# Patient Record
Sex: FEMALE | Race: WHITE | Employment: UNEMPLOYED | URBAN - METROPOLITAN AREA
[De-identification: names, ages, dates, MRNs, and addresses within clinical notes are randomized per-mention and may not be internally consistent; named-entity substitution may affect disease eponyms.]

---

## 2017-02-16 LAB — EF %, EXTERNAL: NORMAL

## 2017-02-22 LAB
CREATININE, EXTERNAL: 0.83
HBA1C MFR BLD HPLC: 7.5 %

## 2017-04-03 RX ORDER — FLUTICASONE PROPIONATE 50 MCG
SPRAY, SUSPENSION (ML) NASAL
Qty: 48 G | Refills: 4 | Status: SHIPPED | OUTPATIENT
Start: 2017-04-03 | End: 2017-09-14

## 2017-04-28 ENCOUNTER — OFFICE VISIT (OUTPATIENT)
Dept: INTERNAL MEDICINE CLINIC | Age: 47
End: 2017-04-28

## 2017-04-28 VITALS
TEMPERATURE: 98.5 F | OXYGEN SATURATION: 100 % | DIASTOLIC BLOOD PRESSURE: 70 MMHG | HEART RATE: 65 BPM | RESPIRATION RATE: 16 BRPM | SYSTOLIC BLOOD PRESSURE: 110 MMHG | WEIGHT: 140.8 LBS | HEIGHT: 55 IN | BODY MASS INDEX: 32.59 KG/M2

## 2017-04-28 DIAGNOSIS — Z13.39 SCREENING FOR ALCOHOLISM: ICD-10-CM

## 2017-04-28 DIAGNOSIS — I10 ESSENTIAL HYPERTENSION: Chronic | ICD-10-CM

## 2017-04-28 DIAGNOSIS — Z12.31 SCREENING MAMMOGRAM, ENCOUNTER FOR: ICD-10-CM

## 2017-04-28 DIAGNOSIS — E03.9 ACQUIRED HYPOTHYROIDISM: ICD-10-CM

## 2017-04-28 DIAGNOSIS — B35.3 TINEA PEDIS OF BOTH FEET: ICD-10-CM

## 2017-04-28 DIAGNOSIS — Z00.00 ROUTINE GENERAL MEDICAL EXAMINATION AT A HEALTH CARE FACILITY: Primary | ICD-10-CM

## 2017-04-28 RX ORDER — PRENATAL VIT 91/IRON/FOLIC/DHA 28-975-200
COMBINATION PACKAGE (EA) ORAL 2 TIMES DAILY
Qty: 30 G | Refills: 0 | Status: SHIPPED | OUTPATIENT
Start: 2017-04-28 | End: 2017-09-14

## 2017-04-28 NOTE — PROGRESS NOTES
Chief Complaint   Patient presents with    Complete Physical     Goals that were addressed/or need to be completed after this visit:  Health Maintenance Due   Topic Date Due    DTaP/Tdap/Td series (1 - Tdap) 08/03/1990    EYE EXAM RETINAL OR DILATED Q1  04/22/2016    MEDICARE YEARLY EXAM  08/04/2016    FOOT EXAM Q1  08/04/2016    LIPID PANEL Q1  10/29/2016    MICROALBUMIN Q1  05/04/2017     Health Maintenance:  Glaucoma Screening: up to date - record requested from Dr. Leonel Rosales. Immunizations:   Tetanus: confirmed w/ pharmacy [facility] on 1/15/2016 - updated in . Influenza: up to date. Annual Wellness Visit: - performed today. Diabetic Foot Exam:  Order placed. Labs:    Lipid:  Order placed. Microalbumin:  Order placed.

## 2017-04-28 NOTE — MR AVS SNAPSHOT
Visit Information Date & Time Provider Department Dept. Phone Encounter #  
 4/28/2017  4:30 PM Prasad Boyle MD [address] [address] Internal Medicine [phone] [id number] Upcoming Health Maintenance Date Due  
 EYE EXAM RETINAL OR DILATED Q1 4/22/2016 MEDICARE YEARLY EXAM 8/4/2016 FOOT EXAM Q1 8/4/2016 LIPID PANEL Q1 10/29/2016 MICROALBUMIN Q1 5/4/2017 HEMOGLOBIN A1C Q6M 8/22/2017 DTaP/Tdap/Td series (2 - Td) 1/15/2026 Allergies as of 4/28/2017  Review Complete On: 4/28/2017 By: Lydia Fajardo LPN Severity Noted Reaction Type Reactions Acetaminophen  04/29/2013    Other (comments) Patient has implanted glucometer. Glucometer does not work properly when patient takes acetaminophen. Codeine  03/01/2010    Nausea and Vomiting Nitrofurantoin  10/30/2010    Rash Pcn [Penicillins]  03/01/2010    Hives, Rash Current Immunizations  Reviewed on 6/10/2016 Name Date Influenza Vaccine 10/1/2015 Influenza Vaccine Whole 10/2/2012 Pneumococcal Vaccine (Unspecified Type) 1/1/2007 Tdap 1/15/2016 Not reviewed this visit You Were Diagnosed With   
  
 Codes Comments Uncontrolled type 1 diabetes mellitus without complication (HCC)    -  Primary ICD-10-CM: E10.9 ICD-9-CM: 250.03 Essential hypertension     ICD-10-CM: I10 
ICD-9-CM: 401.9 Acquired hypothyroidism     ICD-10-CM: E03.9 ICD-9-CM: 244.9 Tinea pedis of both feet     ICD-10-CM: B35.3 ICD-9-CM: 110.4 Screening mammogram, encounter for     ICD-10-CM: Z12.31 
ICD-9-CM: V76.12 Routine general medical examination at a health care facility     ICD-10-CM: Z00.00 ICD-9-CM: V70.0 Screening for alcoholism     ICD-10-CM: Z13.89 ICD-9-CM: V79.1 Vitals BP Pulse Temp Resp Height(growth percentile) Weight(growth percentile) 110/70 65 98.5 °F (36.9 °C) (Oral) 16 4' 6.5\" (1.384 m) 140 lb 12.8 oz (63.9 kg) LMP SpO2 BMI OB Status Smoking Status 03/27/1986 100% 33.33 kg/m2 Hysterectomy Never Smoker Vitals History BMI and BSA Data Body Mass Index Body Surface Area  
 33.33 kg/m 2 1.57 m 2 Preferred Pharmacy Pharmacy Name Phone [address] [facility], [address] [facility] [address] Rohan Valadez Your Updated Medication List  
  
   
This list is accurate as of: 4/28/17  5:15 PM.  Always use your most recent med list.  
  
  
  
  
  insulin pump Misc Commonly known as:  PATIENT SUPPLIED  
by SubCUTAneous route as needed. ADVIL 200 mg tablet Generic drug:  ibuprofen Take 400 mg by mouth every six (6) hours as needed for Pain. albuterol 2.5 mg /3 mL (0.083 %) nebulizer solution Commonly known as:  PROVENTIL VENTOLIN  
3 mL by Nebulization route every four (4) hours as needed for Wheezing and Shortness of Breath. aspirin delayed-release 81 mg tablet Take 81 mg by mouth daily. biotin 500 mcg Cap Take 1 Cap by mouth daily. Cetirizine 10 mg Cap Commonly known as:  ZyrTEC Take 1 Tab by mouth daily. fluticasone 50 mcg/actuation nasal spray Commonly known as:  Creasie Ingram Use 2 sprays in each  nostril daily HumaLOG 100 unit/mL injection Generic drug:  insulin lispro  
by SubCUTAneous route. Sliding scale per Endo  
  
 levothyroxine 112 mcg tablet Commonly known as:  SYNTHROID Take 1 tablet by mouth  daily before breakfast  
  
 minocycline 50 mg capsule Commonly known as:  Landon Ruck Take 1 capsule by mouth  daily  
  
 terbinafine HCl 1 % topical cream  
Commonly known as:  LAMISIL Apply  to affected area two (2) times a day. VITAMIN D2 PO Take 50,000 Int'l Units by mouth every seven (7) days. Prescriptions Sent to Pharmacy Refills  
 terbinafine HCl (LAMISIL) 1 % topical cream 0 Sig: Apply  to affected area two (2) times a day.   
 Class: Normal  
 Pharmacy: [address], [address]  #: [phone] Route: Topical  
  
We Performed the Following CBC WITH AUTOMATED DIFF [63607 CPT(R)] HEPATIC FUNCTION PANEL [25891 CPT(R)]  DIABETES FOOT EXAM [HM7 Custom] LIPID PANEL [96228 CPT(R)] MICROALBUMIN, UR, RAND W/ MICROALBUMIN/CREA RATIO [id number] CPT(R)] UA/M W/RFLX CULTURE, ROUTINE [[id number] Custom] To-Do List   
 04/28/2017 Imaging:  [facility] MAMMO BI SCREENING INCL CAD Patient Instructions Medicare Wellness Visit, Female The best way to live healthy is to have a healthy lifestyle by eating a well-balanced diet, exercising regularly, limiting alcohol and stopping smoking. Regular physical exams and screening tests are another way to keep healthy. Preventive exams provided by your health care provider can find health problems before they become diseases or illnesses. Preventive services including immunizations, screening tests, monitoring and exams can help you take care of your own health. All people over age 72 should have a pneumovax  and and a prevnar shot to prevent pneumonia. These are once in a lifetime unless you and your provider decide differently. All people over 65 should have a yearly flu shot and a tetanus vaccine every 10 years. A bone mass density to screen for osteoporosis or thinning of the bones should be done every 2 years after 65. Screening for diabetes mellitus with a blood sugar test should be done every year. Glaucoma is a disease of the eye due to increased ocular pressure that can lead to blindness and it should be done every year by an eye professional. 
 
Cardiovascular screening tests that check for elevated lipids (fatty part of blood) which can lead to heart disease and strokes should be done every 5 years.  
 
Colorectal screening that evaluates for blood or polyps in your colon should be done yearly as a stool test or every five years as a flexible sigmoidoscope or every 10 years as a colonoscopy up to age 76. Breast cancer screening with a mammogram is recommended biennially  for women age 54-69. Screening for cervical cancer with a pap smear and pelvic exam is recommended for women after age 72 years every 2 years up to age 79 or when the provider and patient decide to stop. If there is a history of cervical abnormalities or other increased risk for cancer then the test is recommended yearly. Hepatitis C screening is also recommended for anyone born between [birth year] through [address]. A shingles vaccine is also recommended once in a lifetime after age 61. Your Medicare Wellness Exam is recommended annually. Here is a list of your current Health Maintenance items with a due date: 
Health Maintenance Due Topic Date Due Won Rivas Eye Exam  04/22/2016 Won Rivas Annual Well Visit  08/04/2016 Won Rivas Diabetic Foot Care  08/04/2016  Cholesterol Test   10/29/2016  Albumin Urine Test  05/04/2017 Introducing \[facility]\"" & HEALTH SERVICES! Dear Casandra Castellanos: Thank you for requesting a [employer] account. Our records indicate that you already have an active [employer] account. You can access your account anytime at [email or web address] Did you know that you can access your hospital and ER discharge instructions at any time in [employer]? You can also review all of your test results from your hospital stay or ER visit. Additional Information If you have questions, please visit the Frequently Asked Questions section of the [employer] website at [email or web address] Remember, [employer] is NOT to be used for urgent needs. For medical emergencies, dial 911. Now available from your iPhone and Android! Please provide this summary of care documentation to your next provider. Your primary care clinician is listed as Isabella [phone]  If you have any questions after today's visit, please call [phone].

## 2017-04-28 NOTE — PATIENT INSTRUCTIONS

## 2017-04-28 NOTE — PROGRESS NOTES
This is an Initial Medicare Annual Wellness Exam (AWV) (Performed 12 months after IPPE or effective date of Medicare Part B enrollment, Once in a lifetime)  Also for followup of DM, thyroid, and recent URI. Has suffered a cough for the last 10 days or so. Is better now. Minimal sputum. No fever or chills. No PND or Orthopnea. No change in bowels or bladder. I have reviewed the patient's medical history in detail and updated the computerized patient record. History     Past Medical History:   Diagnosis Date    Allergic rhinitis, cause unspecified     Asthma     Diabetes ([facility] [address]     Down's syndrome     H/O impacted cerumen     Dr. Verena Haley Hypothyroid     Thyroid disease       Past Surgical History:   Procedure Laterality Date    HX HEENT Bilateral 2017    prior in one eye    HX HEENT Left 3/5/15    cataract left and right    HX HYSTERECTOMY       Current Outpatient Prescriptions   Medication Sig Dispense Refill    terbinafine HCl (LAMISIL) 1 % topical cream Apply  to affected area two (2) times a day. 30 g 0    fluticasone (FLONASE) 50 mcg/actuation nasal spray Use 2 sprays in each  nostril daily 48 g 4    levothyroxine (SYNTHROID) 112 mcg tablet Take 1 tablet by mouth  daily before breakfast 90 Tab 3    Cetirizine (ZYRTEC) 10 mg cap Take 1 Tab by mouth daily. 90 Cap 1    insulin lispro (HUMALOG) 100 unit/mL injection by SubCUTAneous route. Sliding scale per Endo      ibuprofen (ADVIL) 200 mg tablet Take 400 mg by mouth every six (6) hours as needed for Pain.   insulin pump (PATIENT SUPPLIED) misc by SubCUTAneous route as needed.  biotin 500 mcg cap Take 1 Cap by mouth daily.  albuterol (PROVENTIL VENTOLIN) 2.5 mg /3 mL (0.083 %) nebulizer solution 3 mL by Nebulization route every four (4) hours as needed for Wheezing and Shortness of Breath. 60 Each 2    aspirin delayed-release 81 mg tablet Take 81 mg by mouth daily.       ERGOCALCIFEROL (VITAMIN D PO) Take 50,000 Int'l Units by mouth every seven (7) days.  minocycline (MINOCIN, DYNACIN) 50 mg capsule Take 1 capsule by mouth  daily 90 Cap 3     Allergies   Allergen Reactions    Acetaminophen Other (comments)     Patient has implanted glucometer. Glucometer does not work properly when patient takes acetaminophen.  Codeine Nausea and Vomiting    Nitrofurantoin Rash    Pcn [Penicillins] Hives and Rash     Family History   Problem Relation Age of Onset    Thyroid Disease Mother      hypo    Hypertension Father      Social History   Substance Use Topics    Smoking status: Never Smoker    Smokeless tobacco: Never Used    Alcohol use No     Patient Active Problem List   Diagnosis Code    Down syndrome Q90.9    Acquired hypothyroidism E03.9    Anemia, unspecified D64.9    Hypoglycemia, unspecified E16.2    DM (diabetes mellitus), type 1, uncontrolled ([facility] E10.65    Syncope and collapse R55    Asthma J45.909    HTN (hypertension) I10    Acne L70.9    Advance directive on file Z78.9         Depression Risk Factor Screening:     PHQ 2 / 9, over the last two weeks 6/10/2016   Little interest or pleasure in doing things Not at all   Feeling down, depressed or hopeless Not at all   Total Score PHQ 2 0     Alcohol Risk Factor Screening: On any occasion during the past 3 months, have you had more than 3 drinks containing alcohol? No    Do you average more than 7 drinks per week? No    Functional Ability and Level of Safety:     Hearing Loss   normal-to-mild    Activities of Daily Living   Self-care. Requires assistance with: no ADLs    Fall Risk     Fall Risk Assessment, last 12 mths 8/4/2015   Able to walk? Yes   Fall in past 12 months? No     Abuse Screen   Patient is not abused    Review of Systems   A comprehensive review of systems was negative except for: seeing the endocrine MD for followup as well as good sugars when checked.      Physical Examination     No exam data present    Evaluation of Cognitive Function:  Mood/affect:  happy  Appearance: age appropriate  Family member/caregiver input: Mom and sister in the room and concur with her history. Visit Vitals    /70    Pulse 65    Temp 98.5 °F (36.9 °C) (Oral)    Resp 16    Ht 4' 6.5\" (1.384 m)    Wt 140 lb 12.8 oz (63.9 kg)    LMP 03/27/1986    SpO2 100%    BMI 33.33 kg/m2     General appearance: alert, cooperative, no distress, appears stated age  Head: Normocephalic, without obvious abnormality, atraumatic  Eyes: negative  Ears: normal TM's and external ear canals AU  Nose: Nares normal. Septum midline. Mucosa normal. No drainage or sinus tenderness. Throat: Lips, mucosa, and tongue normal. Teeth and gums normal  Neck: supple, symmetrical, trachea midline, no adenopathy, thyroid: not enlarged, symmetric, no tenderness/mass/nodules, no carotid bruit and no JVD  Back: symmetric, no curvature. ROM normal. No CVA tenderness. Lungs: clear to auscultation bilaterally  Heart: regular rate and rhythm, S1, S2 normal, no murmur, click, rub or gallop  Abdomen: soft, non-tender. Bowel sounds normal. No masses,  no organomegaly  Extremities: extremities normal, atraumatic, no cyanosis or edema  Pulses: 2+ and symmetric  Lymph nodes: Cervical, supraclavicular, and axillary nodes normal.  Neurologic: Grossly normal       Diabetic foot exam performed by Larisa Valladares MD     Measurement  Response Nurse Comment Physician Comment   Monofilament  R - 6/6  L - 6/6     Pulse DP R - present  L - present     Pulse TP R - present  L - present     Structural deformity R - Yes - absent nails  L - Yes - absent nails     Skin Integrity / Deformity R - Yes - red scaly and irritated skin on the bottom of foot  L - Yes - red and dry scaly skin on the bottom of the feet.          Reviewed by:               Patient Care Team:  Larisa Valladares MD as PCP - [address] [place] [address], RN as [address] [place] [address] (Internal Medicine)  Corinne Sage, MD as Physician (Endocrinology)  Ashkan Black MD as Physician (Ophthalmology)  Bryant Avila MD as Physician (Ophthalmology)  Kareem Bailey MD as Physician (Otolaryngology)  Harrison Lord III, MD as Physician (Dermatology)    Advice/Referrals/Counseling   Education and counseling provided:  Are appropriate based on today's review and evaluation  End-of-Life planning (with patient's consent)  Screening Mammography      Assessment/Plan   Kev Monique was seen today for complete physical.    Diagnoses and all orders for this visit:    Uncontrolled type 1 diabetes mellitus without complication ([address] - on a pump and seeing endocrine MD for followup. Controlled per her mom. -     UA/M W/RFLX CULTURE, ROUTINE  -     CBC WITH AUTOMATED DIFF  -     MICROALBUMIN, UR, RAND W/ MICROALBUMIN/CREA RATIO  -     LIPID PANEL  -     HM DIABETES FOOT EXAM  -     HEPATIC FUNCTION PANEL    Essential hypertension - BP well controlled    Acquired hypothyroidism - euthyroid. Tinea pedis of both feet - will start DAILY topical treatment and call if not better. -     terbinafine HCl (LAMISIL) 1 % topical cream; Apply  to affected area two (2) times a day. Screening mammogram, encounter for  -     [facility] MAMMO BI SCREENING INCL CAD; Future    Routine general medical examination at a health care facility    Screening for alcoholism  Down's syndrome - stable     Follow-up Disposition: 6 months and as needed. Advised her to call back or return to office if symptoms worsen/change/persist.  Discussed expected course/resolution/complications of diagnosis in detail with patient. Medication risks/benefits/costs/interactions/alternatives discussed with patient. She was given an after visit summary which includes diagnoses, current medications, & vitals. She expressed understanding with the diagnosis and plan. Jarek Kaminski

## 2017-08-21 RX ORDER — MINOCYCLINE HYDROCHLORIDE 50 MG/1
CAPSULE ORAL
Qty: 90 CAP | Refills: 3 | Status: SHIPPED | OUTPATIENT
Start: 2017-08-21 | End: 2021-01-01

## 2017-09-13 DIAGNOSIS — Z11.1 SCREENING-PULMONARY TB: Primary | ICD-10-CM

## 2017-09-14 ENCOUNTER — TELEPHONE (OUTPATIENT)
Dept: INTERNAL MEDICINE CLINIC | Age: 47
End: 2017-09-14

## 2017-09-14 ENCOUNTER — HOSPITAL ENCOUNTER (INPATIENT)
Age: 47
LOS: 2 days | Discharge: HOME | DRG: 638 | End: 2017-09-16
Attending: EMERGENCY MEDICINE | Admitting: INTERNAL MEDICINE
Payer: MEDICARE

## 2017-09-14 ENCOUNTER — APPOINTMENT (OUTPATIENT)
Dept: GENERAL RADIOLOGY | Age: 47
DRG: 638 | End: 2017-09-14
Attending: INTERNAL MEDICINE
Payer: MEDICARE

## 2017-09-14 DIAGNOSIS — E10.10 DIABETIC KETOACIDOSIS WITHOUT COMA ASSOCIATED WITH TYPE 1 DIABETES MELLITUS (HCC): Primary | ICD-10-CM

## 2017-09-14 PROBLEM — E11.10 DKA (DIABETIC KETOACIDOSES): Status: ACTIVE | Noted: 2017-09-14

## 2017-09-14 LAB
ADMINISTERED INITIALS, ADMINIT: NORMAL
ALBUMIN SERPL-MCNC: 3.2 G/DL (ref 3.5–5)
ALBUMIN/GLOB SERPL: 0.7 {RATIO} (ref 1.1–2.2)
ALP SERPL-CCNC: 98 U/L (ref 45–117)
ALT SERPL-CCNC: 24 U/L (ref 12–78)
ANION GAP SERPL CALC-SCNC: 15 MMOL/L (ref 5–15)
ANION GAP SERPL CALC-SCNC: 8 MMOL/L (ref 5–15)
ANION GAP SERPL CALC-SCNC: 9 MMOL/L (ref 5–15)
APPEARANCE UR: CLEAR
AST SERPL-CCNC: 22 U/L (ref 15–37)
ATRIAL RATE: 78 BPM
BACTERIA URNS QL MICRO: NEGATIVE /HPF
BASOPHILS # BLD: 0 K/UL (ref 0–0.1)
BASOPHILS # BLD: 0 K/UL (ref 0–0.1)
BASOPHILS NFR BLD: 0 % (ref 0–1)
BASOPHILS NFR BLD: 0 % (ref 0–1)
BILIRUB SERPL-MCNC: 0.6 MG/DL (ref 0.2–1)
BILIRUB UR QL: NEGATIVE
BUN SERPL-MCNC: 29 MG/DL (ref 6–20)
BUN SERPL-MCNC: 30 MG/DL (ref 6–20)
BUN SERPL-MCNC: 38 MG/DL (ref 6–20)
BUN/CREAT SERPL: 30 (ref 12–20)
BUN/CREAT SERPL: 33 (ref 12–20)
BUN/CREAT SERPL: 33 (ref 12–20)
CALCIUM SERPL-MCNC: 8.1 MG/DL (ref 8.5–10.1)
CALCIUM SERPL-MCNC: 8.1 MG/DL (ref 8.5–10.1)
CALCIUM SERPL-MCNC: 9.4 MG/DL (ref 8.5–10.1)
CALCULATED P AXIS, ECG09: 43 DEGREES
CALCULATED R AXIS, ECG10: 47 DEGREES
CALCULATED T AXIS, ECG11: 31 DEGREES
CHLORIDE SERPL-SCNC: 100 MMOL/L (ref 97–108)
CHLORIDE SERPL-SCNC: 104 MMOL/L (ref 97–108)
CHLORIDE SERPL-SCNC: 96 MMOL/L (ref 97–108)
CO2 SERPL-SCNC: 17 MMOL/L (ref 21–32)
CO2 SERPL-SCNC: 24 MMOL/L (ref 21–32)
CO2 SERPL-SCNC: 25 MMOL/L (ref 21–32)
COLOR UR: ABNORMAL
CREAT SERPL-MCNC: 0.88 MG/DL (ref 0.55–1.02)
CREAT SERPL-MCNC: 0.9 MG/DL (ref 0.55–1.02)
CREAT SERPL-MCNC: 1.25 MG/DL (ref 0.55–1.02)
D50 ADMINISTERED, D50ADM: 0 ML
D50 ORDER, D50ORD: 0 ML
DIAGNOSIS, 93000: NORMAL
DIFFERENTIAL METHOD BLD: ABNORMAL
EOSINOPHIL # BLD: 0 K/UL (ref 0–0.4)
EOSINOPHIL # BLD: 0.1 K/UL (ref 0–0.4)
EOSINOPHIL NFR BLD: 0 % (ref 0–7)
EOSINOPHIL NFR BLD: 1 % (ref 0–7)
EPITH CASTS URNS QL MICRO: ABNORMAL /LPF
ERYTHROCYTE [DISTWIDTH] IN BLOOD BY AUTOMATED COUNT: 12.5 % (ref 11.5–14.5)
ERYTHROCYTE [DISTWIDTH] IN BLOOD BY AUTOMATED COUNT: 12.8 % (ref 11.5–14.5)
EST. AVERAGE GLUCOSE BLD GHB EST-MCNC: 183 MG/DL
GLOBULIN SER CALC-MCNC: 4.9 G/DL (ref 2–4)
GLSCOM COMMENTS: NORMAL
GLUCOSE BLD STRIP.AUTO-MCNC: 128 MG/DL (ref 65–100)
GLUCOSE BLD STRIP.AUTO-MCNC: 128 MG/DL (ref 65–100)
GLUCOSE BLD STRIP.AUTO-MCNC: 142 MG/DL (ref 65–100)
GLUCOSE BLD STRIP.AUTO-MCNC: 149 MG/DL (ref 65–100)
GLUCOSE BLD STRIP.AUTO-MCNC: 180 MG/DL (ref 65–100)
GLUCOSE BLD STRIP.AUTO-MCNC: 185 MG/DL (ref 65–100)
GLUCOSE BLD STRIP.AUTO-MCNC: 196 MG/DL (ref 65–100)
GLUCOSE BLD STRIP.AUTO-MCNC: 205 MG/DL (ref 65–100)
GLUCOSE BLD STRIP.AUTO-MCNC: 220 MG/DL (ref 65–100)
GLUCOSE BLD STRIP.AUTO-MCNC: 248 MG/DL (ref 65–100)
GLUCOSE BLD STRIP.AUTO-MCNC: 312 MG/DL (ref 65–100)
GLUCOSE BLD STRIP.AUTO-MCNC: 501 MG/DL (ref 65–100)
GLUCOSE SERPL-MCNC: 159 MG/DL (ref 65–100)
GLUCOSE SERPL-MCNC: 186 MG/DL (ref 65–100)
GLUCOSE SERPL-MCNC: 531 MG/DL (ref 65–100)
GLUCOSE UR STRIP.AUTO-MCNC: >1000 MG/DL
GLUCOSE, GLC: 128 MG/DL
GLUCOSE, GLC: 128 MG/DL
GLUCOSE, GLC: 142 MG/DL
GLUCOSE, GLC: 147 MG/DL
GLUCOSE, GLC: 180 MG/DL
GLUCOSE, GLC: 185 MG/DL
GLUCOSE, GLC: 196 MG/DL
GLUCOSE, GLC: 205 MG/DL
GLUCOSE, GLC: 248 MG/DL
GLUCOSE, GLC: 312 MG/DL
HBA1C MFR BLD: 8 % (ref 4.2–6.3)
HCG SERPL QL: NEGATIVE
HCT VFR BLD AUTO: 30.7 % (ref 35–47)
HCT VFR BLD AUTO: 40 % (ref 35–47)
HGB BLD-MCNC: 10.6 G/DL (ref 11.5–16)
HGB BLD-MCNC: 13.5 G/DL (ref 11.5–16)
HGB UR QL STRIP: ABNORMAL
HIGH TARGET, HITG: 250 MG/DL
HYALINE CASTS URNS QL MICRO: ABNORMAL /LPF (ref 0–5)
INSULIN ADMINSTERED, INSADM: 0 UNITS/HOUR
INSULIN ADMINSTERED, INSADM: 0.1 UNITS/HOUR
INSULIN ADMINSTERED, INSADM: 0.8 UNITS/HOUR
INSULIN ADMINSTERED, INSADM: 1.3 UNITS/HOUR
INSULIN ADMINSTERED, INSADM: 1.4 UNITS/HOUR
INSULIN ADMINSTERED, INSADM: 2.9 UNITS/HOUR
INSULIN ADMINSTERED, INSADM: 3.8 UNITS/HOUR
INSULIN ADMINSTERED, INSADM: 5 UNITS/HOUR
INSULIN ORDER, INSORD: 0 UNITS/HOUR
INSULIN ORDER, INSORD: 0.1 UNITS/HOUR
INSULIN ORDER, INSORD: 0.8 UNITS/HOUR
INSULIN ORDER, INSORD: 1.3 UNITS/HOUR
INSULIN ORDER, INSORD: 1.4 UNITS/HOUR
INSULIN ORDER, INSORD: 2.9 UNITS/HOUR
INSULIN ORDER, INSORD: 3.8 UNITS/HOUR
INSULIN ORDER, INSORD: 5 UNITS/HOUR
KETONES UR QL STRIP.AUTO: 80 MG/DL
LACTATE SERPL-SCNC: 1.3 MMOL/L (ref 0.4–2)
LEUKOCYTE ESTERASE UR QL STRIP.AUTO: ABNORMAL
LOW TARGET, LOT: 150 MG/DL
LYMPHOCYTES # BLD: 0.6 K/UL (ref 0.8–3.5)
LYMPHOCYTES # BLD: 0.8 K/UL (ref 0.8–3.5)
LYMPHOCYTES NFR BLD: 13 % (ref 12–49)
LYMPHOCYTES NFR BLD: 7 % (ref 12–49)
MAGNESIUM SERPL-MCNC: 1.6 MG/DL (ref 1.6–2.4)
MAGNESIUM SERPL-MCNC: 1.7 MG/DL (ref 1.6–2.4)
MCH RBC QN AUTO: 31.9 PG (ref 26–34)
MCH RBC QN AUTO: 32.4 PG (ref 26–34)
MCHC RBC AUTO-ENTMCNC: 33.8 G/DL (ref 30–36.5)
MCHC RBC AUTO-ENTMCNC: 34.5 G/DL (ref 30–36.5)
MCV RBC AUTO: 92.5 FL (ref 80–99)
MCV RBC AUTO: 95.9 FL (ref 80–99)
MINUTES UNTIL NEXT BG, NBG: 60 MIN
MONOCYTES # BLD: 0.1 K/UL (ref 0–1)
MONOCYTES # BLD: 0.3 K/UL (ref 0–1)
MONOCYTES NFR BLD: 1 % (ref 5–13)
MONOCYTES NFR BLD: 5 % (ref 5–13)
MULTIPLIER, MUL: 0
MULTIPLIER, MUL: 0.01
MULTIPLIER, MUL: 0.02
NEUTS BAND NFR BLD MANUAL: 1 % (ref 0–6)
NEUTS SEG # BLD: 5.5 K/UL (ref 1.8–8)
NEUTS SEG # BLD: 7.8 K/UL (ref 1.8–8)
NEUTS SEG NFR BLD: 82 % (ref 32–75)
NEUTS SEG NFR BLD: 90 % (ref 32–75)
NITRITE UR QL STRIP.AUTO: NEGATIVE
ORDER INITIALS, ORDINIT: NORMAL
P-R INTERVAL, ECG05: 158 MS
PH UR STRIP: 5 [PH] (ref 5–8)
PHOSPHATE SERPL-MCNC: 3.3 MG/DL (ref 2.6–4.7)
PLATELET # BLD AUTO: 215 K/UL (ref 150–400)
PLATELET # BLD AUTO: 218 K/UL (ref 150–400)
POTASSIUM SERPL-SCNC: 3.9 MMOL/L (ref 3.5–5.1)
POTASSIUM SERPL-SCNC: 3.9 MMOL/L (ref 3.5–5.1)
POTASSIUM SERPL-SCNC: 4.3 MMOL/L (ref 3.5–5.1)
PROT SERPL-MCNC: 8.1 G/DL (ref 6.4–8.2)
PROT UR STRIP-MCNC: 100 MG/DL
Q-T INTERVAL, ECG07: 390 MS
QRS DURATION, ECG06: 76 MS
QTC CALCULATION (BEZET), ECG08: 444 MS
RBC # BLD AUTO: 3.32 M/UL (ref 3.8–5.2)
RBC # BLD AUTO: 4.17 M/UL (ref 3.8–5.2)
RBC #/AREA URNS HPF: ABNORMAL /HPF (ref 0–5)
RBC MORPH BLD: ABNORMAL
SERVICE CMNT-IMP: ABNORMAL
SODIUM SERPL-SCNC: 128 MMOL/L (ref 136–145)
SODIUM SERPL-SCNC: 133 MMOL/L (ref 136–145)
SODIUM SERPL-SCNC: 137 MMOL/L (ref 136–145)
SP GR UR REFRACTOMETRY: 1.02 (ref 1–1.03)
UROBILINOGEN UR QL STRIP.AUTO: 0.2 EU/DL (ref 0.2–1)
VENTRICULAR RATE, ECG03: 78 BPM
WBC # BLD AUTO: 6.7 K/UL (ref 3.6–11)
WBC # BLD AUTO: 8.6 K/UL (ref 3.6–11)
WBC URNS QL MICRO: ABNORMAL /HPF (ref 0–4)

## 2017-09-14 PROCEDURE — 74011250636 HC RX REV CODE- 250/636: Performed by: EMERGENCY MEDICINE

## 2017-09-14 PROCEDURE — 80053 COMPREHEN METABOLIC PANEL: CPT | Performed by: EMERGENCY MEDICINE

## 2017-09-14 PROCEDURE — 83036 HEMOGLOBIN GLYCOSYLATED A1C: CPT

## 2017-09-14 PROCEDURE — 84100 ASSAY OF PHOSPHORUS: CPT

## 2017-09-14 PROCEDURE — 36415 COLL VENOUS BLD VENIPUNCTURE: CPT | Performed by: EMERGENCY MEDICINE

## 2017-09-14 PROCEDURE — 93005 ELECTROCARDIOGRAM TRACING: CPT

## 2017-09-14 PROCEDURE — 85025 COMPLETE CBC W/AUTO DIFF WBC: CPT | Performed by: EMERGENCY MEDICINE

## 2017-09-14 PROCEDURE — 82962 GLUCOSE BLOOD TEST: CPT

## 2017-09-14 PROCEDURE — 65660000001 HC RM ICU INTERMED STEPDOWN

## 2017-09-14 PROCEDURE — 96361 HYDRATE IV INFUSION ADD-ON: CPT

## 2017-09-14 PROCEDURE — 83735 ASSAY OF MAGNESIUM: CPT

## 2017-09-14 PROCEDURE — 87040 BLOOD CULTURE FOR BACTERIA: CPT

## 2017-09-14 PROCEDURE — 96360 HYDRATION IV INFUSION INIT: CPT

## 2017-09-14 PROCEDURE — 84703 CHORIONIC GONADOTROPIN ASSAY: CPT | Performed by: EMERGENCY MEDICINE

## 2017-09-14 PROCEDURE — 81001 URINALYSIS AUTO W/SCOPE: CPT | Performed by: EMERGENCY MEDICINE

## 2017-09-14 PROCEDURE — 83605 ASSAY OF LACTIC ACID: CPT

## 2017-09-14 PROCEDURE — 71020 XR CHEST PA LAT: CPT

## 2017-09-14 PROCEDURE — 99285 EMERGENCY DEPT VISIT HI MDM: CPT

## 2017-09-14 PROCEDURE — 74011636637 HC RX REV CODE- 636/637: Performed by: EMERGENCY MEDICINE

## 2017-09-14 PROCEDURE — 74011000258 HC RX REV CODE- 258: Performed by: EMERGENCY MEDICINE

## 2017-09-14 PROCEDURE — 87086 URINE CULTURE/COLONY COUNT: CPT | Performed by: EMERGENCY MEDICINE

## 2017-09-14 PROCEDURE — 80048 BASIC METABOLIC PNL TOTAL CA: CPT

## 2017-09-14 PROCEDURE — 74011250636 HC RX REV CODE- 250/636: Performed by: INTERNAL MEDICINE

## 2017-09-14 RX ORDER — LEVOTHYROXINE SODIUM 50 UG/1
50 TABLET ORAL
COMMUNITY
End: 2017-09-20 | Stop reason: DRUGHIGH

## 2017-09-14 RX ORDER — DEXTROSE 50 % IN WATER (D50W) INTRAVENOUS SYRINGE
12.5-25 AS NEEDED
Status: DISCONTINUED | OUTPATIENT
Start: 2017-09-14 | End: 2017-09-16 | Stop reason: HOSPADM

## 2017-09-14 RX ORDER — ASPIRIN 81 MG/1
81 TABLET ORAL DAILY
Status: DISCONTINUED | OUTPATIENT
Start: 2017-09-15 | End: 2017-09-16 | Stop reason: HOSPADM

## 2017-09-14 RX ORDER — ONDANSETRON 2 MG/ML
4 INJECTION INTRAMUSCULAR; INTRAVENOUS
Status: DISCONTINUED | OUTPATIENT
Start: 2017-09-14 | End: 2017-09-16 | Stop reason: HOSPADM

## 2017-09-14 RX ORDER — FLUTICASONE PROPIONATE 50 MCG
1 SPRAY, SUSPENSION (ML) NASAL 2 TIMES DAILY
Status: DISCONTINUED | OUTPATIENT
Start: 2017-09-14 | End: 2017-09-16 | Stop reason: HOSPADM

## 2017-09-14 RX ORDER — LEVOTHYROXINE SODIUM 50 UG/1
50 TABLET ORAL
Status: DISCONTINUED | OUTPATIENT
Start: 2017-09-14 | End: 2017-09-16 | Stop reason: HOSPADM

## 2017-09-14 RX ORDER — DEXTROSE 50 % IN WATER (D50W) INTRAVENOUS SYRINGE
12.5-25 AS NEEDED
Status: DISCONTINUED | OUTPATIENT
Start: 2017-09-14 | End: 2017-09-14 | Stop reason: SDUPTHER

## 2017-09-14 RX ORDER — INSULIN LISPRO 100 [IU]/ML
INJECTION, SOLUTION INTRAVENOUS; SUBCUTANEOUS
COMMUNITY
End: 2020-01-19

## 2017-09-14 RX ORDER — SODIUM CHLORIDE 0.9 % (FLUSH) 0.9 %
5-10 SYRINGE (ML) INJECTION EVERY 8 HOURS
Status: DISCONTINUED | OUTPATIENT
Start: 2017-09-14 | End: 2017-09-16 | Stop reason: HOSPADM

## 2017-09-14 RX ORDER — INSULIN LISPRO 100 [IU]/ML
INJECTION, SOLUTION INTRAVENOUS; SUBCUTANEOUS
Status: DISCONTINUED | OUTPATIENT
Start: 2017-09-14 | End: 2017-09-14

## 2017-09-14 RX ORDER — MINOCYCLINE HYDROCHLORIDE 50 MG/1
50 CAPSULE ORAL DAILY
Status: DISCONTINUED | OUTPATIENT
Start: 2017-09-15 | End: 2017-09-16 | Stop reason: HOSPADM

## 2017-09-14 RX ORDER — SODIUM CHLORIDE 9 MG/ML
100 INJECTION, SOLUTION INTRAVENOUS CONTINUOUS
Status: DISCONTINUED | OUTPATIENT
Start: 2017-09-14 | End: 2017-09-16 | Stop reason: HOSPADM

## 2017-09-14 RX ORDER — MAGNESIUM SULFATE 100 %
4 CRYSTALS MISCELLANEOUS AS NEEDED
Status: DISCONTINUED | OUTPATIENT
Start: 2017-09-14 | End: 2017-09-16 | Stop reason: HOSPADM

## 2017-09-14 RX ORDER — ERGOCALCIFEROL 1.25 MG/1
50000 CAPSULE ORAL
Status: DISCONTINUED | OUTPATIENT
Start: 2017-09-17 | End: 2017-09-16 | Stop reason: HOSPADM

## 2017-09-14 RX ORDER — MAGNESIUM SULFATE 100 %
4 CRYSTALS MISCELLANEOUS AS NEEDED
Status: DISCONTINUED | OUTPATIENT
Start: 2017-09-14 | End: 2017-09-14 | Stop reason: SDUPTHER

## 2017-09-14 RX ORDER — FLUTICASONE PROPIONATE 50 MCG
1 SPRAY, SUSPENSION (ML) NASAL 2 TIMES DAILY
COMMUNITY
End: 2021-01-01

## 2017-09-14 RX ORDER — SODIUM CHLORIDE 0.9 % (FLUSH) 0.9 %
5-10 SYRINGE (ML) INJECTION AS NEEDED
Status: DISCONTINUED | OUTPATIENT
Start: 2017-09-14 | End: 2017-09-16 | Stop reason: HOSPADM

## 2017-09-14 RX ADMIN — SODIUM CHLORIDE 1000 ML: 900 INJECTION, SOLUTION INTRAVENOUS at 10:28

## 2017-09-14 RX ADMIN — SODIUM CHLORIDE 100 ML/HR: 900 INJECTION, SOLUTION INTRAVENOUS at 23:01

## 2017-09-14 RX ADMIN — SODIUM CHLORIDE 1.3 UNITS/HR: 900 INJECTION, SOLUTION INTRAVENOUS at 17:48

## 2017-09-14 RX ADMIN — SODIUM CHLORIDE 5 UNITS/HR: 900 INJECTION, SOLUTION INTRAVENOUS at 12:25

## 2017-09-14 RX ADMIN — SODIUM CHLORIDE 3.8 UNITS/HR: 900 INJECTION, SOLUTION INTRAVENOUS at 13:41

## 2017-09-14 RX ADMIN — Medication 10 ML: at 22:00

## 2017-09-14 NOTE — PROGRESS NOTES
Admission Medication Reconciliation:    Information obtained from: patient, patient's family    Significant PMH/Disease States:   Past Medical History:   Diagnosis Date    Allergic rhinitis, cause unspecified     Asthma     Diabetes ([address]     Down's syndrome     H/O impacted cerumen     Dr. Abeba Mills Hypothyroid     Thyroid disease        Chief Complaint for this Admission:  hyperglycemia    Allergies:  Acetaminophen; Codeine; Nitrofurantoin; and Pcn [penicillins]    Prior to Admission Medications:   Prior to Admission Medications   Prescriptions Last Dose Informant Patient Reported? Taking?    insulin pump (PATIENT SUPPLIED) misc 2017  Yes Yes   Sig: by SubCUTAneous route as needed. ERGOCALCIFEROL (VITAMIN D PO) 9/10/2017 Parent Yes Yes   Sig: Take 50,000 Int'l Units by mouth. Mother says pt takes 6 doses per month   aspirin delayed-release 81 mg tablet 2017  Yes Yes   Sig: Take 81 mg by mouth daily. biotin 500 mcg cap 2017  Yes Yes   Sig: Take 1 Cap by mouth daily. fluticasone (FLONASE) 50 mcg/actuation nasal spray 2017  Yes Yes   [place] by Both Nostrils route two (2) times a day. insulin lispro (HUMALOG) 100 unit/mL injection 2017  Yes Yes   Sig: by SubCUTAneous route. For use in insulin pump   levothyroxine (SYNTHROID) 50 mcg tablet 2017 at Unknown time Parent Yes Yes   Sig: Take 50 mcg by mouth five (5) days a week. minocycline (MINOCIN, DYNACIN) 50 mg capsule 2017 Parent No Yes   Sig: Take 1 capsule by mouth  daily      Facility-Administered Medications: None         Comments/Recommendations: Discussed home medications with patient and her family members. Removed lamisil cream and albuterol. Added flonase and humalog to be used in insulin pump. The only medication the patient took this morning was levothyroxine, but she threw up about 10 minutes after taking it. Takes vitamin D on Sundays. Confirmed allergies.  The patient and her family members did not have any questions at this time.     Yamile Rich, PharmD  ED EXT [phone]

## 2017-09-14 NOTE — ED TRIAGE NOTES
Triage Note: Patient is coming in with elevated blood sugar today and vomiting. Patient was in the process of having the Insulin pump changed out so the pump is not running at this time.  Patient received 10 units of Regular Inulin IM prior to arrival.

## 2017-09-14 NOTE — ED NOTES
Spoke with Dr. Sophie Altamirano and Alexsander Arzola from the DTC, plan is to start pt's personal insulin pump now and transitioned off insulin drip. Insulin drip is to be stopped TWO HOURS after insulin pump is started. MD should be consulted when insulin dripped is stopped to discharge patient if BGL remain WNL.

## 2017-09-14 NOTE — ED NOTES
Robin Allison with DTC at bedside speaking to pt's family member, Dr. Smith Catching paged to discuss recommendations.

## 2017-09-14 NOTE — DIABETES MGMT
DTC Insulin Drip/Consult   Progress Note     Recommendations/ Comments:  Patient on the Insulin drip. Drip rate 0.8 units/hr. Plan is to restart insulin pump and discontinue drip 2 hours after. Patient known to DTC from outpatient education. Reviewed pump settings and history and learned that blood sugars were > 400 mg/dL starting about mid-afternoon yesterday. Patient's mother (caregiver) was unaware of this and plans to watch patient more carefully. Patient had juice and glucose tablets yesterday, so appropriate use of low blood sugar treatments was reviewed. Chart reviewed on [address]. Patient is 49 y.o. female with a history of Type 1 Diabetes on insulin pump at home. Insulin Pump Settings    Pt on [place] (Type of Pump) and uses quick set (Type of infusion set). Basal Rates     Insulin Carb Ratios  12am to 8am = 0.5 units/hr  12am to 10am = 1 unit per 13 g   8am to 3pm = 0.6 units/hr  10am to 5pm = 1 unit per 15 g   3pm to 9:30om = 0.625 units/hr      5pm to 12am = 1 unit per 11 g   9:30pm to 12am = 0.5 units/hr     Insulin Sensitivity Factors  1 to 100 = 1 unit per 100 mg/dl 12am-7am  1 to 75 = 1 unit per 75 mg/dl  7am-9pm  Target = 100-150 mg/dL      A1c:   Lab Results   Component Value Date/Time    Hemoglobin A1c 8.0 09/14/2017 02:45 PM    Hemoglobin A1c, External 7.5 02/22/2017         Recent Glucose Results:   Lab Results   Component Value Date/Time     (H) 09/14/2017 02:45 PM     (H) 09/14/2017 09:53 AM    GLUCPOC 142 (H) 09/14/2017 04:22 PM    GLUCPOC 205 (H) 09/14/2017 02:52 PM    GLUCPOC 248 (H) 09/14/2017 01:38 PM        Lab Results   Component Value Date/Time    Creatinine 0.90 09/14/2017 02:45 PM       Active Orders   Diet    DIET DIABETIC WITH OPTIONS Consistent Carb 1800kcal; Regular; 2 GM NA (House Low NA)        PO intake: No data found. Currently on insulin gtt. Will continue to follow as needed. Thank you.   Lea Colmenares, MS, RN, CDE

## 2017-09-14 NOTE — ED NOTES
Spoke with Dr. Mallory Ramos that pt's BG is 205. Orders received to continue drip for one more hour and administer basal insulin. Pt carb count is 1 per caregiver after eating salad and unsweetened iced tea.

## 2017-09-14 NOTE — ED PROVIDER NOTES
HPI Comments: Pt. Presents to the ER with n/v and high blood sugars. Pt. Says that when she awoke this morning, she started throwing up. Pt. Threw up once but had multiple episodes of dry heaves. Pt. Has an insulin pump. It was taken off this morning to change sites. Pt. Reports \"normal\" blood sugars yesterday. It sounds like most of her blood sugars were in the mid to upper 100s yesterday, although she did dip from 101 and then up to 350 in the afternoon. Pt. Says that her nausea has resolved. No fevers/chills. No pain. No other complaints. Patient is a 49 y.o. female presenting with hyperglycemia. High Blood Sugar    Associated symptoms include nausea and vomiting. Pertinent negatives include no fever, no diarrhea, no dysuria, no arthralgias, no chest pain and no back pain. Past Medical History:   Diagnosis Date    Allergic rhinitis, cause unspecified     Asthma     Diabetes ([facility] [address]     Down's syndrome    [facility]/O carla James Hypothyroid     Thyroid disease        Past Surgical History:   Procedure Laterality Date    HX CYST REMOVAL      right shoulder    HX HEENT Bilateral 2017    prior in one eye    HX HEENT Left 3/5/15    cataract left and right    HX HYSTERECTOMY           Family History:   Problem Relation Age of Onset    Thyroid Disease Mother      hypo    Hypertension Father        Social History     Social History    Marital status: SINGLE     Spouse name: N/A    Number of children: N/A    Years of education: N/A     Occupational History    Not on file. Social History Main Topics    Smoking status: Never Smoker    Smokeless tobacco: Never Used    Alcohol use No    Drug use: No    Sexual activity: Not Currently     Other Topics Concern    Not on file     Social History Narrative         ALLERGIES: Acetaminophen; Codeine; Nitrofurantoin; and Pcn [penicillins]    Review of Systems   Constitutional: Negative for chills and fever.    HENT: Negative for rhinorrhea and sore throat. Respiratory: Negative for cough and shortness of breath. Cardiovascular: Negative for chest pain. Gastrointestinal: Positive for nausea and vomiting. Negative for abdominal pain and diarrhea. Genitourinary: Negative for dysuria and urgency. Musculoskeletal: Negative for arthralgias and back pain. Skin: Negative for rash. Neurological: Negative for dizziness, weakness and light-headedness. Vitals:    09/14/17 0942   BP: 108/58   Pulse: 80   Resp: 20   Temp: 97.7 °F (36.5 °C)   SpO2: 99%   Weight: 64 kg (141 lb)   Height: 4' 6\" (1.372 m)            Physical Exam     Vital signs reviewed. Nursing notes reviewed. Const:  No acute distress, well developed, well nourished  Head:  Atraumatic, normocephalic  Eyes:  PERRL, conjunctiva normal, no scleral icterus  Neck:  Supple, trachea midline  Cardiovascular:  RRR, no murmurs, no gallops, no rubs  Resp:  No resp distress, no increased work of breathing, no wheezes, no rhonchi, no rales,  Abd:  Soft, non-tender, non-distended, no rebound, no guarding, no CVA tenderness  :  Deferred  MSK:  No pedal edema, normal ROM  Neuro:  Alert and oriented x3, no cranial nerve defect  Skin:  Warm, dry, intact  Psych: normal mood and affect, behavior is normal, judgement and thought content is normal        MDM  Number of Diagnoses or Management Options  Diabetic ketoacidosis without coma associated with type 1 diabetes mellitus ([facility] [address]):      Amount and/or Complexity of Data Reviewed  Clinical lab tests: ordered and reviewed  Tests in the radiology section of CPT®: ordered and reviewed  Review and summarize past medical records: yes    Patient Progress  Patient progress: stable    ED Course     Pt. Presents to the ER with vomiting and hyperglycemia. Pt. Found to be in mild DKA. I have started her on insulin. Pt.  To be evaluated for admission by the hospitalist.      Procedures

## 2017-09-15 LAB
ADMINISTERED INITIALS, ADMINIT: NORMAL
ANION GAP SERPL CALC-SCNC: 10 MMOL/L (ref 5–15)
ANION GAP SERPL CALC-SCNC: 12 MMOL/L (ref 5–15)
ANION GAP SERPL CALC-SCNC: 13 MMOL/L (ref 5–15)
BACTERIA SPEC CULT: NORMAL
BUN SERPL-MCNC: 22 MG/DL (ref 6–20)
BUN SERPL-MCNC: 24 MG/DL (ref 6–20)
BUN SERPL-MCNC: 27 MG/DL (ref 6–20)
BUN/CREAT SERPL: 24 (ref 12–20)
BUN/CREAT SERPL: 27 (ref 12–20)
BUN/CREAT SERPL: 31 (ref 12–20)
CALCIUM SERPL-MCNC: 8.3 MG/DL (ref 8.5–10.1)
CALCIUM SERPL-MCNC: 8.4 MG/DL (ref 8.5–10.1)
CALCIUM SERPL-MCNC: 8.5 MG/DL (ref 8.5–10.1)
CC UR VC: NORMAL
CHLORIDE SERPL-SCNC: 101 MMOL/L (ref 97–108)
CHLORIDE SERPL-SCNC: 102 MMOL/L (ref 97–108)
CHLORIDE SERPL-SCNC: 102 MMOL/L (ref 97–108)
CO2 SERPL-SCNC: 19 MMOL/L (ref 21–32)
CO2 SERPL-SCNC: 21 MMOL/L (ref 21–32)
CO2 SERPL-SCNC: 23 MMOL/L (ref 21–32)
CREAT SERPL-MCNC: 0.88 MG/DL (ref 0.55–1.02)
CREAT SERPL-MCNC: 0.89 MG/DL (ref 0.55–1.02)
CREAT SERPL-MCNC: 0.9 MG/DL (ref 0.55–1.02)
D50 ADMINISTERED, D50ADM: 0 ML
D50 ADMINISTERED, D50ADM: 11 ML
D50 ORDER, D50ORD: 0 ML
D50 ORDER, D50ORD: 11 ML
GLSCOM COMMENTS: NORMAL
GLUCOSE BLD STRIP.AUTO-MCNC: 126 MG/DL (ref 65–100)
GLUCOSE BLD STRIP.AUTO-MCNC: 136 MG/DL (ref 65–100)
GLUCOSE BLD STRIP.AUTO-MCNC: 142 MG/DL (ref 65–100)
GLUCOSE BLD STRIP.AUTO-MCNC: 178 MG/DL (ref 65–100)
GLUCOSE BLD STRIP.AUTO-MCNC: 210 MG/DL (ref 65–100)
GLUCOSE BLD STRIP.AUTO-MCNC: 212 MG/DL (ref 65–100)
GLUCOSE BLD STRIP.AUTO-MCNC: 234 MG/DL (ref 65–100)
GLUCOSE BLD STRIP.AUTO-MCNC: 240 MG/DL (ref 65–100)
GLUCOSE BLD STRIP.AUTO-MCNC: 241 MG/DL (ref 65–100)
GLUCOSE BLD STRIP.AUTO-MCNC: 276 MG/DL (ref 65–100)
GLUCOSE BLD STRIP.AUTO-MCNC: 293 MG/DL (ref 65–100)
GLUCOSE BLD STRIP.AUTO-MCNC: 332 MG/DL (ref 65–100)
GLUCOSE BLD STRIP.AUTO-MCNC: 347 MG/DL (ref 65–100)
GLUCOSE BLD STRIP.AUTO-MCNC: 428 MG/DL (ref 65–100)
GLUCOSE BLD STRIP.AUTO-MCNC: 434 MG/DL (ref 65–100)
GLUCOSE BLD STRIP.AUTO-MCNC: 73 MG/DL (ref 65–100)
GLUCOSE BLD STRIP.AUTO-MCNC: 85 MG/DL (ref 65–100)
GLUCOSE BLD STRIP.AUTO-MCNC: 99 MG/DL (ref 65–100)
GLUCOSE SERPL-MCNC: 193 MG/DL (ref 65–100)
GLUCOSE SERPL-MCNC: 365 MG/DL (ref 65–100)
GLUCOSE SERPL-MCNC: 405 MG/DL (ref 65–100)
GLUCOSE, GLC: 142 MG/DL
GLUCOSE, GLC: 178 MG/DL
GLUCOSE, GLC: 210 MG/DL
GLUCOSE, GLC: 212 MG/DL
GLUCOSE, GLC: 234 MG/DL
GLUCOSE, GLC: 240 MG/DL
GLUCOSE, GLC: 241 MG/DL
GLUCOSE, GLC: 276 MG/DL
GLUCOSE, GLC: 293 MG/DL
GLUCOSE, GLC: 332 MG/DL
GLUCOSE, GLC: 347 MG/DL
GLUCOSE, GLC: 428 MG/DL
GLUCOSE, GLC: 434 MG/DL
GLUCOSE, GLC: 73 MG/DL
GLUCOSE, GLC: 99 MG/DL
HIGH TARGET, HITG: 250 MG/DL
INSULIN ADMINSTERED, INSADM: 0 UNITS/HOUR
INSULIN ADMINSTERED, INSADM: 0.1 UNITS/HOUR
INSULIN ADMINSTERED, INSADM: 1.1 UNITS/HOUR
INSULIN ADMINSTERED, INSADM: 1.5 UNITS/HOUR
INSULIN ADMINSTERED, INSADM: 1.8 UNITS/HOUR
INSULIN ADMINSTERED, INSADM: 11 UNITS/HOUR
INSULIN ADMINSTERED, INSADM: 14 UNITS/HOUR
INSULIN ADMINSTERED, INSADM: 14.4 UNITS/HOUR
INSULIN ADMINSTERED, INSADM: 15 UNITS/HOUR
INSULIN ADMINSTERED, INSADM: 2.2 UNITS/HOUR
INSULIN ADMINSTERED, INSADM: 3.3 UNITS/HOUR
INSULIN ADMINSTERED, INSADM: 3.9 UNITS/HOUR
INSULIN ADMINSTERED, INSADM: 5.4 UNITS/HOUR
INSULIN ORDER, INSORD: 0 UNITS/HOUR
INSULIN ORDER, INSORD: 0.1 UNITS/HOUR
INSULIN ORDER, INSORD: 1.1 UNITS/HOUR
INSULIN ORDER, INSORD: 1.5 UNITS/HOUR
INSULIN ORDER, INSORD: 1.8 UNITS/HOUR
INSULIN ORDER, INSORD: 11 UNITS/HOUR
INSULIN ORDER, INSORD: 14 UNITS/HOUR
INSULIN ORDER, INSORD: 14.4 UNITS/HOUR
INSULIN ORDER, INSORD: 15 UNITS/HOUR
INSULIN ORDER, INSORD: 2.2 UNITS/HOUR
INSULIN ORDER, INSORD: 3.3 UNITS/HOUR
INSULIN ORDER, INSORD: 3.9 UNITS/HOUR
INSULIN ORDER, INSORD: 5.4 UNITS/HOUR
LOW TARGET, LOT: 150 MG/DL
MAGNESIUM SERPL-MCNC: 1.7 MG/DL (ref 1.6–2.4)
MAGNESIUM SERPL-MCNC: 1.7 MG/DL (ref 1.6–2.4)
MAGNESIUM SERPL-MCNC: 1.8 MG/DL (ref 1.6–2.4)
MINUTES UNTIL NEXT BG, NBG: 120 MIN
MINUTES UNTIL NEXT BG, NBG: 120 MIN
MINUTES UNTIL NEXT BG, NBG: 15 MIN
MINUTES UNTIL NEXT BG, NBG: 60 MIN
MULTIPLIER, MUL: 0
MULTIPLIER, MUL: 0.01
MULTIPLIER, MUL: 0.02
MULTIPLIER, MUL: 0.03
MULTIPLIER, MUL: 0.04
MULTIPLIER, MUL: 0.04
MULTIPLIER, MUL: 0.05
MULTIPLIER, MUL: 0.05
MULTIPLIER, MUL: 0.06
ORDER INITIALS, ORDINIT: NORMAL
POTASSIUM SERPL-SCNC: 4.5 MMOL/L (ref 3.5–5.1)
POTASSIUM SERPL-SCNC: 4.5 MMOL/L (ref 3.5–5.1)
POTASSIUM SERPL-SCNC: 4.8 MMOL/L (ref 3.5–5.1)
SERVICE CMNT-IMP: ABNORMAL
SERVICE CMNT-IMP: NORMAL
SODIUM SERPL-SCNC: 134 MMOL/L (ref 136–145)
SODIUM SERPL-SCNC: 134 MMOL/L (ref 136–145)
SODIUM SERPL-SCNC: 135 MMOL/L (ref 136–145)

## 2017-09-15 PROCEDURE — 74011636637 HC RX REV CODE- 636/637: Performed by: EMERGENCY MEDICINE

## 2017-09-15 PROCEDURE — 83735 ASSAY OF MAGNESIUM: CPT

## 2017-09-15 PROCEDURE — 74011250637 HC RX REV CODE- 250/637: Performed by: INTERNAL MEDICINE

## 2017-09-15 PROCEDURE — 82962 GLUCOSE BLOOD TEST: CPT

## 2017-09-15 PROCEDURE — 36415 COLL VENOUS BLD VENIPUNCTURE: CPT

## 2017-09-15 PROCEDURE — 74011636637 HC RX REV CODE- 636/637: Performed by: HOSPITALIST

## 2017-09-15 PROCEDURE — 80048 BASIC METABOLIC PNL TOTAL CA: CPT

## 2017-09-15 PROCEDURE — 74011250636 HC RX REV CODE- 250/636: Performed by: INTERNAL MEDICINE

## 2017-09-15 PROCEDURE — 74011000258 HC RX REV CODE- 258: Performed by: EMERGENCY MEDICINE

## 2017-09-15 PROCEDURE — 65660000001 HC RM ICU INTERMED STEPDOWN

## 2017-09-15 RX ORDER — INSULIN LISPRO 100 [IU]/ML
INJECTION, SOLUTION INTRAVENOUS; SUBCUTANEOUS
Status: DISCONTINUED | OUTPATIENT
Start: 2017-09-15 | End: 2017-09-15 | Stop reason: ALTCHOICE

## 2017-09-15 RX ADMIN — SODIUM CHLORIDE 0.1 UNITS/HR: 900 INJECTION, SOLUTION INTRAVENOUS at 00:48

## 2017-09-15 RX ADMIN — SODIUM CHLORIDE 1.1 UNITS/HR: 900 INJECTION, SOLUTION INTRAVENOUS at 16:38

## 2017-09-15 RX ADMIN — Medication 10 ML: at 21:11

## 2017-09-15 RX ADMIN — SODIUM CHLORIDE 100 ML/HR: 900 INJECTION, SOLUTION INTRAVENOUS at 09:25

## 2017-09-15 RX ADMIN — FLUTICASONE PROPIONATE 1 SPRAY: 50 SPRAY, METERED NASAL at 10:45

## 2017-09-15 RX ADMIN — FLUTICASONE PROPIONATE 1 SPRAY: 50 SPRAY, METERED NASAL at 19:39

## 2017-09-15 RX ADMIN — Medication 10 ML: at 05:36

## 2017-09-15 RX ADMIN — MINOCYCLINE HYDROCHLORIDE 50 MG: 50 CAPSULE ORAL at 09:25

## 2017-09-15 RX ADMIN — LEVOTHYROXINE SODIUM 50 MCG: 50 TABLET ORAL at 06:47

## 2017-09-15 RX ADMIN — INSULIN LISPRO 4 UNITS: 100 INJECTION, SOLUTION INTRAVENOUS; SUBCUTANEOUS at 13:02

## 2017-09-15 RX ADMIN — ASPIRIN 81 MG: 81 TABLET, COATED ORAL at 09:25

## 2017-09-15 NOTE — CDMP QUERY
Documentation of Acute Renal Nickie Deiters is noted in the progress notes. Currently the patient does not meet RIFLE criteria (BSV approved) to support this diagnosis. If you are using another criteria to support this diagnosis, please document this in your progress note. Otherwise, please document in the progress notes the clinical indicators that support this diagnosis or state that the diagnosis has been ruled out. RIFLE  (BSV Approved)  RISK:  Increased SCr x 1.5 or GFR decrease > 25% (within 7 days)    INJURY:  Increased SCr x 2.0 or GFR decreased > 50%    FAILURE:  Increased SCr x 3.0 or GFR decrease > 75% or SCr >4.0 mg/dL or acute increase >0.5 mg/dL    LOSS:  Persistent acute renal failure = complete loss of kidney function > 4 weeks    END STAGE:  End stage of kidney disease > 3 months    AKIN:  STAGE  1:  Increase in SCr >/= 0.3 mg/dL or >/= 150% to 200% (1.5 to 2-fold) from baseline (within 48 hours)    STAGE  2:  Increase in SCr to more than 200% to 300% (>2-3 fold) from baseline    STAGE  3:  Increase in SCr to more than 300% (>3-fold) from baseline or SCr >/= 4.0 mg/dL with an acute increase of at least 0.5 mg/dL or initiation of renal replacement therapy    KDIGO:  STAGE  1:  Increase in SCr by >/= 0.3 mg/dL within 48 hours or increase in SCr 1.5 to 1.9 times baseline which is known or presumed to have occurred within the prior 7 days    STAGE  2:  Increase in SCr to 2.0 to 2.9 times baseline    STAGE  3:  Increase in SCr to 3.0 times baseline or increase in SCr to >/= baseline or increase in SCr to >/= 4.0 mg/dL or initiation of renal replacement therapy    Please clarify and document your clinical opinion in the progress notes and discharge summary including the definitive and/or presumptive diagnosis, (suspected or probable), related to the above clinical findings. Please include clinical findings supporting your diagnosis.     Thank you   Devante العراقي  [facility]  [phone]

## 2017-09-15 NOTE — H&P
History & Physical  Teri Altamirano DO    Date of admission: 9/14/2017    Patient name: Kahtie Jimenez  MRN: [id number]  YOB: 1970  Age: 49 y.o. Primary care provider:  Nyla Frost MD     Source of Information: patient, medical records and parents                               Chief complaint: high sugar    History of present illness  Kathie Jimenez is a 49 y.o. female who presents with high blood glucose. She has a history of down's syndrome and DM2. She is on an insulin pump and states she was nauseated all night and when she awoke she took her morning medications and threw up. She checked her blood glucose and it was elevated in the 500's. She was set to replace her insulin pump today and noted it was functioning properly. Her mother does confirm this as well. She came to the ER for further evaluation. In the ER she was found to be in mild DKA. She was started on an insulin drip and labs were drawn every 4 hours. Prior to her being moved up to a room her gap closed and she was seen by DM education. It was felt she likely had a GI infection and now is back to her baseline. She was restarted on her Pump and she was weaned off the drip. However, her BG continued to be elevated. She will continue to be admitted for control of her BG. Past Medical History:   Diagnosis Date    Allergic rhinitis, cause unspecified     Asthma     Diabetes ([facility] [address]     Down's syndrome     H/O impacted cerumen     Dr. Lorenza Lundborg Hypothyroid     Thyroid disease       Past Surgical History:   Procedure Laterality Date    HX CYST REMOVAL      right shoulder    HX HEENT Bilateral 2017    prior in one eye    HX HEENT Left 3/5/15    cataract left and right    HX HYSTERECTOMY       Prior to Admission medications    Medication Sig Start Date End Date Taking?  Authorizing Provider   levothyroxine (SYNTHROID) 50 mcg tablet Take 50 mcg by mouth five (5) days a week. Yes Historical Provider   insulin lispro (HUMALOG) 100 unit/mL injection by SubCUTAneous route. For use in insulin pump   Yes Historical Provider   fluticasone (FLONASE) 50 mcg/actuation nasal spray 1 [place] by Both Nostrils route two (2) times a day. Yes Historical Provider   minocycline (MINOCIN, DYNACIN) 50 mg capsule Take 1 capsule by mouth  daily 8/21/17  Yes Karen Pretty III, MD    insulin pump (PATIENT SUPPLIED) misc by SubCUTAneous route as needed. Yes Historical Provider   biotin 500 mcg cap Take 1 Cap by mouth daily. Yes Historical Provider   aspirin delayed-release 81 mg tablet Take 81 mg by mouth daily. Yes Historical Provider   ERGOCALCIFEROL (VITAMIN D PO) Take 50,000 Int'l Units by mouth. Mother says pt takes 6 doses per month   Yes Historical Provider     Allergies   Allergen Reactions    Acetaminophen Other (comments)     Patient has implanted glucometer. Glucometer does not work properly when patient takes acetaminophen.  Codeine Nausea and Vomiting    Nitrofurantoin Rash    Pcn [Penicillins] Hives and Rash      Family History   Problem Relation Age of Onset    Thyroid Disease Mother      hypo    Hypertension Father          Social history  Patient resides    Independently    x  With family care      Assisted living      SNF    Ambulates  x  Independently      With cane       Assisted walker         Alcohol history   x  None     Social     Chronic   Smoking history  x  None     Former smoker     Current smoker     Denies illicit drug use. History   Smoking Status    Never Smoker   Smokeless Tobacco    Never Used       Code status  x  Full code     DNR/DNI        Code status discussed with the patient/caregivers.     Review of systems  Constitutional: negative  Eyes: negative  Ears, Nose, Mouth, Throat, and Face: negative  Respiratory: negative  Cardiovascular: negative  Gastrointestinal: negative  Genitourinary:negative  Musculoskeletal:negative  Neurological: negative  Allergic/Immunologic: negative   The remainder of the review of systems was reviewed and is noncontributory.     Physical Examination   Visit Vitals    /76 (BP 1 Location: Right arm, BP Patient Position: At rest)    Pulse 73    Temp 98.4 °F (36.9 °C)    Resp 20    Ht 4' 6\" (1.372 m)    Wt 141 lb (64 kg)    LMP 1986    SpO2 100%    BMI 34 kg/m2          O2 Device: Room air    Gen: awake, NAD, cooperative, pleasant, Down's syndrome  Head: NCAT  EENT: PERRL, EOMI, MMM, oropharynx benign  Neck: supple, no masses  Lungs: CTAB, no w/r/r  CVS: regular rhythm, normal rate, S1S2, AMADO, no peripheral edema, +pulses  Abd: +BS, soft, NT, ND  MSK: moves all extremities  Neuro: AOx3, CN II-XII grossly intact, NFD, responds appropriately to questions and commands  Skin: warm, dry; no rashes, lesions, ulcers noted    Data Review    EK Hour Results:  Recent Results (from the past 24 hour(s))   GLUCOSE, POC    Collection Time: 17  9:44 AM   Result Value Ref Range    Glucose (POC) 501 (H) 65 - 100 mg/dL    Performed by SHRUTHI ZAVALA    EKG, 12 LEAD, INITIAL    Collection Time: 17  9:49 AM   Result Value Ref Range    Ventricular Rate 78 BPM    Atrial Rate 78 BPM    P-R Interval 158 ms    QRS Duration 76 ms    Q-T Interval 390 ms    QTC Calculation (Bezet) 444 ms    Calculated P Axis 43 degrees    Calculated R Axis 47 degrees    Calculated T Axis 31 degrees    Diagnosis       Normal sinus rhythm  When compared with ECG of 2013 12:01,  No significant change was found  Confirmed by lEe Trujillo MD, Chester Morris ([id number]) on 2017 4:29:44 PM     CBC WITH AUTOMATED DIFF    Collection Time: 17  9:53 AM   Result Value Ref Range    WBC 8.6 3.6 - 11.0 K/uL    RBC 4.17 3.80 - 5.20 M/uL    HGB 13.5 11.5 - 16.0 g/dL    HCT 40.0 35.0 - 47.0 %    MCV 95.9 80.0 - 99.0 FL    MCH 32.4 26.0 - 34.0 PG    MCHC 33.8 30.0 - 36.5 g/dL    RDW 12.8 11.5 - 14.5 %    PLATELET 931 274 - 508 K/uL    NEUTROPHILS 90 (H) 32 - 75 %    BAND NEUTROPHILS 1 0 - 6 %    LYMPHOCYTES 7 (L) 12 - 49 %    MONOCYTES 1 (L) 5 - 13 %    EOSINOPHILS 1 0 - 7 %    BASOPHILS 0 0 - 1 %    ABS. NEUTROPHILS 7.8 1.8 - 8.0 K/UL    ABS. LYMPHOCYTES 0.6 (L) 0.8 - 3.5 K/UL    ABS. MONOCYTES 0.1 0.0 - 1.0 K/UL    ABS. EOSINOPHILS 0.1 0.0 - 0.4 K/UL    ABS. BASOPHILS 0.0 0.0 - 0.1 K/UL    DF MANUAL      RBC COMMENTS NORMOCYTIC, NORMOCHROMIC     METABOLIC PANEL, COMPREHENSIVE    Collection Time: 09/14/17  9:53 AM   Result Value Ref Range    Sodium 128 (L) 136 - 145 mmol/L    Potassium 4.3 3.5 - 5.1 mmol/L    Chloride 96 (L) 97 - 108 mmol/L    CO2 17 (L) 21 - 32 mmol/L    Anion gap 15 5 - 15 mmol/L    Glucose 531 (H) 65 - 100 mg/dL    BUN 38 (H) 6 - 20 MG/DL    Creatinine 1.25 (H) 0.55 - 1.02 MG/DL    BUN/Creatinine ratio 30 (H) 12 - 20      GFR est AA 55 (L) >60 ml/min/1.73m2    GFR est non-AA 46 (L) >60 ml/min/1.73m2    Calcium 9.4 8.5 - 10.1 MG/DL    Bilirubin, total 0.6 0.2 - 1.0 MG/DL    ALT (SGPT) 24 12 - 78 U/L    AST (SGOT) 22 15 - 37 U/L    Alk.  phosphatase 98 45 - 117 U/L    Protein, total 8.1 6.4 - 8.2 g/dL    Albumin 3.2 (L) 3.5 - 5.0 g/dL    Globulin 4.9 (H) 2.0 - 4.0 g/dL    A-G Ratio 0.7 (L) 1.1 - 2.2     HCG QL SERUM    Collection Time: 09/14/17  9:53 AM   Result Value Ref Range    HCG, Ql. NEGATIVE  NEG     URINALYSIS W/ RFLX MICROSCOPIC    Collection Time: 09/14/17 10:27 AM   Result Value Ref Range    Color YELLOW/STRAW      Appearance CLEAR CLEAR      Specific gravity 1.023 1.003 - 1.030      pH (UA) 5.0 5.0 - 8.0      Protein 100 (A) NEG mg/dL    Glucose >1000 (A) NEG mg/dL    Ketone 80 (A) NEG mg/dL    Bilirubin NEGATIVE  NEG      Blood SMALL (A) NEG      Urobilinogen 0.2 0.2 - 1.0 EU/dL    Nitrites NEGATIVE  NEG      Leukocyte Esterase TRACE (A) NEG      WBC 10-20 0 - 4 /hpf    RBC 0-5 0 - 5 /hpf    Epithelial cells FEW FEW /lpf    Bacteria NEGATIVE  NEG /hpf    Hyaline cast 0-2 0 - 5 /lpf   GLUCOSE, POC    Collection Time: 09/14/17 12:16 PM   Result Value Ref Range    Glucose (POC) 312 (H) 65 - 100 mg/dL    Performed by [place]    Collection Time: 09/14/17 12:23 PM   Result Value Ref Range    Glucose 312 mg/dL    Insulin order 5.0 units/hour    Insulin adminstered 5.0 units/hour    Multiplier 0.020     Low target 150 mg/dL    High target 250 mg/dL    D50 order 0.0 ml    D50 administered 0.00 ml    Minutes until next BG 60 min    Order initials vg     Administered initials vg     GLSCOM Comments     GLUCOSE, POC    Collection Time: 09/14/17  1:38 PM   Result Value Ref Range    Glucose (POC) 248 (H) 65 - 100 mg/dL    Performed by La Jose Ariel    Collection Time: 09/14/17  1:40 PM   Result Value Ref Range    Glucose 248 mg/dL    Insulin order 3.8 units/hour    Insulin adminstered 3.8 units/hour    Multiplier 0.020     Low target 150 mg/dL    High target 250 mg/dL    D50 order 0.0 ml    D50 administered 0.00 ml    Minutes until next BG 60 min    Order initials vg     Administered initials vg     GLSCOM Comments     METABOLIC PANEL, BASIC    Collection Time: 09/14/17  2:45 PM   Result Value Ref Range    Sodium 137 136 - 145 mmol/L    Potassium 3.9 3.5 - 5.1 mmol/L    Chloride 104 97 - 108 mmol/L    CO2 25 21 - 32 mmol/L    Anion gap 8 5 - 15 mmol/L    Glucose 186 (H) 65 - 100 mg/dL    BUN 30 (H) 6 - 20 MG/DL    Creatinine 0.90 0.55 - 1.02 MG/DL    BUN/Creatinine ratio 33 (H) 12 - 20      GFR est AA >60 >60 ml/min/1.73m2    GFR est non-AA >60 >60 ml/min/1.73m2    Calcium 8.1 (L) 8.5 - 10.1 MG/DL   MAGNESIUM    Collection Time: 09/14/17  2:45 PM   Result Value Ref Range    Magnesium 1.7 1.6 - 2.4 mg/dL   PHOSPHORUS    Collection Time: 09/14/17  2:45 PM   Result Value Ref Range    Phosphorus 3.3 2.6 - 4.7 MG/DL   CBC WITH AUTOMATED DIFF    Collection Time: 09/14/17  2:45 PM   Result Value Ref Range    WBC 6.7 3.6 - 11.0 K/uL    RBC 3.32 (L) 3.80 - 5.20 M/uL    HGB 10.6 (L) 11.5 - 16.0 g/dL    HCT 30.7 (L) 35.0 - 47.0 %    MCV 92.5 80.0 - 99.0 FL    MCH 31.9 26.0 - 34.0 PG    MCHC 34.5 30.0 - 36.5 g/dL    RDW 12.5 11.5 - 14.5 %    PLATELET 851 337 - 937 K/uL    NEUTROPHILS 82 (H) 32 - 75 %    LYMPHOCYTES 13 12 - 49 %    MONOCYTES 5 5 - 13 %    EOSINOPHILS 0 0 - 7 %    BASOPHILS 0 0 - 1 %    ABS. NEUTROPHILS 5.5 1.8 - 8.0 K/UL    ABS. LYMPHOCYTES 0.8 0.8 - 3.5 K/UL    ABS. MONOCYTES 0.3 0.0 - 1.0 K/UL    ABS. EOSINOPHILS 0.0 0.0 - 0.4 K/UL    ABS.  BASOPHILS 0.0 0.0 - 0.1 K/UL   HEMOGLOBIN A1C WITH EAG    Collection Time: 09/14/17  2:45 PM   Result Value Ref Range    Hemoglobin A1c 8.0 (H) 4.2 - 6.3 %    Est. average glucose 183 mg/dL   LACTIC ACID    Collection Time: 09/14/17  2:45 PM   Result Value Ref Range    Lactic acid 1.3 0.4 - 2.0 MMOL/L   GLUCOSE, POC    Collection Time: 09/14/17  2:52 PM   Result Value Ref Range    Glucose (POC) 205 (H) 65 - 100 mg/dL    Performed by [facility] [address]    Collection Time: 09/14/17  2:58 PM   Result Value Ref Range    Glucose 205 mg/dL    Insulin order 2.9 units/hour    Insulin adminstered 2.9 units/hour    Multiplier 0.020     Low target 150 mg/dL    High target 250 mg/dL    D50 order 0.0 ml    D50 administered 0.00 ml    Minutes until next BG 60 min    Order initials vg     Administered initials vg     GLSCOM Comments     GLUCOSE, POC    Collection Time: 09/14/17  4:22 PM   Result Value Ref Range    Glucose (POC) 142 (H) 65 - 100 mg/dL    Performed by [facility] [employer] [facility]    Collection Time: 09/14/17  4:37 PM   Result Value Ref Range    Glucose 142 mg/dL    Insulin order 0.8 units/hour    Insulin adminstered 0.8 units/hour    Multiplier 0.010     Low target 150 mg/dL    High target 250 mg/dL    D50 order 0.0 ml    D50 administered 0.00 ml    Minutes until next BG 60 min    Order initials OM     Administered initials OM     GLSCOM Comments     GLUCOSE, POC Collection Time: 09/14/17  5:43 PM   Result Value Ref Range    Glucose (POC) 185 (H) 65 - 100 mg/dL    Performed by Liu Mims    Collection Time: 09/14/17  5:47 PM   Result Value Ref Range    Glucose 185 mg/dL    Insulin order 1.3 units/hour    Insulin adminstered 1.3 units/hour    Multiplier 0.010     Low target 150 mg/dL    High target 250 mg/dL    D50 order 0.0 ml    D50 administered 0.00 ml    Minutes until next BG 60 min    Order initials OM     Administered initials OM     GLSCOM Comments     GLUCOSE, POC    Collection Time: 09/14/17  6:34 PM   Result Value Ref Range    Glucose (POC) 196 (H) 65 - 100 mg/dL    Performed by Liu Mims    Collection Time: 09/14/17  6:38 PM   Result Value Ref Range    Glucose 196 mg/dL    Insulin order 1.4 units/hour    Insulin adminstered 1.4 units/hour    Multiplier 0.010     Low target 150 mg/dL    High target 250 mg/dL    D50 order 0.0 ml    D50 administered 0.00 ml    Minutes until next BG 60 min    Order initials OM     Administered initials OM     GLSCOM Comments     GLUCOSE, POC    Collection Time: 09/14/17  7:44 PM   Result Value Ref Range    Glucose (POC) 220 (H) 65 - 100 mg/dL    Performed by Radha Page      Recent Labs      09/14/17   1445  09/14/17   0953   WBC  6.7  8.6   HGB  10.6*  13.5   HCT  30.7*  40.0   PLT  218  215     Recent Labs      09/14/17   1445  09/14/17   0953   NA  137  128*   K  3.9  4.3   CL  104  96*   CO2  25  17*   GLU  186*  531*   BUN  30*  38*   CREA  0.90  1.25*   CA  8.1*  9.4   MG  1.7   --    PHOS  3.3   --    ALB   --   3.2*   SGOT   --   22   ALT   --   24       Imaging  none    Assessment and Plan   1. DKA: mild, now off drip. Resume insulin pump. Continue with BG checks. Follow labs q 4hours. 2. Hyponatremia: IVF. Likely secondary to #1. [place]. 3. ROBBY: likely secondary to #1. Continue with IVF, monitor    Diet: diabetic  Activity: as tolerated.   DVT prophylaxis: SCD    Consultations: DM education  Anticipated disposition: to home tomorrow       Signed by: Marilyn Nunes DO    September 14, 2017 at 8:27 PM

## 2017-09-15 NOTE — PROGRESS NOTES
I was called earlier tonight by nurse Flex Raygoza about about patient who reportedly was placed on Glucomander / insulin IV infusion and on her own insulin pump( following evaluation and consensus by both hospitalist and diabetic treatment team). Nurse noted that despite already being on these treatments that BG were increasing. Nurse asked if she should stop the glucomander. I informed nurse to follow hospital policy and continue on glucomander with increasing BG levels. Pharmacist later called about concerns for being on both insulin pump and glucomander. I informed her that I would recommend following hospital policy with Glucomander protocols already in place. As there were concerns for patient self-administering her own insulin bolus with insulin pump, then I would recommend stopping the patient's insulin pump per hospital Glucomander protocols for patient's own safety.

## 2017-09-15 NOTE — PROGRESS NOTES
Verbal order received to restart pt insulin pump. Pump applied at 1650. IV insulin at 1.1 units/hr. Will stop IV insulin at 1850. IV insulin stopped at 1843. BG trending down on Dexcom CGM at 1900. Pt given small snack of 11 g of carb (grahm cracker and PB) since IV insulin was running at 3.3 units/hr for the last hour. At 1927 Pt was feeling like BG was dropping. BG 85. Dexcom showed BG stabilized at 114. Pt denies any other complaints and is tired.

## 2017-09-15 NOTE — PROGRESS NOTES
Hospitalist Progress Note  Anders Catalan MD  Office: [phone]        Date of Service:  9/15/2017  NAME:  Artis Gr  :  1970  MRN:  [id number]      Admission Summary:   49 y.o. female who presents with high blood glucose. She has a history of down's syndrome and DM2, on an insulin pump and states she was nauseated all night and when she awoke she took her morning medications and threw up. She checked her blood glucose and it was elevated in the 500's. Started on Insulin drip in ED and admitted to [facility]    Interval history / Subjective:   Pt seen early this am. RN present at bedside as well as pt's mom. Pt's glucs initially decr to 200s range then spiked up to 400s range. Pt restarted back on insulin drip. Her home insulin pump remains on hold. By afternoon, pt became hypoglycemic to 73 and is now being transitioned to her insulin pump with d.c of insulin drip in couple hrs     Assessment & Plan:     DKA  -HbA1c 8.0. On insulin pump at home  -reqd insulin gtt, will be weaned off today with reinitiation of her insulin pump  -IVF  -DTC on board    PSEUDOHYPONA  -sec to elev glucs, improved. Almost normalized    ROBBY,resolved  -akin stage i  -prerenal. resovled with ivf    POSS UTI  -UA with mild pyuria  -pend UCx  -hold off on abx since no sx, afebrile, no leukocytosis as d/w mom    MRDD  -stable    Antic d/c home tmrw    Code status: full  DVT prophylaxis:SCDs    Care Plan discussed with: Patient/Family, Nurse,  and Other pharm  Disposition: Home w/Family     Hospital Problems  Date Reviewed: 2017          Codes Class Noted POA    DKA (diabetic ketoacidoses) ([facility] ICD-10-CM: E13.10  ICD-9-CM: 250.10  2017 Unknown                Review of Systems:   A comprehensive review of systems was negative except for that written in the HPI. Vital Signs:    Last 24hrs VS reviewed since prior progress note.  Most recent are:  Visit Vitals    /75 (BP 1 Location: Right arm, BP Patient Position: At rest)    Pulse 87    Temp 98.2 °F (36.8 °C)    Resp 18    Ht 4' 6\" (1.372 m)    Wt 64 kg (141 lb)    SpO2 98%    BMI 34 kg/m2         Intake/Output Summary (Last 24 hours) at 09/15/17 1637  Last data filed at 09/15/17 1321   Gross per 24 hour   Intake              690 ml   Output             1125 ml   Net             -435 ml        Physical Examination:             Constitutional:  No acute distress, cooperative, pleasant    ENT:  Oral mucous moist, oropharynx benign. Neck supple,    Resp:  CTA bilaterally. No wheezing/rhonchi/rales. No accessory muscle use   CV:  Regular rhythm, normal rate, no murmurs, gallops, rubs    GI:  Soft, non distended, non tender. normoactive bowel sounds, no hepatosplenomegaly     Musculoskeletal:  No edema, warm, 2+ pulses throughout    Neurologic:  Moves all extremities. AAOx3, CN II-XII reviewed           Data Review:    Review and/or order of clinical lab test      Labs:     Recent Labs      09/14/17   1445  09/14/17   0953   WBC  6.7  8.6   HGB  10.6*  13.5   HCT  30.7*  40.0   PLT  218  215     Recent Labs      09/15/17   1207  09/15/17   0957  09/15/17   0057   09/14/17   1445   NA  134*  134*  135*   < >  137   K  4.5  4.8  4.5   < >  3.9   CL  102  101  102   < >  104   CO2  19*  23  21   < >  25   BUN  24*  22*  27*   < >  30*   CREA  0.89  0.90  0.88   < >  0.90   GLU  405*  365*  193*   < >  186*   CA  8.4*  8.5  8.3*   < >  8.1*   MG  1.7  1.8  1.7   < >  1.7   PHOS   --    --    --    --   3.3    < > = values in this interval not displayed. Recent Labs      09/14/17   0953   SGOT  22   ALT  24   AP  98   TBILI  0.6   TP  8.1   ALB  3.2*   GLOB  4.9*     No results for input(s): INR, PTP, APTT in the last 72 hours. No lab exists for component: INREXT   No results for input(s): FE, TIBC, PSAT, FERR in the last 72 hours.    No results found for: FOL, RBCF   No results for input(s): PH, PCO2, PO2 in the last 72 hours. No results for input(s): CPK, CKNDX, TROIQ in the last 72 hours. No lab exists for component: CPKMB  Lab Results   Component Value Date/Time    Cholesterol, total 135 04/30/2013 05:20 AM    HDL Cholesterol 59 04/30/2013 05:20 AM    LDL, calculated 66.6 04/30/2013 05:20 AM    Triglyceride 47 04/30/2013 05:20 AM    CHOL/HDL Ratio 2.3 04/30/2013 05:20 AM     Lab Results   Component Value Date/Time    Glucose (POC) 73 09/15/2017 04:12 PM    Glucose (POC) 142 09/15/2017 03:07 PM    Glucose (POC) 293 09/15/2017 01:59 PM    Glucose (POC) 347 09/15/2017 12:54 PM    Glucose (POC) 434 09/15/2017 11:45 AM     Lab Results   Component Value Date/Time    Color YELLOW/STRAW 09/14/2017 10:27 AM    Appearance CLEAR 09/14/2017 10:27 AM    Specific gravity 1.023 09/14/2017 10:27 AM    Specific gravity 1.015 04/29/2013 01:50 PM    pH (UA) 5.0 09/14/2017 10:27 AM    Protein 100 09/14/2017 10:27 AM    Glucose >1000 09/14/2017 10:27 AM    Ketone 80 09/14/2017 10:27 AM    Bilirubin NEGATIVE  09/14/2017 10:27 AM    Urobilinogen 0.2 09/14/2017 10:27 AM    Nitrites NEGATIVE  09/14/2017 10:27 AM    Leukocyte Esterase TRACE 09/14/2017 10:27 AM    Glucose/Ketones  02/08/2009 07:00 AM     GLUCOSE AND KETONES SIGNIFICANTLY ELEVATED.  RESULTS PHONED TO AND READ BACK BY    Epithelial cells FEW 09/14/2017 10:27 AM    Bacteria NEGATIVE  09/14/2017 10:27 AM    WBC 10-20 09/14/2017 10:27 AM    RBC 0-5 09/14/2017 10:27 AM         Medications Reviewed:     Current Facility-Administered Medications   Medication Dose Route Frequency    insulin lispro (HUMALOG) injection   SubCUTAneous TIDAC    insulin regular (NOVOLIN R, HUMULIN R) 100 Units in 0.9% sodium chloride 100 mL infusion  0-50 Units/hr IntraVENous TITRATE    dextrose (D50W) injection syrg 12.5-25 g  12.5-25 g IntraVENous PRN    glucagon (GLUCAGEN) injection 1 mg  1 mg IntraMUSCular PRN    aspirin delayed-release tablet 81 mg  81 mg Oral DAILY  [START ON 9/17/2017] ergocalciferol (ERGOCALCIFEROL) capsule 50,000 Units  50,000 Units Oral every Sunday    fluticasone (FLONASE) 50 mcg/actuation nasal spray 1 Spray  1 Spray Both Nostrils BID    levothyroxine (SYNTHROID) tablet 50 mcg  50 mcg Oral Once per day on Mon Tue Wed Thu Fri    minocycline (MINOCIN, DYNACIN) capsule 50 mg  50 mg Oral DAILY    sodium chloride (NS) flush 5-10 mL  5-10 mL IntraVENous Q8H    sodium chloride (NS) flush 5-10 mL  5-10 mL IntraVENous PRN    glucose chewable tablet 16 g  4 Tab Oral PRN    ondansetron (ZOFRAN) injection 4 mg  4 mg IntraVENous Q4H PRN    0.9% sodium chloride infusion  100 mL/hr IntraVENous CONTINUOUS     ______________________________________________________________________  EXPECTED LENGTH OF STAY: 3d 0h                 Anders Catalan MD

## 2017-09-15 NOTE — ROUTINE PROCESS
11:40 am   Pt's IV connected to insulin and NS found infiltrated. Removed IV. New IV placed by [facility], RN (see flowsheet). 11:45 am   Spoke with Dr. David Whittington regarding pt's BG trends and plan for transition to home insulin pump. MD stated pt should remain on hospital insulin drip until BG's are more stable and stated pt should remain on NS at 100 mL until BG is below 250. RN notified MD that pt did not have carb coverage ordered. MD to put in order for coverage. 3:29 PM  Spoke with Dr. David Whittington to report pt's current anion gap of 13 and BGs to inquire RE transitioning to home insulin pump. MD would like RN to f/u with her RE pt's next BG due in 30 mins. If next BG is below 250, pt may begin transition to home insulin pump. 4 PM  Bedside and Verbal shift change report given to [address], RN (oncoming nurse) by Pj Briseno RN (offgoing nurse). Report included the following information SBAR, Kardex, Procedure Summary, Intake/Output, MAR, Accordion, Recent Results and Cardiac Rhythm NSR.

## 2017-09-15 NOTE — DIABETES MGMT
DTC Insulin Drip/Consult   Progress Note     Recommendations/ Comments:  Patient on the Insulin and has had wide blood sugar fluctuations today( mg/dL). She is now on her insulin pump and will be transitioned off the drip 2 hours post start of insulin pump. Order for insulin pump on chart. Chart reviewed on Ness Gr. Patient is 49 y.o. female with a history of Type 1 Diabetes on insulin pump at home. Insulin Pump Settings    Pt on [employer] Rebel 523(Type of Pump) and uses quick set (Type of infusion set). Basal Rates     Insulin Carb Ratios  12am to 8am = 0.5 units/hr  12am to 10am = 1 unit per 13 g   8am to 3pm = 0.6 units/hr  10am to 5pm = 1 unit per 15 g   3pm to 9:30om = 0.625 units/hr      5pm to 12am = 1 unit per 11 g   9:30pm to 12am = 0.5 units/hr     Insulin Sensitivity Factors  1 to 100 = 1 unit per 100 mg/dl 12am-7am  1 to 75 = 1 unit per 75 mg/dl  7am-9pm  Target = 100-150 mg/dL      A1c:   Lab Results   Component Value Date/Time    Hemoglobin A1c 8.0 09/14/2017 02:45 PM    Hemoglobin A1c, External 7.5 02/22/2017         Recent Glucose Results:   Lab Results   Component Value Date/Time     (H) 09/15/2017 12:07 PM     (H) 09/15/2017 09:57 AM     (H) 09/15/2017 12:57 AM    GLUCPOC 99 09/15/2017 04:36 PM    GLUCPOC 73 09/15/2017 04:12 PM    GLUCPOC 142 (H) 09/15/2017 03:07 PM        Lab Results   Component Value Date/Time    Creatinine 0.89 09/15/2017 12:07 PM       Active Orders   Diet    DIET DIABETIC WITH OPTIONS Consistent Carb 1800kcal; Regular; 2 GM NA (House Low NA)        PO intake:   Patient Vitals for the past 72 hrs:   % Diet Eaten   09/15/17 1225 100 %   09/15/17 0941 100 %       Currently on insulin gtt running @ 1.1 units/jhr    Will continue to follow as needed. Thank you.   Gene Ibarra, MS, RN, CDE

## 2017-09-15 NOTE — PROGRESS NOTES
The following herbal, alternative, and/or nutritional/dietary supplement product(s) has been discontinued  per [facility] approved policy:    biotin    Please reorder upon discharge if appropriate.

## 2017-09-15 NOTE — PROGRESS NOTES
TRANSFER - IN REPORT:    Verbal report received from [facility] (name) on Gareth Laner RADHA Gr  being received from ED (unit) for routine progression of care      Report consisted of patients Situation, Background, Assessment and   Recommendations(SBAR). Information from the following report(s) SBAR, Kardex, Intake/Output, MAR, Recent Results and Cardiac Rhythm NSR was reviewed with the receiving nurse. Opportunity for questions and clarification was provided. Assessment completed upon patients arrival to unit and care assumed.      Primary Nurse Ck Verdin and Ciarra Albrecht, RN performed a dual skin assessment on this patient No impairment noted  Raul score is as charted

## 2017-09-15 NOTE — PROGRESS NOTES
Problem: DKA: Day 1  Goal: Medications  Outcome: Progressing Towards Goal     Educated family and patient on insulin drip and the need for Q1 blood sugars. Normal Saline started at 100mL/hour. Will continue to monitor. Bedside and Verbal shift change report given to Lisbet (oncoming nurse) by Elizabeth Wells (offgoing nurse). Report included the following information SBAR, Kardex, Intake/Output, MAR, Recent Results, Cardiac Rhythm NSR and Alarm Parameters .

## 2017-09-15 NOTE — PROGRESS NOTES
Problem: DKA: Day 2  Goal: *Acidosis resolved  Outcome: Progressing Towards Goal  Pt's anion gap has closed per last two lab results. Goal: *Tolerating diet  Outcome: Progressing Towards Goal  No c/o of N/V. Pt consuming 100% of meals today. Goal: *Demonstrates progressive activity  Outcome: Progressing Towards Goal  Pt tolerated ambulating in room and is using bedside commode. Goal: *Blood glucose 80 to 180 mg/dl  Outcome: Not Progressing Towards Goal  Variance: Patient slowly responding  Comments: Pt's last BG trended downward.

## 2017-09-15 NOTE — PROGRESS NOTES
Chart reviewed for discharge planning needs. No needs or barriers noted. Pt was on insulin pump prior to admission, DTC following. Pt lives at home and has a supportive family. KOFFI Martinez      Care Management Interventions  PCP Verified by CM:  Yes (Dr. Rachna Hull)  Last Visit to PCP: 04/28/17  Physical Therapy Consult: No  Occupational Therapy Consult: No  Speech Therapy Consult: No  Current Support Network: Relative's Home  Discharge Location  Discharge Placement: Home

## 2017-09-15 NOTE — ED NOTES
Spoke with Dr. Felisa Dockery concerning pt's increase in BGL, she wants pt to remain on glucostabilizer over night and continue her insulin pump. Verbal order to discontinue SQ insulin order received.

## 2017-09-15 NOTE — ROUTINE PROCESS
TRANSFER - OUT REPORT:    Verbal report given to Jian Pineda RN (name) on Soraya Gr  being transferred to [facility] (unit) for routine progression of care       Report consisted of patients Situation, Background, Assessment and   Recommendations(SBAR). Information from the following report(s) SBAR, ED Summary, [facility] and Recent Results was reviewed with the receiving nurse. Lines:   Peripheral IV 09/14/17 Left;Right Antecubital (Active)   Site Assessment Clean, dry, & intact 9/14/2017  9:52 AM   Phlebitis Assessment 0 9/14/2017  9:52 AM   Infiltration Assessment 0 9/14/2017  9:52 AM   Dressing Status Clean, dry, & intact 9/14/2017  9:52 AM        Opportunity for questions and clarification was provided. Patient transported with:   Registered Nurse     UPDATE: Pt assessment unchanged, pt's BGL now 128, VSS, no other complaints at this time, waiting on transport.      Visit Vitals    /76 (BP 1 Location: Right arm, BP Patient Position: At rest)    Pulse 73    Temp 98.4 °F (36.9 °C)    Resp 20    Ht 4' 6\" (1.372 m)    Wt 64 kg (141 lb)    SpO2 100%    BMI 34 kg/m2

## 2017-09-16 VITALS
RESPIRATION RATE: 17 BRPM | HEART RATE: 76 BPM | OXYGEN SATURATION: 97 % | SYSTOLIC BLOOD PRESSURE: 138 MMHG | HEIGHT: 55 IN | WEIGHT: 141.31 LBS | BODY MASS INDEX: 32.7 KG/M2 | DIASTOLIC BLOOD PRESSURE: 61 MMHG | TEMPERATURE: 98.2 F

## 2017-09-16 LAB
ANION GAP SERPL CALC-SCNC: 11 MMOL/L (ref 5–15)
BUN SERPL-MCNC: 29 MG/DL (ref 6–20)
BUN/CREAT SERPL: 37 (ref 12–20)
CALCIUM SERPL-MCNC: 8.5 MG/DL (ref 8.5–10.1)
CHLORIDE SERPL-SCNC: 109 MMOL/L (ref 97–108)
CO2 SERPL-SCNC: 20 MMOL/L (ref 21–32)
CREAT SERPL-MCNC: 0.78 MG/DL (ref 0.55–1.02)
ERYTHROCYTE [DISTWIDTH] IN BLOOD BY AUTOMATED COUNT: 13.1 % (ref 11.5–14.5)
GLUCOSE BLD STRIP.AUTO-MCNC: 114 MG/DL (ref 65–100)
GLUCOSE BLD STRIP.AUTO-MCNC: 197 MG/DL (ref 65–100)
GLUCOSE BLD STRIP.AUTO-MCNC: 332 MG/DL (ref 65–100)
GLUCOSE SERPL-MCNC: 122 MG/DL (ref 65–100)
HCT VFR BLD AUTO: 31.2 % (ref 35–47)
HGB BLD-MCNC: 10.7 G/DL (ref 11.5–16)
MAGNESIUM SERPL-MCNC: 1.8 MG/DL (ref 1.6–2.4)
MCH RBC QN AUTO: 31.9 PG (ref 26–34)
MCHC RBC AUTO-ENTMCNC: 34.3 G/DL (ref 30–36.5)
MCV RBC AUTO: 93.1 FL (ref 80–99)
PLATELET # BLD AUTO: 228 K/UL (ref 150–400)
POTASSIUM SERPL-SCNC: 4.1 MMOL/L (ref 3.5–5.1)
RBC # BLD AUTO: 3.35 M/UL (ref 3.8–5.2)
SERVICE CMNT-IMP: ABNORMAL
SODIUM SERPL-SCNC: 140 MMOL/L (ref 136–145)
WBC # BLD AUTO: 4.1 K/UL (ref 3.6–11)

## 2017-09-16 PROCEDURE — 82962 GLUCOSE BLOOD TEST: CPT

## 2017-09-16 PROCEDURE — 80048 BASIC METABOLIC PNL TOTAL CA: CPT

## 2017-09-16 PROCEDURE — 83735 ASSAY OF MAGNESIUM: CPT

## 2017-09-16 PROCEDURE — 36415 COLL VENOUS BLD VENIPUNCTURE: CPT

## 2017-09-16 PROCEDURE — 74011250637 HC RX REV CODE- 250/637: Performed by: INTERNAL MEDICINE

## 2017-09-16 PROCEDURE — 85027 COMPLETE CBC AUTOMATED: CPT | Performed by: HOSPITALIST

## 2017-09-16 RX ADMIN — ASPIRIN 81 MG: 81 TABLET, COATED ORAL at 09:56

## 2017-09-16 RX ADMIN — FLUTICASONE PROPIONATE 1 SPRAY: 50 SPRAY, METERED NASAL at 09:02

## 2017-09-16 RX ADMIN — Medication 10 ML: at 06:00

## 2017-09-16 RX ADMIN — MINOCYCLINE HYDROCHLORIDE 50 MG: 50 CAPSULE ORAL at 09:56

## 2017-09-16 NOTE — PROGRESS NOTES
I have reviewed discharge instructions with the patient, parent and caregiver. The patient, parent and caregiver verbalized understanding. PT discharged with family. Unable to sign electronic signature pad for discharge instructions because it didn't work.

## 2017-09-16 NOTE — DISCHARGE SUMMARY
Discharge Summary       PATIENT ID: Jenn Spencer  MRN: [id number]   YOB: 1970    DATE OF ADMISSION: 9/14/2017  9:33 AM    DATE OF DISCHARGE:9/16/2017   PRIMARY CARE PROVIDER: Thais Bazan MD     DISCHARGING PROVIDER: Dai Marshall MD    To contact this individual call [phone] and ask the  to page. If unavailable ask to be transferred the Adult Hospitalist Department. CONSULTATIONS: IP CONSULT TO HOSPITALIST    PROCEDURES/SURGERIES: * No surgery found *    ADMITTING [address] [place] [address] COURSE:   49 y. o. female who presents with high blood glucose. She has a history of down's syndrome and DM2, on an insulin pump and states she was nauseated all night and when she awoke she took her morning medications and threw up. She checked her blood glucose and it was elevated in the 500's. Started on Insulin drip in ED and admitted to [facility]. Pt was able to successfully wean off insulin drip yest and transitioned to her insulin pump with improvement of her numbers. She has also had complete resolution of the nausea and abd pain. Pt is stable for d/c home. Mom present at bedside. DISCHARGE DIAGNOSES / PLAN:      DKA, resolved  -HbA1c 8.0. On insulin pump at home  -reqd insulin gtt, weaned off evening of 9/15 with reinitiation of her insulin pump. glucs up to 400s in mornng and then dropped low. glucs better controlled today  -IVF given  -DTC on board     PSEUDOHYPONA  -sec to elev glucs, improved.  Almost normalized     ROBBY,resolved  -akin stage i  -prerenal. resovled with ivf     UA with mild pyuria  -UCx no signif growth  -no uti therefore no abx neeed       MRDD  -stable          PENDING TEST RESULTS:   At the time of discharge the following test results are still pending: none    FOLLOW UP APPOINTMENTS:    Follow-up Information     Follow up With Details Comments Contact Info    Thais Bazan MD  f/u next wednesday as prev rome Re[facility]             ADDITIONAL CARE RECOMMENDATIONS:     DIET: Diabetic Diet    ACTIVITY: Activity as tolerated    WOUND CARE: none    EQUIPMENT needed: none      DISCHARGE MEDICATIONS:  Current Discharge Medication List      CONTINUE these medications which have NOT CHANGED    Details   levothyroxine (SYNTHROID) 50 mcg tablet Take 50 mcg by mouth five (5) days a week. insulin lispro (HUMALOG) 100 unit/mL injection by SubCUTAneous route. For use in insulin pump      fluticasone (FLONASE) 50 mcg/actuation nasal spray 1 [place] by Both Nostrils route two (2) times a day. minocycline (MINOCIN, DYNACIN) 50 mg capsule Take 1 capsule by mouth  daily  Qty: 90 Cap, Refills: 3       insulin pump (PATIENT SUPPLIED) misc by SubCUTAneous route as needed. biotin 500 mcg cap Take 1 Cap by mouth daily. aspirin delayed-release 81 mg tablet Take 81 mg by mouth daily. ERGOCALCIFEROL (VITAMIN D PO) Take 50,000 Int'l Units by mouth. Mother says pt takes 6 doses per month               NOTIFY YOUR PHYSICIAN FOR ANY OF THE FOLLOWING:   Fever over 101 degrees for 24 hours. Chest pain, shortness of breath, fever, chills, nausea, vomiting, diarrhea, change in mentation, falling, weakness, bleeding. Severe pain or pain not relieved by medications. Or, any other signs or symptoms that you may have questions about.     DISPOSITION:   x Home With:   OT  PT  HH  RN       Long term SNF/Inpatient Rehab    Independent/assisted living    Hospice    Other:       PATIENT CONDITION AT DISCHARGE:     Functional status    Poor     Deconditioned    x Independent      Cognition   x  Lucid     Forgetful     Dementia      Catheters/lines (plus indication)    [facility]     PICC     PEG    x None      Code status   x  Full code     DNR      PHYSICAL EXAMINATION AT DISCHARGE:   Refer to Progress Note      CHRONIC MEDICAL DIAGNOSES:  Problem List as of 9/16/2017  Date Reviewed: 4/28/2017          Codes Class Noted - Resolved    DKA (diabetic ketoacidoses) ([facility] ICD-10-CM: E13.10  ICD-9-CM: 250.10  9/14/2017 - Present        Advance directive on file ICD-10-CM: Z78.9  ICD-9-CM: V49.89  12/28/2015 - Present        Acne ICD-10-CM: L70.9  ICD-9-CM: 706.1  8/20/2014 - Present        Syncope and collapse ICD-10-CM: R55  ICD-9-CM: 780.2  4/29/2013 - Present        Asthma (Chronic) ICD-10-CM: J45.909  ICD-9-CM: 493.90  4/29/2013 - Present        HTN (hypertension) (Chronic) ICD-10-CM: I10  ICD-9-CM: 401.9  4/29/2013 - Present        DM (diabetes mellitus), type 1, uncontrolled ([facility] ICD-10-CM: E10.65  ICD-9-CM: 250.03  11/14/2012 - Present        Hypoglycemia, unspecified ICD-10-CM: E16.2  ICD-9-CM: 251.2  6/27/2012 - Present        Down syndrome ICD-10-CM: Q90.9  ICD-9-CM: 758.0  7/23/2010 - Present        Acquired hypothyroidism ICD-10-CM: E03.9  ICD-9-CM: 244.9  7/23/2010 - Present        Anemia, unspecified ICD-10-CM: D64.9  ICD-9-CM: 285.9  7/23/2010 - Present        RESOLVED: Hyperglycemia ICD-10-CM: R73.9  ICD-9-CM: 790.29  7/28/2014 - 7/29/2014        RESOLVED: Type II or unspecified type diabetes mellitus without mention of complication, uncontrolled ICD-10-CM: E11.65  ICD-9-CM: 250.02  7/23/2010 - 11/14/2012              Greater than 35 minutes were spent with the patient on counseling and coordination of care    Signed:   Marbella Fabian MD  9/16/2017  11:21 AM

## 2017-09-16 NOTE — PROGRESS NOTES
Problem: DKA: Day 3  Goal: Nutrition/Diet  Outcome: Progressing Towards Goal  Patient is able to count carbohydrates per meal and snack and is able to bolus herself appropriately using her home insulin pump. 0740- Bedside and Verbal shift change report given to Mary Strauss (oncoming nurse) by Lubna Fischer (offgoing nurse). Report included the following information SBAR, Kardex, Intake/Output, MAR, Recent Results and Cardiac Rhythm NSR.

## 2017-09-16 NOTE — DISCHARGE INSTRUCTIONS
Discharge Instructions       PATIENT ID: Alex Tapia  MRN: [id number]   YOB: 1970    DATE OF ADMISSION: 9/14/2017  9:33 AM    DATE OF DISCHARGE: 9/16/2017    PRIMARY CARE PROVIDER: Luiza Carl MD     ATTENDING PHYSICIAN: Domingo Washington MD  DISCHARGING PROVIDER: Domingo Washington MD    To contact this individual call [phone] and ask the  to page. If unavailable ask to be transferred the Adult Hospitalist Department. DISCHARGE DIAGNOSES   DKA, resolved  H/O DM  PSEUDOHYPONA,sec to elev glucs, resolved  ROBBY,resolved      CONSULTATIONS: IP CONSULT TO HOSPITALIST    PROCEDURES/SURGERIES: * No surgery found *    PENDING TEST RESULTS:   At the time of discharge the following test results are still pending:none    FOLLOW UP APPOINTMENTS:   Follow-up Information     Follow up With Details Comments 5 Princess Deng III, MD  f/u next wednesday as prev schd [facility]  [facility]  [phone]              ADDITIONAL CARE RECOMMENDATIONS:     DIET: Diabetic Diet    ACTIVITY: Activity as tolerated    WOUND CARE: none    EQUIPMENT needed: none      DISCHARGE MEDICATIONS:   See Medication Reconciliation Form    · It is important that you take the medication exactly as they are prescribed. · Keep your medication in the bottles provided by the pharmacist and keep a list of the medication names, dosages, and times to be taken in your wallet. · Do not take other medications without consulting your doctor. NOTIFY YOUR PHYSICIAN FOR ANY OF THE FOLLOWING:   Fever over 101 degrees for 24 hours. Chest pain, shortness of breath, fever, chills, nausea, vomiting, diarrhea, change in mentation, falling, weakness, bleeding. Severe pain or pain not relieved by medications. Or, any other signs or symptoms that you may have questions about.       DISPOSITION:   x Home With:   OT  PT  [facility]  RN       SNF/Inpatient Rehab/LTAC Independent/assisted living    Hospice    Other:     CDMP Checked:   Yes x     PROBLEM LIST Updated:  Yes x       Signed:   Reshma Rutherford MD  9/16/2017  11:20 AM

## 2017-09-17 ENCOUNTER — PATIENT OUTREACH (OUTPATIENT)
Dept: INTERNAL MEDICINE CLINIC | Age: 47
End: 2017-09-17

## 2017-09-17 NOTE — PROGRESS NOTES
[facility] Follow Up for [facility] Admission from 9/14/17 - 9/16/17 for DKA. RRAT score: 9 LOW    Advance Medical Directive on file in EMR? yes has an advanced directive - a copy has been provided    Barriers to care? Mentally challenged, transportation    Support System consists of: parents, family    [address], if so what agency? No    Medical History:     Past Medical History:   Diagnosis Date    Allergic rhinitis, cause unspecified     Asthma     Diabetes ([address]     Down's syndrome     H/O impacted cerumen     Dr. Henry Guess Hypothyroid     Thyroid disease      This represents Transitions of Care b/c NN spoke with patient and/or caregiver within 1 business days of discharge. Pt's TCM follow up appt is scheduled with Dr. Musa Cox on 9/20/17, which is within 5 days of discharge. Kary Diop LPN with Dr Musa Cox called pt's mother on 9/18/17 and verified [employer] Delong with 2 identifiers. Says she is doing well. Office received paperwork to be completed for pt to move to the [address] [place] [address]. Pt's appt was r/s from Dr Renetta Pulido to Dr Musa Cox. Fall Risk Assessment:    Fall Risk Assessment, last 12 mths 8/4/2015   Able to walk? Yes   Fall in past 12 months? No     Patient presenting symptoms (referenced by Marbella Fabian MD):   Pt presents with high blood glucose. She has a history of down's syndrome and DM2, on an insulin pump and states she was nauseated all night and when she awoke she took her morning medications and threw up. She checked her blood glucose and it was elevated in the 500's. Started on Insulin drip in ED and admitted to [facility]. Pt was able to successfully wean off insulin drip yest and transitioned to her insulin pump with improvement of her numbers. She has also had complete resolution of the nausea and abd pain. Pt is stable for d/c home.  Mom present at bedside.     Course of current Hospitalization (referenced by Marbella Fabian MD note dated 9/16/17):   DKA, resolved  -HbA1c 8.0. On insulin pump at home  -reqd insulin gtt, weaned off evening of 9/15 with reinitiation of her insulin pump. glucs up to 400s in mornng and then dropped low. glucs better controlled today  -IVF given  -DTC on board      PSEUDOHYPONA  -sec to elev glucs, improved.  Almost normalized      ROBBY,resolved  -akin stage i  -prerenal. resovled with ivf      UA with mild pyuria  -UCx no signif growth  -no uti therefore no abx neeed       MRDD  -stable     Follow-up Information     Follow up With Details Comments Contact Info     Nyla Frost MD   f/u next wednesday as prev schd [address] [facility]  [address] [place] [address]  [phone]     Lab/Diagnostics at time of Discharge:   Lab Results  Component Value Date/Time   WBC 4.1 09/16/2017 03:20 AM   HGB 10.7 09/16/2017 03:20 AM   Hemoglobin (POC) 12.2 11/05/2012 03:10 PM   HCT 31.2 09/16/2017 03:20 AM   Hematocrit (POC) 36 11/05/2012 03:10 PM   PLATELET 793 91/53/0167 03:20 AM   MCV 93.1 09/16/2017 03:20 AM     Lab Results  Component Value Date/Time   Hemoglobin A1c 8.0 09/14/2017 02:45 PM   Hemoglobin A1c 7.3 04/30/2013 05:20 AM   Hemoglobin A1c 7.6 02/09/2009 05:00 AM   Hemoglobin A1c, External 7.5 02/22/2017   Hemoglobin A1c, External 7.7 08/17/2016 11:35 AM   Hemoglobin A1c, External 7.5 05/04/2016   Glucose 122 09/16/2017 03:20 AM   Glucose (POC) 332 09/16/2017 11:38 AM   LDL, calculated 66.6 04/30/2013 05:20 AM   Creatinine (POC) 1.0 11/05/2012 03:10 PM   Creatinine 0.78 09/16/2017 03:20 AM      Lab Results   Component Value Date/Time    Sodium 140 09/16/2017 03:20 AM    Potassium 4.1 09/16/2017 03:20 AM    Chloride 109 09/16/2017 03:20 AM    CO2 20 09/16/2017 03:20 AM    Anion gap 11 09/16/2017 03:20 AM    Glucose 122 09/16/2017 03:20 AM    BUN 29 09/16/2017 03:20 AM    Creatinine 0.78 09/16/2017 03:20 AM    BUN/Creatinine ratio 37 09/16/2017 03:20 AM    GFR est AA >60 09/16/2017 03:20 AM    GFR est non-AA >60 09/16/2017 03:20 AM    Calcium 8.5 09/16/2017 03:20 AM      Lab Results   Component Value Date/Time    Hemoglobin A1c 8.0 09/14/2017 02:45 PM    Hemoglobin A1c, External 7.5 02/22/2017        Medication Reconciliation completed: No           CONTINUE these medications which have NOT CHANGED     Details   levothyroxine (SYNTHROID) 50 mcg tablet Take 50 mcg by mouth five (5) days a week.       insulin lispro (HUMALOG) 100 unit/mL injection by SubCUTAneous route. For use in insulin pump       fluticasone (FLONASE) 50 mcg/actuation nasal spray 1 [place] by Both Nostrils route two (2) times a day.       minocycline (MINOCIN, DYNACIN) 50 mg capsule Take 1 capsule by mouth  daily  Qty: 90 Cap, Refills: 3        insulin pump (PATIENT SUPPLIED) misc by SubCUTAneous route as needed.       biotin 500 mcg cap Take 1 Cap by mouth daily.       aspirin delayed-release 81 mg tablet Take 81 mg by mouth daily.       ERGOCALCIFEROL (VITAMIN D PO) Take 50,000 Int'l Units by mouth.  Mother says pt takes 6 doses per month             Prescription Medication total: 5 (pharmacy consult for polypharm needed?) no      Past Hospitalizations and/or ED visits last 12 months? 0

## 2017-09-19 LAB
BACTERIA SPEC CULT: NORMAL
SERVICE CMNT-IMP: NORMAL

## 2017-09-20 ENCOUNTER — OFFICE VISIT (OUTPATIENT)
Dept: INTERNAL MEDICINE CLINIC | Age: 47
End: 2017-09-20

## 2017-09-20 VITALS
TEMPERATURE: 98.4 F | HEIGHT: 55 IN | HEART RATE: 64 BPM | BODY MASS INDEX: 32.49 KG/M2 | DIASTOLIC BLOOD PRESSURE: 70 MMHG | RESPIRATION RATE: 16 BRPM | SYSTOLIC BLOOD PRESSURE: 138 MMHG | OXYGEN SATURATION: 98 % | WEIGHT: 140.4 LBS

## 2017-09-20 DIAGNOSIS — E10.10 DIABETIC KETOACIDOSIS WITHOUT COMA ASSOCIATED WITH TYPE 1 DIABETES MELLITUS (HCC): Primary | ICD-10-CM

## 2017-09-20 DIAGNOSIS — Q90.9 DOWN SYNDROME: ICD-10-CM

## 2017-09-20 RX ORDER — LEVOTHYROXINE SODIUM 112 UG/1
1 TABLET ORAL DAILY
COMMUNITY
Start: 2017-06-26 | End: 2017-12-18 | Stop reason: SDUPTHER

## 2017-09-20 RX ORDER — GLUCOSAMINE SULFATE 1500 MG
2000 POWDER IN PACKET (EA) ORAL
COMMUNITY
End: 2020-01-15 | Stop reason: DRUGHIGH

## 2017-09-20 NOTE — PROGRESS NOTES
HPI:  Diane Gr is a 49y.o. year old female who is here for a routine visit in transitional care visit after recent admission to [facility] for hyperglycemia. Her blood sugar was low last evening in the 60 range. She has otherwise been doing well. She is planning to transition with her parents to independent living at the [facility] next week. She has been feeling well. No headaches or dizziness. No nosebleeds. No fevers or chills. No chest pains or shortness of breath. No cough or wheeze. No change in bowel or bladder habits. Patient Outreach   9/17/2017   Diane Gr   MRN: [id number]   CSN:   [id number]   Call Information      Provider Odell Shaw   9/17/2017 7:39 PM Mann Chung RN [facility]   Reason for Call   Transitions Of Care 9/14/17 - 9/16/17 for DKA   Reason for Call History   Progress notes   Mann Chung RN at 09/17/17 1939   Status: Signed          NNTOCIP     [place] [address] Follow Up for [facility] Admission from 9/14/17 - 9/16/17 for DKA.       RRAT score: 9 LOW     Advance Medical Directive on file in EMR? yes has an advanced directive - a copy has been provided     Barriers to care? Mentally challenged, transportation  [address] consists of: parents, family  [address], if so what agency? No     Medical History:          Past Medical History:   Diagnosis Date    Allergic rhinitis, cause unspecified      Asthma      Diabetes ([facility] [address] [facility]      Down's syndrome      H/O impacted cerumen       Dr. Linda Loyola Hypothyroid      Thyroid disease        This represents Transitions of Care b/c NN spoke with patient and/or caregiver within 1 business days of discharge. Pt's TCM follow up appt is scheduled with Dr. Jennifer Anderson on 9/20/17, which is within 5 days of discharge.      Leyla Lr LPN with Dr Jennifer Anderson called pt's mother on 9/18/17 and verified Neri Ortega with 2 identifiers. Says she is doing well.   Office received paperwork to be completed for pt to move to the [facility]. Pt's appt was r/s from Dr Marybel Jaquez to Dr Cheron Boeck.       Fall Risk Assessment:    Fall Risk Assessment, last 12 mths 8/4/2015   Able to walk? Yes   Fall in past 12 months? No      Patient presenting symptoms (referenced by Aditi Santoyo MD):   Pt presents with high blood glucose. She has a history of down's syndrome and DM2, on an insulin pump and states she was nauseated all night and when she awoke she took her morning medications and threw up. She checked her blood glucose and it was elevated in the 500's. Started on Insulin drip in ED and admitted to [facility]. Pt was able to successfully wean off insulin drip yest and transitioned to her insulin pump with improvement of her numbers. She has also had complete resolution of the nausea and abd pain. Pt is stable for d/c home. Mom present at bedside.     Course of current Hospitalization (referenced by Aditi Santoyo MD note dated 9/16/17):   DKA, resolved  -HbA1c 8.0. On insulin pump at home  -reqd insulin gtt, weaned off evening of 9/15 with reinitiation of her insulin pump. glucs up to 400s in mornng and then dropped low. glucs better controlled today  -IVF given  -DTC on board      PSEUDOHYPONA  -sec to elev glucs, improved.  Almost normalized      ROBBY,resolved  -akin stage i  -prerenal. resovled with ivf      UA with mild pyuria  -UCx no signif growth  -no uti therefore no abx neeed      MRDD  -stable             Follow-up Information      Follow up With Details Comments Contact Info      Rohan Pizano MD    f/u next wednesday as prev schd [address]  [address]  [phone]      Lab/Diagnostics at time of Discharge:   Lab Results  Component Value Date/Time   WBC 4.1 09/16/2017 03:20 AM   HGB 10.7 09/16/2017 03:20 AM   Hemoglobin (POC) 12.2 11/05/2012 03:10 PM   HCT 31.2 09/16/2017 03:20 AM   Hematocrit (POC) 36 11/05/2012 03:10 PM   PLATELET 210 83/70/5658 03:20 AM MCV 93.1 09/16/2017 03:20 AM      Lab Results  Component Value Date/Time   Hemoglobin A1c 8.0 09/14/2017 02:45 PM   Hemoglobin A1c 7.3 04/30/2013 05:20 AM   Hemoglobin A1c 7.6 02/09/2009 05:00 AM   Hemoglobin A1c, External 7.5 02/22/2017   Hemoglobin A1c, External 7.7 08/17/2016 11:35 AM   Hemoglobin A1c, External 7.5 05/04/2016   Glucose 122 09/16/2017 03:20 AM   Glucose (POC) 332 09/16/2017 11:38 AM   LDL, calculated 66.6 04/30/2013 05:20 AM   Creatinine (POC) 1.0 11/05/2012 03:10 PM   Creatinine 0.78 09/16/2017 03:20 AM            Lab Results   Component Value Date/Time     Sodium 140 09/16/2017 03:20 AM     Potassium 4.1 09/16/2017 03:20 AM     Chloride 109 09/16/2017 03:20 AM     CO2 20 09/16/2017 03:20 AM     Anion gap 11 09/16/2017 03:20 AM     Glucose 122 09/16/2017 03:20 AM     BUN 29 09/16/2017 03:20 AM     Creatinine 0.78 09/16/2017 03:20 AM     BUN/Creatinine ratio 37 09/16/2017 03:20 AM     GFR est AA >60 09/16/2017 03:20 AM     GFR est non-AA >60 09/16/2017 03:20 AM     Calcium 8.5 09/16/2017 03:20 AM            Lab Results   Component Value Date/Time     Hemoglobin A1c 8.0 09/14/2017 02:45 PM     Hemoglobin A1c, External 7.5 02/22/2017             Medication Reconciliation completed: No             CONTINUE these medications which have NOT CHANGED      Details   levothyroxine (SYNTHROID) 50 mcg tablet Take 50 mcg by mouth five (5) days a week.        insulin lispro (HUMALOG) 100 unit/mL injection by SubCUTAneous route.  For use in insulin pump        fluticasone (FLONASE) 50 mcg/actuation nasal spray 1 [place] by Both Nostrils route two (2) times a day.        minocycline (MINOCIN, DYNACIN) 50 mg capsule Take 1 capsule by mouth  daily  Qty: 90 Cap, Refills: 3         insulin pump (PATIENT SUPPLIED) misc by SubCUTAneous route as needed.        biotin 500 mcg cap Take 1 Cap by mouth daily.        aspirin delayed-release 81 mg tablet Take 81 mg by mouth daily.        ERGOCALCIFEROL (VITAMIN D PO) Take 50,000 Int'l Units by mouth. Mother says pt takes 6 doses per month                Prescription Medication total: 5 (pharmacy consult for polypharm needed?) no       Past Hospitalizations and/or ED visits last 12 months? 0         Care Management General Assessment   Assessment completed with: parents   Consent for care management: No   Living arrangement: parent   Reports poor health: No   Support system: parent   Inadequate caregiver assistance: No   Family conflict: No   Type of residence: private residence   Home care services: No   Equipment used at home: none   Communication device: No   Financial problems: No   Transportation issues: Yes   Transportation means: accessible car, family   Other issues: impaired decision making   Diet: diabetic diet   Inadequate nutrition: No       Medications   Medication Review Info   Medications have not yet been reviewed for this encounter.    Encounter Messages   Expand All  Collapse All   No messages in this encounter   Follow-up and Disposition   Routing History   Follow-up and Disposition History       Patient Instructions   None   Chart Review Routing History   Recipient Method Report Sent By Ibrahima Kaur MD   Fax: [phone]   Phone: [phone]    Fax Provider Comm Report Victor Hugo Juarez LPN [[id number]] 3/01/7723  1:40 PM 05/08/2011   Chris Ortega MD   Fax: [phone]   Phone: [phone]    Fax Provider Comm Report Rohan Pizano MD [facility] 11/14/2011  5:51 PM 11/12/2011        Provider Comm Report Rohan Pizano MD [facility] 11/14/2011  5:51 PM 11/03/2011        Provider Comm Report Rohan Pizano MD [facility] 11/14/2011  5:51 PM 11/03/2011        Provider Comm Report Rohan Pizano MD [facility] 11/14/2011  5:51 PM 11/03/2011        Provider Comm Report Rohan Pizano MD [facility] 11/14/2011  5:51 PM 11/03/2011        Provider Comm Report Rohan Pizano MD [facility] 11/14/2011  5:51 PM 11/03/2011   Danya Marino MD   Fax: [phone]   Phone: [phone]    Fax Provider Comm Report Hu Bryant MD [facility] 12/2/2011  3:28 PM 11/12/2011        Provider Comm Report Hu Bryant MD [facility] 12/2/2011  3:28 PM 11/03/2011        Provider Comm Report Hu Bryant MD [facility] 12/2/2011  3:28 PM 11/03/2011        Provider Comm Report Hu Bryant MD [facility] 12/2/2011  3:28 PM 11/03/2011        Provider Comm Report Hu Bryant MD [facility] 12/2/2011  3:28 PM 11/03/2011        Provider Comm Report Hu Bryant MD [facility] 12/2/2011  3:28 PM 11/03/2011   Sindy Gould MD   Fax: [phone]   Phone: [phone]    Fax Provider Comm Report Hu Bryant MD [facility] 12/2/2011  3:28 PM 11/03/2011        Provider Comm Report Hu Bryant MD [facility] 12/2/2011  3:28 PM 02/08/2009   Sindy Gould MD   Fax: [phone]   Phone: [phone]    Fax Provider Comm Report Hu Bryant MD [facility] 12/2/2011  6:52 PM 12/02/2011   Hu Bryant MD   Phone: [phone]    In [employer] Routed [address] 6/27/2012  6:51 AM 06/27/2012   Hu Bryant MD   Phone: [phone]    In Basket IP Auto Routed Zenaida Vigil MD [[id number]] 4/29/2013  9:28 PM 04/29/2013   Hu Bryant MD   Phone: [phone]    In [address] [place] [address] Auto Routed Notes Daksha Christian MD [[id number]] 7/28/2014  7:43 PM 07/28/2014   Hu Bryant MD   Phone: [phone]    In [address] [place] [address] Auto Routed Notes Gris Mancini [place] [[id number]] 7/29/2014  9:26 AM 07/29/2014   Hu Bryant MD   Phone: [phone]    In Basket IP Auto Routed Notes Josy Vasquez MD [[id number]] 9/16/2017 11:27 AM 09/16/2017    Routing History   There are no sent or routed communications associated with this encounter.       Encounter Information      Provider Department Encounter #   9/17/2017 [address] [place] [id number]    Encounter Status   Closed by Alice Parikh RN on 9/18/17 at 3:27 PM Additional Documentation   Vitals:     LMP 03/27/1986    SmartForms:     C HP SF GENERAL CM      CHRONIC CARE MANAGEMENT TIME SPENT WITH PATIENT        Encounter Info:     Billing Info,     History,     Allergies,     Detailed Report        Provider Demographics   Phone Address   [phone] [address] [facility] [address] [facility]  [address]   Allergies as of 9/17/2017   Review Complete On: 9/14/2017 By: Beatriz Wu RN      Noted Reaction Type Reactions   Acetaminophen 04/29/2013    Other (comments)   Patient has implanted Denise Guild does not work properly when patient takes acetaminophen. Codeine 03/01/2010    Nausea and Vomiting   Nitrofurantoin 10/30/2010    Rash   Pcn [Penicillins] 03/01/2010    Hives, Rash               Past Medical History:   Diagnosis Date    Allergic rhinitis, cause unspecified     Asthma     Diabetes ([facility] [address]     Down's syndrome     H/O impacted cerumen     Dr. Guillermo Caro Hypothyroid     Thyroid disease        Past Surgical History:   Procedure Laterality Date    HX CYST REMOVAL      right shoulder    HX HEENT Bilateral 2017    prior in one eye    HX HEENT Left 3/5/15    cataract left and right    HX HYSTERECTOMY         Prior to Admission medications    Medication Sig Start Date End Date Taking? Authorizing Provider   cholecalciferol (VITAMIN D3) 1,000 unit cap Take  by mouth daily. Yes Historical Provider   insulin lispro (HUMALOG) 100 unit/mL injection by SubCUTAneous route. For use in insulin pump   Yes Historical Provider   fluticasone (FLONASE) 50 mcg/actuation nasal spray 1 [place] by Both Nostrils route two (2) times a day. Yes Historical Provider   minocycline (MINOCIN, DYNACIN) 50 mg capsule Take 1 capsule by mouth  daily 8/21/17  Yes Katlyn Pritchard III, MD    insulin pump (PATIENT SUPPLIED) misc by SubCUTAneous route as needed. Yes Historical Provider   biotin 500 mcg cap Take 1 Cap by mouth daily.    Yes Historical Provider aspirin delayed-release 81 mg tablet Take 81 mg by mouth daily. Yes Historical Provider   levothyroxine (SYNTHROID) 112 mcg tablet Take 1 Tab by mouth daily. 6/26/17   Historical Provider       Social History     Social History    Marital status: SINGLE     Spouse name: N/A    Number of children: N/A    Years of education: N/A     Occupational History    Not on file. Social History Main Topics    Smoking status: Never Smoker    Smokeless tobacco: Never Used    Alcohol use No    Drug use: No    Sexual activity: Not Currently     Other Topics Concern    Not on file     Social History Narrative          ROS  Per HPI    Visit Vitals    /70 (BP 1 Location: Right arm, BP Patient Position: Sitting)    Pulse 64    Temp 98.4 °F (36.9 °C) (Oral)    Resp 16    Ht 4' 6\" (1.372 m)    Wt 140 lb 6.4 oz (63.7 kg)    LMP 03/27/1986    SpO2 98%    BMI 33.85 kg/m2         Physical Exam   Physical Examination: General appearance - alert, well appearing, and in no distress  Mouth - mucous membranes moist, pharynx normal without lesions  Neck - supple, no significant adenopathy  Lymphatics - no palpable lymphadenopathy, no hepatosplenomegaly  Chest - clear to auscultation, no wheezes, rales or rhonchi, symmetric air entry  Heart - normal rate, regular rhythm, normal S1, S2, no murmurs, rubs, clicks or gallops  Abdomen - soft, nontender, nondistended, no masses or organomegaly  Extremities - peripheral pulses normal, no pedal edema, no clubbing or cyanosis      Assessment/Plan:  1.  diabetes under better control. She will see her endocrinologist for follow-up in the next week at my suggestion. 2.  Nausea and vomiting resolved after control of her blood sugar    3. Down syndrome is stable. Agree with her to move to independent living. She did have a chest x-ray done in the hospital and this will serve for her TB screening for this move.   Follow-up Disposition: as needed   Advised her to call back or return to office if symptoms worsen/change/persist.  Discussed expected course/resolution/complications of diagnosis in detail with patient. Medication risks/benefits/costs/interactions/alternatives discussed with patient. She was given an after visit summary which includes diagnoses, current medications, & vitals. She expressed understanding with the diagnosis and plan. Follow-up Disposition: Not on File       Advised her to call back or return to office if symptoms worsen/change/persist.  Discussed expected course/resolution/complications of diagnosis in detail with patient. Medication risks/benefits/costs/interactions/alternatives discussed with patient. She was given an after visit summary which includes diagnoses, current medications, & vitals. She expressed understanding with the diagnosis and plan.

## 2017-09-20 NOTE — MR AVS SNAPSHOT
Visit Information Date & Time Provider Department Dept. Phone Encounter #  
 9/20/2017  9:30 AM Segundo Weber MD [facility] [phone] [id number] Your Appointments 9/29/2017  2:00 PM  
ROUTINE CARE with Segundo Weber MD  
[facility]) Appt Note: forms for [place]; due/elig for [facility] - 4/29/18 or later; pt in ER; forms for hermitage [address] formerly [facility] [address]  
[address] [address] [place] Upcoming Health Maintenance Date Due  
 LIPID PANEL Q1 10/29/2016 MICROALBUMIN Q1 5/4/2017 INFLUENZA AGE 9 TO ADULT 8/1/2017 EYE EXAM RETINAL OR DILATED Q1 10/12/2017 HEMOGLOBIN A1C Q6M 3/14/2018 FOOT EXAM Q1 4/28/2018 MEDICARE YEARLY EXAM 4/29/2018 DTaP/Tdap/Td series (2 - Td) 1/15/2026 Allergies as of 9/20/2017  Review Complete On: 9/20/2017 By: Segundo Weber MD  
  
 Severity Noted Reaction Type Reactions Acetaminophen  04/29/2013    Other (comments) Patient has implanted glucometer. Glucometer does not work properly when patient takes acetaminophen. Codeine  03/01/2010    Nausea and Vomiting Nitrofurantoin  10/30/2010    Rash Pcn [Penicillins]  03/01/2010    Hives, Rash Current Immunizations  Reviewed on 6/10/2016 Name Date Influenza Vaccine 10/1/2015 Influenza Vaccine Whole 10/2/2012 Pneumococcal Vaccine (Unspecified Type) 1/1/2007 Tdap 1/15/2016 Not reviewed this visit Vitals BP Pulse Temp Resp Height(growth percentile) Weight(growth percentile) 138/70 (BP 1 Location: Right arm, BP Patient Position: Sitting) 64 98.4 °F (36.9 °C) (Oral) 16 4' 6\" (1.372 m) 140 lb 6.4 oz (63.7 kg) LMP SpO2 BMI OB Status Smoking Status 03/27/1986 98% 33.85 kg/m2 Hysterectomy Never Smoker BMI and BSA Data  Body Mass Index Body Surface Area  
 33.85 kg/m 2 1.56 m 2  
  
  
 Preferred Pharmacy Pharmacy Name Phone [id number] [place] [address] [facility] [phone] Your Updated Medication List  
  
   
This list is accurate as of: 9/20/17 10:22 AM.  Always use your most recent med list.  
  
  
  
  
  insulin pump Misc Commonly known as:  PATIENT SUPPLIED  
by SubCUTAneous route as needed. aspirin delayed-release 81 mg tablet Take 81 mg by mouth daily. biotin 500 mcg Cap Take 1 Cap by mouth daily. FLONASE 50 mcg/actuation nasal spray Generic drug:  fluticasone 1 [place] by Both Nostrils route two (2) times a day. HumaLOG 100 unit/mL injection Generic drug:  insulin lispro  
by SubCUTAneous route. For use in insulin pump  
  
 levothyroxine 112 mcg tablet Commonly known as:  SYNTHROID Take 1 Tab by mouth daily. minocycline 50 mg capsule Commonly known as:  Zain Hazard Take 1 capsule by mouth  daily VITAMIN D3 1,000 unit Cap Generic drug:  cholecalciferol Take  by mouth daily. Introducing [facility]! Dear Pedro Luis : Thank you for requesting a [email or web address] account. Our records indicate that you already have an active [email or web address] account. You can access your account anytime at [email or web address] Did you know that you can access your hospital and ER discharge instructions at any time in [email or web address]? You can also review all of your test results from your hospital stay or ER visit. Additional Information If you have questions, please visit the Frequently Asked Questions section of the [email or web address] website at [email or web address] Remember, [email or web address] is NOT to be used for urgent needs. For medical emergencies, dial 911. Now available from your iPhone and Android! Please provide this summary of care documentation to your next provider. Your primary care clinician is listed as [place] [phone] If you have any questions after today's visit, please call [phone].

## 2017-10-02 ENCOUNTER — PATIENT OUTREACH (OUTPATIENT)
Dept: INTERNAL MEDICINE CLINIC | Age: 47
End: 2017-10-02

## 2017-12-18 RX ORDER — LEVOTHYROXINE SODIUM 112 UG/1
TABLET ORAL
Qty: 90 TAB | Refills: 0 | Status: SHIPPED | OUTPATIENT
Start: 2017-12-18 | End: 2018-02-28

## 2018-02-28 ENCOUNTER — HOSPITAL ENCOUNTER (OUTPATIENT)
Age: 48
Setting detail: OBSERVATION
Discharge: NURSING, REHAB OR LONG-TERM CARE | End: 2018-03-02
Attending: EMERGENCY MEDICINE | Admitting: FAMILY MEDICINE
Payer: MEDICARE

## 2018-02-28 DIAGNOSIS — E87.1 HYPONATREMIA: Primary | ICD-10-CM

## 2018-02-28 LAB
ALBUMIN SERPL-MCNC: 2.2 G/DL (ref 3.5–5)
ALBUMIN/GLOB SERPL: 0.5 {RATIO} (ref 1.1–2.2)
ALP SERPL-CCNC: 72 U/L (ref 45–117)
ALT SERPL-CCNC: 13 U/L (ref 12–78)
ANION GAP SERPL CALC-SCNC: 8 MMOL/L (ref 5–15)
APPEARANCE UR: CLEAR
AST SERPL-CCNC: 19 U/L (ref 15–37)
BACTERIA URNS QL MICRO: NEGATIVE /HPF
BASOPHILS # BLD: 0.1 K/UL (ref 0–0.1)
BASOPHILS NFR BLD: 1 % (ref 0–1)
BILIRUB SERPL-MCNC: 0.2 MG/DL (ref 0.2–1)
BILIRUB UR QL: NEGATIVE
BUN SERPL-MCNC: 22 MG/DL (ref 6–20)
BUN/CREAT SERPL: 19 (ref 12–20)
CALCIUM SERPL-MCNC: 8.3 MG/DL (ref 8.5–10.1)
CHLORIDE SERPL-SCNC: 92 MMOL/L (ref 97–108)
CO2 SERPL-SCNC: 26 MMOL/L (ref 21–32)
COLOR UR: ABNORMAL
CREAT SERPL-MCNC: 1.14 MG/DL (ref 0.55–1.02)
DIFFERENTIAL METHOD BLD: ABNORMAL
EOSINOPHIL # BLD: 0.2 K/UL (ref 0–0.4)
EOSINOPHIL NFR BLD: 4 % (ref 0–7)
EPITH CASTS URNS QL MICRO: ABNORMAL /LPF
ERYTHROCYTE [DISTWIDTH] IN BLOOD BY AUTOMATED COUNT: 14.9 % (ref 11.5–14.5)
EST. AVERAGE GLUCOSE BLD GHB EST-MCNC: 214 MG/DL
GLOBULIN SER CALC-MCNC: 4.7 G/DL (ref 2–4)
GLUCOSE BLD STRIP.AUTO-MCNC: 256 MG/DL (ref 65–100)
GLUCOSE BLD STRIP.AUTO-MCNC: 262 MG/DL (ref 65–100)
GLUCOSE SERPL-MCNC: 184 MG/DL (ref 65–100)
GLUCOSE UR STRIP.AUTO-MCNC: NEGATIVE MG/DL
HBA1C MFR BLD: 9.1 % (ref 4.2–6.3)
HCT VFR BLD AUTO: 29.2 % (ref 35–47)
HGB BLD-MCNC: 10 G/DL (ref 11.5–16)
HGB UR QL STRIP: ABNORMAL
HYALINE CASTS URNS QL MICRO: ABNORMAL /LPF (ref 0–5)
IMM GRANULOCYTES # BLD: 0.1 K/UL (ref 0–0.04)
IMM GRANULOCYTES NFR BLD AUTO: 1 % (ref 0–0.5)
KETONES UR QL STRIP.AUTO: NEGATIVE MG/DL
LEUKOCYTE ESTERASE UR QL STRIP.AUTO: NEGATIVE
LYMPHOCYTES # BLD: 0.5 K/UL (ref 0.8–3.5)
LYMPHOCYTES NFR BLD: 8 % (ref 12–49)
MCH RBC QN AUTO: 30.9 PG (ref 26–34)
MCHC RBC AUTO-ENTMCNC: 34.2 G/DL (ref 30–36.5)
MCV RBC AUTO: 90.1 FL (ref 80–99)
MONOCYTES # BLD: 0.4 K/UL (ref 0–1)
MONOCYTES NFR BLD: 7 % (ref 5–13)
NEUTS SEG # BLD: 4.8 K/UL (ref 1.8–8)
NEUTS SEG NFR BLD: 79 % (ref 32–75)
NITRITE UR QL STRIP.AUTO: NEGATIVE
NRBC # BLD: 0 K/UL (ref 0–0.01)
NRBC BLD-RTO: 0 PER 100 WBC
OSMOLALITY SERPL: 289 MOSM/KG H2O
PH UR STRIP: 6.5 [PH] (ref 5–8)
PLATELET # BLD AUTO: 220 K/UL (ref 150–400)
PMV BLD AUTO: 9.8 FL (ref 8.9–12.9)
POTASSIUM SERPL-SCNC: 4.4 MMOL/L (ref 3.5–5.1)
PROT SERPL-MCNC: 6.9 G/DL (ref 6.4–8.2)
PROT UR STRIP-MCNC: 100 MG/DL
RBC # BLD AUTO: 3.24 M/UL (ref 3.8–5.2)
RBC #/AREA URNS HPF: ABNORMAL /HPF (ref 0–5)
RBC MORPH BLD: ABNORMAL
SERVICE CMNT-IMP: ABNORMAL
SERVICE CMNT-IMP: ABNORMAL
SODIUM SERPL-SCNC: 126 MMOL/L (ref 136–145)
SP GR UR REFRACTOMETRY: 1.01 (ref 1–1.03)
UA: UC IF INDICATED,UAUC: ABNORMAL
UROBILINOGEN UR QL STRIP.AUTO: 0.2 EU/DL (ref 0.2–1)
WBC # BLD AUTO: 6.1 K/UL (ref 3.6–11)
WBC URNS QL MICRO: ABNORMAL /HPF (ref 0–4)

## 2018-02-28 PROCEDURE — 99218 HC RM OBSERVATION: CPT

## 2018-02-28 PROCEDURE — 80053 COMPREHEN METABOLIC PANEL: CPT | Performed by: NURSE PRACTITIONER

## 2018-02-28 PROCEDURE — 74011250636 HC RX REV CODE- 250/636: Performed by: EMERGENCY MEDICINE

## 2018-02-28 PROCEDURE — 83935 ASSAY OF URINE OSMOLALITY: CPT | Performed by: FAMILY MEDICINE

## 2018-02-28 PROCEDURE — 36415 COLL VENOUS BLD VENIPUNCTURE: CPT | Performed by: FAMILY MEDICINE

## 2018-02-28 PROCEDURE — 84300 ASSAY OF URINE SODIUM: CPT | Performed by: FAMILY MEDICINE

## 2018-02-28 PROCEDURE — 82570 ASSAY OF URINE CREATININE: CPT | Performed by: FAMILY MEDICINE

## 2018-02-28 PROCEDURE — 96360 HYDRATION IV INFUSION INIT: CPT

## 2018-02-28 PROCEDURE — 74011636637 HC RX REV CODE- 636/637: Performed by: FAMILY MEDICINE

## 2018-02-28 PROCEDURE — 74011250636 HC RX REV CODE- 250/636: Performed by: FAMILY MEDICINE

## 2018-02-28 PROCEDURE — 99285 EMERGENCY DEPT VISIT HI MDM: CPT

## 2018-02-28 PROCEDURE — 82962 GLUCOSE BLOOD TEST: CPT

## 2018-02-28 PROCEDURE — 85025 COMPLETE CBC W/AUTO DIFF WBC: CPT | Performed by: NURSE PRACTITIONER

## 2018-02-28 PROCEDURE — 83930 ASSAY OF BLOOD OSMOLALITY: CPT | Performed by: FAMILY MEDICINE

## 2018-02-28 PROCEDURE — 84443 ASSAY THYROID STIM HORMONE: CPT | Performed by: FAMILY MEDICINE

## 2018-02-28 PROCEDURE — 96361 HYDRATE IV INFUSION ADD-ON: CPT

## 2018-02-28 PROCEDURE — 83036 HEMOGLOBIN GLYCOSYLATED A1C: CPT | Performed by: FAMILY MEDICINE

## 2018-02-28 PROCEDURE — 82533 TOTAL CORTISOL: CPT | Performed by: FAMILY MEDICINE

## 2018-02-28 PROCEDURE — 81001 URINALYSIS AUTO W/SCOPE: CPT | Performed by: EMERGENCY MEDICINE

## 2018-02-28 RX ORDER — SODIUM CHLORIDE 9 MG/ML
75 INJECTION, SOLUTION INTRAVENOUS CONTINUOUS
Status: DISCONTINUED | OUTPATIENT
Start: 2018-02-28 | End: 2018-02-28

## 2018-02-28 RX ORDER — INSULIN GLARGINE 100 [IU]/ML
14 INJECTION, SOLUTION SUBCUTANEOUS DAILY
COMMUNITY
End: 2020-01-15

## 2018-02-28 RX ORDER — SULFAMETHOXAZOLE AND TRIMETHOPRIM 800; 160 MG/1; MG/1
1 TABLET ORAL 2 TIMES DAILY
COMMUNITY
Start: 2018-02-25 | End: 2018-03-02

## 2018-02-28 RX ORDER — LEVOTHYROXINE SODIUM 50 UG/1
50 TABLET ORAL
Status: DISCONTINUED | OUTPATIENT
Start: 2018-03-01 | End: 2018-02-28 | Stop reason: DRUGHIGH

## 2018-02-28 RX ORDER — INSULIN LISPRO 100 [IU]/ML
3 INJECTION, SOLUTION INTRAVENOUS; SUBCUTANEOUS
Status: ON HOLD | COMMUNITY
End: 2018-12-16 | Stop reason: SDUPTHER

## 2018-02-28 RX ORDER — GLUCOSAMINE/CHONDR SU A SOD 750-600 MG
5000 TABLET ORAL DAILY
COMMUNITY
End: 2020-01-15 | Stop reason: DRUGHIGH

## 2018-02-28 RX ORDER — MAGNESIUM SULFATE 100 %
12 CRYSTALS MISCELLANEOUS AS NEEDED
COMMUNITY

## 2018-02-28 RX ORDER — ASPIRIN 81 MG/1
81 TABLET ORAL DAILY
Status: DISCONTINUED | OUTPATIENT
Start: 2018-03-01 | End: 2018-03-02 | Stop reason: HOSPADM

## 2018-02-28 RX ORDER — DEXTROSE 50 % IN WATER (D50W) INTRAVENOUS SYRINGE
12.5-25 AS NEEDED
Status: DISCONTINUED | OUTPATIENT
Start: 2018-02-28 | End: 2018-03-02 | Stop reason: HOSPADM

## 2018-02-28 RX ORDER — MAGNESIUM SULFATE 100 %
4 CRYSTALS MISCELLANEOUS AS NEEDED
Status: DISCONTINUED | OUTPATIENT
Start: 2018-02-28 | End: 2018-03-02 | Stop reason: HOSPADM

## 2018-02-28 RX ORDER — LEVOTHYROXINE SODIUM 50 UG/1
50 TABLET ORAL
COMMUNITY
End: 2018-03-02

## 2018-02-28 RX ORDER — SODIUM CHLORIDE 0.9 % (FLUSH) 0.9 %
5-10 SYRINGE (ML) INJECTION EVERY 8 HOURS
Status: DISCONTINUED | OUTPATIENT
Start: 2018-02-28 | End: 2018-03-02 | Stop reason: HOSPADM

## 2018-02-28 RX ORDER — SODIUM CHLORIDE 0.9 % (FLUSH) 0.9 %
5-10 SYRINGE (ML) INJECTION AS NEEDED
Status: DISCONTINUED | OUTPATIENT
Start: 2018-02-28 | End: 2018-03-02 | Stop reason: HOSPADM

## 2018-02-28 RX ORDER — INSULIN LISPRO 100 [IU]/ML
INJECTION, SOLUTION INTRAVENOUS; SUBCUTANEOUS EVERY 6 HOURS
Status: DISCONTINUED | OUTPATIENT
Start: 2018-03-01 | End: 2018-03-02 | Stop reason: HOSPADM

## 2018-02-28 RX ORDER — LEVOTHYROXINE SODIUM 50 UG/1
50 TABLET ORAL
Status: DISCONTINUED | OUTPATIENT
Start: 2018-03-01 | End: 2018-03-01

## 2018-02-28 RX ORDER — SODIUM CHLORIDE 9 MG/ML
50 INJECTION, SOLUTION INTRAVENOUS CONTINUOUS
Status: DISCONTINUED | OUTPATIENT
Start: 2018-02-28 | End: 2018-03-02 | Stop reason: HOSPADM

## 2018-02-28 RX ADMIN — SODIUM CHLORIDE 50 ML/HR: 900 INJECTION, SOLUTION INTRAVENOUS at 23:01

## 2018-02-28 RX ADMIN — SODIUM CHLORIDE 1000 ML: 900 INJECTION, SOLUTION INTRAVENOUS at 19:30

## 2018-02-28 RX ADMIN — Medication 10 ML: at 23:06

## 2018-02-28 RX ADMIN — INSULIN LISPRO 3 UNITS: 100 INJECTION, SOLUTION INTRAVENOUS; SUBCUTANEOUS at 23:11

## 2018-02-28 NOTE — IP AVS SNAPSHOT
27[address] [facility] [address] 
[phone] Patient: Lucila Gr MRN: [id number] [place]U:[birth year] A check amelia indicates which time of day the medication should be taken. My Medications CHANGE how you take these medications Instructions Each Dose to Equal  
 Morning Noon Evening Bedtime  
 levothyroxine 100 mcg tablet Commonly known as:  SYNTHROID What changed:  See the new instructions. Your last dose was: Your next dose is: Take 1 Tab by mouth every morning. 100 mcg CONTINUE taking these medications Instructions Each Dose to Equal  
 Morning Noon Evening Bedtime  
 aspirin delayed-release 81 mg tablet Your last dose was: Your next dose is: Take 81 mg by mouth daily. 81 mg  
    
   
   
   
  
 BACTRIM -800 mg per tablet Generic drug:  trimethoprim-sulfamethoxazole Your last dose was: Your next dose is: Take 1 Tab by mouth two (2) times a day. 1 Tab Biotin 2,500 mcg Cap Your last dose was: Your next dose is: Take 5,000 mcg by mouth daily. 5000 mcg FLONASE 50 mcg/actuation nasal spray Generic drug:  fluticasone Your last dose was: Your next dose is:    
   
   
 1 Spray by Both Nostrils route two (2) times a day. 1 Spray  
    
   
   
   
  
 glucagon 1 mg injection Commonly known as:  Sue Holder Your last dose was: Your next dose is:    
   
   
 1 mg by IntraMUSCular route once as needed (hypoglycemia if patient is unresponsive). 1 mg  
    
   
   
   
  
 glucose 4 gram chewable tablet Your last dose was: Your next dose is: Take 12 g by mouth as needed (BS < 60). 12 g * HumaLOG U-100 Insulin 100 unit/mL injection Generic drug:  insulin lispro Your last dose was: Your next dose is:    
   
   
 by SubCUTAneous route. Sliding scale: If blood sugar > 80 give with scheduled 3 units 151-200= 4 units  201-250= 5 units 251-300= 6 units If > 300 call MD  
     
   
   
   
  
 * HumaLOG U-100 Insulin 100 unit/mL injection Generic drug:  insulin lispro Your last dose was: Your next dose is:    
   
   
 3 Units by SubCUTAneous route three (3) times daily (with meals). Hold insulin if BS < 80. Hold if patient does not eat her meal. If BS> 150 give with SS.  
 3 Units LANTUS U-100 INSULIN 100 unit/mL injection Generic drug:  insulin glargine Your last dose was: Your next dose is:    
   
   
 14 Units by SubCUTAneous route daily. 14 Units  
    
   
   
   
  
 minocycline 50 mg capsule Commonly known as:  Jhon Tamayos Your last dose was: Your next dose is: Take 1 capsule by mouth  daily VITAMIN D3 1,000 unit Cap Generic drug:  cholecalciferol Your last dose was: Your next dose is: Take 2,000 Units by mouth every Sunday. 2000 Units * Notice: This list has 2 medication(s) that are the same as other medications prescribed for you. Read the directions carefully, and ask your doctor or other care provider to review them with you. Where to Get Your Medications Information on where to get these meds will be given to you by the nurse or doctor. ! Ask your nurse or doctor about these medications  
  levothyroxine 100 mcg tablet

## 2018-02-28 NOTE — IP AVS SNAPSHOT
[address] 
[phone] Patient: Nimisha Gr MRN: [id number] [facility]:4/2/5697 About your hospitalization You were admitted on:  February 28, 2018 You last received care in the:  [facility] [address] [place] [address] SPECIAL  You were discharged on:  March 2, 2018 Why you were hospitalized Your primary diagnosis was:  Hyponatremia Follow-up Information Follow up With Details Comments Contact Info Charlotte Mcdowell MD   [address] [facility] 
[phone] Discharge Orders None A check amelia indicates which time of day the medication should be taken. My Medications CHANGE how you take these medications Instructions Each Dose to Equal  
 Morning Noon Evening Bedtime  
 levothyroxine 100 mcg tablet Commonly known as:  SYNTHROID What changed:  See the new instructions. Your last dose was: Your next dose is: Take 1 Tab by mouth every morning. 100 mcg CONTINUE taking these medications Instructions Each Dose to Equal  
 Morning Noon Evening Bedtime  
 aspirin delayed-release 81 mg tablet Your last dose was: Your next dose is: Take 81 mg by mouth daily. 81 mg  
    
   
   
   
  
 BACTRIM -800 mg per tablet Generic drug:  trimethoprim-sulfamethoxazole Your last dose was: Your next dose is: Take 1 Tab by mouth two (2) times a day. 1 Tab Biotin 2,500 mcg Cap Your last dose was: Your next dose is: Take 5,000 mcg by mouth daily. 5000 mcg FLONASE 50 mcg/actuation nasal spray Generic drug:  fluticasone Your last dose was: Your next dose is:    
   
   
 1 Spray by Both Nostrils route two (2) times a day. 1 Spray glucagon 1 mg injection Commonly known as:  Rosmery Rebollar Your last dose was: Your next dose is:    
   
   
 1 mg by IntraMUSCular route once as needed (hypoglycemia if patient is unresponsive). 1 mg  
    
   
   
   
  
 glucose 4 gram chewable tablet Your last dose was: Your next dose is: Take 12 g by mouth as needed (BS < 60). 12 g * HumaLOG U-100 Insulin 100 unit/mL injection Generic drug:  insulin lispro Your last dose was: Your next dose is:    
   
   
 by SubCUTAneous route. Sliding scale: If blood sugar > 80 give with scheduled 3 units 151-200= 4 units  201-250= 5 units 251-300= 6 units If > 300 call MD  
     
   
   
   
  
 * HumaLOG U-100 Insulin 100 unit/mL injection Generic drug:  insulin lispro Your last dose was: Your next dose is:    
   
   
 3 Units by SubCUTAneous route three (3) times daily (with meals). Hold insulin if BS < 80. Hold if patient does not eat her meal. If BS> 150 give with SS.  
 3 Units LANTUS U-100 INSULIN 100 unit/mL injection Generic drug:  insulin glargine Your last dose was: Your next dose is:    
   
   
 14 Units by SubCUTAneous route daily. 14 Units  
    
   
   
   
  
 minocycline 50 mg capsule Commonly known as:  Prasad Prescott Your last dose was: Your next dose is: Take 1 capsule by mouth  daily VITAMIN D3 1,000 unit Cap Generic drug:  cholecalciferol Your last dose was: Your next dose is: Take 2,000 Units by mouth every Sunday. 2000 Units * Notice: This list has 2 medication(s) that are the same as other medications prescribed for you. Read the directions carefully, and ask your doctor or other care provider to review them with you. Where to Get Your Medications Information on where to get these meds will be given to you by the nurse or doctor. ! Ask your nurse or doctor about these medications  
  levothyroxine 100 mcg tablet Discharge Instructions Discharge SNF/Rehab Instructions/LTAC  
 
 
PATIENT ID: Nela Gr MRN: [id number] YOB: 1970 DATE OF ADMISSION: 2/28/2018  6:42 PM   
DATE OF DISCHARGE: 3/2/2018 PRIMARY CARE PROVIDER: Felice Costa MD  
 
 
ATTENDING PHYSICIAN: Alessandro Mike MD;* 
DISCHARGING PROVIDER: Alessandro Mike MD    
To contact this individual call [phone] and ask the  to page. If unavailable ask to be transferred the Adult Hospitalist Department. CONSULTATIONS: IP CONSULT TO HOSPITALIST 
 
PROCEDURES/SURGERIES: * No surgery found * [address] COURSE:  
 
A 49years old female with past medical history significant for Down's syndrome, hypothyroidism, diabetes, and asthma who presents from home with chief complaint of evaluation for abnormal lab results. Hyponatremia possible related to hypothyroidism  
-improved with normal saline, and sodium level 136 
-TSH elevated to 79.90,  
-she takes synthroid 50 mcg 5 x a week, dose increased after discussion with her Endocrinologist 
Hypothyroidism  
-increased Synthroid to 100 mcg po q daily after Dr Denilson Lynn recommendation DM-II 
-A1c 9.1 
-add lantus 14 units, lispro 3 units q hs, sliding scale and monitor finger stick glucose 
-sliding scale, monitor finger stick glucose  
Anemia of chronic disease  
-no evidence of active bleeding, H/H stable Down syndrome  
-continue supportive care 
  
Code status: Full Code DVT prophylaxis: SCD 
 
DISCHARGE DIAGNOSES / PLAN:   
 
Hyponatremia possible related to hypothyroidism  
-Resolved Hypothyroidism  
-increased Synthroid to 100 mcg po q daily 
-outpatient follow up with Endocrinologist  
DM-II 
 -continue lantus 14 units, lispro 3 units q hs, sliding scale and monitor finger stick glucose Anemia of chronic disease  
-no evidence of active bleeding, H/H stable Down syndrome  
-continue supportive care PENDING TEST RESULTS:  
At the time of discharge the following test results are still pending: none FOLLOW UP APPOINTMENTS:   
Sanjiv Umanzor MD, in one week David Recinos MD in 4 weeks ADDITIONAL CARE RECOMMENDATIONS: none DIET: Diabetic Diet TUBE FEEDING INSTRUCTIONS: none OXYGEN / BiPAP SETTINGS: none ACTIVITY: Activity as tolerated WOUND CARE: none EQUIPMENT needed: none DISCHARGE MEDICATIONS: 
 See Medication Reconciliation Form NOTIFY YOUR PHYSICIAN FOR ANY OF THE FOLLOWING:  
Fever over 101 degrees for 24 hours. Chest pain, shortness of breath, fever, chills, nausea, vomiting, diarrhea, change in mentation, falling, weakness, bleeding. Severe pain or pain not relieved by medications. Or, any other signs or symptoms that you may have questions about. DISPOSITION: 
  Home With: 
 OT  PT  [facility]  RN  
  
 SNF/Inpatient Rehab/LTAC Independent/assisted living  
x [facility] Other:  
 
 
PATIENT CONDITION AT DISCHARGE:  
 
Functional status Poor Deconditioned   
x Independent Cognition  
x  Lucid Forgetful Dementia Catheters/lines (plus indication) [facility] PICC   
 PEG   
x None Code status  
 x Full code DNR   
 
PHYSICAL EXAMINATION AT DISCHARGE: 
 Refer to Progress Note CHRONIC MEDICAL DIAGNOSES: 
Problem List as of 3/1/2018  Date Reviewed: 2/28/2018 Codes Class Noted - Resolved * (Principal)Hyponatremia ICD-10-CM: E87.1 ICD-9-CM: 276.1  2/28/2018 - Present [facility]acidoses[facility] ICD-10-CM: E13.10 ICD-9-CM: 250.10  9/14/2017 - Present Advance directive on file ICD-10-CM: Z78.9 ICD-9-CM: V49.89  12/28/2015 - Present Acne ICD-10-CM: L70.9 ICD-9-CM: 706.1  8/20/2014 - Present Syncope and collapse ICD-10-CM: R55 
ICD-9-CM: 780.2  4/29/2013 - Present Asthma (Chronic) ICD-10-CM: J45.909 ICD-9-CM: 493.90  4/29/2013 - Present HTN (hypertension) (Chronic) ICD-10-CM: I10 
ICD-9-CM: 401.9  4/29/2013 - Present DM (diabetes mellitus), type 1, uncontrolled ([facility] ICD-10-CM: E10.65 ICD-9-CM: 250.03  11/14/2012 - Present Hypoglycemia, unspecified ICD-10-CM: E16.2 ICD-9-CM: 251.2  6/27/2012 - Present Down syndrome ICD-10-CM: Q90.9 ICD-9-CM: 758.0  7/23/2010 - Present Acquired hypothyroidism ICD-10-CM: E03.9 ICD-9-CM: 244.9  7/23/2010 - Present Anemia, unspecified ICD-10-CM: D64.9 ICD-9-CM: 285.9  7/23/2010 - Present RESOLVED: Hyperglycemia ICD-10-CM: R73.9 ICD-9-CM: 790.29  7/28/2014 - 7/29/2014 RESOLVED: Type II or unspecified type diabetes mellitus without mention of complication, uncontrolled ICD-10-CM: E11.65 ICD-9-CM: 250.02  7/23/2010 - 11/14/2012 CDMP Checked:  
Yes x PROBLEM LIST Updated: 
Yes x Signed:  
Joseph Mcfarland MD 
3/1/2018 
8:38 PM 
 
  
 
  
  
  
[place] Announcement We are excited to announce that we are making your provider's discharge notes available to you in [place]. You will see these notes when they are completed and signed by the physician that discharged you from your recent hospital stay. If you have any questions or concerns about any information you see in [place], please call the Health Information Department where you were seen or reach out to your Primary Care Provider for more information about your plan of care. Introducing [facility]! Dear Rj Napier: Thank you for requesting a [employer] account. Our records indicate that you already have an active [employer] account. You can access your account anytime at [email or web address] [email or web address] Did you know that you can access your hospital and ER discharge instructions at any time in [employer]? You can also review all of your test results from your hospital stay or ER visit. Additional Information If you have questions, please visit the Frequently Asked Questions section of the [employer] website at [email or web address] Remember, MyChart is NOT to be used for urgent needs. For medical emergencies, dial 911. Now available from your iPhone and Android! Providers Seen During Your Hospitalization Provider Specialty Primary office phone Matthew Day MD Emergency Medicine [phone] Criselda Damico MD Internal Medicine [phone] Nakia Burton MD Hospitalist [phone] Your Primary Care Physician (PCP) Primary Care Physician Office Phone Office Fax Maria Del Rosario Lowry [phone] You are allergic to the following Allergen Reactions Acetaminophen Other (comments) Patient has implanted glucometer. Glucometer does not work properly when patient takes acetaminophen. Codeine Nausea and Vomiting Nitrofurantoin Rash Pcn (Penicillins) Hives Rash Recent Documentation Weight BMI OB Status Smoking Status 59.6 kg 31.66 kg/m2 Hysterectomy Never Smoker Emergency Contacts Name Discharge Info Relation Home Work Mobile Medina Gr DISCHARGE CAREGIVER [3] Other Relative [6] [phone] Lonza Clay  Sister [23] [phone] Patient Belongings The following personal items are in your possession at time of discharge: 
  Dental Appliances: None         Home Medications: None   Jewelry: None  Clothing: At bedside    Other Valuables: None Please provide this summary of care documentation to your next provider. Signatures-by signing, you are acknowledging that this After Visit Summary has been reviewed with you and you have received a copy. Patient Signature:  ____________________________________________________________ Date:  ____________________________________________________________  
  
Estrada November Provider Signature:  ____________________________________________________________ Date:  ____________________________________________________________

## 2018-02-28 NOTE — ED NOTES
5:00 PM  I have evaluated the patient as the Provider in Triage. I have reviewed Her vital signs and the triage nurse assessment. I have talked with the patient and any available family and advised that I am the provider in triage and have ordered the appropriate study to initiate their work up based on the clinical presentation during my assessment. I have advised that the patient will be accommodated in the Main ED as soon as possible. I have also requested to contact the triage nurse or myself immediately if the patient experiences any changes in their condition during this brief waiting period. Patient seen by PCP and sent here for low sodium.  She denies symptoms   Marcos Stiles NP

## 2018-02-28 NOTE — ED TRIAGE NOTES
TRIAGE NOTE:  Patient arrives with c/o abnormal low sodium. Patient referred by PCP. Patient family reports patient has been treated for UTI. Patient reports vomiting on Monday but states \"it was something I ate\".

## 2018-03-01 LAB
ANION GAP SERPL CALC-SCNC: 9 MMOL/L (ref 5–15)
BASOPHILS # BLD: 0.1 K/UL (ref 0–0.1)
BASOPHILS NFR BLD: 1 % (ref 0–1)
BUN SERPL-MCNC: 16 MG/DL (ref 6–20)
BUN/CREAT SERPL: 16 (ref 12–20)
CALCIUM SERPL-MCNC: 8.6 MG/DL (ref 8.5–10.1)
CHLORIDE SERPL-SCNC: 104 MMOL/L (ref 97–108)
CO2 SERPL-SCNC: 23 MMOL/L (ref 21–32)
CORTIS SERPL-MCNC: 12.7 UG/DL
CREAT SERPL-MCNC: 0.99 MG/DL (ref 0.55–1.02)
CREAT UR-MCNC: 26.2 MG/DL
DIFFERENTIAL METHOD BLD: ABNORMAL
EOSINOPHIL # BLD: 0.2 K/UL (ref 0–0.4)
EOSINOPHIL NFR BLD: 5 % (ref 0–7)
ERYTHROCYTE [DISTWIDTH] IN BLOOD BY AUTOMATED COUNT: 15.2 % (ref 11.5–14.5)
GLUCOSE BLD STRIP.AUTO-MCNC: 150 MG/DL (ref 65–100)
GLUCOSE BLD STRIP.AUTO-MCNC: 173 MG/DL (ref 65–100)
GLUCOSE BLD STRIP.AUTO-MCNC: 383 MG/DL (ref 65–100)
GLUCOSE SERPL-MCNC: 180 MG/DL (ref 65–100)
HCT VFR BLD AUTO: 31.7 % (ref 35–47)
HGB BLD-MCNC: 10.6 G/DL (ref 11.5–16)
IMM GRANULOCYTES # BLD: 0 K/UL (ref 0–0.04)
IMM GRANULOCYTES NFR BLD AUTO: 1 % (ref 0–0.5)
LYMPHOCYTES # BLD: 0.5 K/UL (ref 0.8–3.5)
LYMPHOCYTES NFR BLD: 11 % (ref 12–49)
MCH RBC QN AUTO: 30.6 PG (ref 26–34)
MCHC RBC AUTO-ENTMCNC: 33.4 G/DL (ref 30–36.5)
MCV RBC AUTO: 91.6 FL (ref 80–99)
MONOCYTES # BLD: 0.5 K/UL (ref 0–1)
MONOCYTES NFR BLD: 12 % (ref 5–13)
NEUTS SEG # BLD: 2.9 K/UL (ref 1.8–8)
NEUTS SEG NFR BLD: 71 % (ref 32–75)
NRBC # BLD: 0 K/UL (ref 0–0.01)
NRBC BLD-RTO: 0 PER 100 WBC
OSMOLALITY UR: 225 MOSM/KG H2O
PLATELET # BLD AUTO: 202 K/UL (ref 150–400)
PMV BLD AUTO: 10 FL (ref 8.9–12.9)
POTASSIUM SERPL-SCNC: 4.4 MMOL/L (ref 3.5–5.1)
RBC # BLD AUTO: 3.46 M/UL (ref 3.8–5.2)
SERVICE CMNT-IMP: ABNORMAL
SODIUM SERPL-SCNC: 136 MMOL/L (ref 136–145)
SODIUM UR-SCNC: 42 MMOL/L
TSH SERPL DL<=0.05 MIU/L-ACNC: 79.9 UIU/ML (ref 0.36–3.74)
WBC # BLD AUTO: 4.1 K/UL (ref 3.6–11)

## 2018-03-01 PROCEDURE — 74011636637 HC RX REV CODE- 636/637: Performed by: HOSPITALIST

## 2018-03-01 PROCEDURE — 99218 HC RM OBSERVATION: CPT

## 2018-03-01 PROCEDURE — 74011250637 HC RX REV CODE- 250/637: Performed by: FAMILY MEDICINE

## 2018-03-01 PROCEDURE — 96361 HYDRATE IV INFUSION ADD-ON: CPT

## 2018-03-01 PROCEDURE — 85025 COMPLETE CBC W/AUTO DIFF WBC: CPT | Performed by: FAMILY MEDICINE

## 2018-03-01 PROCEDURE — 36415 COLL VENOUS BLD VENIPUNCTURE: CPT | Performed by: FAMILY MEDICINE

## 2018-03-01 PROCEDURE — 80048 BASIC METABOLIC PNL TOTAL CA: CPT | Performed by: FAMILY MEDICINE

## 2018-03-01 PROCEDURE — 82962 GLUCOSE BLOOD TEST: CPT

## 2018-03-01 PROCEDURE — 74011250636 HC RX REV CODE- 250/636: Performed by: FAMILY MEDICINE

## 2018-03-01 PROCEDURE — 74011636637 HC RX REV CODE- 636/637: Performed by: FAMILY MEDICINE

## 2018-03-01 RX ORDER — INSULIN LISPRO 100 [IU]/ML
12 INJECTION, SOLUTION INTRAVENOUS; SUBCUTANEOUS ONCE
Status: DISCONTINUED | OUTPATIENT
Start: 2018-03-01 | End: 2018-03-01

## 2018-03-01 RX ORDER — INSULIN GLARGINE 100 [IU]/ML
18 INJECTION, SOLUTION SUBCUTANEOUS
Status: DISCONTINUED | OUTPATIENT
Start: 2018-03-01 | End: 2018-03-02 | Stop reason: HOSPADM

## 2018-03-01 RX ORDER — INSULIN LISPRO 100 [IU]/ML
3 INJECTION, SOLUTION INTRAVENOUS; SUBCUTANEOUS
Status: DISCONTINUED | OUTPATIENT
Start: 2018-03-01 | End: 2018-03-02 | Stop reason: HOSPADM

## 2018-03-01 RX ORDER — INSULIN LISPRO 100 [IU]/ML
9 INJECTION, SOLUTION INTRAVENOUS; SUBCUTANEOUS ONCE
Status: COMPLETED | OUTPATIENT
Start: 2018-03-01 | End: 2018-03-01

## 2018-03-01 RX ORDER — INSULIN GLARGINE 100 [IU]/ML
14 INJECTION, SOLUTION SUBCUTANEOUS
Status: DISCONTINUED | OUTPATIENT
Start: 2018-03-01 | End: 2018-03-01

## 2018-03-01 RX ORDER — LEVOTHYROXINE SODIUM 100 UG/1
100 TABLET ORAL
Status: DISCONTINUED | OUTPATIENT
Start: 2018-03-02 | End: 2018-03-02 | Stop reason: HOSPADM

## 2018-03-01 RX ORDER — LEVOTHYROXINE SODIUM 50 UG/1
50 TABLET ORAL
Status: DISCONTINUED | OUTPATIENT
Start: 2018-03-02 | End: 2018-03-01

## 2018-03-01 RX ADMIN — INSULIN LISPRO 9 UNITS: 100 INJECTION, SOLUTION INTRAVENOUS; SUBCUTANEOUS at 18:00

## 2018-03-01 RX ADMIN — ASPIRIN 81 MG: 81 TABLET, COATED ORAL at 09:32

## 2018-03-01 RX ADMIN — INSULIN LISPRO 3 UNITS: 100 INJECTION, SOLUTION INTRAVENOUS; SUBCUTANEOUS at 11:56

## 2018-03-01 RX ADMIN — INSULIN LISPRO 9 UNITS: 100 INJECTION, SOLUTION INTRAVENOUS; SUBCUTANEOUS at 17:00

## 2018-03-01 RX ADMIN — Medication 10 ML: at 05:45

## 2018-03-01 RX ADMIN — INSULIN LISPRO 3 UNITS: 100 INJECTION, SOLUTION INTRAVENOUS; SUBCUTANEOUS at 16:30

## 2018-03-01 RX ADMIN — INSULIN LISPRO 2 UNITS: 100 INJECTION, SOLUTION INTRAVENOUS; SUBCUTANEOUS at 11:55

## 2018-03-01 RX ADMIN — LEVOTHYROXINE SODIUM 50 MCG: 50 TABLET ORAL at 05:48

## 2018-03-01 RX ADMIN — INSULIN LISPRO 2 UNITS: 100 INJECTION, SOLUTION INTRAVENOUS; SUBCUTANEOUS at 05:45

## 2018-03-01 RX ADMIN — SODIUM CHLORIDE 50 ML/HR: 900 INJECTION, SOLUTION INTRAVENOUS at 20:58

## 2018-03-01 RX ADMIN — Medication 10 ML: at 14:00

## 2018-03-01 RX ADMIN — INSULIN GLARGINE 18 UNITS: 100 INJECTION, SOLUTION SUBCUTANEOUS at 22:30

## 2018-03-01 RX ADMIN — INSULIN LISPRO 3 UNITS: 100 INJECTION, SOLUTION INTRAVENOUS; SUBCUTANEOUS at 09:32

## 2018-03-01 NOTE — ED PROVIDER NOTES
HPI Comments: 49 y.o. female with past medical history significant for Down's syndrome, hypothyroidism, diabetes, and asthma who presents from home with chief complaint of evaluation for abnormal lab results. Patient states her PCP sent her to the ED for a low sodium level. Patient notes she was found to have a UTI recently and is currently on Bactrim. Patient does mention an episode of vomiting 2 days ago, but she suspects this was from something she ate. Patient denies nausea, vomiting, and weakness. Oral intake has been reduced over the past few days. No urinary symptoms, cough or congestion and no SOB noted. There are no other acute medical concerns at this time. Old Chart Review: Per note, patient was evaluated here from 09/14/18 through the 16th for DKA, pseudohypona, ROBBY, and UA. Social hx: Tobacco Use: No, Alcohol Use: No, Drug Use: No    PCP: Shane Ruth MD    Note written by Onel Ortega, as dictated by Marylen Anda, MD 7:14 PM      The history is provided by the patient and medical records. Past Medical History:   Diagnosis Date    Allergic rhinitis, cause unspecified     Asthma     Diabetes ([facility]     Down's syndrome    [facility] H/O impacted cerumen     Dr. Sotelo Keep Hypothyroid     Thyroid disease        Past Surgical History:   Procedure Laterality Date    HX CYST REMOVAL      right shoulder    HX HEENT Bilateral 2017    prior in one eye    HX HEENT Left 3/5/15    cataract left and right    HX HYSTERECTOMY           Family History:   Problem Relation Age of Onset    Thyroid Disease Mother      hypo    Hypertension Father        Social History     Social History    Marital status: SINGLE     Spouse name: N/A    Number of children: N/A    Years of education: N/A     Occupational History    Not on file.      Social History Main Topics    Smoking status: Never Smoker    Smokeless tobacco: Never Used    Alcohol use No    Drug use: No    Sexual activity: Not Currently     Other Topics Concern    Not on file     Social History Narrative         ALLERGIES: Acetaminophen; Codeine; Nitrofurantoin; and Pcn [penicillins]    Review of Systems   Constitutional: Negative for activity change, appetite change and fatigue. HENT: Negative for ear pain, facial swelling, sore throat and trouble swallowing. Eyes: Negative for pain, discharge and visual disturbance. Respiratory: Negative for chest tightness, shortness of breath and wheezing. Cardiovascular: Negative for chest pain and palpitations. Gastrointestinal: Negative for abdominal pain, blood in stool, nausea and vomiting. Genitourinary: Negative for difficulty urinating, flank pain and hematuria. Musculoskeletal: Negative for arthralgias, joint swelling, myalgias and neck pain. Skin: Negative for color change and rash. Neurological: Negative for dizziness, weakness, numbness and headaches. Hematological: Negative for adenopathy. Does not bruise/bleed easily. Psychiatric/Behavioral: Negative for behavioral problems, confusion and sleep disturbance. All other systems reviewed and are negative. Vitals:    02/28/18 1658   BP: 138/80   Pulse: 82   Resp: 18   Temp: 98.5 °F (36.9 °C)   SpO2: 99%   Weight: 65 kg (143 lb 3.2 oz)            Physical Exam   Constitutional: She is oriented to person, place, and time. She appears well-developed and well-nourished. No distress. Downs syndrome  NAD   HENT:   Head: Normocephalic and atraumatic. Nose: Nose normal.   Mouth/Throat: Oropharynx is clear and moist.   Eyes: Conjunctivae and EOM are normal. Pupils are equal, round, and reactive to light. No scleral icterus. Neck: Normal range of motion. Neck supple. No JVD present. No tracheal deviation present. No thyromegaly present. No carotid bruits noted. Cardiovascular: Normal rate, regular rhythm, normal heart sounds and intact distal pulses. Exam reveals no gallop and no friction rub.     No murmur heard.  Pulmonary/Chest: Effort normal and breath sounds normal. No respiratory distress. She has no wheezes. She has no rales. She exhibits no tenderness. Abdominal: Soft. Bowel sounds are normal. She exhibits no distension and no mass. There is no tenderness. There is no rebound and no guarding. Musculoskeletal: Normal range of motion. She exhibits no edema or tenderness. Lymphadenopathy:     She has no cervical adenopathy. Neurological: She is alert and oriented to person, place, and time. She has normal reflexes. No cranial nerve deficit. Coordination normal.   Skin: Skin is warm and dry. No rash noted. No erythema. Psychiatric: She has a normal mood and affect. Her behavior is normal. Judgment and thought content normal.   Nursing note and vitals reviewed. Note written by Onel Maldonado, as dictated by Crystal Poole MD 7:14 PM    MDM  Number of Diagnoses or Management Options  Hyponatremia: new and requires workup     Amount and/or Complexity of Data Reviewed  Clinical lab tests: ordered and reviewed  Decide to obtain previous medical records or to obtain history from someone other than the patient: yes  Obtain history from someone other than the patient: yes  Review and summarize past medical records: yes  Discuss the patient with other providers: yes    Risk of Complications, Morbidity, and/or Mortality  Presenting problems: moderate  Diagnostic procedures: moderate  Management options: moderate    Patient Progress  Patient progress: stable        ED Course       Procedures    CONSULT NOTE:  8:11 PM Crystal Poole MD spoke with Dr. Benita Reich, Consult for Hospitalist.  Discussed available diagnostic tests and clinical findings. Provider is in agreement with care plans as outlined. Provider will evaluate the patient for admission to the hospital.    Discussed all findings with the family and it is felt that admission for further evaluation and treatment is reasonable. Family agrees. No difficulty in the ED.  Admitted without incident

## 2018-03-01 NOTE — PROGRESS NOTES
Hospitalist Progress Note  Milad Khanna MD  Answering service: [phone] from in house phone  Cell: [phone]      Date of Service:  3/1/2018  NAME:  Mateo Gr  :  1970  MRN:  [id number]      Admission Summary:   49 y.o. female with past medical history significant for Down's syndrome, hypothyroidism, diabetes, and asthma who presents from home with chief complaint of evaluation for abnormal lab results. Interval history / Subjective:   No acute complaint, limited historian     Assessment & Plan:     Hyponatremia possible related to hypothyroidism   -TSH elevated to 79.90,   -she takes synthroid 50 mcg 5 x a week, will adjust to 50 mcg q day  -improved with IVF    Hypothyroidism   -increase Synthroid 100 mcg po q daily  -I called and discussed with Dr Wade Oropeza her PCP and he said her endocrinologist, Dr Mago Brooks is managing her synthroid. Discussed with Dr Mago Brooks and recommended to increase her synthroid to 100 mcg po q daily jeff 50 mcg 5 x a week    DM-II  -A1c 9.1  -add lantus 14 units, lispro 3 units q hs, sliding scale and monitor finger stick glucose  -sliding scale, monitor finger stick glucose    Anemia of chronic disease   -no evidence of active bleeding, H/H stable    Down syndrome   -continue supportive care    Code status: Full Code  DVT prophylaxis: SCD    Care Plan discussed with: Patient/Family and Nurse  Disposition: TBD   Discussed with her parents and other family number,  answered questions  Contact number [phone] [facility] Problems  Date Reviewed: 2018          Codes Class Noted POA    * (Principal)Hyponatremia ICD-10-CM: E87.1  ICD-9-CM: 276.1  2018 Unknown              Vital Signs:    Last 24hrs VS reviewed since prior progress note.  Most recent are:  Visit Vitals    /58    Pulse 76    Temp 98.6 °F (37 °C)    Resp 16    Wt 65 kg (143 lb 3.2 oz)    SpO2 95%    BMI 34.53 kg/m2         Intake/Output Summary (Last 24 hours) at 03/01/18 8901  Last data filed at 03/01/18 0600   Gross per 24 hour   Intake           309.17 ml   Output                0 ml   Net           309.17 ml        Physical Examination:             Constitutional:  No acute distress, cooperative, pleasant    ENT:  Oral mucous moist, oropharynx benign. Neck supple,    Resp:  CTA bilaterally. No wheezing/rhonchi/rales. No accessory muscle use   CV:  Regular rhythm, normal rate, no murmurs, gallops, rubs    GI:  Soft, non distended, non tender. normoactive bowel sounds, no hepatosplenomegaly     Musculoskeletal:  No edema    Neurologic:  Moves all extremities. AAOx3, CN II-XII reviewed     Skin:  Good turgor, no rashes or ulcers       Data Review:    Review and/or order of clinical lab test      Labs:     Recent Labs      03/01/18   0456  02/28/18   1722   WBC  4.1  6.1   HGB  10.6*  10.0*   HCT  31.7*  29.2*   PLT  202  220     Recent Labs      03/01/18   0456  02/28/18   1722   NA  136  126*   K  4.4  4.4   CL  104  92*   CO2  23  26   BUN  16  22*   CREA  0.99  1.14*   GLU  180*  184*   CA  8.6  8.3*     Recent Labs      02/28/18   1722   SGOT  19   ALT  13   AP  72   TBILI  0.2   TP  6.9   ALB  2.2*   GLOB  4.7*     No results for input(s): INR, PTP, APTT in the last 72 hours. No lab exists for component: INREXT   No results for input(s): FE, TIBC, PSAT, FERR in the last 72 hours. No results found for: FOL, RBCF   No results for input(s): PH, PCO2, PO2 in the last 72 hours. No results for input(s): CPK, CKNDX, TROIQ in the last 72 hours.     No lab exists for component: CPKMB  Lab Results   Component Value Date/Time    Cholesterol, total 135 04/30/2013 05:20 AM    HDL Cholesterol 59 04/30/2013 05:20 AM    LDL, calculated 66.6 04/30/2013 05:20 AM    Triglyceride 47 04/30/2013 05:20 AM    CHOL/HDL Ratio 2.3 04/30/2013 05:20 AM     Lab Results   Component Value Date/Time    Glucose (POC) 256 (H) 02/28/2018 10:28 PM    Glucose (POC) 262 (H) 02/28/2018 06:42 PM    Glucose (POC) 332 (H) 09/16/2017 11:38 AM    Glucose (POC) 197 (H) 09/16/2017 08:35 AM    Glucose (POC) 114 (H) 09/16/2017 06:28 AM     Lab Results   Component Value Date/Time    Color YELLOW/STRAW 02/28/2018 07:30 PM    Appearance CLEAR 02/28/2018 07:30 PM    Specific gravity 1.010 02/28/2018 07:30 PM    Specific gravity 1.015 04/29/2013 01:50 PM    pH (UA) 6.5 02/28/2018 07:30 PM    Protein 100 (A) 02/28/2018 07:30 PM    Glucose NEGATIVE  02/28/2018 07:30 PM    Ketone NEGATIVE  02/28/2018 07:30 PM    Bilirubin NEGATIVE  02/28/2018 07:30 PM    Urobilinogen 0.2 02/28/2018 07:30 PM    Nitrites NEGATIVE  02/28/2018 07:30 PM    Leukocyte Esterase NEGATIVE  02/28/2018 07:30 PM    Glucose/Ketones  02/08/2009 07:00 AM     GLUCOSE AND KETONES SIGNIFICANTLY ELEVATED. RESULTS PHONED TO AND READ BACK BY    Epithelial cells FEW 02/28/2018 07:30 PM    Bacteria NEGATIVE  02/28/2018 07:30 PM    WBC 0-4 02/28/2018 07:30 PM    RBC 5-10 02/28/2018 07:30 PM         Medications Reviewed:     Current Facility-Administered Medications   Medication Dose Route Frequency    . PHARMACY TO SUBSTITUTE PER PROTOCOL    Per Protocol    aspirin delayed-release tablet 81 mg  81 mg Oral DAILY    sodium chloride (NS) flush 5-10 mL  5-10 mL IntraVENous Q8H    sodium chloride (NS) flush 5-10 mL  5-10 mL IntraVENous PRN    0.9% sodium chloride infusion  50 mL/hr IntraVENous CONTINUOUS    glucose chewable tablet 16 g  4 Tab Oral PRN    dextrose (D50W) injection syrg 12.5-25 g  12.5-25 g IntraVENous PRN    glucagon (GLUCAGEN) injection 1 mg  1 mg IntraMUSCular PRN    insulin lispro (HUMALOG) injection   SubCUTAneous Q6H    levothyroxine (SYNTHROID) tablet 50 mcg  50 mcg Oral Once per day on Mon Tue Wed Thu Fri     Current Outpatient Prescriptions   Medication Sig    trimethoprim-sulfamethoxazole (BACTRIM DS) 160-800 mg per tablet Take 1 Tab by mouth two (2) times a day.     Biotin 2,500 mcg cap Take 5,000 mcg by mouth daily.  levothyroxine (SYNTHROID) 50 mcg tablet Take 50 mcg by mouth five (5) days a week. Takes on M,T,W,T,F    glucose 4 gram chewable tablet Take 12 g by mouth as needed (BS < 60).  glucagon (GLUCAGEN) 1 mg injection 1 mg by IntraMUSCular route once as needed (hypoglycemia if patient is unresponsive).  insulin glargine (LANTUS U-100 INSULIN) 100 unit/mL injection 14 Units by SubCUTAneous route daily.  insulin lispro (HUMALOG U-100 INSULIN) 100 unit/mL injection 3 Units by SubCUTAneous route three (3) times daily (with meals). Hold insulin if BS < 80. Hold if patient does not eat her meal. If BS> 150 give with SS.    fluticasone (FLONASE) 50 mcg/actuation nasal spray 1 [place] by Both Nostrils route two (2) times a day.  minocycline (MINOCIN, DYNACIN) 50 mg capsule Take 1 capsule by mouth  daily    aspirin delayed-release 81 mg tablet Take 81 mg by mouth daily.  cholecalciferol (VITAMIN D3) 1,000 unit cap Take 2,000 Units by mouth every Sunday.  insulin lispro (HUMALOG) 100 unit/mL injection by SubCUTAneous route. Sliding scale:  If blood sugar > 80 give with scheduled 3 units  151-200= 4 units   201-250= 5 units  251-300= 6 units  If > 300 call MD     ______________________________________________________________________  EXPECTED LENGTH OF STAY: - - -  ACTUAL LENGTH OF STAY:          0                 Rochelle Lazcano MD

## 2018-03-01 NOTE — ED NOTES
Bedside and Verbal shift change report given to Tanvi Jones (oncoming nurse) by Leia Crespo (offgoing nurse). Report included the following information ED Summary and Recent Results.

## 2018-03-01 NOTE — PROGRESS NOTES
TRANSFER - IN REPORT:    Verbal report received from Sadie Aquino RN(name) on Ness Gr  being received from ED(unit) for routine progression of care      Report consisted of patients Situation, Background, Assessment and   Recommendations(SBAR). Information from the following report(s) SBAR, Kardex, ED Summary, Procedure Summary, Intake/Output, MAR, Accordion, Recent Results and Med Rec Status was reviewed with the receiving nurse. Opportunity for questions and clarification was provided. Assessment completed upon patients arrival to unit and care assumed.

## 2018-03-01 NOTE — PROGRESS NOTES
Admission Medication Reconciliation:    Information obtained from:  Medication list from the [facility]    Comments/Recommendations: All medications/allergies have been reviewed and updated; due to the patient's condition, last medication administration times reviewed with patient's father. All medication information was received from medication list from the [address] [place] [address]. Changes made to Prior to Admission (PTA) Medication List:   ?   Medications Added:   - None   ? Medications Changed:   - None   ? Medications Removed:   - None       Significant PMH/Disease States:   Past Medical History:   Diagnosis Date    Allergic rhinitis, cause unspecified     Asthma     Diabetes ([address]     Down's syndrome     H/O impacted cerumen     Dr. Terry Hollingsworth Hypothyroid     Thyroid disease        Chief Complaint for this Admission:    Chief Complaint   Patient presents with    Abnormal Lab Results       Allergies:  Acetaminophen; Codeine; Nitrofurantoin; and Pcn [penicillins]    Prior to Admission Medications:   Prior to Admission Medications   Prescriptions Last Dose Informant Patient Reported? Taking? Biotin 2,500 mcg cap 2018 at AM  Yes Yes   Sig: Take 5,000 mcg by mouth daily. aspirin delayed-release 81 mg tablet 2018 at AM  Yes Yes   Sig: Take 81 mg by mouth daily. cholecalciferol (VITAMIN D3) 1,000 unit cap 2018  Yes No   Sig: Take 2,000 Units by mouth every . fluticasone (FLONASE) 50 mcg/actuation nasal spray 2018 at AM  Yes Yes   [place] by Both Nostrils route two (2) times a day. glucagon (GLUCAGEN) 1 mg injection   Yes Yes   Si mg by IntraMUSCular route once as needed (hypoglycemia if patient is unresponsive). glucose 4 gram chewable tablet   Yes Yes   Sig: Take 12 g by mouth as needed (BS < 60). insulin glargine (LANTUS U-100 INSULIN) 100 unit/mL injection 2018 at AM  Yes Yes   Si Units by SubCUTAneous route daily.    insulin lispro (HUMALOG U-100 INSULIN) 100 unit/mL injection   Yes Yes   Sig: 3 Units by SubCUTAneous route three (3) times daily (with meals). Hold insulin if BS < 80. Hold if patient does not eat her meal. If BS> 150 give with SS.   insulin lispro (HUMALOG) 100 unit/mL injection   Yes No   Sig: by SubCUTAneous route. Sliding scale: If blood sugar > 80 give with scheduled 3 units  151-200= 4 units   201-250= 5 units  251-300= 6 units  If > 300 call MD   levothyroxine (SYNTHROID) 50 mcg tablet 2/28/2018 at AM  Yes Yes   Sig: Take 50 mcg by mouth five (5) days a week. Takes on M,T,W,T,F   minocycline (MINOCIN, DYNACIN) 50 mg capsule 2/28/2018 at AM Parent No Yes   Sig: Take 1 capsule by mouth  daily   trimethoprim-sulfamethoxazole (BACTRIM DS) 160-800 mg per tablet 2/28/2018 at AM  Yes Yes   Sig: Take 1 Tab by mouth two (2) times a day. Facility-Administered Medications: None     Thank you for allowing pharmacy to participate in the coordination of this patient's care. If you have any other questions, please contact the medication reconciliation pharmacist at [phone]. Vega Du, Pharm. D.

## 2018-03-01 NOTE — ED NOTES
This RN assumed care of patient at this time. Patient sleeping in room, monitored via telemetry. At this time patient is awaiting inpatient room.

## 2018-03-02 VITALS
OXYGEN SATURATION: 95 % | SYSTOLIC BLOOD PRESSURE: 113 MMHG | WEIGHT: 131.3 LBS | TEMPERATURE: 98.6 F | HEART RATE: 82 BPM | DIASTOLIC BLOOD PRESSURE: 67 MMHG | BODY MASS INDEX: 31.66 KG/M2 | RESPIRATION RATE: 16 BRPM

## 2018-03-02 LAB
ALBUMIN SERPL-MCNC: 1.9 G/DL (ref 3.5–5)
ALBUMIN/GLOB SERPL: 0.4 {RATIO} (ref 1.1–2.2)
ALP SERPL-CCNC: 68 U/L (ref 45–117)
ALT SERPL-CCNC: 15 U/L (ref 12–78)
ANION GAP SERPL CALC-SCNC: 7 MMOL/L (ref 5–15)
AST SERPL-CCNC: 16 U/L (ref 15–37)
BILIRUB SERPL-MCNC: 0.1 MG/DL (ref 0.2–1)
BUN SERPL-MCNC: 19 MG/DL (ref 6–20)
BUN/CREAT SERPL: 22 (ref 12–20)
CALCIUM SERPL-MCNC: 8.3 MG/DL (ref 8.5–10.1)
CHLORIDE SERPL-SCNC: 105 MMOL/L (ref 97–108)
CO2 SERPL-SCNC: 25 MMOL/L (ref 21–32)
CREAT SERPL-MCNC: 0.85 MG/DL (ref 0.55–1.02)
ERYTHROCYTE [DISTWIDTH] IN BLOOD BY AUTOMATED COUNT: 15.8 % (ref 11.5–14.5)
GLOBULIN SER CALC-MCNC: 4.4 G/DL (ref 2–4)
GLUCOSE BLD STRIP.AUTO-MCNC: 181 MG/DL (ref 65–100)
GLUCOSE SERPL-MCNC: 196 MG/DL (ref 65–100)
HCT VFR BLD AUTO: 30.4 % (ref 35–47)
HGB BLD-MCNC: 10 G/DL (ref 11.5–16)
MCH RBC QN AUTO: 30.6 PG (ref 26–34)
MCHC RBC AUTO-ENTMCNC: 32.9 G/DL (ref 30–36.5)
MCV RBC AUTO: 93 FL (ref 80–99)
NRBC # BLD: 0 K/UL (ref 0–0.01)
NRBC BLD-RTO: 0 PER 100 WBC
PLATELET # BLD AUTO: 203 K/UL (ref 150–400)
PMV BLD AUTO: 9.9 FL (ref 8.9–12.9)
POTASSIUM SERPL-SCNC: 4.5 MMOL/L (ref 3.5–5.1)
PROT SERPL-MCNC: 6.3 G/DL (ref 6.4–8.2)
RBC # BLD AUTO: 3.27 M/UL (ref 3.8–5.2)
SERVICE CMNT-IMP: ABNORMAL
SODIUM SERPL-SCNC: 137 MMOL/L (ref 136–145)
WBC # BLD AUTO: 3.6 K/UL (ref 3.6–11)

## 2018-03-02 PROCEDURE — 80053 COMPREHEN METABOLIC PANEL: CPT | Performed by: HOSPITALIST

## 2018-03-02 PROCEDURE — 99218 HC RM OBSERVATION: CPT

## 2018-03-02 PROCEDURE — 74011250637 HC RX REV CODE- 250/637: Performed by: HOSPITALIST

## 2018-03-02 PROCEDURE — 85027 COMPLETE CBC AUTOMATED: CPT | Performed by: HOSPITALIST

## 2018-03-02 PROCEDURE — 74011636637 HC RX REV CODE- 636/637: Performed by: HOSPITALIST

## 2018-03-02 PROCEDURE — 82962 GLUCOSE BLOOD TEST: CPT

## 2018-03-02 PROCEDURE — 36415 COLL VENOUS BLD VENIPUNCTURE: CPT | Performed by: HOSPITALIST

## 2018-03-02 PROCEDURE — 74011250637 HC RX REV CODE- 250/637: Performed by: FAMILY MEDICINE

## 2018-03-02 RX ORDER — LEVOTHYROXINE SODIUM 100 UG/1
100 TABLET ORAL
Qty: 30 TAB | Refills: 0 | Status: SHIPPED | OUTPATIENT
Start: 2018-03-02 | End: 2020-01-15 | Stop reason: DRUGHIGH

## 2018-03-02 RX ADMIN — ASPIRIN 81 MG: 81 TABLET, COATED ORAL at 09:35

## 2018-03-02 RX ADMIN — INSULIN LISPRO 3 UNITS: 100 INJECTION, SOLUTION INTRAVENOUS; SUBCUTANEOUS at 07:50

## 2018-03-02 RX ADMIN — LEVOTHYROXINE SODIUM 100 MCG: 100 TABLET ORAL at 09:38

## 2018-03-02 NOTE — PROGRESS NOTES
Hospital to [address] [place] [address] Maile                                                                        49 y.o.   female    [address]   Room: 350/01    [facility] [address]  CR  Unit Phone# :  [phone] [facility]  Olivier Anaya [address] [facility] [address]  Dept: [phone]  Loc: [phone]                    SITUATION     Admitted:  2/28/2018         Attending Provider:  Mcneil Harada, MD;*       Consultations:  IP CONSULT TO HOSPITALIST    PCP:  Lilia Steinberg MD   [phone]    Treatment Team: Attending Provider: Mcneil Harada, MD; Attending Provider: Alicia Bee MD; Consulting Provider: Alicia Bee MD; Utilization Review: Robert Brown RN    Admitting Dx:  Hyponatremia       Principal Problem: Hyponatremia    * No surgery found * of      BY: * Surgery not found *             ON: * No surgery found *                  Code Status: Full Code                Advance Directives:   Advance Care Planning 3/2/2018   Patient's Healthcare Decision Maker is: Legal Next of Jimi 68   Primary Decision Maker Name Jolly Mao   Primary Decision Maker Phone Number [phone]   Primary Decision Maker Relationship to Patient Sibling   Secondary Decision Maker Name Sam Siddiqui   Secondary Decision Maker Phone Number [phone]   Confirm Advance Directive Yes, on file    (Send w/patient)   Verified       Isolation:  There are currently no Active Isolations       MDRO: No current active infections    Pain Medications given:  none        Special Equipment needed: no  Type of equipment:         BACKGROUND     Allergies: Allergies   Allergen Reactions    Acetaminophen Other (comments)     Patient has implanted glucometer. Glucometer does not work properly when patient takes acetaminophen.     Codeine Nausea and Vomiting    Nitrofurantoin Rash    Pcn [Penicillins] Hives and Rash       Past Medical History:   Diagnosis Date    Allergic rhinitis, cause unspecified     Asthma     [address] ([facility] [address] [facility]     Down's syndrome     H/O impacted cerumen     Dr. Lora Cornell Hypothyroid     Thyroid disease        Past Surgical History:   Procedure Laterality Date    HX CYST REMOVAL      right shoulder    HX HEENT Bilateral 2017    prior in one eye    HX HEENT Left 3/5/15    cataract left and right    HX HYSTERECTOMY         Prescriptions Prior to Admission   Medication Sig    trimethoprim-sulfamethoxazole (BACTRIM DS) 160-800 mg per tablet Take 1 Tab by mouth two (2) times a day.  Biotin 2,500 mcg cap Take 5,000 mcg by mouth daily.  levothyroxine (SYNTHROID) 50 mcg tablet Take 50 mcg by mouth five (5) days a week. Takes on M,T,W,T,F    glucose 4 gram chewable tablet Take 12 g by mouth as needed (BS < 60).  glucagon (GLUCAGEN) 1 mg injection 1 mg by IntraMUSCular route once as needed (hypoglycemia if patient is unresponsive).  insulin glargine (LANTUS U-100 INSULIN) 100 unit/mL injection 14 Units by SubCUTAneous route daily.  insulin lispro (HUMALOG U-100 INSULIN) 100 unit/mL injection 3 Units by SubCUTAneous route three (3) times daily (with meals). Hold insulin if BS < 80. Hold if patient does not eat her meal. If BS> 150 give with SS.    fluticasone (FLONASE) 50 mcg/actuation nasal spray 1 [place] by Both Nostrils route two (2) times a day.  minocycline (MINOCIN, DYNACIN) 50 mg capsule Take 1 capsule by mouth  daily    aspirin delayed-release 81 mg tablet Take 81 mg by mouth daily.  cholecalciferol (VITAMIN D3) 1,000 unit cap Take 2,000 Units by mouth every Sunday.  insulin lispro (HUMALOG) 100 unit/mL injection by SubCUTAneous route. Sliding scale:  If blood sugar > 80 give with scheduled 3 units  151-200= 4 units   201-250= 5 units  251-300= 6 units  If > 300 call MD       Hard scripts included in transfer packet yes    Vaccinations:    Immunization History   Administered Date(s) Administered    Influenza Vaccine 10/01/2015  Influenza Vaccine Whole 10/02/2012    Pneumococcal Vaccine (Unspecified Type) 01/01/2007    Tdap 01/15/2016       Readmission Risks: medium   Known Risks: The [employer] Index tool is an evidenced based tool that has more automatic generated information. The tool looks at many different items such as the age of the patient, how many times they were admitted in the last calendar year, current length of stay in the hospital and their diagnosis. All of these items are pulled automatically from information documented in the chart from various places and will generate a score that predicts whether a patient is at low (less than 13), medium (13-20) or high (21 or greater) risk of being readmitted.         ASSESSMENT                Temp: 97.7 °F (36.5 °C) (03/02/18 0443) Pulse (Heart Rate): 81 (03/02/18 0443)     Resp Rate: 16 (03/02/18 0443)           BP: 130/77 (03/02/18 0443)     O2 Sat (%): 92 % (03/02/18 0443)     Weight: 59.6 kg (131 lb 4.8 oz)    Height: (not recorded)       If above not within 1 hour of discharge:    BP:113/67  P: 82  R: 16 T: 98.6 O2 Sat: 95%  O2: RA    Active Orders   Diet    DIET DIABETIC CONSISTENT CARB Regular         Orientation: oriented to time, place, person and situation     Active Behaviors: None                                   Active Lines/Drains:  (Peg Tube / Kaiser / CL or S/L?): no    Urinary Status: Bathroom privileges     Last BM: Last Bowel Movement Date: 02/28/18                   Mobility: No limitations   Weight Bearing Status: WBAT (Weight Bearing as Tolerated)                Lab Results   Component Value Date/Time    Glucose 196 (H) 03/02/2018 05:19 AM    Hemoglobin A1c 9.1 (H) 02/28/2018 05:22 PM    HGB 10.0 (L) 03/02/2018 05:19 AM    HGB 10.6 (L) 03/01/2018 04:56 AM    Hemoglobin A1c, External 7.5 02/22/2017        RECOMMENDATION     See After Visit Summary (AVS) for:  · Discharge instructions  · After [address]   · Special equipment needed (entered pre-discharge by Care Management)  · Medication Reconciliation    · Follow up Appointment(s)         Report given/sent by:  Meena Eldridge RN                    Verbal report given to:  Nasima Avery  FAXED to:  [phone]         Estimated discharge time:  3/2/2018 at 1030

## 2018-03-02 NOTE — PROGRESS NOTES
03/02/2018 - 09:30     CM received a phone call from [facility] (Ms. Gita Mejias) regarding confirmation and an update of information on the patient. CM confirmed with bedside nurse that patient will be discharging today. Nurse is working with the patient and patient's sister who is bedside, to review discharge information. Plan is to discharge back to [facility] with transport provided by the patient's sister. CM returned a call to Ms. Gita Mejias and left a  requesting a call. CM will update with where to call in nursing report. CRM: Mc Garcia, MPH; Z: [phone]      10:00    CM met with patient and sister at bedside. Patient is eager to return to [facility]. Sister confirmed that Montserrat Watson is the Nurse Manager: [phone]; Main Phone: [phone]. CM called the main number and confirmed with nursing staff that patient will be discharged shortly.     Nurse will need to call in report to Troy Arenas: [phone]    CRM: Mc Garcia, MPH; Z: [phone]

## 2018-03-02 NOTE — PROGRESS NOTES
Bedside and Verbal shift change report given to [address] (oncoming nurse) by Melany Sainz (offgoing nurse). Report included the following information SBAR, Kardex, ED Summary, Procedure Summary, Intake/Output, MAR and Recent Results.

## 2018-03-02 NOTE — DISCHARGE SUMMARY
Discharge Summary       PATIENT ID: Ilan Sanders  MRN: [id number]   YOB: 1970    DATE OF ADMISSION: 2/28/2018  6:42 PM    DATE OF DISCHARGE: 3/2/2018   PRIMARY CARE PROVIDER: Anali Ochoa MD     ATTENDING PHYSICIAN: Lisette Adams MD  DISCHARGING PROVIDER: Charissa Bumpers, MD    To contact this individual call [phone] and ask the  to page. If unavailable ask to be transferred the Adult Hospitalist Department. CONSULTATIONS: IP CONSULT TO HOSPITALIST    PROCEDURES/SURGERIES: * No surgery found *    ADMITTING DIAGNOSES & HOSPITAL COURSE:     A 49years old female with past medical history significant for Down's syndrome, hypothyroidism, diabetes, and asthma who presents from home with chief complaint of evaluation for abnormal lab results.   Hyponatremia possible related to hypothyroidism   -improved with normal saline, and sodium level 136  -TSH elevated to 79.90,   -she takes synthroid 50 mcg 5 x a week, dose increased after discussion with her Endocrinologist  Hypothyroidism   -increased Synthroid to 100 mcg po q daily after Dr Wade Lee recommendation  DM-II  -A1c 9.1  -add lantus 14 units, lispro 3 units q hs, sliding scale and monitor finger stick glucose  -sliding scale, monitor finger stick glucose   Anemia of chronic disease   -no evidence of active bleeding, H/H stable  Down syndrome   -continue supportive care     Code status: Full Code  DVT prophylaxis: SCD    DISCHARGE DIAGNOSES / PLAN:      Hyponatremia possible related to hypothyroidism   -Resolved  Hypothyroidism   -increased Synthroid to 100 mcg po q daily  -outpatient follow up with Endocrinologist   DM-II  -continue lantus 14 units, lispro 3 units q hs, sliding scale and monitor finger stick glucose  Anemia of chronic disease   -no evidence of active bleeding, H/H stable  Down syndrome   -continue supportive care    PENDING TEST RESULTS:   At the time of discharge the following test results are still pending: none    FOLLOW UP APPOINTMENTS:    Karyle Pacific, MD, in one week  Vicki Martinez MD in 4 weeks       ADDITIONAL CARE RECOMMENDATIONS:    DIET: Diabetic Diet    ACTIVITY: Activity as tolerated    WOUND CARE: NONE    EQUIPMENT needed: NONE      DISCHARGE MEDICATIONS:  Current Discharge Medication List      CONTINUE these medications which have CHANGED    Details   levothyroxine (SYNTHROID) 100 mcg tablet Take 1 Tab by mouth every morning. Qty: 30 Tab, Refills: 0         CONTINUE these medications which have NOT CHANGED    Details   trimethoprim-sulfamethoxazole (BACTRIM DS) 160-800 mg per tablet Take 1 Tab by mouth two (2) times a day. Biotin 2,500 mcg cap Take 5,000 mcg by mouth daily. glucose 4 gram chewable tablet Take 12 g by mouth as needed (BS < 60). glucagon (GLUCAGEN) 1 mg injection 1 mg by IntraMUSCular route once as needed (hypoglycemia if patient is unresponsive). insulin glargine (LANTUS U-100 INSULIN) 100 unit/mL injection 14 Units by SubCUTAneous route daily. !! insulin lispro (HUMALOG U-100 INSULIN) 100 unit/mL injection 3 Units by SubCUTAneous route three (3) times daily (with meals). Hold insulin if BS < 80. Hold if patient does not eat her meal. If BS> 150 give with SS.      fluticasone (FLONASE) 50 mcg/actuation nasal spray 1 [place] by Both Nostrils route two (2) times a day. minocycline (MINOCIN, DYNACIN) 50 mg capsule Take 1 capsule by mouth  daily  Qty: 90 Cap, Refills: 3      aspirin delayed-release 81 mg tablet Take 81 mg by mouth daily. cholecalciferol (VITAMIN D3) 1,000 unit cap Take 2,000 Units by mouth every Sunday. !! insulin lispro (HUMALOG) 100 unit/mL injection by SubCUTAneous route. Sliding scale: If blood sugar > 80 give with scheduled 3 units  151-200= 4 units   201-250= 5 units  251-300= 6 units  If > 300 call MD       !! - Potential duplicate medications found. Please discuss with provider.             NOTIFY [address] ANY OF THE FOLLOWING:   Fever over 101 degrees for 24 hours. Chest pain, shortness of breath, fever, chills, nausea, vomiting, diarrhea, change in mentation, falling, weakness, bleeding. Severe pain or pain not relieved by medications. Or, any other signs or symptoms that you may have questions about.     DISPOSITION:    Home With:   OT  PT  HH  RN       Long term SNF/Inpatient Rehab    Independent/assisted living    Hospice   X Other: [facility] AT DISCHARGE:     Functional status    Poor     Deconditioned    X Independent      Cognition   X  Lucid     Forgetful     Dementia      Catheters/lines (plus indication)    [facility]     PICC     PEG    X None      Code status   X  Full code     DNR      PHYSICAL EXAMINATION AT DISCHARGE:   Refer to Progress Note      CHRONIC MEDICAL DIAGNOSES:  Problem List as of 3/2/2018  Date Reviewed: 2/28/2018          Codes Class Noted - Resolved    * (Principal)Hyponatremia ICD-10-CM: E87.1  ICD-9-CM: 276.1  2/28/2018 - Present        DKA (diabetic ketoacidoses) ([facility] ICD-10-CM: E13.10  ICD-9-CM: 250.10  9/14/2017 - Present        Advance directive on file ICD-10-CM: Z78.9  ICD-9-CM: V49.89  12/28/2015 - Present        Acne ICD-10-CM: L70.9  ICD-9-CM: 706.1  8/20/2014 - Present        Syncope and collapse ICD-10-CM: R55  ICD-9-CM: 780.2  4/29/2013 - Present        Asthma (Chronic) ICD-10-CM: J45.909  ICD-9-CM: 493.90  4/29/2013 - Present        HTN (hypertension) (Chronic) ICD-10-CM: I10  ICD-9-CM: 401.9  4/29/2013 - Present        DM (diabetes mellitus), type 1, uncontrolled ([facility] ICD-10-CM: E10.65  ICD-9-CM: 250.03  11/14/2012 - Present        Hypoglycemia, unspecified ICD-10-CM: E16.2  ICD-9-CM: 251.2  6/27/2012 - Present        Down syndrome ICD-10-CM: Q90.9  ICD-9-CM: 758.0  7/23/2010 - Present        Acquired hypothyroidism ICD-10-CM: E03.9  ICD-9-CM: 244.9  7/23/2010 - Present        Anemia, unspecified ICD-10-CM: D64.9  ICD-9-CM: 285.9  7/23/2010 - Present        RESOLVED: Hyperglycemia ICD-10-CM: R73.9  ICD-9-CM: 790.29  7/28/2014 - 7/29/2014        RESOLVED: Type II or unspecified type diabetes mellitus without mention of complication, uncontrolled ICD-10-CM: E11.65  ICD-9-CM: 250.02  7/23/2010 - 11/14/2012              Greater than 35 minutes were spent with the patient on counseling and coordination of care    Signed:   Rajesh Giron MD  3/2/2018  8:19 AM

## 2018-03-02 NOTE — DIABETES MGMT
DTC Progress Note    Recommendations/ Comments: Chart reviewed for elevated blood sugars. Noted Lantus increased to 18 units (from 14) If appropriate, please consider:   - increasing pre-meal insulin to 5 units tid ac. Current hospital DM medication: Lantus 18 units, Lispro 3 units tid ac plus correction scale. Chart reviewed on [address]. Patient is a 49 y.o. female with a history of Type 1 Diabetes on intensive insulin injection program (lantus 14 units, Lispro 3 units tid ac and correction) at home. A1c:   Lab Results   Component Value Date/Time    Hemoglobin A1c 9.1 (H) 02/28/2018 05:22 PM    Hemoglobin A1c 8.0 (H) 09/14/2017 02:45 PM    Hemoglobin A1c, External 7.5 02/22/2017    Hemoglobin A1c, External 7.7 08/17/2016 11:35 AM       Recent Glucose Results:   Lab Results   Component Value Date/Time     (H) 03/02/2018 05:19 AM    GLUCPOC 181 (H) 03/02/2018 06:55 AM    GLUCPOC 173 (H) 03/01/2018 09:41 PM    GLUCPOC 383 (H) 03/01/2018 04:29 PM        Lab Results   Component Value Date/Time    Creatinine 0.85 03/02/2018 05:19 AM     Estimated Creatinine Clearance: 61.8 mL/min (based on Cr of 0.85). Active Orders   Diet    DIET DIABETIC CONSISTENT CARB Regular        PO intake: No data found. Will continue to follow as needed.     Thank you  Eden Tapia, MS, RN, CDE

## 2018-04-01 ENCOUNTER — HOSPITAL ENCOUNTER (EMERGENCY)
Age: 48
Discharge: HOME | End: 2018-04-01
Attending: EMERGENCY MEDICINE | Admitting: EMERGENCY MEDICINE
Payer: MEDICARE

## 2018-04-01 VITALS
BODY MASS INDEX: 38.52 KG/M2 | HEIGHT: 60 IN | TEMPERATURE: 97.8 F | RESPIRATION RATE: 16 BRPM | WEIGHT: 196.21 LBS | DIASTOLIC BLOOD PRESSURE: 59 MMHG | HEART RATE: 67 BPM | OXYGEN SATURATION: 95 % | SYSTOLIC BLOOD PRESSURE: 132 MMHG

## 2018-04-01 DIAGNOSIS — R73.9 HYPERGLYCEMIA: Primary | ICD-10-CM

## 2018-04-01 LAB
ALBUMIN SERPL-MCNC: 2.1 G/DL (ref 3.5–5)
ALBUMIN/GLOB SERPL: 0.4 {RATIO} (ref 1.1–2.2)
ALP SERPL-CCNC: 87 U/L (ref 45–117)
ALT SERPL-CCNC: 12 U/L (ref 12–78)
ANION GAP SERPL CALC-SCNC: 8 MMOL/L (ref 5–15)
APPEARANCE UR: CLEAR
AST SERPL-CCNC: 13 U/L (ref 15–37)
BACTERIA URNS QL MICRO: NEGATIVE /HPF
BASOPHILS # BLD: 0.1 K/UL (ref 0–0.1)
BASOPHILS NFR BLD: 1 % (ref 0–1)
BILIRUB SERPL-MCNC: 0.2 MG/DL (ref 0.2–1)
BILIRUB UR QL: NEGATIVE
BUN SERPL-MCNC: 34 MG/DL (ref 6–20)
BUN/CREAT SERPL: 32 (ref 12–20)
CALCIUM SERPL-MCNC: 8.8 MG/DL (ref 8.5–10.1)
CHLORIDE SERPL-SCNC: 103 MMOL/L (ref 97–108)
CO2 SERPL-SCNC: 23 MMOL/L (ref 21–32)
COLOR UR: ABNORMAL
CREAT SERPL-MCNC: 1.07 MG/DL (ref 0.55–1.02)
DIFFERENTIAL METHOD BLD: ABNORMAL
EOSINOPHIL # BLD: 0.1 K/UL (ref 0–0.4)
EOSINOPHIL NFR BLD: 3 % (ref 0–7)
EPITH CASTS URNS QL MICRO: ABNORMAL /LPF
ERYTHROCYTE [DISTWIDTH] IN BLOOD BY AUTOMATED COUNT: 14.8 % (ref 11.5–14.5)
GLOBULIN SER CALC-MCNC: 5 G/DL (ref 2–4)
GLUCOSE BLD STRIP.AUTO-MCNC: 268 MG/DL (ref 65–100)
GLUCOSE BLD STRIP.AUTO-MCNC: 399 MG/DL (ref 65–100)
GLUCOSE SERPL-MCNC: 450 MG/DL (ref 65–100)
GLUCOSE UR STRIP.AUTO-MCNC: >1000 MG/DL
HCT VFR BLD AUTO: 33.9 % (ref 35–47)
HGB BLD-MCNC: 10.9 G/DL (ref 11.5–16)
HGB UR QL STRIP: ABNORMAL
HYALINE CASTS URNS QL MICRO: ABNORMAL /LPF (ref 0–5)
IMM GRANULOCYTES # BLD: 0 K/UL (ref 0–0.04)
IMM GRANULOCYTES NFR BLD AUTO: 0 % (ref 0–0.5)
KETONES UR QL STRIP.AUTO: NEGATIVE MG/DL
LEUKOCYTE ESTERASE UR QL STRIP.AUTO: NEGATIVE
LYMPHOCYTES # BLD: 1.1 K/UL (ref 0.8–3.5)
LYMPHOCYTES NFR BLD: 22 % (ref 12–49)
MCH RBC QN AUTO: 30.9 PG (ref 26–34)
MCHC RBC AUTO-ENTMCNC: 32.2 G/DL (ref 30–36.5)
MCV RBC AUTO: 96 FL (ref 80–99)
MONOCYTES # BLD: 0.4 K/UL (ref 0–1)
MONOCYTES NFR BLD: 8 % (ref 5–13)
NEUTS SEG # BLD: 3.2 K/UL (ref 1.8–8)
NEUTS SEG NFR BLD: 66 % (ref 32–75)
NITRITE UR QL STRIP.AUTO: NEGATIVE
NRBC # BLD: 0 K/UL (ref 0–0.01)
NRBC BLD-RTO: 0 PER 100 WBC
PH UR STRIP: 6 [PH] (ref 5–8)
PLATELET # BLD AUTO: 251 K/UL (ref 150–400)
PMV BLD AUTO: 9.8 FL (ref 8.9–12.9)
POTASSIUM SERPL-SCNC: 4.2 MMOL/L (ref 3.5–5.1)
PROT SERPL-MCNC: 7.1 G/DL (ref 6.4–8.2)
PROT UR STRIP-MCNC: 100 MG/DL
RBC # BLD AUTO: 3.53 M/UL (ref 3.8–5.2)
RBC #/AREA URNS HPF: ABNORMAL /HPF (ref 0–5)
SERVICE CMNT-IMP: ABNORMAL
SERVICE CMNT-IMP: ABNORMAL
SODIUM SERPL-SCNC: 134 MMOL/L (ref 136–145)
SP GR UR REFRACTOMETRY: 1.02 (ref 1–1.03)
UR CULT HOLD, URHOLD: NORMAL
UROBILINOGEN UR QL STRIP.AUTO: 0.2 EU/DL (ref 0.2–1)
WBC # BLD AUTO: 4.9 K/UL (ref 3.6–11)
WBC URNS QL MICRO: ABNORMAL /HPF (ref 0–4)

## 2018-04-01 PROCEDURE — 99284 EMERGENCY DEPT VISIT MOD MDM: CPT

## 2018-04-01 PROCEDURE — 82962 GLUCOSE BLOOD TEST: CPT

## 2018-04-01 PROCEDURE — 85025 COMPLETE CBC W/AUTO DIFF WBC: CPT | Performed by: EMERGENCY MEDICINE

## 2018-04-01 PROCEDURE — 81001 URINALYSIS AUTO W/SCOPE: CPT | Performed by: EMERGENCY MEDICINE

## 2018-04-01 PROCEDURE — 74011250636 HC RX REV CODE- 250/636: Performed by: EMERGENCY MEDICINE

## 2018-04-01 PROCEDURE — 36415 COLL VENOUS BLD VENIPUNCTURE: CPT | Performed by: EMERGENCY MEDICINE

## 2018-04-01 PROCEDURE — 80053 COMPREHEN METABOLIC PANEL: CPT | Performed by: EMERGENCY MEDICINE

## 2018-04-01 PROCEDURE — 96360 HYDRATION IV INFUSION INIT: CPT

## 2018-04-01 RX ORDER — DIAPER,BRIEF,ADULT, DISPOSABLE
1 EACH MISCELLANEOUS DAILY
COMMUNITY
End: 2020-01-15

## 2018-04-01 RX ADMIN — SODIUM CHLORIDE 2000 ML: 900 INJECTION, SOLUTION INTRAVENOUS at 03:52

## 2018-04-01 NOTE — ED NOTES
1495: Bedside and Verbal shift change report given to [address] GILMAN RN (oncoming nurse) by [address] RN (offgoing nurse). Report included the following information SBAR, Kardex, ED Summary, [facility] and Recent Results. 1395: Patient verbalizes understanding of discharge instructions that were provided by MD. Opportunity for questions provided. Patient in no apparent distress. VSS, no complaints of pain. Patient ambulatory accompanied by parents upon discharge.    Visit Vitals    /59 (BP 1 Location: Left arm, BP Patient Position: At rest)    Pulse 67    Temp 97.8 °F (36.6 °C)    Resp 16    Ht 5' (1.524 m)    Wt 89 kg (196 lb 3.4 oz)    SpO2 95%    BMI 38.32 kg/m2

## 2018-04-01 NOTE — ED PROVIDER NOTES
Patient is a 49 y.o. female presenting with hyperglycemia. High Blood Sugar    Pertinent negatives include no fever, no nausea, no vomiting, no dysuria and no headaches. 49year old female with IDDM since age 2, Down syndrome, asthma, hypothyroid, presents from [facility] accompanied by her mother for elevated blood sugars for 1 week. 400's up to 500. Her doctor has been trying to get it down but it has not. This morning at 3am they checked it and it was over 500. Patient has no complaints. Mother reports she does often get UTI's without symptoms. NO fevers. NO chest pain or sob. No n/v/d. Eating/drinking ok. Denies increased thirst/urination. No abdominal pain. Received 5 units of Humulog at 2100 and 8 units at 2:45. She is on Lantus 14units as well. She was admitted earlier this month for hyponatremia. Past Medical History:   Diagnosis Date    Allergic rhinitis, cause unspecified     Asthma     [facility] ([facility]     Down's syndrome    [facility] H/O impacted cerumen     Dr. Myah Sidhu Hypothyroid     Thyroid disease        Past Surgical History:   Procedure Laterality Date    HX CYST REMOVAL      right shoulder    HX HEENT Bilateral 2017    prior in one eye    HX HEENT Left 3/5/15    cataract left and right    HX HYSTERECTOMY           Family History:   Problem Relation Age of Onset    Thyroid Disease Mother      hypo    Hypertension Father        Social History     Social History    Marital status: SINGLE     Spouse name: N/A    Number of children: N/A    Years of education: N/A     Occupational History    Not on file. Social History Main Topics    Smoking status: Never Smoker    Smokeless tobacco: Never Used    Alcohol use No    Drug use: No    Sexual activity: Not Currently     Other Topics Concern    Not on file     Social History Narrative         ALLERGIES: Acetaminophen; Codeine; Nitrofurantoin; and Pcn [penicillins]    Review of Systems   Constitutional: Negative for fever. HENT: Negative for congestion. Eyes: Negative for visual disturbance. Respiratory: Negative for cough and shortness of breath. Gastrointestinal: Negative for abdominal pain, nausea and vomiting. Endocrine: Negative for polyuria. Genitourinary: Negative for dysuria. Musculoskeletal: Negative for gait problem. Skin: Negative for rash. Neurological: Negative for headaches. Psychiatric/Behavioral: Negative for dysphoric mood. Vitals:    04/01/18 0324   BP: (!) 112/98   Pulse: 78   Resp: 18   Temp: 98.1 °F (36.7 °C)   SpO2: 99%   Weight: 89 kg (196 lb 3.4 oz)   Height: 5' (1.524 m)            Physical Exam   Constitutional: She is oriented to person, place, and time. She appears well-developed and well-nourished. No distress. HENT:   Head: Normocephalic and atraumatic. Mouth/Throat: Oropharynx is clear and moist. No oropharyngeal exudate. Eyes: Conjunctivae and EOM are normal. Pupils are equal, round, and reactive to light. Right eye exhibits no discharge. Left eye exhibits no discharge. No scleral icterus. Neck: Normal range of motion. Neck supple. No JVD present. Cardiovascular: Normal rate, regular rhythm, normal heart sounds and intact distal pulses. Exam reveals no gallop and no friction rub. No murmur heard. Pulmonary/Chest: Effort normal and breath sounds normal. No stridor. No respiratory distress. She has no wheezes. She has no rales. She exhibits no tenderness. Abdominal: Soft. Bowel sounds are normal. She exhibits no distension and no mass. There is no tenderness. There is no rebound and no guarding. Musculoskeletal: Normal range of motion. She exhibits no edema or tenderness. Neurological: She is alert and oriented to person, place, and time. She has normal reflexes. No cranial nerve deficit. She exhibits normal muscle tone. Coordination normal.   Skin: Skin is warm and dry. No rash noted. No erythema. Psychiatric: She has a normal mood and affect.  Her behavior is normal. Judgment and thought content normal.        [facility]      ED Course       Procedures    Blood sugar 399. Will check labs/ua,  give IV fluids and recheck the glucose after fluids. Labs ok- glucose 450 but normal bicarb, no anion gap and no ketones in urine. Repeat post fluids pending. Harsh Lee MD  5:40 AM  Spoke with Dr. Mckenzie Skinner- would like to go up to 16 units of Lantus at night and continue sliding scale. She will monitor from there. Discussed with family and they are agreeable with taking her home. Will follow up with her.

## 2018-04-01 NOTE — ED TRIAGE NOTES
Pt arrives via EMS from [facility] for elevated blood sugars. This has been an ongoing problem all week. Given 5 units of Humalog at 2100, 8 units at 0245. Pt denies any complaints.

## 2018-04-01 NOTE — DISCHARGE INSTRUCTIONS
Learning About High Blood Sugar  What is high blood sugar? Your body turns the food you eat into glucose (sugar), which it uses for energy. But if your body isn't able to use the sugar right away, it can build up in your blood and lead to high blood sugar. When the amount of sugar in your blood stays too high for too much of the time, you may have diabetes. Diabetes is a disease that can cause serious health problems. The good news is that lifestyle changes may help you get your blood sugar back to normal and avoid or delay diabetes. What causes high blood sugar? Sugar (glucose) can build up in your blood if you:  · Are overweight. · Have a family history of diabetes. · Take certain medicines, such as steroids. What are the symptoms? Having high blood sugar may not cause any symptoms at all. Or it may make you feel very thirsty or very hungry. You may also urinate more often than usual, have blurry vision, or lose weight without trying. How is high blood sugar treated? You can take steps to lower your blood sugar level if you understand what makes it get higher. Your doctor may want you to learn how to test your blood sugar level at home. Then you can see how illness, stress, or different kinds of food or medicine raise or lower your blood sugar level. Other tests may be needed to see if you have diabetes. How can you prevent high blood sugar? · Watch your weight. If you're overweight, losing just a small amount of weight may help. Reducing fat around your waist is most important. · Limit the amount of calories, sweets, and unhealthy fat you eat. Ask your doctor if a dietitian can help you. A registered dietitian can help you create meal plans that fit your lifestyle. · Get at least 30 minutes of exercise on most days of the week. Exercise helps control your blood sugar. It also helps you maintain a healthy weight. Walking is a good choice.  You also may want to do other activities, such as running, swimming, cycling, or playing tennis or team sports. · If your doctor prescribed medicines, take them exactly as prescribed. Call your doctor if you think you are having a problem with your medicine. You will get more details on the specific medicines your doctor prescribes. Follow-up care is a key part of your treatment and safety. Be sure to make and go to all appointments, and call your doctor if you are having problems. It's also a good idea to know your test results and keep a list of the medicines you take. Where can you learn more? Go to http://rebecca-cornel.info/. Enter O108 in the search box to learn more about \"Learning About High Blood Sugar. \"  Current as of: March 13, 2017  Content Version: 11.4  © 8983-9959 [employer]wise, [employer]. Care instructions adapted under license by [employer] (which disclaims liability or warranty for this information). If you have questions about a medical condition or this instruction, always ask your healthcare professional. [address] any warranty or liability for your use of this information.

## 2018-12-14 ENCOUNTER — HOSPITAL ENCOUNTER (INPATIENT)
Age: 48
LOS: 1 days | Discharge: HOME | DRG: 637 | End: 2018-12-16
Attending: EMERGENCY MEDICINE | Admitting: INTERNAL MEDICINE
Payer: MEDICARE

## 2018-12-14 ENCOUNTER — APPOINTMENT (OUTPATIENT)
Dept: GENERAL RADIOLOGY | Age: 48
DRG: 637 | End: 2018-12-14
Attending: EMERGENCY MEDICINE
Payer: MEDICARE

## 2018-12-14 DIAGNOSIS — E87.1 HYPONATREMIA: ICD-10-CM

## 2018-12-14 DIAGNOSIS — E08.10 DIABETIC KETOACIDOSIS WITHOUT COMA ASSOCIATED WITH DIABETES MELLITUS DUE TO UNDERLYING CONDITION (HCC): Primary | ICD-10-CM

## 2018-12-14 DIAGNOSIS — R65.10 SIRS (SYSTEMIC INFLAMMATORY RESPONSE SYNDROME) (HCC): ICD-10-CM

## 2018-12-14 DIAGNOSIS — Q90.9 DOWN SYNDROME: ICD-10-CM

## 2018-12-14 LAB
ALBUMIN SERPL-MCNC: 1.6 G/DL (ref 3.5–5)
ALBUMIN/GLOB SERPL: 0.4 {RATIO} (ref 1.1–2.2)
ALP SERPL-CCNC: 87 U/L (ref 45–117)
ALT SERPL-CCNC: 17 U/L (ref 12–78)
ANION GAP SERPL CALC-SCNC: 9 MMOL/L (ref 5–15)
AST SERPL-CCNC: 22 U/L (ref 15–37)
BASOPHILS # BLD: 0 K/UL (ref 0–0.1)
BASOPHILS NFR BLD: 0 % (ref 0–1)
BILIRUB SERPL-MCNC: 0.2 MG/DL (ref 0.2–1)
BUN SERPL-MCNC: 42 MG/DL (ref 6–20)
BUN/CREAT SERPL: 21 (ref 12–20)
CALCIUM SERPL-MCNC: 7.4 MG/DL (ref 8.5–10.1)
CHLORIDE SERPL-SCNC: 98 MMOL/L (ref 97–108)
CO2 SERPL-SCNC: 20 MMOL/L (ref 21–32)
CREAT SERPL-MCNC: 2.01 MG/DL (ref 0.55–1.02)
DIFFERENTIAL METHOD BLD: ABNORMAL
EOSINOPHIL # BLD: 0 K/UL (ref 0–0.4)
EOSINOPHIL NFR BLD: 0 % (ref 0–7)
ERYTHROCYTE [DISTWIDTH] IN BLOOD BY AUTOMATED COUNT: 13.4 % (ref 11.5–14.5)
GLOBULIN SER CALC-MCNC: 4.4 G/DL (ref 2–4)
GLUCOSE BLD STRIP.AUTO-MCNC: 557 MG/DL (ref 65–100)
GLUCOSE SERPL-MCNC: 544 MG/DL (ref 65–100)
HCT VFR BLD AUTO: 29.5 % (ref 35–47)
HGB BLD-MCNC: 9.4 G/DL (ref 11.5–16)
IMM GRANULOCYTES # BLD: 0.1 K/UL (ref 0–0.04)
IMM GRANULOCYTES NFR BLD AUTO: 1 % (ref 0–0.5)
LACTATE BLD-SCNC: 1.31 MMOL/L (ref 0.4–2)
LYMPHOCYTES # BLD: 1.1 K/UL (ref 0.8–3.5)
LYMPHOCYTES NFR BLD: 9 % (ref 12–49)
MCH RBC QN AUTO: 30.8 PG (ref 26–34)
MCHC RBC AUTO-ENTMCNC: 31.9 G/DL (ref 30–36.5)
MCV RBC AUTO: 96.7 FL (ref 80–99)
MONOCYTES # BLD: 1.1 K/UL (ref 0–1)
MONOCYTES NFR BLD: 9 % (ref 5–13)
NEUTS SEG # BLD: 9.6 K/UL (ref 1.8–8)
NEUTS SEG NFR BLD: 81 % (ref 32–75)
NRBC # BLD: 0 K/UL (ref 0–0.01)
NRBC BLD-RTO: 0 PER 100 WBC
PLATELET # BLD AUTO: 210 K/UL (ref 150–400)
PMV BLD AUTO: 10.7 FL (ref 8.9–12.9)
POTASSIUM SERPL-SCNC: 4.3 MMOL/L (ref 3.5–5.1)
PROT SERPL-MCNC: 6 G/DL (ref 6.4–8.2)
RBC # BLD AUTO: 3.05 M/UL (ref 3.8–5.2)
SERVICE CMNT-IMP: ABNORMAL
SODIUM SERPL-SCNC: 127 MMOL/L (ref 136–145)
WBC # BLD AUTO: 11.8 K/UL (ref 3.6–11)

## 2018-12-14 PROCEDURE — 74011250636 HC RX REV CODE- 250/636: Performed by: EMERGENCY MEDICINE

## 2018-12-14 PROCEDURE — 87040 BLOOD CULTURE FOR BACTERIA: CPT

## 2018-12-14 PROCEDURE — 82962 GLUCOSE BLOOD TEST: CPT

## 2018-12-14 PROCEDURE — 99285 EMERGENCY DEPT VISIT HI MDM: CPT

## 2018-12-14 PROCEDURE — 96360 HYDRATION IV INFUSION INIT: CPT

## 2018-12-14 PROCEDURE — 84100 ASSAY OF PHOSPHORUS: CPT

## 2018-12-14 PROCEDURE — 82010 KETONE BODYS QUAN: CPT

## 2018-12-14 PROCEDURE — 80053 COMPREHEN METABOLIC PANEL: CPT

## 2018-12-14 PROCEDURE — 85025 COMPLETE CBC W/AUTO DIFF WBC: CPT

## 2018-12-14 PROCEDURE — 87804 INFLUENZA ASSAY W/OPTIC: CPT

## 2018-12-14 PROCEDURE — 71045 X-RAY EXAM CHEST 1 VIEW: CPT

## 2018-12-14 PROCEDURE — 83605 ASSAY OF LACTIC ACID: CPT

## 2018-12-14 PROCEDURE — 83036 HEMOGLOBIN GLYCOSYLATED A1C: CPT

## 2018-12-14 PROCEDURE — 36415 COLL VENOUS BLD VENIPUNCTURE: CPT

## 2018-12-14 PROCEDURE — 93005 ELECTROCARDIOGRAM TRACING: CPT

## 2018-12-14 RX ADMIN — SODIUM CHLORIDE 2000 ML: 900 INJECTION, SOLUTION INTRAVENOUS at 23:32

## 2018-12-15 ENCOUNTER — APPOINTMENT (OUTPATIENT)
Dept: CT IMAGING | Age: 48
DRG: 637 | End: 2018-12-15
Attending: INTERNAL MEDICINE
Payer: MEDICARE

## 2018-12-15 LAB
ADMINISTERED INITIALS, ADMINIT: NORMAL
AMYLASE SERPL-CCNC: 17 U/L (ref 25–115)
ANION GAP SERPL CALC-SCNC: 11 MMOL/L (ref 5–15)
ANION GAP SERPL CALC-SCNC: 9 MMOL/L (ref 5–15)
APPEARANCE UR: CLEAR
ARTERIAL PATENCY WRIST A: NO
ATRIAL RATE: 82 BPM
B-OH-BUTYR SERPL-SCNC: 0.28 MMOL/L
BACTERIA URNS QL MICRO: NEGATIVE /HPF
BASE DEFICIT BLDV-SCNC: 8 MMOL/L
BDY SITE: ABNORMAL
BILIRUB UR QL: NEGATIVE
BUN SERPL-MCNC: 38 MG/DL (ref 6–20)
BUN SERPL-MCNC: 38 MG/DL (ref 6–20)
BUN/CREAT SERPL: 23 (ref 12–20)
BUN/CREAT SERPL: 24 (ref 12–20)
CALCIUM SERPL-MCNC: 6.9 MG/DL (ref 8.5–10.1)
CALCIUM SERPL-MCNC: 6.9 MG/DL (ref 8.5–10.1)
CALCULATED P AXIS, ECG09: 44 DEGREES
CALCULATED R AXIS, ECG10: 62 DEGREES
CALCULATED T AXIS, ECG11: 45 DEGREES
CHLORIDE SERPL-SCNC: 103 MMOL/L (ref 97–108)
CHLORIDE SERPL-SCNC: 104 MMOL/L (ref 97–108)
CK MB CFR SERPL CALC: 1.6 % (ref 0–2.5)
CK MB CFR SERPL CALC: 1.8 % (ref 0–2.5)
CK MB SERPL-MCNC: 3 NG/ML (ref 5–25)
CK MB SERPL-MCNC: 3.9 NG/ML (ref 5–25)
CK SERPL-CCNC: 189 U/L (ref 26–192)
CK SERPL-CCNC: 215 U/L (ref 26–192)
CO2 SERPL-SCNC: 17 MMOL/L (ref 21–32)
CO2 SERPL-SCNC: 18 MMOL/L (ref 21–32)
COLOR UR: ABNORMAL
CREAT SERPL-MCNC: 1.58 MG/DL (ref 0.55–1.02)
CREAT SERPL-MCNC: 1.68 MG/DL (ref 0.55–1.02)
D50 ADMINISTERED, D50ADM: 0 ML
D50 ORDER, D50ORD: 0 ML
DIAGNOSIS, 93000: NORMAL
EPITH CASTS URNS QL MICRO: ABNORMAL /LPF
EST. AVERAGE GLUCOSE BLD GHB EST-MCNC: 203 MG/DL
FERRITIN SERPL-MCNC: 234 NG/ML (ref 8–252)
FLUAV AG NPH QL IA: NEGATIVE
FLUBV AG NOSE QL IA: NEGATIVE
FOLATE SERPL-MCNC: 28.7 NG/ML (ref 5–21)
GAS FLOW.O2 O2 DELIVERY SYS: ABNORMAL L/MIN
GLSCOM COMMENTS: NORMAL
GLUCOSE BLD STRIP.AUTO-MCNC: 127 MG/DL (ref 65–100)
GLUCOSE BLD STRIP.AUTO-MCNC: 142 MG/DL (ref 65–100)
GLUCOSE BLD STRIP.AUTO-MCNC: 173 MG/DL (ref 65–100)
GLUCOSE BLD STRIP.AUTO-MCNC: 185 MG/DL (ref 65–100)
GLUCOSE BLD STRIP.AUTO-MCNC: 211 MG/DL (ref 65–100)
GLUCOSE BLD STRIP.AUTO-MCNC: 236 MG/DL (ref 65–100)
GLUCOSE BLD STRIP.AUTO-MCNC: 244 MG/DL (ref 65–100)
GLUCOSE BLD STRIP.AUTO-MCNC: 248 MG/DL (ref 65–100)
GLUCOSE BLD STRIP.AUTO-MCNC: 249 MG/DL (ref 65–100)
GLUCOSE BLD STRIP.AUTO-MCNC: 254 MG/DL (ref 65–100)
GLUCOSE BLD STRIP.AUTO-MCNC: 325 MG/DL (ref 65–100)
GLUCOSE BLD STRIP.AUTO-MCNC: 425 MG/DL (ref 65–100)
GLUCOSE BLD STRIP.AUTO-MCNC: 445 MG/DL (ref 65–100)
GLUCOSE BLD STRIP.AUTO-MCNC: 478 MG/DL (ref 65–100)
GLUCOSE SERPL-MCNC: 302 MG/DL (ref 65–100)
GLUCOSE SERPL-MCNC: 403 MG/DL (ref 65–100)
GLUCOSE UR STRIP.AUTO-MCNC: 500 MG/DL
GLUCOSE, GLC: 127 MG/DL
GLUCOSE, GLC: 173 MG/DL
GLUCOSE, GLC: 185 MG/DL
GLUCOSE, GLC: 211 MG/DL
GLUCOSE, GLC: 236 MG/DL
GLUCOSE, GLC: 244 MG/DL
GLUCOSE, GLC: 249 MG/DL
GLUCOSE, GLC: 325 MG/DL
GLUCOSE, GLC: 425 MG/DL
GLUCOSE, GLC: 478 MG/DL
HBA1C MFR BLD: 8.7 % (ref 4.2–6.3)
HCO3 BLDV-SCNC: 18.2 MMOL/L (ref 23–28)
HGB UR QL STRIP: ABNORMAL
HIGH TARGET, HITG: 250 MG/DL
HYALINE CASTS URNS QL MICRO: ABNORMAL /LPF (ref 0–5)
INSULIN ADMINSTERED, INSADM: 1.3 UNITS/HOUR
INSULIN ADMINSTERED, INSADM: 11 UNITS/HOUR
INSULIN ADMINSTERED, INSADM: 2.3 UNITS/HOUR
INSULIN ADMINSTERED, INSADM: 3 UNITS/HOUR
INSULIN ADMINSTERED, INSADM: 3.5 UNITS/HOUR
INSULIN ADMINSTERED, INSADM: 3.7 UNITS/HOUR
INSULIN ADMINSTERED, INSADM: 3.8 UNITS/HOUR
INSULIN ADMINSTERED, INSADM: 5.7 UNITS/HOUR
INSULIN ADMINSTERED, INSADM: 8 UNITS/HOUR
INSULIN ADMINSTERED, INSADM: 8.4 UNITS/HOUR
INSULIN ORDER, INSORD: 1.3 UNITS/HOUR
INSULIN ORDER, INSORD: 11 UNITS/HOUR
INSULIN ORDER, INSORD: 2.3 UNITS/HOUR
INSULIN ORDER, INSORD: 3 UNITS/HOUR
INSULIN ORDER, INSORD: 3.5 UNITS/HOUR
INSULIN ORDER, INSORD: 3.7 UNITS/HOUR
INSULIN ORDER, INSORD: 3.8 UNITS/HOUR
INSULIN ORDER, INSORD: 5.7 UNITS/HOUR
INSULIN ORDER, INSORD: 8 UNITS/HOUR
INSULIN ORDER, INSORD: 8.4 UNITS/HOUR
IRON SATN MFR SERPL: 26 % (ref 20–50)
IRON SERPL-MCNC: 28 UG/DL (ref 35–150)
IRON SERPL-MCNC: 29 UG/DL (ref 35–150)
KETONES UR QL STRIP.AUTO: NEGATIVE MG/DL
LACTATE SERPL-SCNC: 1.5 MMOL/L (ref 0.4–2)
LEUKOCYTE ESTERASE UR QL STRIP.AUTO: NEGATIVE
LIPASE SERPL-CCNC: 87 U/L (ref 73–393)
LOW TARGET, LOT: 150 MG/DL
MAGNESIUM SERPL-MCNC: 1.7 MG/DL (ref 1.6–2.4)
MAGNESIUM SERPL-MCNC: 1.7 MG/DL (ref 1.6–2.4)
MINUTES UNTIL NEXT BG, NBG: 60 MIN
MULTIPLIER, MUL: 0.02
MULTIPLIER, MUL: 0.03
NITRITE UR QL STRIP.AUTO: NEGATIVE
ORDER INITIALS, ORDINIT: NORMAL
P-R INTERVAL, ECG05: 154 MS
PCO2 BLDV: 37.5 MMHG (ref 41–51)
PH BLDV: 7.29 [PH] (ref 7.32–7.42)
PH UR STRIP: 5.5 [PH] (ref 5–8)
PHOSPHATE SERPL-MCNC: 4.1 MG/DL (ref 2.6–4.7)
PO2 BLDV: 42 MMHG (ref 25–40)
POTASSIUM SERPL-SCNC: 3.8 MMOL/L (ref 3.5–5.1)
POTASSIUM SERPL-SCNC: 3.9 MMOL/L (ref 3.5–5.1)
PROT UR STRIP-MCNC: 100 MG/DL
Q-T INTERVAL, ECG07: 388 MS
QRS DURATION, ECG06: 72 MS
QTC CALCULATION (BEZET), ECG08: 453 MS
RBC #/AREA URNS HPF: ABNORMAL /HPF (ref 0–5)
SAO2 % BLDV: 73 % (ref 65–88)
SERVICE CMNT-IMP: ABNORMAL
SODIUM SERPL-SCNC: 131 MMOL/L (ref 136–145)
SODIUM SERPL-SCNC: 131 MMOL/L (ref 136–145)
SP GR UR REFRACTOMETRY: 1.01 (ref 1–1.03)
SPECIMEN TYPE: ABNORMAL
TIBC SERPL-MCNC: 106 UG/DL (ref 250–450)
TROPONIN I SERPL-MCNC: 0.15 NG/ML
TROPONIN I SERPL-MCNC: 0.19 NG/ML
TROPONIN I SERPL-MCNC: 0.2 NG/ML
TSH SERPL DL<=0.05 MIU/L-ACNC: 0.13 UIU/ML (ref 0.36–3.74)
UA: UC IF INDICATED,UAUC: ABNORMAL
UROBILINOGEN UR QL STRIP.AUTO: 0.2 EU/DL (ref 0.2–1)
VENTRICULAR RATE, ECG03: 82 BPM
VIT B12 SERPL-MCNC: 1123 PG/ML (ref 193–986)
WBC URNS QL MICRO: ABNORMAL /HPF (ref 0–4)

## 2018-12-15 PROCEDURE — 82803 BLOOD GASES ANY COMBINATION: CPT

## 2018-12-15 PROCEDURE — 84443 ASSAY THYROID STIM HORMONE: CPT

## 2018-12-15 PROCEDURE — 71250 CT THORAX DX C-: CPT

## 2018-12-15 PROCEDURE — 94640 AIRWAY INHALATION TREATMENT: CPT

## 2018-12-15 PROCEDURE — 74011250637 HC RX REV CODE- 250/637: Performed by: INTERNAL MEDICINE

## 2018-12-15 PROCEDURE — 36415 COLL VENOUS BLD VENIPUNCTURE: CPT

## 2018-12-15 PROCEDURE — 94760 N-INVAS EAR/PLS OXIMETRY 1: CPT

## 2018-12-15 PROCEDURE — 83735 ASSAY OF MAGNESIUM: CPT

## 2018-12-15 PROCEDURE — 83690 ASSAY OF LIPASE: CPT

## 2018-12-15 PROCEDURE — 82150 ASSAY OF AMYLASE: CPT

## 2018-12-15 PROCEDURE — 82553 CREATINE MB FRACTION: CPT

## 2018-12-15 PROCEDURE — 82746 ASSAY OF FOLIC ACID SERUM: CPT

## 2018-12-15 PROCEDURE — 74011250636 HC RX REV CODE- 250/636: Performed by: INTERNAL MEDICINE

## 2018-12-15 PROCEDURE — 74011000250 HC RX REV CODE- 250: Performed by: INTERNAL MEDICINE

## 2018-12-15 PROCEDURE — 83540 ASSAY OF IRON: CPT

## 2018-12-15 PROCEDURE — 74011636637 HC RX REV CODE- 636/637: Performed by: HOSPITALIST

## 2018-12-15 PROCEDURE — 82607 VITAMIN B-12: CPT

## 2018-12-15 PROCEDURE — 74011636637 HC RX REV CODE- 636/637: Performed by: EMERGENCY MEDICINE

## 2018-12-15 PROCEDURE — 82962 GLUCOSE BLOOD TEST: CPT

## 2018-12-15 PROCEDURE — 83605 ASSAY OF LACTIC ACID: CPT

## 2018-12-15 PROCEDURE — 74011000258 HC RX REV CODE- 258: Performed by: EMERGENCY MEDICINE

## 2018-12-15 PROCEDURE — 80048 BASIC METABOLIC PNL TOTAL CA: CPT

## 2018-12-15 PROCEDURE — 81001 URINALYSIS AUTO W/SCOPE: CPT

## 2018-12-15 PROCEDURE — 77030029684 HC NEB SM VOL KT MONA -A

## 2018-12-15 PROCEDURE — 87086 URINE CULTURE/COLONY COUNT: CPT

## 2018-12-15 PROCEDURE — 65660000000 HC RM CCU STEPDOWN

## 2018-12-15 PROCEDURE — 84484 ASSAY OF TROPONIN QUANT: CPT

## 2018-12-15 PROCEDURE — 87077 CULTURE AEROBIC IDENTIFY: CPT

## 2018-12-15 PROCEDURE — 82728 ASSAY OF FERRITIN: CPT

## 2018-12-15 PROCEDURE — 74011250637 HC RX REV CODE- 250/637: Performed by: HOSPITALIST

## 2018-12-15 PROCEDURE — 93306 TTE W/DOPPLER COMPLETE: CPT

## 2018-12-15 PROCEDURE — 87186 SC STD MICRODIL/AGAR DIL: CPT

## 2018-12-15 RX ORDER — HEPARIN SODIUM 5000 [USP'U]/ML
5000 INJECTION, SOLUTION INTRAVENOUS; SUBCUTANEOUS EVERY 8 HOURS
Status: DISCONTINUED | OUTPATIENT
Start: 2018-12-15 | End: 2018-12-16 | Stop reason: HOSPADM

## 2018-12-15 RX ORDER — ASPIRIN 81 MG/1
81 TABLET ORAL DAILY
Status: DISCONTINUED | OUTPATIENT
Start: 2018-12-15 | End: 2018-12-16 | Stop reason: HOSPADM

## 2018-12-15 RX ORDER — SODIUM CHLORIDE 9 MG/ML
125 INJECTION, SOLUTION INTRAVENOUS CONTINUOUS
Status: DISCONTINUED | OUTPATIENT
Start: 2018-12-15 | End: 2018-12-15

## 2018-12-15 RX ORDER — FLUTICASONE PROPIONATE 50 MCG
1 SPRAY, SUSPENSION (ML) NASAL 2 TIMES DAILY
Status: DISCONTINUED | OUTPATIENT
Start: 2018-12-15 | End: 2018-12-16 | Stop reason: HOSPADM

## 2018-12-15 RX ORDER — SODIUM CHLORIDE 0.9 % (FLUSH) 0.9 %
5-10 SYRINGE (ML) INJECTION EVERY 8 HOURS
Status: DISCONTINUED | OUTPATIENT
Start: 2018-12-15 | End: 2018-12-16 | Stop reason: HOSPADM

## 2018-12-15 RX ORDER — SODIUM CHLORIDE 0.9 % (FLUSH) 0.9 %
5-10 SYRINGE (ML) INJECTION AS NEEDED
Status: DISCONTINUED | OUTPATIENT
Start: 2018-12-15 | End: 2018-12-16 | Stop reason: HOSPADM

## 2018-12-15 RX ORDER — IPRATROPIUM BROMIDE AND ALBUTEROL SULFATE 2.5; .5 MG/3ML; MG/3ML
3 SOLUTION RESPIRATORY (INHALATION)
Status: DISCONTINUED | OUTPATIENT
Start: 2018-12-15 | End: 2018-12-16 | Stop reason: HOSPADM

## 2018-12-15 RX ORDER — LEVOFLOXACIN 5 MG/ML
750 INJECTION, SOLUTION INTRAVENOUS
Status: DISCONTINUED | OUTPATIENT
Start: 2018-12-17 | End: 2018-12-16

## 2018-12-15 RX ORDER — INSULIN LISPRO 100 [IU]/ML
INJECTION, SOLUTION INTRAVENOUS; SUBCUTANEOUS
Status: DISCONTINUED | OUTPATIENT
Start: 2018-12-15 | End: 2018-12-16 | Stop reason: HOSPADM

## 2018-12-15 RX ORDER — GUAIFENESIN 100 MG/5ML
100 SOLUTION ORAL
Status: DISCONTINUED | OUTPATIENT
Start: 2018-12-15 | End: 2018-12-16 | Stop reason: HOSPADM

## 2018-12-15 RX ORDER — MAGNESIUM SULFATE 100 %
4 CRYSTALS MISCELLANEOUS AS NEEDED
Status: DISCONTINUED | OUTPATIENT
Start: 2018-12-15 | End: 2018-12-16 | Stop reason: HOSPADM

## 2018-12-15 RX ORDER — BISACODYL 5 MG
5 TABLET, DELAYED RELEASE (ENTERIC COATED) ORAL DAILY PRN
Status: DISCONTINUED | OUTPATIENT
Start: 2018-12-15 | End: 2018-12-16 | Stop reason: HOSPADM

## 2018-12-15 RX ORDER — MELATONIN
2000
Status: DISCONTINUED | OUTPATIENT
Start: 2018-12-16 | End: 2018-12-16 | Stop reason: HOSPADM

## 2018-12-15 RX ORDER — INSULIN GLARGINE 100 [IU]/ML
14 INJECTION, SOLUTION SUBCUTANEOUS DAILY
Status: DISCONTINUED | OUTPATIENT
Start: 2018-12-15 | End: 2018-12-16 | Stop reason: HOSPADM

## 2018-12-15 RX ORDER — INSULIN LISPRO 100 [IU]/ML
INJECTION, SOLUTION INTRAVENOUS; SUBCUTANEOUS
Status: DISCONTINUED | OUTPATIENT
Start: 2018-12-15 | End: 2018-12-15

## 2018-12-15 RX ORDER — DEXTROSE 50 % IN WATER (D50W) INTRAVENOUS SYRINGE
25-50 AS NEEDED
Status: DISCONTINUED | OUTPATIENT
Start: 2018-12-15 | End: 2018-12-16 | Stop reason: HOSPADM

## 2018-12-15 RX ORDER — ONDANSETRON 2 MG/ML
4 INJECTION INTRAMUSCULAR; INTRAVENOUS
Status: DISCONTINUED | OUTPATIENT
Start: 2018-12-15 | End: 2018-12-16 | Stop reason: HOSPADM

## 2018-12-15 RX ORDER — MAGNESIUM SULFATE 100 %
4 CRYSTALS MISCELLANEOUS AS NEEDED
Status: DISCONTINUED | OUTPATIENT
Start: 2018-12-15 | End: 2018-12-15

## 2018-12-15 RX ORDER — DEXTROSE 50 % IN WATER (D50W) INTRAVENOUS SYRINGE
25-50 AS NEEDED
Status: DISCONTINUED | OUTPATIENT
Start: 2018-12-15 | End: 2018-12-15

## 2018-12-15 RX ORDER — LEVOTHYROXINE SODIUM 100 UG/1
100 TABLET ORAL
Status: DISCONTINUED | OUTPATIENT
Start: 2018-12-15 | End: 2018-12-16 | Stop reason: HOSPADM

## 2018-12-15 RX ORDER — LEVOFLOXACIN 5 MG/ML
750 INJECTION, SOLUTION INTRAVENOUS EVERY 24 HOURS
Status: DISCONTINUED | OUTPATIENT
Start: 2018-12-15 | End: 2018-12-15

## 2018-12-15 RX ORDER — INSULIN LISPRO 100 [IU]/ML
3 INJECTION, SOLUTION INTRAVENOUS; SUBCUTANEOUS
Status: DISCONTINUED | OUTPATIENT
Start: 2018-12-15 | End: 2018-12-16

## 2018-12-15 RX ADMIN — LEVOFLOXACIN 750 MG: 5 INJECTION, SOLUTION INTRAVENOUS at 02:16

## 2018-12-15 RX ADMIN — Medication 10 ML: at 13:15

## 2018-12-15 RX ADMIN — Medication 1 CAPSULE: at 08:27

## 2018-12-15 RX ADMIN — INSULIN LISPRO 2 UNITS: 100 INJECTION, SOLUTION INTRAVENOUS; SUBCUTANEOUS at 17:52

## 2018-12-15 RX ADMIN — FLUTICASONE PROPIONATE 1 SPRAY: 50 SPRAY, METERED NASAL at 18:00

## 2018-12-15 RX ADMIN — HEPARIN SODIUM 5000 UNITS: 5000 INJECTION INTRAVENOUS; SUBCUTANEOUS at 05:06

## 2018-12-15 RX ADMIN — SODIUM CHLORIDE 125 ML/HR: 900 INJECTION, SOLUTION INTRAVENOUS at 05:54

## 2018-12-15 RX ADMIN — INSULIN LISPRO 3 UNITS: 100 INJECTION, SOLUTION INTRAVENOUS; SUBCUTANEOUS at 12:14

## 2018-12-15 RX ADMIN — INSULIN LISPRO 1 UNITS: 100 INJECTION, SOLUTION INTRAVENOUS; SUBCUTANEOUS at 09:14

## 2018-12-15 RX ADMIN — FLUTICASONE PROPIONATE 1 SPRAY: 50 SPRAY, METERED NASAL at 08:27

## 2018-12-15 RX ADMIN — HEPARIN SODIUM 5000 UNITS: 5000 INJECTION INTRAVENOUS; SUBCUTANEOUS at 22:04

## 2018-12-15 RX ADMIN — GUAIFENESIN 100 MG: 200 SOLUTION ORAL at 18:11

## 2018-12-15 RX ADMIN — SODIUM CHLORIDE 8.4 UNITS/HR: 900 INJECTION, SOLUTION INTRAVENOUS at 02:13

## 2018-12-15 RX ADMIN — IPRATROPIUM BROMIDE AND ALBUTEROL SULFATE 3 ML: .5; 3 SOLUTION RESPIRATORY (INHALATION) at 09:22

## 2018-12-15 RX ADMIN — LEVOTHYROXINE SODIUM 100 MCG: 100 TABLET ORAL at 07:00

## 2018-12-15 RX ADMIN — ASPIRIN 81 MG: 81 TABLET, COATED ORAL at 08:25

## 2018-12-15 RX ADMIN — Medication 10 ML: at 21:42

## 2018-12-15 RX ADMIN — HEPARIN SODIUM 5000 UNITS: 5000 INJECTION INTRAVENOUS; SUBCUTANEOUS at 12:14

## 2018-12-15 RX ADMIN — Medication 10 ML: at 05:07

## 2018-12-15 RX ADMIN — INSULIN LISPRO 2 UNITS: 100 INJECTION, SOLUTION INTRAVENOUS; SUBCUTANEOUS at 12:14

## 2018-12-15 RX ADMIN — INSULIN LISPRO 3 UNITS: 100 INJECTION, SOLUTION INTRAVENOUS; SUBCUTANEOUS at 17:53

## 2018-12-15 RX ADMIN — INSULIN GLARGINE 14 UNITS: 100 INJECTION, SOLUTION SUBCUTANEOUS at 11:24

## 2018-12-15 NOTE — PROGRESS NOTES
Day #1 of Levaquin  Indication:  Possible CAP (?)  Current regimen:  750 mg IV Q 24 hr x 5 days  Abx regimen: Monotherapy  Recent Labs     12/15/18  0509 12/15/18  0354 18  2306   WBC  --   --  11.8*   CREA 1.58* 1.68* 2.01*   BUN 38* 38* 42*     Est CrCl: ~30-40 ml/min; UO: unmeasured  Temp (24hrs), Av.1 °F (36.7 °C), Min:97.8 °F (36.6 °C), Max:98.4 °F (36.9 °C)    Cultures:    Blood: NGTD    Plan: Change to 750 mg IV Q 48 hr per [facility] P&T Committee Protocol with respect to renal function. Pharmacy will continue to monitor patient daily and will make dosage adjustments based upon changing renal function.

## 2018-12-15 NOTE — PROGRESS NOTES
Hospitalist Progress Note  Jillian Viveros MD  Answering service: [phone] OR [phone] from in house phone      Date of Service:  12/15/2018  NAME:  Giselle Gr  :  1970  MRN:  [id number]      Admission Summary: This is a 14-year-old woman with a past medical history significant for Down syndrome, hypothyroidism, asthma, and hypertension, who was in her usual state of health until the day of her presentation at the emergency room when it was reported that the patient's blood sugar was elevated at the assisted living facility where the patient resides. Interval history / Subjective:     Pt seen in follow up of Pneumonia    Pt seen She reports cough and wants cough medications       Assessment & Plan:     Assessment    Pneumonia  Elevated troponin's unknown sig  Diabetes type 1 with Hyperglycemia  Downs syndrome   Overweight - Body mass index is 28.59 kg/m². Plan  Continue Tele monitoring  Continue abx, Followup Cultures  Transition off Insulin drip - resume home doses of insulin  Cough medications  Pulmonary Toilet  Check Echo  Cardiology consulted    Code status: Full  DVT prophylaxis: SCD    Care Plan discussed with: Patient/Family, Nurse and Other Sister  Patient has given Verbal permission to discuss medical care with   persons present in the room and and also with contact as listed on face sheet. Disposition: SNF/LTC     Hospital Problems  Date Reviewed: 12/15/2018          Codes Class Noted POA    * (Principal) DKA (diabetic ketoacidoses) ([facility] ICD-10-CM: E13.10  ICD-9-CM: 250.10  2017 Yes                Review of Systems:   Pertinent items are noted in HPI. Vital Signs:    Last 24hrs VS reviewed since prior progress note.  Most recent are:  Visit Vitals  /47 (BP 1 Location: Left arm, BP Patient Position: At rest)   Pulse 84   Temp 97.5 °F (36.4 °C)   Resp 22   Ht 5' (1.524 m)   Wt 66.4 kg (146 lb 6.2 oz)   SpO2 98%   BMI 28.59 kg/m²         Intake/Output Summary (Last 24 hours) at 12/15/2018 1628  Last data filed at 12/15/2018 1300  Gross per 24 hour   Intake 2630 ml   Output --   Net 2630 ml        Physical Examination:             Constitutional:  No acute distress, cooperative, pleasant    ENT:  Oral mucous moist, oropharynx benign. Neck supple,    Resp:  + rhonchi LLL. No accessory muscle use   CV:  Regular rhythm, normal rate, no murmurs, gallops, rubs    GI:  Soft, non distended, non tender. normoactive bowel sounds, no hepatosplenomegaly     Musculoskeletal:  No edema, warm, 2+ pulses throughout    Neurologic:  Moves all extremities. AAOx3, CN II-XII reviewed     Skin:  Good turgor, no rashes or ulcers       Data Review:    Review and/or order of clinical lab test  Review and/or order of tests in the radiology section of CPT  Review and/or order of tests in the medicine section of CPT      Labs:     Recent Labs     12/14/18  2306   WBC 11.8*   HGB 9.4*   HCT 29.5*        Recent Labs     12/15/18  0509 12/15/18  0354 12/14/18  2306   * 131* 127*   K 3.8 3.9 4.3    103 98   CO2 18* 17* 20*   BUN 38* 38* 42*   CREA 1.58* 1.68* 2.01*   * 403* 544*   CA 6.9* 6.9* 7.4*   MG 1.7 1.7  --    PHOS  --   --  4.1     Recent Labs     12/15/18  0509 12/14/18  2306   SGOT  --  22   ALT  --  17   AP  --  87   TBILI  --  0.2   TP  --  6.0*   ALB  --  1.6*   GLOB  --  4.4*   AML 17*  --    LPSE 87  --      No results for input(s): INR, PTP, APTT in the last 72 hours. No lab exists for component: INREXT   Recent Labs     12/15/18  0509   TIBC 106*   PSAT 26   FERR 234      Lab Results   Component Value Date/Time    Folate 28.7 (H) 12/15/2018 05:09 AM      No results for input(s): PH, PCO2, PO2 in the last 72 hours.   Recent Labs     12/15/18  1145 12/15/18  0509   * 189   CKNDX 1.8 1.6   TROIQ 0.19* 0.15*     Lab Results   Component Value Date/Time    Cholesterol, total 135 04/30/2013 05:20 AM    HDL Cholesterol 59 04/30/2013 05:20 AM    LDL, calculated 66.6 04/30/2013 05:20 AM    Triglyceride 47 04/30/2013 05:20 AM    CHOL/HDL Ratio 2.3 04/30/2013 05:20 AM     Lab Results   Component Value Date/Time    Glucose (POC) 254 (H) 12/15/2018 04:02 PM    Glucose (POC) 244 (H) 12/15/2018 12:36 PM    Glucose (POC) 236 (H) 12/15/2018 11:23 AM    Glucose (POC) 211 (H) 12/15/2018 10:11 AM    Glucose (POC) 173 (H) 12/15/2018 09:07 AM     Lab Results   Component Value Date/Time    Color YELLOW/STRAW 12/15/2018 01:25 AM    Appearance CLEAR 12/15/2018 01:25 AM    Specific gravity 1.013 12/15/2018 01:25 AM    Specific gravity 1.015 04/29/2013 01:50 PM    pH (UA) 5.5 12/15/2018 01:25 AM    Protein 100 (A) 12/15/2018 01:25 AM    Glucose 500 (A) 12/15/2018 01:25 AM    Ketone NEGATIVE  12/15/2018 01:25 AM    Bilirubin NEGATIVE  12/15/2018 01:25 AM    Urobilinogen 0.2 12/15/2018 01:25 AM    Nitrites NEGATIVE  12/15/2018 01:25 AM    Leukocyte Esterase NEGATIVE  12/15/2018 01:25 AM    Glucose/Ketones  02/08/2009 07:00 AM     GLUCOSE AND KETONES SIGNIFICANTLY ELEVATED.  RESULTS PHONED TO AND READ BACK BY    Epithelial cells FEW 12/15/2018 01:25 AM    Bacteria NEGATIVE  12/15/2018 01:25 AM    WBC 0-4 12/15/2018 01:25 AM    RBC 0-5 12/15/2018 01:25 AM         Medications Reviewed:     Current Facility-Administered Medications   Medication Dose Route Frequency    albuterol-ipratropium (DUO-NEB) 2.5 MG-0.5 MG/3 ML  3 mL Nebulization Q4H PRN    aspirin delayed-release tablet 81 mg  81 mg Oral DAILY    fluticasone (FLONASE) 50 mcg/actuation nasal spray 1 Spray  1 Spray Both Nostrils BID    levothyroxine (SYNTHROID) tablet 100 mcg  100 mcg Oral 7am    sodium chloride (NS) flush 5-10 mL  5-10 mL IntraVENous Q8H    sodium chloride (NS) flush 5-10 mL  5-10 mL IntraVENous PRN    ondansetron (ZOFRAN) injection 4 mg  4 mg IntraVENous Q4H PRN    bisacodyl (DULCOLAX) tablet 5 mg  5 mg Oral DAILY PRN    heparin (porcine) injection 5,000 Units  5,000 Units SubCUTAneous Q8H    lactobac ac& pc-s.therm-b.anim (JEN Q/RISAQUAD)  1 Cap Oral DAILY    [START ON 12/17/2018] levoFLOXacin (LEVAQUIN) 750 mg in D5W IVPB  750 mg IntraVENous Q48H    insulin glargine (LANTUS) injection 14 Units  14 Units SubCUTAneous DAILY    [START ON 12/16/2018] cholecalciferol (VITAMIN D3) tablet 2,000 Units  2,000 Units Oral every Sunday    insulin lispro (HUMALOG) injection 3 Units  3 Units SubCUTAneous TID WITH MEALS    glucose chewable tablet 16 g  4 Tab Oral PRN    dextrose (D50W) injection syrg 12.5-25 g  25-50 mL IntraVENous PRN    glucagon (GLUCAGEN) injection 1 mg  1 mg IntraMUSCular PRN    insulin lispro (HUMALOG) injection   SubCUTAneous AC&HS     ______________________________________________________________________  EXPECTED LENGTH OF STAY: - - -  ACTUAL LENGTH OF STAY:          0                 Belén Blandon MD

## 2018-12-15 NOTE — PROGRESS NOTES
TRANSFER - IN REPORT:    Verbal report received from Zack(name) on Ness Gr  being received from [facility]) for routine progression of care      Report consisted of patients Situation, Background, Assessment and   Recommendations(SBAR). Information from the following report(s) SBAR, ED Summary, Procedure Summary, MAR, Recent Results and Cardiac Rhythm Normal Sinus was reviewed with the receiving nurse. Opportunity for questions and clarification was provided. Assessment completed upon patients arrival to unit and care assumed.

## 2018-12-15 NOTE — PROGRESS NOTES
Problem: Diabetes Self-Management  Goal: *Monitoring blood glucose, interpreting and using results  Identify recommended blood glucose targets  and personal targets. Outcome: Progressing Towards Goal  Pts blood glucose within target range for the past four hours. Pts anion gap closed since admission. Dr. Nida Guallpa notified, orders received for lantus 14 units and to d/c drip and D5 two hours after lantus. Pt educated on use of insulin drip for control of blood glucose. Pt verbalized understanding. 1124- lantus administered    1320- insulin drip and D5 stopped    TRANSFER - OUT REPORT:    Verbal report given to Paty Barba RN(name) on Ness Gr  being transferred to [facility](unit) for routine progression of care       Report consisted of patients Situation, Background, Assessment and   Recommendations(SBAR). Information from the following report(s) SBAR, Kardex, ED Summary, Procedure Summary, Intake/Output, MAR, Accordion, Recent Results, Med Rec Status, Cardiac Rhythm NSR and Alarm Parameters  was reviewed with the receiving nurse.     Lines:   Peripheral IV 12/14/18 Right Wrist (Active)   Site Assessment Clean, dry, & intact 12/15/2018  4:00 PM   Phlebitis Assessment 0 12/15/2018  4:00 PM   Infiltration Assessment 0 12/15/2018  4:00 PM   Dressing Status Clean, dry, & intact 12/15/2018  4:00 PM   Dressing Type Tape;Transparent 12/15/2018  4:00 PM   Hub Color/Line Status Pink;Flushed;End cap changed 12/15/2018  4:00 PM   Action Taken Open ports on tubing capped 12/15/2018  8:29 AM   Alcohol Cap Used Yes 12/15/2018  4:00 PM       Peripheral IV 12/14/18 Left Antecubital (Active)   Site Assessment Clean, dry, & intact 12/15/2018  4:00 PM   Phlebitis Assessment 0 12/15/2018  4:00 PM   Infiltration Assessment 0 12/15/2018  4:00 PM   Dressing Status Clean, dry, & intact 12/15/2018  4:00 PM   Dressing Type Tape;Transparent 12/15/2018  4:00 PM   Hub Color/Line Status Pink;Flushed;End cap changed 12/15/2018  4:00 PM Action Taken Open ports on tubing capped 12/15/2018  8:29 AM   Alcohol Cap Used Yes 12/15/2018  4:00 PM        Opportunity for questions and clarification was provided.       Patient transported with:   [employer]

## 2018-12-15 NOTE — PROGRESS NOTES
9569 Pt has a CT of the chest ordered this AM. CT unable to take pt now. Will notify day shift.     0620 Dr Lilli Null notified of pt's troponin of 0.15. No orders received for now. Cards consulted. Will continue to monitor. TRANSFER - IN REPORT:    Verbal report received from Ac(name) on Ness Gr  being received from ED(unit) for routine progression of care      Report consisted of patients Situation, Background, Assessment and   Recommendations(SBAR). Information from the following report(s) SBAR, Kardex, ED Summary and Recent Results was reviewed with the receiving nurse. Opportunity for questions and clarification was provided. Assessment completed upon patients arrival to unit and care assumed.

## 2018-12-15 NOTE — PROGRESS NOTES
The following herbal, alternative, and/or nutritional/dietary supplement product(s) has been discontinued  per [employer] approved policy:      Biotin 4,137 mcg cap     Please reorder upon discharge if appropriate.

## 2018-12-15 NOTE — ED NOTES
[facility] called and notified RN that pt received 30 units of regular insulin prior to EMS transport. Pt also taking doxycycline for Bronchitis. Dr. Janie Beckman notified.

## 2018-12-15 NOTE — PROGRESS NOTES
The following herbal, alternative, and/or nutritional/dietary supplement product(s) has been discontinued  per [employer]/[facility] approved policy:      cranberry conc-ascorbic acid 4,200-20 mg cap - 1 tablet PO daily    Please reorder upon discharge if appropriate.

## 2018-12-15 NOTE — ED TRIAGE NOTES
Pt arrived to ed by ems. Pt was told by care giver that her b.s. Was over 500. Nad. Respirations unlabored. Denies pain. Pt's mother at bedside.

## 2018-12-15 NOTE — ED PROVIDER NOTES
51 y.o. female with past medical history significant for DM I, Down Syndrome, and hypothyroidism who presents to the ED via EMS (from [facility]), accompanied by mother, with chief complaint of high blood sugar. Per EMS, about 36 today urinalysis results showed that had elevated blood sugar over 500. She received 30 units of regular insulin, but assisted living nurses reported that blood sugar remained high, prompting the call to EMS. Pt had a CXR and blood work done at [facility] yesterday; she has had a cough for about 2 weeks and was put on Doxycycline for bronchitis. Mother notes that pt had a fever of 101.3 this morning. Pt reports diarrhea and abd pain, but specifically denies any HA at this time. There are no other acute medical concerns at this time. Negative Tobacco use; Negative EtOH use; Negative Illicit Drug Abuse       PCP: Kelsey Mckee MD    Note written by Onel Wesley, as dictated by Darrin Payne MD 10:39 PM        The history is provided by the patient and medical records. No  was used.         Past Medical History:   Diagnosis Date    Allergic rhinitis, cause unspecified     Asthma     Diabetes ([facility]     Down's syndrome     H/O impacted cerumen     Dr. Daisy Irizarry Hypothyroid     Thyroid disease        Past Surgical History:   Procedure Laterality Date    HX CYST REMOVAL      right shoulder    HX HEENT Bilateral 2017    prior in one eye    HX HEENT Left 3/5/15    cataract left and right    HX HYSTERECTOMY           Family History:   Problem Relation Age of Onset    Thyroid Disease Mother         hypo    Hypertension Father        Social History     Socioeconomic History    Marital status: SINGLE     Spouse name: Not on file    Number of children: Not on file    Years of education: Not on file    Highest education level: Not on file   Social Needs    Financial resource strain: Not on file    Food insecurity - worry: Not on file   [facility] Food insecurity - inability: Not on file    Transportation needs - medical: Not on file   [employer] needs - non-medical: Not on file   Occupational History    Not on file   Tobacco Use    Smoking status: Never Smoker    Smokeless tobacco: Never Used   Substance and Sexual Activity    Alcohol use: No    Drug use: No    Sexual activity: Not Currently   Other Topics Concern    Not on file   Social History Narrative    Not on file         ALLERGIES: Acetaminophen; Codeine; Nitrofurantoin; and Pcn [penicillins]    Review of Systems   Constitutional: Positive for fever. Respiratory: Positive for cough. Gastrointestinal: Positive for abdominal pain and diarrhea. Negative for nausea and vomiting. Neurological: Negative for headaches. All other systems reviewed and are negative. Vitals:    12/14/18 2256 12/14/18 2312   BP:  116/53   Pulse: 86 87   Resp: 20 18   Temp: 98.4 °F (36.9 °C) 98.4 °F (36.9 °C)   SpO2: 95% 97%            Physical Exam   Nursing note and vitals reviewed. CONSTITUTIONAL: Well-appearing; well-nourished; in no apparent distress  HEAD: Normocephalic; atraumatic  EYES: PERRL; EOM intact; conjunctiva and sclera are clear bilaterally. ENT: No rhinorrhea; normal pharynx with no tonsillar hypertrophy; mucous membranes pink/moist, no erythema, no exudate. NECK: Supple; non-tender; no cervical lymphadenopathy  CARD: Normal S1, S2; no murmurs, rubs, or gallops. Regular rate and rhythm. RESP: Normal respiratory effort; breath sounds clear and equal bilaterally; no wheezes, rhonchi, or rales. ABD: Normal bowel sounds; non-distended; non-tender; no palpable organomegaly, no masses, no bruits. Back Exam: Normal inspection; no vertebral point tenderness, no CVA tenderness. Normal range of motion. EXT: Normal ROM in all four extremities; non-tender to palpation; no swelling or deformity; distal pulses are normal, no edema. SKIN: Warm; dry; no rash.   NEURO:Alert and oriented x 3, coherent, KUSUM-XII grossly intact, sensory and motor are non-focal.        MDM  Number of Diagnoses or Management Options  Diabetic ketoacidosis without coma associated with diabetes mellitus due to underlying condition ([facility]):   Down syndrome:   Hyponatremia:   SIRS (systemic inflammatory response syndrome) ([facility]):   Diagnosis management comments: Assessment: this is a 50-year-old female with history of diabetes, who presents with elevated blood sugar, fever,, and general malaise, rule out DKA, and , electrolyte abnormality, dehydration/sepsis      Plan: EKG/ chest x-ray/ lab/ IV fluid/ insulin/ serial exam/ Monitor and Reevaluate. Amount and/or Complexity of Data Reviewed  Clinical lab tests: ordered and reviewed  Tests in the radiology section of CPT®: ordered and reviewed  Tests in the medicine section of CPT®: reviewed and ordered  Discussion of test results with the performing providers: yes  Decide to obtain previous medical records or to obtain history from someone other than the patient: yes  Obtain history from someone other than the patient: yes  Review and summarize past medical records: yes  Discuss the patient with other providers: yes  Independent visualization of images, tracings, or specimens: yes    Risk of Complications, Morbidity, and/or Mortality  Presenting problems: moderate  Diagnostic procedures: moderate  Management options: moderate    Critical Care  Total time providing critical care: (Total critical care time spent exclusive of procedures: 45 minutes)    Patient Progress  Patient progress: stable         Procedures     ED EKG interpretation:  Rhythm: normal sinus rhythm; and regular . Rate (approx.): 82; Axis: normal; P wave: normal; QRS interval: normal ; ST/T wave: non-specific changes; in  Lead: Diffusely; Other findings: abnormal ekg. This EKG was interpreted by Maddy Hernandez MD,ED Provider. XRAY INTERPRETATION (ED MD)  Chest Xray  No acute process seen. Normal heart size.  No bony abnormalities. No infiltrate. Franky Serrano MD 12:36 AM    PROGRESS NOTE:  Pt has been reexamined by Franky Serrano MD all available results have been reviewed with pt and any available family. Pt understands sx, dx, and tx in ED. Care plan has been outlined and questions have been answered. Pt and any available family understands and agrees to need for admission to hospital for further tx not available in ED. Pt is ready for admission. Written by Casimiro Joshua MD,  12:43 AM    CONSULT NOTE:  Franky Serrano MD spoke with Dr. Willian Pereira of the adult hospitalist team. Discussed patient's presentation, history, physical assessment, and available diagnostic results.  He will evaluate, write orders and admit the patient to the hospital. 12:43 AM    .

## 2018-12-15 NOTE — ED NOTES
TRANSFER - IN REPORT:    Verbal report received from [facility] sidra (name) on Paul Gr  being received from [facility](unit) for routine progression of care      Report consisted of patients Situation, Background, Assessment and   Recommendations(SBAR). Information from the following report(s) ED Summary and MAR was reviewed with the receiving nurse. Opportunity for questions and clarification was provided. Assessment completed upon patients arrival to unit and care assumed.

## 2018-12-15 NOTE — H&P
[address]  HISTORY AND PHYSICAL      Name:CELSA NORRIS  MR#: [id number]  : 1970  ACCOUNT #: [de-identified]   ADMIT DATE: 2018    PRIMARY CARE PHYSICIAN:  Kerri Rock MD    SOURCE OF INFORMATION:  Patient and patient's mother, who was present at the bedside. CHIEF COMPLAINT:  Elevated blood sugar. HISTORY OF PRESENT ILLNESS:  This is a 50-year-old woman with a past medical history significant for Down syndrome, hypothyroidism, asthma, and hypertension, who was in her usual state of health until the day of her presentation at the emergency room when it was reported that the patient's blood sugar was elevated at the assisted living facility where the patient resides. It was reported that the patient's blood sugar was as high as 500. Patient received 30 units of regular insulin and a repeat blood sugar was still very high. Because of that, EMS was called and then the patient was brought to the emergency room for further evaluation. Also, for the past couple of days, patient has been having a cough and nasal congestion. The cough is nonproductive. It was reported that the patient had a chest x-ray and blood work done at the assisted living facility yesterday. The patient was subsequently started on doxycycline for bronchitis. The patient also had a fever at the assisted living facility as well. Her temperature was as high as 101.3. Patient denies rigors and chills. When the patient arrived at the emergency room, her lab work showed diabetic ketoacidosis. Chest x-ray was suggestive of pneumonia. Patient was started on treatment for DKA as per DKA protocol. She was subsequently referred to the hospitalist service for evaluation for admission. She was first admitted to this facility from 2018 to 2018. Patient was admitted and treated for diabetic ketoacidosis.     PAST MEDICAL HISTORY:  Down syndrome, hypothyroidism, hypertension, asthma. ALLERGIES:  PATIENT IS ALLERGIC TO CODEINE, PENICILLIN, ACETAMINOPHEN, NITROFURANTOIN. MEDICATIONS:  Her medications are aspirin 81 mg daily, Flonase 50 mcg 1 spray into each nostril twice daily, Lantus insulin 14 units subcutaneously daily, sliding scale with insulin coverage, Synthroid 100 mcg daily, doxycycline 50 mg daily. FAMILY HISTORY:  This was reviewed. Her mother had thyroid disease. Father had hypertension. PAST SURGICAL HISTORY:  This is significant for bilateral cataract extraction, hysterectomy, removal of a cyst from the right shoulder. SOCIAL HISTORY:  No history of alcohol or tobacco abuse. REVIEW OF SYSTEMS:  HEENT:  No headache, no dizziness, no blurring of vision, no photophobia. RESPIRATORY:  This is positive for cough and shortness of breath. No hemoptysis. CARDIOVASCULAR:  No chest pain, no orthopnea, no palpitations. GASTROINTESTINAL:  This is positive for abdominal pain, diarrhea. No nausea or vomiting. No constipation. GENITOURINARY:  No dysuria, no urgency, and no frequency. All other systems are reviewed and they are negative. PHYSICAL EXAMINATION:  GENERAL APPEARANCE:  Patient appeared ill, in moderate distress. VITAL SIGNS:  On arrival at the emergency room, temperature 98.4, pulse 86, respiratory rate 20, blood pressure 116/53, oxygen saturation 95% on room air. HEAD:  Normocephalic, atraumatic. EYES:  Normal eye movement. No redness, no drainage, no discharge. EARS:  Normal external ears with no obvious drainage. NOSE:  No deformity, no drainage. MOUTH AND THROAT:  No visible oral lesions. Dry oral mucosa. NECK:  Supple, no JVD, no thyromegaly. CHEST:  Few expiratory wheezes, no crackles. HEART:  Normal S1 and S2, regular. No clinically appreciable murmur. ABDOMEN:  Soft, nontender. Normal bowel sounds. CENTRAL NERVOUS SYSTEM:  Alert, oriented x3. No gross focal neurological deficits. EXTREMITIES:  No edema.   Pulses 2+ bilaterally. MUSCULOSKELETAL:  No obvious joint deformity or swelling. SKIN:  No active skin lesions seen in the exposed part of the body. PSYCHIATRIC:  Normal mood and affect. LYMPHATIC:  No cervical lymphadenopathy. DIAGNOSTIC DATA:  EKG shows normal sinus rhythm, no significant ST and T-wave abnormalities. Chest x-ray shows left perihilar opacity that may represent atelectasis or infection, prominence of the left hilum may represent lymphadenopathy. LABORATORY DATA:  Lactic acid level 1.31. Hematology:  WBC 11.8, hemoglobin 9.4, hematocrit 29.5, platelets 573. Chemistry:  Sodium 127, potassium 4.3, chloride 98, CO2 of 20, glucose 544, BUN 42, creatinine 2.01, calcium 7.4, total bilirubin 0.2, ALT 17, AST 22, alkaline phosphatase 87, total protein 6.0, albumin level 1.6, globulin 4.4. Influenza A antigen negative. Influenza B antigen negative. Venous blood gas done in the emergency room, pH 7.295, pCO2 of 37.5, pO2 of 42, bicarbonate 18.2. Urinalysis is significant for negative nitrite, negative leukocyte esterase, negative bacteria, small blood. Phosphorus 4.1. ASSESSMENT:  1.  Diabetic ketoacidosis. 2.  Hyponatremia. 3.  Suspected bacterial pneumonia. 4.  Anemia. 5.  Acute renal failure. 6.  Down syndrome. 7.  Hypothyroidism. PLAN:  1. Diabetic ketoacidosis. We will admit the patient for further evaluation and treatment. We will continue with treatment for diabetic ketoacidosis as per protocol. We will monitor the patient closely. 2.  Hyponatremia. This is most likely due to the diabetic ketoacidosis. We will carry out hydration with saline and repeat the sodium level. 3.  Suspected bacterial pneumonia. This could be because of the patient's diabetic ketoacidosis. We will start the patient on Levaquin. We will obtain a noncontrast CT scan of the chest for further evaluation. 4.  Anemia. This is most likely due to chronic disease.   We will carry out anemia workup including checking a stool guaiac to rule out occult gastrointestinal bleed. 5.  Acute renal failure. This is most likely due to volume depletion. We will carry out fluid therapy. We will monitor the patient's renal function. If there is no definite improvement with fluid therapy, patient may require further evaluation such as a renal ultrasound and Nephrology consult. 6.  Down syndrome. We will continue with supportive therapy. 7.  Hypothyroidism. We will continue with Synthroid. We will check a TSH level. 8.  Other issues:  CODE STATUS:  THE PATIENT IS A FULL CODE. We will place the patient on heparin for deep venous thrombosis prophylaxis.       Jose Yusuf MD       RE/LILLIANA  D: 12/15/2018 02:13     T: 12/15/2018 09:15  JOB #: [id number]  CC: Shahrzad Quintero MD

## 2018-12-16 VITALS
RESPIRATION RATE: 18 BRPM | SYSTOLIC BLOOD PRESSURE: 118 MMHG | BODY MASS INDEX: 28.74 KG/M2 | HEIGHT: 60 IN | DIASTOLIC BLOOD PRESSURE: 53 MMHG | HEART RATE: 84 BPM | TEMPERATURE: 98.5 F | WEIGHT: 146.39 LBS | OXYGEN SATURATION: 100 %

## 2018-12-16 LAB
ALBUMIN SERPL-MCNC: 1.7 G/DL (ref 3.5–5)
ALBUMIN/GLOB SERPL: 0.4 {RATIO} (ref 1.1–2.2)
ALP SERPL-CCNC: 94 U/L (ref 45–117)
ALT SERPL-CCNC: 20 U/L (ref 12–78)
ANION GAP SERPL CALC-SCNC: 8 MMOL/L (ref 5–15)
AST SERPL-CCNC: 32 U/L (ref 15–37)
BASOPHILS # BLD: 0.1 K/UL (ref 0–0.1)
BASOPHILS NFR BLD: 1 % (ref 0–1)
BILIRUB SERPL-MCNC: 0.2 MG/DL (ref 0.2–1)
BUN SERPL-MCNC: 28 MG/DL (ref 6–20)
BUN/CREAT SERPL: 28 (ref 12–20)
CALCIUM SERPL-MCNC: 8.2 MG/DL (ref 8.5–10.1)
CHLORIDE SERPL-SCNC: 104 MMOL/L (ref 97–108)
CHOLEST SERPL-MCNC: 107 MG/DL
CO2 SERPL-SCNC: 20 MMOL/L (ref 21–32)
CREAT SERPL-MCNC: 1.01 MG/DL (ref 0.55–1.02)
DIFFERENTIAL METHOD BLD: ABNORMAL
EOSINOPHIL # BLD: 0 K/UL (ref 0–0.4)
EOSINOPHIL NFR BLD: 0 % (ref 0–7)
ERYTHROCYTE [DISTWIDTH] IN BLOOD BY AUTOMATED COUNT: 12.8 % (ref 11.5–14.5)
GLOBULIN SER CALC-MCNC: 4.6 G/DL (ref 2–4)
GLUCOSE BLD STRIP.AUTO-MCNC: 139 MG/DL (ref 65–100)
GLUCOSE BLD STRIP.AUTO-MCNC: 226 MG/DL (ref 65–100)
GLUCOSE SERPL-MCNC: 202 MG/DL (ref 65–100)
HCT VFR BLD AUTO: 30.5 % (ref 35–47)
HDLC SERPL-MCNC: 42 MG/DL
HDLC SERPL: 2.5 {RATIO} (ref 0–5)
HGB BLD-MCNC: 10.2 G/DL (ref 11.5–16)
IMM GRANULOCYTES # BLD: 0 K/UL
IMM GRANULOCYTES NFR BLD AUTO: 0 %
LDLC SERPL CALC-MCNC: 36.4 MG/DL (ref 0–100)
LIPID PROFILE,FLP: NORMAL
LYMPHOCYTES # BLD: 1.3 K/UL (ref 0.8–3.5)
LYMPHOCYTES NFR BLD: 19 % (ref 12–49)
MCH RBC QN AUTO: 31.5 PG (ref 26–34)
MCHC RBC AUTO-ENTMCNC: 33.4 G/DL (ref 30–36.5)
MCV RBC AUTO: 94.1 FL (ref 80–99)
MONOCYTES # BLD: 0.5 K/UL (ref 0–1)
MONOCYTES NFR BLD: 7 % (ref 5–13)
NEUTS BAND NFR BLD MANUAL: 2 % (ref 0–6)
NEUTS SEG # BLD: 5.2 K/UL (ref 1.8–8)
NEUTS SEG NFR BLD: 71 % (ref 32–75)
NRBC # BLD: 0 K/UL (ref 0–0.01)
NRBC BLD-RTO: 0 PER 100 WBC
PLATELET # BLD AUTO: 246 K/UL (ref 150–400)
PMV BLD AUTO: 10.1 FL (ref 8.9–12.9)
POTASSIUM SERPL-SCNC: 4.1 MMOL/L (ref 3.5–5.1)
PROT SERPL-MCNC: 6.3 G/DL (ref 6.4–8.2)
RBC # BLD AUTO: 3.24 M/UL (ref 3.8–5.2)
RBC MORPH BLD: ABNORMAL
SERVICE CMNT-IMP: ABNORMAL
SERVICE CMNT-IMP: ABNORMAL
SODIUM SERPL-SCNC: 132 MMOL/L (ref 136–145)
TRIGL SERPL-MCNC: 143 MG/DL (ref ?–150)
TROPONIN I SERPL-MCNC: 0.2 NG/ML
VLDLC SERPL CALC-MCNC: 28.6 MG/DL
WBC # BLD AUTO: 7.1 K/UL (ref 3.6–11)

## 2018-12-16 PROCEDURE — 74011250636 HC RX REV CODE- 250/636: Performed by: INTERNAL MEDICINE

## 2018-12-16 PROCEDURE — 82962 GLUCOSE BLOOD TEST: CPT

## 2018-12-16 PROCEDURE — 74011250637 HC RX REV CODE- 250/637: Performed by: HOSPITALIST

## 2018-12-16 PROCEDURE — 74011250637 HC RX REV CODE- 250/637: Performed by: INTERNAL MEDICINE

## 2018-12-16 PROCEDURE — 80061 LIPID PANEL: CPT

## 2018-12-16 PROCEDURE — 74011636637 HC RX REV CODE- 636/637: Performed by: HOSPITALIST

## 2018-12-16 PROCEDURE — 80053 COMPREHEN METABOLIC PANEL: CPT

## 2018-12-16 PROCEDURE — 85025 COMPLETE CBC W/AUTO DIFF WBC: CPT

## 2018-12-16 PROCEDURE — 36415 COLL VENOUS BLD VENIPUNCTURE: CPT

## 2018-12-16 PROCEDURE — 84484 ASSAY OF TROPONIN QUANT: CPT

## 2018-12-16 RX ORDER — GUAIFENESIN 100 MG/5ML
100 SOLUTION ORAL
Qty: 1 BOTTLE | Refills: 1 | Status: SHIPPED | OUTPATIENT
Start: 2018-12-16 | End: 2021-01-01

## 2018-12-16 RX ORDER — INSULIN LISPRO 100 [IU]/ML
5 INJECTION, SOLUTION INTRAVENOUS; SUBCUTANEOUS
Qty: 1 VIAL | Refills: 0 | Status: SHIPPED
Start: 2018-12-16 | End: 2020-01-15 | Stop reason: DRUGHIGH

## 2018-12-16 RX ORDER — LEVOFLOXACIN 750 MG/1
750 TABLET ORAL DAILY
Qty: 5 TAB | Refills: 0 | Status: SHIPPED | OUTPATIENT
Start: 2018-12-16 | End: 2020-01-15

## 2018-12-16 RX ORDER — INSULIN LISPRO 100 [IU]/ML
5 INJECTION, SOLUTION INTRAVENOUS; SUBCUTANEOUS
Status: DISCONTINUED | OUTPATIENT
Start: 2018-12-16 | End: 2018-12-16 | Stop reason: HOSPADM

## 2018-12-16 RX ORDER — ALBUTEROL SULFATE 90 UG/1
1 AEROSOL, METERED RESPIRATORY (INHALATION)
Qty: 1 INHALER | Refills: 0 | Status: SHIPPED | OUTPATIENT
Start: 2018-12-16

## 2018-12-16 RX ORDER — LEVOFLOXACIN 5 MG/ML
750 INJECTION, SOLUTION INTRAVENOUS EVERY 24 HOURS
Status: DISCONTINUED | OUTPATIENT
Start: 2018-12-16 | End: 2018-12-16 | Stop reason: HOSPADM

## 2018-12-16 RX ADMIN — INSULIN LISPRO 2 UNITS: 100 INJECTION, SOLUTION INTRAVENOUS; SUBCUTANEOUS at 06:59

## 2018-12-16 RX ADMIN — INSULIN LISPRO 5 UNITS: 100 INJECTION, SOLUTION INTRAVENOUS; SUBCUTANEOUS at 12:19

## 2018-12-16 RX ADMIN — LEVOFLOXACIN 750 MG: 5 INJECTION, SOLUTION INTRAVENOUS at 08:17

## 2018-12-16 RX ADMIN — HEPARIN SODIUM 5000 UNITS: 5000 INJECTION INTRAVENOUS; SUBCUTANEOUS at 12:20

## 2018-12-16 RX ADMIN — GUAIFENESIN 100 MG: 200 SOLUTION ORAL at 07:11

## 2018-12-16 RX ADMIN — Medication 10 ML: at 08:27

## 2018-12-16 RX ADMIN — FLUTICASONE PROPIONATE 1 SPRAY: 50 SPRAY, METERED NASAL at 09:11

## 2018-12-16 RX ADMIN — INSULIN GLARGINE 14 UNITS: 100 INJECTION, SOLUTION SUBCUTANEOUS at 08:23

## 2018-12-16 RX ADMIN — INSULIN LISPRO 3 UNITS: 100 INJECTION, SOLUTION INTRAVENOUS; SUBCUTANEOUS at 08:23

## 2018-12-16 RX ADMIN — ASPIRIN 81 MG: 81 TABLET, COATED ORAL at 08:16

## 2018-12-16 RX ADMIN — Medication 1 CAPSULE: at 08:16

## 2018-12-16 RX ADMIN — LEVOTHYROXINE SODIUM 100 MCG: 100 TABLET ORAL at 06:59

## 2018-12-16 RX ADMIN — HEPARIN SODIUM 5000 UNITS: 5000 INJECTION INTRAVENOUS; SUBCUTANEOUS at 06:59

## 2018-12-16 RX ADMIN — VITAMIN D, TAB 1000IU (100/BT) 2000 UNITS: 25 TAB at 07:38

## 2018-12-16 NOTE — DISCHARGE SUMMARY
Discharge Summary       PATIENT ID: Eunice Oliver  MRN: [id number]   YOB: 1970    DATE OF ADMISSION: 12/14/2018 10:37 PM    DATE OF DISCHARGE: 01/01/19  PRIMARY CARE PROVIDER: Gigi Davidson MD       DISCHARGING PROVIDER: Berta Guardado MD    To contact this individual call [phone] and ask the  to page. If unavailable ask to be transferred the Adult Hospitalist Department. CONSULTATIONS: IP CONSULT TO CARDIOLOGY      PROCEDURES/SURGERIES: * No surgery found *    IMAGING  Ct Chest Wo Cont    Result Date: 12/15/2018  IMPRESSION: Airspace disease in the superior and medial basilar segments of the left lower lobe. Xr Chest Port    Result Date: 12/14/2018  IMPRESSION: Left perihilar opacity that may represent atelectasis or infection. Prominence of the left hilum may represent lymphadenopathy. HISTORY OF PRESENT ILLNESS per admitting : This is a 68-year-old woman with a past medical history significant for Down syndrome, hypothyroidism, asthma, and hypertension, who was in her usual state of health until the day of her presentation at the emergency room when it was reported that the patient's blood sugar was elevated at the assisted living facility where the patient resides. It was reported that the patient's blood sugar was as high as 500. Patient received 30 units of regular insulin and a repeat blood sugar was still very high. Because of that, EMS was called and then the patient was brought to the emergency room for further evaluation. Also, for the past couple of days, patient has been having a cough and nasal congestion. The cough is nonproductive. It was reported that the patient had a chest x-ray and blood work done at the assisted living facility yesterday. The patient was subsequently started on doxycycline for bronchitis.   The patient also had a fever at the assisted living facility as well.  Her temperature was as high as 101.3. Patient denies rigors and chills. When the patient arrived at the emergency room, her lab work showed diabetic ketoacidosis. Chest x-ray was suggestive of pneumonia. Patient was started on treatment for DKA as per DKA protocol. She was subsequently referred to the hospitalist service for evaluation for admission. She was first admitted to this facility from 02/28/2018 to 03/02/2018. Patient was admitted and treated for diabetic ketoacidosis. HOSPITAL COURSE:     Pneumonia  Likely Community Acquired,  Cultures so far negative  D/C on po Abx  Follow up CXR in 1 month for resolution of pneumonia    Elevated troponin's unknown sig  Seen by cardiology  Non cardiac Troponin leak   Echo Normal   No Additional Workup recommended by cardiology  Patient denies any chest pains    Diabetes type 1 with Hyperglycemia  Transitioned off Insulin drip - resume home doses of insulin  Was not in Acidosis  Back on usual home doses of insulin  OP followup with her endocrinologist  D/W pts family     Jessica Krabbe syndrome  At baseline  [facility]    Overweight - Body mass index is 28.59 kg/m². Continue Other medications for chronic medical problems. DISCHARGE DIAGNOSES / PLAN:      Patient Active Problem List   Diagnosis Code    Down syndrome Q90.9    Acquired hypothyroidism E03.9    Anemia, unspecified D64.9    DM (diabetes mellitus), type 1, uncontrolled ([facility] E10.65    Asthma J45.909    HTN (hypertension) I10    Acne L70.9    Advance directive on file Z78.9           FOLLOW UP APPOINTMENTS:    Follow-up Information     Follow up With Specialties Details Why Contact Nhan Celis MD Internal Medicine   [address] [place] [address]  [facility].  [phone]             ADDITIONAL CARE RECOMMENDATIONS:   · It is important that you take the medication exactly as they are prescribed.    · Keep your medication in the bottles provided by the pharmacist and keep a list of the medication names, dosages, and times to be taken in your wallet. · Do not take other medications without consulting your doctor. ·  Donot take sedating mediations if you are sleepy or confused. · Fall Precautions  · Keep Well Hydrated  · Report to your medical provider if you feel you have  developed allergies to medications  · Follow up with your PCP or Consultant for medication adjustments and refills  · Monitor for signs of fevers,chills,bleeding,chest pain and seek medical attention if you do so. · Recheck CXR in 1 months time for resolution of pneumonia  · Monitor Glucose checks  · Followup with PCP and Endocrinologist for further management        DIET: Diabetic Diet    ACTIVITY: Activity as tolerated      DISCHARGE MEDICATIONS:  Discharge Medication List as of 12/16/2018 12:33 PM      START taking these medications    Details   guaiFENesin (ROBITUSSIN) 100 mg/5 mL liquid Take 5 mL by mouth every four (4) hours as needed for Cough., NormalPlease use diabetic tussinDisp-1 Bottle, R-1      L. acidoph & paracasei- S therm- Bifido (JEN-Q/RISAQUAD) 8 billion cell cap cap Take 1 Cap by mouth daily. , Print, Disp-30 Cap, R-0      albuterol (PROVENTIL HFA, VENTOLIN HFA, PROAIR HFA) 90 mcg/actuation inhaler Take 1 Puff by inhalation every four (4) hours as needed for Wheezing., Print, Disp-1 Inhaler, R-0      levoFLOXacin (LEVAQUIN) 750 mg tablet Take 1 Tab by mouth daily. , Print, Disp-5 Tab, R-0         CONTINUE these medications which have CHANGED    Details   !! insulin lispro (HUMALOG U-100 INSULIN) 100 unit/mL injection 5 Units by SubCUTAneous route three (3) times daily (with meals). Hold insulin if BS < 80. Hold if patient does not eat her meal. If BS> 150 give with SS., No Print, Disp-1 Vial, R-0       !! - Potential duplicate medications found. Please discuss with provider.       CONTINUE these medications which have NOT CHANGED    Details   cranberry conc-ascorbic acid 4,200-20 mg cap Take 1 Cap by mouth daily. , Historical Med      levothyroxine (SYNTHROID) 100 mcg tablet Take 1 Tab by mouth every morning., Print, Disp-30 Tab, R-0      Biotin 2,500 mcg cap Take 5,000 mcg by mouth daily. , Historical Med      glucose 4 gram chewable tablet Take 12 g by mouth as needed (BS < 60). , Historical Med      glucagon (GLUCAGEN) 1 mg injection 1 mg by IntraMUSCular route once as needed (hypoglycemia if patient is unresponsive). , Historical Med      insulin glargine (LANTUS U-100 INSULIN) 100 unit/mL injection 14 Units by SubCUTAneous route daily. , Historical Med      cholecalciferol (VITAMIN D3) 1,000 unit cap Take 2,000 Units by mouth every Sunday., Historical Med      !! insulin lispro (HUMALOG) 100 unit/mL injection by SubCUTAneous route. Sliding scale: If blood sugar > 80 give with scheduled 3 units  151-200= 4 units   201-250= 5 units  251-300= 6 units  If > 300 call MD, Historical Med      fluticasone (FLONASE) 50 mcg/actuation nasal spray 1 [place] by Both Nostrils route two (2) times a day., Historical Med      minocycline (MINOCIN, DYNACIN) 50 mg capsule Take 1 capsule by mouth  daily, Normal, Disp-90 Cap, R-3      aspirin delayed-release 81 mg tablet Take 81 mg by mouth daily. , Historical Med       !! - Potential duplicate medications found. Please discuss with provider.           All Micro Results     Procedure Component Value Units Date/Time    CULTURE, BLOOD, PAIRED [[id number]] Collected:  12/14/18 2306    Order Status:  Completed Specimen:  Blood Updated:  12/19/18 0503     Special Requests: NO SPECIAL REQUESTS        Culture result: NO GROWTH 5 DAYS       CULTURE, URINE [[id number]]  (Abnormal)  (Susceptibility) Collected:  12/15/18 0125    Order Status:  Completed Specimen:  Cath Urine Updated:  12/17/18 1137     Special Requests: NO SPECIAL REQUESTS        Fletcher Count --        14006  COLONIES/mL       Culture result:       ENTEROCOCCUS FAECALIS GROUP D (18,000 COL/mL) ALPHA STREPTOCOCCUS (4,000 COL/mL)          INFLUENZA A & B AG (RAPID TEST) [[id number]] Collected:  12/14/18 2334    Order Status:  Completed Specimen:  Nasopharyngeal from Nasal washing Updated:  12/15/18 0004     Influenza A Antigen NEGATIVE         Influenza B Antigen NEGATIVE              No results found for this or any previous visit (from the past 24 hour(s)). NOTIFY YOUR PHYSICIAN FOR ANY OF THE FOLLOWING:   Fever over 101 degrees for 24 hours. Chest pain, shortness of breath, fever, chills, nausea, vomiting, diarrhea, change in mentation, falling, weakness, bleeding. Severe pain or pain not relieved by medications. Or, any other signs or symptoms that you may have questions about. DISPOSITION:    Home With:   OT  PT  HH  RN       Long term SNF/Inpatient Rehab   X assisted living    Hospice    Other:       PATIENT CONDITION AT DISCHARGE:     Functional status    Poor     Deconditioned    X Independent      Cognition    X Lucid     Forgetful     Dementia      Catheters/lines (plus indication)    [facility]     PICC     PEG    X None      Code status    X Full code     DNR      PHYSICAL EXAMINATION AT DISCHARGE:  Gen:  No distress, alert  HEENT:  Normal cephalic atraumatic, extra-occular movements are intact. Neck:  Supple, No JVD  Lungs:  Clear bilaterally, no wheeze, no rales, normal effort  Heart:  Regular Rate and Rhythm, normal S1 and S2, no edema  Abdomen:  Soft, non tender, normal bowel sounds, no guarding.   Extremities:  Well perfused, no cyanosis or edema  Neurological:  Awake and alert, CN's are intact, normal strength throughout extremities  Skin:  No rashes or moles  Mental Status:  Normal thought process, does not appear anxious      CHRONIC MEDICAL DIAGNOSES:  Problem List as of 12/16/2018 Date Reviewed: 12/15/2018          Codes Class Noted - Resolved    Hyponatremia ICD-10-CM: E87.1  ICD-9-CM: 276.1  2/28/2018 - Present        * (Principal) [facility]acidoses) [facility] ICD-10-CM: E13.10  ICD-9-CM: 250.10  9/14/2017 - Present        Advance directive on file ICD-10-CM: Z78.9  ICD-9-CM: V49.89  12/28/2015 - Present        Acne ICD-10-CM: L70.9  ICD-9-CM: 706.1  8/20/2014 - Present        Syncope and collapse ICD-10-CM: R55  ICD-9-CM: 780.2  4/29/2013 - Present        Asthma (Chronic) ICD-10-CM: J45.909  ICD-9-CM: 493.90  4/29/2013 - Present        HTN (hypertension) (Chronic) ICD-10-CM: I10  ICD-9-CM: 401.9  4/29/2013 - Present        DM (diabetes mellitus), type 1, uncontrolled [facility] ICD-10-CM: E10.65  ICD-9-CM: 250.03  11/14/2012 - Present        Hypoglycemia, unspecified ICD-10-CM: E16.2  ICD-9-CM: 251.2  6/27/2012 - Present        Down syndrome ICD-10-CM: Q90.9  ICD-9-CM: 758.0  7/23/2010 - Present        Acquired hypothyroidism ICD-10-CM: E03.9  ICD-9-CM: 244.9  7/23/2010 - Present        Anemia, unspecified ICD-10-CM: D64.9  ICD-9-CM: 285.9  7/23/2010 - Present        RESOLVED: Hyperglycemia ICD-10-CM: R73.9  ICD-9-CM: 790.29  7/28/2014 - 7/29/2014        RESOLVED: Type II or unspecified type diabetes mellitus without mention of complication, uncontrolled ICD-10-CM: E11.65  ICD-9-CM: 250.02  7/23/2010 - 11/14/2012                Discussed with patient and family. Explained the importance of following up, Compliance with medications and recommendations on discharge,Side effect profile of medications were explained. Safety precautions at home and while taking pain medications also explained. All questions answered to the satisfaction of the patient/family. Discussed with consultant(s) who are agreeable to the discharge. Verbal and written instructions on discharge given. Explained about Discharge medications and side effect profile.         Thank you Brayan Ching MD for taking care of your patient, Please call with any questions.       Greater than 36 minutes were spent with the patient on counseling and coordination of care    Signed:   Samra Torres MD  1/1/2019  12:20 PM

## 2018-12-16 NOTE — PROGRESS NOTES
Patient being discharged home; discharge instruction and script reviewed, med care notes provided.   Patient's f

## 2018-12-16 NOTE — PROGRESS NOTES
13:37 discharge instructions, and medications script and care notes given to patient and family, (two brothers and mom) Dr. Hugh Ackerman at bedside with patient and family.   Patient instructed to have a follow chest x ray of chest.  Edson Wilder RN

## 2018-12-16 NOTE — PROGRESS NOTES
Problem: Falls - Risk of  Goal: *Absence of Falls  Document Vickey Fall Risk and appropriate interventions in the flowsheet. Outcome: Progressing Towards Goal  Fall Risk Interventions:  Mobility Interventions: Communicate number of staff needed for ambulation/transfer, Patient to call before getting OOB         Medication Interventions: Patient to call before getting OOB    Elimination Interventions: Call light in reach, Patient to call for help with toileting needs, Toilet paper/wipes in reach             Problem: Pressure Injury - Risk of  Goal: *Prevention of pressure injury  Document Raul Scale and appropriate interventions in the flowsheet.   Outcome: Progressing Towards Goal  Pressure Injury Interventions:  Sensory Interventions: Assess changes in LOC, Check visual cues for pain, Keep linens dry and wrinkle-free    Moisture Interventions: Apply protective barrier, creams and emollients, Check for incontinence Q2 hours and as needed, Minimize layers, Moisture barrier    Activity Interventions: Increase time out of bed, Pressure redistribution bed/mattress(bed type)    Mobility Interventions: Pressure redistribution bed/mattress (bed type), PT/OT evaluation    Nutrition Interventions: Document food/fluid/supplement intake

## 2018-12-16 NOTE — CONSULTS
Cardiology Consult    Patient: Derrek Her MRN: [id number]  SSN: [id number]    YOB: 1970  Age: 51 y.o. Sex: female       Subjective:      Date of  Admission: 12/14/2018     Admission type: Emergency    Isha Gr is a 51 y.o. female admitted for DKA (diabetic ketoacidoses) ([facility] [address] Pt with Down's syndrome, asthma, HTN admit with PNA and DKA. She is feeling better. No chest pain. Breathing improved. No prior cardiac history.     Primary Care Provider: Ji Harvey MD  Past Medical History:   Diagnosis Date    Allergic rhinitis, cause unspecified     Asthma     Diabetes ([facility]     Down's syndrome     H/O impacted cerumen     Dr. Michelle Garcia Hypothyroid     Thyroid disease       Past Surgical History:   Procedure Laterality Date    HX CYST REMOVAL      right shoulder    HX HEENT Bilateral 2017    prior in one eye    HX HEENT Left 3/5/15    cataract left and right    HX HYSTERECTOMY       Family History   Problem Relation Age of Onset    Thyroid Disease Mother         hypo    Hypertension Father       Social History     Tobacco Use    Smoking status: Never Smoker    Smokeless tobacco: Never Used   Substance Use Topics    Alcohol use: No      Current Facility-Administered Medications   Medication Dose Route Frequency    levoFLOXacin (LEVAQUIN) 750 mg in D5W IVPB  750 mg IntraVENous Q24H    insulin lispro (HUMALOG) injection 5 Units  5 Units SubCUTAneous TID WITH MEALS    albuterol-ipratropium (DUO-NEB) 2.5 MG-0.5 MG/3 ML  3 mL Nebulization Q4H PRN    aspirin delayed-release tablet 81 mg  81 mg Oral DAILY    fluticasone (FLONASE) 50 mcg/actuation nasal spray 1 Spray  1 Spray Both Nostrils BID    levothyroxine (SYNTHROID) tablet 100 mcg  100 mcg Oral 7am    sodium chloride (NS) flush 5-10 mL  5-10 mL IntraVENous Q8H    sodium chloride (NS) flush 5-10 mL  5-10 mL IntraVENous PRN    ondansetron (ZOFRAN) injection 4 mg  4 mg IntraVENous Q4H PRN    bisacodyl (DULCOLAX) tablet 5 mg  5 mg Oral DAILY PRN    heparin (porcine) injection 5,000 Units  5,000 Units SubCUTAneous Q8H    lactobac ac& pc-s.therm-b.anim (JEN Q/RISAQUAD)  1 Cap Oral DAILY    insulin glargine (LANTUS) injection 14 Units  14 Units SubCUTAneous DAILY    cholecalciferol (VITAMIN D3) tablet 2,000 Units  2,000 Units Oral every Sunday    glucose chewable tablet 16 g  4 Tab Oral PRN    dextrose (D50W) injection syrg 12.5-25 g  25-50 mL IntraVENous PRN    glucagon (GLUCAGEN) injection 1 mg  1 mg IntraMUSCular PRN    insulin lispro (HUMALOG) injection   SubCUTAneous AC&HS    guaiFENesin (ROBITUSSIN) 100 mg/5 mL oral liquid 100 mg  100 mg Oral Q4H PRN        Allergies   Allergen Reactions    Acetaminophen Other (comments)     Patient has implanted glucometer. Glucometer does not work properly when patient takes acetaminophen.  Codeine Nausea and Vomiting    Nitrofurantoin Rash    Pcn [Penicillins] Hives and Rash        Review of Systems:  A comprehensive review of systems was negative except for that written in the History of Present Illness. Subjective:     Visit Vitals  /56 (BP Patient Position: At rest)   Pulse 94   Temp 97.7 °F (36.5 °C)   Resp 20   Ht 5' (1.524 m)   Wt 146 lb 6.2 oz (66.4 kg)   SpO2 97%   BMI 28.59 kg/m²        Physical Exam:  Visit Vitals  /56 (BP Patient Position: At rest)   Pulse 94   Temp 97.7 °F (36.5 °C)   Resp 20   Ht 5' (1.524 m)   Wt 146 lb 6.2 oz (66.4 kg)   LMP 03/27/1986   SpO2 97%   BMI 28.59 kg/m²     General Appearance:  Well developed, well nourished,alert and oriented x 3, and individual in no acute distress. Ears/Nose/Mouth/Throat:   Hearing grossly normal.         Neck: Supple. No JVD   Chest:   Scattered L sided rhonchi, no wheeze   Cardiovascular:  Regular rate and rhythm, S1, S2 normal, no murmur. Abdomen:   Soft, non-tender, bowel sounds are active. Extremities: trace edema bilaterally. Skin: Warm and dry. Cardiographics:  Telemetry: normal sinus rhythm  ECG: normal sinus rhythm, nonspecific ST and T waves changes  Echocardiogram: Normal and reviewed by myself    Data Reviewed: All lab results for the last 24 hours reviewed. Assessment:         Hospital Problems  Date Reviewed: 12/15/2018          Codes Class Noted POA    * (Principal) D[facility]di[facility]acidoses) [facility] ICD-10-CM: E13.10  ICD-9-CM: 250.10  9/14/2017 Yes               Plan:     Troponin elevation. Trivially elevated and flat, echo was normal and reviewed with pt; from demand/PNA. No evidence of acute ischemia. Supportive care and tx of PNA as is being done. No additional cardiac evaluation indicated at this time and will sign off.       Signed By: Herrera Mora MD     December 16, 2018

## 2018-12-16 NOTE — DISCHARGE INSTRUCTIONS
· It is important that you take the medication exactly as they are prescribed. · Keep your medication in the bottles provided by the pharmacist and keep a list of the medication names, dosages, and times to be taken in your wallet. · Do not take other medications without consulting your doctor. ·  Donot take sedating mediations if you are sleepy or confused. · Fall Precautions  · Keep Well Hydrated  · Report to your medical provider if you feel you have  developed allergies to medications  · Follow up with your PCP or Consultant for medication adjustments and refills  · Monitor for signs of fevers,chills,bleeding,chest pain and seek medical attention if you do so.    · Recheck CXR in 1 months time for resolution of pneumonia  · Monitor Glucose checks  · Followup with PCP and Endocrinologist for further management

## 2018-12-16 NOTE — PROGRESS NOTES
Day #2 of Levaquin - Renal Dose Adjustment  Indication:  CAP  Current regimen:  750 mg IV Q 48 hr x 5 days  Abx regimen: Monotherapy  Recent Labs     18  0408 12/15/18  0509 12/15/18  0354 18  2306   WBC 7.1  --   --  11.8*   CREA 1.01 1.58* 1.68* 2.01*   BUN 28* 38* 38* 42*     Est CrCl: ~50-60 ml/min; UO: unmeasured  Temp (24hrs), Av.9 °F (36.6 °C), Min:97.4 °F (36.3 °C), Max:98.6 °F (37 °C)    Cultures:    Blood: NGTD  12/15 Urine: pending    Plan: Given the improvement in the patient's Scr, the dose of Levaquin has been adjusted back to 750 mg IV Q 24 hr x 4 more doses per [facility] P[facility]T [facility] Protocol with respect to renal function. Pharmacy will continue to monitor patient daily and will make dosage adjustments based upon changing renal function.

## 2018-12-17 LAB
BACTERIA SPEC CULT: ABNORMAL
BACTERIA SPEC CULT: ABNORMAL
CC UR VC: ABNORMAL
SERVICE CMNT-IMP: ABNORMAL

## 2018-12-17 NOTE — CDMP QUERY
Dear Dr. Shira Garza,    Please clarify if this patient is (was) being treated/managed for:     => Systemic inflammatory response syndrome (SIRS) of non-infectious origin (POA) with acute organ dysfunction (ROBBY) in the setting of bacterial pneumonia and DKA requiring   => Other explanation of clinical findings  => Clinically Undetermined (no explanation for clinical findings)    The medical record reflects the following clinical findings, treatment, and risk factors. Risk Factors:  bacterial pneumonia  Clinical Indicators:  noted ROBBY- BUN 38, creat 1.58, GFR 35, SIRS per H&P, wbc 11.8, bands 2, UA + staph  Treatment: NS 2000ml bolus, IVF, labs, IV levaquin    Please clarify and document your clinical opinion in the progress notes and discharge summary including the definitive and/or presumptive diagnosis, (suspected or probable), related to the above clinical findings. Please include clinical findings supporting your diagnosis.       Thank You  Aracelis Sesay,MSN,BSN,RN,[facility]  [phone]

## 2018-12-19 LAB
BACTERIA SPEC CULT: NORMAL
SERVICE CMNT-IMP: NORMAL

## 2018-12-24 ENCOUNTER — DOCUMENTATION ONLY (OUTPATIENT)
Dept: INTERNAL MEDICINE CLINIC | Age: 48
End: 2018-12-24

## 2019-01-01 PROBLEM — E11.10 DKA (DIABETIC KETOACIDOSES): Status: RESOLVED | Noted: 2017-09-14 | Resolved: 2019-01-01

## 2019-01-01 PROBLEM — E87.1 HYPONATREMIA: Status: RESOLVED | Noted: 2018-02-28 | Resolved: 2019-01-01

## 2020-01-15 ENCOUNTER — APPOINTMENT (OUTPATIENT)
Dept: GENERAL RADIOLOGY | Age: 50
DRG: 194 | End: 2020-01-15
Attending: EMERGENCY MEDICINE
Payer: MEDICARE

## 2020-01-15 ENCOUNTER — APPOINTMENT (OUTPATIENT)
Dept: ULTRASOUND IMAGING | Age: 50
DRG: 194 | End: 2020-01-15
Attending: FAMILY MEDICINE
Payer: MEDICARE

## 2020-01-15 ENCOUNTER — HOSPITAL ENCOUNTER (INPATIENT)
Age: 50
LOS: 3 days | Discharge: NURSING, REHAB OR LONG-TERM CARE | DRG: 194 | End: 2020-01-19
Attending: EMERGENCY MEDICINE | Admitting: INTERNAL MEDICINE
Payer: MEDICARE

## 2020-01-15 ENCOUNTER — APPOINTMENT (OUTPATIENT)
Dept: VASCULAR SURGERY | Age: 50
DRG: 194 | End: 2020-01-15
Attending: FAMILY MEDICINE
Payer: MEDICARE

## 2020-01-15 DIAGNOSIS — E86.1 HYPOVOLEMIA: ICD-10-CM

## 2020-01-15 DIAGNOSIS — N17.9 ACUTE RENAL FAILURE, UNSPECIFIED ACUTE RENAL FAILURE TYPE (HCC): Primary | ICD-10-CM

## 2020-01-15 DIAGNOSIS — J45.901 MILD ASTHMA WITH ACUTE EXACERBATION, UNSPECIFIED WHETHER PERSISTENT: ICD-10-CM

## 2020-01-15 DIAGNOSIS — R73.9 HYPERGLYCEMIA: ICD-10-CM

## 2020-01-15 LAB
ALBUMIN SERPL-MCNC: 1.8 G/DL (ref 3.5–5)
ALBUMIN/GLOB SERPL: 0.4 {RATIO} (ref 1.1–2.2)
ALP SERPL-CCNC: 93 U/L (ref 45–117)
ALT SERPL-CCNC: 20 U/L (ref 12–78)
ANION GAP SERPL CALC-SCNC: 8 MMOL/L (ref 5–15)
ARTERIAL PATENCY WRIST A: YES
AST SERPL-CCNC: 23 U/L (ref 15–37)
B-OH-BUTYR SERPL-SCNC: 0.08 MMOL/L
BASE DEFICIT BLD-SCNC: 6 MMOL/L
BASOPHILS # BLD: 0 K/UL (ref 0–0.1)
BASOPHILS NFR BLD: 0 % (ref 0–1)
BDY SITE: ABNORMAL
BILIRUB SERPL-MCNC: <0.1 MG/DL (ref 0.2–1)
BUN SERPL-MCNC: 61 MG/DL (ref 6–20)
BUN/CREAT SERPL: 25 (ref 12–20)
CALCIUM SERPL-MCNC: 8 MG/DL (ref 8.5–10.1)
CHLORIDE SERPL-SCNC: 102 MMOL/L (ref 97–108)
CK SERPL-CCNC: 152 U/L (ref 26–192)
CO2 SERPL-SCNC: 22 MMOL/L (ref 21–32)
COMMENT, HOLDF: NORMAL
CREAT SERPL-MCNC: 2.46 MG/DL (ref 0.55–1.02)
DIFFERENTIAL METHOD BLD: ABNORMAL
EOSINOPHIL # BLD: 0 K/UL (ref 0–0.4)
EOSINOPHIL NFR BLD: 0 % (ref 0–7)
ERYTHROCYTE [DISTWIDTH] IN BLOOD BY AUTOMATED COUNT: 13.2 % (ref 11.5–14.5)
GAS FLOW.O2 O2 DELIVERY SYS: ABNORMAL L/MIN
GAS FLOW.O2 SETTING OXYMISER: 2 L/M
GLOBULIN SER CALC-MCNC: 4.5 G/DL (ref 2–4)
GLUCOSE BLD STRIP.AUTO-MCNC: 270 MG/DL (ref 65–100)
GLUCOSE BLD STRIP.AUTO-MCNC: 310 MG/DL (ref 65–100)
GLUCOSE BLD STRIP.AUTO-MCNC: 379 MG/DL (ref 65–100)
GLUCOSE SERPL-MCNC: 473 MG/DL (ref 65–100)
HCO3 BLD-SCNC: 20.7 MMOL/L (ref 22–26)
HCT VFR BLD AUTO: 31.8 % (ref 35–47)
HGB BLD-MCNC: 10.1 G/DL (ref 11.5–16)
IMM GRANULOCYTES # BLD AUTO: 0 K/UL (ref 0–0.04)
IMM GRANULOCYTES NFR BLD AUTO: 1 % (ref 0–0.5)
LYMPHOCYTES # BLD: 0.3 K/UL (ref 0.8–3.5)
LYMPHOCYTES NFR BLD: 7 % (ref 12–49)
MAGNESIUM SERPL-MCNC: 1.6 MG/DL (ref 1.6–2.4)
MAGNESIUM SERPL-MCNC: 1.9 MG/DL (ref 1.6–2.4)
MCH RBC QN AUTO: 31.9 PG (ref 26–34)
MCHC RBC AUTO-ENTMCNC: 31.8 G/DL (ref 30–36.5)
MCV RBC AUTO: 100.3 FL (ref 80–99)
MONOCYTES # BLD: 0.3 K/UL (ref 0–1)
MONOCYTES NFR BLD: 7 % (ref 5–13)
NEUTS SEG # BLD: 3.2 K/UL (ref 1.8–8)
NEUTS SEG NFR BLD: 85 % (ref 32–75)
NRBC # BLD: 0 K/UL (ref 0–0.01)
NRBC BLD-RTO: 0 PER 100 WBC
PCO2 BLD: 43.6 MMHG (ref 35–45)
PH BLD: 7.28 [PH] (ref 7.35–7.45)
PHOSPHATE SERPL-MCNC: 3.4 MG/DL (ref 2.6–4.7)
PLATELET # BLD AUTO: 131 K/UL (ref 150–400)
PMV BLD AUTO: 10.3 FL (ref 8.9–12.9)
PO2 BLD: 66 MMHG (ref 80–100)
POTASSIUM SERPL-SCNC: 4 MMOL/L (ref 3.5–5.1)
PROT SERPL-MCNC: 6.3 G/DL (ref 6.4–8.2)
RBC # BLD AUTO: 3.17 M/UL (ref 3.8–5.2)
RBC MORPH BLD: ABNORMAL
SAMPLES BEING HELD,HOLD: NORMAL
SAO2 % BLD: 90 % (ref 92–97)
SERVICE CMNT-IMP: ABNORMAL
SODIUM SERPL-SCNC: 132 MMOL/L (ref 136–145)
SPECIMEN TYPE: ABNORMAL
TOTAL RESP. RATE, ITRR: 23
WBC # BLD AUTO: 3.8 K/UL (ref 3.6–11)

## 2020-01-15 PROCEDURE — 82962 GLUCOSE BLOOD TEST: CPT

## 2020-01-15 PROCEDURE — 80053 COMPREHEN METABOLIC PANEL: CPT

## 2020-01-15 PROCEDURE — 94640 AIRWAY INHALATION TREATMENT: CPT

## 2020-01-15 PROCEDURE — 74011250636 HC RX REV CODE- 250/636: Performed by: FAMILY MEDICINE

## 2020-01-15 PROCEDURE — 96372 THER/PROPH/DIAG INJ SC/IM: CPT

## 2020-01-15 PROCEDURE — 36600 WITHDRAWAL OF ARTERIAL BLOOD: CPT

## 2020-01-15 PROCEDURE — 74011250636 HC RX REV CODE- 250/636: Performed by: EMERGENCY MEDICINE

## 2020-01-15 PROCEDURE — 83735 ASSAY OF MAGNESIUM: CPT

## 2020-01-15 PROCEDURE — 74011250637 HC RX REV CODE- 250/637: Performed by: FAMILY MEDICINE

## 2020-01-15 PROCEDURE — 82550 ASSAY OF CK (CPK): CPT

## 2020-01-15 PROCEDURE — 99218 HC RM OBSERVATION: CPT

## 2020-01-15 PROCEDURE — 74011000250 HC RX REV CODE- 250: Performed by: FAMILY MEDICINE

## 2020-01-15 PROCEDURE — 81001 URINALYSIS AUTO W/SCOPE: CPT

## 2020-01-15 PROCEDURE — 99285 EMERGENCY DEPT VISIT HI MDM: CPT

## 2020-01-15 PROCEDURE — 94664 DEMO&/EVAL PT USE INHALER: CPT

## 2020-01-15 PROCEDURE — 76770 US EXAM ABDO BACK WALL COMP: CPT

## 2020-01-15 PROCEDURE — 84100 ASSAY OF PHOSPHORUS: CPT

## 2020-01-15 PROCEDURE — 71045 X-RAY EXAM CHEST 1 VIEW: CPT

## 2020-01-15 PROCEDURE — 82803 BLOOD GASES ANY COMBINATION: CPT

## 2020-01-15 PROCEDURE — 36415 COLL VENOUS BLD VENIPUNCTURE: CPT

## 2020-01-15 PROCEDURE — 74011000250 HC RX REV CODE- 250: Performed by: EMERGENCY MEDICINE

## 2020-01-15 PROCEDURE — 96365 THER/PROPH/DIAG IV INF INIT: CPT

## 2020-01-15 PROCEDURE — 0100U RESPIRATORY PANEL,PCR,NASOPHARYNGEAL: CPT

## 2020-01-15 PROCEDURE — 93970 EXTREMITY STUDY: CPT

## 2020-01-15 PROCEDURE — 74011636637 HC RX REV CODE- 636/637: Performed by: FAMILY MEDICINE

## 2020-01-15 PROCEDURE — 93005 ELECTROCARDIOGRAM TRACING: CPT

## 2020-01-15 PROCEDURE — 82010 KETONE BODYS QUAN: CPT

## 2020-01-15 PROCEDURE — 85025 COMPLETE CBC W/AUTO DIFF WBC: CPT

## 2020-01-15 RX ORDER — MAGNESIUM SULFATE 100 %
4 CRYSTALS MISCELLANEOUS AS NEEDED
Status: DISCONTINUED | OUTPATIENT
Start: 2020-01-15 | End: 2020-01-19 | Stop reason: HOSPADM

## 2020-01-15 RX ORDER — INSULIN LISPRO 100 [IU]/ML
5 INJECTION, SOLUTION INTRAVENOUS; SUBCUTANEOUS
Status: DISCONTINUED | OUTPATIENT
Start: 2020-01-15 | End: 2020-01-16

## 2020-01-15 RX ORDER — DEXTROMETHORPHAN POLISTIREX 30 MG/5ML
30 SUSPENSION ORAL
COMMUNITY
End: 2021-01-01

## 2020-01-15 RX ORDER — ATORVASTATIN CALCIUM 20 MG/1
20 TABLET, FILM COATED ORAL
COMMUNITY

## 2020-01-15 RX ORDER — CRANBERRY FRUIT 500 MG
500 TABLET,CHEWABLE ORAL DAILY
COMMUNITY
End: 2021-01-01

## 2020-01-15 RX ORDER — HEPARIN SODIUM 5000 [USP'U]/ML
5000 INJECTION, SOLUTION INTRAVENOUS; SUBCUTANEOUS EVERY 8 HOURS
Status: DISCONTINUED | OUTPATIENT
Start: 2020-01-15 | End: 2020-01-19 | Stop reason: HOSPADM

## 2020-01-15 RX ORDER — CHOLECALCIFEROL (VITAMIN D3) 125 MCG
4000 CAPSULE ORAL DAILY
COMMUNITY

## 2020-01-15 RX ORDER — GUAIFENESIN 100 MG/5ML
100 SOLUTION ORAL
Status: DISCONTINUED | OUTPATIENT
Start: 2020-01-15 | End: 2020-01-17 | Stop reason: SDUPTHER

## 2020-01-15 RX ORDER — CHOLECALCIFEROL (VITAMIN D3) 125 MCG
5000 CAPSULE ORAL DAILY
COMMUNITY

## 2020-01-15 RX ORDER — ALBUTEROL SULFATE 0.83 MG/ML
2.5 SOLUTION RESPIRATORY (INHALATION)
Status: COMPLETED | OUTPATIENT
Start: 2020-01-15 | End: 2020-01-15

## 2020-01-15 RX ORDER — LEVOTHYROXINE SODIUM 100 UG/1
100 TABLET ORAL
COMMUNITY
End: 2021-01-01

## 2020-01-15 RX ORDER — ACETAMINOPHEN 325 MG/1
650 TABLET ORAL
Status: DISCONTINUED | OUTPATIENT
Start: 2020-01-15 | End: 2020-01-19 | Stop reason: HOSPADM

## 2020-01-15 RX ORDER — SODIUM CHLORIDE 0.9 % (FLUSH) 0.9 %
5-40 SYRINGE (ML) INJECTION EVERY 8 HOURS
Status: DISCONTINUED | OUTPATIENT
Start: 2020-01-15 | End: 2020-01-19 | Stop reason: HOSPADM

## 2020-01-15 RX ORDER — ALBUTEROL SULFATE 0.83 MG/ML
2.5 SOLUTION RESPIRATORY (INHALATION)
Status: ON HOLD | COMMUNITY
End: 2020-01-19 | Stop reason: SDUPTHER

## 2020-01-15 RX ORDER — ONDANSETRON 4 MG/1
4 TABLET, FILM COATED ORAL
COMMUNITY
End: 2021-01-01

## 2020-01-15 RX ORDER — INSULIN LISPRO 100 [IU]/ML
4 INJECTION, SOLUTION INTRAVENOUS; SUBCUTANEOUS
Status: ON HOLD | COMMUNITY
End: 2022-01-01 | Stop reason: SDUPTHER

## 2020-01-15 RX ORDER — TORSEMIDE 20 MG/1
40 TABLET ORAL DAILY
Status: ON HOLD | COMMUNITY
End: 2020-01-19 | Stop reason: SDUPTHER

## 2020-01-15 RX ORDER — LEVOTHYROXINE SODIUM 100 UG/1
100 TABLET ORAL
Status: DISCONTINUED | OUTPATIENT
Start: 2020-01-16 | End: 2020-01-19 | Stop reason: HOSPADM

## 2020-01-15 RX ORDER — INSULIN DEGLUDEC 100 U/ML
15 INJECTION, SOLUTION SUBCUTANEOUS
Status: ON HOLD | COMMUNITY
End: 2020-01-19 | Stop reason: SDUPTHER

## 2020-01-15 RX ORDER — BENZONATATE 100 MG/1
100 CAPSULE ORAL
COMMUNITY
End: 2021-01-01

## 2020-01-15 RX ORDER — ESOMEPRAZOLE MAGNESIUM 40 MG/1
40 CAPSULE, DELAYED RELEASE ORAL DAILY
COMMUNITY
End: 2021-01-01

## 2020-01-15 RX ORDER — INSULIN GLARGINE 100 [IU]/ML
15 INJECTION, SOLUTION SUBCUTANEOUS EVERY EVENING
Status: DISCONTINUED | OUTPATIENT
Start: 2020-01-15 | End: 2020-01-16

## 2020-01-15 RX ORDER — IPRATROPIUM BROMIDE AND ALBUTEROL SULFATE 2.5; .5 MG/3ML; MG/3ML
3 SOLUTION RESPIRATORY (INHALATION)
Status: DISCONTINUED | OUTPATIENT
Start: 2020-01-15 | End: 2020-01-16

## 2020-01-15 RX ORDER — ASPIRIN 81 MG/1
81 TABLET ORAL DAILY
Status: DISCONTINUED | OUTPATIENT
Start: 2020-01-16 | End: 2020-01-19 | Stop reason: HOSPADM

## 2020-01-15 RX ORDER — SODIUM CHLORIDE 9 MG/ML
75 INJECTION, SOLUTION INTRAVENOUS CONTINUOUS
Status: DISPENSED | OUTPATIENT
Start: 2020-01-15 | End: 2020-01-16

## 2020-01-15 RX ORDER — IBUPROFEN 400 MG/1
400 TABLET ORAL
COMMUNITY
End: 2022-01-01

## 2020-01-15 RX ORDER — INSULIN LISPRO 100 [IU]/ML
INJECTION, SOLUTION INTRAVENOUS; SUBCUTANEOUS
Status: DISCONTINUED | OUTPATIENT
Start: 2020-01-15 | End: 2020-01-18

## 2020-01-15 RX ORDER — PREDNISONE 20 MG/1
40 TABLET ORAL
Status: DISCONTINUED | OUTPATIENT
Start: 2020-01-15 | End: 2020-01-19 | Stop reason: HOSPADM

## 2020-01-15 RX ORDER — DEXTROSE MONOHYDRATE 100 MG/ML
0-250 INJECTION, SOLUTION INTRAVENOUS AS NEEDED
Status: DISCONTINUED | OUTPATIENT
Start: 2020-01-15 | End: 2020-01-19 | Stop reason: HOSPADM

## 2020-01-15 RX ORDER — SODIUM CHLORIDE 0.9 % (FLUSH) 0.9 %
5-40 SYRINGE (ML) INJECTION AS NEEDED
Status: DISCONTINUED | OUTPATIENT
Start: 2020-01-15 | End: 2020-01-19 | Stop reason: HOSPADM

## 2020-01-15 RX ADMIN — SODIUM CHLORIDE 75 ML/HR: 900 INJECTION, SOLUTION INTRAVENOUS at 16:19

## 2020-01-15 RX ADMIN — SODIUM CHLORIDE 1000 ML: 900 INJECTION, SOLUTION INTRAVENOUS at 11:45

## 2020-01-15 RX ADMIN — PREDNISONE 40 MG: 20 TABLET ORAL at 19:08

## 2020-01-15 RX ADMIN — INSULIN LISPRO 3 UNITS: 100 INJECTION, SOLUTION INTRAVENOUS; SUBCUTANEOUS at 21:23

## 2020-01-15 RX ADMIN — GUAIFENESIN 100 MG: 100 SOLUTION ORAL at 23:10

## 2020-01-15 RX ADMIN — IPRATROPIUM BROMIDE AND ALBUTEROL SULFATE 3 ML: .5; 3 SOLUTION RESPIRATORY (INHALATION) at 19:23

## 2020-01-15 RX ADMIN — INSULIN LISPRO 5 UNITS: 100 INJECTION, SOLUTION INTRAVENOUS; SUBCUTANEOUS at 18:15

## 2020-01-15 RX ADMIN — ACETAMINOPHEN 650 MG: 325 TABLET ORAL at 19:25

## 2020-01-15 RX ADMIN — AZITHROMYCIN MONOHYDRATE 500 MG: 500 INJECTION, POWDER, LYOPHILIZED, FOR SOLUTION INTRAVENOUS at 16:21

## 2020-01-15 RX ADMIN — IPRATROPIUM BROMIDE AND ALBUTEROL SULFATE 3 ML: .5; 3 SOLUTION RESPIRATORY (INHALATION) at 23:11

## 2020-01-15 RX ADMIN — INSULIN GLARGINE 15 UNITS: 100 INJECTION, SOLUTION SUBCUTANEOUS at 18:14

## 2020-01-15 RX ADMIN — HEPARIN SODIUM 5000 UNITS: 5000 INJECTION INTRAVENOUS; SUBCUTANEOUS at 18:06

## 2020-01-15 RX ADMIN — IPRATROPIUM BROMIDE AND ALBUTEROL SULFATE 3 ML: .5; 3 SOLUTION RESPIRATORY (INHALATION) at 15:44

## 2020-01-15 RX ADMIN — GUAIFENESIN 100 MG: 100 SOLUTION ORAL at 18:05

## 2020-01-15 RX ADMIN — ALBUTEROL SULFATE 2.5 MG: 2.5 SOLUTION RESPIRATORY (INHALATION) at 13:13

## 2020-01-15 RX ADMIN — INSULIN LISPRO 7 UNITS: 100 INJECTION, SOLUTION INTRAVENOUS; SUBCUTANEOUS at 18:14

## 2020-01-15 NOTE — H&P
[address]  HISTORY AND PHYSICAL    Name:  Favian Salinas  MR#:  [id number]  :  1970  ACCOUNT #:  [de-identified]  ADMIT DATE:  01/15/2020    CHIEF COMPLAINT:  Shortness of breath. HISTORY OF PRESENT ILLNESS:  The patient is a 58-year-old female with past medical history of Down syndrome, asthma, diabetes, hypothyroidism, who presents to the hospital with the above-mentioned symptom. The patient lives at [facility] with her mother. Currently, her sister is present at the bedside. History was primarily obtained from the patient as well as her sister. The patient reports that this morning she was not feeling well, she was having some shortness of breath associated with cough, also her blood glucose was low. She checked her blood glucose, and it was \"in the 70s. \"  The patient apparently this morning was short of breath, and usually when she is short of breath, she feels confused and cries, gets diaphoretic, and she is usually treated with orange and cranberry juice. The sister reports the patient was a little lethargic this morning, but she is not very sure as to how the events unfolded. She reports that the patient has had some swelling in her legs, follows up with Dr. Rl Quiros who is her nephrologist.  Recently, her Lasix was stopped, and she was started on a new diuretic of which the sister does not remember the name. She also reports that the patient has been having some cough. She saw a pulmonologist 8 days ago, and they were not very sure as to what is going on and \"no acute treatment was done and she is not taking any inhalers. \"  The patient came to the ER, was found to be hypoxic and was found to have a blood glucose of 400-500 and was requested to be admitted under the hospitalist service. The patient denies any other complaints or problems. Denies any headache, blurry vision, sore throat, trouble swallowing, trouble with speech.   Denies any chest pain, abdominal pain, constipation, diarrhea, urinary symptoms, focal or generalized neurological weakness, recent travel, sick contact, falls, injuries, hematemesis, melena, hemoptysis, hematuria or any other concerns or problems. PAST MEDICAL HISTORY:  See above. HOME MEDICATIONS:  Currently, the patient is on  1. Albuterol p.r.n.  2.  Humalog 5 units with each meal.  3.  Levothyroxine 100 mcg daily. 4.  Lantus 14 units daily. 5.  Aspirin 81 mg daily. 6.  Probiotic. 7.  Glucagon as needed. 8.  Cholecalciferol. SOCIAL HISTORY:  No history of tobacco abuse, alcohol use or IV drug abuse. ALLERGIES:  TO CODEINE, NITROFURANTOIN AND PENICILLIN. FAMILY HISTORY:  Mother has a history of thyroid disease, and father has a history of hypothyroidism. PHYSICAL EXAMINATION:  VITAL SIGNS:  Temperature 99.5, pulse 73, respiratory rate 17, blood pressure 132/46, pulse oximetry 92% on room air, on arrival was around 89%. GENERAL:  Alert x2, awake, mildly distressed, pleasant female. HEENT:  Pupils equal and reactive to light. Dry mucous membranes. Tympanic membranes clear. NECK:  Supple. CHEST:  Decreased basilar breath sounds. Scattered wheezes. CORONARY:  S1 and S2 are heard. ABDOMEN:  Soft, nontender, nondistended. Bowel sounds are physiological.  EXTREMITIES:  No clubbing, no cyanosis, no edema. NEURO/PSYCH:  Pleasant mood and affect. No focal neurological deficits were noted. SKIN:  Warm. LABORATORY DATA:  White count 3.8, hemoglobin 10.1, hematocrit 31.8, platelets 879. Sodium 132, potassium 4.0, chloride 102, bicarbonate 22, anion gap 8, glucose 473, BUN 61, creatinine 2.46, calcium 8.0, magnesium 1.9, bilirubin total less than 0.1, ALT 20, AST 23, alkaline phosphatase 93. Beta-hydroxybutyrate is 0.08.    X-ray of the chest shows no acute process. US retroperitoneum is pending. EKG shows normal sinus rhythm with nonspecific ST changes. ASSESSMENT AND PLAN:  1.   Hyperglycemia:  The patient will be observed on a telemetry bed. We will provide gentle IV hydration as I believe this is secondary to dehydration. Put the patient on sliding scale NovoLog insulin. Continue home doses of Humalog and home doses of Lantus. We will provide Accu-Cheks, and further intervention per hospital course. Continue to closely monitor. Reassess as needed. We will also check a hemoglobin A1c.  2.  Acute bronchitis:  The patient appears to be mildly wheezy with signs and symptoms for acute bronchitis. Start the patient on steroids, IV antibiotics, scheduled DuoNebs, pulse oximetry monitoring. We will get a respiratory panel, oxygen support, and further intervention per hospital course. Continue to closely monitor and reassess as needed. May consider getting pulmonary consult. Monitor for now. 3.  Acute kidney injury:  Likely prerenal.  The patient was recently started on new diuretic. Provide gentle IV hydration. Renal ultrasound. Avoid nephrotoxic medications. Renally dose all other medications. Nephrology consult and further intervention per hospital course. Continue to closely monitor. Reassess as needed. 4.  Hypothyroidism:  Continue home medication and continue to monitor. 5.  Acute hypoxic respiratory failure:  See above. Likely secondary to bronchitis. Oxygen support, pulse oximetry monitoring, telemetry monitoring, treat the underlying cause, and further intervention per hospital course. If persists, may consider getting a pulmonary consult. Monitor for now. 6.  Gastrointestinal/deep venous thrombosis prophylaxis:  The patient will be on heparin.       Braulio Goodell, MD      [id number]  D:  01/15/2020 15:12  T:  01/15/2020 17:01  JOB #:  [id number]

## 2020-01-15 NOTE — ED TRIAGE NOTES
Pt comes in via EMS for high blood sugar. Pt lives at [facility] with parents, parents state pt has been sick for ~2 months. Facility checked BGL this am and read \"HI\". Pt also reports HA, and Abd pain. Pt has coarse breath sounds. Hx of down syndrome.

## 2020-01-15 NOTE — ED NOTES
1548: Verbal shift change report given to Sol Florian RN (oncoming nurse) by Heriberto Thibodeaux RN (offgoing nurse). Report included the following information SBAR, Kardex, ED Summary, MAR, Recent Results and Cardiac Rhythm NSR.

## 2020-01-15 NOTE — PROGRESS NOTES
Admission Medication Reconciliation:    Information obtained from:  [facility] Medication list, Parents  RxQuery data available¹:  NO    Comments/Recommendations: Patient with Down's Syndrome. Resides at [facility]. Parents were present with patient, however stated they were unsure of her medications and that [facility] would know all of her medications. Obtained current medication list from [facility] which was utilized to update PTA med list. Parents were able to verify patient's allergies. 1)  Of note, nurse at [facility] reports patient has not had any of her medications today except for Humalog- she received 20 units at 8:30 AM and another 15 units at 9:15 AM.    2)  Medication changes (since last review): Added  - Atorvastatin  - Cranberry chewable  - Delsym ER  - Ibuprofen  - Esomeprazole  - Benzonatate  - Torsemide  - Tresiba (insulin degludec)  - Ondansetron  - Albuterol neb soln    Adjusted  - Vitamin D3 (from 2000 units every Sunday to 4000 units daily)  - Humalog (from 5 units TIDAC to 3-4 units TIDAC)  - Humalog SS (updated for BG ranges >301)  - Levothyroxine (from 100 mcg daily to 100 mcg daily on Mon, Tues, Wed, Thurs, Fri, Sat- none on Sun)    Removed  - Cranberry-ascorbic acid  - Lantus (insulin glargine)  - Levofloxacin    3)  Reviewed patient allergies. According to parents, patient was not allowed to take acetaminophen due to implanted glucometer not reading correctly when patient taking acetaminophen. Per parents patient however no longer has implanted glucometer. ¹RxQuery pharmacy benefit data reflects medications filled and processed through the patient's insurance, however   this data does NOT capture whether the medication was picked up or is currently being taken by the patient.     Allergies:  Acetaminophen; Codeine; Nitrofurantoin; and Pcn [penicillins]    Significant PMH/Disease States:   Past Medical History:   Diagnosis Date    Allergic rhinitis, cause unspecified     Asthma     Diabetes ([facility] [address]     Down's syndrome     H/O impacted cerumen     Dr. Karyle Maxcy    Hypothyroid     Thyroid disease      Chief Complaint for this Admission:    Chief Complaint   Patient presents with    High Blood Sugar       Prior to Admission Medications:   Prior to Admission Medications   Prescriptions Last Dose Informant Taking? L. acidoph & paracasei- S therm- Bifido (JEN-Q/RISAQUAD) 8 billion cell cap cap 2020 at Unknown time  Yes   Sig: Take 1 Cap by mouth daily. albuterol (PROVENTIL HFA, VENTOLIN HFA, PROAIR HFA) 90 mcg/actuation inhaler   Yes   Sig: Take 1 Puff by inhalation every four (4) hours as needed for Wheezing. albuterol (PROVENTIL VENTOLIN) 2.5 mg /3 mL (0.083 %) nebu   Yes   Si.5 mg by Nebulization route every four (4) hours as needed for Wheezing. aspirin delayed-release 81 mg tablet 2020 at Unknown time  Yes   Sig: Take 81 mg by mouth daily. atorvastatin (LIPITOR) 10 mg tablet 2020 at Unknown time  Yes   Sig: Take 10 mg by mouth daily. benzonatate (TESSALON PERLES) 100 mg capsule   Yes   Sig: Take 100 mg by mouth three (3) times daily as needed for Cough. biotin 5,000 mcg TbDi 2020 at Unknown time  Yes   Sig: Take 5,000 mcg by mouth daily. cholecalciferol, vitamin D3, (VITAMIN D3) 2,000 unit tab 2020 at Unknown time  Yes   Sig: Take 4,000 Units by mouth daily. cranberry fruit 500 mg chew 2020 at Unknown time  Yes   Sig: Take 500 mg by mouth daily. dextromethorphan (DELSYM) 30 mg/5 mL liquid   Yes   Sig: Take 30 mg by mouth every twelve (12) hours as needed for Cough. esomeprazole (NEXIUM) 40 mg capsule 2020 at Unknown time  Yes   Sig: Take 40 mg by mouth daily. fluticasone (FLONASE) 50 mcg/actuation nasal spray 2020 at Unknown time  Yes   Si [place] by Both Nostrils route two (2) times a day.    glucagon (GLUCAGEN) 1 mg injection   Yes   Si mg by IntraMUSCular route once as needed (hypoglycemia if patient is unresponsive). glucose 4 gram chewable tablet   Yes   Sig: Take 12 g by mouth as needed (BS < 60). guaiFENesin (ROBITUSSIN) 100 mg/5 mL liquid   Yes   Sig: Take 5 mL by mouth every four (4) hours as needed for Cough. ibuprofen (MOTRIN) 400 mg tablet   Yes   Sig: Take 400 mg by mouth every six (6) hours as needed for Pain (Fever). insulin degludec (TRESIBA U-100 INSULIN) 100 unit/mL soln 1/14/2020 at Unknown time  Yes   Sig: 15 Units by SubCUTAneous route nightly. insulin lispro (HUMALOG U-100 INSULIN) 100 unit/mL injection   Yes   Sig: 3-4 Units by SubCUTAneous route Before breakfast, lunch, and dinner. Hold insulin if BS <80. Hold if patient does not eat her meal. If BS >150 give with SS.   insulin lispro (HUMALOG) 100 unit/mL injection   Yes   Sig: by SubCUTAneous route. Sliding scale: If blood sugar > 80 give with scheduled 3 units  151-200= 4 units   201-250= 5 units  251-300= 6 units  301-350 = 8 units  351-400 = 12 units   If >400 give 16 units and contact MD   levothyroxine (SYNTHROID) 100 mcg tablet 1/14/2020 at Unknown time  Yes   Sig: Take 100 mcg by mouth. Take daily on Mon, Tues, Wed, Thur, Fri, Sat (none on Sun)   minocycline (MINOCIN, [place]) 50 mg capsule 1/14/2020 at Unknown time Parent Yes   Sig: Take 1 capsule by mouth  daily   ondansetron hcl (ZOFRAN) 4 mg tablet   Yes   Sig: Take 4 mg by mouth every four (4) hours as needed for Nausea or Vomiting.   torsemide (DEMADEX) 20 mg tablet 1/14/2020 at Unknown time  Yes   Sig: Take 40 mg by mouth daily. Facility-Administered Medications: None       Please contact the main inpatient pharmacy with any questions or concerns at [phone] and we will direct you to the clinical pharmacist covering this patient's care while in-house.    JUANCARLOS Scott

## 2020-01-15 NOTE — ED PROVIDER NOTES
HPI .  Patient has Down syndrome, asthma, diabetes, and hypothyroidism. She lives at the [address] [place] [address] in an apartment with her mother. They report that this is independent living but they are near a nursing station and patient's meds are administered by adult home personnel. Patient is ambulatory without assistance in general but uses a cane once in a while. She has had an intermittent cough for 2 months or so. Patient saw pulmonologist 8 days ago. At that time she was not wheezing or out of breath particularly. Last night patient's mother felt like she was hypoglycemic. Glucose was not checked. When patient cries, seems confused, and becomes diaphoretic her mother routinely treats her for hypoglycemia. She drank  some orange or cranberry juice. Patient stays in bed a lot including this morning. [place] personnel checked her glucose and it was greater than 600. Soon thereafter patient complained of headache, abdominal pain, and weakness. Patient was given 20 units of Humalog at 8:30 AM and an additional 15 units of Humalog at 9:25 AM.  Patient reports that she is wheezing. Cough is also flaring up again today.     Past Medical History:   Diagnosis Date    Allergic rhinitis, cause unspecified     Asthma     Diabetes ([facility] [address]     Down's syndrome     H/O impacted cerumen     Dr. Cedillo Flucherie Hypothyroid     Thyroid disease        Past Surgical History:   Procedure Laterality Date    HX CYST REMOVAL      right shoulder    HX HEENT Bilateral 2017    prior in one eye    HX HEENT Left 3/5/15    cataract left and right    HX HYSTERECTOMY           Family History:   Problem Relation Age of Onset    Thyroid Disease Mother         hypo    Hypertension Father        Social History     Socioeconomic History    Marital status: SINGLE     Spouse name: Not on file    Number of children: Not on file    Years of education: Not on file    Highest education level: Not on file   Occupational History    Not on file   Social Needs    Financial resource strain: Not on file    Food insecurity:     Worry: Not on file     Inability: Not on file    Transportation needs:     Medical: Not on file     Non-medical: Not on file   Tobacco Use    Smoking status: Never Smoker    Smokeless tobacco: Never Used   Substance and Sexual Activity    Alcohol use: No    Drug use: No    Sexual activity: Not Currently   Lifestyle    Physical activity:     Days per week: Not on file     Minutes per session: Not on file    Stress: Not on file   Relationships    Social connections:     Talks on phone: Not on file     Gets together: Not on file     Attends Congregational service: Not on file     Active member of club or organization: Not on file     Attends meetings of clubs or organizations: Not on file     Relationship status: Not on file    Intimate partner violence:     Fear of current or ex partner: Not on file     Emotionally abused: Not on file     Physically abused: Not on file     Forced sexual activity: Not on file   Other Topics Concern    Not on file   Social History Narrative    Not on file         ALLERGIES: Acetaminophen; Codeine; Nitrofurantoin; and Pcn [penicillins]    Review of Systems   Constitutional: Positive for diaphoresis. HENT: Positive for congestion. Eyes: Negative for redness. Respiratory: Positive for cough, shortness of breath and wheezing. Cardiovascular: Negative for chest pain. Gastrointestinal: Positive for abdominal pain. Negative for abdominal distention. Endocrine: Positive for polyuria. Genitourinary: Positive for frequency. Neurological: Positive for dizziness. Negative for syncope. Psychiatric/Behavioral: Negative for agitation and behavioral problems. The patient is not nervous/anxious.         Vitals:    01/15/20 1133   BP: 132/46   Pulse: 83   Resp: 17   Temp: 99.5 °F (37.5 °C)   SpO2: 91%   Weight: 71.3 kg (157 lb 3 oz)            Physical Exam  Vitals signs and nursing note reviewed. Constitutional:       Appearance: She is well-developed. Comments: Small stature; Down's facies   HENT:      Head: Normocephalic and atraumatic. Eyes:      Pupils: Pupils are equal, round, and reactive to light. Neck:      Musculoskeletal: Normal range of motion and neck supple. Cardiovascular:      Rate and Rhythm: Normal rate and regular rhythm. Heart sounds: Normal heart sounds. No murmur. No friction rub. No gallop. Pulmonary:      Effort: Pulmonary effort is normal. No respiratory distress. Breath sounds: No wheezing or rales. Comments: Diffuse wheezes  Abdominal:      Palpations: Abdomen is soft. Tenderness: There is no tenderness. There is no rebound. Musculoskeletal: Normal range of motion. General: No tenderness. Skin:     Findings: No erythema. Neurological:      Mental Status: She is alert. Cranial Nerves: No cranial nerve deficit. Comments: Motor; symmetric   Psychiatric:         Behavior: Behavior normal.          MDM       Procedures        ED EKG interpretation:  Rhythm: normal sinus rhythm; and regular . Rate (approx.): 90; Axis: normal; P wave: normal; QRS interval: normal ; ST/T wave: normal; in  Lead: ; Other findings: . This EKG was interpreted by Chanell Maharaj MD,ED Provider. 11:45 AM           Hospitalist Vadito Text for Admission  12:52 PM    ED Room Number: ER22/22  Patient Name and age:  Robert Gr 47 y.o.  female  Working Diagnosis:   1. Acute renal failure, unspecified acute renal failure type ([address]    2. Hypovolemia    3. Hyperglycemia    4.  Mild asthma with acute exacerbation, unspecified whether persistent      Readmission: no  Isolation Requirements:  no  Recommended Level of Care:  telemetry  Code Status:  FULL  Other:    Department:[facility] Adult ED - [phone]

## 2020-01-15 NOTE — ED NOTES
Patient's niece would like to speak with Dr Gabriella Celis (attending) regarding patient's disposition. She also requested pt's blood sugar re-checked before insulin administration. Tylenol was also requested. MD paged.

## 2020-01-15 NOTE — ED NOTES
1727: Verbal shift change report given to Jazmine Perez RN (oncoming nurse) by Nyla Perry RN (offgoing nurse). Report included the following information SBAR, Kardex, ED Summary, [facility] and Recent Results.

## 2020-01-16 LAB
ANION GAP SERPL CALC-SCNC: 10 MMOL/L (ref 5–15)
ANION GAP SERPL CALC-SCNC: 8 MMOL/L (ref 5–15)
APPEARANCE UR: CLEAR
ATRIAL RATE: 88 BPM
B PERT DNA SPEC QL NAA+PROBE: NOT DETECTED
BACTERIA URNS QL MICRO: NEGATIVE /HPF
BASOPHILS # BLD: 0 K/UL (ref 0–0.1)
BASOPHILS NFR BLD: 0 % (ref 0–1)
BILIRUB UR QL: NEGATIVE
BORDETELLA PARAPERTUSSIS PCR, BORPAR: NOT DETECTED
BUN SERPL-MCNC: 48 MG/DL (ref 6–20)
BUN SERPL-MCNC: 50 MG/DL (ref 6–20)
BUN/CREAT SERPL: 22 (ref 12–20)
BUN/CREAT SERPL: 25 (ref 12–20)
C PNEUM DNA SPEC QL NAA+PROBE: NOT DETECTED
CALCIUM SERPL-MCNC: 7.8 MG/DL (ref 8.5–10.1)
CALCIUM SERPL-MCNC: 8.2 MG/DL (ref 8.5–10.1)
CALCULATED P AXIS, ECG09: 50 DEGREES
CALCULATED R AXIS, ECG10: 81 DEGREES
CALCULATED T AXIS, ECG11: 32 DEGREES
CHLORIDE SERPL-SCNC: 103 MMOL/L (ref 97–108)
CHLORIDE SERPL-SCNC: 106 MMOL/L (ref 97–108)
CO2 SERPL-SCNC: 15 MMOL/L (ref 21–32)
CO2 SERPL-SCNC: 18 MMOL/L (ref 21–32)
COLOR UR: ABNORMAL
CREAT SERPL-MCNC: 1.95 MG/DL (ref 0.55–1.02)
CREAT SERPL-MCNC: 2.24 MG/DL (ref 0.55–1.02)
DIAGNOSIS, 93000: NORMAL
DIFFERENTIAL METHOD BLD: ABNORMAL
EOSINOPHIL # BLD: 0 K/UL (ref 0–0.4)
EOSINOPHIL NFR BLD: 0 % (ref 0–7)
EPITH CASTS URNS QL MICRO: ABNORMAL /LPF
ERYTHROCYTE [DISTWIDTH] IN BLOOD BY AUTOMATED COUNT: 13.2 % (ref 11.5–14.5)
FLUAV H1 2009 PAND RNA SPEC QL NAA+PROBE: NOT DETECTED
FLUAV H1 RNA SPEC QL NAA+PROBE: NOT DETECTED
FLUAV H3 RNA SPEC QL NAA+PROBE: NOT DETECTED
FLUAV SUBTYP SPEC NAA+PROBE: NOT DETECTED
FLUBV RNA SPEC QL NAA+PROBE: NOT DETECTED
GLUCOSE BLD STRIP.AUTO-MCNC: 246 MG/DL (ref 65–100)
GLUCOSE BLD STRIP.AUTO-MCNC: 364 MG/DL (ref 65–100)
GLUCOSE BLD STRIP.AUTO-MCNC: 500 MG/DL (ref 65–100)
GLUCOSE BLD STRIP.AUTO-MCNC: 502 MG/DL (ref 65–100)
GLUCOSE BLD STRIP.AUTO-MCNC: 540 MG/DL (ref 65–100)
GLUCOSE SERPL-MCNC: 380 MG/DL (ref 65–100)
GLUCOSE SERPL-MCNC: 431 MG/DL (ref 65–100)
GLUCOSE UR STRIP.AUTO-MCNC: 250 MG/DL
HADV DNA SPEC QL NAA+PROBE: NOT DETECTED
HCOV 229E RNA SPEC QL NAA+PROBE: NOT DETECTED
HCOV HKU1 RNA SPEC QL NAA+PROBE: NOT DETECTED
HCOV NL63 RNA SPEC QL NAA+PROBE: NOT DETECTED
HCOV OC43 RNA SPEC QL NAA+PROBE: NOT DETECTED
HCT VFR BLD AUTO: 29.9 % (ref 35–47)
HGB BLD-MCNC: 9.9 G/DL (ref 11.5–16)
HGB UR QL STRIP: ABNORMAL
HMPV RNA SPEC QL NAA+PROBE: DETECTED
HPIV1 RNA SPEC QL NAA+PROBE: NOT DETECTED
HPIV2 RNA SPEC QL NAA+PROBE: NOT DETECTED
HPIV3 RNA SPEC QL NAA+PROBE: NOT DETECTED
HPIV4 RNA SPEC QL NAA+PROBE: NOT DETECTED
IMM GRANULOCYTES # BLD AUTO: 0.1 K/UL (ref 0–0.04)
IMM GRANULOCYTES NFR BLD AUTO: 1 % (ref 0–0.5)
KETONES UR QL STRIP.AUTO: NEGATIVE MG/DL
LEUKOCYTE ESTERASE UR QL STRIP.AUTO: NEGATIVE
LYMPHOCYTES # BLD: 0.4 K/UL (ref 0.8–3.5)
LYMPHOCYTES NFR BLD: 8 % (ref 12–49)
M PNEUMO DNA SPEC QL NAA+PROBE: NOT DETECTED
MCH RBC QN AUTO: 31.9 PG (ref 26–34)
MCHC RBC AUTO-ENTMCNC: 33.1 G/DL (ref 30–36.5)
MCV RBC AUTO: 96.5 FL (ref 80–99)
MONOCYTES # BLD: 0.1 K/UL (ref 0–1)
MONOCYTES NFR BLD: 2 % (ref 5–13)
NEUTS SEG # BLD: 4.7 K/UL (ref 1.8–8)
NEUTS SEG NFR BLD: 89 % (ref 32–75)
NITRITE UR QL STRIP.AUTO: NEGATIVE
NRBC # BLD: 0 K/UL (ref 0–0.01)
NRBC BLD-RTO: 0 PER 100 WBC
P-R INTERVAL, ECG05: 146 MS
PH UR STRIP: 6.5 [PH] (ref 5–8)
PLATELET # BLD AUTO: 132 K/UL (ref 150–400)
PMV BLD AUTO: 11.1 FL (ref 8.9–12.9)
POTASSIUM SERPL-SCNC: 4 MMOL/L (ref 3.5–5.1)
POTASSIUM SERPL-SCNC: 4.8 MMOL/L (ref 3.5–5.1)
PROT UR STRIP-MCNC: 300 MG/DL
Q-T INTERVAL, ECG07: 362 MS
QRS DURATION, ECG06: 62 MS
QTC CALCULATION (BEZET), ECG08: 438 MS
RBC # BLD AUTO: 3.1 M/UL (ref 3.8–5.2)
RBC #/AREA URNS HPF: ABNORMAL /HPF (ref 0–5)
RBC MORPH BLD: ABNORMAL
RBC MORPH BLD: ABNORMAL
RSV RNA SPEC QL NAA+PROBE: NOT DETECTED
RV+EV RNA SPEC QL NAA+PROBE: NOT DETECTED
SERVICE CMNT-IMP: ABNORMAL
SODIUM SERPL-SCNC: 129 MMOL/L (ref 136–145)
SODIUM SERPL-SCNC: 131 MMOL/L (ref 136–145)
SP GR UR REFRACTOMETRY: 1.01 (ref 1–1.03)
UR CULT HOLD, URHOLD: NORMAL
UROBILINOGEN UR QL STRIP.AUTO: 0.2 EU/DL (ref 0.2–1)
VENTRICULAR RATE, ECG03: 88 BPM
WBC # BLD AUTO: 5.3 K/UL (ref 3.6–11)
WBC URNS QL MICRO: ABNORMAL /HPF (ref 0–4)

## 2020-01-16 PROCEDURE — 74011636637 HC RX REV CODE- 636/637: Performed by: INTERNAL MEDICINE

## 2020-01-16 PROCEDURE — 36415 COLL VENOUS BLD VENIPUNCTURE: CPT

## 2020-01-16 PROCEDURE — 82962 GLUCOSE BLOOD TEST: CPT

## 2020-01-16 PROCEDURE — 36600 WITHDRAWAL OF ARTERIAL BLOOD: CPT

## 2020-01-16 PROCEDURE — 80048 BASIC METABOLIC PNL TOTAL CA: CPT

## 2020-01-16 PROCEDURE — 94664 DEMO&/EVAL PT USE INHALER: CPT

## 2020-01-16 PROCEDURE — 74011000258 HC RX REV CODE- 258: Performed by: INTERNAL MEDICINE

## 2020-01-16 PROCEDURE — 74011636637 HC RX REV CODE- 636/637: Performed by: FAMILY MEDICINE

## 2020-01-16 PROCEDURE — 85025 COMPLETE CBC W/AUTO DIFF WBC: CPT

## 2020-01-16 PROCEDURE — 74011000250 HC RX REV CODE- 250: Performed by: INTERNAL MEDICINE

## 2020-01-16 PROCEDURE — 65270000029 HC RM PRIVATE

## 2020-01-16 PROCEDURE — 74011000250 HC RX REV CODE- 250: Performed by: FAMILY MEDICINE

## 2020-01-16 PROCEDURE — 94640 AIRWAY INHALATION TREATMENT: CPT

## 2020-01-16 PROCEDURE — 99218 HC RM OBSERVATION: CPT

## 2020-01-16 PROCEDURE — 74011250637 HC RX REV CODE- 250/637: Performed by: FAMILY MEDICINE

## 2020-01-16 PROCEDURE — 74011250636 HC RX REV CODE- 250/636: Performed by: INTERNAL MEDICINE

## 2020-01-16 PROCEDURE — 74011250636 HC RX REV CODE- 250/636: Performed by: FAMILY MEDICINE

## 2020-01-16 PROCEDURE — 74011250637 HC RX REV CODE- 250/637: Performed by: INTERNAL MEDICINE

## 2020-01-16 RX ORDER — ALBUTEROL SULFATE 0.83 MG/ML
2.5 SOLUTION RESPIRATORY (INHALATION)
Status: DISCONTINUED | OUTPATIENT
Start: 2020-01-16 | End: 2020-01-19 | Stop reason: HOSPADM

## 2020-01-16 RX ORDER — PANTOPRAZOLE SODIUM 40 MG/1
40 TABLET, DELAYED RELEASE ORAL
Status: DISCONTINUED | OUTPATIENT
Start: 2020-01-16 | End: 2020-01-19 | Stop reason: HOSPADM

## 2020-01-16 RX ORDER — FLUTICASONE PROPIONATE 50 MCG
1 SPRAY, SUSPENSION (ML) NASAL 2 TIMES DAILY
Status: DISCONTINUED | OUTPATIENT
Start: 2020-01-16 | End: 2020-01-19 | Stop reason: HOSPADM

## 2020-01-16 RX ORDER — DEXTROMETHORPHAN POLISTIREX 30 MG/5ML
30 SUSPENSION ORAL
Status: DISCONTINUED | OUTPATIENT
Start: 2020-01-16 | End: 2020-01-19 | Stop reason: HOSPADM

## 2020-01-16 RX ORDER — LEVOTHYROXINE SODIUM 100 UG/1
100 TABLET ORAL
Status: DISCONTINUED | OUTPATIENT
Start: 2020-01-16 | End: 2020-01-17 | Stop reason: SDUPTHER

## 2020-01-16 RX ORDER — INSULIN DEGLUDEC 100 U/ML
30 INJECTION, SOLUTION SUBCUTANEOUS ONCE
Status: DISCONTINUED | OUTPATIENT
Start: 2020-01-17 | End: 2020-01-17

## 2020-01-16 RX ORDER — SODIUM CHLORIDE 450 MG/100ML
125 INJECTION, SOLUTION INTRAVENOUS ONCE
Status: DISCONTINUED | OUTPATIENT
Start: 2020-01-16 | End: 2020-01-16

## 2020-01-16 RX ORDER — INSULIN GLARGINE 100 [IU]/ML
30 INJECTION, SOLUTION SUBCUTANEOUS EVERY 24 HOURS
Status: DISCONTINUED | OUTPATIENT
Start: 2020-01-16 | End: 2020-01-16

## 2020-01-16 RX ORDER — SODIUM CHLORIDE 9 MG/ML
125 INJECTION, SOLUTION INTRAVENOUS CONTINUOUS
Status: DISCONTINUED | OUTPATIENT
Start: 2020-01-16 | End: 2020-01-16

## 2020-01-16 RX ORDER — INSULIN LISPRO 100 [IU]/ML
8 INJECTION, SOLUTION INTRAVENOUS; SUBCUTANEOUS
Status: DISCONTINUED | OUTPATIENT
Start: 2020-01-17 | End: 2020-01-19 | Stop reason: HOSPADM

## 2020-01-16 RX ORDER — SODIUM CHLORIDE 450 MG/100ML
125 INJECTION, SOLUTION INTRAVENOUS CONTINUOUS
Status: DISCONTINUED | OUTPATIENT
Start: 2020-01-16 | End: 2020-01-16

## 2020-01-16 RX ORDER — BENZONATATE 100 MG/1
100 CAPSULE ORAL
Status: DISCONTINUED | OUTPATIENT
Start: 2020-01-16 | End: 2020-01-19 | Stop reason: HOSPADM

## 2020-01-16 RX ORDER — ATORVASTATIN CALCIUM 10 MG/1
10 TABLET, FILM COATED ORAL DAILY
Status: DISCONTINUED | OUTPATIENT
Start: 2020-01-16 | End: 2020-01-19 | Stop reason: HOSPADM

## 2020-01-16 RX ORDER — IPRATROPIUM BROMIDE AND ALBUTEROL SULFATE 2.5; .5 MG/3ML; MG/3ML
3 SOLUTION RESPIRATORY (INHALATION)
Status: DISCONTINUED | OUTPATIENT
Start: 2020-01-17 | End: 2020-01-19 | Stop reason: HOSPADM

## 2020-01-16 RX ADMIN — INSULIN LISPRO 5 UNITS: 100 INJECTION, SOLUTION INTRAVENOUS; SUBCUTANEOUS at 12:50

## 2020-01-16 RX ADMIN — IPRATROPIUM BROMIDE AND ALBUTEROL SULFATE 3 ML: .5; 3 SOLUTION RESPIRATORY (INHALATION) at 11:53

## 2020-01-16 RX ADMIN — SODIUM CHLORIDE: 450 INJECTION, SOLUTION INTRAVENOUS at 23:09

## 2020-01-16 RX ADMIN — INSULIN LISPRO 11 UNITS: 100 INJECTION, SOLUTION INTRAVENOUS; SUBCUTANEOUS at 12:48

## 2020-01-16 RX ADMIN — IPRATROPIUM BROMIDE AND ALBUTEROL SULFATE 3 ML: .5; 3 SOLUTION RESPIRATORY (INHALATION) at 03:06

## 2020-01-16 RX ADMIN — HEPARIN SODIUM 5000 UNITS: 5000 INJECTION INTRAVENOUS; SUBCUTANEOUS at 09:44

## 2020-01-16 RX ADMIN — SODIUM CHLORIDE 10 ML: 9 INJECTION INTRAMUSCULAR; INTRAVENOUS; SUBCUTANEOUS at 09:43

## 2020-01-16 RX ADMIN — SODIUM CHLORIDE 125 ML/HR: 900 INJECTION, SOLUTION INTRAVENOUS at 14:33

## 2020-01-16 RX ADMIN — INSULIN GLARGINE 30 UNITS: 100 INJECTION, SOLUTION SUBCUTANEOUS at 14:33

## 2020-01-16 RX ADMIN — LEVOTHYROXINE SODIUM 100 MCG: 100 TABLET ORAL at 08:22

## 2020-01-16 RX ADMIN — IPRATROPIUM BROMIDE AND ALBUTEROL SULFATE 3 ML: .5; 3 SOLUTION RESPIRATORY (INHALATION) at 15:48

## 2020-01-16 RX ADMIN — SODIUM CHLORIDE 10 ML: 9 INJECTION INTRAMUSCULAR; INTRAVENOUS; SUBCUTANEOUS at 12:50

## 2020-01-16 RX ADMIN — SODIUM CHLORIDE 10 ML: 9 INJECTION INTRAMUSCULAR; INTRAVENOUS; SUBCUTANEOUS at 14:34

## 2020-01-16 RX ADMIN — CEFTRIAXONE 1 G: 1 INJECTION, POWDER, FOR SOLUTION INTRAMUSCULAR; INTRAVENOUS at 09:43

## 2020-01-16 RX ADMIN — SODIUM CHLORIDE 10 ML: 9 INJECTION INTRAMUSCULAR; INTRAVENOUS; SUBCUTANEOUS at 22:00

## 2020-01-16 RX ADMIN — ASPIRIN 81 MG: 81 TABLET, COATED ORAL at 09:42

## 2020-01-16 RX ADMIN — INSULIN LISPRO 5 UNITS: 100 INJECTION, SOLUTION INTRAVENOUS; SUBCUTANEOUS at 08:10

## 2020-01-16 RX ADMIN — FLUTICASONE PROPIONATE 1 SPRAY: 50 SPRAY, METERED NASAL at 12:48

## 2020-01-16 RX ADMIN — LEVOTHYROXINE SODIUM 100 MCG: 100 TABLET ORAL at 09:43

## 2020-01-16 RX ADMIN — INSULIN LISPRO 11 UNITS: 100 INJECTION, SOLUTION INTRAVENOUS; SUBCUTANEOUS at 17:39

## 2020-01-16 RX ADMIN — PANTOPRAZOLE SODIUM 40 MG: 40 TABLET, DELAYED RELEASE ORAL at 09:43

## 2020-01-16 RX ADMIN — INSULIN LISPRO 5 UNITS: 100 INJECTION, SOLUTION INTRAVENOUS; SUBCUTANEOUS at 17:40

## 2020-01-16 RX ADMIN — INSULIN LISPRO 7 UNITS: 100 INJECTION, SOLUTION INTRAVENOUS; SUBCUTANEOUS at 08:08

## 2020-01-16 RX ADMIN — PREDNISONE 40 MG: 20 TABLET ORAL at 08:08

## 2020-01-16 RX ADMIN — ATORVASTATIN CALCIUM 10 MG: 10 TABLET, FILM COATED ORAL at 09:43

## 2020-01-16 RX ADMIN — HEPARIN SODIUM 5000 UNITS: 5000 INJECTION INTRAVENOUS; SUBCUTANEOUS at 17:40

## 2020-01-16 RX ADMIN — IPRATROPIUM BROMIDE AND ALBUTEROL SULFATE 3 ML: .5; 3 SOLUTION RESPIRATORY (INHALATION) at 07:21

## 2020-01-16 RX ADMIN — FLUTICASONE PROPIONATE 1 SPRAY: 50 SPRAY, METERED NASAL at 23:07

## 2020-01-16 RX ADMIN — AZITHROMYCIN MONOHYDRATE 500 MG: 500 INJECTION, POWDER, LYOPHILIZED, FOR SOLUTION INTRAVENOUS at 14:33

## 2020-01-16 RX ADMIN — INSULIN LISPRO 2 UNITS: 100 INJECTION, SOLUTION INTRAVENOUS; SUBCUTANEOUS at 23:08

## 2020-01-16 NOTE — PROGRESS NOTES
1350 - Received a critical result for CO2 of 15 at 1340. Notified the RN Margot and have paged the Dr. To let them know. 1400 - Dr. Faviola Simmons called back and said she believes that results may not be accurate and to redraw them again.  Placed a STAT BMP order

## 2020-01-16 NOTE — PROGRESS NOTES
[address] [facility] Adult  Hospitalist Group                                                                                          Hospitalist Progress Note  Izzy Rodríguez MD  Answering service: [phone] or [phone] from in house phone        Date of Service:  2020  NAME:  Yana Benton  :  1970  MRN:  [id number]      Admission Summary:   The patient is a 67-year-old female with past medical history of Down syndrome, asthma, diabetes, hypothyroidism, who presents to the hospital with the above-mentioned symptom. The patient lives at [facility] with her mother. Currently, her sister is present at the bedside. History was primarily obtained from the patient as well as her sister. The patient reports that this morning she was not feeling well, she was having some shortness of breath associated with cough, also her blood glucose was low. She checked her blood glucose, and it was \"in the 70s. \"  The patient apparently this morning was short of breath, and usually when she is short of breath, she feels confused and cries, gets diaphoretic, and she is usually treated with orange and cranberry juice. The sister reports the patient was a little lethargic this morning, but she is not very sure as to how the events unfolded. She reports that the patient has had some swelling in her legs, follows up with Dr. Chiara Kilgore who is her nephrologist.  Recently, her Lasix was stopped, and she was started on a new diuretic of which the sister does not remember the name. She also reports that the patient has been having some cough. She saw a pulmonologist 8 days ago, and they were not very sure as to what is going on and \"no acute treatment was done and she is not taking any inhalers. \"  The patient came to the ER, was found to be hypoxic and was found to have a blood glucose of 400-500 and was requested to be admitted under the hospitalist service.     Interval history / Subjective:   Patient states she is feeling much better, since being admitted to the hospital.  Her blood sugars have remained very elevated. Per the patient, and family only her home insulin works for her     Assessment & Plan:     Community-acquired pneumonia, left lower lobe airspace disease on CT, also + viral PNA with human metaneumovirus   -Continue ceftriaxone and azithromycin while inpatient, may transition to oral on discharge  - Check procalcitonin    Acute asthma exacerbation -worsened by CAP, and positive human metapneumovirus  -Scheduled nebs  -Prednisone 40 mg daily, x5 days  -Chest PT  - O2 if needed    ROBBY - Cr up to 2.4, from baseline of ~1.0  -Nephrology consulted  -Resume IV fluids today  -Repeat basic metabolic panel tomorrow    Acute metabolic acidosis, non-gap, likely secondary to dehydration  - Add IV fluids with half-normal saline plus bicarb  - BMP tomorrow am   - nephrology following    Uncontrolled type 2 diabetes - A1c 8.7%  - pt prefers to use home U-100, will give 30U glargine this evening, and patient to resume home U-100 tomorrow evening if still admitted. - POCT glucose checks   - Increase mealtime to 8U + SSI  - Hypoglycemia protocol  - BMP checked and not in DKA     Code status: FULL  DVT prophylaxis: heparin     Care Plan discussed with: Patient/Family  Disposition: TBD     Hospital Problems  Date Reviewed: 12/15/2018          Codes Class Noted POA    ROBBY (acute kidney injury) [facility]) ICD-10-CM: N17.9  ICD-9-CM: 584.9  1/15/2020 Unknown                Review of Systems:   A comprehensive review of systems was negative except for that written in the HPI. Vital Signs:    Last 24hrs VS reviewed since prior progress note.  Most recent are:  Visit Vitals  /79 (BP 1 Location: Left arm, BP Patient Position: At rest)   Pulse 78   Temp 98 °F (36.7 °C)   Resp 18   Wt 72.7 kg (160 lb 4.8 oz)   SpO2 97%   BMI 31.31 kg/m²       No intake or output data in the 24 hours ending 01/16/20 8741     Physical Examination:             Constitutional:  No acute distress, cooperative, pleasant, down facies   ENT:  Oral mucous moist, oropharynx benign. Resp:  + exp wheezing in all lung fields, course bilaterally    CV:  Regular rhythm, normal rate, no murmurs, gallops, rubs    GI:  Soft, non distended, non tender. normoactive bowel sounds, no hepatosplenomegaly     Musculoskeletal:  trace edema, warm, 2+ pulses throughout    Neurologic:  Moves all extremities. Answering questions appropriately      Skin:  Good turgor, no rashes or ulcers       Data Review:    Review and/or order of clinical lab test  Review and/or order of tests in the radiology section of CPT  Review and/or order of tests in the medicine section of [facility]      Labs:     Recent Labs     01/16/20  0715 01/15/20  1159   WBC 5.3 3.8   HGB 9.9* 10.1*   HCT 29.9* 31.8*   * 131*     Recent Labs     01/16/20  1619 01/16/20  0715 01/15/20  1602 01/15/20  1159   * 129*  --  132*   K 4.0 4.8  --  4.0    106  --  102   CO2 18* 15*  --  22   BUN 50* 48*  --  61*   CREA 2.24* 1.95*  --  2.46*   * 380*  --  473*   CA 8.2* 7.8*  --  8.0*   MG  --   --  1.6 1.9   PHOS  --   --  3.4  --      Recent Labs     01/15/20  1159   SGOT 23   ALT 20   AP 93   TBILI <0.1*   TP 6.3*   ALB 1.8*   GLOB 4.5*     No results for input(s): INR, PTP, APTT, INREXT in the last 72 hours. No results for input(s): FE, TIBC, PSAT, FERR in the last 72 hours. Lab Results   Component Value Date/Time    Folate 28.7 (H) 12/15/2018 05:09 AM      No results for input(s): PH, PCO2, PO2 in the last 72 hours.   Recent Labs     01/15/20  1602        Lab Results   Component Value Date/Time    Cholesterol, total 107 12/16/2018 04:08 AM    HDL Cholesterol 42 12/16/2018 04:08 AM    LDL, calculated 36.4 12/16/2018 04:08 AM    Triglyceride 143 12/16/2018 04:08 AM    CHOL/HDL Ratio 2.5 12/16/2018 04:08 AM     Lab Results   Component Value Date/Time    Glucose (POC) 502 (H) 01/16/2020 04:08 PM    Glucose (POC) 500 (H) 01/16/2020 11:42 AM    Glucose (POC) 540 (H) 01/16/2020 11:39 AM    Glucose (POC) 364 (H) 01/16/2020 06:32 AM    Glucose (POC) 270 (H) 01/15/2020 09:11 PM     Lab Results   Component Value Date/Time    Color YELLOW/STRAW 01/15/2020 11:59 AM    Appearance CLEAR 01/15/2020 11:59 AM    Specific gravity 1.010 01/15/2020 11:59 AM    Specific gravity 1.015 04/29/2013 01:50 PM    pH (UA) 6.5 01/15/2020 11:59 AM    Protein 300 (A) 01/15/2020 11:59 AM    Glucose 250 (A) 01/15/2020 11:59 AM    Ketone NEGATIVE  01/15/2020 11:59 AM    Bilirubin NEGATIVE  01/15/2020 11:59 AM    Urobilinogen 0.2 01/15/2020 11:59 AM    Nitrites NEGATIVE  01/15/2020 11:59 AM    Leukocyte Esterase NEGATIVE  01/15/2020 11:59 AM    Glucose/Ketones  02/08/2009 07:00 AM     GLUCOSE AND KETONES SIGNIFICANTLY ELEVATED.  RESULTS PHONED TO AND READ BACK BY    Epithelial cells FEW 01/15/2020 11:59 AM    Bacteria NEGATIVE  01/15/2020 11:59 AM    WBC 0-4 01/15/2020 11:59 AM    RBC 0-5 01/15/2020 11:59 AM         Medications Reviewed:     Current Facility-Administered Medications   Medication Dose Route Frequency    cefTRIAXone (ROCEPHIN) 1 g in 0.9% sodium chloride (MBP/ADV) 50 mL  1 g IntraVENous Q24H    albuterol (PROVENTIL VENTOLIN) nebulizer solution 2.5 mg  2.5 mg Nebulization Q4H PRN    atorvastatin (LIPITOR) tablet 10 mg  10 mg Oral DAILY    benzonatate (TESSALON) capsule 100 mg  100 mg Oral TID PRN    dextromethorphan (DELSYM) 30 mg/5 mL syrup 30 mg  30 mg Oral Q12H PRN    pantoprazole (PROTONIX) tablet 40 mg  40 mg Oral ACB    fluticasone propionate (FLONASE) 50 mcg/actuation nasal spray 1 Spray  1 Spray Both Nostrils BID    levothyroxine (SYNTHROID) tablet 100 mcg  100 mcg Oral ACB    insulin glargine (LANTUS) injection 30 Units  30 Units SubCUTAneous Q24H    0.9% sodium chloride infusion  125 mL/hr IntraVENous CONTINUOUS    aspirin delayed-release tablet 81 mg  81 mg Oral DAILY    guaiFENesin (ROBITUSSIN) 100 mg/5 mL oral liquid 100 mg  100 mg Oral Q4H PRN    insulin lispro (HUMALOG) injection 5 Units  5 Units SubCUTAneous TID WITH MEALS    levothyroxine (SYNTHROID) tablet 100 mcg  100 mcg Oral Once per day on Mon Tue Wed Thu Fri Sat    sodium chloride (NS) flush 5-40 mL  5-40 mL IntraVENous Q8H    sodium chloride (NS) flush 5-40 mL  5-40 mL IntraVENous PRN    heparin (porcine) injection 5,000 Units  5,000 Units SubCUTAneous Q8H    albuterol-ipratropium (DUO-NEB) 2.5 MG-0.5 MG/3 ML  3 mL Nebulization Q4H RT    predniSONE (DELTASONE) tablet 40 mg  40 mg Oral DAILY WITH BREAKFAST    azithromycin (ZITHROMAX) 500 mg in 0.9% sodium chloride (MBP/ADV) 250 mL  500 mg IntraVENous Q24H    glucose chewable tablet 16 g  4 Tab Oral PRN    glucagon (GLUCAGEN) injection 1 mg  1 mg IntraMUSCular PRN    dextrose 10% infusion 0-250 mL  0-250 mL IntraVENous PRN    insulin lispro (HUMALOG) injection   SubCUTAneous AC&HS    acetaminophen (TYLENOL) tablet 650 mg  650 mg Oral Q4H PRN     ______________________________________________________________________  EXPECTED LENGTH OF STAY: - - -  ACTUAL LENGTH OF STAY:          0                 Yana Real MD

## 2020-01-16 NOTE — PROGRESS NOTES
Problem: Falls - Risk of  Goal: *Absence of Falls  Description  Document Isamarchitrasunday Jett Fall Risk and appropriate interventions in the flowsheet.   Outcome: Progressing Towards Goal  Note: Fall Risk Interventions:            Medication Interventions: Patient to call before getting OOB, Evaluate medications/consider consulting pharmacy                   Problem: Patient Education: Go to Patient Education Activity  Goal: Patient/Family Education  Outcome: Progressing Towards Goal

## 2020-01-16 NOTE — PROGRESS NOTES
Blood sugar 364. Called MD and she advised to use current sliding scale insulin coverage and use parameter for blood glucose of 300-349.

## 2020-01-16 NOTE — CONSULTS
Assessment:  ROBBY on CKD-3: Bump in serum Cr to 2.4mg/dl likely from recent increase in diuretic. Improving with IVF. Patient has high grade proteinuria (nephrotic range) of 5gm-> thus high risk for CKD progression. Suspect underlying DM nephropathy. Baseline serum Cr 1 to 1.4mg/dl. ZO8:[id number]    Metabolic acidosis: likely 2 to IV NS/hyperchloremia    HTN    Acute bronchitis/PNA    HYpoantremia: pseudo from hyperglycemia    Down Syndrome    Plan/Recommendations: Will await repeat lab work  Consider changing IVF to 1/2NS +75meq Sodium Bicarbonate if she remains acidotic  Hold Torsemide  Control bld sugars  IV Abx  Strict I/Os,avoid nephrotoxins  AM lab      Discussed with patient and RN    Thanks for the consultation. Renal service will follow patient with you. Please contact me with any questions or concerns. Initial Consult note         Patient name: Lesli Humphrey  MR no: [id number]  Date of admission: 1/15/2020  Date of consultation: 1/16/2020  Requested by: Dr. Holland Eubanks  Reason for consult: ROBBY on CKD    Patient seen and examined. History obtained from patient and chart review. Relevant labs, data and notes reviewed. HPI: Sohan Gr is a 47 y.o. female with PMH significant for CKD stage 3, Down's Syndrome, DM2, Hypothyroidism who presented last night to ED with SOB/Hypoglycemia. Notable lethargy. IN ED Bld sugars in the 400s. Serum Cr elevated at 2.4mg/dl. Nephrology consulted to evaluate ROBBY on CKD. Patient is followed by my partner Dr. Jeni Payton for CKD management. Reportedly was started on a new diuretic recently? Last visit on 12/12/20-> no record of new medicine but Lasix was increased to 40mg daily. Med list now shows she is on Torsemide-> unclear who started that. No n/v/d. No NSAID use.     PMH:  Past Medical History:   Diagnosis Date    Allergic rhinitis, cause unspecified     Asthma     Diabetes ([facility] [address]     Down's syndrome     H/O impacted cerumen     Dr. Yamini Parisi Hypothyroid     Thyroid disease      PSH:  Past Surgical History:   Procedure Laterality Date    HX CYST REMOVAL      right shoulder    HX HEENT Bilateral 2017    prior in one eye    HX HEENT Left 3/5/15    cataract left and right    HX HYSTERECTOMY         Social history:   Social History     Tobacco Use    Smoking status: Never Smoker    Smokeless tobacco: Never Used   Substance Use Topics    Alcohol use: No    Drug use: No       Family history:  No history of CKD or ESRD in the family. Allergies   Allergen Reactions    Acetaminophen Other (comments)     Patient has implanted glucometer. Glucometer does not work properly when patient takes acetaminophen.  No longer with implanted glucometer per parents    Codeine Nausea and Vomiting    Nitrofurantoin Rash    Pcn [Penicillins] Hives and Rash       Current Facility-Administered Medications   Medication Dose Route Frequency Last Dose    cefTRIAXone (ROCEPHIN) 1 g in 0.9% sodium chloride (MBP/ADV) 50 mL  1 g IntraVENous Q24H 1 g at 01/16/20 0943    albuterol (PROVENTIL VENTOLIN) nebulizer solution 2.5 mg  2.5 mg Nebulization Q4H PRN      atorvastatin (LIPITOR) tablet 10 mg  10 mg Oral DAILY 10 mg at 01/16/20 0943    benzonatate (TESSALON) capsule 100 mg  100 mg Oral TID PRN      dextromethorphan (DELSYM) 30 mg/5 mL syrup 30 mg  30 mg Oral Q12H PRN      pantoprazole (PROTONIX) tablet 40 mg  40 mg Oral ACB 40 mg at 01/16/20 0943    fluticasone propionate (FLONASE) 50 mcg/actuation nasal spray 1 Spray  1 Spray Both Nostrils BID 1 Spray at 01/16/20 1248    levothyroxine (SYNTHROID) tablet 100 mcg  100 mcg Oral  mcg at 01/16/20 0943    insulin glargine (LANTUS) injection 30 Units  30 Units SubCUTAneous Q24H      0.9% sodium chloride infusion  125 mL/hr IntraVENous CONTINUOUS      aspirin delayed-release tablet 81 mg  81 mg Oral DAILY 81 mg at 01/16/20 0942    guaiFENesin (ROBITUSSIN) 100 mg/5 mL oral liquid 100 mg  100 mg Oral Q4H  mg at 01/15/20 2310    insulin lispro (HUMALOG) injection 5 Units  5 Units SubCUTAneous TID WITH MEALS 5 Units at 01/16/20 1250    levothyroxine (SYNTHROID) tablet 100 mcg  100 mcg Oral Once per day on Mon Tue Wed Thu Fri Sat 100 mcg at 01/16/20 9989    sodium chloride (NS) flush 5-40 mL  5-40 mL IntraVENous Q8H 10 mL at 01/16/20 0943    sodium chloride (NS) flush 5-40 mL  5-40 mL IntraVENous PRN 10 mL at 01/16/20 1250    heparin (porcine) injection 5,000 Units  5,000 Units SubCUTAneous Q8H 5,000 Units at 01/16/20 0944    albuterol-ipratropium (DUO-NEB) 2.5 MG-0.5 MG/3 ML  3 mL Nebulization Q4H RT 3 mL at 01/16/20 1153    predniSONE (DELTASONE) tablet 40 mg  40 mg Oral DAILY WITH BREAKFAST 40 mg at 01/16/20 0808    azithromycin (ZITHROMAX) 500 mg in 0.9% sodium chloride (MBP/ADV) 250 mL  500 mg IntraVENous Q24H 500 mg at 01/15/20 1621    glucose chewable tablet 16 g  4 Tab Oral PRN      glucagon (GLUCAGEN) injection 1 mg  1 mg IntraMUSCular PRN      dextrose 10% infusion 0-250 mL  0-250 mL IntraVENous PRN      insulin lispro (HUMALOG) injection   SubCUTAneous AC&HS 11 Units at 01/16/20 1248    acetaminophen (TYLENOL) tablet 650 mg  650 mg Oral Q4H  mg at 01/15/20 1925       ROS (besides HPI):    General: No fever. No weight changes  ENT: No hearing loss or visual changes  Cardiovascular: No Chest pain/SOB  Pulmonary: No SOB  GI: No abdominal pain. No Nausea/Vomiting/Diarrhea. No blood in stool  : No blood in urine. No foamy or cloudy urine  Musculoskeletal: No joint swelling or redness.  No morning stiffness  Endocrine: no cold or heat intolerance  Psych: denies anxiety or depression  Neuro: No light headedness or dizziness    Objective   Visit Vitals  /58 (BP 1 Location: Left arm, BP Patient Position: At rest)   Pulse 83   Temp 98.2 °F (36.8 °C)   Resp 18   Wt 72.7 kg (160 lb 4.8 oz)   LMP 03/27/1986   SpO2 94%   BMI 31.31 kg/m²       Physical Exam:    Gen: NAD. HEENT: AT/NC, Downs facies    Neck: no JVD, no cervical lymphadenopathy, no carotid bruit    Lungs/Chest wall: Breath sounds normal. Symmetrical chest wall expansion. No accessory muscle use. Clear to auscultation    Cardiovascular: Normal S1/S2, normal rate, regular rhythm. Abdomen: soft, NT, ND, BS+, no HSM    Ext: no clubbing or cyanosis. No edema    Skin: warm and dry. No rashes    : no CVA tenderness    CNS: alert awake. Answers appropriately.      Labs/Data:    Lab Results   Component Value Date/Time    Sodium 129 (L) 01/16/2020 07:15 AM    Potassium 4.8 01/16/2020 07:15 AM    Chloride 106 01/16/2020 07:15 AM    CO2 15 (LL) 01/16/2020 07:15 AM    Anion gap 8 01/16/2020 07:15 AM    Glucose 380 (H) 01/16/2020 07:15 AM    BUN 48 (H) 01/16/2020 07:15 AM    Creatinine 1.95 (H) 01/16/2020 07:15 AM    BUN/Creatinine ratio 25 (H) 01/16/2020 07:15 AM    GFR est AA 33 (L) 01/16/2020 07:15 AM    GFR est non-AA 27 (L) 01/16/2020 07:15 AM    Calcium 7.8 (L) 01/16/2020 07:15 AM       Lab Results   Component Value Date/Time    WBC 5.3 01/16/2020 07:15 AM    Hemoglobin (POC) 12.2 11/05/2012 03:10 PM    HGB 9.9 (L) 01/16/2020 07:15 AM    Hematocrit (POC) 36 11/05/2012 03:10 PM    HCT 29.9 (L) 01/16/2020 07:15 AM    PLATELET 325 (L) 20/[birth year]1947 07:15 AM    MCV 96.5 01/16/2020 07:15 AM       Urine analysis:   Results for orders placed or performed in visit on 01/26/15   AMB POC URINALYSIS DIP STICK AUTO W/O MICRO     Status: Abnormal   Result Value Ref Range Status    Color (UA POC) Yellow  Final    Clarity (UA POC) Clear  Final    Glucose (UA POC) Negative Negative Final    Bilirubin (UA POC) Negative Negative Final    Ketones (UA POC) Negative Negative Final    Specific gravity (UA POC) 1.015 1.001 - 1.035 Final    Blood (UA POC) Negative Negative Final    pH (UA POC) 6.0 4.6 - 8.0 Final    Protein (UA POC) 2+ Negative mg/dL Final    Urobilinogen (UA POC) 0.2 mg/dL 0.2 - 1 Final    Nitrites (UA POC) Negative Negative Final    Leukocyte esterase (UA POC) Negative Negative Final       UA reviewed    Renal US: 1/15/20   IMPRESSION:    Normal examination.     No components found for: SPEP, UPEP  No results found for: PUQ, PROTU2, PROTU1, BJP1, CPE1, IMEL1, MET2  No results found for: MCACR, MCA1, MCA2, MCA3, MCAU, MCAU2, MCALPOCT    No intake or output data in the 24 hours ending 01/16/20 1423    Wt Readings from Last 3 Encounters:   01/16/20 72.7 kg (160 lb 4.8 oz)   12/16/18 66.4 kg (146 lb 6.2 oz)   04/01/18 89 kg (196 lb 3.4 oz)       Signed by:  Ron Rose MD  Nephrology and Hypertension  [facility]

## 2020-01-16 NOTE — PROGRESS NOTES
Occupational Therapy Screening:  Services are not indicated at this time. [facility] based on results obtained during the nursing admission assessment. The patients chart was reviewed and the patient is not appropriate for a skilled therapy evaluation at this time. Please consult occupational therapy if any therapy needs arise. Thank you.     Annel Hurley OT

## 2020-01-17 LAB
ALBUMIN SERPL-MCNC: 1.8 G/DL (ref 3.5–5)
ALBUMIN/GLOB SERPL: 0.4 {RATIO} (ref 1.1–2.2)
ALP SERPL-CCNC: 84 U/L (ref 45–117)
ALT SERPL-CCNC: 27 U/L (ref 12–78)
ANION GAP SERPL CALC-SCNC: 8 MMOL/L (ref 5–15)
AST SERPL-CCNC: 41 U/L (ref 15–37)
BILIRUB SERPL-MCNC: 0.1 MG/DL (ref 0.2–1)
BUN SERPL-MCNC: 54 MG/DL (ref 6–20)
BUN/CREAT SERPL: 28 (ref 12–20)
CALCIUM SERPL-MCNC: 8.1 MG/DL (ref 8.5–10.1)
CHLORIDE SERPL-SCNC: 106 MMOL/L (ref 97–108)
CO2 SERPL-SCNC: 22 MMOL/L (ref 21–32)
CREAT SERPL-MCNC: 1.94 MG/DL (ref 0.55–1.02)
ERYTHROCYTE [DISTWIDTH] IN BLOOD BY AUTOMATED COUNT: 13.3 % (ref 11.5–14.5)
GLOBULIN SER CALC-MCNC: 4.3 G/DL (ref 2–4)
GLUCOSE BLD STRIP.AUTO-MCNC: 231 MG/DL (ref 65–100)
GLUCOSE BLD STRIP.AUTO-MCNC: 241 MG/DL (ref 65–100)
GLUCOSE BLD STRIP.AUTO-MCNC: 344 MG/DL (ref 65–100)
GLUCOSE BLD STRIP.AUTO-MCNC: 78 MG/DL (ref 65–100)
GLUCOSE BLD STRIP.AUTO-MCNC: 90 MG/DL (ref 65–100)
GLUCOSE SERPL-MCNC: 86 MG/DL (ref 65–100)
HCT VFR BLD AUTO: 29.9 % (ref 35–47)
HGB BLD-MCNC: 9.5 G/DL (ref 11.5–16)
MAGNESIUM SERPL-MCNC: 1.8 MG/DL (ref 1.6–2.4)
MCH RBC QN AUTO: 30.5 PG (ref 26–34)
MCHC RBC AUTO-ENTMCNC: 31.8 G/DL (ref 30–36.5)
MCV RBC AUTO: 96.1 FL (ref 80–99)
NRBC # BLD: 0 K/UL (ref 0–0.01)
NRBC BLD-RTO: 0 PER 100 WBC
PHOSPHATE SERPL-MCNC: 3.2 MG/DL (ref 2.6–4.7)
PLATELET # BLD AUTO: 157 K/UL (ref 150–400)
PMV BLD AUTO: 10.9 FL (ref 8.9–12.9)
POTASSIUM SERPL-SCNC: 3.8 MMOL/L (ref 3.5–5.1)
PROT SERPL-MCNC: 6.1 G/DL (ref 6.4–8.2)
RBC # BLD AUTO: 3.11 M/UL (ref 3.8–5.2)
SERVICE CMNT-IMP: ABNORMAL
SERVICE CMNT-IMP: NORMAL
SERVICE CMNT-IMP: NORMAL
SODIUM SERPL-SCNC: 136 MMOL/L (ref 136–145)
WBC # BLD AUTO: 4.6 K/UL (ref 3.6–11)

## 2020-01-17 PROCEDURE — 74011636637 HC RX REV CODE- 636/637: Performed by: FAMILY MEDICINE

## 2020-01-17 PROCEDURE — 36415 COLL VENOUS BLD VENIPUNCTURE: CPT

## 2020-01-17 PROCEDURE — 94664 DEMO&/EVAL PT USE INHALER: CPT

## 2020-01-17 PROCEDURE — 65270000029 HC RM PRIVATE

## 2020-01-17 PROCEDURE — 82962 GLUCOSE BLOOD TEST: CPT

## 2020-01-17 PROCEDURE — 74011250637 HC RX REV CODE- 250/637: Performed by: INTERNAL MEDICINE

## 2020-01-17 PROCEDURE — 74011636637 HC RX REV CODE- 636/637: Performed by: INTERNAL MEDICINE

## 2020-01-17 PROCEDURE — 74011000258 HC RX REV CODE- 258: Performed by: INTERNAL MEDICINE

## 2020-01-17 PROCEDURE — 83735 ASSAY OF MAGNESIUM: CPT

## 2020-01-17 PROCEDURE — 74011250636 HC RX REV CODE- 250/636: Performed by: INTERNAL MEDICINE

## 2020-01-17 PROCEDURE — 84100 ASSAY OF PHOSPHORUS: CPT

## 2020-01-17 PROCEDURE — 94760 N-INVAS EAR/PLS OXIMETRY 1: CPT

## 2020-01-17 PROCEDURE — 74011250636 HC RX REV CODE- 250/636: Performed by: FAMILY MEDICINE

## 2020-01-17 PROCEDURE — 94640 AIRWAY INHALATION TREATMENT: CPT

## 2020-01-17 PROCEDURE — 80053 COMPREHEN METABOLIC PANEL: CPT

## 2020-01-17 PROCEDURE — 74011250637 HC RX REV CODE- 250/637: Performed by: FAMILY MEDICINE

## 2020-01-17 PROCEDURE — 74011000250 HC RX REV CODE- 250: Performed by: INTERNAL MEDICINE

## 2020-01-17 PROCEDURE — 85027 COMPLETE CBC AUTOMATED: CPT

## 2020-01-17 RX ORDER — SODIUM CHLORIDE 9 MG/ML
75 INJECTION, SOLUTION INTRAVENOUS CONTINUOUS
Status: DISCONTINUED | OUTPATIENT
Start: 2020-01-17 | End: 2020-01-19

## 2020-01-17 RX ORDER — INSULIN DEGLUDEC 100 U/ML
15 INJECTION, SOLUTION SUBCUTANEOUS ONCE
Status: COMPLETED | OUTPATIENT
Start: 2020-01-17 | End: 2020-01-17

## 2020-01-17 RX ORDER — GUAIFENESIN 100 MG/5ML
100 SOLUTION ORAL
Status: DISCONTINUED | OUTPATIENT
Start: 2020-01-17 | End: 2020-01-19 | Stop reason: HOSPADM

## 2020-01-17 RX ADMIN — IPRATROPIUM BROMIDE AND ALBUTEROL SULFATE 3 ML: .5; 3 SOLUTION RESPIRATORY (INHALATION) at 11:43

## 2020-01-17 RX ADMIN — HEPARIN SODIUM 5000 UNITS: 5000 INJECTION INTRAVENOUS; SUBCUTANEOUS at 16:30

## 2020-01-17 RX ADMIN — SODIUM CHLORIDE 10 ML: 9 INJECTION INTRAMUSCULAR; INTRAVENOUS; SUBCUTANEOUS at 13:38

## 2020-01-17 RX ADMIN — FLUTICASONE PROPIONATE 1 SPRAY: 50 SPRAY, METERED NASAL at 09:46

## 2020-01-17 RX ADMIN — BENZONATATE 100 MG: 100 CAPSULE ORAL at 18:18

## 2020-01-17 RX ADMIN — SODIUM CHLORIDE 75 ML/HR: 900 INJECTION, SOLUTION INTRAVENOUS at 12:25

## 2020-01-17 RX ADMIN — FLUTICASONE PROPIONATE 1 SPRAY: 50 SPRAY, METERED NASAL at 23:01

## 2020-01-17 RX ADMIN — LEVOTHYROXINE SODIUM 100 MCG: 100 TABLET ORAL at 07:15

## 2020-01-17 RX ADMIN — INSULIN LISPRO 2 UNITS: 100 INJECTION, SOLUTION INTRAVENOUS; SUBCUTANEOUS at 23:00

## 2020-01-17 RX ADMIN — INSULIN LISPRO 8 UNITS: 100 INJECTION, SOLUTION INTRAVENOUS; SUBCUTANEOUS at 16:29

## 2020-01-17 RX ADMIN — IPRATROPIUM BROMIDE AND ALBUTEROL SULFATE 3 ML: .5; 3 SOLUTION RESPIRATORY (INHALATION) at 07:25

## 2020-01-17 RX ADMIN — INSULIN LISPRO 8 UNITS: 100 INJECTION, SOLUTION INTRAVENOUS; SUBCUTANEOUS at 12:13

## 2020-01-17 RX ADMIN — IPRATROPIUM BROMIDE AND ALBUTEROL SULFATE 3 ML: .5; 3 SOLUTION RESPIRATORY (INHALATION) at 21:33

## 2020-01-17 RX ADMIN — ATORVASTATIN CALCIUM 10 MG: 10 TABLET, FILM COATED ORAL at 09:45

## 2020-01-17 RX ADMIN — GUAIFENESIN 100 MG: 100 SOLUTION ORAL at 01:01

## 2020-01-17 RX ADMIN — GUAIFENESIN 100 MG: 200 SOLUTION ORAL at 23:01

## 2020-01-17 RX ADMIN — PANTOPRAZOLE SODIUM 40 MG: 40 TABLET, DELAYED RELEASE ORAL at 07:15

## 2020-01-17 RX ADMIN — ALBUTEROL SULFATE 2.5 MG: 2.5 SOLUTION RESPIRATORY (INHALATION) at 01:10

## 2020-01-17 RX ADMIN — SODIUM CHLORIDE: 450 INJECTION, SOLUTION INTRAVENOUS at 07:17

## 2020-01-17 RX ADMIN — AZITHROMYCIN MONOHYDRATE 500 MG: 500 INJECTION, POWDER, LYOPHILIZED, FOR SOLUTION INTRAVENOUS at 13:38

## 2020-01-17 RX ADMIN — SODIUM CHLORIDE 10 ML: 9 INJECTION INTRAMUSCULAR; INTRAVENOUS; SUBCUTANEOUS at 06:00

## 2020-01-17 RX ADMIN — CEFTRIAXONE 1 G: 1 INJECTION, POWDER, FOR SOLUTION INTRAMUSCULAR; INTRAVENOUS at 09:46

## 2020-01-17 RX ADMIN — BENZONATATE 100 MG: 100 CAPSULE ORAL at 07:15

## 2020-01-17 RX ADMIN — HEPARIN SODIUM 5000 UNITS: 5000 INJECTION INTRAVENOUS; SUBCUTANEOUS at 09:45

## 2020-01-17 RX ADMIN — ASPIRIN 81 MG: 81 TABLET, COATED ORAL at 09:45

## 2020-01-17 RX ADMIN — IPRATROPIUM BROMIDE AND ALBUTEROL SULFATE 3 ML: .5; 3 SOLUTION RESPIRATORY (INHALATION) at 15:12

## 2020-01-17 RX ADMIN — INSULIN LISPRO 5 UNITS: 100 INJECTION, SOLUTION INTRAVENOUS; SUBCUTANEOUS at 12:12

## 2020-01-17 RX ADMIN — INSULIN DEGLUDEC 15 UNITS: 100 INJECTION, SOLUTION SUBCUTANEOUS at 16:29

## 2020-01-17 RX ADMIN — PREDNISONE 40 MG: 20 TABLET ORAL at 07:15

## 2020-01-17 NOTE — PROGRESS NOTES
JEAN:    Discharge tomorrow back to [place]. CM notified Charge Nurse, [address] [facility] on [address]. One of patient's daughters will provide transportation. Please call Patient's sister, Oskar Zayas when discharge time is [phone]. RN call report to Nursing supervisor at [phone]    CM fax AVS to [phone]    Care Management Interventions  PCP Verified by CM: Yes  Mode of Transport at Discharge: Other (see comment)(Patient's sister will take her back to the [facility] at discharge.)  Transition of Care Consult (CM Consult): Assisted Living  MyChart Signup: No  Discharge Durable Medical Equipment: No  Physical Therapy Consult: No  Occupational Therapy Consult: No  Speech Therapy Consult: No  Current Support Network: Assisted Living  Confirm Follow Up Transport: Family  Discharge Location  Discharge Placement: Assisted Living     Reason for Admission:   SOB                   RRAT Score:     7                Plan for utilizing home health: No orders                         Current Advanced Directive/Advance Care Plan:  ON file                         Transition of Care Plan:         Patient lives at the [facility] with her mother in assisted living. She will go back there at discharge. One of her daughters will drive her there.     Elliot Wallace RN/CRM

## 2020-01-17 NOTE — PROGRESS NOTES
[address] [facility] Adult  Hospitalist Group                                                                                          Hospitalist Progress Note  Joe Dejesus MD  Answering service: [phone] or [phone] from in house phone        Date of Service:  2020  NAME:  Dai Sky  :  1970  MRN:  [id number]      Admission Summary:   The patient is a 25-year-old female with past medical history of Down syndrome, asthma, diabetes, hypothyroidism, who presents to the hospital with the above-mentioned symptom. The patient lives at [facility] with her mother. Currently, her sister is present at the bedside. History was primarily obtained from the patient as well as her sister. The patient reports that this morning she was not feeling well, she was having some shortness of breath associated with cough, also her blood glucose was low. She checked her blood glucose, and it was \"in the 70s. \"  The patient apparently this morning was short of breath, and usually when she is short of breath, she feels confused and cries, gets diaphoretic, and she is usually treated with orange and cranberry juice. The sister reports the patient was a little lethargic this morning, but she is not very sure as to how the events unfolded. She reports that the patient has had some swelling in her legs, follows up with Dr. Stephanie Burt who is her nephrologist.  Recently, her Lasix was stopped, and she was started on a new diuretic of which the sister does not remember the name. She also reports that the patient has been having some cough. She saw a pulmonologist 8 days ago, and they were not very sure as to what is going on and \"no acute treatment was done and she is not taking any inhalers. \"  The patient came to the ER, was found to be hypoxic and was found to have a blood glucose of 400-500 and was requested to be admitted under the hospitalist service.     Interval history / Subjective:   Patient states she is feeling much better, since being admitted to the hospital.  Her blood sugars have remained very elevated. Per the patient, and family only her home insulin works for her     Assessment & Plan:     Community-acquired pneumonia, left lower lobe airspace disease on CT, also + viral PNA with human metaneumovirus (hypoxic failure ruled out)  -Continue ceftriaxone and azithromycin while inpatient, may transition to oral on discharge  -O2 as needed    Acute asthma exacerbation - imrpoving. worsened by CAP, and positive human metapneumovirus  -Scheduled nebs  -Prednisone 40 mg daily, x5 days  -Chest PT  -O2 if needed    ROBBY - Cr up to 2.4, from baseline of ~1.0, improving   -Nephrology following  -Switch IVF from 0.45 bicarb to 0.9NaCl   -Repeat basic metabolic panel tomorrow    Acute metabolic acidosis, non-gap, likely secondary to dehydration, improved. - Switch fluids to 0.9NaCL  - BMP tomorrow am   - nephrology following    Uncontrolled type 2 diabetes - A1c 8.7%  - pt prefers to use home U-100, will give 15U glargine this evening,  - POCT glucose checks   - Increase mealtime to 8U + SSI, may need to increase further as patient seems to have post meal highs and not overnight. - Hypoglycemia protocol    Code status: FULL  DVT prophylaxis: heparin     Care Plan discussed with: Patient/Family  Disposition: TBD, plan to DC tomorrow 01/18     Hospital Problems  Date Reviewed: 12/15/2018          Codes Class Noted POA    ROBBY (acute kidney injury) [facility]) ICD-10-CM: N17.9  ICD-9-CM: 584.9  1/15/2020 Unknown                Review of Systems:   A comprehensive review of systems was negative except for that written in the HPI. Vital Signs:    Last 24hrs VS reviewed since prior progress note.  Most recent are:  Visit Vitals  /78 (BP 1 Location: Left arm, BP Patient Position: At rest)   Pulse 93   Temp 97.3 °F (36.3 °C)   Resp 18   Wt 75.6 kg (166 lb 9.6 oz)   SpO2 93%   BMI 32.54 kg/m²         Intake/Output Summary (Last 24 hours) at 1/17/2020 1637  Last data filed at 1/17/2020 0946  Gross per 24 hour   Intake 350 ml   Output --   Net 350 ml        Physical Examination:             Constitutional:  No acute distress, cooperative, pleasant, down facies   ENT:  Oral mucous moist, oropharynx benign. Resp:  + exp wheezing in all lung fields, course bilaterally, no increased work of breathing. CV:  Regular rhythm, normal rate, no murmurs, gallops, rubs    GI:  Soft, non distended, non tender. normoactive bowel sounds, no hepatosplenomegaly     Musculoskeletal:  trace edema, warm, 2+ pulses throughout    Neurologic:  Moves all extremities. Answering questions appropriately      Skin:  Good turgor, no rashes or ulcers       Data Review:    Review and/or order of clinical lab test  Review and/or order of tests in the radiology section of CPT  Review and/or order of tests in the medicine section of CPT      Labs:     Recent Labs     01/17/20  0300 01/16/20  0715   WBC 4.6 5.3   HGB 9.5* 9.9*   HCT 29.9* 29.9*    132*     Recent Labs     01/17/20 0300 01/16/20  1619 01/16/20  0715 01/15/20  1602 01/15/20  1159    131* 129*  --  132*   K 3.8 4.0 4.8  --  4.0    103 106  --  102   CO2 22 18* 15*  --  22   BUN 54* 50* 48*  --  61*   CREA 1.94* 2.24* 1.95*  --  2.46*   GLU 86 431* 380*  --  473*   CA 8.1* 8.2* 7.8*  --  8.0*   MG 1.8  --   --  1.6 1.9   PHOS 3.2  --   --  3.4  --      Recent Labs     01/17/20  0300 01/15/20  1159   SGOT 41* 23   ALT 27 20   AP 84 93   TBILI 0.1* <0.1*   TP 6.1* 6.3*   ALB 1.8* 1.8*   GLOB 4.3* 4.5*     No results for input(s): INR, PTP, APTT, INREXT, INREXT in the last 72 hours. No results for input(s): FE, TIBC, PSAT, FERR in the last 72 hours. Lab Results   Component Value Date/Time    Folate 28.7 (H) 12/15/2018 05:09 AM      No results for input(s): PH, PCO2, PO2 in the last 72 hours.   Recent Labs     01/15/20  1602        Lab Results   Component Value Date/Time    Cholesterol, total 107 12/16/2018 04:08 AM    HDL Cholesterol 42 12/16/2018 04:08 AM    LDL, calculated 36.4 12/16/2018 04:08 AM    Triglyceride 143 12/16/2018 04:08 AM    CHOL/HDL Ratio 2.5 12/16/2018 04:08 AM     Lab Results   Component Value Date/Time    Glucose (POC) 344 (H) 01/17/2020 03:54 PM    Glucose (POC) 241 (H) 01/17/2020 11:31 AM    Glucose (POC) 90 01/17/2020 06:40 AM    Glucose (POC) 78 01/17/2020 06:12 AM    Glucose (POC) 246 (H) 01/16/2020 10:05 PM     Lab Results   Component Value Date/Time    Color YELLOW/STRAW 01/15/2020 11:59 AM    Appearance CLEAR 01/15/2020 11:59 AM    Specific gravity 1.010 01/15/2020 11:59 AM    Specific gravity 1.015 04/29/2013 01:50 PM    pH (UA) 6.5 01/15/2020 11:59 AM    Protein 300 (A) 01/15/2020 11:59 AM    Glucose 250 (A) 01/15/2020 11:59 AM    Ketone NEGATIVE  01/15/2020 11:59 AM    Bilirubin NEGATIVE  01/15/2020 11:59 AM    Urobilinogen 0.2 01/15/2020 11:59 AM    Nitrites NEGATIVE  01/15/2020 11:59 AM    Leukocyte Esterase NEGATIVE  01/15/2020 11:59 AM    Glucose/Ketones  02/08/2009 07:00 AM     GLUCOSE AND KETONES SIGNIFICANTLY ELEVATED.  RESULTS PHONED TO AND READ BACK BY    Epithelial cells FEW 01/15/2020 11:59 AM    Bacteria NEGATIVE  01/15/2020 11:59 AM    WBC 0-4 01/15/2020 11:59 AM    RBC 0-5 01/15/2020 11:59 AM         Medications Reviewed:     Current Facility-Administered Medications   Medication Dose Route Frequency    0.9% sodium chloride infusion  75 mL/hr IntraVENous CONTINUOUS    cefTRIAXone (ROCEPHIN) 1 g in 0.9% sodium chloride (MBP/ADV) 50 mL  1 g IntraVENous Q24H    albuterol (PROVENTIL VENTOLIN) nebulizer solution 2.5 mg  2.5 mg Nebulization Q4H PRN    atorvastatin (LIPITOR) tablet 10 mg  10 mg Oral DAILY    benzonatate (TESSALON) capsule 100 mg  100 mg Oral TID PRN    dextromethorphan (DELSYM) 30 mg/5 mL syrup 30 mg  30 mg Oral Q12H PRN    pantoprazole (PROTONIX) tablet 40 mg  40 mg Oral ACB    fluticasone propionate (FLONASE) 50 mcg/actuation nasal spray 1 Spray  1 Spray Both Nostrils BID    insulin lispro (HUMALOG) injection 8 Units  8 Units SubCUTAneous TID WITH MEALS    albuterol-ipratropium (DUO-NEB) 2.5 MG-0.5 MG/3 ML  3 mL Nebulization QID RT    aspirin delayed-release tablet 81 mg  81 mg Oral DAILY    guaiFENesin (ROBITUSSIN) 100 mg/5 mL oral liquid 100 mg  100 mg Oral Q4H PRN    levothyroxine (SYNTHROID) tablet 100 mcg  100 mcg Oral Once per day on Mon Tue Wed Thu Fri Sat    sodium chloride (NS) flush 5-40 mL  5-40 mL IntraVENous Q8H    sodium chloride (NS) flush 5-40 mL  5-40 mL IntraVENous PRN    heparin (porcine) injection 5,000 Units  5,000 Units SubCUTAneous Q8H    predniSONE (DELTASONE) tablet 40 mg  40 mg Oral DAILY WITH BREAKFAST    azithromycin (ZITHROMAX) 500 mg in 0.9% sodium chloride (MBP/ADV) 250 mL  500 mg IntraVENous Q24H    glucose chewable tablet 16 g  4 Tab Oral PRN    glucagon (GLUCAGEN) injection 1 mg  1 mg IntraMUSCular PRN    dextrose 10% infusion 0-250 mL  0-250 mL IntraVENous PRN    insulin lispro (HUMALOG) injection   SubCUTAneous AC&HS    acetaminophen (TYLENOL) tablet 650 mg  650 mg Oral Q4H PRN     ______________________________________________________________________  EXPECTED LENGTH OF STAY: 3d 7h  ACTUAL LENGTH OF STAY:          1                 Josephine Mar MD

## 2020-01-17 NOTE — PROGRESS NOTES
Patient name: John Kc  MRN: [id number]    Nephrology Progress note:    Assessment:  ROBBY on CKD-3: Bump in serum Cr to 2.4mg/dl likely from recent diuresis. Improving with IVF. Azotemia exacerbated by steroids. Patient has high grade proteinuria (nephrotic range) of 5gm-> thus high risk for CKD progression. Suspect underlying DM nephropathy. Baseline serum Cr 1 to 1.4mg/dl. Followed by Dr. Jackson Mccormick     DM2: Hyperglycemia improving.     Metabolic acidosis: likely 2 to IV NS/hyperchloremia-> better with IV bicarb     HTN     Acute bronchitis/PNA     HYponatremia: pseudo from hyperglycemia-> better     Down Syndrome    Plan/Recommendations:  Change back to NS at 75cc/hr-> stop by tomorrow  Hold diuretics-> can resume lower dose of Torsemide 20mg daily on discharge  IV Abx  Steroids/nebs  Am labs    Subjective:  Ct to have wheezing/cough. Bld sugars better    ROS:   No nausea, no vomiting  No chest pain    Exam:  Visit Vitals  /79   Pulse 78   Temp 97.7 °F (36.5 °C)   Resp 17   Wt 72.7 kg (160 lb 4.8 oz)   LMP 03/27/1986   SpO2 98%   BMI 31.31 kg/m²     Wt Readings from Last 3 Encounters:   01/16/20 72.7 kg (160 lb 4.8 oz)   12/16/18 66.4 kg (146 lb 6.2 oz)   04/01/18 89 kg (196 lb 3.4 oz)     No intake or output data in the 24 hours ending 01/17/20 1012    Gen: NAD  HEENT: Down's facies  Lungs/Chest wall: End exp wheezing/scattered rhonchi  Cardiovascular: Regular rate, normal rhythm. Abdomen/: Soft, NT, ND, BS+. Ext:  No peripheral edema  CNS: alert and awake.  Answers appropriately      Current Facility-Administered Medications   Medication Dose Route Frequency Last Dose    insulin degludec inpn 15 Units (Patient Supplied)  15 Units SubCUTAneous ONCE      cefTRIAXone (ROCEPHIN) 1 g in 0.9% sodium chloride (MBP/ADV) 50 mL  1 g IntraVENous Q24H 1 g at 01/17/20 0946    albuterol (PROVENTIL VENTOLIN) nebulizer solution 2.5 mg  2.5 mg Nebulization Q4H PRN 2.5 mg at 01/17/20 0110    atorvastatin (LIPITOR) tablet 10 mg  10 mg Oral DAILY 10 mg at 01/17/20 0945    benzonatate (TESSALON) capsule 100 mg  100 mg Oral TID  mg at 01/17/20 0715    dextromethorphan (DELSYM) 30 mg/5 mL syrup 30 mg  30 mg Oral Q12H PRN      pantoprazole (PROTONIX) tablet 40 mg  40 mg Oral ACB 40 mg at 01/17/20 0715    fluticasone propionate (FLONASE) 50 mcg/actuation nasal spray 1 Spray  1 Spray Both Nostrils BID 1 Spray at 01/17/20 0946    sodium bicarbonate (8.4%) 75 mEq in 0.45% sodium chloride 1,000 mL infusion   IntraVENous CONTINUOUS      insulin lispro (HUMALOG) injection 8 Units  8 Units SubCUTAneous TID WITH MEALS      albuterol-ipratropium (DUO-NEB) 2.5 MG-0.5 MG/3 ML  3 mL Nebulization QID RT 3 mL at 01/17/20 0725    aspirin delayed-release tablet 81 mg  81 mg Oral DAILY 81 mg at 01/17/20 0945    guaiFENesin (ROBITUSSIN) 100 mg/5 mL oral liquid 100 mg  100 mg Oral Q4H  mg at 01/17/20 0101    levothyroxine (SYNTHROID) tablet 100 mcg  100 mcg Oral Once per day on Mon Tue Wed Thu Fri Sat 100 mcg at 01/17/20 0715    sodium chloride (NS) flush 5-40 mL  5-40 mL IntraVENous Q8H 10 mL at 01/17/20 0600    sodium chloride (NS) flush 5-40 mL  5-40 mL IntraVENous PRN 10 mL at 01/16/20 1250    heparin (porcine) injection 5,000 Units  5,000 Units SubCUTAneous Q8H 5,000 Units at 01/17/20 0945    predniSONE (DELTASONE) tablet 40 mg  40 mg Oral DAILY WITH BREAKFAST 40 mg at 01/17/20 0715    azithromycin (ZITHROMAX) 500 mg in 0.9% sodium chloride (MBP/ADV) 250 mL  500 mg IntraVENous Q24H 500 mg at 01/16/20 1433    glucose chewable tablet 16 g  4 Tab Oral PRN      glucagon (GLUCAGEN) injection 1 mg  1 mg IntraMUSCular PRN      dextrose 10% infusion 0-250 mL  0-250 mL IntraVENous PRN      insulin lispro (HUMALOG) injection   SubCUTAneous AC&HS Stopped at 01/17/20 0730    acetaminophen (TYLENOL) tablet 650 mg  650 mg Oral Q4H  mg at 01/15/20 1925       Labs/Data:    Lab Results   Component Value Date/Time    WBC 4.6 01/17/2020 03:00 AM    Hemoglobin (POC) 12.2 11/05/2012 03:10 PM    HGB 9.5 (L) 01/17/2020 03:00 AM    Hematocrit (POC) 36 11/05/2012 03:10 PM    HCT 29.9 (L) 01/17/2020 03:00 AM    PLATELET 448 41/87/7261 03:00 AM    MCV 96.1 01/17/2020 03:00 AM       Lab Results   Component Value Date/Time    Sodium 136 01/17/2020 03:00 AM    Potassium 3.8 01/17/2020 03:00 AM    Chloride 106 01/17/2020 03:00 AM    CO2 22 01/17/2020 03:00 AM    Anion gap 8 01/17/2020 03:00 AM    Glucose 86 01/17/2020 03:00 AM    BUN 54 (H) 01/17/2020 03:00 AM    Creatinine 1.94 (H) 01/17/2020 03:00 AM    BUN/Creatinine ratio 28 (H) 01/17/2020 03:00 AM    GFR est AA 33 (L) 01/17/2020 03:00 AM    GFR est non-AA 27 (L) 01/17/2020 03:00 AM    Calcium 8.1 (L) 01/17/2020 03:00 AM       Patient seen and examined. Chart reviewed. Labs, data and other pertinent notes reviewed in last 24 hrs.     Discussed with patient and Dr. Yuliet Allen by:  Remington Carnes MD  [address]

## 2020-01-17 NOTE — PROGRESS NOTES
Bedside shift change report given to TOOTIE Xiong (oncoming nurse) by Dao Brumfield RN (offgoing nurse). Report included the following information SBAR, Kardex, Intake/Output and MAR.

## 2020-01-17 NOTE — CDMP QUERY
#1 
 
Pt admitted with viral pneumonia/Pt noted to have documentation of respiratory failure in H/P without supporting criteria If possible, please document in the progress notes and d/c summary if you are evaluating and / or treating any of the following: ? Acute Hypoxic Respiratory Failure Ruled Out ? Acute Hypoxia only ? Other, please specify ? Clinically unable to determine Please see criteria below The medical record reflects the following: 
  Risk Factors: Pneumonia Clinical Indicators:  
resp 17-32 ED Respiratory: Positive for cough, shortness of breath and wheezing Pulmonary:  
   Effort: Pulmonary effort is normal. No respiratory distress. Breath sounds: No wheezing or rales. Comments: Diffuse wheezes H/P Acute hypoxic respiratory failure:  See above. Likely secondary to bronchitis. Oxygen support, pulse oximetry monitoring, telemetry monitoring, treat the underlying cause, and further intervention per hospital course. If persists, may consider getting a pulmonary consult. Monitor for now P/F 295 
 
1/15/2020 15:43 
pH (POC): 7.284 (L) 
pCO2 (POC): 43.6 pO2 (POC): 66 (L) HCO3 (POC): 20.7 (L) 
sO2 (POC): 90 (L) Base deficit (POC): 6 Specimen type (POC): ARTERIAL Flow rate (POC): 2 Site: RIGHT RADIAL Device[de-identified] NASAL CANNULA Treatment:pulmonary consult, supplemental oxygen @ 2lts Thank you, 
       Rl Lopez [facility], [address] Criteria that is needed to support diagnosis: 1. Difficulty breathing: SOB, dyspnea, tachypnea (RR >/= 28), respiratory distress, cyanosis, use of accessory muscles, air hunger, inability to speak in full sentences AND 2. Blood gas abnormality 
 pO2 < 60 or SpO2 </= 88%  while on room air 
 pCO2 > 50 & usually pH < 7.35 
  pO2 < 70 or SpO2 < 92% while on supplemental O2 
  pO2 < 80 or SpO2 < 95% with FiO2 > 32% Requiring more than 40% FiO2, regardless of pO2/SpO2

## 2020-01-18 LAB
ALBUMIN SERPL-MCNC: 1.8 G/DL (ref 3.5–5)
ALBUMIN/GLOB SERPL: 0.4 {RATIO} (ref 1.1–2.2)
ALP SERPL-CCNC: 82 U/L (ref 45–117)
ALT SERPL-CCNC: 33 U/L (ref 12–78)
ANION GAP SERPL CALC-SCNC: 6 MMOL/L (ref 5–15)
AST SERPL-CCNC: 50 U/L (ref 15–37)
BASOPHILS # BLD: 0 K/UL (ref 0–0.1)
BASOPHILS NFR BLD: 0 % (ref 0–1)
BILIRUB SERPL-MCNC: <0.1 MG/DL (ref 0.2–1)
BUN SERPL-MCNC: 54 MG/DL (ref 6–20)
BUN/CREAT SERPL: 32 (ref 12–20)
CALCIUM SERPL-MCNC: 8.1 MG/DL (ref 8.5–10.1)
CHLORIDE SERPL-SCNC: 111 MMOL/L (ref 97–108)
CO2 SERPL-SCNC: 23 MMOL/L (ref 21–32)
CREAT SERPL-MCNC: 1.68 MG/DL (ref 0.55–1.02)
DIFFERENTIAL METHOD BLD: ABNORMAL
EOSINOPHIL # BLD: 0 K/UL (ref 0–0.4)
EOSINOPHIL NFR BLD: 0 % (ref 0–7)
ERYTHROCYTE [DISTWIDTH] IN BLOOD BY AUTOMATED COUNT: 13.6 % (ref 11.5–14.5)
GLOBULIN SER CALC-MCNC: 4.2 G/DL (ref 2–4)
GLUCOSE BLD STRIP.AUTO-MCNC: 105 MG/DL (ref 65–100)
GLUCOSE BLD STRIP.AUTO-MCNC: 115 MG/DL (ref 65–100)
GLUCOSE BLD STRIP.AUTO-MCNC: 23 MG/DL (ref 65–100)
GLUCOSE BLD STRIP.AUTO-MCNC: 243 MG/DL (ref 65–100)
GLUCOSE BLD STRIP.AUTO-MCNC: 25 MG/DL (ref 65–100)
GLUCOSE BLD STRIP.AUTO-MCNC: 357 MG/DL (ref 65–100)
GLUCOSE SERPL-MCNC: 27 MG/DL (ref 65–100)
HCT VFR BLD AUTO: 29.6 % (ref 35–47)
HGB BLD-MCNC: 9.8 G/DL (ref 11.5–16)
IMM GRANULOCYTES # BLD AUTO: 0 K/UL
IMM GRANULOCYTES NFR BLD AUTO: 0 %
LYMPHOCYTES # BLD: 1.5 K/UL (ref 0.8–3.5)
LYMPHOCYTES NFR BLD: 27 % (ref 12–49)
MAGNESIUM SERPL-MCNC: 1.9 MG/DL (ref 1.6–2.4)
MCH RBC QN AUTO: 31.8 PG (ref 26–34)
MCHC RBC AUTO-ENTMCNC: 33.1 G/DL (ref 30–36.5)
MCV RBC AUTO: 96.1 FL (ref 80–99)
MONOCYTES # BLD: 0.5 K/UL (ref 0–1)
MONOCYTES NFR BLD: 9 % (ref 5–13)
NEUTS BAND NFR BLD MANUAL: 2 % (ref 0–6)
NEUTS SEG # BLD: 3.6 K/UL (ref 1.8–8)
NEUTS SEG NFR BLD: 62 % (ref 32–75)
NRBC # BLD: 0 K/UL (ref 0–0.01)
NRBC BLD-RTO: 0 PER 100 WBC
PHOSPHATE SERPL-MCNC: 3.2 MG/DL (ref 2.6–4.7)
PLATELET # BLD AUTO: 185 K/UL (ref 150–400)
PMV BLD AUTO: 10.4 FL (ref 8.9–12.9)
POTASSIUM SERPL-SCNC: 3.6 MMOL/L (ref 3.5–5.1)
PROT SERPL-MCNC: 6 G/DL (ref 6.4–8.2)
RBC # BLD AUTO: 3.08 M/UL (ref 3.8–5.2)
RBC MORPH BLD: ABNORMAL
RBC MORPH BLD: ABNORMAL
SERVICE CMNT-IMP: ABNORMAL
SODIUM SERPL-SCNC: 140 MMOL/L (ref 136–145)
WBC # BLD AUTO: 5.6 K/UL (ref 3.6–11)

## 2020-01-18 PROCEDURE — 84100 ASSAY OF PHOSPHORUS: CPT

## 2020-01-18 PROCEDURE — 94760 N-INVAS EAR/PLS OXIMETRY 1: CPT

## 2020-01-18 PROCEDURE — 65270000029 HC RM PRIVATE

## 2020-01-18 PROCEDURE — 74011636637 HC RX REV CODE- 636/637: Performed by: INTERNAL MEDICINE

## 2020-01-18 PROCEDURE — 74011250636 HC RX REV CODE- 250/636: Performed by: INTERNAL MEDICINE

## 2020-01-18 PROCEDURE — 74011636637 HC RX REV CODE- 636/637: Performed by: FAMILY MEDICINE

## 2020-01-18 PROCEDURE — 74011000258 HC RX REV CODE- 258: Performed by: INTERNAL MEDICINE

## 2020-01-18 PROCEDURE — 94664 DEMO&/EVAL PT USE INHALER: CPT

## 2020-01-18 PROCEDURE — 74011000250 HC RX REV CODE- 250: Performed by: INTERNAL MEDICINE

## 2020-01-18 PROCEDURE — 74011250637 HC RX REV CODE- 250/637: Performed by: INTERNAL MEDICINE

## 2020-01-18 PROCEDURE — 74011000258 HC RX REV CODE- 258: Performed by: FAMILY MEDICINE

## 2020-01-18 PROCEDURE — 82962 GLUCOSE BLOOD TEST: CPT

## 2020-01-18 PROCEDURE — 94640 AIRWAY INHALATION TREATMENT: CPT

## 2020-01-18 PROCEDURE — 74011250636 HC RX REV CODE- 250/636: Performed by: FAMILY MEDICINE

## 2020-01-18 PROCEDURE — 83735 ASSAY OF MAGNESIUM: CPT

## 2020-01-18 PROCEDURE — 80053 COMPREHEN METABOLIC PANEL: CPT

## 2020-01-18 PROCEDURE — 74011250637 HC RX REV CODE- 250/637: Performed by: FAMILY MEDICINE

## 2020-01-18 PROCEDURE — 36415 COLL VENOUS BLD VENIPUNCTURE: CPT

## 2020-01-18 PROCEDURE — 85025 COMPLETE CBC W/AUTO DIFF WBC: CPT

## 2020-01-18 RX ORDER — INSULIN LISPRO 100 [IU]/ML
INJECTION, SOLUTION INTRAVENOUS; SUBCUTANEOUS
Status: DISCONTINUED | OUTPATIENT
Start: 2020-01-18 | End: 2020-01-19 | Stop reason: HOSPADM

## 2020-01-18 RX ORDER — POTASSIUM CHLORIDE 750 MG/1
40 TABLET, FILM COATED, EXTENDED RELEASE ORAL
Status: COMPLETED | OUTPATIENT
Start: 2020-01-18 | End: 2020-01-18

## 2020-01-18 RX ADMIN — ATORVASTATIN CALCIUM 10 MG: 10 TABLET, FILM COATED ORAL at 08:54

## 2020-01-18 RX ADMIN — HEPARIN SODIUM 5000 UNITS: 5000 INJECTION INTRAVENOUS; SUBCUTANEOUS at 17:04

## 2020-01-18 RX ADMIN — AZITHROMYCIN MONOHYDRATE 500 MG: 500 INJECTION, POWDER, LYOPHILIZED, FOR SOLUTION INTRAVENOUS at 13:19

## 2020-01-18 RX ADMIN — FLUTICASONE PROPIONATE 1 SPRAY: 50 SPRAY, METERED NASAL at 08:55

## 2020-01-18 RX ADMIN — INSULIN LISPRO 5 UNITS: 100 INJECTION, SOLUTION INTRAVENOUS; SUBCUTANEOUS at 17:03

## 2020-01-18 RX ADMIN — IPRATROPIUM BROMIDE AND ALBUTEROL SULFATE 3 ML: .5; 3 SOLUTION RESPIRATORY (INHALATION) at 10:10

## 2020-01-18 RX ADMIN — POTASSIUM CHLORIDE 40 MEQ: 750 TABLET, FILM COATED, EXTENDED RELEASE ORAL at 13:19

## 2020-01-18 RX ADMIN — DEXTROMETHORPHAN 30 MG: 30 SUSPENSION, EXTENDED RELEASE ORAL at 21:42

## 2020-01-18 RX ADMIN — HEPARIN SODIUM 5000 UNITS: 5000 INJECTION INTRAVENOUS; SUBCUTANEOUS at 08:54

## 2020-01-18 RX ADMIN — DEXTROSE MONOHYDRATE 250 ML: 10 INJECTION, SOLUTION INTRAVENOUS at 07:27

## 2020-01-18 RX ADMIN — CEFTRIAXONE 1 G: 1 INJECTION, POWDER, FOR SOLUTION INTRAMUSCULAR; INTRAVENOUS at 08:53

## 2020-01-18 RX ADMIN — FLUTICASONE PROPIONATE 1 SPRAY: 50 SPRAY, METERED NASAL at 20:28

## 2020-01-18 RX ADMIN — LEVOTHYROXINE SODIUM 100 MCG: 100 TABLET ORAL at 08:59

## 2020-01-18 RX ADMIN — ASPIRIN 81 MG: 81 TABLET, COATED ORAL at 08:54

## 2020-01-18 RX ADMIN — PANTOPRAZOLE SODIUM 40 MG: 40 TABLET, DELAYED RELEASE ORAL at 08:54

## 2020-01-18 RX ADMIN — IPRATROPIUM BROMIDE AND ALBUTEROL SULFATE 3 ML: .5; 3 SOLUTION RESPIRATORY (INHALATION) at 21:23

## 2020-01-18 RX ADMIN — PREDNISONE 40 MG: 20 TABLET ORAL at 08:54

## 2020-01-18 NOTE — PROGRESS NOTES
visit for Advance Medical Directive (AMD) consult. Upon review of pt's chart pt currently has an AMD in her chart. Please contact [phone] [address] [facility] for further support.  follow up as needed.      Bobo Bright, [facility]   [phone]

## 2020-01-18 NOTE — ACP (ADVANCE CARE PLANNING)
visit for Advance Medical Directive (AMD) consult. Upon review of pt's chart pt currently has an AMD in her chart. Please contact [phone] [address] [facility] for further support.  follow up as needed.      Bobo Bright, [employer] [facility]   [phone]

## 2020-01-18 NOTE — PROGRESS NOTES
[facility] Sugar at 0717 that resulted at 25 and repeat at 0719 that resulted at 23. RN called into the room. Patient was lethargic but arousable. This Rn started D10. Dr. Barrie Morales Paged and made aware. As D10 was infusing, patient woke up and was able to drink 2 apple juices. Patient was also given crackers and peanut butter. [facility] PCT rechecked POC blood sugar at 0805 for 115. Day shift Rn made aware of the situation and will continue to monitor this am.     0815: received call from lab ([facility]) to report serum glucose of 27. It is of note that the patient's blood was drawn at 0530 1/18 but did not result in the lab until 0800.

## 2020-01-18 NOTE — PROGRESS NOTES
Patient name: Carola Santos  MRN: [id number]    Nephrology Progress note:    Assessment:  ROBBY on CKD-3: Presenting serum Cr 2.4mg/dl likely from recent diuresis. Improving with IVF-> down to 1.6mg/dl today. Azotemia exacerbated by steroids. Patient has high grade proteinuria (nephrotic range) of 5gm-> thus high risk for CKD progression. Suspect underlying DM nephropathy. Baseline serum Cr 1 to 1.4mg/dl. Followed by Dr. Tracy Chiu     DM2: Hyperglycemia improved. +Hypoglycemic this morning.     Metabolic acidosis: likely 2 to IV NS/hyperchloremia-> better with IV bicarb     HTN     Acute bronchitis/PNA     HYponatremia: pseudo from hyperglycemia-> better     Down Syndrome    Hypokalemia    Plan/Recommendations:  Ct IV NS at 75cc/hr  Oral KCl 40meq x1 dose  WOrk on hypoglycemia  Hold diuretics-> can resume lower dose of Torsemide 20mg daily on discharge  IV Abx  Steroids/nebs  Am labs    Subjective:  Improving cough. +Hypoglycemic this morning.      ROS:   No nausea, no vomiting  No chest pain    Exam:  Visit Vitals  /69 (BP 1 Location: Left arm, BP Patient Position: At rest)   Pulse 86   Temp 98 °F (36.7 °C)   Resp 17   Wt 75.6 kg (166 lb 9.6 oz)   LMP 03/27/1986   SpO2 94%   BMI 32.54 kg/m²     Wt Readings from Last 3 Encounters:   01/17/20 75.6 kg (166 lb 9.6 oz)   12/16/18 66.4 kg (146 lb 6.2 oz)   04/01/18 89 kg (196 lb 3.4 oz)       Intake/Output Summary (Last 24 hours) at 1/18/2020 1148  Last data filed at 1/17/2020 1338  Gross per 24 hour   Intake 350 ml   Output --   Net 350 ml       Gen: NAD  HEENT: Down's facies  Lungs/Chest wall: IMproving aeration. Few scattered rhonchi (better)  Cardiovascular: Regular rate, normal rhythm. Abdomen/: Soft, NT, ND, BS+. Ext:  No peripheral edema  CNS: alert and awake.  Answers appropriately      Current Facility-Administered Medications   Medication Dose Route Frequency Last Dose    insulin lispro (HUMALOG) injection   SubCUTAneous TIDAC      potassium chloride SR (KLOR-CON 10) tablet 40 mEq  40 mEq Oral NOW      0.9% sodium chloride infusion  75 mL/hr IntraVENous CONTINUOUS 75 mL/hr at 01/17/20 1225    guaiFENesin (ROBITUSSIN) 100 mg/5 mL oral liquid 100 mg  100 mg Oral Q4H  mg at 01/17/20 2301    cefTRIAXone (ROCEPHIN) 1 g in 0.9% sodium chloride (MBP/ADV) 50 mL  1 g IntraVENous Q24H 1 g at 01/18/20 0853    albuterol (PROVENTIL VENTOLIN) nebulizer solution 2.5 mg  2.5 mg Nebulization Q4H PRN 2.5 mg at 01/17/20 0110    atorvastatin (LIPITOR) tablet 10 mg  10 mg Oral DAILY 10 mg at 01/18/20 0854    benzonatate (TESSALON) capsule 100 mg  100 mg Oral TID  mg at 01/17/20 1818    dextromethorphan (DELSYM) 30 mg/5 mL syrup 30 mg  30 mg Oral Q12H PRN      pantoprazole (PROTONIX) tablet 40 mg  40 mg Oral ACB 40 mg at 01/18/20 0854    fluticasone propionate (FLONASE) 50 mcg/actuation nasal spray 1 Spray  1 Spray Both Nostrils BID 1 Spray at 01/18/20 0855    [Held by provider] insulin lispro (HUMALOG) injection 8 Units  8 Units SubCUTAneous TID WITH MEALS 8 Units at 01/17/20 1629    albuterol-ipratropium (DUO-NEB) 2.5 MG-0.5 MG/3 ML  3 mL Nebulization QID RT 3 mL at 01/18/20 1010    aspirin delayed-release tablet 81 mg  81 mg Oral DAILY 81 mg at 01/18/20 0854    levothyroxine (SYNTHROID) tablet 100 mcg  100 mcg Oral Once per day on Mon Tue Wed Thu Fri Sat 100 mcg at 01/18/20 0859    sodium chloride (NS) flush 5-40 mL  5-40 mL IntraVENous Q8H 10 mL at 01/17/20 1338    sodium chloride (NS) flush 5-40 mL  5-40 mL IntraVENous PRN 10 mL at 01/16/20 1250    heparin (porcine) injection 5,000 Units  5,000 Units SubCUTAneous Q8H 5,000 Units at 01/18/20 0854    predniSONE (DELTASONE) tablet 40 mg  40 mg Oral DAILY WITH BREAKFAST 40 mg at 01/18/20 0854    azithromycin (ZITHROMAX) 500 mg in 0.9% sodium chloride (MBP/ADV) 250 mL  500 mg IntraVENous Q24H 500 mg at 01/17/20 1338    glucose chewable tablet 16 g  4 Tab Oral PRN      glucagon (GLUCAGEN) injection 1 mg  1 mg IntraMUSCular PRN      dextrose 10% infusion 0-250 mL  0-250 mL IntraVENous  mL at 01/18/20 0727    acetaminophen (TYLENOL) tablet 650 mg  650 mg Oral Q4H  mg at 01/15/20 1925       Labs/Data:    Lab Results   Component Value Date/Time    WBC 5.6 01/18/2020 05:37 AM    Hemoglobin (POC) 12.2 11/05/2012 03:10 PM    HGB 9.8 (L) 01/18/2020 05:37 AM    Hematocrit (POC) 36 11/05/2012 03:10 PM    HCT 29.6 (L) 01/18/2020 05:37 AM    PLATELET 971 03/25/9516 05:37 AM    MCV 96.1 01/18/2020 05:37 AM       Lab Results   Component Value Date/Time    Sodium 140 01/18/2020 05:37 AM    Potassium 3.6 01/18/2020 05:37 AM    Chloride 111 (H) 01/18/2020 05:37 AM    CO2 23 01/18/2020 05:37 AM    Anion gap 6 01/18/2020 05:37 AM    Glucose 27 (LL) 01/18/2020 05:37 AM    BUN 54 (H) 01/18/2020 05:37 AM    Creatinine 1.68 (H) 01/18/2020 05:37 AM    BUN/Creatinine ratio 32 (H) 01/18/2020 05:37 AM    GFR est AA 39 (L) 01/18/2020 05:37 AM    GFR est non-AA 32 (L) 01/18/2020 05:37 AM    Calcium 8.1 (L) 01/18/2020 05:37 AM       Patient seen and examined. Chart reviewed. Labs, data and other pertinent notes reviewed in last 24 hrs.     Discussed with patient and Dr. Yudy Ruiz by:  Tani Moreno MD  [phone]

## 2020-01-18 NOTE — PROGRESS NOTES
[facility]                                                                                          Hospitalist Progress Note  Renard Garner MD  Answering service: [phone] or [phone] from in house phone        Date of Service:  2020  NAME:  Lesli Humphrey  :  1970  MRN:  [id number]      Admission Summary:   The patient is a 74-year-old female with past medical history of Down syndrome, asthma, diabetes, hypothyroidism, who presents to the hospital with the above-mentioned symptom. The patient lives at [facility] with her mother. Currently, her sister is present at the bedside. History was primarily obtained from the patient as well as her sister. The patient reports that this morning she was not feeling well, she was having some shortness of breath associated with cough, also her blood glucose was low. She checked her blood glucose, and it was \"in the 70s. \"  The patient apparently this morning was short of breath, and usually when she is short of breath, she feels confused and cries, gets diaphoretic, and she is usually treated with orange and cranberry juice. The sister reports the patient was a little lethargic this morning, but she is not very sure as to how the events unfolded. She reports that the patient has had some swelling in her legs, follows up with Dr. Nimisha Pelaez who is her nephrologist.  Recently, her Lasix was stopped, and she was started on a new diuretic of which the sister does not remember the name. She also reports that the patient has been having some cough. She saw a pulmonologist 8 days ago, and they were not very sure as to what is going on and \"no acute treatment was done and she is not taking any inhalers. \"  The patient came to the ER, was found to be hypoxic and was found to have a blood glucose of 400-500 and was requested to be admitted under the hospitalist service.     Interval history / Subjective:   Severe hypoglycmeia this AM with BS in the 20's despite being on home dose of insulin. Pt says this has happened to her in the past, and per her mom has been associated with seizures as well in the past. Per mom she needs to eat an evening snack  Breathing is slowly improving. Assessment & Plan:     Community-acquired pneumonia, left lower lobe airspace disease on CT, also + viral PNA with human metaneumovirus (hypoxic failure ruled out)  -Continue ceftriaxone and azithromycin while inpatient, may transition to oral on discharge total 5-7 day course. -O2 as needed    Acute asthma exacerbation - imrpoving. worsened by CAP, and positive human metapneumovirus  -Scheduled nebs  -Prednisone 40 mg daily, x5 days  -Chest PT  -O2 if needed    ROBBY - Cr up to 2.4, from baseline of ~1.0, improving   -Nephrology following  -0.9NaCL at 76 today, will DC tomorrow am   -Repeat basic metabolic panel tomorrow     Acute metabolic acidosis, non-gap, likely secondary to dehydration, improved. - BMP tomorrow am   - nephrology following    Uncontrolled type 2 diabetes - A1c 8.7% with Severe hypoglycemia event this am (20's)  - Holding home long acting insulin given low tonight   - POCT glucose checks   - Hold mealtime + SSI, may need to increase further as patient seems to have post meal highs and not overnight. - Hypoglycemia protocol    Code status: FULL  DVT prophylaxis: heparin     Care Plan discussed with: Patient/Family  Disposition: TBD, plan to DC tomorrow 01/19 if no further lows      Hospital Problems  Date Reviewed: 12/15/2018          Codes Class Noted POA    ROBBY (acute kidney injury) [facility]) ICD-10-CM: N17.9  ICD-9-CM: 584.9  1/15/2020 Unknown                Review of Systems:   A comprehensive review of systems was negative except for that written in the HPI. Vital Signs:    Last 24hrs VS reviewed since prior progress note. Most recent are:  Visit Vitals  /80 (BP 1 Location: Left arm, BP Patient Position:  At rest)   Pulse 91   Temp 98.1 °F (36.7 °C)   Resp 14   Wt 75.6 kg (166 lb 9.6 oz)   SpO2 97%   Breastfeeding No   BMI 32.54 kg/m²       No intake or output data in the 24 hours ending 01/18/20 1627     Physical Examination:             Constitutional:  No acute distress, cooperative, pleasant, down facies   ENT:  Oral mucous moist, oropharynx benign. Resp:  + mild exp wheezing in all lung fields, course bilaterally, no increased work of breathing. + cough. CV:  Regular rhythm, normal rate, no murmurs, gallops, rubs    GI:  Soft, non distended, non tender. normoactive bowel sounds, no hepatosplenomegaly     Musculoskeletal:  trace edema, warm, 2+ pulses throughout    Neurologic:  Moves all extremities. Answering questions appropriately      Skin:  Good turgor, no rashes or ulcers       Data Review:    Review and/or order of clinical lab test  Review and/or order of tests in the radiology section of CPT  Review and/or order of tests in the medicine section of CPT      Labs:     Recent Labs     01/18/20  0537 01/17/20  0300   WBC 5.6 4.6   HGB 9.8* 9.5*   HCT 29.6* 29.9*    157     Recent Labs     01/18/20  0537 01/17/20  0300 01/16/20  1619    136 131*   K 3.6 3.8 4.0   * 106 103   CO2 23 22 18*   BUN 54* 54* 50*   CREA 1.68* 1.94* 2.24*   GLU 27* 86 431*   CA 8.1* 8.1* 8.2*   MG 1.9 1.8  --    PHOS 3.2 3.2  --      Recent Labs     01/18/20  0537 01/17/20  0300   SGOT 50* 41*   ALT 33 27   AP 82 84   TBILI <0.1* 0.1*   TP 6.0* 6.1*   ALB 1.8* 1.8*   GLOB 4.2* 4.3*     No results for input(s): INR, PTP, APTT, INREXT, INREXT in the last 72 hours. No results for input(s): FE, TIBC, PSAT, FERR in the last 72 hours. Lab Results   Component Value Date/Time    Folate 28.7 (H) 12/15/2018 05:09 AM      No results for input(s): PH, PCO2, PO2 in the last 72 hours. No results for input(s): CPK, CKNDX, TROIQ in the last 72 hours.     No lab exists for component: CPKMB  Lab Results   Component Value Date/Time    Cholesterol, total 107 12/16/2018 04:08 AM    HDL Cholesterol 42 12/16/2018 04:08 AM    LDL, calculated 36.4 12/16/2018 04:08 AM    Triglyceride 143 12/16/2018 04:08 AM    CHOL/HDL Ratio 2.5 12/16/2018 04:08 AM     Lab Results   Component Value Date/Time    Glucose (POC) 105 (H) 01/18/2020 11:35 AM    Glucose (POC) 115 (H) 01/18/2020 08:04 AM    Glucose (POC) 23 (LL) 01/18/2020 07:19 AM    Glucose (POC) 25 (LL) 01/18/2020 07:17 AM    Glucose (POC) 231 (H) 01/17/2020 09:38 PM     Lab Results   Component Value Date/Time    Color YELLOW/STRAW 01/15/2020 11:59 AM    Appearance CLEAR 01/15/2020 11:59 AM    Specific gravity 1.010 01/15/2020 11:59 AM    Specific gravity 1.015 04/29/2013 01:50 PM    pH (UA) 6.5 01/15/2020 11:59 AM    Protein 300 (A) 01/15/2020 11:59 AM    Glucose 250 (A) 01/15/2020 11:59 AM    Ketone NEGATIVE  01/15/2020 11:59 AM    Bilirubin NEGATIVE  01/15/2020 11:59 AM    Urobilinogen 0.2 01/15/2020 11:59 AM    Nitrites NEGATIVE  01/15/2020 11:59 AM    Leukocyte Esterase NEGATIVE  01/15/2020 11:59 AM    Glucose/Ketones  02/08/2009 07:00 AM     GLUCOSE AND KETONES SIGNIFICANTLY ELEVATED.  RESULTS PHONED TO AND READ BACK BY    Epithelial cells FEW 01/15/2020 11:59 AM    Bacteria NEGATIVE  01/15/2020 11:59 AM    WBC 0-4 01/15/2020 11:59 AM    RBC 0-5 01/15/2020 11:59 AM         Medications Reviewed:     Current Facility-Administered Medications   Medication Dose Route Frequency    insulin lispro (HUMALOG) injection   SubCUTAneous TIDAC    0.9% sodium chloride infusion  75 mL/hr IntraVENous CONTINUOUS    guaiFENesin (ROBITUSSIN) 100 mg/5 mL oral liquid 100 mg  100 mg Oral Q4H PRN    cefTRIAXone (ROCEPHIN) 1 g in 0.9% sodium chloride (MBP/ADV) 50 mL  1 g IntraVENous Q24H    albuterol (PROVENTIL VENTOLIN) nebulizer solution 2.5 mg  2.5 mg Nebulization Q4H PRN    atorvastatin (LIPITOR) tablet 10 mg  10 mg Oral DAILY    benzonatate (TESSALON) capsule 100 mg  100 mg Oral TID PRN    dextromethorphan (DELSYM) 30 mg/5 mL syrup 30 mg  30 mg Oral Q12H PRN    pantoprazole (PROTONIX) tablet 40 mg  40 mg Oral ACB    fluticasone propionate (FLONASE) 50 mcg/actuation nasal spray 1 Spray  1 Spray Both Nostrils BID    [Held by provider] insulin lispro (HUMALOG) injection 8 Units  8 Units SubCUTAneous TID WITH MEALS    albuterol-ipratropium (DUO-NEB) 2.5 MG-0.5 MG/3 ML  3 mL Nebulization QID RT    aspirin delayed-release tablet 81 mg  81 mg Oral DAILY    levothyroxine (SYNTHROID) tablet 100 mcg  100 mcg Oral Once per day on Mon Tue Wed Thu Fri Sat    sodium chloride (NS) flush 5-40 mL  5-40 mL IntraVENous Q8H    sodium chloride (NS) flush 5-40 mL  5-40 mL IntraVENous PRN    heparin (porcine) injection 5,000 Units  5,000 Units SubCUTAneous Q8H    predniSONE (DELTASONE) tablet 40 mg  40 mg Oral DAILY WITH BREAKFAST    azithromycin (ZITHROMAX) 500 mg in 0.9% sodium chloride (MBP/ADV) 250 mL  500 mg IntraVENous Q24H    glucose chewable tablet 16 g  4 Tab Oral PRN    glucagon (GLUCAGEN) injection 1 mg  1 mg IntraMUSCular PRN    dextrose 10% infusion 0-250 mL  0-250 mL IntraVENous PRN    acetaminophen (TYLENOL) tablet 650 mg  650 mg Oral Q4H PRN     ______________________________________________________________________  EXPECTED LENGTH OF STAY: 3d 7h  ACTUAL LENGTH OF STAY:          2                 Nakia Wilson MD

## 2020-01-19 VITALS
OXYGEN SATURATION: 97 % | BODY MASS INDEX: 33.03 KG/M2 | RESPIRATION RATE: 18 BRPM | SYSTOLIC BLOOD PRESSURE: 156 MMHG | TEMPERATURE: 98 F | HEART RATE: 88 BPM | WEIGHT: 169.1 LBS | DIASTOLIC BLOOD PRESSURE: 78 MMHG

## 2020-01-19 PROBLEM — J45.901 ASTHMA EXACERBATION: Status: ACTIVE | Noted: 2020-01-19

## 2020-01-19 PROBLEM — E87.20 METABOLIC ACIDOSIS: Status: ACTIVE | Noted: 2020-01-19

## 2020-01-19 LAB
ANION GAP SERPL CALC-SCNC: 5 MMOL/L (ref 5–15)
BASOPHILS # BLD: 0 K/UL (ref 0–0.1)
BASOPHILS NFR BLD: 0 % (ref 0–1)
BUN SERPL-MCNC: 51 MG/DL (ref 6–20)
BUN/CREAT SERPL: 28 (ref 12–20)
CALCIUM SERPL-MCNC: 7.6 MG/DL (ref 8.5–10.1)
CHLORIDE SERPL-SCNC: 109 MMOL/L (ref 97–108)
CO2 SERPL-SCNC: 20 MMOL/L (ref 21–32)
CREAT SERPL-MCNC: 1.84 MG/DL (ref 0.55–1.02)
DIFFERENTIAL METHOD BLD: ABNORMAL
EOSINOPHIL # BLD: 0 K/UL (ref 0–0.4)
EOSINOPHIL NFR BLD: 0 % (ref 0–7)
ERYTHROCYTE [DISTWIDTH] IN BLOOD BY AUTOMATED COUNT: 13.7 % (ref 11.5–14.5)
GLUCOSE BLD STRIP.AUTO-MCNC: 297 MG/DL (ref 65–100)
GLUCOSE BLD STRIP.AUTO-MCNC: 355 MG/DL (ref 65–100)
GLUCOSE SERPL-MCNC: 368 MG/DL (ref 65–100)
HCT VFR BLD AUTO: 29.6 % (ref 35–47)
HGB BLD-MCNC: 9.5 G/DL (ref 11.5–16)
IMM GRANULOCYTES # BLD AUTO: 0 K/UL
IMM GRANULOCYTES NFR BLD AUTO: 0 %
LYMPHOCYTES # BLD: 0.2 K/UL (ref 0.8–3.5)
LYMPHOCYTES NFR BLD: 3 % (ref 12–49)
MAGNESIUM SERPL-MCNC: 2 MG/DL (ref 1.6–2.4)
MCH RBC QN AUTO: 31.6 PG (ref 26–34)
MCHC RBC AUTO-ENTMCNC: 32.1 G/DL (ref 30–36.5)
MCV RBC AUTO: 98.3 FL (ref 80–99)
METAMYELOCYTES NFR BLD MANUAL: 2 %
MONOCYTES # BLD: 0.6 K/UL (ref 0–1)
MONOCYTES NFR BLD: 8 % (ref 5–13)
NEUTS BAND NFR BLD MANUAL: 1 % (ref 0–6)
NEUTS SEG # BLD: 6.5 K/UL (ref 1.8–8)
NEUTS SEG NFR BLD: 86 % (ref 32–75)
NRBC # BLD: 0 K/UL (ref 0–0.01)
NRBC BLD-RTO: 0 PER 100 WBC
PHOSPHATE SERPL-MCNC: 3.6 MG/DL (ref 2.6–4.7)
PLATELET # BLD AUTO: 175 K/UL (ref 150–400)
PMV BLD AUTO: 11.1 FL (ref 8.9–12.9)
POTASSIUM SERPL-SCNC: 4.8 MMOL/L (ref 3.5–5.1)
RBC # BLD AUTO: 3.01 M/UL (ref 3.8–5.2)
RBC MORPH BLD: ABNORMAL
RBC MORPH BLD: ABNORMAL
SERVICE CMNT-IMP: ABNORMAL
SERVICE CMNT-IMP: ABNORMAL
SODIUM SERPL-SCNC: 134 MMOL/L (ref 136–145)
WBC # BLD AUTO: 7.5 K/UL (ref 3.6–11)

## 2020-01-19 PROCEDURE — 83735 ASSAY OF MAGNESIUM: CPT

## 2020-01-19 PROCEDURE — 74011250636 HC RX REV CODE- 250/636: Performed by: INTERNAL MEDICINE

## 2020-01-19 PROCEDURE — 74011250637 HC RX REV CODE- 250/637: Performed by: INTERNAL MEDICINE

## 2020-01-19 PROCEDURE — 82962 GLUCOSE BLOOD TEST: CPT

## 2020-01-19 PROCEDURE — 74011000250 HC RX REV CODE- 250: Performed by: INTERNAL MEDICINE

## 2020-01-19 PROCEDURE — 36415 COLL VENOUS BLD VENIPUNCTURE: CPT

## 2020-01-19 PROCEDURE — 74011000258 HC RX REV CODE- 258: Performed by: INTERNAL MEDICINE

## 2020-01-19 PROCEDURE — 80048 BASIC METABOLIC PNL TOTAL CA: CPT

## 2020-01-19 PROCEDURE — 74011250636 HC RX REV CODE- 250/636: Performed by: FAMILY MEDICINE

## 2020-01-19 PROCEDURE — 74011636637 HC RX REV CODE- 636/637: Performed by: INTERNAL MEDICINE

## 2020-01-19 PROCEDURE — 85025 COMPLETE CBC W/AUTO DIFF WBC: CPT

## 2020-01-19 PROCEDURE — 94640 AIRWAY INHALATION TREATMENT: CPT

## 2020-01-19 PROCEDURE — 74011250637 HC RX REV CODE- 250/637: Performed by: FAMILY MEDICINE

## 2020-01-19 PROCEDURE — 84100 ASSAY OF PHOSPHORUS: CPT

## 2020-01-19 PROCEDURE — 74011636637 HC RX REV CODE- 636/637: Performed by: FAMILY MEDICINE

## 2020-01-19 RX ORDER — ALBUTEROL SULFATE 0.83 MG/ML
2.5 SOLUTION RESPIRATORY (INHALATION)
Qty: 30 NEBULE | Refills: 2 | Status: SHIPPED | OUTPATIENT
Start: 2020-01-19 | End: 2020-02-18

## 2020-01-19 RX ORDER — TORSEMIDE 20 MG/1
20 TABLET ORAL DAILY
Qty: 30 TAB | Refills: 0 | Status: SHIPPED | OUTPATIENT
Start: 2020-01-19 | End: 2020-02-18

## 2020-01-19 RX ORDER — CEPHALEXIN 500 MG/1
500 CAPSULE ORAL 3 TIMES DAILY
Qty: 6 CAP | Refills: 0 | Status: SHIPPED | OUTPATIENT
Start: 2020-01-19 | End: 2020-01-21

## 2020-01-19 RX ORDER — INSULIN DEGLUDEC 100 U/ML
7 INJECTION, SOLUTION SUBCUTANEOUS
Qty: 1 VIAL | Refills: 0 | Status: SHIPPED | OUTPATIENT
Start: 2020-01-19 | End: 2020-02-18

## 2020-01-19 RX ADMIN — CEFTRIAXONE 1 G: 1 INJECTION, POWDER, FOR SOLUTION INTRAMUSCULAR; INTRAVENOUS at 09:33

## 2020-01-19 RX ADMIN — INSULIN LISPRO 6 UNITS: 100 INJECTION, SOLUTION INTRAVENOUS; SUBCUTANEOUS at 07:54

## 2020-01-19 RX ADMIN — ASPIRIN 81 MG: 81 TABLET, COATED ORAL at 09:33

## 2020-01-19 RX ADMIN — IPRATROPIUM BROMIDE AND ALBUTEROL SULFATE 3 ML: .5; 3 SOLUTION RESPIRATORY (INHALATION) at 09:34

## 2020-01-19 RX ADMIN — PANTOPRAZOLE SODIUM 40 MG: 40 TABLET, DELAYED RELEASE ORAL at 07:55

## 2020-01-19 RX ADMIN — PREDNISONE 40 MG: 20 TABLET ORAL at 07:55

## 2020-01-19 RX ADMIN — FLUTICASONE PROPIONATE 1 SPRAY: 50 SPRAY, METERED NASAL at 09:33

## 2020-01-19 RX ADMIN — HEPARIN SODIUM 5000 UNITS: 5000 INJECTION INTRAVENOUS; SUBCUTANEOUS at 09:33

## 2020-01-19 RX ADMIN — ATORVASTATIN CALCIUM 10 MG: 10 TABLET, FILM COATED ORAL at 09:33

## 2020-01-19 NOTE — DISCHARGE SUMMARY
Discharge Summary       PATIENT ID: Melva Garica  MRN: [id number]   YOB: 1970    DATE OF ADMISSION: 1/15/2020 11:14 AM    DATE OF DISCHARGE: 01/19/2020   PRIMARY CARE PROVIDER: Stark Schlatter, MD     DISCHARGING PROVIDER: Zeke Espitia MD    To contact this individual call [phone] and ask the  to page. If unavailable ask to be transferred the Adult Hospitalist Department. CONSULTATIONS: None    PROCEDURES/SURGERIES: * No surgery found *    ADMITTING DIAGNOSES & HOSPITAL COURSE:   Asthma exacerbation  CAP  Human Gull[place]-pneumovirus   Type 1 diabetes with hyper and hypoglycemia   ROBBY    Per admitting physician   The patient is a 58-year-old female with past medical history of Down syndrome, asthma, diabetes, hypothyroidism, who presents to the hospital with the above-mentioned symptom.  The patient lives at [facility] with her mother. Victor M Avalos, her sister is present at the bedside. [place] Maroon was primarily obtained from the patient as well as her sister. Ulysses Fregoso patient reports that this morning she was not feeling well, she was having some shortness of breath associated with cough, also her blood glucose was low.  She checked her blood glucose, and it was \"in the 70s. \"  The patient apparently this morning was short of breath, and usually when she is short of breath, she feels confused and cries, gets diaphoretic, and she is usually treated with orange and cranberry juice.  The sister reports the patient was a little lethargic this morning, but she is not very sure as to how the events unfolded.  She reports that the patient has had some swelling in her legs, follows up with Dr. Talia Galeas who is her nephrologist.  Recently, her Lasix was stopped, and she was started on a new diuretic of which the sister does not remember the name. Arturo Timgabe also reports that the patient has been having some cough.  She saw a pulmonologist 8 days ago, and they were not very sure as to what is going on and \"no acute treatment was done and she is not taking any inhalers. \"  The patient came to the ER, was found to be hypoxic and was found to have a blood glucose of 400-500 and was requested to be admitted under the hospitalist service. Hospital course:  Admitted for CAP, asthma exacerbation and human meta-pneumovirus infection. Received nebs, steroids, and antibitoics with improvement. She also was noted to have ROBBY on admission, which improved with IVF. In addition had severe metabolic acidosis, presumed both from her CKD and compounded by dehydration. She was stable from respiratory standpoint on 01/18, however that am had a very low BS in the am of 27 despite being on home insulin. Suspect she is now holding on to insulin a bit longer due to her reduced kidney function. Her long acting was held, and her short acting was continued, with improvement. We will discharge her on 1/2 home insulin glargine dose with strict instructions to follow up with her PCP and endocrinologist tomorrow. He kidney function improved, however not yet back to prior baseline. She will need to follow up with her nephrologist.     Daryl Carr / PLAN:      Community-acquired pneumonia, left lower lobe airspace disease on CT, also + viral PNA with human metaneumovirus (hypoxic failure ruled out)  -Continue ceftriaxone and azithromycin while inpatient, may transition to oral on discharge total 5-7 day course. - Completed azithromycin 5 days, to finish x 2 days of Augmentin for home.   -O2 as needed, remains stable on RA  - Although she has allergy to PCN, has tolerated cephalosporins well inpatient, and very low cross reactivity.      Acute asthma exacerbation - imrpoving. worsened by CAP, and positive human metapneumovirus, much improved.  No wheezing today on exam (1/19)  -Scheduled nebs, continue at home.   -Prednisone 40 mg daily, x5 days, completed 01/19  -Chest PT  -O2 if needed     ROBBY - Cr up to 2.4, from baseline of ~1.4, improving now at 1.8 on discharge. -Nephrology following  - s/p IV hydration with improvement. -Repeat basic metabolic panel next week by PCP/nephrologist.      Acute metabolic acidosis, non-gap, likely secondary to dehydration, improved. - resolved. - nephrology following     Uncontrolled type 1 diabetes - Brittle A1c 8.7% with Severe hypoglycemia event 01/18 (20's)  - Will plan to DC on half long acting dose with strict plans to follow up with endocrinologist tomorrow 01/20.   - POCT glucose checks   - Continue mealtime home regimen. - Hypoglycemia protocol        ADDITIONAL CARE RECOMMENDATIONS:   - Please take all medications as prescribed. Note changes as below. ** reduce your torsemide to 20mg daily   ** reduce your long acting insulin (U-100) to 7U daily, until you follow up with your endocrinologist or your PCP  ** complete x 2 more days of antibiotics for your pneumonia  ** continue doing nebulizer treatments every 4-6 hours for the first 24 hours after discharge. After that you may do them on an as needed basis. - Please make sure to follow up with your primary care physician within 1-2 weeks of discharge for hospital follow up. You should also follow up with your endocrinologist on Monday regarding your blood sugars and your insulin dose. You should also follow up with the kidney doctors, Dr. Vasyl Yee next week for repeat kidney function test.   - You should expect to have a cough for some time. The cough from viral and bacterial pneumonia can unfortunately last for many weeks. You should not develop fevers however, as you have been fever free for a few days here in the hospital.   - Please make sure to monitor your breathing. If you have increased work of breathing, trouble completing sentences, significant wheezing, you should come back for further evaluation. You have now completed your steroid course. - Please get up slowly from a seated or laying position, avoid falls.    - Avoid tobacco, alcohol and other illicit drug use. PENDING TEST RESULTS:   At the time of discharge the following test results are still pending: None    FOLLOW UP APPOINTMENTS:    Follow-up Information     Follow up With Specialties Details Why Contact Info    Ernst Bernal MD Internal Medicine In 1 week  [facility]  [address] [address] [phone]      With your endocrinologist    Please call the office on Monday, and let them know your blood sugars and new regimen. Kenny Spann MD Nephrology In 1 week Call for folllow up of your kidney function [place]   [address]  [address] [phone]               DIET: Resume previous diet    ACTIVITY: Activity as tolerated    WOUND CARE: None     EQUIPMENT needed: None      DISCHARGE MEDICATIONS:  Current Discharge Medication List      START taking these medications    Details   cephALEXin (KEFLEX) 500 mg capsule Take 1 Cap by mouth three (3) times daily for 2 days. Qty: 6 Cap, Refills: 0         CONTINUE these medications which have CHANGED    Details   insulin degludec (TRESIBA U-100 INSULIN) 100 unit/mL soln 7 Units by SubCUTAneous route nightly for 30 days. Qty: 1 Vial, Refills: 0      torsemide (DEMADEX) 20 mg tablet Take 1 Tab by mouth daily for 30 days. Qty: 30 Tab, Refills: 0      albuterol (PROVENTIL VENTOLIN) 2.5 mg /3 mL (0.083 %) nebu 3 mL by Nebulization route every four (4) hours as needed for Wheezing for up to 30 days. Qty: 30 Nebule, Refills: 2         CONTINUE these medications which have NOT CHANGED    Details   biotin 5,000 mcg TbDi Take 5,000 mcg by mouth daily. cholecalciferol, vitamin D3, (VITAMIN D3) 2,000 unit tab Take 4,000 Units by mouth daily. insulin lispro (HUMALOG U-100 INSULIN) 100 unit/mL injection 3-4 Units by SubCUTAneous route Before breakfast, lunch, and dinner. Hold insulin if BS <80.  Hold if patient does not eat her meal. If BS >150 give with SS. levothyroxine (SYNTHROID) 100 mcg tablet Take 100 mcg by mouth. Take daily on Mon, Tues, Wed, Thur, Fri, Sat (none on Sun)      atorvastatin (LIPITOR) 10 mg tablet Take 10 mg by mouth daily. cranberry fruit 500 mg chew Take 500 mg by mouth daily. dextromethorphan (DELSYM) 30 mg/5 mL liquid Take 30 mg by mouth every twelve (12) hours as needed for Cough. ibuprofen (MOTRIN) 400 mg tablet Take 400 mg by mouth every six (6) hours as needed for Pain (Fever). esomeprazole (NEXIUM) 40 mg capsule Take 40 mg by mouth daily. benzonatate (TESSALON PERLES) 100 mg capsule Take 100 mg by mouth three (3) times daily as needed for Cough. ondansetron hcl (ZOFRAN) 4 mg tablet Take 4 mg by mouth every four (4) hours as needed for Nausea or Vomiting.      guaiFENesin (ROBITUSSIN) 100 mg/5 mL liquid Take 5 mL by mouth every four (4) hours as needed for Cough. Qty: 1 Bottle, Refills: 1    Comments: Please use diabetic tussin      L. acidoph & paracasei- S therm- Bifido (JEN-Q/RISAQUAD) 8 billion cell cap cap Take 1 Cap by mouth daily. Qty: 30 Cap, Refills: 0      albuterol (PROVENTIL HFA, VENTOLIN HFA, PROAIR HFA) 90 mcg/actuation inhaler Take 1 Puff by inhalation every four (4) hours as needed for Wheezing. Qty: 1 Inhaler, Refills: 0      glucose 4 gram chewable tablet Take 12 g by mouth as needed (BS < 60). glucagon (GLUCAGEN) 1 mg injection 1 mg by IntraMUSCular route once as needed (hypoglycemia if patient is unresponsive). fluticasone (FLONASE) 50 mcg/actuation nasal spray 1 [place] by Both Nostrils route two (2) times a day. minocycline (MINOCIN, DYNACIN) 50 mg capsule Take 1 capsule by mouth  daily  Qty: 90 Cap, Refills: 3      aspirin delayed-release 81 mg tablet Take 81 mg by mouth daily.          STOP taking these medications       Biotin 2,500 mcg cap Comments:   Reason for Stopping:         cholecalciferol (VITAMIN D3) 1,000 unit cap Comments:   Reason for Stopping: NOTIFY YOUR PHYSICIAN FOR ANY OF THE FOLLOWING:   Fever over 101 degrees for 24 hours. Chest pain, shortness of breath, fever, chills, nausea, vomiting, diarrhea, change in mentation, falling, weakness, bleeding. Severe pain or pain not relieved by medications. Or, any other signs or symptoms that you may have questions about. DISPOSITION:  X  Home With:   OT  PT  HH  RN       Long term SNF/Inpatient Rehab    Independent/assisted living    Hospice    Other:       PATIENT CONDITION AT DISCHARGE:     Functional status   X Poor     Deconditioned     Independent      Cognition   X  Lucid     Forgetful     Dementia      Catheters/lines (plus indication)    [facility]     PICC     PEG    X None      Code status    X Full code     DNR      PHYSICAL EXAMINATION AT DISCHARGE:  Visit Vitals  /78 (BP 1 Location: Right arm, BP Patient Position: Sitting)   Pulse 88   Temp 98 °F (36.7 °C)   Resp 18   Wt 76.7 kg (169 lb 1.6 oz)   LMP 03/27/1986   SpO2 97%   Breastfeeding No   BMI 33.03 kg/m²     Gen: NAD, awake in bed  HEENT: NC/AT, sclera anicteric, PERRL, EOMI  CV: RRR no m/r/g  Resp: CTA b/l no increased work of breathing, + congestion and cough.  No wheezing today   Abd: NT/ND, normal bowel sounds  Ext: 2+ pulses, no edema  Skin: No rashes      CHRONIC MEDICAL DIAGNOSES:  Problem List as of 1/19/2020 Date Reviewed: 1/18/2020          Codes Class Noted - Resolved    ROBBY (acute kidney injury) ([facility] ICD-10-CM: N17.9  ICD-9-CM: 584.9  1/15/2020 - Present        Advance directive on file ICD-10-CM: Z78.9  ICD-9-CM: V49.89  12/28/2015 - Present        Acne ICD-10-CM: L70.9  ICD-9-CM: 706.1  8/20/2014 - Present        Asthma (Chronic) ICD-10-CM: J45.909  ICD-9-CM: 493.90  4/29/2013 - Present        HTN (hypertension) (Chronic) ICD-10-CM: I10  ICD-9-CM: 401.9  4/29/2013 - Present        DM (diabetes mellitus), type 1, uncontrolled ([facility] ICD-10-CM: E10.65  ICD-9-CM: 250.03  11/14/2012 - Present        Down syndrome ICD-10-CM: Q90.9  ICD-9-CM: 758.0  7/23/2010 - Present        Acquired hypothyroidism ICD-10-CM: E03.9  ICD-9-CM: 244.9  7/23/2010 - Present        Anemia, unspecified ICD-10-CM: D64.9  ICD-9-CM: 285.9  7/23/2010 - Present        RESOLVED: Hyponatremia ICD-10-CM: E87.1  ICD-9-CM: 276.1  2/28/2018 - 1/1/2019        RESOLVED: DKA (diabetic ketoacidoses) ([facility] ICD-10-CM: E11.10  ICD-9-CM: 250.10  9/14/2017 - 1/1/2019        RESOLVED: Hyperglycemia ICD-10-CM: R73.9  ICD-9-CM: 790.29  7/28/2014 - 7/29/2014        RESOLVED: Syncope and collapse ICD-10-CM: R55  ICD-9-CM: 780.2  4/29/2013 - 1/1/2019        RESOLVED: Hypoglycemia, unspecified ICD-10-CM: E16.2  ICD-9-CM: 251.2  6/27/2012 - 1/1/2019        RESOLVED: Type II or unspecified type diabetes mellitus without mention of complication, uncontrolled ICD-10-CM: E11.65  ICD-9-CM: 250.02  7/23/2010 - 11/14/2012              Greater than 30 minutes were spent with the patient on counseling and coordination of care    Signed:   Evan Goodman MD  1/19/2020  9:02 AM

## 2020-01-19 NOTE — PROGRESS NOTES
1000 spoke with Monica Faulkner (sister) and notified family of discharge today. All questions answered. Patient pending transport by family to the [facility]. Will call report to the [place] per  note. 1010 Report called to [place] per  note. Patient awaiting family transport. 1145 Patient educated with discharge instructions and Rx. Patient and family verbalized understanding with adequate teach back. Patient signed copy of the discharge instructions that were then placed on the hard chart.

## 2020-01-19 NOTE — DISCHARGE INSTRUCTIONS
Dear Tim Gr,    Thank you for choosing [address] [place] [address] for your healthcare needs. We strive to provide EXCELLENT care to you and your family. In an effort to explain clearly why you were here in the hospital, I've also written a very brief summary below. Other details including formal diagnosis, medication changes, and follow up appointment recommendations can also be found in this packet. You were admitted for shortness of breath and acute kidney injury due to viral and bacterial pneumonia, and dehydration for which you were started on antibiotics, IV fluids, steroids, and nebulizer treatments. You also received care from specialist physicians in the following specialties:  Nephrologist    Here are the updates to your medication list:  ** reduce your torsemide to 20mg daily   ** reduce your long acting insulin (U-100) to 7U daily, until you follow up with your endocrinologist or your PCP  ** complete x 2 more days of antibiotics for your pneumonia  ** continue doing nebulizer treatments every 4-6 hours for the first 24 hours after discharge. After that you may do them on an as needed basis. Remember that it is important for you to take your medications exactly as they are prescribed. It is helpful to keep a list of your medication with the names, dosages, and times to be taken in your wallet. Additionally,     - Please make sure to follow up with your primary care physician within 1-2 weeks of discharge for hospital follow up. You should also follow up with your endocrinologist on Monday regarding your blood sugars and your insulin dose. You should also follow up with the kidney doctors, Dr. Colleen Pollard next week for repeat kidney function test.   - You should expect to have a cough for some time. The cough from viral and bacterial pneumonia can unfortunately last for many weeks.  You should not develop fevers however, as you have been fever free for a few days here in the hospital. - Please make sure to monitor your breathing. If you have increased work of breathing, trouble completing sentences, significant wheezing, you should come back for further evaluation. You have now completed your steroid course. - Please get up slowly from a seated or laying position, avoid falls. - Avoid tobacco, alcohol and other illicit drug use. Make sure to also see your primary care doctor for follow-up. Bring these papers with you and be sure to review your medication list with your doctor. I cannot stress the importance of follow up enough. I've included the information for your follow-up appointments below: Follow-up Information     Follow up With Specialties Details Why Contact Info    Cassie Cardoso MD Internal Medicine In 1 week  [facility]  [address] [address] [phone]      With your endocrinologist    Please call the office on Monday, and let them know your blood sugars and new regimen. Petros Kelly MD Nephrology In 1 week Call for folllow up of your kidney function [address].  [phone]            Should you have any fever over 101 degrees for 24 hours, chest pain, shortness of breath, fever, chills, nausea, vomiting, diarrhea, change in mentation, falling, weakness, bleeding, or worsening pain, please seek medical attention immediately. Finally, as your discharging physician, you may be receiving a survey regarding my care. I would greatly value and appreciate your input in the survey as we strive for excellence. If you have any questions, I can be reached at [phone].   Thank you so much again for allowing me to care for you at [facility].    Respectfully yours,  Elijah Trujillo MD

## 2020-01-19 NOTE — PROGRESS NOTES
Patient name: Matthew Nguyen  MRN: [id number]    Nephrology Progress note:    Assessment:  ROBBY on CKD-3: Presenting serum Cr 2.4mg/dl likely from recent diuresis. Some improvement with IVF. Azotemia exacerbated by steroids. Patient has high grade proteinuria (nephrotic range) of 5gm-> thus high risk for CKD progression. Suspect underlying DM nephropathy. Baseline serum Cr 1 to 1.4mg/dl but suspect she maybe progressing. Followed by Dr. Chepe Tobar     DM1:Hyperglycemia on presentation     Metabolic acidosis: likely 2 to IV NS/hyperchloremia-> better with IV bicarb     HTN     Acute bronchitis/PNA     HYponatremia: pseudo from hyperglycemia-> better     Down Syndrome    Hypokalemia    Plan/Recommendations:  Stop IVF  Can resume lower dose of Torsemide 20mg daily on discharge  Lab work in our office next week  Will need close f/u with Dr. Vences He 1-2wks  Avoid nephrotoxins    Subjective:  Receiving neb treatment. Awaiting discharge.     ROS:   No nausea, no vomiting  No chest pain    Exam:  Visit Vitals  /78 (BP 1 Location: Right arm, BP Patient Position: Sitting)   Pulse 88   Temp 98 °F (36.7 °C)   Resp 18   Wt 76.7 kg (169 lb 1.6 oz)   LMP 03/27/1986   SpO2 97%   Breastfeeding No   BMI 33.03 kg/m²     Wt Readings from Last 3 Encounters:   01/19/20 76.7 kg (169 lb 1.6 oz)   12/16/18 66.4 kg (146 lb 6.2 oz)   04/01/18 89 kg (196 lb 3.4 oz)       Intake/Output Summary (Last 24 hours) at 1/19/2020 1048  Last data filed at 1/19/2020 0933  Gross per 24 hour   Intake 1150 ml   Output -- Net 1150 ml       Gen: NAD  HEENT: Down's facies  Lungs/Chest wall:  Few scattered rhonchi (better)  Cardiovascular: Regular rate, normal rhythm. Abdomen/: Soft, NT, ND, BS+. Ext: Trace peripheral edema  CNS: alert and awake.  Answers appropriately      Current Facility-Administered Medications   Medication Dose Route Frequency Last Dose    insulin lispro (HUMALOG) injection   SubCUTAneous TIDAC 6 Units at 01/19/20 0754    guaiFENesin (ROBITUSSIN) 100 mg/5 mL oral liquid 100 mg  100 mg Oral Q4H  mg at 01/17/20 2301    cefTRIAXone (ROCEPHIN) 1 g in 0.9% sodium chloride (MBP/ADV) 50 mL  1 g IntraVENous Q24H 1 g at 01/19/20 0933    albuterol (PROVENTIL VENTOLIN) nebulizer solution 2.5 mg  2.5 mg Nebulization Q4H PRN 2.5 mg at 01/17/20 0110    atorvastatin (LIPITOR) tablet 10 mg  10 mg Oral DAILY 10 mg at 01/19/20 0933    benzonatate (TESSALON) capsule 100 mg  100 mg Oral TID  mg at 01/17/20 1818    dextromethorphan (DELSYM) 30 mg/5 mL syrup 30 mg  30 mg Oral Q12H PRN 30 mg at 01/18/20 2142    pantoprazole (PROTONIX) tablet 40 mg  40 mg Oral ACB 40 mg at 01/19/20 0755    fluticasone propionate (FLONASE) 50 mcg/actuation nasal spray 1 Spray  1 Spray Both Nostrils BID 1 Spray at 01/19/20 0933    [Held by provider] insulin lispro (HUMALOG) injection 8 Units  8 Units SubCUTAneous TID WITH MEALS 8 Units at 01/17/20 1629    albuterol-ipratropium (DUO-NEB) 2.5 MG-0.5 MG/3 ML  3 mL Nebulization QID RT 3 mL at 01/19/20 0934    aspirin delayed-release tablet 81 mg  81 mg Oral DAILY 81 mg at 01/19/20 0933    levothyroxine (SYNTHROID) tablet 100 mcg  100 mcg Oral Once per day on Mon Tue Wed Thu Fri Sat 100 mcg at 01/18/20 0859    sodium chloride (NS) flush 5-40 mL  5-40 mL IntraVENous Q8H 10 mL at 01/17/20 1338    sodium chloride (NS) flush 5-40 mL  5-40 mL IntraVENous PRN 10 mL at 01/16/20 1250    heparin (porcine) injection 5,000 Units  5,000 Units SubCUTAneous Q8H 5,000 Units at 01/19/20 9835  predniSONE (DELTASONE) tablet 40 mg  40 mg Oral DAILY WITH BREAKFAST 40 mg at 01/19/20 0755    azithromycin (ZITHROMAX) 500 mg in 0.9% sodium chloride (MBP/ADV) 250 mL  500 mg IntraVENous Q24H 500 mg at 01/18/20 1319    glucose chewable tablet 16 g  4 Tab Oral PRN      glucagon (GLUCAGEN) injection 1 mg  1 mg IntraMUSCular PRN      dextrose 10% infusion 0-250 mL  0-250 mL IntraVENous  mL at 01/18/20 0727    acetaminophen (TYLENOL) tablet 650 mg  650 mg Oral Q4H  mg at 01/15/20 1925       Labs/Data:    Lab Results   Component Value Date/Time    WBC 7.5 01/19/2020 04:15 AM    Hemoglobin (POC) 12.2 11/05/2012 03:10 PM    HGB 9.5 (L) 01/19/2020 04:15 AM    Hematocrit (POC) 36 11/05/2012 03:10 PM    HCT 29.6 (L) 01/19/2020 04:15 AM    PLATELET 440 44/22/8272 04:15 AM    MCV 98.3 01/19/2020 04:15 AM       Lab Results   Component Value Date/Time    Sodium 134 (L) 01/19/2020 04:15 AM    Potassium 4.8 01/19/2020 04:15 AM    Chloride 109 (H) 01/19/2020 04:15 AM    CO2 20 (L) 01/19/2020 04:15 AM    Anion gap 5 01/19/2020 04:15 AM    Glucose 368 (H) 01/19/2020 04:15 AM    BUN 51 (H) 01/19/2020 04:15 AM    Creatinine 1.84 (H) 01/19/2020 04:15 AM    BUN/Creatinine ratio 28 (H) 01/19/2020 04:15 AM    GFR est AA 35 (L) 01/19/2020 04:15 AM    GFR est non-AA 29 (L) 01/19/2020 04:15 AM    Calcium 7.6 (L) 01/19/2020 04:15 AM       Patient seen and examined. Chart reviewed. Labs, data and other pertinent notes reviewed in last 24 hrs.     Discussed with patient and Dr. Jasbir Gonzalez by:  Lefty Finley MD  [address]

## 2020-01-19 NOTE — PROGRESS NOTES
[facility] to [address] [place] [address] Maile                                                                        47 y.o.   female    [address]   Room: [place] [address].    [facility] 5E1 SURGICAL UNIT  Unit Phone# :  [phone]      Indy Brown 54  [address] [place] [address]  Dept: [phone]: [phone]                    SITUATION     Admitted:  1/15/2020         Attending Provider:  Lily Interiano*       Consultations:  None    PCP:  Carrie Colbert MD   [phone]    Treatment Team: Attending Provider: Lily Interiano MD; Utilization Review: Sandee Ho RN; Care Manager: Shea Sharpe RN    Admitting Dx: ROBBY (acute kidney injury) ([facility] [N17.9]  ROBBY (acute kidney injury) ([facility] [N17.9]       Principal Problem: <principal problem not specified>    * No surgery found * of      BY: * Surgery not found *             ON: * No surgery found *                  Code Status: Full Code                Advance Directives:   Advance Care Planning 1/18/2020   Patient's Healthcare Decision Maker is: -   Primary Decision Maker Name -   Primary Decision Maker Phone Number -   Primary Decision Maker Relationship to Patient -   Secondary Decision Maker Name -   Secondary Decision Norm Humphreys Phone Number -   Confirm Advance Directive Yes, on file   Patient Would Like to Complete Advance Directive Yes    (Send w/patient)   Verified       Isolation:  There are currently no Active Isolations       MDRO: No current active infections    Pain Medications given:  n/a    Last dose:      Special Equipment needed: no  Type of equipment:      (Not currently on dialysis)  (Not currently on dialysis)  (Not currently on dialysis)     BACKGROUND     Allergies: Allergies   Allergen Reactions    Acetaminophen Other (comments)     Patient has implanted glucometer. Glucometer does not work properly when patient takes acetaminophen.  No longer with implanted glucometer per parents    Codeine Nausea and Vomiting    Nitrofurantoin Rash    Pcn [Penicillins] Hives and Rash       Past Medical History:   Diagnosis Date    Allergic rhinitis, cause unspecified     Asthma     Diabetes ([address]     Down's syndrome     H/O impacted cerumen     Dr. Ameya Shell Hypothyroid     Thyroid disease        Past Surgical History:   Procedure Laterality Date    HX CYST REMOVAL      right shoulder    HX HEENT Bilateral 2017    prior in one eye    HX HEENT Left 3/5/15    cataract left and right    HX HYSTERECTOMY         Medications Prior to Admission   Medication Sig    biotin 5,000 mcg TbDi Take 5,000 mcg by mouth daily.  cholecalciferol, vitamin D3, (VITAMIN D3) 2,000 unit tab Take 4,000 Units by mouth daily.  insulin lispro (HUMALOG U-100 INSULIN) 100 unit/mL injection 3-4 Units by SubCUTAneous route Before breakfast, lunch, and dinner. Hold insulin if BS <80. Hold if patient does not eat her meal. If BS >150 give with SS.    levothyroxine (SYNTHROID) 100 mcg tablet Take 100 mcg by mouth. Take daily on Mon, Tues, Wed, Thur, Fri, Sat (none on Sun)    atorvastatin (LIPITOR) 10 mg tablet Take 10 mg by mouth daily.  cranberry fruit 500 mg chew Take 500 mg by mouth daily.  dextromethorphan (DELSYM) 30 mg/5 mL liquid Take 30 mg by mouth every twelve (12) hours as needed for Cough.  ibuprofen (MOTRIN) 400 mg tablet Take 400 mg by mouth every six (6) hours as needed for Pain (Fever).  esomeprazole (NEXIUM) 40 mg capsule Take 40 mg by mouth daily.  benzonatate (TESSALON PERLES) 100 mg capsule Take 100 mg by mouth three (3) times daily as needed for Cough.  ondansetron hcl (ZOFRAN) 4 mg tablet Take 4 mg by mouth every four (4) hours as needed for Nausea or Vomiting.  guaiFENesin (ROBITUSSIN) 100 mg/5 mL liquid Take 5 mL by mouth every four (4) hours as needed for Cough.     L. acidoph & paracasei- S therm- Bifido (JEN-Q/RISAQUAD) 8 billion cell cap cap Take 1 Cap by mouth daily.  albuterol (PROVENTIL HFA, VENTOLIN HFA, PROAIR HFA) 90 mcg/actuation inhaler Take 1 Puff by inhalation every four (4) hours as needed for Wheezing.  glucose 4 gram chewable tablet Take 12 g by mouth as needed (BS < 60).  glucagon (GLUCAGEN) 1 mg injection 1 mg by IntraMUSCular route once as needed (hypoglycemia if patient is unresponsive).  insulin lispro (HUMALOG U-100 INSULIN) 100 unit/mL injection by SubCUTAneous route. Sliding scale: If blood sugar > 80 give with scheduled 3 units  151-200= 4 units   201-250= 5 units  251-300= 6 units  301-350 = 8 units  351-400 = 12 units   If >400 give 16 units and contact MD    fluticasone (FLONASE) 50 mcg/actuation nasal spray 1 [place] by Both Nostrils route two (2) times a day.  minocycline (MINOCIN, DYNACIN) 50 mg capsule Take 1 capsule by mouth  daily    aspirin delayed-release 81 mg tablet Take 81 mg by mouth daily.  [DISCONTINUED] albuterol (PROVENTIL VENTOLIN) 2.5 mg /3 mL (0.083 %) nebu 2.5 mg by Nebulization route every four (4) hours as needed for Wheezing.  [DISCONTINUED] torsemide (DEMADEX) 20 mg tablet Take 40 mg by mouth daily.  [DISCONTINUED] insulin degludec (TRESIBA U-100 INSULIN) 100 unit/mL soln 15 Units by SubCUTAneous route nightly. Hard scripts included in transfer packet yes    Vaccinations:    Immunization History   Administered Date(s) Administered    Influenza Vaccine 10/01/2015    Influenza Vaccine Whole 10/02/2012    Pneumococcal Vaccine (Unspecified Type) 01/01/2007    Tdap 01/15/2016       Readmission Risks:    Known Risks: The Charlson CoMorbitiy Index tool is an evidenced based tool that has more automatic generated information. The tool looks at many different items such as the age of the patient, how many times they were admitted in the last calendar year, current length of stay in the hospital and their diagnosis.  All of these items are pulled automatically from information documented in the chart from various places and will generate a score that predicts whether a patient is at low (less than 13), medium (13-20) or high (21 or greater) risk of being readmitted.         ASSESSMENT                Temp: 98 °F (36.7 °C) (01/19/20 0812) Pulse (Heart Rate): 88 (01/19/20 0812)     Resp Rate: 18 (01/19/20 0812)           BP: 156/78 (01/19/20 0812)     O2 Sat (%): 97 % (01/19/20 0812)     Weight: 76.7 kg (169 lb 1.6 oz)    Height: (not recorded)       If above not within 1 hour of discharge:    BP:_____  P:____  R:____ T:_____ O2 Sat: ___%  O2: ______    Active Orders   Diet    DIET DIABETIC WITH OPTIONS Consistent Carb 1500-1600kcal; Regular         Orientation: oriented to time, place, person and situation     Active Behaviors: None                                   Active Lines/Drains:  (Peg Tube / [facility] / CL or S/L?): no    Urinary Status: Voiding     Last BM: Last Bowel Movement Date: 01/09/20                   Mobility: Slightly limited   Weight Bearing Status: WBAT (Weight Bearing as Tolerated)                Lab Results   Component Value Date/Time    Glucose 368 (H) 01/19/2020 04:15 AM    Hemoglobin A1c 8.7 (H) 12/14/2018 11:06 PM    HGB 9.5 (L) 01/19/2020 04:15 AM    HGB 9.8 (L) 01/18/2020 05:37 AM    Hemoglobin A1c, External 7.5 02/22/2017        RECOMMENDATION     See After Visit Summary (AVS) for:  · Discharge instructions  · After [address]   · Special equipment needed (entered pre-discharge by Care Management)  · Medication Reconciliation    · Follow up Appointment(s)         Report given/sent by:  Summer Ruiz RN                    Verbal report given to: Supervisor [phone] at [facility]   FAXED to:  [phone]         Estimated discharge time:  1/19/2020 at afternoon, family transporting

## 2020-01-19 NOTE — PROGRESS NOTES
Appointment Information  The following appointments have been successfully scheduled:    Date/time Monday, January 27, 2020 10:45 AM  Patient Eben Hartley 1970 (89CN F) #[id number]  Department [facility]-MAIN OFFICE-[facility] [address]  Appointment type [address] [facility]

## 2020-06-26 NOTE — IP AVS SNAPSHOT
[address] [facility] [address] [place] [address] 
[phone] Patient: Devaughn Lanes Noftsinger MRN: [id number] BIJ:4/9/9331 You are allergic to the following Allergen Reactions Acetaminophen Other (comments) Patient has implanted glucometer. Glucometer does not work properly when patient takes acetaminophen. Codeine Nausea and Vomiting Nitrofurantoin Rash Pcn (Penicillins) Hives Rash Recent Documentation Height Weight BMI OB Status Smoking Status 1.372 m 64.1 kg 34.07 kg/m2 Hysterectomy Never Smoker Unresulted Labs Order Current Status CULTURE, BLOOD, PAIRED Preliminary result Emergency Contacts Name Discharge Info Relation Home Work Mobile Jarrell Gr DISCHARGE CAREGIVER [3] Other Relative [6] [phone] Letha Bliss  Sister [23] [phone] About your hospitalization You were admitted on:  September 14, 2017 You last received care in the:  [facility] You were discharged on:  September 16, 2017 Unit phone number:  [phone] Why you were hospitalized Your primary diagnosis was:  Not on File Your diagnoses also included:  Dka (Diabetic Ketoacidoses) (Hcc) Providers Seen During Your Hospitalizations Provider Role Specialty Primary office phone Opal Quevedo MD Attending Provider Emergency Medicine [phone] Jonah Reid DO Attending Provider Hospitalist [phone] Cony Barraza MD Attending Provider Internal Medicine [phone] Your Primary Care Physician (PCP) Primary Care Physician Office Phone Office Fax [facility]quin [facility], [place] [phone] Follow-up Information Follow up With Details Comments Contact Info Anahy Cid MD  f/u next wednesday as 81 [facility] [address] [place] [address] 
[phone] Epidural Steroid Injection, Care After  Refer to this sheet in the next few weeks. These instructions provide you with information about caring for yourself after your procedure. Your health care provider may also give you more specific instructions. Your treatment has been planned according to current medical practices, but problems sometimes occur. Call your health care provider if you have any problems or questions after your procedure.  What can I expect after the procedure?  After your procedure, it is common to feel a little discomfort at the injection site.  Follow these instructions at home:  · For 24 hours after the procedure:  ? Avoid using heat on the injection site.  ? Do not take a tub bath, and do not soak in water.  ? Do not drive if you received a medicine to help you relax (sedative).  · If directed, put ice on the injection site:  ? Put ice in a plastic bag.  ? Place a towel between your skin and the bag.  ? Leave the ice on for 20 minutes, 2-3 times a day.  · Return to your normal activities as told by your health care provider. Ask your health care provider what activities are safe for you.  · You may remove the bandage (dressing) after 24 hours.  · Take over-the-counter and prescription medicines only as told by your health care provider.  · Keep all follow-up visits as told by your health care provider. This is important.  Contact a health care provider if:  · You have a fever.  · You continue to have pain and soreness around the injection site, even after taking over-the-counter pain medicine.  · You have severe, sudden, or lasting nausea or vomiting.  Get help right away if:  · You have severe pain at the injection site that is not relieved by medicines.  · You develop a severe headache or a stiff neck.  · You become sensitive to light.  · You have any new numbness or weakness in your legs or arms.  · You lose control of your bladder or bowel movements.  · You have trouble breathing.  This  Your Appointments Wednesday September 20, 2017  9:00 AM EDT ACUTE CARE with Xavier Roche MD  
[facility] [address] [place] [address]) [address]  
[phone] Friday September 29, 2017  2:00 PM EDT ROUTINE CARE with Shannan Melo MD  
[facility] [address] [place] [address]) [address]  
[phone] Current Discharge Medication List  
  
CONTINUE these medications which have NOT CHANGED Dose & Instructions Dispensing Information Comments Morning Noon Evening Bedtime  
  insulin pump Misc Commonly known as:  PATIENT SUPPLIED Your last dose was: Your next dose is:    
   
   
 by SubCUTAneous route as needed. Refills:  0  
     
   
   
   
  
 aspirin delayed-release 81 mg tablet Your last dose was: Your next dose is:    
   
   
 Dose:  81 mg Take 81 mg by mouth daily. Refills:  0  
     
   
   
   
  
 biotin 500 mcg Cap Your last dose was: Your next dose is:    
   
   
 Dose:  1 Cap Take 1 Cap by mouth daily. Refills:  0  
     
   
   
   
  
 FLONASE 50 mcg/actuation nasal spray Generic drug:  fluticasone Your last dose was: Your next dose is:    
   
   
 Dose:  1 Spray 1 Gardner by Both Nostrils route two (2) times a day. Refills:  0 HumaLOG 100 unit/mL injection Generic drug:  insulin lispro Your last dose was: Your next dose is:    
   
   
 by SubCUTAneous route. For use in insulin pump Refills:  0  
     
   
   
   
  
 levothyroxine 50 mcg tablet Commonly known as:  SYNTHROID Your last dose was: Your next dose is:    
   
   
 Dose:  50 mcg Take 50 mcg by mouth five (5) days a week. Refills:  0  
     
   
   
   
  
 minocycline 50 mg capsule Commonly known as:  Colt Smith  
   
 information is not intended to replace advice given to you by your health care provider. Make sure you discuss any questions you have with your health care provider.  Document Released: 04/03/2012 Document Revised: 11/30/2018 Document Reviewed: 04/04/2017  Elsevier Patient Education © 2020 Elsevier Inc.     Your last dose was: Your next dose is: Take 1 capsule by mouth  daily Quantity:  90 Cap Refills:  3 VITAMIN D2 PO Your last dose was: Your next dose is:    
   
   
 Dose:  78195 Int'l Units Take 50,000 Int'l Units by mouth. Mother says pt takes 6 doses per month Refills:  0 Discharge Instructions Discharge Instructions PATIENT ID: Ranulfo Gr MRN: [id number] YOB: 1970 DATE OF ADMISSION: 9/14/2017  9:33 AM   
DATE OF DISCHARGE: 9/16/2017 PRIMARY CARE PROVIDER: Xavier Brown MD  
 
ATTENDING PHYSICIAN: Brandy Rocha MD 
DISCHARGING PROVIDER: Brandy Rocha MD   
To contact this individual call [phone] and ask the  to page. If unavailable ask to be transferred the Adult Hospitalist Department. DISCHARGE DIAGNOSES  
DKA, resolved H/O DM 
PSEUDOHYPONA,sec to elev glucs, resolved ROBBY,resolved CONSULTATIONS: IP CONSULT TO HOSPITALIST 
 
PROCEDURES/SURGERIES: * No surgery found * PENDING TEST RESULTS:  
At the time of discharge the following test results are still pending:none FOLLOW UP APPOINTMENTS:  
Follow-up Information Follow up With Details Comments Contact Info Xavier Brown MD  f/u next wednesday as [address] [facility] [address] [place] [address] 
[phone] ADDITIONAL CARE RECOMMENDATIONS:  
 
DIET: Diabetic Diet ACTIVITY: Activity as tolerated WOUND CARE: none EQUIPMENT needed: none DISCHARGE MEDICATIONS: 
 See Medication Reconciliation Form · It is important that you take the medication exactly as they are prescribed. · Keep your medication in the bottles provided by the pharmacist and keep a list of the medication names, dosages, and times to be taken in your wallet. · Do not take other medications without consulting your doctor. NOTIFY YOUR PHYSICIAN FOR ANY OF THE FOLLOWING:  
Fever over 101 degrees for 24 hours. Chest pain, shortness of breath, fever, chills, nausea, vomiting, diarrhea, change in mentation, falling, weakness, bleeding. Severe pain or pain not relieved by medications. Or, any other signs or symptoms that you may have questions about. DISPOSITION: 
 x Home With: 
 OT  PT  [facility]  RN  
  
 SNF/Inpatient Rehab/LTAC Independent/assisted living Hospice Other: CDMP Checked:  
Yes x PROBLEM LIST Updated: 
Yes x Signed:  
Seferino Fonseca MD 
9/16/2017 
11:20 AM 
 
 
Discharge Orders None [email or web address] Announcement We are excited to announce that we are making your provider's discharge notes available to you in [email or web address]. You will see these notes when they are completed and signed by the physician that discharged you from your recent hospital stay. If you have any questions or concerns about any information you see in [email or web address], please call the Health Information Department where you were seen or reach out to your Primary Care Provider for more information about your plan of care. Introducing [facility] & HEALTH SERVICES! Dear Marcy Mcqueen: Thank you for requesting a [email or web address] account. Our records indicate that you already have an active [email or web address] account. You can access your account anytime at [email or web address] Did you know that you can access your hospital and ER discharge instructions at any time in [email or web address]? You can also review all of your test results from your hospital stay or ER visit. Additional Information If you have questions, please visit the Frequently Asked Questions section of the [email or web address] website at [email or web address] Remember, [email or web address] is NOT to be used for urgent needs. For medical emergencies, dial 911. Now available from your iPhone and Android! General Information Please provide this summary of care documentation to your next provider. Patient Signature:  ____________________________________________________________ Date:  ____________________________________________________________  
  
Fayrene Retort Provider Signature:  ____________________________________________________________ Date:  ____________________________________________________________

## 2020-08-07 ENCOUNTER — HOSPITAL ENCOUNTER (EMERGENCY)
Age: 50
Discharge: HOME | End: 2020-08-07
Attending: EMERGENCY MEDICINE | Admitting: EMERGENCY MEDICINE
Payer: MEDICARE

## 2020-08-07 VITALS
TEMPERATURE: 98 F | OXYGEN SATURATION: 100 % | HEART RATE: 71 BPM | RESPIRATION RATE: 16 BRPM | SYSTOLIC BLOOD PRESSURE: 165 MMHG | DIASTOLIC BLOOD PRESSURE: 84 MMHG

## 2020-08-07 DIAGNOSIS — E16.2 HYPOGLYCEMIA: Primary | ICD-10-CM

## 2020-08-07 LAB
ALBUMIN SERPL-MCNC: 2.6 G/DL (ref 3.5–5)
ALBUMIN/GLOB SERPL: 0.6 {RATIO} (ref 1.1–2.2)
ALP SERPL-CCNC: 101 U/L (ref 45–117)
ALT SERPL-CCNC: 21 U/L (ref 12–78)
ANION GAP SERPL CALC-SCNC: 6 MMOL/L (ref 5–15)
AST SERPL-CCNC: 24 U/L (ref 15–37)
BASOPHILS # BLD: 0.1 K/UL (ref 0–0.1)
BASOPHILS NFR BLD: 1 % (ref 0–1)
BILIRUB SERPL-MCNC: 0.2 MG/DL (ref 0.2–1)
BUN SERPL-MCNC: 45 MG/DL (ref 6–20)
BUN/CREAT SERPL: 25 (ref 12–20)
CALCIUM SERPL-MCNC: 8.6 MG/DL (ref 8.5–10.1)
CHLORIDE SERPL-SCNC: 109 MMOL/L (ref 97–108)
CO2 SERPL-SCNC: 23 MMOL/L (ref 21–32)
CREAT SERPL-MCNC: 1.78 MG/DL (ref 0.55–1.02)
DIFFERENTIAL METHOD BLD: ABNORMAL
EOSINOPHIL # BLD: 0.2 K/UL (ref 0–0.4)
EOSINOPHIL NFR BLD: 3 % (ref 0–7)
ERYTHROCYTE [DISTWIDTH] IN BLOOD BY AUTOMATED COUNT: 13.2 % (ref 11.5–14.5)
GLOBULIN SER CALC-MCNC: 4.3 G/DL (ref 2–4)
GLUCOSE BLD STRIP.AUTO-MCNC: 350 MG/DL (ref 65–100)
GLUCOSE SERPL-MCNC: 160 MG/DL (ref 65–100)
HCT VFR BLD AUTO: 31.4 % (ref 35–47)
HGB BLD-MCNC: 9.8 G/DL (ref 11.5–16)
IMM GRANULOCYTES # BLD AUTO: 0 K/UL (ref 0–0.04)
IMM GRANULOCYTES NFR BLD AUTO: 0 % (ref 0–0.5)
LYMPHOCYTES # BLD: 0.6 K/UL (ref 0.8–3.5)
LYMPHOCYTES NFR BLD: 10 % (ref 12–49)
MCH RBC QN AUTO: 32 PG (ref 26–34)
MCHC RBC AUTO-ENTMCNC: 31.2 G/DL (ref 30–36.5)
MCV RBC AUTO: 102.6 FL (ref 80–99)
MONOCYTES # BLD: 0.5 K/UL (ref 0–1)
MONOCYTES NFR BLD: 9 % (ref 5–13)
NEUTS SEG # BLD: 4.2 K/UL (ref 1.8–8)
NEUTS SEG NFR BLD: 77 % (ref 32–75)
NRBC # BLD: 0 K/UL (ref 0–0.01)
NRBC BLD-RTO: 0 PER 100 WBC
PLATELET # BLD AUTO: 226 K/UL (ref 150–400)
PMV BLD AUTO: 10 FL (ref 8.9–12.9)
POTASSIUM SERPL-SCNC: 4 MMOL/L (ref 3.5–5.1)
PROT SERPL-MCNC: 6.9 G/DL (ref 6.4–8.2)
RBC # BLD AUTO: 3.06 M/UL (ref 3.8–5.2)
RBC MORPH BLD: ABNORMAL
SERVICE CMNT-IMP: ABNORMAL
SODIUM SERPL-SCNC: 138 MMOL/L (ref 136–145)
WBC # BLD AUTO: 5.6 K/UL (ref 3.6–11)

## 2020-08-07 PROCEDURE — 99285 EMERGENCY DEPT VISIT HI MDM: CPT

## 2020-08-07 PROCEDURE — 82962 GLUCOSE BLOOD TEST: CPT

## 2020-08-07 PROCEDURE — 85025 COMPLETE CBC W/AUTO DIFF WBC: CPT

## 2020-08-07 PROCEDURE — 36415 COLL VENOUS BLD VENIPUNCTURE: CPT

## 2020-08-07 PROCEDURE — 80053 COMPREHEN METABOLIC PANEL: CPT

## 2020-08-07 NOTE — ED PROVIDER NOTES
HPI .  Patient has a history of Down syndrome, hypothyroidism, diabetes, chronic kidney disease, proteinuria, edema, asthma, and syncope. Patient lives at the [facility]. Reportedly her mother also lives there. Patient reports that she walks independently without a walker or Rollator. She says her memory is about like usual.  Patient's glucose was running high yesterday evening. At 2 AM 5 hours ago patient's glucose was greater than 500 and she was given 20 units of Humalog. At 6 AM her glucose was 35 and she was given glucagon. Glucose continued to be low and she was given crackers and orange juice. EMS came and her glucose was found to be 42. She was given at 100 mL's of D10. Repeat glucose was 65. Patient's history is limited but she  says she is here for \"low sugar. \"  She now feels \"fine. \"    Past Medical History:   Diagnosis Date    Allergic rhinitis, cause unspecified     Asthma     Diabetes ([address]     Down's syndrome     H/O impacted cerumen     Dr. Self Hypothyroid     Thyroid disease        Past Surgical History:   Procedure Laterality Date    HX CYST REMOVAL      right shoulder    HX HEENT Bilateral 2017    prior in one eye    HX HEENT Left 3/5/15    cataract left and right    HX HYSTERECTOMY           Family History:   Problem Relation Age of Onset    Thyroid Disease Mother         hypo    Hypertension Father        Social History     Socioeconomic History    Marital status: SINGLE     Spouse name: Not on file    Number of children: Not on file    Years of education: Not on file    Highest education level: Not on file   Occupational History    Not on file   Social Needs    Financial resource strain: Not on file    Food insecurity     Worry: Not on file     Inability: Not on file    Transportation needs     Medical: Not on file     Non-medical: Not on file   Tobacco Use    Smoking status: Never Smoker    Smokeless tobacco: Never Used   Substance and Sexual Activity    Alcohol use: No    Drug use: No    Sexual activity: Not Currently   Lifestyle    Physical activity     Days per week: Not on file     Minutes per session: Not on file    Stress: Not on file   Relationships    Social connections     Talks on phone: Not on file     Gets together: Not on file     Attends Shinto service: Not on file     Active member of club or organization: Not on file     Attends meetings of clubs or organizations: Not on file     Relationship status: Not on file    Intimate partner violence     Fear of current or ex partner: Not on file     Emotionally abused: Not on file     Physically abused: Not on file     Forced sexual activity: Not on file   Other Topics Concern    Not on file   Social History Narrative    Not on file         ALLERGIES: Acetaminophen; Codeine; Nitrofurantoin; and Pcn [penicillins]    Review of Systems   HENT: Negative for congestion. Respiratory: Negative for cough and shortness of breath. Cardiovascular: Negative for chest pain. Gastrointestinal: Negative for abdominal pain. Genitourinary: Negative for difficulty urinating. Neurological: Positive for speech difficulty. Psychiatric/Behavioral: Positive for agitation and confusion. Negative for behavioral problems. The patient is not nervous/anxious. There were no vitals filed for this visit. Physical Exam  Vitals signs and nursing note reviewed. Constitutional:       Appearance: She is well-developed. Comments: Short stature; Down's facies; HENT:      Head: Normocephalic and atraumatic. Eyes:      Pupils: Pupils are equal, round, and reactive to light. Neck:      Musculoskeletal: Normal range of motion and neck supple. Cardiovascular:      Rate and Rhythm: Normal rate and regular rhythm. Heart sounds: Normal heart sounds. No murmur. No friction rub. No gallop. Pulmonary:      Effort: Pulmonary effort is normal. No respiratory distress. Breath sounds: No wheezing or rales. Abdominal:      Palpations: Abdomen is soft. Tenderness: There is no abdominal tenderness. There is no rebound. Musculoskeletal: Normal range of motion. General: No tenderness. Skin:     Findings: No erythema. Neurological:      Mental Status: She is alert. Cranial Nerves: No cranial nerve deficit.       Comments: Motor; symmetric   Psychiatric:         Behavior: Behavior normal.      Comments: Holding stuffed animal; alert conversant;          MDM       Procedures

## 2020-08-07 NOTE — DISCHARGE INSTRUCTIONS
Patient Education        Hypoglycemia: Care Instructions  Your Care Instructions     Hypoglycemia means that your blood sugar is low and your body is not getting enough fuel. Some people get low blood sugar from not eating often enough. Some medicines to treat diabetes can cause low blood sugar. People who have had surgery on their stomachs or intestines may get hypoglycemia. Problems with the pancreas, kidneys, or liver also can cause low blood sugar. A snack or drink with sugar in it will raise your blood sugar and should ease your symptoms right away. Your doctor may recommend that you change or stop your medicines until you can get your blood sugar levels under control. In the long run, you may need to change your diet and eating habits so that you get enough fuel for your body throughout the day. Follow-up care is a key part of your treatment and safety. Be sure to make and go to all appointments, and call your doctor if you are having problems. It's also a good idea to know your test results and keep a list of the medicines you take. How can you care for yourself at home? · Learn to recognize the early signs of low blood sugar. Signs include:  ? Nausea. ? Hunger. ? Feeling nervous, irritable, or shaky. ? Cold, clammy, wet skin. ? Sweating (when you are not exercising). ? A fast heartbeat.  ? Numbness or tingling of the fingertips or lips. · If you feel an episode of low blood sugar coming on, eat or drink a quick-sugar food. Some examples of quick-sugar foods are glucose tablets, table sugar, hard candy (such as Life Savers), fruit juice, and regular (not diet) soda. · Eat small, frequent meals so that you do not get too hungry between meals. · Balance extra exercise with eating more. · Keep a written record of your low blood sugar episodes, including when you last ate and what you ate, so that you can learn what causes your blood sugar to drop.   · Make sure your family, friends, and coworkers know the symptoms of low blood sugar and know what to do to get your sugar level up. · Wear medical alert jewelry that lists your condition. You can buy this at most drugstores. When should you call for help? [id number] anytime you think you may need emergency care. For example, call if:  · You passed out (lost consciousness). · You are confused or cannot think clearly. · Your blood sugar is very high or very low. Watch closely for changes in your health, and be sure to contact your doctor if:  · Your blood sugar stays outside the level your doctor set for you. · You have any problems. Where can you learn more? Go to http://rebecca-cornel.info/  Enter R955 in the search box to learn more about \"Hypoglycemia: Care Instructions. \"  Current as of: December 20, 2019               Content Version: 12.5  © 3103-9617 Healthwise, Incorporated. Care instructions adapted under license by [employer] (which disclaims liability or warranty for this information). If you have questions about a medical condition or this instruction, always ask your healthcare professional. [address] any warranty or liability for your use of this information.

## 2020-08-07 NOTE — ED TRIAGE NOTES
Patient reports to the ED via EMS from the [facility] reporting low blood sugar. Patient was administered 40 units humalog approx 0200. Patient morning FSBS found to be in the 40s. Patient administered approx 100 mL D10 en route. AAO x4, at baseline per EMS.

## 2021-01-01 ENCOUNTER — APPOINTMENT (OUTPATIENT)
Dept: GENERAL RADIOLOGY | Age: 51
DRG: 640 | End: 2021-01-01
Attending: INTERNAL MEDICINE
Payer: MEDICARE

## 2021-01-01 ENCOUNTER — HOSPITAL ENCOUNTER (INPATIENT)
Age: 51
LOS: 3 days | Discharge: NURSING, REHAB OR LONG-TERM CARE | DRG: 640 | End: 2021-10-11
Attending: EMERGENCY MEDICINE | Admitting: HOSPITALIST
Payer: MEDICARE

## 2021-01-01 ENCOUNTER — APPOINTMENT (OUTPATIENT)
Dept: GENERAL RADIOLOGY | Age: 51
DRG: 637 | End: 2021-01-01
Attending: EMERGENCY MEDICINE
Payer: MEDICARE

## 2021-01-01 ENCOUNTER — APPOINTMENT (OUTPATIENT)
Dept: GENERAL RADIOLOGY | Age: 51
DRG: 640 | End: 2021-01-01
Attending: EMERGENCY MEDICINE
Payer: MEDICARE

## 2021-01-01 ENCOUNTER — APPOINTMENT (OUTPATIENT)
Dept: CT IMAGING | Age: 51
DRG: 637 | End: 2021-01-01
Attending: EMERGENCY MEDICINE
Payer: MEDICARE

## 2021-01-01 ENCOUNTER — PATIENT OUTREACH (OUTPATIENT)
Dept: CASE MANAGEMENT | Age: 51
End: 2021-01-01

## 2021-01-01 ENCOUNTER — APPOINTMENT (OUTPATIENT)
Dept: INTERVENTIONAL RADIOLOGY/VASCULAR | Age: 51
DRG: 640 | End: 2021-01-01
Attending: HOSPITALIST
Payer: MEDICARE

## 2021-01-01 ENCOUNTER — APPOINTMENT (OUTPATIENT)
Dept: GENERAL RADIOLOGY | Age: 51
DRG: 640 | End: 2021-01-01
Attending: HOSPITALIST
Payer: MEDICARE

## 2021-01-01 ENCOUNTER — HOSPITAL ENCOUNTER (OUTPATIENT)
Age: 51
Setting detail: OUTPATIENT SURGERY
Discharge: HOME | End: 2021-10-01
Attending: SURGERY | Admitting: SURGERY
Payer: MEDICARE

## 2021-01-01 ENCOUNTER — HOSPITAL ENCOUNTER (OUTPATIENT)
Dept: PREADMISSION TESTING | Age: 51
Discharge: HOME | End: 2021-09-27
Payer: MEDICARE

## 2021-01-01 ENCOUNTER — DOCUMENTATION ONLY (OUTPATIENT)
Dept: INTERNAL MEDICINE CLINIC | Age: 51
End: 2021-01-01

## 2021-01-01 ENCOUNTER — APPOINTMENT (OUTPATIENT)
Dept: CT IMAGING | Age: 51
DRG: 640 | End: 2021-01-01
Attending: EMERGENCY MEDICINE
Payer: MEDICARE

## 2021-01-01 ENCOUNTER — HOSPITAL ENCOUNTER (OUTPATIENT)
Dept: GENERAL RADIOLOGY | Age: 51
Discharge: HOME | End: 2021-09-27
Attending: SURGERY
Payer: MEDICARE

## 2021-01-01 ENCOUNTER — TELEPHONE (OUTPATIENT)
Dept: INTERNAL MEDICINE CLINIC | Age: 51
End: 2021-01-01

## 2021-01-01 ENCOUNTER — TRANSCRIBE ORDER (OUTPATIENT)
Dept: REGISTRATION | Age: 51
End: 2021-01-01

## 2021-01-01 ENCOUNTER — APPOINTMENT (OUTPATIENT)
Dept: GENERAL RADIOLOGY | Age: 51
DRG: 637 | End: 2021-01-01
Attending: FAMILY MEDICINE
Payer: MEDICARE

## 2021-01-01 ENCOUNTER — HOSPITAL ENCOUNTER (INPATIENT)
Age: 51
LOS: 1 days | Discharge: HOME | DRG: 640 | End: 2021-10-26
Attending: EMERGENCY MEDICINE | Admitting: HOSPITALIST
Payer: MEDICARE

## 2021-01-01 ENCOUNTER — HOSPITAL ENCOUNTER (INPATIENT)
Age: 51
LOS: 4 days | Discharge: HOME HEALTH | DRG: 637 | End: 2021-12-08
Attending: EMERGENCY MEDICINE | Admitting: HOSPITALIST
Payer: MEDICARE

## 2021-01-01 ENCOUNTER — APPOINTMENT (OUTPATIENT)
Dept: NUCLEAR MEDICINE | Age: 51
DRG: 640 | End: 2021-01-01
Attending: INTERNAL MEDICINE
Payer: MEDICARE

## 2021-01-01 ENCOUNTER — ANESTHESIA EVENT (OUTPATIENT)
Dept: CARDIOTHORACIC SURGERY | Age: 51
End: 2021-01-01
Payer: MEDICARE

## 2021-01-01 ENCOUNTER — ANESTHESIA (OUTPATIENT)
Dept: CARDIOTHORACIC SURGERY | Age: 51
End: 2021-01-01
Payer: MEDICARE

## 2021-01-01 ENCOUNTER — APPOINTMENT (OUTPATIENT)
Dept: NON INVASIVE DIAGNOSTICS | Age: 51
DRG: 640 | End: 2021-01-01
Attending: INTERNAL MEDICINE
Payer: MEDICARE

## 2021-01-01 VITALS
DIASTOLIC BLOOD PRESSURE: 50 MMHG | BODY MASS INDEX: 30.3 KG/M2 | OXYGEN SATURATION: 95 % | TEMPERATURE: 98 F | RESPIRATION RATE: 20 BRPM | SYSTOLIC BLOOD PRESSURE: 115 MMHG | WEIGHT: 150 LBS | HEART RATE: 71 BPM

## 2021-01-01 VITALS
BODY MASS INDEX: 33.69 KG/M2 | SYSTOLIC BLOOD PRESSURE: 137 MMHG | SYSTOLIC BLOOD PRESSURE: 148 MMHG | OXYGEN SATURATION: 100 % | RESPIRATION RATE: 16 BRPM | HEART RATE: 84 BPM | RESPIRATION RATE: 16 BRPM | WEIGHT: 142.86 LBS | DIASTOLIC BLOOD PRESSURE: 64 MMHG | HEIGHT: 59 IN | OXYGEN SATURATION: 95 % | BODY MASS INDEX: 28.85 KG/M2 | TEMPERATURE: 98 F | DIASTOLIC BLOOD PRESSURE: 84 MMHG | WEIGHT: 167.11 LBS | TEMPERATURE: 97.9 F | HEART RATE: 80 BPM

## 2021-01-01 VITALS
SYSTOLIC BLOOD PRESSURE: 108 MMHG | HEART RATE: 78 BPM | HEIGHT: 61 IN | RESPIRATION RATE: 18 BRPM | WEIGHT: 154.32 LBS | OXYGEN SATURATION: 97 % | BODY MASS INDEX: 29.14 KG/M2 | TEMPERATURE: 97.8 F | DIASTOLIC BLOOD PRESSURE: 56 MMHG

## 2021-01-01 VITALS
RESPIRATION RATE: 18 BRPM | WEIGHT: 153.66 LBS | SYSTOLIC BLOOD PRESSURE: 158 MMHG | DIASTOLIC BLOOD PRESSURE: 75 MMHG | BODY MASS INDEX: 34.57 KG/M2 | HEIGHT: 56 IN | TEMPERATURE: 97.9 F | HEART RATE: 73 BPM

## 2021-01-01 DIAGNOSIS — R73.9 HYPERGLYCEMIA: ICD-10-CM

## 2021-01-01 DIAGNOSIS — Z01.812 PRE-PROCEDURE LAB EXAM: ICD-10-CM

## 2021-01-01 DIAGNOSIS — E11.10 DIABETIC KETOACIDOSIS WITHOUT COMA ASSOCIATED WITH TYPE 2 DIABETES MELLITUS (HCC): Primary | ICD-10-CM

## 2021-01-01 DIAGNOSIS — Z01.812 PRE-PROCEDURE LAB EXAM: Primary | ICD-10-CM

## 2021-01-01 DIAGNOSIS — J81.0 ACUTE PULMONARY EDEMA (HCC): ICD-10-CM

## 2021-01-01 DIAGNOSIS — N18.6 ESRD (END STAGE RENAL DISEASE) (HCC): ICD-10-CM

## 2021-01-01 DIAGNOSIS — R09.02 HYPOXIA: ICD-10-CM

## 2021-01-01 DIAGNOSIS — E87.20 METABOLIC ACIDOSIS: ICD-10-CM

## 2021-01-01 DIAGNOSIS — R09.02 HYPOXIA: Primary | ICD-10-CM

## 2021-01-01 DIAGNOSIS — J90 PLEURAL EFFUSION: ICD-10-CM

## 2021-01-01 DIAGNOSIS — E87.20 METABOLIC ACIDOSIS: Primary | ICD-10-CM

## 2021-01-01 DIAGNOSIS — Z99.2 ESRD ON DIALYSIS (HCC): ICD-10-CM

## 2021-01-01 DIAGNOSIS — D63.1 ANEMIA DUE TO STAGE 5 CHRONIC KIDNEY DISEASE, NOT ON CHRONIC DIALYSIS (HCC): ICD-10-CM

## 2021-01-01 DIAGNOSIS — R77.8 ELEVATED TROPONIN: ICD-10-CM

## 2021-01-01 DIAGNOSIS — N18.5 ANEMIA DUE TO STAGE 5 CHRONIC KIDNEY DISEASE, NOT ON CHRONIC DIALYSIS (HCC): ICD-10-CM

## 2021-01-01 DIAGNOSIS — N18.6 ESRD ON DIALYSIS (HCC): ICD-10-CM

## 2021-01-01 DIAGNOSIS — N18.6 ESRD (END STAGE RENAL DISEASE) (HCC): Primary | ICD-10-CM

## 2021-01-01 LAB
25(OH)D3 SERPL-MCNC: 87.5 NG/ML (ref 30–100)
ABO + RH BLD: NORMAL
ADMINISTERED INITIALS, ADMINIT: NORMAL
ALBUMIN SERPL-MCNC: 1.8 G/DL (ref 3.5–5)
ALBUMIN SERPL-MCNC: 1.9 G/DL (ref 3.5–5)
ALBUMIN SERPL-MCNC: 1.9 G/DL (ref 3.5–5)
ALBUMIN SERPL-MCNC: 2 G/DL (ref 3.5–5)
ALBUMIN SERPL-MCNC: 2 G/DL (ref 3.5–5)
ALBUMIN SERPL-MCNC: 2.1 G/DL (ref 3.5–5)
ALBUMIN SERPL-MCNC: 2.2 G/DL (ref 3.5–5)
ALBUMIN/GLOB SERPL: 0.4 {RATIO} (ref 1.1–2.2)
ALBUMIN/GLOB SERPL: 0.5 {RATIO} (ref 1.1–2.2)
ALBUMIN/GLOB SERPL: 0.5 {RATIO} (ref 1.1–2.2)
ALP SERPL-CCNC: 101 U/L (ref 45–117)
ALP SERPL-CCNC: 103 U/L (ref 45–117)
ALP SERPL-CCNC: 81 U/L (ref 45–117)
ALP SERPL-CCNC: 87 U/L (ref 45–117)
ALP SERPL-CCNC: 91 U/L (ref 45–117)
ALP SERPL-CCNC: 96 U/L (ref 45–117)
ALP SERPL-CCNC: 97 U/L (ref 45–117)
ALT SERPL-CCNC: 13 U/L (ref 12–78)
ALT SERPL-CCNC: 15 U/L (ref 12–78)
ALT SERPL-CCNC: 16 U/L (ref 12–78)
ALT SERPL-CCNC: 17 U/L (ref 12–78)
ALT SERPL-CCNC: 19 U/L (ref 12–78)
ALT SERPL-CCNC: 22 U/L (ref 12–78)
ALT SERPL-CCNC: 28 U/L (ref 12–78)
ANION GAP BLD CALC-SCNC: 2 MMOL/L (ref 10–20)
ANION GAP BLD CALC-SCNC: 4 MMOL/L (ref 10–20)
ANION GAP SERPL CALC-SCNC: 10 MMOL/L (ref 5–15)
ANION GAP SERPL CALC-SCNC: 11 MMOL/L (ref 5–15)
ANION GAP SERPL CALC-SCNC: 11 MMOL/L (ref 5–15)
ANION GAP SERPL CALC-SCNC: 12 MMOL/L (ref 5–15)
ANION GAP SERPL CALC-SCNC: 12 MMOL/L (ref 5–15)
ANION GAP SERPL CALC-SCNC: 13 MMOL/L (ref 5–15)
ANION GAP SERPL CALC-SCNC: 14 MMOL/L (ref 5–15)
ANION GAP SERPL CALC-SCNC: 21 MMOL/L (ref 5–15)
ANION GAP SERPL CALC-SCNC: 4 MMOL/L (ref 5–15)
ANION GAP SERPL CALC-SCNC: 6 MMOL/L (ref 5–15)
ANION GAP SERPL CALC-SCNC: 6 MMOL/L (ref 5–15)
ANION GAP SERPL CALC-SCNC: 7 MMOL/L (ref 5–15)
ANION GAP SERPL CALC-SCNC: 8 MMOL/L (ref 5–15)
ANION GAP SERPL CALC-SCNC: 9 MMOL/L (ref 5–15)
ANION GAP SERPL CALC-SCNC: 9 MMOL/L (ref 5–15)
APPEARANCE UR: ABNORMAL
ARTERIAL PATENCY WRIST A: POSITIVE
ARTERIAL PATENCY WRIST A: POSITIVE
AST SERPL-CCNC: 13 U/L (ref 15–37)
AST SERPL-CCNC: 17 U/L (ref 15–37)
AST SERPL-CCNC: 18 U/L (ref 15–37)
AST SERPL-CCNC: 19 U/L (ref 15–37)
AST SERPL-CCNC: 39 U/L (ref 15–37)
ATRIAL RATE: 100 BPM
ATRIAL RATE: 75 BPM
ATRIAL RATE: 86 BPM
ATRIAL RATE: 90 BPM
ATRIAL RATE: 96 BPM
BACTERIA SPEC CULT: NORMAL
BACTERIA URNS QL MICRO: ABNORMAL /HPF
BASE DEFICIT BLD-SCNC: 2.1 MMOL/L
BASE DEFICIT BLD-SCNC: 8.9 MMOL/L
BASE DEFICIT BLDV-SCNC: 16.9 MMOL/L
BASE EXCESS BLD CALC-SCNC: 0.7 MMOL/L
BASOPHILS # BLD: 0.1 K/UL (ref 0–0.1)
BASOPHILS # BLD: 0.3 K/UL (ref 0–0.1)
BASOPHILS NFR BLD: 1 % (ref 0–1)
BASOPHILS NFR BLD: 3 % (ref 0–1)
BDY SITE: ABNORMAL
BDY SITE: ABNORMAL
BILIRUB SERPL-MCNC: 0.2 MG/DL (ref 0.2–1)
BILIRUB SERPL-MCNC: 0.2 MG/DL (ref 0.2–1)
BILIRUB SERPL-MCNC: 0.3 MG/DL (ref 0.2–1)
BILIRUB SERPL-MCNC: 0.5 MG/DL (ref 0.2–1)
BILIRUB SERPL-MCNC: 0.5 MG/DL (ref 0.2–1)
BILIRUB UR QL: NEGATIVE
BLD PROD TYP BPU: NORMAL
BLOOD GROUP ANTIBODIES SERPL: NORMAL
BNP SERPL-MCNC: 2048 PG/ML
BNP SERPL-MCNC: 5304 PG/ML
BNP SERPL-MCNC: ABNORMAL PG/ML
BPU ID: NORMAL
BUN SERPL-MCNC: 116 MG/DL (ref 6–20)
BUN SERPL-MCNC: 38 MG/DL (ref 6–20)
BUN SERPL-MCNC: 39 MG/DL (ref 6–20)
BUN SERPL-MCNC: 42 MG/DL (ref 6–20)
BUN SERPL-MCNC: 45 MG/DL (ref 6–20)
BUN SERPL-MCNC: 46 MG/DL (ref 6–20)
BUN SERPL-MCNC: 46 MG/DL (ref 6–20)
BUN SERPL-MCNC: 60 MG/DL (ref 6–20)
BUN SERPL-MCNC: 69 MG/DL (ref 6–20)
BUN SERPL-MCNC: 71 MG/DL (ref 6–20)
BUN SERPL-MCNC: 81 MG/DL (ref 6–20)
BUN SERPL-MCNC: 87 MG/DL (ref 6–20)
BUN SERPL-MCNC: 91 MG/DL (ref 6–20)
BUN/CREAT SERPL: 10 (ref 12–20)
BUN/CREAT SERPL: 12 (ref 12–20)
BUN/CREAT SERPL: 8 (ref 12–20)
BUN/CREAT SERPL: 9 (ref 12–20)
CA-I BLD-MCNC: 1.03 MMOL/L (ref 1.12–1.32)
CA-I BLD-MCNC: 1.05 MMOL/L (ref 1.12–1.32)
CA-I BLD-MCNC: 1.12 MMOL/L (ref 1.12–1.32)
CALCIUM SERPL-MCNC: 6.2 MG/DL (ref 8.5–10.1)
CALCIUM SERPL-MCNC: 7.1 MG/DL (ref 8.5–10.1)
CALCIUM SERPL-MCNC: 7.2 MG/DL (ref 8.5–10.1)
CALCIUM SERPL-MCNC: 7.2 MG/DL (ref 8.5–10.1)
CALCIUM SERPL-MCNC: 7.7 MG/DL (ref 8.5–10.1)
CALCIUM SERPL-MCNC: 7.8 MG/DL (ref 8.5–10.1)
CALCIUM SERPL-MCNC: 7.9 MG/DL (ref 8.5–10.1)
CALCIUM SERPL-MCNC: 8.1 MG/DL (ref 8.5–10.1)
CALCIUM SERPL-MCNC: 8.4 MG/DL (ref 8.5–10.1)
CALCIUM SERPL-MCNC: 8.5 MG/DL (ref 8.5–10.1)
CALCIUM SERPL-MCNC: 8.5 MG/DL (ref 8.5–10.1)
CALCIUM SERPL-MCNC: 8.8 MG/DL (ref 8.5–10.1)
CALCIUM SERPL-MCNC: 8.8 MG/DL (ref 8.5–10.1)
CALCIUM SERPL-MCNC: 8.9 MG/DL (ref 8.5–10.1)
CALCIUM SERPL-MCNC: 9 MG/DL (ref 8.5–10.1)
CALCULATED P AXIS, ECG09: 41 DEGREES
CALCULATED P AXIS, ECG09: 50 DEGREES
CALCULATED P AXIS, ECG09: 54 DEGREES
CALCULATED P AXIS, ECG09: 57 DEGREES
CALCULATED P AXIS, ECG09: 61 DEGREES
CALCULATED R AXIS, ECG10: 47 DEGREES
CALCULATED R AXIS, ECG10: 66 DEGREES
CALCULATED R AXIS, ECG10: 69 DEGREES
CALCULATED R AXIS, ECG10: 69 DEGREES
CALCULATED R AXIS, ECG10: 84 DEGREES
CALCULATED T AXIS, ECG11: 0 DEGREES
CALCULATED T AXIS, ECG11: 27 DEGREES
CALCULATED T AXIS, ECG11: 36 DEGREES
CALCULATED T AXIS, ECG11: 63 DEGREES
CALCULATED T AXIS, ECG11: 75 DEGREES
CC UR VC: NORMAL
CHLORIDE BLD-SCNC: 104 MMOL/L (ref 100–108)
CHLORIDE BLD-SCNC: 118 MMOL/L (ref 98–107)
CHLORIDE BLD-SCNC: 122 MMOL/L (ref 98–107)
CHLORIDE SERPL-SCNC: 104 MMOL/L (ref 97–108)
CHLORIDE SERPL-SCNC: 105 MMOL/L (ref 97–108)
CHLORIDE SERPL-SCNC: 106 MMOL/L (ref 97–108)
CHLORIDE SERPL-SCNC: 107 MMOL/L (ref 97–108)
CHLORIDE SERPL-SCNC: 107 MMOL/L (ref 97–108)
CHLORIDE SERPL-SCNC: 108 MMOL/L (ref 97–108)
CHLORIDE SERPL-SCNC: 115 MMOL/L (ref 97–108)
CHLORIDE SERPL-SCNC: 86 MMOL/L (ref 97–108)
CHLORIDE SERPL-SCNC: 90 MMOL/L (ref 97–108)
CHLORIDE SERPL-SCNC: 95 MMOL/L (ref 97–108)
CHLORIDE SERPL-SCNC: 95 MMOL/L (ref 97–108)
CHLORIDE SERPL-SCNC: 96 MMOL/L (ref 97–108)
CHLORIDE SERPL-SCNC: 97 MMOL/L (ref 97–108)
CHLORIDE SERPL-SCNC: 97 MMOL/L (ref 97–108)
CHLORIDE SERPL-SCNC: 99 MMOL/L (ref 97–108)
CO2 BLD-SCNC: 14.9 MMOL/L (ref 21–32)
CO2 BLD-SCNC: 17.3 MMOL/L (ref 21–32)
CO2 BLD-SCNC: 27 MMOL/L (ref 19–24)
CO2 SERPL-SCNC: 12 MMOL/L (ref 21–32)
CO2 SERPL-SCNC: 17 MMOL/L (ref 21–32)
CO2 SERPL-SCNC: 19 MMOL/L (ref 21–32)
CO2 SERPL-SCNC: 20 MMOL/L (ref 21–32)
CO2 SERPL-SCNC: 21 MMOL/L (ref 21–32)
CO2 SERPL-SCNC: 21 MMOL/L (ref 21–32)
CO2 SERPL-SCNC: 23 MMOL/L (ref 21–32)
CO2 SERPL-SCNC: 24 MMOL/L (ref 21–32)
CO2 SERPL-SCNC: 24 MMOL/L (ref 21–32)
CO2 SERPL-SCNC: 25 MMOL/L (ref 21–32)
CO2 SERPL-SCNC: 27 MMOL/L (ref 21–32)
CO2 SERPL-SCNC: 27 MMOL/L (ref 21–32)
CO2 SERPL-SCNC: 31 MMOL/L (ref 21–32)
COLOR UR: ABNORMAL
COMMENT, HOLDF: NORMAL
COVID-19 RAPID TEST, COVR: NOT DETECTED
CREAT BLD-MCNC: 7.83 MG/DL (ref 0.6–1.3)
CREAT BLD-MCNC: 8.17 MG/DL (ref 0.6–1.3)
CREAT SERPL-MCNC: 11.9 MG/DL (ref 0.55–1.02)
CREAT SERPL-MCNC: 4.1 MG/DL (ref 0.55–1.02)
CREAT SERPL-MCNC: 4.16 MG/DL (ref 0.55–1.02)
CREAT SERPL-MCNC: 4.5 MG/DL (ref 0.55–1.02)
CREAT SERPL-MCNC: 4.74 MG/DL (ref 0.55–1.02)
CREAT SERPL-MCNC: 4.96 MG/DL (ref 0.55–1.02)
CREAT SERPL-MCNC: 4.98 MG/DL (ref 0.55–1.02)
CREAT SERPL-MCNC: 5.21 MG/DL (ref 0.55–1.02)
CREAT SERPL-MCNC: 5.59 MG/DL (ref 0.55–1.02)
CREAT SERPL-MCNC: 6.31 MG/DL (ref 0.55–1.02)
CREAT SERPL-MCNC: 7.14 MG/DL (ref 0.55–1.02)
CREAT SERPL-MCNC: 7.23 MG/DL (ref 0.55–1.02)
CREAT SERPL-MCNC: 8 MG/DL (ref 0.55–1.02)
CREAT SERPL-MCNC: 8.08 MG/DL (ref 0.55–1.02)
CREAT SERPL-MCNC: 9.16 MG/DL (ref 0.55–1.02)
CREAT UR-MCNC: 5.6 MG/DL (ref 0.6–1.3)
CROSSMATCH RESULT,%XM: NORMAL
D DIMER PPP FEU-MCNC: 11.03 MG/L FEU (ref 0–0.65)
D DIMER PPP FEU-MCNC: 6.66 MG/L FEU (ref 0–0.65)
D50 ADMINISTERED, D50ADM: 0 ML
D50 ADMINISTERED, D50ADM: 18 ML
D50 ADMINISTERED, D50ADM: 19 ML
D50 ORDER, D50ORD: 0 ML
D50 ORDER, D50ORD: 18 ML
D50 ORDER, D50ORD: 19 ML
DIAGNOSIS, 93000: NORMAL
DIFFERENTIAL METHOD BLD: ABNORMAL
EOSINOPHIL # BLD: 0 K/UL (ref 0–0.4)
EOSINOPHIL # BLD: 0.1 K/UL (ref 0–0.4)
EOSINOPHIL # BLD: 0.1 K/UL (ref 0–0.4)
EOSINOPHIL # BLD: 0.2 K/UL (ref 0–0.4)
EOSINOPHIL NFR BLD: 0 % (ref 0–7)
EOSINOPHIL NFR BLD: 1 % (ref 0–7)
EOSINOPHIL NFR BLD: 1 % (ref 0–7)
EOSINOPHIL NFR BLD: 3 % (ref 0–7)
EPITH CASTS URNS QL MICRO: ABNORMAL /LPF
ERYTHROCYTE [DISTWIDTH] IN BLOOD BY AUTOMATED COUNT: 15.4 % (ref 11.5–14.5)
ERYTHROCYTE [DISTWIDTH] IN BLOOD BY AUTOMATED COUNT: 15.5 % (ref 11.5–14.5)
ERYTHROCYTE [DISTWIDTH] IN BLOOD BY AUTOMATED COUNT: 15.5 % (ref 11.5–14.5)
ERYTHROCYTE [DISTWIDTH] IN BLOOD BY AUTOMATED COUNT: 15.6 % (ref 11.5–14.5)
ERYTHROCYTE [DISTWIDTH] IN BLOOD BY AUTOMATED COUNT: 15.7 % (ref 11.5–14.5)
ERYTHROCYTE [DISTWIDTH] IN BLOOD BY AUTOMATED COUNT: 15.7 % (ref 11.5–14.5)
ERYTHROCYTE [DISTWIDTH] IN BLOOD BY AUTOMATED COUNT: 15.8 % (ref 11.5–14.5)
ERYTHROCYTE [DISTWIDTH] IN BLOOD BY AUTOMATED COUNT: 15.9 % (ref 11.5–14.5)
ERYTHROCYTE [DISTWIDTH] IN BLOOD BY AUTOMATED COUNT: 16.5 % (ref 11.5–14.5)
ERYTHROCYTE [DISTWIDTH] IN BLOOD BY AUTOMATED COUNT: 16.5 % (ref 11.5–14.5)
ERYTHROCYTE [DISTWIDTH] IN BLOOD BY AUTOMATED COUNT: 16.6 % (ref 11.5–14.5)
EST. AVERAGE GLUCOSE BLD GHB EST-MCNC: 143 MG/DL
EST. AVERAGE GLUCOSE BLD GHB EST-MCNC: 200 MG/DL
FERRITIN SERPL-MCNC: 104 NG/ML (ref 8–252)
GAS FLOW.O2 O2 DELIVERY SYS: ABNORMAL L/MIN
GAS FLOW.O2 O2 DELIVERY SYS: ABNORMAL L/MIN
GLOBULIN SER CALC-MCNC: 4.2 G/DL (ref 2–4)
GLOBULIN SER CALC-MCNC: 4.2 G/DL (ref 2–4)
GLOBULIN SER CALC-MCNC: 4.4 G/DL (ref 2–4)
GLOBULIN SER CALC-MCNC: 4.7 G/DL (ref 2–4)
GLOBULIN SER CALC-MCNC: 5.1 G/DL (ref 2–4)
GLOBULIN SER CALC-MCNC: 5.2 G/DL (ref 2–4)
GLOBULIN SER CALC-MCNC: 5.6 G/DL (ref 2–4)
GLSCOM COMMENTS: NORMAL
GLUCOSE BLD STRIP.AUTO-MCNC: 103 MG/DL (ref 65–117)
GLUCOSE BLD STRIP.AUTO-MCNC: 104 MG/DL (ref 65–117)
GLUCOSE BLD STRIP.AUTO-MCNC: 105 MG/DL (ref 65–117)
GLUCOSE BLD STRIP.AUTO-MCNC: 118 MG/DL (ref 65–117)
GLUCOSE BLD STRIP.AUTO-MCNC: 132 MG/DL (ref 65–117)
GLUCOSE BLD STRIP.AUTO-MCNC: 145 MG/DL (ref 65–117)
GLUCOSE BLD STRIP.AUTO-MCNC: 146 MG/DL (ref 74–106)
GLUCOSE BLD STRIP.AUTO-MCNC: 148 MG/DL (ref 65–117)
GLUCOSE BLD STRIP.AUTO-MCNC: 152 MG/DL (ref 65–117)
GLUCOSE BLD STRIP.AUTO-MCNC: 153 MG/DL (ref 65–117)
GLUCOSE BLD STRIP.AUTO-MCNC: 159 MG/DL (ref 65–117)
GLUCOSE BLD STRIP.AUTO-MCNC: 162 MG/DL (ref 65–117)
GLUCOSE BLD STRIP.AUTO-MCNC: 164 MG/DL (ref 65–117)
GLUCOSE BLD STRIP.AUTO-MCNC: 166 MG/DL (ref 65–117)
GLUCOSE BLD STRIP.AUTO-MCNC: 171 MG/DL (ref 65–117)
GLUCOSE BLD STRIP.AUTO-MCNC: 174 MG/DL (ref 65–117)
GLUCOSE BLD STRIP.AUTO-MCNC: 178 MG/DL (ref 65–117)
GLUCOSE BLD STRIP.AUTO-MCNC: 179 MG/DL (ref 65–117)
GLUCOSE BLD STRIP.AUTO-MCNC: 179 MG/DL (ref 65–117)
GLUCOSE BLD STRIP.AUTO-MCNC: 189 MG/DL (ref 65–117)
GLUCOSE BLD STRIP.AUTO-MCNC: 200 MG/DL (ref 65–117)
GLUCOSE BLD STRIP.AUTO-MCNC: 205 MG/DL (ref 65–117)
GLUCOSE BLD STRIP.AUTO-MCNC: 225 MG/DL (ref 65–117)
GLUCOSE BLD STRIP.AUTO-MCNC: 239 MG/DL (ref 65–117)
GLUCOSE BLD STRIP.AUTO-MCNC: 239 MG/DL (ref 65–117)
GLUCOSE BLD STRIP.AUTO-MCNC: 247 MG/DL (ref 65–117)
GLUCOSE BLD STRIP.AUTO-MCNC: 250 MG/DL (ref 65–117)
GLUCOSE BLD STRIP.AUTO-MCNC: 273 MG/DL (ref 65–117)
GLUCOSE BLD STRIP.AUTO-MCNC: 276 MG/DL (ref 65–117)
GLUCOSE BLD STRIP.AUTO-MCNC: 277 MG/DL (ref 65–117)
GLUCOSE BLD STRIP.AUTO-MCNC: 302 MG/DL (ref 65–117)
GLUCOSE BLD STRIP.AUTO-MCNC: 305 MG/DL (ref 65–117)
GLUCOSE BLD STRIP.AUTO-MCNC: 307 MG/DL (ref 65–117)
GLUCOSE BLD STRIP.AUTO-MCNC: 324 MG/DL (ref 65–117)
GLUCOSE BLD STRIP.AUTO-MCNC: 329 MG/DL (ref 65–117)
GLUCOSE BLD STRIP.AUTO-MCNC: 331 MG/DL (ref 65–117)
GLUCOSE BLD STRIP.AUTO-MCNC: 342 MG/DL (ref 65–117)
GLUCOSE BLD STRIP.AUTO-MCNC: 344 MG/DL (ref 65–117)
GLUCOSE BLD STRIP.AUTO-MCNC: 370 MG/DL (ref 65–117)
GLUCOSE BLD STRIP.AUTO-MCNC: 370 MG/DL (ref 65–117)
GLUCOSE BLD STRIP.AUTO-MCNC: 371 MG/DL (ref 65–117)
GLUCOSE BLD STRIP.AUTO-MCNC: 376 MG/DL (ref 65–117)
GLUCOSE BLD STRIP.AUTO-MCNC: 384 MG/DL (ref 65–117)
GLUCOSE BLD STRIP.AUTO-MCNC: 436 MG/DL (ref 65–117)
GLUCOSE BLD STRIP.AUTO-MCNC: 474 MG/DL (ref 65–117)
GLUCOSE BLD STRIP.AUTO-MCNC: 53 MG/DL (ref 65–117)
GLUCOSE BLD STRIP.AUTO-MCNC: 549 MG/DL (ref 65–117)
GLUCOSE BLD STRIP.AUTO-MCNC: 56 MG/DL (ref 65–117)
GLUCOSE BLD STRIP.AUTO-MCNC: 77 MG/DL (ref 65–117)
GLUCOSE BLD STRIP.AUTO-MCNC: 80 MG/DL (ref 65–117)
GLUCOSE BLD STRIP.AUTO-MCNC: 90 MG/DL (ref 65–117)
GLUCOSE BLD STRIP.AUTO-MCNC: 94 MG/DL (ref 65–117)
GLUCOSE BLD STRIP.AUTO-MCNC: >600 MG/DL (ref 65–117)
GLUCOSE BLD-MCNC: 277 MG/DL (ref 65–100)
GLUCOSE BLD-MCNC: 375 MG/DL (ref 65–100)
GLUCOSE SERPL-MCNC: 126 MG/DL (ref 65–100)
GLUCOSE SERPL-MCNC: 130 MG/DL (ref 65–100)
GLUCOSE SERPL-MCNC: 139 MG/DL (ref 65–100)
GLUCOSE SERPL-MCNC: 149 MG/DL (ref 65–100)
GLUCOSE SERPL-MCNC: 178 MG/DL (ref 65–100)
GLUCOSE SERPL-MCNC: 188 MG/DL (ref 65–100)
GLUCOSE SERPL-MCNC: 197 MG/DL (ref 65–100)
GLUCOSE SERPL-MCNC: 213 MG/DL (ref 65–100)
GLUCOSE SERPL-MCNC: 225 MG/DL (ref 65–100)
GLUCOSE SERPL-MCNC: 364 MG/DL (ref 65–100)
GLUCOSE SERPL-MCNC: 402 MG/DL (ref 65–100)
GLUCOSE SERPL-MCNC: 446 MG/DL (ref 65–100)
GLUCOSE SERPL-MCNC: 561 MG/DL (ref 65–100)
GLUCOSE SERPL-MCNC: 73 MG/DL (ref 65–100)
GLUCOSE SERPL-MCNC: 807 MG/DL (ref 65–100)
GLUCOSE UR STRIP.AUTO-MCNC: 250 MG/DL
GLUCOSE, GLC: 105 MG/DL
GLUCOSE, GLC: 179 MG/DL
GLUCOSE, GLC: 324 MG/DL
GLUCOSE, GLC: 474 MG/DL
GLUCOSE, GLC: 53 MG/DL
GLUCOSE, GLC: 549 MG/DL
GLUCOSE, GLC: 56 MG/DL
GLUCOSE, GLC: 601 MG/DL
GLUCOSE, GLC: 77 MG/DL
GLUCOSE, GLC: 80 MG/DL
HBA1C MFR BLD: 6.6 % (ref 4–5.6)
HBA1C MFR BLD: 8.6 % (ref 4–5.6)
HBV SURFACE AB SER QL: NONREACTIVE
HBV SURFACE AB SER-ACNC: <3.1 MIU/ML
HBV SURFACE AG SER QL: <0.1 INDEX
HBV SURFACE AG SER QL: NEGATIVE
HCO3 BLD-SCNC: 17 MMOL/L (ref 22–26)
HCO3 BLD-SCNC: 24.8 MMOL/L (ref 22–26)
HCO3 BLDA-SCNC: 26 MMOL/L
HCO3 BLDV-SCNC: 11.2 MMOL/L (ref 23–28)
HCT VFR BLD AUTO: 21.4 % (ref 35–47)
HCT VFR BLD AUTO: 22 % (ref 35–47)
HCT VFR BLD AUTO: 23.3 % (ref 35–47)
HCT VFR BLD AUTO: 23.4 % (ref 35–47)
HCT VFR BLD AUTO: 23.7 % (ref 35–47)
HCT VFR BLD AUTO: 25.5 % (ref 35–47)
HCT VFR BLD AUTO: 26.7 % (ref 35–47)
HCT VFR BLD AUTO: 29.5 % (ref 35–47)
HCT VFR BLD AUTO: 29.7 % (ref 35–47)
HCT VFR BLD AUTO: 30.3 % (ref 35–47)
HCT VFR BLD AUTO: 30.3 % (ref 35–47)
HCV AB SERPL QL IA: NONREACTIVE
HGB BLD-MCNC: 6.6 G/DL (ref 11.5–16)
HGB BLD-MCNC: 7.2 G/DL (ref 11.5–16)
HGB BLD-MCNC: 7.2 G/DL (ref 11.5–16)
HGB BLD-MCNC: 7.3 G/DL (ref 11.5–16)
HGB BLD-MCNC: 7.5 G/DL (ref 11.5–16)
HGB BLD-MCNC: 7.7 G/DL (ref 11.5–16)
HGB BLD-MCNC: 8 G/DL (ref 11.5–16)
HGB BLD-MCNC: 9.2 G/DL (ref 11.5–16)
HGB BLD-MCNC: 9.3 G/DL (ref 11.5–16)
HGB BLD-MCNC: 9.5 G/DL (ref 11.5–16)
HGB BLD-MCNC: 9.7 G/DL (ref 11.5–16)
HGB UR QL STRIP: ABNORMAL
HIGH TARGET, HITG: 250 MG/DL
HISTORY CHECKED?,CKHIST: NORMAL
IMM GRANULOCYTES # BLD AUTO: 0 K/UL
IMM GRANULOCYTES # BLD AUTO: 0.1 K/UL (ref 0–0.04)
IMM GRANULOCYTES NFR BLD AUTO: 0 %
IMM GRANULOCYTES NFR BLD AUTO: 1 % (ref 0–0.5)
INR PPP: 1 (ref 0.9–1.1)
INSULIN ADMINSTERED, INSADM: 0 UNITS/HOUR
INSULIN ADMINSTERED, INSADM: 0.3 UNITS/HOUR
INSULIN ADMINSTERED, INSADM: 10.8 UNITS/HOUR
INSULIN ADMINSTERED, INSADM: 15.8 UNITS/HOUR
INSULIN ADMINSTERED, INSADM: 16.2 UNITS/HOUR
INSULIN ADMINSTERED, INSADM: 2.2 UNITS/HOUR
INSULIN ADMINSTERED, INSADM: 21.6 UNITS/HOUR
INSULIN ADMINSTERED, INSADM: 24.5 UNITS/HOUR
INSULIN ADMINSTERED, INSADM: 24.8 UNITS/HOUR
INSULIN ADMINSTERED, INSADM: 7.1 UNITS/HOUR
INSULIN ORDER, INSORD: 0 UNITS/HOUR
INSULIN ORDER, INSORD: 0.3 UNITS/HOUR
INSULIN ORDER, INSORD: 10.8 UNITS/HOUR
INSULIN ORDER, INSORD: 15.8 UNITS/HOUR
INSULIN ORDER, INSORD: 16.2 UNITS/HOUR
INSULIN ORDER, INSORD: 2.2 UNITS/HOUR
INSULIN ORDER, INSORD: 21.6 UNITS/HOUR
INSULIN ORDER, INSORD: 24.5 UNITS/HOUR
INSULIN ORDER, INSORD: 24.8 UNITS/HOUR
INSULIN ORDER, INSORD: 7.1 UNITS/HOUR
IRON SATN MFR SERPL: 15 % (ref 20–50)
IRON SERPL-MCNC: 30 UG/DL (ref 35–150)
KETONES UR QL STRIP.AUTO: NEGATIVE MG/DL
LACTATE BLD-SCNC: 1.15 MMOL/L (ref 0.4–2)
LACTATE SERPL-SCNC: 1.8 MMOL/L (ref 0.4–2)
LEUKOCYTE ESTERASE UR QL STRIP.AUTO: ABNORMAL
LIPASE SERPL-CCNC: 116 U/L (ref 73–393)
LOW TARGET, LOT: 150 MG/DL
LYMPHOCYTES # BLD: 0.2 K/UL (ref 0.8–3.5)
LYMPHOCYTES # BLD: 0.5 K/UL (ref 0.8–3.5)
LYMPHOCYTES # BLD: 0.7 K/UL (ref 0.8–3.5)
LYMPHOCYTES # BLD: 0.8 K/UL (ref 0.8–3.5)
LYMPHOCYTES NFR BLD: 12 % (ref 12–49)
LYMPHOCYTES NFR BLD: 2 % (ref 12–49)
LYMPHOCYTES NFR BLD: 5 % (ref 12–49)
LYMPHOCYTES NFR BLD: 6 % (ref 12–49)
MAGNESIUM SERPL-MCNC: 1.8 MG/DL (ref 1.6–2.4)
MAGNESIUM SERPL-MCNC: 2 MG/DL (ref 1.6–2.4)
MAGNESIUM SERPL-MCNC: 2 MG/DL (ref 1.6–2.4)
MAGNESIUM SERPL-MCNC: 2.1 MG/DL (ref 1.6–2.4)
MCH RBC QN AUTO: 30.9 PG (ref 26–34)
MCH RBC QN AUTO: 31 PG (ref 26–34)
MCH RBC QN AUTO: 31.4 PG (ref 26–34)
MCH RBC QN AUTO: 31.6 PG (ref 26–34)
MCH RBC QN AUTO: 31.9 PG (ref 26–34)
MCH RBC QN AUTO: 32.1 PG (ref 26–34)
MCH RBC QN AUTO: 32.1 PG (ref 26–34)
MCH RBC QN AUTO: 32.6 PG (ref 26–34)
MCH RBC QN AUTO: 32.6 PG (ref 26–34)
MCH RBC QN AUTO: 33 PG (ref 26–34)
MCH RBC QN AUTO: 33.2 PG (ref 26–34)
MCHC RBC AUTO-ENTMCNC: 30 G/DL (ref 30–36.5)
MCHC RBC AUTO-ENTMCNC: 30.2 G/DL (ref 30–36.5)
MCHC RBC AUTO-ENTMCNC: 30.4 G/DL (ref 30–36.5)
MCHC RBC AUTO-ENTMCNC: 30.7 G/DL (ref 30–36.5)
MCHC RBC AUTO-ENTMCNC: 30.8 G/DL (ref 30–36.5)
MCHC RBC AUTO-ENTMCNC: 31.2 G/DL (ref 30–36.5)
MCHC RBC AUTO-ENTMCNC: 31.3 G/DL (ref 30–36.5)
MCHC RBC AUTO-ENTMCNC: 32 G/DL (ref 30–36.5)
MCHC RBC AUTO-ENTMCNC: 32 G/DL (ref 30–36.5)
MCHC RBC AUTO-ENTMCNC: 32.1 G/DL (ref 30–36.5)
MCHC RBC AUTO-ENTMCNC: 32.7 G/DL (ref 30–36.5)
MCV RBC AUTO: 101.7 FL (ref 80–99)
MCV RBC AUTO: 101.9 FL (ref 80–99)
MCV RBC AUTO: 102.1 FL (ref 80–99)
MCV RBC AUTO: 102.8 FL (ref 80–99)
MCV RBC AUTO: 105.7 FL (ref 80–99)
MCV RBC AUTO: 105.8 FL (ref 80–99)
MCV RBC AUTO: 106.4 FL (ref 80–99)
MCV RBC AUTO: 108.2 FL (ref 80–99)
MCV RBC AUTO: 98.2 FL (ref 80–99)
MCV RBC AUTO: 98.7 FL (ref 80–99)
MCV RBC AUTO: 98.7 FL (ref 80–99)
MINUTES UNTIL NEXT BG, NBG: 15 MIN
MINUTES UNTIL NEXT BG, NBG: 15 MIN
MINUTES UNTIL NEXT BG, NBG: 60 MIN
MONOCYTES # BLD: 0.5 K/UL (ref 0–1)
MONOCYTES # BLD: 0.7 K/UL (ref 0–1)
MONOCYTES # BLD: 0.7 K/UL (ref 0–1)
MONOCYTES # BLD: 0.8 K/UL (ref 0–1)
MONOCYTES NFR BLD: 5 % (ref 5–13)
MONOCYTES NFR BLD: 7 % (ref 5–13)
MONOCYTES NFR BLD: 8 % (ref 5–13)
MONOCYTES NFR BLD: 9 % (ref 5–13)
MULTIPLIER, MUL: 0
MULTIPLIER, MUL: 0
MULTIPLIER, MUL: 0.01
MULTIPLIER, MUL: 0.02
MULTIPLIER, MUL: 0.02
MULTIPLIER, MUL: 0.03
MULTIPLIER, MUL: 0.04
MULTIPLIER, MUL: 0.05
MULTIPLIER, MUL: 0.05
MULTIPLIER, MUL: 0.06
NEUTS SEG # BLD: 12.2 K/UL (ref 1.8–8)
NEUTS SEG # BLD: 4.8 K/UL (ref 1.8–8)
NEUTS SEG # BLD: 7.4 K/UL (ref 1.8–8)
NEUTS SEG # BLD: 8.7 K/UL (ref 1.8–8)
NEUTS SEG NFR BLD: 75 % (ref 32–75)
NEUTS SEG NFR BLD: 83 % (ref 32–75)
NEUTS SEG NFR BLD: 87 % (ref 32–75)
NEUTS SEG NFR BLD: 87 % (ref 32–75)
NITRITE UR QL STRIP.AUTO: NEGATIVE
NRBC # BLD: 0 K/UL (ref 0–0.01)
NRBC # BLD: 0.02 K/UL (ref 0–0.01)
NRBC BLD-RTO: 0 PER 100 WBC
NRBC BLD-RTO: 0.2 PER 100 WBC
O2/TOTAL GAS SETTING VFR VENT: 1.5 %
O2/TOTAL GAS SETTING VFR VENT: 6 %
ORDER INITIALS, ORDINIT: NORMAL
P-R INTERVAL, ECG05: 140 MS
P-R INTERVAL, ECG05: 144 MS
P-R INTERVAL, ECG05: 148 MS
P-R INTERVAL, ECG05: 152 MS
P-R INTERVAL, ECG05: 174 MS
PCO2 BLD: 35.7 MMHG (ref 35–45)
PCO2 BLD: 51.1 MMHG (ref 35–45)
PCO2 BLDV: 36.5 MMHG (ref 41–51)
PCO2 BLDV: 45.2 MMHG (ref 41–51)
PH BLD: 7.29 [PH] (ref 7.35–7.45)
PH BLD: 7.29 [PH] (ref 7.35–7.45)
PH BLDV: 7.1 [PH] (ref 7.32–7.42)
PH BLDV: 7.37 [PH] (ref 7.32–7.42)
PH UR STRIP: 5 [PH] (ref 5–8)
PHOSPHATE SERPL-MCNC: 4.4 MG/DL (ref 2.6–4.7)
PHOSPHATE SERPL-MCNC: 4.4 MG/DL (ref 2.6–4.7)
PHOSPHATE SERPL-MCNC: 8 MG/DL (ref 2.6–4.7)
PLATELET # BLD AUTO: 166 K/UL (ref 150–400)
PLATELET # BLD AUTO: 177 K/UL (ref 150–400)
PLATELET # BLD AUTO: 177 K/UL (ref 150–400)
PLATELET # BLD AUTO: 190 K/UL (ref 150–400)
PLATELET # BLD AUTO: 196 K/UL (ref 150–400)
PLATELET # BLD AUTO: 198 K/UL (ref 150–400)
PLATELET # BLD AUTO: 229 K/UL (ref 150–400)
PLATELET # BLD AUTO: 238 K/UL (ref 150–400)
PLATELET # BLD AUTO: 257 K/UL (ref 150–400)
PLATELET # BLD AUTO: 260 K/UL (ref 150–400)
PLATELET # BLD AUTO: 290 K/UL (ref 150–400)
PLATELET COMMENTS,PCOM: ABNORMAL
PMV BLD AUTO: 10.3 FL (ref 8.9–12.9)
PMV BLD AUTO: 10.3 FL (ref 8.9–12.9)
PMV BLD AUTO: 10.4 FL (ref 8.9–12.9)
PMV BLD AUTO: 10.5 FL (ref 8.9–12.9)
PMV BLD AUTO: 10.5 FL (ref 8.9–12.9)
PMV BLD AUTO: 10.6 FL (ref 8.9–12.9)
PMV BLD AUTO: 10.7 FL (ref 8.9–12.9)
PMV BLD AUTO: 10.8 FL (ref 8.9–12.9)
PMV BLD AUTO: 11.1 FL (ref 8.9–12.9)
PO2 BLD: 63 MMHG (ref 80–100)
PO2 BLD: 68 MMHG (ref 80–100)
PO2 BLDV: 27 MMHG (ref 25–40)
PO2 BLDV: 28 MMHG (ref 25–40)
POTASSIUM BLD-SCNC: 3.6 MMOL/L (ref 3.5–5.5)
POTASSIUM BLD-SCNC: 4.9 MMOL/L (ref 3.5–5.1)
POTASSIUM BLD-SCNC: 6.1 MMOL/L (ref 3.5–5.1)
POTASSIUM SERPL-SCNC: 3 MMOL/L (ref 3.5–5.1)
POTASSIUM SERPL-SCNC: 3.2 MMOL/L (ref 3.5–5.1)
POTASSIUM SERPL-SCNC: 3.9 MMOL/L (ref 3.5–5.1)
POTASSIUM SERPL-SCNC: 4 MMOL/L (ref 3.5–5.1)
POTASSIUM SERPL-SCNC: 4 MMOL/L (ref 3.5–5.1)
POTASSIUM SERPL-SCNC: 4.1 MMOL/L (ref 3.5–5.1)
POTASSIUM SERPL-SCNC: 4.2 MMOL/L (ref 3.5–5.1)
POTASSIUM SERPL-SCNC: 4.2 MMOL/L (ref 3.5–5.1)
POTASSIUM SERPL-SCNC: 4.3 MMOL/L (ref 3.5–5.1)
POTASSIUM SERPL-SCNC: 4.5 MMOL/L (ref 3.5–5.1)
POTASSIUM SERPL-SCNC: 4.5 MMOL/L (ref 3.5–5.1)
POTASSIUM SERPL-SCNC: 4.9 MMOL/L (ref 3.5–5.1)
POTASSIUM SERPL-SCNC: 5.6 MMOL/L (ref 3.5–5.1)
POTASSIUM SERPL-SCNC: 5.6 MMOL/L (ref 3.5–5.1)
POTASSIUM SERPL-SCNC: 5.7 MMOL/L (ref 3.5–5.1)
PROT SERPL-MCNC: 6.1 G/DL (ref 6.4–8.2)
PROT SERPL-MCNC: 6.2 G/DL (ref 6.4–8.2)
PROT SERPL-MCNC: 6.4 G/DL (ref 6.4–8.2)
PROT SERPL-MCNC: 6.7 G/DL (ref 6.4–8.2)
PROT SERPL-MCNC: 7 G/DL (ref 6.4–8.2)
PROT SERPL-MCNC: 7.2 G/DL (ref 6.4–8.2)
PROT SERPL-MCNC: 7.7 G/DL (ref 6.4–8.2)
PROT UR STRIP-MCNC: >300 MG/DL
PROTHROMBIN TIME: 10.3 SEC (ref 9–11.1)
Q-T INTERVAL, ECG07: 346 MS
Q-T INTERVAL, ECG07: 356 MS
Q-T INTERVAL, ECG07: 362 MS
Q-T INTERVAL, ECG07: 386 MS
Q-T INTERVAL, ECG07: 398 MS
QRS DURATION, ECG06: 58 MS
QRS DURATION, ECG06: 62 MS
QRS DURATION, ECG06: 64 MS
QRS DURATION, ECG06: 68 MS
QRS DURATION, ECG06: 68 MS
QTC CALCULATION (BEZET), ECG08: 435 MS
QTC CALCULATION (BEZET), ECG08: 437 MS
QTC CALCULATION (BEZET), ECG08: 444 MS
QTC CALCULATION (BEZET), ECG08: 461 MS
QTC CALCULATION (BEZET), ECG08: 466 MS
RBC # BLD AUTO: 2.1 M/UL (ref 3.8–5.2)
RBC # BLD AUTO: 2.24 M/UL (ref 3.8–5.2)
RBC # BLD AUTO: 2.24 M/UL (ref 3.8–5.2)
RBC # BLD AUTO: 2.36 M/UL (ref 3.8–5.2)
RBC # BLD AUTO: 2.37 M/UL (ref 3.8–5.2)
RBC # BLD AUTO: 2.48 M/UL (ref 3.8–5.2)
RBC # BLD AUTO: 2.51 M/UL (ref 3.8–5.2)
RBC # BLD AUTO: 2.79 M/UL (ref 3.8–5.2)
RBC # BLD AUTO: 2.8 M/UL (ref 3.8–5.2)
RBC # BLD AUTO: 2.91 M/UL (ref 3.8–5.2)
RBC # BLD AUTO: 2.98 M/UL (ref 3.8–5.2)
RBC #/AREA URNS HPF: ABNORMAL /HPF (ref 0–5)
RBC MORPH BLD: ABNORMAL
SAMPLES BEING HELD,HOLD: NORMAL
SAO2 % BLD: 89.1 % (ref 92–97)
SAO2 % BLD: 90.9 % (ref 92–97)
SAO2 % BLDV: 34.5 % (ref 65–88)
SARS-COV-2, COV2: NORMAL
SARS-COV-2, XPLCVT: NOT DETECTED
SARS-COV-2, XPLCVT: NOT DETECTED
SERVICE CMNT-IMP: ABNORMAL
SERVICE CMNT-IMP: NORMAL
SODIUM BLD-SCNC: 136 MMOL/L (ref 136–145)
SODIUM BLD-SCNC: 140 MMOL/L (ref 136–145)
SODIUM BLD-SCNC: 141 MMOL/L (ref 136–145)
SODIUM SERPL-SCNC: 124 MMOL/L (ref 136–145)
SODIUM SERPL-SCNC: 126 MMOL/L (ref 136–145)
SODIUM SERPL-SCNC: 128 MMOL/L (ref 136–145)
SODIUM SERPL-SCNC: 129 MMOL/L (ref 136–145)
SODIUM SERPL-SCNC: 130 MMOL/L (ref 136–145)
SODIUM SERPL-SCNC: 130 MMOL/L (ref 136–145)
SODIUM SERPL-SCNC: 131 MMOL/L (ref 136–145)
SODIUM SERPL-SCNC: 132 MMOL/L (ref 136–145)
SODIUM SERPL-SCNC: 134 MMOL/L (ref 136–145)
SODIUM SERPL-SCNC: 138 MMOL/L (ref 136–145)
SODIUM SERPL-SCNC: 139 MMOL/L (ref 136–145)
SODIUM SERPL-SCNC: 139 MMOL/L (ref 136–145)
SODIUM SERPL-SCNC: 141 MMOL/L (ref 136–145)
SOURCE, COVRS: NORMAL
SP GR UR REFRACTOMETRY: 1.02 (ref 1–1.03)
SPECIMEN EXP DATE BLD: NORMAL
SPECIMEN SITE: ABNORMAL
SPECIMEN TYPE: ABNORMAL
STATUS OF UNIT,%ST: NORMAL
STRESS BASELINE DIAS BP: 67 MMHG
STRESS BASELINE HR: 71 BPM
STRESS BASELINE SYS BP: 159 MMHG
STRESS ESTIMATED WORKLOAD: 1 METS
STRESS EXERCISE DUR MIN: NORMAL
STRESS PEAK DIAS BP: 67 MMHG
STRESS PEAK SYS BP: 159 MMHG
STRESS PERCENT HR ACHIEVED: 61 %
STRESS POST PEAK HR: 102 BPM
STRESS RATE PRESSURE PRODUCT: NORMAL BPM*MMHG
STRESS ST DEPRESSION: 0 MM
STRESS ST ELEVATION: 0 MM
STRESS STAGE RECOVERY 1 BP: NORMAL MMHG
STRESS STAGE RECOVERY 1 HR: 108 BPM
STRESS STAGE RECOVERY 2 BP: NORMAL MMHG
STRESS STAGE RECOVERY 2 HR: 96 BPM
STRESS TARGET HR: 168 BPM
TIBC SERPL-MCNC: 199 UG/DL (ref 250–450)
TROPONIN-HIGH SENSITIVITY: 175 NG/L (ref 0–51)
TROPONIN-HIGH SENSITIVITY: 59 NG/L (ref 0–51)
TROPONIN-HIGH SENSITIVITY: 644 NG/L (ref 0–51)
TROPONIN-HIGH SENSITIVITY: 665 NG/L (ref 0–51)
TROPONIN-HIGH SENSITIVITY: 846 NG/L (ref 0–51)
TROPONIN-HIGH SENSITIVITY: 856 NG/L (ref 0–51)
TROPONIN-HIGH SENSITIVITY: 889 NG/L (ref 0–51)
TROPONIN-HIGH SENSITIVITY: 891 NG/L (ref 0–51)
TROPONIN-HIGH SENSITIVITY: 958 NG/L (ref 0–51)
UNIT DIVISION, %UDIV: 0
UR CULT HOLD, URHOLD: NORMAL
URATE SERPL-MCNC: 7 MG/DL (ref 2.6–6)
UROBILINOGEN UR QL STRIP.AUTO: 0.2 EU/DL (ref 0.2–1)
VENTRICULAR RATE, ECG03: 100 BPM
VENTRICULAR RATE, ECG03: 75 BPM
VENTRICULAR RATE, ECG03: 86 BPM
VENTRICULAR RATE, ECG03: 90 BPM
VENTRICULAR RATE, ECG03: 96 BPM
WBC # BLD AUTO: 10 K/UL (ref 3.6–11)
WBC # BLD AUTO: 10 K/UL (ref 3.6–11)
WBC # BLD AUTO: 12.9 K/UL (ref 3.6–11)
WBC # BLD AUTO: 13.9 K/UL (ref 3.6–11)
WBC # BLD AUTO: 4.3 K/UL (ref 3.6–11)
WBC # BLD AUTO: 6.1 K/UL (ref 3.6–11)
WBC # BLD AUTO: 6.5 K/UL (ref 3.6–11)
WBC # BLD AUTO: 6.5 K/UL (ref 3.6–11)
WBC # BLD AUTO: 8.9 K/UL (ref 3.6–11)
WBC # BLD AUTO: 9.2 K/UL (ref 3.6–11)
WBC # BLD AUTO: 9.6 K/UL (ref 3.6–11)
WBC URNS QL MICRO: ABNORMAL /HPF (ref 0–4)

## 2021-01-01 PROCEDURE — 74011250636 HC RX REV CODE- 250/636: Performed by: INTERNAL MEDICINE

## 2021-01-01 PROCEDURE — 80048 BASIC METABOLIC PNL TOTAL CA: CPT

## 2021-01-01 PROCEDURE — 74011000258 HC RX REV CODE- 258: Performed by: EMERGENCY MEDICINE

## 2021-01-01 PROCEDURE — 96374 THER/PROPH/DIAG INJ IV PUSH: CPT

## 2021-01-01 PROCEDURE — 74011250637 HC RX REV CODE- 250/637: Performed by: HOSPITALIST

## 2021-01-01 PROCEDURE — 84484 ASSAY OF TROPONIN QUANT: CPT

## 2021-01-01 PROCEDURE — 83036 HEMOGLOBIN GLYCOSYLATED A1C: CPT

## 2021-01-01 PROCEDURE — 74011636637 HC RX REV CODE- 636/637: Performed by: HOSPITALIST

## 2021-01-01 PROCEDURE — 82728 ASSAY OF FERRITIN: CPT

## 2021-01-01 PROCEDURE — 96365 THER/PROPH/DIAG IV INF INIT: CPT

## 2021-01-01 PROCEDURE — 74011000250 HC RX REV CODE- 250: Performed by: FAMILY MEDICINE

## 2021-01-01 PROCEDURE — 82962 GLUCOSE BLOOD TEST: CPT

## 2021-01-01 PROCEDURE — 82803 BLOOD GASES ANY COMBINATION: CPT

## 2021-01-01 PROCEDURE — U0005 INFEC AGEN DETEC AMPLI PROBE: HCPCS

## 2021-01-01 PROCEDURE — 80053 COMPREHEN METABOLIC PANEL: CPT

## 2021-01-01 PROCEDURE — 81001 URINALYSIS AUTO W/SCOPE: CPT

## 2021-01-01 PROCEDURE — P9047 ALBUMIN (HUMAN), 25%, 50ML: HCPCS | Performed by: HOSPITALIST

## 2021-01-01 PROCEDURE — 83735 ASSAY OF MAGNESIUM: CPT

## 2021-01-01 PROCEDURE — 84100 ASSAY OF PHOSPHORUS: CPT

## 2021-01-01 PROCEDURE — 97535 SELF CARE MNGMENT TRAINING: CPT

## 2021-01-01 PROCEDURE — 74011250637 HC RX REV CODE- 250/637: Performed by: INTERNAL MEDICINE

## 2021-01-01 PROCEDURE — 74011636637 HC RX REV CODE- 636/637: Performed by: FAMILY MEDICINE

## 2021-01-01 PROCEDURE — 65660000001 HC RM ICU INTERMED STEPDOWN

## 2021-01-01 PROCEDURE — 85025 COMPLETE CBC W/AUTO DIFF WBC: CPT

## 2021-01-01 PROCEDURE — 85027 COMPLETE CBC AUTOMATED: CPT

## 2021-01-01 PROCEDURE — 71045 X-RAY EXAM CHEST 1 VIEW: CPT

## 2021-01-01 PROCEDURE — 99285 EMERGENCY DEPT VISIT HI MDM: CPT

## 2021-01-01 PROCEDURE — 65660000000 HC RM CCU STEPDOWN

## 2021-01-01 PROCEDURE — 74011636637 HC RX REV CODE- 636/637: Performed by: EMERGENCY MEDICINE

## 2021-01-01 PROCEDURE — 77010033678 HC OXYGEN DAILY

## 2021-01-01 PROCEDURE — C1757 CATH, THROMBECTOMY/EMBOLECT: HCPCS | Performed by: SURGERY

## 2021-01-01 PROCEDURE — 77030031139 HC SUT VCRL2 J&J -A: Performed by: SURGERY

## 2021-01-01 PROCEDURE — 86901 BLOOD TYPING SEROLOGIC RH(D): CPT

## 2021-01-01 PROCEDURE — P9016 RBC LEUKOCYTES REDUCED: HCPCS

## 2021-01-01 PROCEDURE — 5A09357 ASSISTANCE WITH RESPIRATORY VENTILATION, LESS THAN 24 CONSECUTIVE HOURS, CONTINUOUS POSITIVE AIRWAY PRESSURE: ICD-10-PCS | Performed by: HOSPITALIST

## 2021-01-01 PROCEDURE — 74011000250 HC RX REV CODE- 250: Performed by: INTERNAL MEDICINE

## 2021-01-01 PROCEDURE — 74011250636 HC RX REV CODE- 250/636: Performed by: ANESTHESIOLOGY

## 2021-01-01 PROCEDURE — 99284 EMERGENCY DEPT VISIT MOD MDM: CPT

## 2021-01-01 PROCEDURE — 74011250636 HC RX REV CODE- 250/636: Performed by: SURGERY

## 2021-01-01 PROCEDURE — 83880 ASSAY OF NATRIURETIC PEPTIDE: CPT

## 2021-01-01 PROCEDURE — 77030018558 HC SUT GORTX2 WLGO -F: Performed by: SURGERY

## 2021-01-01 PROCEDURE — 5A1D70Z PERFORMANCE OF URINARY FILTRATION, INTERMITTENT, LESS THAN 6 HOURS PER DAY: ICD-10-PCS | Performed by: HOSPITALIST

## 2021-01-01 PROCEDURE — 36415 COLL VENOUS BLD VENIPUNCTURE: CPT

## 2021-01-01 PROCEDURE — 94660 CPAP INITIATION&MGMT: CPT

## 2021-01-01 PROCEDURE — 96375 TX/PRO/DX INJ NEW DRUG ADDON: CPT

## 2021-01-01 PROCEDURE — 77030002987 HC SUT PROL J&J -B: Performed by: SURGERY

## 2021-01-01 PROCEDURE — 74011636637 HC RX REV CODE- 636/637: Performed by: NURSE PRACTITIONER

## 2021-01-01 PROCEDURE — 87635 SARS-COV-2 COVID-19 AMP PRB: CPT

## 2021-01-01 PROCEDURE — 74011250636 HC RX REV CODE- 250/636: Performed by: NURSE ANESTHETIST, CERTIFIED REGISTERED

## 2021-01-01 PROCEDURE — 94664 DEMO&/EVAL PT USE INHALER: CPT

## 2021-01-01 PROCEDURE — 74011250636 HC RX REV CODE- 250/636: Performed by: HOSPITALIST

## 2021-01-01 PROCEDURE — 84550 ASSAY OF BLOOD/URIC ACID: CPT

## 2021-01-01 PROCEDURE — 36600 WITHDRAWAL OF ARTERIAL BLOOD: CPT

## 2021-01-01 PROCEDURE — 74011000636 HC RX REV CODE- 636: Performed by: EMERGENCY MEDICINE

## 2021-01-01 PROCEDURE — 94640 AIRWAY INHALATION TREATMENT: CPT

## 2021-01-01 PROCEDURE — 71275 CT ANGIOGRAPHY CHEST: CPT

## 2021-01-01 PROCEDURE — 80047 BASIC METABLC PNL IONIZED CA: CPT

## 2021-01-01 PROCEDURE — 90935 HEMODIALYSIS ONE EVALUATION: CPT

## 2021-01-01 PROCEDURE — 77030002924 HC SUT GORTX WLGO -B: Performed by: SURGERY

## 2021-01-01 PROCEDURE — 94760 N-INVAS EAR/PLS OXIMETRY 1: CPT

## 2021-01-01 PROCEDURE — 36430 TRANSFUSION BLD/BLD COMPNT: CPT

## 2021-01-01 PROCEDURE — C1768 GRAFT, VASCULAR: HCPCS | Performed by: SURGERY

## 2021-01-01 PROCEDURE — 93005 ELECTROCARDIOGRAM TRACING: CPT

## 2021-01-01 PROCEDURE — 74011000258 HC RX REV CODE- 258: Performed by: HOSPITALIST

## 2021-01-01 PROCEDURE — 74011250636 HC RX REV CODE- 250/636: Performed by: FAMILY MEDICINE

## 2021-01-01 PROCEDURE — 2709999900 HC NON-CHARGEABLE SUPPLY: Performed by: SURGERY

## 2021-01-01 PROCEDURE — 85379 FIBRIN DEGRADATION QUANT: CPT

## 2021-01-01 PROCEDURE — 87086 URINE CULTURE/COLONY COUNT: CPT

## 2021-01-01 PROCEDURE — A9500 TC99M SESTAMIBI: HCPCS

## 2021-01-01 PROCEDURE — 77030040361 HC SLV COMPR DVT MDII -B: Performed by: SURGERY

## 2021-01-01 PROCEDURE — 86706 HEP B SURFACE ANTIBODY: CPT

## 2021-01-01 PROCEDURE — 97161 PT EVAL LOW COMPLEX 20 MIN: CPT

## 2021-01-01 PROCEDURE — 87340 HEPATITIS B SURFACE AG IA: CPT

## 2021-01-01 PROCEDURE — 86923 COMPATIBILITY TEST ELECTRIC: CPT

## 2021-01-01 PROCEDURE — A4565 SLINGS: HCPCS

## 2021-01-01 PROCEDURE — 78452 HT MUSCLE IMAGE SPECT MULT: CPT

## 2021-01-01 PROCEDURE — 86803 HEPATITIS C AB TEST: CPT

## 2021-01-01 PROCEDURE — 74011636637 HC RX REV CODE- 636/637: Performed by: SURGERY

## 2021-01-01 PROCEDURE — 74011000250 HC RX REV CODE- 250: Performed by: HOSPITALIST

## 2021-01-01 PROCEDURE — 76010000132 HC OR TIME 2.5 TO 3 HR: Performed by: SURGERY

## 2021-01-01 PROCEDURE — 83690 ASSAY OF LIPASE: CPT

## 2021-01-01 PROCEDURE — 77030012341 HC CHMB SPCR OPTC MDI VYRM -A

## 2021-01-01 PROCEDURE — 76210000017 HC OR PH I REC 1.5 TO 2 HR: Performed by: SURGERY

## 2021-01-01 PROCEDURE — 74011250637 HC RX REV CODE- 250/637: Performed by: ANESTHESIOLOGY

## 2021-01-01 PROCEDURE — 77030002996 HC SUT SLK J&J -A: Performed by: SURGERY

## 2021-01-01 PROCEDURE — 74011250636 HC RX REV CODE- 250/636: Performed by: EMERGENCY MEDICINE

## 2021-01-01 PROCEDURE — 71046 X-RAY EXAM CHEST 2 VIEWS: CPT

## 2021-01-01 PROCEDURE — U0003 INFECTIOUS AGENT DETECTION BY NUCLEIC ACID (DNA OR RNA); SEVERE ACUTE RESPIRATORY SYNDROME CORONAVIRUS 2 (SARS-COV-2) (CORONAVIRUS DISEASE [COVID-19]), AMPLIFIED PROBE TECHNIQUE, MAKING USE OF HIGH THROUGHPUT TECHNOLOGIES AS DESCRIBED BY CMS-2020-01-R: HCPCS

## 2021-01-01 PROCEDURE — 30233N1 TRANSFUSION OF NONAUTOLOGOUS RED BLOOD CELLS INTO PERIPHERAL VEIN, PERCUTANEOUS APPROACH: ICD-10-PCS | Performed by: HOSPITALIST

## 2021-01-01 PROCEDURE — 74011000250 HC RX REV CODE- 250

## 2021-01-01 PROCEDURE — 96366 THER/PROPH/DIAG IV INF ADDON: CPT

## 2021-01-01 PROCEDURE — 97165 OT EVAL LOW COMPLEX 30 MIN: CPT

## 2021-01-01 PROCEDURE — 85610 PROTHROMBIN TIME: CPT

## 2021-01-01 PROCEDURE — 74011250637 HC RX REV CODE- 250/637: Performed by: EMERGENCY MEDICINE

## 2021-01-01 PROCEDURE — 74176 CT ABD & PELVIS W/O CONTRAST: CPT

## 2021-01-01 PROCEDURE — 76060000036 HC ANESTHESIA 2.5 TO 3 HR: Performed by: SURGERY

## 2021-01-01 PROCEDURE — 77030002933 HC SUT MCRYL J&J -A: Performed by: SURGERY

## 2021-01-01 PROCEDURE — 82306 VITAMIN D 25 HYDROXY: CPT

## 2021-01-01 PROCEDURE — 74011000250 HC RX REV CODE- 250: Performed by: NURSE ANESTHETIST, CERTIFIED REGISTERED

## 2021-01-01 PROCEDURE — 74011000250 HC RX REV CODE- 250: Performed by: ANESTHESIOLOGY

## 2021-01-01 PROCEDURE — 83605 ASSAY OF LACTIC ACID: CPT

## 2021-01-01 PROCEDURE — 84295 ASSAY OF SERUM SODIUM: CPT

## 2021-01-01 PROCEDURE — 5A1D70Z PERFORMANCE OF URINARY FILTRATION, INTERMITTENT, LESS THAN 6 HOURS PER DAY: ICD-10-PCS | Performed by: FAMILY MEDICINE

## 2021-01-01 PROCEDURE — 83550 IRON BINDING TEST: CPT

## 2021-01-01 DEVICE — GORE ACUSEAL VASCULAR GRAFT 6MMX40CM HEPARIN
Type: IMPLANTABLE DEVICE | Site: ARM | Status: FUNCTIONAL
Brand: GORE ACUSEAL VASCULAR GRAFT

## 2021-01-01 RX ORDER — GUAIFENESIN 600 MG/1
600 TABLET, EXTENDED RELEASE ORAL
COMMUNITY
End: 2022-01-01

## 2021-01-01 RX ORDER — ACETAMINOPHEN 325 MG/1
650 TABLET ORAL
Status: DISCONTINUED | OUTPATIENT
Start: 2021-01-01 | End: 2021-01-01 | Stop reason: HOSPADM

## 2021-01-01 RX ORDER — SODIUM CHLORIDE 0.9 % (FLUSH) 0.9 %
5-40 SYRINGE (ML) INJECTION AS NEEDED
Status: DISCONTINUED | OUTPATIENT
Start: 2021-01-01 | End: 2021-01-01 | Stop reason: HOSPADM

## 2021-01-01 RX ORDER — INSULIN GLARGINE 100 [IU]/ML
10 INJECTION, SOLUTION SUBCUTANEOUS DAILY
Status: DISCONTINUED | OUTPATIENT
Start: 2021-01-01 | End: 2021-01-01

## 2021-01-01 RX ORDER — SODIUM CHLORIDE 0.9 % (FLUSH) 0.9 %
5-40 SYRINGE (ML) INJECTION EVERY 8 HOURS
Status: DISCONTINUED | OUTPATIENT
Start: 2021-01-01 | End: 2021-01-01 | Stop reason: HOSPADM

## 2021-01-01 RX ORDER — ATORVASTATIN CALCIUM 20 MG/1
20 TABLET, FILM COATED ORAL
Status: DISCONTINUED | OUTPATIENT
Start: 2021-01-01 | End: 2021-01-01 | Stop reason: HOSPADM

## 2021-01-01 RX ORDER — GUAIFENESIN DEXTROMETHORPHAN HYDROBROMIDE ORAL SOLUTION 10; 100 MG/5ML; MG/5ML
10 SOLUTION ORAL
COMMUNITY
End: 2022-01-01

## 2021-01-01 RX ORDER — DEXTROSE 50 % IN WATER (D50W) INTRAVENOUS SYRINGE
12.5-25 AS NEEDED
Status: DISCONTINUED | OUTPATIENT
Start: 2021-01-01 | End: 2021-01-01 | Stop reason: HOSPADM

## 2021-01-01 RX ORDER — PROPOFOL 10 MG/ML
INJECTION, EMULSION INTRAVENOUS
Status: DISCONTINUED | OUTPATIENT
Start: 2021-01-01 | End: 2021-01-01 | Stop reason: HOSPADM

## 2021-01-01 RX ORDER — ONDANSETRON 4 MG/1
4 TABLET, FILM COATED ORAL
COMMUNITY
End: 2022-01-01

## 2021-01-01 RX ORDER — ONDANSETRON 4 MG/1
4 TABLET, ORALLY DISINTEGRATING ORAL
Status: DISCONTINUED | OUTPATIENT
Start: 2021-01-01 | End: 2021-01-01 | Stop reason: HOSPADM

## 2021-01-01 RX ORDER — ROPIVACAINE HYDROCHLORIDE 5 MG/ML
30 INJECTION, SOLUTION EPIDURAL; INFILTRATION; PERINEURAL AS NEEDED
Status: DISCONTINUED | OUTPATIENT
Start: 2021-01-01 | End: 2021-01-01 | Stop reason: HOSPADM

## 2021-01-01 RX ORDER — INSULIN LISPRO 100 [IU]/ML
INJECTION, SOLUTION INTRAVENOUS; SUBCUTANEOUS
Status: DISCONTINUED | OUTPATIENT
Start: 2021-01-01 | End: 2021-01-01 | Stop reason: HOSPADM

## 2021-01-01 RX ORDER — LIDOCAINE HYDROCHLORIDE 10 MG/ML
0.1 INJECTION, SOLUTION EPIDURAL; INFILTRATION; INTRACAUDAL; PERINEURAL AS NEEDED
Status: DISCONTINUED | OUTPATIENT
Start: 2021-01-01 | End: 2021-01-01 | Stop reason: HOSPADM

## 2021-01-01 RX ORDER — SODIUM CHLORIDE 0.9 % (FLUSH) 0.9 %
20 SYRINGE (ML) INJECTION
Status: COMPLETED | OUTPATIENT
Start: 2021-01-01 | End: 2021-01-01

## 2021-01-01 RX ORDER — INSULIN GLARGINE 100 [IU]/ML
12 INJECTION, SOLUTION SUBCUTANEOUS DAILY
Status: DISCONTINUED | OUTPATIENT
Start: 2021-01-01 | End: 2021-01-01 | Stop reason: HOSPADM

## 2021-01-01 RX ORDER — ALBUTEROL SULFATE 0.83 MG/ML
2.5 SOLUTION RESPIRATORY (INHALATION)
Status: DISCONTINUED | OUTPATIENT
Start: 2021-01-01 | End: 2021-01-01 | Stop reason: HOSPADM

## 2021-01-01 RX ORDER — ONDANSETRON 2 MG/ML
4 INJECTION INTRAMUSCULAR; INTRAVENOUS
Status: DISCONTINUED | OUTPATIENT
Start: 2021-01-01 | End: 2021-01-01 | Stop reason: HOSPADM

## 2021-01-01 RX ORDER — ERGOCALCIFEROL 1.25 MG/1
50000 CAPSULE ORAL
COMMUNITY

## 2021-01-01 RX ORDER — SODIUM CHLORIDE 9 MG/ML
50 INJECTION, SOLUTION INTRAVENOUS CONTINUOUS
Status: DISCONTINUED | OUTPATIENT
Start: 2021-01-01 | End: 2021-01-01

## 2021-01-01 RX ORDER — HEPARIN SODIUM 1000 [USP'U]/ML
10000 INJECTION, SOLUTION INTRAVENOUS; SUBCUTANEOUS
Status: DISCONTINUED | OUTPATIENT
Start: 2021-01-01 | End: 2021-01-01

## 2021-01-01 RX ORDER — IPRATROPIUM BROMIDE AND ALBUTEROL SULFATE 2.5; .5 MG/3ML; MG/3ML
3 SOLUTION RESPIRATORY (INHALATION)
Status: DISCONTINUED | OUTPATIENT
Start: 2021-01-01 | End: 2021-01-01 | Stop reason: HOSPADM

## 2021-01-01 RX ORDER — LEVOTHYROXINE SODIUM 100 UG/1
100 TABLET ORAL
Status: DISCONTINUED | OUTPATIENT
Start: 2021-01-01 | End: 2021-01-01 | Stop reason: HOSPADM

## 2021-01-01 RX ORDER — MIDAZOLAM HYDROCHLORIDE 1 MG/ML
0.5 INJECTION, SOLUTION INTRAMUSCULAR; INTRAVENOUS
Status: DISCONTINUED | OUTPATIENT
Start: 2021-01-01 | End: 2021-01-01 | Stop reason: HOSPADM

## 2021-01-01 RX ORDER — DEXMEDETOMIDINE HYDROCHLORIDE 100 UG/ML
INJECTION, SOLUTION INTRAVENOUS AS NEEDED
Status: DISCONTINUED | OUTPATIENT
Start: 2021-01-01 | End: 2021-01-01 | Stop reason: HOSPADM

## 2021-01-01 RX ORDER — ONDANSETRON 2 MG/ML
INJECTION INTRAMUSCULAR; INTRAVENOUS AS NEEDED
Status: DISCONTINUED | OUTPATIENT
Start: 2021-01-01 | End: 2021-01-01 | Stop reason: HOSPADM

## 2021-01-01 RX ORDER — SODIUM CHLORIDE 9 MG/ML
INJECTION, SOLUTION INTRAVENOUS
Status: DISCONTINUED | OUTPATIENT
Start: 2021-01-01 | End: 2021-01-01 | Stop reason: HOSPADM

## 2021-01-01 RX ORDER — MAGNESIUM SULFATE 100 %
4 CRYSTALS MISCELLANEOUS AS NEEDED
Status: DISCONTINUED | OUTPATIENT
Start: 2021-01-01 | End: 2021-01-01

## 2021-01-01 RX ORDER — PANTOPRAZOLE SODIUM 40 MG/1
40 TABLET, DELAYED RELEASE ORAL
Status: DISCONTINUED | OUTPATIENT
Start: 2021-01-01 | End: 2021-01-01 | Stop reason: HOSPADM

## 2021-01-01 RX ORDER — DEXTROSE 50 % IN WATER (D50W) INTRAVENOUS SYRINGE
25-50 AS NEEDED
Status: DISCONTINUED | OUTPATIENT
Start: 2021-01-01 | End: 2021-01-01

## 2021-01-01 RX ORDER — ALBUMIN HUMAN 250 G/1000ML
25 SOLUTION INTRAVENOUS EVERY 6 HOURS
Status: DISCONTINUED | OUTPATIENT
Start: 2021-01-01 | End: 2021-01-01

## 2021-01-01 RX ORDER — SODIUM CHLORIDE 9 MG/ML
250 INJECTION, SOLUTION INTRAVENOUS AS NEEDED
Status: DISCONTINUED | OUTPATIENT
Start: 2021-01-01 | End: 2021-01-01 | Stop reason: HOSPADM

## 2021-01-01 RX ORDER — DIPHENHYDRAMINE HCL 25 MG
25 CAPSULE ORAL
Status: DISCONTINUED | OUTPATIENT
Start: 2021-01-01 | End: 2021-01-01 | Stop reason: HOSPADM

## 2021-01-01 RX ORDER — CETIRIZINE HCL 10 MG
10 TABLET ORAL DAILY
Status: DISCONTINUED | OUTPATIENT
Start: 2021-01-01 | End: 2021-01-01 | Stop reason: HOSPADM

## 2021-01-01 RX ORDER — LIDOCAINE 40 MG/G
CREAM TOPICAL
Status: DISCONTINUED | OUTPATIENT
Start: 2021-01-01 | End: 2021-01-01 | Stop reason: HOSPADM

## 2021-01-01 RX ORDER — FENTANYL CITRATE 50 UG/ML
25 INJECTION, SOLUTION INTRAMUSCULAR; INTRAVENOUS
Status: DISCONTINUED | OUTPATIENT
Start: 2021-01-01 | End: 2021-01-01 | Stop reason: HOSPADM

## 2021-01-01 RX ORDER — POLYETHYLENE GLYCOL 3350 17 G/17G
17 POWDER, FOR SOLUTION ORAL DAILY PRN
Status: DISCONTINUED | OUTPATIENT
Start: 2021-01-01 | End: 2021-01-01 | Stop reason: HOSPADM

## 2021-01-01 RX ORDER — SODIUM BICARBONATE 1 MEQ/ML
100 SYRINGE (ML) INTRAVENOUS ONCE
Status: COMPLETED | OUTPATIENT
Start: 2021-01-01 | End: 2021-01-01

## 2021-01-01 RX ORDER — LEVOTHYROXINE SODIUM 100 UG/1
100 TABLET ORAL
Qty: 90 TABLET | Refills: 3 | Status: SHIPPED | OUTPATIENT
Start: 2021-01-01

## 2021-01-01 RX ORDER — ALBUMIN HUMAN 250 G/1000ML
25 SOLUTION INTRAVENOUS AS NEEDED
Status: DISCONTINUED | OUTPATIENT
Start: 2021-01-01 | End: 2021-01-01 | Stop reason: HOSPADM

## 2021-01-01 RX ORDER — DEXTROSE MONOHYDRATE AND SODIUM CHLORIDE 5; .9 G/100ML; G/100ML
50 INJECTION, SOLUTION INTRAVENOUS AS NEEDED
Status: DISCONTINUED | OUTPATIENT
Start: 2021-01-01 | End: 2021-01-01

## 2021-01-01 RX ORDER — MONTELUKAST SODIUM 10 MG/1
10 TABLET ORAL DAILY
Status: DISCONTINUED | OUTPATIENT
Start: 2021-01-01 | End: 2021-01-01 | Stop reason: HOSPADM

## 2021-01-01 RX ORDER — ACETAMINOPHEN 325 MG/1
650 TABLET ORAL ONCE
Status: COMPLETED | OUTPATIENT
Start: 2021-01-01 | End: 2021-01-01

## 2021-01-01 RX ORDER — MAGNESIUM SULFATE 100 %
4 CRYSTALS MISCELLANEOUS AS NEEDED
Status: DISCONTINUED | OUTPATIENT
Start: 2021-01-01 | End: 2021-01-01 | Stop reason: HOSPADM

## 2021-01-01 RX ORDER — SODIUM CHLORIDE, SODIUM LACTATE, POTASSIUM CHLORIDE, CALCIUM CHLORIDE 600; 310; 30; 20 MG/100ML; MG/100ML; MG/100ML; MG/100ML
100 INJECTION, SOLUTION INTRAVENOUS CONTINUOUS
Status: DISCONTINUED | OUTPATIENT
Start: 2021-01-01 | End: 2021-01-01

## 2021-01-01 RX ORDER — INSULIN LISPRO 100 [IU]/ML
3 INJECTION, SOLUTION INTRAVENOUS; SUBCUTANEOUS ONCE
Status: COMPLETED | OUTPATIENT
Start: 2021-01-01 | End: 2021-01-01

## 2021-01-01 RX ORDER — GUAIFENESIN 600 MG/1
600 TABLET, EXTENDED RELEASE ORAL EVERY 12 HOURS
Status: DISCONTINUED | OUTPATIENT
Start: 2021-01-01 | End: 2021-01-01 | Stop reason: HOSPADM

## 2021-01-01 RX ORDER — FLUTICASONE PROPIONATE 50 MCG
2 SPRAY, SUSPENSION (ML) NASAL
COMMUNITY

## 2021-01-01 RX ORDER — LIDOCAINE HYDROCHLORIDE 20 MG/ML
20 INJECTION, SOLUTION INFILTRATION; PERINEURAL
Status: DISCONTINUED | OUTPATIENT
Start: 2021-01-01 | End: 2021-01-01

## 2021-01-01 RX ORDER — INSULIN DEGLUDEC INJECTION 100 U/ML
10 INJECTION, SOLUTION SUBCUTANEOUS
COMMUNITY

## 2021-01-01 RX ORDER — MIDAZOLAM HYDROCHLORIDE 1 MG/ML
1 INJECTION, SOLUTION INTRAMUSCULAR; INTRAVENOUS AS NEEDED
Status: DISCONTINUED | OUTPATIENT
Start: 2021-01-01 | End: 2021-01-01 | Stop reason: HOSPADM

## 2021-01-01 RX ORDER — CETIRIZINE HCL 10 MG
10 TABLET ORAL DAILY
COMMUNITY
End: 2022-01-01

## 2021-01-01 RX ORDER — SODIUM CHLORIDE, SODIUM LACTATE, POTASSIUM CHLORIDE, CALCIUM CHLORIDE 600; 310; 30; 20 MG/100ML; MG/100ML; MG/100ML; MG/100ML
100 INJECTION, SOLUTION INTRAVENOUS CONTINUOUS
Status: DISCONTINUED | OUTPATIENT
Start: 2021-01-01 | End: 2021-01-01 | Stop reason: HOSPADM

## 2021-01-01 RX ORDER — ACETAMINOPHEN 650 MG/1
650 SUPPOSITORY RECTAL
Status: DISCONTINUED | OUTPATIENT
Start: 2021-01-01 | End: 2021-01-01 | Stop reason: HOSPADM

## 2021-01-01 RX ORDER — ASPIRIN 81 MG/1
81 TABLET ORAL DAILY
Status: DISCONTINUED | OUTPATIENT
Start: 2021-01-01 | End: 2021-01-01

## 2021-01-01 RX ORDER — FENTANYL CITRATE 50 UG/ML
50 INJECTION, SOLUTION INTRAMUSCULAR; INTRAVENOUS AS NEEDED
Status: DISCONTINUED | OUTPATIENT
Start: 2021-01-01 | End: 2021-01-01 | Stop reason: HOSPADM

## 2021-01-01 RX ORDER — TRAZODONE HYDROCHLORIDE 50 MG/1
50 TABLET ORAL
COMMUNITY

## 2021-01-01 RX ORDER — FLUTICASONE PROPIONATE 50 MCG
2 SPRAY, SUSPENSION (ML) NASAL 2 TIMES DAILY
Status: DISCONTINUED | OUTPATIENT
Start: 2021-01-01 | End: 2021-01-01 | Stop reason: HOSPADM

## 2021-01-01 RX ORDER — METOPROLOL TARTRATE 25 MG/1
25 TABLET, FILM COATED ORAL EVERY 12 HOURS
Qty: 60 TABLET | Refills: 0 | Status: SHIPPED | OUTPATIENT
Start: 2021-01-01

## 2021-01-01 RX ORDER — CALCIUM GLUCONATE 20 MG/ML
2 INJECTION, SOLUTION INTRAVENOUS ONCE
Status: COMPLETED | OUTPATIENT
Start: 2021-01-01 | End: 2021-01-01

## 2021-01-01 RX ORDER — INSULIN LISPRO 100 [IU]/ML
10 INJECTION, SOLUTION INTRAVENOUS; SUBCUTANEOUS ONCE
Status: COMPLETED | OUTPATIENT
Start: 2021-01-01 | End: 2021-01-01

## 2021-01-01 RX ORDER — ERGOCALCIFEROL 1.25 MG/1
50000 CAPSULE ORAL
Status: DISCONTINUED | OUTPATIENT
Start: 2021-01-01 | End: 2021-01-01 | Stop reason: HOSPADM

## 2021-01-01 RX ORDER — DEXAMETHASONE SODIUM PHOSPHATE 4 MG/ML
INJECTION, SOLUTION INTRA-ARTICULAR; INTRALESIONAL; INTRAMUSCULAR; INTRAVENOUS; SOFT TISSUE AS NEEDED
Status: DISCONTINUED | OUTPATIENT
Start: 2021-01-01 | End: 2021-01-01 | Stop reason: HOSPADM

## 2021-01-01 RX ORDER — MIDAZOLAM HYDROCHLORIDE 1 MG/ML
INJECTION, SOLUTION INTRAMUSCULAR; INTRAVENOUS AS NEEDED
Status: DISCONTINUED | OUTPATIENT
Start: 2021-01-01 | End: 2021-01-01 | Stop reason: HOSPADM

## 2021-01-01 RX ORDER — GUAIFENESIN 100 MG/5ML
100 SOLUTION ORAL
COMMUNITY
End: 2022-01-01

## 2021-01-01 RX ORDER — DIPHENHYDRAMINE HYDROCHLORIDE 50 MG/ML
12.5 INJECTION, SOLUTION INTRAMUSCULAR; INTRAVENOUS AS NEEDED
Status: DISCONTINUED | OUTPATIENT
Start: 2021-01-01 | End: 2021-01-01 | Stop reason: HOSPADM

## 2021-01-01 RX ORDER — HEPARIN SODIUM 5000 [USP'U]/ML
5000 INJECTION, SOLUTION INTRAVENOUS; SUBCUTANEOUS EVERY 8 HOURS
Status: DISCONTINUED | OUTPATIENT
Start: 2021-01-01 | End: 2021-01-01

## 2021-01-01 RX ORDER — METOPROLOL TARTRATE 25 MG/1
25 TABLET, FILM COATED ORAL EVERY 12 HOURS
Status: DISCONTINUED | OUTPATIENT
Start: 2021-01-01 | End: 2021-01-01 | Stop reason: HOSPADM

## 2021-01-01 RX ORDER — DEXTROSE MONOHYDRATE AND SODIUM CHLORIDE 5; .9 G/100ML; G/100ML
100 INJECTION, SOLUTION INTRAVENOUS AS NEEDED
Status: DISCONTINUED | OUTPATIENT
Start: 2021-01-01 | End: 2021-01-01

## 2021-01-01 RX ORDER — INSULIN GLARGINE 100 [IU]/ML
10 INJECTION, SOLUTION SUBCUTANEOUS DAILY
Status: DISCONTINUED | OUTPATIENT
Start: 2021-01-01 | End: 2021-01-01 | Stop reason: HOSPADM

## 2021-01-01 RX ORDER — ESOMEPRAZOLE MAGNESIUM 40 MG/1
40 CAPSULE, DELAYED RELEASE ORAL DAILY
COMMUNITY

## 2021-01-01 RX ORDER — HEPARIN SODIUM 5000 [USP'U]/ML
5000 INJECTION, SOLUTION INTRAVENOUS; SUBCUTANEOUS EVERY 8 HOURS
Status: DISCONTINUED | OUTPATIENT
Start: 2021-01-01 | End: 2021-01-01 | Stop reason: HOSPADM

## 2021-01-01 RX ORDER — ONDANSETRON 2 MG/ML
4 INJECTION INTRAMUSCULAR; INTRAVENOUS
Status: COMPLETED | OUTPATIENT
Start: 2021-01-01 | End: 2021-01-01

## 2021-01-01 RX ORDER — MELATONIN
4000 DAILY
Status: DISCONTINUED | OUTPATIENT
Start: 2021-01-01 | End: 2021-01-01 | Stop reason: HOSPADM

## 2021-01-01 RX ORDER — CALCIUM GLUCONATE 20 MG/ML
1 INJECTION, SOLUTION INTRAVENOUS ONCE
Status: COMPLETED | OUTPATIENT
Start: 2021-01-01 | End: 2021-01-01

## 2021-01-01 RX ORDER — PROPOFOL 10 MG/ML
INJECTION, EMULSION INTRAVENOUS AS NEEDED
Status: DISCONTINUED | OUTPATIENT
Start: 2021-01-01 | End: 2021-01-01 | Stop reason: HOSPADM

## 2021-01-01 RX ORDER — TETRAKIS(2-METHOXYISOBUTYLISOCYANIDE)COPPER(I) TETRAFLUOROBORATE 1 MG/ML
10.3 INJECTION, POWDER, LYOPHILIZED, FOR SOLUTION INTRAVENOUS
Status: COMPLETED | OUTPATIENT
Start: 2021-01-01 | End: 2021-01-01

## 2021-01-01 RX ORDER — TETRAKIS(2-METHOXYISOBUTYLISOCYANIDE)COPPER(I) TETRAFLUOROBORATE 1 MG/ML
31.6 INJECTION, POWDER, LYOPHILIZED, FOR SOLUTION INTRAVENOUS
Status: COMPLETED | OUTPATIENT
Start: 2021-01-01 | End: 2021-01-01

## 2021-01-01 RX ORDER — INSULIN LISPRO 100 [IU]/ML
INJECTION, SOLUTION INTRAVENOUS; SUBCUTANEOUS
Status: DISCONTINUED | OUTPATIENT
Start: 2021-01-01 | End: 2021-01-01

## 2021-01-01 RX ORDER — LORATADINE 10 MG/1
10 TABLET ORAL DAILY
COMMUNITY
End: 2022-01-01

## 2021-01-01 RX ORDER — DEXTROSE, SODIUM CHLORIDE, AND POTASSIUM CHLORIDE 5; .45; .22 G/100ML; G/100ML; G/100ML
INJECTION INTRAVENOUS CONTINUOUS
Status: DISCONTINUED | OUTPATIENT
Start: 2021-01-01 | End: 2021-01-01

## 2021-01-01 RX ORDER — PATIROMER 16.8 G/1
POWDER, FOR SUSPENSION ORAL DAILY
COMMUNITY
End: 2022-01-01

## 2021-01-01 RX ORDER — FUROSEMIDE 10 MG/ML
120 INJECTION INTRAMUSCULAR; INTRAVENOUS ONCE
Status: COMPLETED | OUTPATIENT
Start: 2021-01-01 | End: 2021-01-01

## 2021-01-01 RX ORDER — DIPHENHYDRAMINE HCL 25 MG
25 CAPSULE ORAL
COMMUNITY

## 2021-01-01 RX ORDER — SODIUM BICARBONATE 84 MG/ML
INJECTION, SOLUTION INTRAVENOUS
Status: DISPENSED
Start: 2021-01-01 | End: 2021-01-01

## 2021-01-01 RX ORDER — LIDOCAINE 40 MG/G
CREAM TOPICAL
Qty: 15 G | Refills: 0 | Status: SHIPPED | OUTPATIENT
Start: 2021-01-01 | End: 2022-01-01

## 2021-01-01 RX ORDER — ONDANSETRON 2 MG/ML
4 INJECTION INTRAMUSCULAR; INTRAVENOUS AS NEEDED
Status: DISCONTINUED | OUTPATIENT
Start: 2021-01-01 | End: 2021-01-01 | Stop reason: HOSPADM

## 2021-01-01 RX ORDER — MORPHINE SULFATE 2 MG/ML
2 INJECTION, SOLUTION INTRAMUSCULAR; INTRAVENOUS
Status: DISCONTINUED | OUTPATIENT
Start: 2021-01-01 | End: 2021-01-01 | Stop reason: HOSPADM

## 2021-01-01 RX ORDER — CALCIUM GLUCONATE 94 MG/ML
2 INJECTION, SOLUTION INTRAVENOUS ONCE
Status: DISCONTINUED | OUTPATIENT
Start: 2021-01-01 | End: 2021-01-01 | Stop reason: CLARIF

## 2021-01-01 RX ORDER — TRAMADOL HYDROCHLORIDE 50 MG/1
50 TABLET ORAL
Qty: 12 TABLET | Refills: 0 | Status: SHIPPED | OUTPATIENT
Start: 2021-01-01 | End: 2021-01-01

## 2021-01-01 RX ORDER — HYDROMORPHONE HYDROCHLORIDE 1 MG/ML
0.5 INJECTION, SOLUTION INTRAMUSCULAR; INTRAVENOUS; SUBCUTANEOUS
Status: DISCONTINUED | OUTPATIENT
Start: 2021-01-01 | End: 2021-01-01 | Stop reason: HOSPADM

## 2021-01-01 RX ORDER — CLINDAMYCIN PHOSPHATE 600 MG/50ML
600 INJECTION INTRAVENOUS
Status: COMPLETED | OUTPATIENT
Start: 2021-01-01 | End: 2021-01-01

## 2021-01-01 RX ORDER — POTASSIUM CHLORIDE 750 MG/1
40 TABLET, FILM COATED, EXTENDED RELEASE ORAL DAILY
Status: DISCONTINUED | OUTPATIENT
Start: 2021-01-01 | End: 2021-01-01

## 2021-01-01 RX ORDER — HEPARIN SODIUM 5000 [USP'U]/ML
5000 INJECTION, SOLUTION INTRAVENOUS; SUBCUTANEOUS EVERY 12 HOURS
Status: DISCONTINUED | OUTPATIENT
Start: 2021-01-01 | End: 2021-01-01 | Stop reason: HOSPADM

## 2021-01-01 RX ORDER — ALBUTEROL SULFATE 90 UG/1
6 AEROSOL, METERED RESPIRATORY (INHALATION) ONCE
Status: COMPLETED | OUTPATIENT
Start: 2021-01-01 | End: 2021-01-01

## 2021-01-01 RX ORDER — LIDOCAINE HYDROCHLORIDE 20 MG/ML
INJECTION, SOLUTION EPIDURAL; INFILTRATION; INTRACAUDAL; PERINEURAL AS NEEDED
Status: DISCONTINUED | OUTPATIENT
Start: 2021-01-01 | End: 2021-01-01 | Stop reason: HOSPADM

## 2021-01-01 RX ORDER — POTASSIUM &MAGNESIUM ASPARTATE 250-250 MG
500 CAPSULE ORAL DAILY
COMMUNITY

## 2021-01-01 RX ORDER — FENTANYL CITRATE 50 UG/ML
INJECTION, SOLUTION INTRAMUSCULAR; INTRAVENOUS AS NEEDED
Status: DISCONTINUED | OUTPATIENT
Start: 2021-01-01 | End: 2021-01-01 | Stop reason: HOSPADM

## 2021-01-01 RX ORDER — INSULIN LISPRO 100 [IU]/ML
6 INJECTION, SOLUTION INTRAVENOUS; SUBCUTANEOUS ONCE
Status: COMPLETED | OUTPATIENT
Start: 2021-01-01 | End: 2021-01-01

## 2021-01-01 RX ORDER — MONTELUKAST SODIUM 10 MG/1
10 TABLET ORAL
COMMUNITY

## 2021-01-01 RX ORDER — GLYCOPYRROLATE 0.2 MG/ML
INJECTION INTRAMUSCULAR; INTRAVENOUS AS NEEDED
Status: DISCONTINUED | OUTPATIENT
Start: 2021-01-01 | End: 2021-01-01 | Stop reason: HOSPADM

## 2021-01-01 RX ADMIN — INSULIN LISPRO 2 UNITS: 100 INJECTION, SOLUTION INTRAVENOUS; SUBCUTANEOUS at 07:21

## 2021-01-01 RX ADMIN — METOPROLOL TARTRATE 25 MG: 25 TABLET, FILM COATED ORAL at 09:18

## 2021-01-01 RX ADMIN — GUAIFENESIN 600 MG: 600 TABLET, EXTENDED RELEASE ORAL at 10:42

## 2021-01-01 RX ADMIN — METOPROLOL TARTRATE 25 MG: 25 TABLET, FILM COATED ORAL at 09:55

## 2021-01-01 RX ADMIN — FLUTICASONE PROPIONATE 2 SPRAY: 50 SPRAY, METERED NASAL at 18:14

## 2021-01-01 RX ADMIN — Medication 10 ML: at 07:02

## 2021-01-01 RX ADMIN — MONTELUKAST 10 MG: 10 TABLET, FILM COATED ORAL at 14:22

## 2021-01-01 RX ADMIN — METOPROLOL TARTRATE 25 MG: 25 TABLET, FILM COATED ORAL at 21:40

## 2021-01-01 RX ADMIN — HEPARIN SODIUM 5000 UNITS: 5000 INJECTION INTRAVENOUS; SUBCUTANEOUS at 09:24

## 2021-01-01 RX ADMIN — ACETAMINOPHEN 650 MG: 325 TABLET ORAL at 07:01

## 2021-01-01 RX ADMIN — ALBUMIN (HUMAN) 25 G: 0.25 INJECTION, SOLUTION INTRAVENOUS at 22:04

## 2021-01-01 RX ADMIN — FLUTICASONE PROPIONATE 2 SPRAY: 50 SPRAY, METERED NASAL at 09:55

## 2021-01-01 RX ADMIN — ATORVASTATIN CALCIUM 20 MG: 20 TABLET, FILM COATED ORAL at 22:12

## 2021-01-01 RX ADMIN — INSULIN LISPRO 3 UNITS: 100 INJECTION, SOLUTION INTRAVENOUS; SUBCUTANEOUS at 07:26

## 2021-01-01 RX ADMIN — IOPAMIDOL 80 ML: 755 INJECTION, SOLUTION INTRAVENOUS at 02:46

## 2021-01-01 RX ADMIN — CALCIUM GLUCONATE 2 G: 20 INJECTION, SOLUTION INTRAVENOUS at 09:35

## 2021-01-01 RX ADMIN — LIDOCAINE 4%: 4 CREAM TOPICAL at 18:38

## 2021-01-01 RX ADMIN — SODIUM CHLORIDE 24.5 UNITS/HR: 9 INJECTION, SOLUTION INTRAVENOUS at 09:50

## 2021-01-01 RX ADMIN — TETRAKIS(2-METHOXYISOBUTYLISOCYANIDE)COPPER(I) TETRAFLUOROBORATE 10.3 MILLICURIE: 1 INJECTION, POWDER, LYOPHILIZED, FOR SOLUTION INTRAVENOUS at 07:25

## 2021-01-01 RX ADMIN — Medication 10 ML: at 22:22

## 2021-01-01 RX ADMIN — Medication 1 CAPSULE: at 11:06

## 2021-01-01 RX ADMIN — ALBUMIN (HUMAN) 25 G: 0.25 INJECTION, SOLUTION INTRAVENOUS at 13:33

## 2021-01-01 RX ADMIN — SODIUM CHLORIDE 25 ML/HR: 9 INJECTION, SOLUTION INTRAVENOUS at 06:55

## 2021-01-01 RX ADMIN — HEPARIN SODIUM 5000 UNITS: 5000 INJECTION INTRAVENOUS; SUBCUTANEOUS at 22:45

## 2021-01-01 RX ADMIN — MEROPENEM 500 MG: 500 INJECTION, POWDER, FOR SOLUTION INTRAVENOUS at 16:49

## 2021-01-01 RX ADMIN — ATORVASTATIN CALCIUM 20 MG: 20 TABLET, FILM COATED ORAL at 22:45

## 2021-01-01 RX ADMIN — INSULIN LISPRO 2 UNITS: 100 INJECTION, SOLUTION INTRAVENOUS; SUBCUTANEOUS at 07:36

## 2021-01-01 RX ADMIN — MEROPENEM 500 MG: 500 INJECTION, POWDER, FOR SOLUTION INTRAVENOUS at 16:22

## 2021-01-01 RX ADMIN — POTASSIUM CHLORIDE 40 MEQ: 750 TABLET, FILM COATED, EXTENDED RELEASE ORAL at 13:38

## 2021-01-01 RX ADMIN — ERGOCALCIFEROL 50000 UNITS: 1.25 CAPSULE ORAL at 06:55

## 2021-01-01 RX ADMIN — Medication 10 ML: at 21:46

## 2021-01-01 RX ADMIN — METOPROLOL TARTRATE 25 MG: 25 TABLET, FILM COATED ORAL at 22:45

## 2021-01-01 RX ADMIN — ATORVASTATIN CALCIUM 20 MG: 20 TABLET, FILM COATED ORAL at 21:48

## 2021-01-01 RX ADMIN — ALBUTEROL SULFATE 6 PUFF: 90 AEROSOL, METERED RESPIRATORY (INHALATION) at 00:03

## 2021-01-01 RX ADMIN — ATORVASTATIN CALCIUM 20 MG: 20 TABLET, FILM COATED ORAL at 22:49

## 2021-01-01 RX ADMIN — MIDAZOLAM HYDROCHLORIDE 1 MG: 1 INJECTION, SOLUTION INTRAMUSCULAR; INTRAVENOUS at 15:30

## 2021-01-01 RX ADMIN — PROPOFOL 10 MG: 10 INJECTION, EMULSION INTRAVENOUS at 16:39

## 2021-01-01 RX ADMIN — METOPROLOL TARTRATE 25 MG: 25 TABLET, FILM COATED ORAL at 22:04

## 2021-01-01 RX ADMIN — INSULIN LISPRO 5 UNITS: 100 INJECTION, SOLUTION INTRAVENOUS; SUBCUTANEOUS at 13:05

## 2021-01-01 RX ADMIN — PHENYLEPHRINE HYDROCHLORIDE 80 MCG: 10 INJECTION INTRAVENOUS at 18:15

## 2021-01-01 RX ADMIN — SODIUM CHLORIDE 21.6 UNITS/HR: 9 INJECTION, SOLUTION INTRAVENOUS at 08:05

## 2021-01-01 RX ADMIN — ATORVASTATIN CALCIUM 20 MG: 20 TABLET, FILM COATED ORAL at 21:40

## 2021-01-01 RX ADMIN — METOPROLOL TARTRATE 25 MG: 25 TABLET, FILM COATED ORAL at 09:29

## 2021-01-01 RX ADMIN — INSULIN LISPRO 4 UNITS: 100 INJECTION, SOLUTION INTRAVENOUS; SUBCUTANEOUS at 18:00

## 2021-01-01 RX ADMIN — CETIRIZINE HYDROCHLORIDE 10 MG: 10 TABLET, FILM COATED ORAL at 09:54

## 2021-01-01 RX ADMIN — HEPARIN SODIUM 5000 UNITS: 5000 INJECTION INTRAVENOUS; SUBCUTANEOUS at 09:55

## 2021-01-01 RX ADMIN — Medication 1 CAPSULE: at 09:55

## 2021-01-01 RX ADMIN — CLINDAMYCIN PHOSPHATE 600 MG: 600 INJECTION, SOLUTION INTRAVENOUS at 16:40

## 2021-01-01 RX ADMIN — PHENYLEPHRINE HYDROCHLORIDE 40 MCG: 10 INJECTION INTRAVENOUS at 18:41

## 2021-01-01 RX ADMIN — HUMAN INSULIN 7 UNITS: 100 INJECTION, SOLUTION SUBCUTANEOUS at 10:17

## 2021-01-01 RX ADMIN — INSULIN LISPRO 3 UNITS: 100 INJECTION, SOLUTION INTRAVENOUS; SUBCUTANEOUS at 07:33

## 2021-01-01 RX ADMIN — PANTOPRAZOLE SODIUM 40 MG: 40 TABLET, DELAYED RELEASE ORAL at 07:26

## 2021-01-01 RX ADMIN — Medication 1 CAPSULE: at 13:47

## 2021-01-01 RX ADMIN — Medication 10 ML: at 06:00

## 2021-01-01 RX ADMIN — DEXMEDETOMIDINE HYDROCHLORIDE 4 MCG: 100 INJECTION, SOLUTION, CONCENTRATE INTRAVENOUS at 18:37

## 2021-01-01 RX ADMIN — EPOETIN ALFA-EPBX 6000 UNITS: 4000 INJECTION, SOLUTION INTRAVENOUS; SUBCUTANEOUS at 21:40

## 2021-01-01 RX ADMIN — Medication 10 ML: at 13:40

## 2021-01-01 RX ADMIN — Medication 4000 UNITS: at 11:05

## 2021-01-01 RX ADMIN — ASPIRIN 81 MG: 81 TABLET, COATED ORAL at 11:06

## 2021-01-01 RX ADMIN — MONTELUKAST 10 MG: 10 TABLET, FILM COATED ORAL at 09:18

## 2021-01-01 RX ADMIN — METHYLPREDNISOLONE SODIUM SUCCINATE 125 MG: 125 INJECTION, POWDER, FOR SOLUTION INTRAMUSCULAR; INTRAVENOUS at 00:18

## 2021-01-01 RX ADMIN — Medication 20 ML: at 11:18

## 2021-01-01 RX ADMIN — MONTELUKAST 10 MG: 10 TABLET, FILM COATED ORAL at 11:05

## 2021-01-01 RX ADMIN — PANTOPRAZOLE SODIUM 40 MG: 40 TABLET, DELAYED RELEASE ORAL at 06:56

## 2021-01-01 RX ADMIN — ASPIRIN 81 MG: 81 TABLET, COATED ORAL at 09:55

## 2021-01-01 RX ADMIN — INSULIN GLARGINE 12 UNITS: 100 INJECTION, SOLUTION SUBCUTANEOUS at 09:01

## 2021-01-01 RX ADMIN — HUMAN INSULIN 7 UNITS: 100 INJECTION, SOLUTION SUBCUTANEOUS at 12:09

## 2021-01-01 RX ADMIN — Medication 10 ML: at 22:24

## 2021-01-01 RX ADMIN — NITROGLYCERIN 0.5 INCH: 20 OINTMENT TOPICAL at 18:13

## 2021-01-01 RX ADMIN — EPOETIN ALFA-EPBX 10000 UNITS: 10000 INJECTION, SOLUTION INTRAVENOUS; SUBCUTANEOUS at 20:25

## 2021-01-01 RX ADMIN — INSULIN GLARGINE 10 UNITS: 100 INJECTION, SOLUTION SUBCUTANEOUS at 09:18

## 2021-01-01 RX ADMIN — Medication 10 ML: at 22:45

## 2021-01-01 RX ADMIN — MIDAZOLAM 1 MG: 1 INJECTION INTRAMUSCULAR; INTRAVENOUS at 18:47

## 2021-01-01 RX ADMIN — FLUTICASONE PROPIONATE 2 SPRAY: 50 SPRAY, METERED NASAL at 18:02

## 2021-01-01 RX ADMIN — ALBUMIN (HUMAN) 25 G: 0.25 INJECTION, SOLUTION INTRAVENOUS at 07:26

## 2021-01-01 RX ADMIN — INSULIN LISPRO 3 UNITS: 100 INJECTION, SOLUTION INTRAVENOUS; SUBCUTANEOUS at 22:28

## 2021-01-01 RX ADMIN — INSULIN LISPRO 3 UNITS: 100 INJECTION, SOLUTION INTRAVENOUS; SUBCUTANEOUS at 06:58

## 2021-01-01 RX ADMIN — NITROGLYCERIN 0.5 INCH: 20 OINTMENT TOPICAL at 00:58

## 2021-01-01 RX ADMIN — SODIUM BICARBONATE 100 MEQ: 84 INJECTION, SOLUTION INTRAVENOUS at 17:38

## 2021-01-01 RX ADMIN — Medication 1 CAPSULE: at 09:24

## 2021-01-01 RX ADMIN — LEVOTHYROXINE SODIUM 100 MCG: 0.1 TABLET ORAL at 06:55

## 2021-01-01 RX ADMIN — LEVOTHYROXINE SODIUM 100 MCG: 0.1 TABLET ORAL at 06:51

## 2021-01-01 RX ADMIN — Medication 1 CAPSULE: at 09:29

## 2021-01-01 RX ADMIN — DEXMEDETOMIDINE HYDROCHLORIDE 4 MCG: 100 INJECTION, SOLUTION, CONCENTRATE INTRAVENOUS at 16:33

## 2021-01-01 RX ADMIN — METOPROLOL TARTRATE 25 MG: 25 TABLET, FILM COATED ORAL at 09:19

## 2021-01-01 RX ADMIN — Medication 10 ML: at 13:07

## 2021-01-01 RX ADMIN — FUROSEMIDE 120 MG: 10 INJECTION, SOLUTION INTRAMUSCULAR; INTRAVENOUS at 05:04

## 2021-01-01 RX ADMIN — LEVOTHYROXINE SODIUM 100 MCG: 0.1 TABLET ORAL at 07:02

## 2021-01-01 RX ADMIN — HEPARIN SODIUM 5000 UNITS: 5000 INJECTION INTRAVENOUS; SUBCUTANEOUS at 19:04

## 2021-01-01 RX ADMIN — MIDAZOLAM 1 MG: 1 INJECTION INTRAMUSCULAR; INTRAVENOUS at 16:58

## 2021-01-01 RX ADMIN — GUAIFENESIN 600 MG: 600 TABLET, EXTENDED RELEASE ORAL at 11:05

## 2021-01-01 RX ADMIN — INSULIN LISPRO 3 UNITS: 100 INJECTION, SOLUTION INTRAVENOUS; SUBCUTANEOUS at 17:56

## 2021-01-01 RX ADMIN — PHENYLEPHRINE HYDROCHLORIDE 80 MCG: 10 INJECTION INTRAVENOUS at 18:51

## 2021-01-01 RX ADMIN — METOPROLOL TARTRATE 25 MG: 25 TABLET, FILM COATED ORAL at 09:02

## 2021-01-01 RX ADMIN — PANTOPRAZOLE SODIUM 40 MG: 40 TABLET, DELAYED RELEASE ORAL at 07:20

## 2021-01-01 RX ADMIN — PROPOFOL 10 MG: 10 INJECTION, EMULSION INTRAVENOUS at 18:05

## 2021-01-01 RX ADMIN — FENTANYL CITRATE 25 MCG: 50 INJECTION, SOLUTION INTRAMUSCULAR; INTRAVENOUS at 18:04

## 2021-01-01 RX ADMIN — FLUTICASONE PROPIONATE 2 SPRAY: 50 SPRAY, METERED NASAL at 11:06

## 2021-01-01 RX ADMIN — Medication 10 ML: at 05:05

## 2021-01-01 RX ADMIN — GUAIFENESIN 600 MG: 600 TABLET, EXTENDED RELEASE ORAL at 22:12

## 2021-01-01 RX ADMIN — ASPIRIN 81 MG: 81 TABLET, COATED ORAL at 13:46

## 2021-01-01 RX ADMIN — CALCIUM GLUCONATE 1000 MG: 20 INJECTION, SOLUTION INTRAVENOUS at 11:55

## 2021-01-01 RX ADMIN — DEXMEDETOMIDINE HYDROCHLORIDE 4 MCG: 100 INJECTION, SOLUTION, CONCENTRATE INTRAVENOUS at 16:30

## 2021-01-01 RX ADMIN — DEXMEDETOMIDINE HYDROCHLORIDE 4 MCG: 100 INJECTION, SOLUTION, CONCENTRATE INTRAVENOUS at 16:36

## 2021-01-01 RX ADMIN — MONTELUKAST 10 MG: 10 TABLET, FILM COATED ORAL at 09:19

## 2021-01-01 RX ADMIN — Medication 10 ML: at 14:29

## 2021-01-01 RX ADMIN — FENTANYL CITRATE 25 MCG: 50 INJECTION, SOLUTION INTRAMUSCULAR; INTRAVENOUS at 18:06

## 2021-01-01 RX ADMIN — DEXMEDETOMIDINE HYDROCHLORIDE 4 MCG: 100 INJECTION, SOLUTION, CONCENTRATE INTRAVENOUS at 17:36

## 2021-01-01 RX ADMIN — ALBUMIN (HUMAN) 25 G: 0.25 INJECTION, SOLUTION INTRAVENOUS at 14:29

## 2021-01-01 RX ADMIN — Medication 10 ML: at 21:41

## 2021-01-01 RX ADMIN — METOPROLOL TARTRATE 25 MG: 25 TABLET, FILM COATED ORAL at 22:03

## 2021-01-01 RX ADMIN — MIDAZOLAM 1 MG: 1 INJECTION INTRAMUSCULAR; INTRAVENOUS at 16:34

## 2021-01-01 RX ADMIN — Medication 4000 UNITS: at 09:55

## 2021-01-01 RX ADMIN — LIDOCAINE 4%: 4 CREAM TOPICAL at 13:54

## 2021-01-01 RX ADMIN — Medication 4000 UNITS: at 13:46

## 2021-01-01 RX ADMIN — HEPARIN SODIUM 5000 UNITS: 5000 INJECTION INTRAVENOUS; SUBCUTANEOUS at 17:56

## 2021-01-01 RX ADMIN — INSULIN LISPRO 6 UNITS: 100 INJECTION, SOLUTION INTRAVENOUS; SUBCUTANEOUS at 13:37

## 2021-01-01 RX ADMIN — FLUTICASONE PROPIONATE 2 SPRAY: 50 SPRAY, METERED NASAL at 19:12

## 2021-01-01 RX ADMIN — MONTELUKAST 10 MG: 10 TABLET, FILM COATED ORAL at 09:29

## 2021-01-01 RX ADMIN — ONDANSETRON HYDROCHLORIDE 4 MG: 2 INJECTION, SOLUTION INTRAMUSCULAR; INTRAVENOUS at 16:34

## 2021-01-01 RX ADMIN — MONTELUKAST 10 MG: 10 TABLET, FILM COATED ORAL at 13:46

## 2021-01-01 RX ADMIN — MEROPENEM 500 MG: 500 INJECTION, POWDER, FOR SOLUTION INTRAVENOUS at 18:27

## 2021-01-01 RX ADMIN — Medication 10 ML: at 16:49

## 2021-01-01 RX ADMIN — INSULIN GLARGINE 12 UNITS: 100 INJECTION, SOLUTION SUBCUTANEOUS at 09:00

## 2021-01-01 RX ADMIN — Medication 10 ML: at 17:51

## 2021-01-01 RX ADMIN — Medication 10 ML: at 07:00

## 2021-01-01 RX ADMIN — HEPARIN SODIUM 5000 UNITS: 5000 INJECTION INTRAVENOUS; SUBCUTANEOUS at 21:40

## 2021-01-01 RX ADMIN — SODIUM CHLORIDE 50 ML/HR: 9 INJECTION, SOLUTION INTRAVENOUS at 09:21

## 2021-01-01 RX ADMIN — INSULIN LISPRO 3 UNITS: 100 INJECTION, SOLUTION INTRAVENOUS; SUBCUTANEOUS at 14:21

## 2021-01-01 RX ADMIN — IPRATROPIUM BROMIDE AND ALBUTEROL SULFATE 3 ML: .5; 3 SOLUTION RESPIRATORY (INHALATION) at 10:42

## 2021-01-01 RX ADMIN — DEXMEDETOMIDINE HYDROCHLORIDE 4 MCG: 100 INJECTION, SOLUTION, CONCENTRATE INTRAVENOUS at 17:54

## 2021-01-01 RX ADMIN — PROPOFOL 10 MG: 10 INJECTION, EMULSION INTRAVENOUS at 18:46

## 2021-01-01 RX ADMIN — DEXMEDETOMIDINE HYDROCHLORIDE 4 MCG: 100 INJECTION, SOLUTION, CONCENTRATE INTRAVENOUS at 17:05

## 2021-01-01 RX ADMIN — CETIRIZINE HYDROCHLORIDE 10 MG: 10 TABLET, FILM COATED ORAL at 09:18

## 2021-01-01 RX ADMIN — PHENYLEPHRINE HYDROCHLORIDE 80 MCG: 10 INJECTION INTRAVENOUS at 18:49

## 2021-01-01 RX ADMIN — CETIRIZINE HYDROCHLORIDE 10 MG: 10 TABLET, FILM COATED ORAL at 14:23

## 2021-01-01 RX ADMIN — METOPROLOL TARTRATE 25 MG: 25 TABLET, FILM COATED ORAL at 18:20

## 2021-01-01 RX ADMIN — CETIRIZINE HYDROCHLORIDE 10 MG: 10 TABLET, FILM COATED ORAL at 13:46

## 2021-01-01 RX ADMIN — DEXMEDETOMIDINE HYDROCHLORIDE 4 MCG: 100 INJECTION, SOLUTION, CONCENTRATE INTRAVENOUS at 17:46

## 2021-01-01 RX ADMIN — POTASSIUM CHLORIDE, DEXTROSE MONOHYDRATE AND SODIUM CHLORIDE: 224; 5; 450 INJECTION, SOLUTION INTRAVENOUS at 14:15

## 2021-01-01 RX ADMIN — LEVOTHYROXINE SODIUM 100 MCG: 0.1 TABLET ORAL at 07:21

## 2021-01-01 RX ADMIN — ATORVASTATIN CALCIUM 20 MG: 20 TABLET, FILM COATED ORAL at 21:46

## 2021-01-01 RX ADMIN — REGADENOSON 0.4 MG: 0.08 INJECTION, SOLUTION INTRAVENOUS at 11:18

## 2021-01-01 RX ADMIN — Medication 1 CAPSULE: at 11:05

## 2021-01-01 RX ADMIN — DEXTROSE MONOHYDRATE 9.5 G: 25 INJECTION, SOLUTION INTRAVENOUS at 14:29

## 2021-01-01 RX ADMIN — TETRAKIS(2-METHOXYISOBUTYLISOCYANIDE)COPPER(I) TETRAFLUOROBORATE 31.6 MILLICURIE: 1 INJECTION, POWDER, LYOPHILIZED, FOR SOLUTION INTRAVENOUS at 12:20

## 2021-01-01 RX ADMIN — HEPARIN SODIUM 5000 UNITS: 5000 INJECTION INTRAVENOUS; SUBCUTANEOUS at 09:28

## 2021-01-01 RX ADMIN — Medication 1 CAPSULE: at 13:46

## 2021-01-01 RX ADMIN — PANTOPRAZOLE SODIUM 40 MG: 40 TABLET, DELAYED RELEASE ORAL at 06:51

## 2021-01-01 RX ADMIN — PANTOPRAZOLE SODIUM 40 MG: 40 TABLET, DELAYED RELEASE ORAL at 07:37

## 2021-01-01 RX ADMIN — LIDOCAINE 4%: 4 CREAM TOPICAL at 15:04

## 2021-01-01 RX ADMIN — GUAIFENESIN 600 MG: 600 TABLET, EXTENDED RELEASE ORAL at 21:48

## 2021-01-01 RX ADMIN — LEVOTHYROXINE SODIUM 100 MCG: 0.1 TABLET ORAL at 07:36

## 2021-01-01 RX ADMIN — INSULIN LISPRO 10 UNITS: 100 INJECTION, SOLUTION INTRAVENOUS; SUBCUTANEOUS at 18:14

## 2021-01-01 RX ADMIN — HEPARIN SODIUM 5000 UNITS: 5000 INJECTION INTRAVENOUS; SUBCUTANEOUS at 22:13

## 2021-01-01 RX ADMIN — HEPARIN SODIUM 5000 UNITS: 5000 INJECTION INTRAVENOUS; SUBCUTANEOUS at 01:08

## 2021-01-01 RX ADMIN — MEPIVACAINE HYDROCHLORIDE 30 ML: 15 INJECTION, SOLUTION EPIDURAL; INFILTRATION at 15:38

## 2021-01-01 RX ADMIN — LIDOCAINE HYDROCHLORIDE 60 MG: 20 INJECTION, SOLUTION EPIDURAL; INFILTRATION; INTRACAUDAL; PERINEURAL at 16:34

## 2021-01-01 RX ADMIN — SODIUM CHLORIDE: 900 INJECTION, SOLUTION INTRAVENOUS at 16:25

## 2021-01-01 RX ADMIN — SODIUM CHLORIDE 16.2 UNITS/HR: 9 INJECTION, SOLUTION INTRAVENOUS at 06:39

## 2021-01-01 RX ADMIN — PANTOPRAZOLE SODIUM 40 MG: 40 TABLET, DELAYED RELEASE ORAL at 07:02

## 2021-01-01 RX ADMIN — INSULIN LISPRO 7 UNITS: 100 INJECTION, SOLUTION INTRAVENOUS; SUBCUTANEOUS at 10:19

## 2021-01-01 RX ADMIN — INSULIN LISPRO 3 UNITS: 100 INJECTION, SOLUTION INTRAVENOUS; SUBCUTANEOUS at 16:21

## 2021-01-01 RX ADMIN — INSULIN LISPRO 3 UNITS: 100 INJECTION, SOLUTION INTRAVENOUS; SUBCUTANEOUS at 06:54

## 2021-01-01 RX ADMIN — SODIUM ZIRCONIUM CYCLOSILICATE 15 G: 10 POWDER, FOR SUSPENSION ORAL at 09:38

## 2021-01-01 RX ADMIN — SODIUM CHLORIDE 10.8 UNITS/HR: 9 INJECTION, SOLUTION INTRAVENOUS at 05:43

## 2021-01-01 RX ADMIN — INSULIN GLARGINE 10 UNITS: 100 INJECTION, SOLUTION SUBCUTANEOUS at 09:55

## 2021-01-01 RX ADMIN — SODIUM BICARBONATE: 84 INJECTION, SOLUTION INTRAVENOUS at 12:40

## 2021-01-01 RX ADMIN — DEXMEDETOMIDINE HYDROCHLORIDE 4 MCG: 100 INJECTION, SOLUTION, CONCENTRATE INTRAVENOUS at 16:54

## 2021-01-01 RX ADMIN — METOPROLOL TARTRATE 25 MG: 25 TABLET, FILM COATED ORAL at 09:24

## 2021-01-01 RX ADMIN — NITROGLYCERIN 0.5 INCH: 20 OINTMENT TOPICAL at 07:36

## 2021-01-01 RX ADMIN — GLYCOPYRROLATE 0.1 MG: 0.2 INJECTION, SOLUTION INTRAMUSCULAR; INTRAVENOUS at 16:34

## 2021-01-01 RX ADMIN — Medication 1 CAPSULE: at 09:01

## 2021-01-01 RX ADMIN — INSULIN LISPRO 2 UNITS: 100 INJECTION, SOLUTION INTRAVENOUS; SUBCUTANEOUS at 21:49

## 2021-01-01 RX ADMIN — CETIRIZINE HYDROCHLORIDE 10 MG: 10 TABLET, FILM COATED ORAL at 11:05

## 2021-01-01 RX ADMIN — MONTELUKAST 10 MG: 10 TABLET, FILM COATED ORAL at 09:02

## 2021-01-01 RX ADMIN — Medication 1 CAPSULE: at 09:54

## 2021-01-01 RX ADMIN — INSULIN GLARGINE 12 UNITS: 100 INJECTION, SOLUTION SUBCUTANEOUS at 09:19

## 2021-01-01 RX ADMIN — INSULIN GLARGINE 10 UNITS: 100 INJECTION, SOLUTION SUBCUTANEOUS at 10:19

## 2021-01-01 RX ADMIN — INSULIN LISPRO 2 UNITS: 100 INJECTION, SOLUTION INTRAVENOUS; SUBCUTANEOUS at 22:03

## 2021-01-01 RX ADMIN — MONTELUKAST 10 MG: 10 TABLET, FILM COATED ORAL at 09:55

## 2021-01-01 RX ADMIN — ALBUMIN (HUMAN) 25 G: 0.25 INJECTION, SOLUTION INTRAVENOUS at 01:08

## 2021-01-01 RX ADMIN — ATORVASTATIN CALCIUM 20 MG: 20 TABLET, FILM COATED ORAL at 22:04

## 2021-01-01 RX ADMIN — INSULIN LISPRO 3 UNITS: 100 INJECTION, SOLUTION INTRAVENOUS; SUBCUTANEOUS at 12:08

## 2021-01-01 RX ADMIN — METOPROLOL TARTRATE 25 MG: 25 TABLET, FILM COATED ORAL at 14:22

## 2021-01-01 RX ADMIN — SODIUM CHLORIDE 0 UNITS/HR: 9 INJECTION, SOLUTION INTRAVENOUS at 14:26

## 2021-01-01 RX ADMIN — LEVOTHYROXINE SODIUM 100 MCG: 0.1 TABLET ORAL at 07:26

## 2021-01-01 RX ADMIN — Medication 10 ML: at 06:51

## 2021-01-01 RX ADMIN — HEPARIN SODIUM 5000 UNITS: 5000 INJECTION INTRAVENOUS; SUBCUTANEOUS at 07:20

## 2021-01-01 RX ADMIN — HEPARIN SODIUM 5000 UNITS: 5000 INJECTION INTRAVENOUS; SUBCUTANEOUS at 09:02

## 2021-01-01 RX ADMIN — IRON SUCROSE 100 MG: 20 INJECTION, SOLUTION INTRAVENOUS at 07:27

## 2021-01-01 RX ADMIN — Medication 10 ML: at 07:27

## 2021-01-01 RX ADMIN — DEXMEDETOMIDINE HYDROCHLORIDE 4 MCG: 100 INJECTION, SOLUTION, CONCENTRATE INTRAVENOUS at 17:14

## 2021-01-01 RX ADMIN — PROPOFOL 10 MG: 10 INJECTION, EMULSION INTRAVENOUS at 16:34

## 2021-01-01 RX ADMIN — PROPOFOL 10 MG: 10 INJECTION, EMULSION INTRAVENOUS at 18:07

## 2021-01-01 RX ADMIN — Medication 10 ML: at 13:39

## 2021-01-01 RX ADMIN — GUAIFENESIN 600 MG: 600 TABLET, EXTENDED RELEASE ORAL at 13:46

## 2021-01-01 RX ADMIN — INSULIN GLARGINE 10 UNITS: 100 INJECTION, SOLUTION SUBCUTANEOUS at 19:04

## 2021-01-01 RX ADMIN — INSULIN LISPRO 3 UNITS: 100 INJECTION, SOLUTION INTRAVENOUS; SUBCUTANEOUS at 21:46

## 2021-01-01 RX ADMIN — FLUTICASONE PROPIONATE 2 SPRAY: 50 SPRAY, METERED NASAL at 13:47

## 2021-01-01 RX ADMIN — ONDANSETRON HYDROCHLORIDE 4 MG: 2 SOLUTION INTRAMUSCULAR; INTRAVENOUS at 03:57

## 2021-01-01 RX ADMIN — DEXAMETHASONE SODIUM PHOSPHATE 4 MG: 4 INJECTION, SOLUTION INTRAMUSCULAR; INTRAVENOUS at 16:49

## 2021-01-01 RX ADMIN — Medication 1 CAPSULE: at 09:19

## 2021-01-01 RX ADMIN — PROPOFOL 10 MCG/KG/MIN: 10 INJECTION, EMULSION INTRAVENOUS at 18:03

## 2021-01-01 RX ADMIN — PROPOFOL 10 MG: 10 INJECTION, EMULSION INTRAVENOUS at 18:03

## 2021-01-01 RX ADMIN — PROPOFOL 10 MG: 10 INJECTION, EMULSION INTRAVENOUS at 18:42

## 2021-02-17 NOTE — DISCHARGE INSTRUCTIONS
Discharge SNF/Rehab Instructions/LTAC       PATIENT ID: Wilmer Parker  MRN: [id number]   YOB: 1970    DATE OF ADMISSION: 2/28/2018  6:42 PM    DATE OF DISCHARGE: 3/2/2018    PRIMARY CARE PROVIDER: Hans Deleon MD       ATTENDING PHYSICIAN: Miladys Bro MD;*  DISCHARGING PROVIDER: Miladys Bro MD     To contact this individual call [phone] and ask the  to page. If unavailable ask to be transferred the Adult Hospitalist Department. CONSULTATIONS: IP CONSULT TO HOSPITALIST    PROCEDURES/SURGERIES: * No surgery found *    ADMITTING DIAGNOSES & HOSPITAL COURSE:     A 49years old female with past medical history significant for Down's syndrome, hypothyroidism, diabetes, and asthma who presents from home with chief complaint of evaluation for abnormal lab results.   Hyponatremia possible related to hypothyroidism   -improved with normal saline, and sodium level 136  -TSH elevated to 79.90,   -she takes synthroid 50 mcg 5 x a week, dose increased after discussion with her Endocrinologist  Hypothyroidism   -increased Synthroid to 100 mcg po q daily after Dr Chely Le recommendation  DM-II  -A1c 9.1  -add lantus 14 units, lispro 3 units q hs, sliding scale and monitor finger stick glucose  -sliding scale, monitor finger stick glucose   Anemia of chronic disease   -no evidence of active bleeding, H/H stable  Down syndrome   -continue supportive care     Code status: Full Code  DVT prophylaxis: SCD    DISCHARGE DIAGNOSES / PLAN:      Hyponatremia possible related to hypothyroidism   -Resolved  Hypothyroidism   -increased Synthroid to 100 mcg po q daily  -outpatient follow up with Endocrinologist   DM-II  -continue lantus 14 units, lispro 3 units q hs, sliding scale and monitor finger stick glucose  Anemia of chronic disease   -no evidence of active bleeding, H/H stable  Down syndrome   -continue supportive care    PENDING TEST RESULTS:   At the time of discharge the following test results are still pending: none    FOLLOW UP APPOINTMENTS:    Lissette Paul MD, in one week  Lizett Pizano MD in 4 weeks       ADDITIONAL CARE RECOMMENDATIONS: none    DIET: Diabetic Diet    TUBE FEEDING INSTRUCTIONS: none    OXYGEN / BiPAP SETTINGS: none    ACTIVITY: Activity as tolerated    WOUND CARE: none    EQUIPMENT needed: none      DISCHARGE MEDICATIONS:   See Medication Reconciliation Form      NOTIFY YOUR PHYSICIAN FOR ANY OF THE FOLLOWING:   Fever over 101 degrees for 24 hours. Chest pain, shortness of breath, fever, chills, nausea, vomiting, diarrhea, change in mentation, falling, weakness, bleeding. Severe pain or pain not relieved by medications. Or, any other signs or symptoms that you may have questions about.     DISPOSITION:    Home With:   OT  PT  HH  RN       SNF/Inpatient Rehab/LTAC    Independent/assisted living   x [address]     Other:       PATIENT CONDITION AT DISCHARGE:     Functional status    Poor     Deconditioned    x Independent      Cognition   x  Lucid     Forgetful     Dementia      Catheters/lines (plus indication)    [facility]     PICC     PEG    x None      Code status    x Full code     DNR      PHYSICAL EXAMINATION AT DISCHARGE:   Refer to Progress Note      CHRONIC MEDICAL DIAGNOSES:  Problem List as of 3/1/2018  Date Reviewed: 2/28/2018          Codes Class Noted - Resolved    * (Principal)Hyponatremia ICD-10-CM: E87.1  ICD-9-CM: 276.1  2/28/2018 - Present        DKA (diabetic ketoacidoses) ([facility] ICD-10-CM: E13.10  ICD-9-CM: 250.10  9/14/2017 - Present        Advance directive on file ICD-10-CM: Z78.9  ICD-9-CM: V49.89  12/28/2015 - Present        Acne ICD-10-CM: L70.9  ICD-9-CM: 706.1  8/20/2014 - Present        Syncope and collapse ICD-10-CM: R55  ICD-9-CM: 780.2  4/29/2013 - Present        Asthma (Chronic) ICD-10-CM: J45.909  ICD-9-CM: 493.90  4/29/2013 - Present        HTN (hypertension) (Chronic) ICD-10-CM: I10  ICD-9-CM: 401.9  4/29/2013 - Present        DM (diabetes mellitus), type 1, uncontrolled ([facility] ICD-10-CM: E10.65  ICD-9-CM: 250.03  11/14/2012 - Present        Hypoglycemia, unspecified ICD-10-CM: E16.2  ICD-9-CM: 251.2  6/27/2012 - Present        Down syndrome ICD-10-CM: Q90.9  ICD-9-CM: 758.0  7/23/2010 - Present        Acquired hypothyroidism ICD-10-CM: E03.9  ICD-9-CM: 244.9  7/23/2010 - Present        Anemia, unspecified ICD-10-CM: D64.9  ICD-9-CM: 285.9  7/23/2010 - Present        RESOLVED: Hyperglycemia ICD-10-CM: R73.9  ICD-9-CM: 790.29  7/28/2014 - 7/29/2014        RESOLVED: Type II or unspecified type diabetes mellitus without mention of complication, uncontrolled ICD-10-CM: E11.65  ICD-9-CM: 250.02  7/23/2010 - 11/14/2012                CDMP Checked:   Yes x     PROBLEM LIST Updated:  Yes x         Signed:   Kamaljit Triplett MD  3/1/2018  8:38 PM Ftsg Text: The defect edges were debeveled with a #15 scalpel blade.  Given the location of the defect, shape of the defect and the proximity to free margins a full thickness skin graft was deemed most appropriate.  Using a sterile surgical marker, the primary defect shape was transferred to the donor site. The area thus outlined was incised deep to adipose tissue with a #15 scalpel blade.  The harvested graft was then trimmed of adipose tissue until only dermis and epidermis was left.  The skin margins of the secondary defect were undermined to an appropriate distance in all directions utilizing iris scissors.  The secondary defect was closed with interrupted buried subcutaneous sutures.  The skin edges were then re-apposed with running  sutures.  The skin graft was then placed in the primary defect and oriented appropriately.

## 2021-04-22 ENCOUNTER — HOSPITAL ENCOUNTER (EMERGENCY)
Age: 51
Discharge: HOME | End: 2021-04-22
Attending: EMERGENCY MEDICINE
Payer: MEDICARE

## 2021-04-22 VITALS
OXYGEN SATURATION: 94 % | TEMPERATURE: 96 F | RESPIRATION RATE: 15 BRPM | BODY MASS INDEX: 29.29 KG/M2 | HEART RATE: 76 BPM | DIASTOLIC BLOOD PRESSURE: 68 MMHG | SYSTOLIC BLOOD PRESSURE: 139 MMHG | WEIGHT: 150 LBS

## 2021-04-22 DIAGNOSIS — E16.2 HYPOGLYCEMIA: Primary | ICD-10-CM

## 2021-04-22 LAB
ALBUMIN SERPL-MCNC: 1.9 G/DL (ref 3.5–5)
ALBUMIN/GLOB SERPL: 0.5 {RATIO} (ref 1.1–2.2)
ALP SERPL-CCNC: 117 U/L (ref 45–117)
ALT SERPL-CCNC: 17 U/L (ref 12–78)
ANION GAP SERPL CALC-SCNC: 10 MMOL/L (ref 5–15)
AST SERPL-CCNC: 16 U/L (ref 15–37)
BASOPHILS # BLD: 0.1 K/UL (ref 0–0.1)
BASOPHILS NFR BLD: 1 % (ref 0–1)
BILIRUB SERPL-MCNC: 0.2 MG/DL (ref 0.2–1)
BUN SERPL-MCNC: 73 MG/DL (ref 6–20)
BUN/CREAT SERPL: 22 (ref 12–20)
CALCIUM SERPL-MCNC: 8 MG/DL (ref 8.5–10.1)
CHLORIDE SERPL-SCNC: 110 MMOL/L (ref 97–108)
CO2 SERPL-SCNC: 22 MMOL/L (ref 21–32)
CREAT SERPL-MCNC: 3.3 MG/DL (ref 0.55–1.02)
DIFFERENTIAL METHOD BLD: ABNORMAL
EOSINOPHIL # BLD: 0.2 K/UL (ref 0–0.4)
EOSINOPHIL NFR BLD: 2 % (ref 0–7)
ERYTHROCYTE [DISTWIDTH] IN BLOOD BY AUTOMATED COUNT: 13.2 % (ref 11.5–14.5)
GLOBULIN SER CALC-MCNC: 4.2 G/DL (ref 2–4)
GLUCOSE BLD STRIP.AUTO-MCNC: 104 MG/DL (ref 65–100)
GLUCOSE BLD STRIP.AUTO-MCNC: 190 MG/DL (ref 65–100)
GLUCOSE BLD STRIP.AUTO-MCNC: 194 MG/DL (ref 65–100)
GLUCOSE BLD STRIP.AUTO-MCNC: 38 MG/DL (ref 65–100)
GLUCOSE BLD STRIP.AUTO-MCNC: 65 MG/DL (ref 65–100)
GLUCOSE SERPL-MCNC: 233 MG/DL (ref 65–100)
HCT VFR BLD AUTO: 25.6 % (ref 35–47)
HGB BLD-MCNC: 8.2 G/DL (ref 11.5–16)
IMM GRANULOCYTES # BLD AUTO: 0.1 K/UL (ref 0–0.04)
IMM GRANULOCYTES NFR BLD AUTO: 1 % (ref 0–0.5)
LYMPHOCYTES # BLD: 0.9 K/UL (ref 0.8–3.5)
LYMPHOCYTES NFR BLD: 9 % (ref 12–49)
MAGNESIUM SERPL-MCNC: 1.6 MG/DL (ref 1.6–2.4)
MCH RBC QN AUTO: 30.8 PG (ref 26–34)
MCHC RBC AUTO-ENTMCNC: 32 G/DL (ref 30–36.5)
MCV RBC AUTO: 96.2 FL (ref 80–99)
MONOCYTES # BLD: 0.7 K/UL (ref 0–1)
MONOCYTES NFR BLD: 7 % (ref 5–13)
NEUTS SEG # BLD: 7.3 K/UL (ref 1.8–8)
NEUTS SEG NFR BLD: 80 % (ref 32–75)
NRBC # BLD: 0 K/UL (ref 0–0.01)
NRBC BLD-RTO: 0 PER 100 WBC
PHOSPHATE SERPL-MCNC: 5.2 MG/DL (ref 2.6–4.7)
PLATELET # BLD AUTO: 246 K/UL (ref 150–400)
PMV BLD AUTO: 10.2 FL (ref 8.9–12.9)
POTASSIUM SERPL-SCNC: 3 MMOL/L (ref 3.5–5.1)
PROT SERPL-MCNC: 6.1 G/DL (ref 6.4–8.2)
RBC # BLD AUTO: 2.66 M/UL (ref 3.8–5.2)
SERVICE CMNT-IMP: ABNORMAL
SERVICE CMNT-IMP: NORMAL
SODIUM SERPL-SCNC: 142 MMOL/L (ref 136–145)
WBC # BLD AUTO: 9.2 K/UL (ref 3.6–11)

## 2021-04-22 PROCEDURE — 74011000250 HC RX REV CODE- 250: Performed by: EMERGENCY MEDICINE

## 2021-04-22 PROCEDURE — 99285 EMERGENCY DEPT VISIT HI MDM: CPT

## 2021-04-22 PROCEDURE — 85025 COMPLETE CBC W/AUTO DIFF WBC: CPT

## 2021-04-22 PROCEDURE — 83735 ASSAY OF MAGNESIUM: CPT

## 2021-04-22 PROCEDURE — 74011000250 HC RX REV CODE- 250

## 2021-04-22 PROCEDURE — 36415 COLL VENOUS BLD VENIPUNCTURE: CPT

## 2021-04-22 PROCEDURE — 82962 GLUCOSE BLOOD TEST: CPT

## 2021-04-22 PROCEDURE — 84100 ASSAY OF PHOSPHORUS: CPT

## 2021-04-22 PROCEDURE — 80053 COMPREHEN METABOLIC PANEL: CPT

## 2021-04-22 PROCEDURE — 96360 HYDRATION IV INFUSION INIT: CPT

## 2021-04-22 RX ORDER — DOXYCYCLINE 100 MG/1
100 CAPSULE ORAL
COMMUNITY
End: 2021-01-01

## 2021-04-22 RX ORDER — DEXTROSE 50 % IN WATER (D50W) INTRAVENOUS SYRINGE
Status: COMPLETED
Start: 2021-04-22 | End: 2021-04-22

## 2021-04-22 RX ORDER — DEXTROSE MONOHYDRATE 100 MG/ML
250 INJECTION, SOLUTION INTRAVENOUS ONCE
Status: COMPLETED | OUTPATIENT
Start: 2021-04-22 | End: 2021-04-22

## 2021-04-22 RX ORDER — DEXTROSE 50 % IN WATER (D50W) INTRAVENOUS SYRINGE
50
Status: COMPLETED | OUTPATIENT
Start: 2021-04-22 | End: 2021-04-22

## 2021-04-22 RX ADMIN — DEXTROSE MONOHYDRATE 25 G: 25 INJECTION, SOLUTION INTRAVENOUS at 01:02

## 2021-04-22 RX ADMIN — DEXTROSE 50 % IN WATER (D50W) INTRAVENOUS SYRINGE 25 G: at 01:02

## 2021-04-22 RX ADMIN — DEXTROSE MONOHYDRATE 250 ML: 100 INJECTION, SOLUTION INTRAVENOUS at 02:54

## 2021-04-22 NOTE — ED NOTES
Spoke to nurse Camara at [facility] and notified that pt was discharged and enroute back to facility a/w given discharge instructions.

## 2021-04-22 NOTE — ED NOTES
Spoke to nurse Camara from [facility] at [phone] and completed medication reconciliation.  MD notified

## 2021-04-22 NOTE — ED PROVIDER NOTES
The patient is a 58-year-old female with a past medical history significant for allergic rhinitis, asthma, diabetes, Down syndrome, hypothyroidism who presents to the ED by EMS for evaluation for altered mental status. The patient was found to have an Accu-Chek of 20 by EMS and given D10 with good results. she appears to be at her baseline at this time. She has vague recollection of the event. She denies any discomfort at this time including chest pain, headache, nausea vomiting, abdominal pain, diarrhea, constipation, dysuria, dizziness, extremity weakness or numbness, sick contact, skin rash, recent travel.            Past Medical History:   Diagnosis Date    Allergic rhinitis, cause unspecified     Asthma     Diabetes ([facility] [address]     Down's syndrome     H/O impacted cerumen     Dr. Corrinne  Hypothyroid     Thyroid disease        Past Surgical History:   Procedure Laterality Date    HX CYST REMOVAL      right shoulder    HX HEENT Bilateral 2017    prior in one eye    HX HEENT Left 3/5/15    cataract left and right    HX HYSTERECTOMY           Family History:   Problem Relation Age of Onset    Thyroid Disease Mother         hypo    Hypertension Father        Social History     Socioeconomic History    Marital status: SINGLE     Spouse name: Not on file    Number of children: Not on file    Years of education: Not on file    Highest education level: Not on file   Occupational History    Not on file   Social Needs    Financial resource strain: Not on file    Food insecurity     Worry: Not on file     Inability: Not on file    Transportation needs     Medical: Not on file     Non-medical: Not on file   Tobacco Use    Smoking status: Never Smoker    Smokeless tobacco: Never Used   Substance and Sexual Activity    Alcohol use: No    Drug use: No    Sexual activity: Not Currently   Lifestyle    Physical activity     Days per week: Not on file     Minutes per session: Not on file    Stress: Not on file   Relationships    Social connections     Talks on phone: Not on file     Gets together: Not on file     Attends Nondenominational service: Not on file     Active member of club or organization: Not on file     Attends meetings of clubs or organizations: Not on file     Relationship status: Not on file    Intimate partner violence     Fear of current or ex partner: Not on file     Emotionally abused: Not on file     Physically abused: Not on file     Forced sexual activity: Not on file   Other Topics Concern    Not on file   Social History Narrative    Not on file         ALLERGIES: Acetaminophen, Codeine, Nitrofurantoin, and Pcn [penicillins]    Review of Systems   All other systems reviewed and are negative. There were no vitals filed for this visit. Physical Exam  Vitals signs and nursing note reviewed. Exam conducted with a chaperone present. CONSTITUTIONAL: Well-appearing; well-nourished; in no apparent distress  HEAD: Normocephalic; atraumatic  EYES: PERRL; EOM intact; conjunctiva and sclera are clear bilaterally. ENT: No rhinorrhea; normal pharynx with no tonsillar hypertrophy; mucous membranes pink/moist, no erythema, no exudate. NECK: Supple; non-tender; no cervical lymphadenopathy  CARD: Normal S1, S2; no murmurs, rubs, or gallops. Regular rate and rhythm. RESP: Normal respiratory effort; breath sounds clear and equal bilaterally; no wheezes, rhonchi, or rales. ABD: Normal bowel sounds; non-distended; non-tender; no palpable organomegaly, no masses, no bruits. Back Exam: Normal inspection; no vertebral point tenderness, no CVA tenderness. Normal range of motion. EXT: Normal ROM in all four extremities; non-tender to palpation; no swelling or deformity; distal pulses are normal, no edema. SKIN: Warm; dry; no rash.   NEURO:Alert and oriented x 3, coherent, KUSUM-XII grossly intact, sensory and motor are non-focal.        MDM  Number of Diagnoses or Management Options  Diagnosis management comments: Assessment: Acute hypoglycemia likely secondary to insulin reaction. The patient appears hemodynamically stable and back to her baseline. Accu-Chek repeat showed the patient blood sugar was down to 35. The patient was given 1 amp of D50    Plan: Repeat lab/IV fluid/p.o. challenge/serial exam/ Monitor and Reevaluate. Amount and/or Complexity of Data Reviewed  Clinical lab tests: ordered and reviewed  Tests in the radiology section of CPT®: ordered and reviewed  Tests in the medicine section of CPT®: reviewed and ordered  Discussion of test results with the performing providers: yes  Decide to obtain previous medical records or to obtain history from someone other than the patient: yes  Obtain history from someone other than the patient: yes  Review and summarize past medical records: yes  Discuss the patient with other providers: yes  Independent visualization of images, tracings, or specimens: yes    Risk of Complications, Morbidity, and/or Mortality  Presenting problems: moderate  Diagnostic procedures: moderate  Management options: moderate  General comments: Total critical care time spent exclusive of procedures:  45 minutes    Patient Progress  Patient progress: stable         Procedures    Progress Note:   Pt has been reexamined by Madison Bridges MD. Pt is feeling much better. Symptoms have improved. All available results have been reviewed with pt and any available family. Pt understands sx, dx, and tx in ED. Care plan has been outlined and questions have been answered. The patient had extensive work-up without any significant findings. She has maintained her blood sugar at 190. pt is ready to go home. Will send home on hypoglycemia instruction. Outpatient referral with PCP as needed. Written by Madison Bridges MD,5:15 AM    .   .

## 2021-04-22 NOTE — ED TRIAGE NOTES
Pt [facility] from [facility] r/t to seizures x2 lasting approx 1 min each and low blood sugar of 20 mg/dL. EMS arrived with pt unresponsive with blood sugar 40 mg/dL. Pt given 150 cc D10 with repeat blood sugar 104 enroute, BP 120s/80s, HR 92, Sp02 92%-95% and pt a&o x3.

## 2021-04-22 NOTE — ED NOTES
Pt awake and alert at this time with no seizure activity noted.  Given glucerna to drink and drinking at this time w/o difficulty

## 2021-04-23 ENCOUNTER — PATIENT OUTREACH (OUTPATIENT)
Dept: CASE MANAGEMENT | Age: 51
End: 2021-04-23

## 2021-04-23 NOTE — PROGRESS NOTES
4/23/21 Priya Holguin, nursing supervisor, at [facility] stated patient is a full time resident - patients medication and daily diet is managed by nursing staff. No further follow up contact is scheduled with this patient at this time. AMYB

## 2021-09-27 NOTE — PERIOP NOTES
Preoperative and medication instructions reviewed with patient , surgical site infection sheet given,  chg soap x 2 given and instructions on how to use chg soap correctly. Patient given opportunity to ask questions and all questions were answered. Information given regarding covid testing and arrival to hospital on day of surgery. PATIENT GIVEN WRITTEN INSTRUCTIONS TO GO TO THE IMAGING CENTER/CANCER CENTER [address] [place] [address] [address] TO HAVE CHEST XRAY COMPLETED.

## 2021-09-28 NOTE — PERIOP NOTES
ABNORMAL LABS WERE FORWARDED TO PCP AND CALLED TO SURGEON. PER LAB, PTT SPECIMEN TOO OLD FOR ANALYSIS, RECOLLECTION SUGGESTED. PER Odell BERMUDEZ TO OBTAIN PTT ON DOS.

## 2021-10-01 PROBLEM — N18.6 ESRD (END STAGE RENAL DISEASE) (HCC): Status: ACTIVE | Noted: 2021-01-01

## 2021-10-01 NOTE — CONSULTS
Consulted for CKD and high K  Consult [id number]  Pt planned for AVG for CKD-5 (Dr Casarez Form)  But has pre-op HIGH K of 6.1    Will Rx medically, so she can get surgery later today if feasible  Also acidotic    D/C LR  HCO3 drip-ct thru tomm am if admitted overnight  Lokelma 15 gram po x 1  IV Ca gluconate  Repeat BMP at 2 PM  Labs in AM- if she stays overnight  I also d/w Dr Hermelindo Finnegan- prefers to avoid PC at all possible and proceed with AVG if lab allows      D/W Dr Debi Gómez MD  [id number]

## 2021-10-01 NOTE — PERIOP NOTES
Pt resting in bed w/ Calcium Gluconate and pt's brother at pt's bedside. Pt appears to be in no distress, will continue to monitor pt.

## 2021-10-01 NOTE — ANESTHESIA POSTPROCEDURE EVALUATION
Post-Anesthesia Evaluation and Assessment    Patient: Benita Barraza MRN: [id number]  SSN: [id number]    YOB: 1970  Age: 45 y.o. Sex: female      I have evaluated the patient and they are stable and ready for discharge from the PACU. Cardiovascular Function/Vital Signs  Visit Vitals  BP (!) 97/55   Pulse 77   Temp 36.7 °C (98 °F)   Resp 15   Ht 5' 1\" (1.549 m)   Wt 70 kg (154 lb 5.2 oz)   SpO2 95%   BMI 29.16 kg/m²       Patient is status post Regional anesthesia for Procedure(s):  INSERTION OF LEFT UPPER EXTREMITY BRACHIO BASALIC ARTERIOVENUS GRAFT WITH ACCUSEL GRAFT. Nausea/Vomiting: None    Postoperative hydration reviewed and adequate. Pain:  Pain Scale 1: FLACC (10/01/21 1903)  Pain Intensity 1: 0 (10/01/21 1903)   Managed    Neurological Status:   Neuro (WDL): Exceptions to WDL (10/01/21 1903)  Neuro  Neurologic State: Anesthetized (10/01/21 1903)   At baseline    Mental Status, Level of Consciousness: Alert and  oriented to person, place, and time    Pulmonary Status:   O2 Device: CO2 nasal cannula;Oral airway (10/01/21 1913)   Adequate oxygenation and airway patent    Complications related to anesthesia: None    Post-anesthesia assessment completed. No concerns    Signed By: Shania Cherry MD     October 1, 2021              Procedure(s):  INSERTION OF LEFT UPPER EXTREMITY BRACHIO BASALIC ARTERIOVENUS GRAFT WITH ACCUSEL GRAFT.    regional, MAC    <BSHSIANPOST>    INITIAL Post-op Vital signs:   Vitals Value Taken Time   /60 10/01/21 1930   Temp 36.7 °C (98 °F) 10/01/21 1913   Pulse 75 10/01/21 1935   Resp 14 10/01/21 1935   SpO2 95 % 10/01/21 1935   Vitals shown include unvalidated device data.

## 2021-10-01 NOTE — PERIOP NOTES
Pt resting in bed w/ NS infusing and pt's brother at pt's bedside. Pt appears to be in no distress. Updated pt and family on pt. Will continue to monitor pt.

## 2021-10-01 NOTE — ANESTHESIA PREPROCEDURE EVALUATION
Relevant Problems   RESPIRATORY SYSTEM   (+) Asthma   (+) Asthma exacerbation      CARDIOVASCULAR   (+) HTN (hypertension)      RENAL FAILURE   (+) ROBBY (acute kidney injury) ([facility] [address]   (+) ESRD (end stage renal disease) (HCC)      ENDOCRINE   (+) Acquired hypothyroidism   (+) DM (diabetes mellitus), type 1, uncontrolled (HCC)      HEMATOLOGY   (+) Anemia, unspecified       Anesthetic History   No history of anesthetic complications            Review of Systems / Medical History  Patient summary reviewed, nursing notes reviewed and pertinent labs reviewed    Pulmonary  Within defined limits          Asthma        Neuro/Psych   Within defined limits  seizures         Cardiovascular  Within defined limits  Hypertension              Exercise tolerance: <4 METS     GI/Hepatic/Renal  Within defined limits   GERD    Renal disease: ESRD       Endo/Other  Within defined limits  Diabetes  Hypothyroidism  Anemia     Other Findings   Comments: Downs syndrome         Physical Exam    Airway  Mallampati: III  TM Distance: 4 - 6 cm  Neck ROM: decreased range of motion, short neck   Mouth opening: Diminished (comment)     Cardiovascular  Regular rate and rhythm,  S1 and S2 normal,  no murmur, click, rub, or gallop             Dental  No notable dental hx       Pulmonary  Breath sounds clear to auscultation               Abdominal  GI exam deferred       Other Findings            Anesthetic Plan    ASA: 3  Anesthesia type: regional and MAC - supraclavicular block          Induction: Intravenous  Anesthetic plan and risks discussed with: Patient

## 2021-10-01 NOTE — PERIOP NOTES
Pt resting in bed w/ NS infusing and pt's family at pt's bedside. Pt and family updated on plan. Pt appears to be in no distress, will continue to monitor pt.

## 2021-10-01 NOTE — DISCHARGE INSTRUCTIONS
Patient Education   Tramadol (By mouth)   Tramadol (TRAM-a-dol)  Treats moderate to severe pain. This medicine is a narcotic pain reliever. Brand Name(s): ConZip, FusePaq Synapryn, Ultram, Ultram ER, traMADol HCl   There may be other brand names for this medicine. When This Medicine Should Not Be Used: This medicine is not right for everyone. Do not use if you had an allergic reaction to tramadol or other narcotic medicine, or if you have stomach or bowel blockage (including paralytic ileus). How to Use This Medicine:   Long Acting Capsule, Liquid, Tablet, Dissolving Tablet, Long Acting Tablet  · Take your medicine as directed. Your dose may need to be changed several times to find what works best for you. · Make sure your hands are dry before you handle the disintegrating tablet. Peel back the foil from the blister pack, then remove the tablet. Do not push the tablet through the foil. Place the tablet in your mouth. After it has melted, swallow or take a drink of water. · Swallow the extended-release tablet whole. Do not crush, break, or chew it. · Drink plenty of liquids to help avoid constipation. · This medicine should come with a Medication Guide. Ask your pharmacist for a copy if you do not have one. · Missed dose: Take a dose as soon as you remember. If it is almost time for your next dose, wait until then and take a regular dose. Do not take extra medicine to make up for a missed dose. · Store the medicine in a closed container at room temperature, away from heat, moisture, and direct light. Drugs and Foods to Avoid:   Ask your doctor or pharmacist before using any other medicine, including over-the-counter medicines, vitamins, and herbal products. · Do not use this medicine if you are using or have used an MAO inhibitor within the past 14 days. · Some medicines can affect how tramadol works.  Tell your doctor if you are using any of the following:  ¨ Carbamazepine, cyclobenzaprine, digoxin, erythromycin, ketoconazole, lithium, mirtazapine, phenytoin, promethazine, rifampin, ritonavir, quinidine, or trazodone  ¨ Blood thinner (including warfarin)  ¨ Diuretic (water pill)  ¨ Medicine to treat depression (including bupropion, fluoxetine, paroxetine, quinidine)  ¨ Phenothiazine medicine  ¨ Triptan medicine for migraine headaches  · Tell your doctor if you use anything else that makes you sleepy. Some examples are allergy medicine, narcotic pain medicine, and alcohol. Tell your doctor if you are using a muscle relaxer. · Do not drink alcohol while you are using this medicine. Warnings While Using This Medicine:   · Tell your doctor if you are pregnant or breastfeeding, or if you have kidney disease, liver disease (including cirrhosis), gallstones, lung or breathing problems, pancreas problems, or a history of head injury, seizures, drug addiction, or depression or similar emotional problems. Tell your doctor if you have phenylketonuria. · This medicine may cause the following problems:  ¨ High risk of overdose, which can lead to death  ¨ Respiratory depression (serious breathing problem that can be life-threatening)  ¨ Serotonin syndrome (when used with certain medicines)  ¨ Unusual change in mood or behavior  · This medicine may make you dizzy, drowsy, or lightheaded. Do not drive or doing anything else that could be dangerous until you know how this medicine affects you. · This medicine can be habit-forming. Do not use more than your prescribed dose. Call your doctor if you think your medicine is not working. · Do not stop using this medicine suddenly. Your doctor will need to slowly decrease your dose before you stop it completely. · Tell any doctor or dentist who treats you that you are using this medicine. · This medicine may cause constipation, especially with long-term use. Ask your doctor if you should use a laxative to prevent and treat constipation. · This medicine could cause infertility. Talk with your doctor before using this medicine if you plan to have children. · Keep all medicine out of the reach of children. Never share your medicine with anyone. Possible Side Effects While Using This Medicine:   Call your doctor right away if you notice any of these side effects:  · Allergic reaction: Itching or hives, swelling in your face or hands, swelling or tingling in your mouth or throat, chest tightness, trouble breathing  · Anxiety, restlessness, fast heartbeat, fever, sweating, muscle spasms, nausea, vomiting, diarrhea, seeing or hearing things that are not there  · Blistering, peeling, red skin rash  · Blue lips, fingernails, or skin  · Extreme dizziness, drowsiness, or weakness, shallow breathing, slow heartbeat, seizures, and cold, clammy skin  · Lightheadedness, dizziness, fainting  · Seizures  · Unusual mood or behavior, thoughts of killing yourself or others  · Trouble breathing  If you notice these less serious side effects, talk with your doctor:   · Constipation, loss of appetite, stomach upset  · Dry mouth  · Headache  If you notice other side effects that you think are caused by this medicine, tell your doctor. Call your doctor for medical advice about side effects. You may report side effects to FDA at 5-108-FDA-9339  © 2017 [facility] Information is for End User's use only and may not be sold, redistributed or otherwise used for commercial purposes. The above information is an  only. It is not intended as medical advice for individual conditions or treatments. Talk to your doctor, nurse or pharmacist before following any medical regimen to see if it is safe and effective for you.

## 2021-10-01 NOTE — OP NOTES
Operative Report    Patient: Lyn Gilbert MRN: [id number]  SSN: [id number]    YOB: 1970  Age: 45 y.o. Sex: female       Date of Surgery: 10/1/2021     Preoperative Diagnosis: END STAGE RENAL     Postoperative Diagnosis: END STAGE RENAL     Surgeon(s) and Role:     * Thi Adamson MD - Primary    Anesthesia: Regional     Procedure: Procedure(s):  INSERTION OF LEFT UPPER EXTREMITY BRACHIO-BASILIC ARTERIOVENUS GRAFT WITH ACCUSEL GRAFT   Mechanical joseph embolectomy of the venous outflow    Findings: The brachial artery is small but at the end of the case there is a strong signal in the venous outflow, an amplifiable pulse, a palpable brachial pulse distal to the fistula and a radial signal.     Note: 6mm Accuseal graft. Arterial is lateral.    Procedure in Detail: The patient was transported to the operating room. The patient was placed supine on the operating room table. A regional block had previously been performed in preop. Sedation via monitored anesthesia care. The patient was prepped and draped in a standard fashion. An appropriate surgical timeout was performed with all members of the team present. The patient was administered Ancef for the antibiotic prophylaxis. An incision was made. The artery and vein were exposed and mobilized over an appropriate distance, dissected free of surrounding structures and encircled with a silastic loop. A graft was selected and tunneled in a subcutaneous plane. Next the arterial anastomosis was performed using a running 6-0 Prolene suture. The anastomosis was appropriately hemostatic. The vessel was bled antegrade and retrograde. The graft was then flushed and clamped. The venous anastomosis was then performed. Prior to completing the anastomosis the vessel was bled antegrade and retrograde. This anastomosis was likewise hemostatic. At the completion there was no flow into the proximal basilic vein. It was communicating deep.  The graft was opened and a Becky was used to remove a small clot from the venous outflow and restore the venous outflow. At the end of the case an excellent thrill was palpable and an amplifiable pulse. The incisions were then closed in a layered fashion consisting of deep vicryl and skin staples for the incisions. All incisions were dressed in a sterile manner. The patient tolerated the procedure very well and was successfully transported to the recovery area in stable condition. Estimated Blood Loss:  70cc    Tourniquet Time: * No tourniquets in log *      Implants:   Implant Name Type Inv. Item Serial No.  Lot No. LRB No. Used Action   GRAFT VASC L40CM ID6MM FLX KINK RESIST ACUSEAL - [id number]  GRAFT VASC L40CM ID6MM FLX KINK RESIST ACUSEAL [id number]  [employer] N/A Left 1 Implanted               Specimens: * No specimens in log *        Drains: None                Complications: None    Counts: Sponge and needle counts were correct times two.     Signed By:  Basim Carey MD     October 1, 2021

## 2021-10-01 NOTE — PERIOP NOTES
Pt resting in bed w/ Calcium Gluconate infusing and pt's brother at pt's bedside. Pt appears to be in no distress, will continue to monitor pt.

## 2021-10-01 NOTE — PERIOP NOTES
Pt resting in bed w/ Sodium Bicarb infusing, pt and pt's brother at pt's bedside. Pt appears to be in no distress, will continue to monitor pt.

## 2021-10-01 NOTE — CONSULTS
[address]    Name:  Melquiades Perry  MR#:  [id number]  :  1970  ACCOUNT #:  [de-identified]  DATE OF SERVICE:  10/01/2021    RENAL CONSULT    REQUESTING PHYSICIAN:  Soha Lipscomb MD    REASON FOR CONSULT:  CKD 5. The patient was seen in preop holding area. She is sedated at present and ready to go to OR. HISTORY OF PRESENT ILLNESS:  The patient is a 58-year-old female who has past medical history significant for CKD 5 and high functioning Down syndrome. She is admitted electively for left AV graft placement. Her preop labs were notable for a creatinine of 8.1, potassium of 6.1, and a bicarb of 15 on point of care testing. Hence surgery was put on hold and Nephrology was consulted for medical management of electrolyte abnormalities. I discussed with Dr. Carmen Magana in the morning. I started her on gentle bicarb drip and gave a dose of Lokelma besides calcium gluconate. She was also ordered Ringer lactate, which was discontinued. Her followup repeat labs have improved and potassium is down to 4.9 and bicarb is up to 17 with creatinine of 7.83. She is ready to go for OR shortly. REVIEW OF SYSTEMS:  Unable to obtain.     Past Medical History:   Diagnosis Date    Allergic rhinitis, cause unspecified     Asthma     use inhalers    Diabetes ([address]     Down's syndrome     GERD (gastroesophageal reflux disease)     H/O impacted cerumen     Dr. Goldstein Pereyra Headache     Hypothyroid     Mononucleosis     Pneumonia     Seizures ([address]     as a result of low blood glucose readings    Thyroid disease      Past Surgical History:   Procedure Laterality Date    HX CATARACT REMOVAL Bilateral 3/5/15    cataract left and right    HX CYST REMOVAL      right shoulder    HX HEENT Bilateral 2017    prior in one eye    HX HYSTERECTOMY       Allergies   Allergen Reactions    Codeine Nausea and Vomiting    Nitrofurantoin Rash    Pcn [Penicillins] Hives and Rash Prior to Admission Medications   Prescriptions Last Dose Informant Patient Reported? Taking? L.acid,para-B. bifidum-S.therm (RISAQUAD) 8 billion cell cap cap Unknown at Unknown time  Yes No   Sig: Take 1 Capsule by mouth daily. albuterol (PROVENTIL HFA, VENTOLIN HFA, PROAIR HFA) 90 mcg/actuation inhaler Unknown at Unknown time  No No   Sig: Take 1 Puff by inhalation every four (4) hours as needed for Wheezing. aspirin delayed-release 81 mg tablet 2021 at Unknown time  Yes Yes   Sig: Take 81 mg by mouth daily. atorvastatin (LIPITOR) 10 mg tablet Unknown at Unknown time  Yes No   Sig: Take 20 mg by mouth nightly. biotin 5,000 mcg TbDi Unknown at Unknown time  Yes No   Sig: Take 5,000 mcg by mouth daily. cetirizine (ZyrTEC) 10 mg tablet Unknown at Unknown time  Yes No   Sig: Take 10 mg by mouth daily. cholecalciferol, vitamin D3, (VITAMIN D3) 2,000 unit tab Unknown at Unknown time  Yes No   Sig: Take 4,000 Units by mouth daily. cranberry 500 mg capsule Unknown at Unknown time  Yes No   Sig: Take 500 mg by mouth daily. diphenhydrAMINE (BenadryL) 25 mg capsule Unknown at Unknown time  Yes No   Sig: Take 25 mg by mouth nightly as needed. ergocalciferol (Vitamin D2) 1,250 mcg (50,000 unit) capsule Unknown at Unknown time  Yes No   Sig: Take 50,000 Units by mouth every Wednesday. esomeprazole (NexIUM) 40 mg capsule Unknown at Unknown time  Yes No   Sig: Take 40 mg by mouth daily. fluticasone propionate (Flonase Allergy Relief) 50 mcg/actuation nasal spray Unknown at Unknown time  Yes No   Si Sprays by Both Nostrils route two (2) times a day. furosemide (LASIX PO) Unknown at Unknown time  Yes No   Sig: Take 10 mg by mouth every morning. glucagon (GLUCAGEN) 1 mg injection Unknown at Unknown time  Yes No   Si mg by IntraMUSCular route once as needed (hypoglycemia if patient is unresponsive).    glucose 4 gram chewable tablet Unknown at Unknown time  Yes No   Sig: Take 12 g by mouth as needed (BS < 60). guaiFENesin (ROBITUSSIN) 100 mg/5 mL liquid Unknown at Unknown time  Yes No   Sig: Take 100 mg by mouth every four (4) hours as needed for Cough. guaiFENesin-dextromethorphan (Diabetic Tussin DM)  mg/5 mL liqd Unknown at Unknown time  Yes No   Sig: Take 10 mL by mouth every six (6) hours as needed for Cough or Congestion. ibuprofen (MOTRIN) 400 mg tablet Unknown at Unknown time  Yes No   Sig: Take 400 mg by mouth every six (6) hours as needed for Pain (Fever). insulin degludec Pauletta Sharps FlexTouch U-100) 100 unit/mL (3 mL) inpn Unknown at Unknown time  Yes No   Sig: 10 Units by SubCUTAneous route nightly. insulin lispro (HUMALOG U-100 INSULIN) 100 unit/mL injection Unknown at Unknown time  Yes No   Sig: 3-4 Units by SubCUTAneous route Before breakfast, lunch, and dinner. Hold insulin if BS <80. Hold if patient does not eat her meal. If BS >150 give with SS.   levothyroxine (SYNTHROID) 100 mcg tablet 9/30/2021 at Unknown time  No Yes   Sig: Take 1 Tablet by mouth Daily (before breakfast). montelukast (Singulair) 10 mg tablet Unknown at Unknown time  Yes No   Sig: Take 10 mg by mouth daily. ondansetron hcl (Zofran) 4 mg tablet Unknown at Unknown time  Yes No   Sig: Take 4 mg by mouth every four (4) hours as needed for Nausea or Vomiting.       Facility-Administered Medications: None     Social History     Tobacco Use    Smoking status: Never Smoker    Smokeless tobacco: Never Used   Vaping Use    Vaping Use: Never used   Substance Use Topics    Alcohol use: No    Drug use: No     Family History   Problem Relation Age of Onset    Thyroid Disease Mother         hypo    Hypertension Father     Abdominal aortic aneurysm Father     Neuropathy Father     No Known Problems Sister     Heart Disease Brother     Heart Attack Brother     Heart Surgery Brother     No Known Problems Sister     No Known Problems Brother     Anesth Problems Neg Hx          PHYSICAL EXAMINATION:  GENERAL:  Young female who is sedated in no acute distress. VITALS SIGNS:  Stable. She is afebrile. Pulse 82, /71, respiration of 14, saturating 98% on 2 L nasal cannula. Weight is 70 kg. HEENT:  Head is atraumatic. Enlarged head features. LUNGS:  Clear to auscultation. HEART:  Regular rate and rhythm. EXTREMITIES:  No edema. CNS:  She is sedated. LABORATORY DATA:  As reviewed in HPI. ASSESSMENT:  1. Chronic kidney disease 5.  2.  Hyperkalemia. 3.  Metabolic acidosis. 4.  Hypertension. RECOMMENDATIONS:  1. Continue gentle bicarb drip at 50 mL/hour. 2.  Status post medical treatment of hyperkalemia and potassium is improved. 3.  She is going to proceed with left AV graft placement. 4.  If she stays overnight, repeat labs in the morning. 5.  Treat high potassium medically if there is a recurrence. 6.  I will also spoke with Dr. Clay Lua and she preferred to avoid dialysis catheter placement, unless absolutely needed. Thanks for consult. We will follow.       Rachel Messina MD      [id number]  D:  10/01/2021 16:07  T:  10/01/2021 17:47  JOB #:  [id number]

## 2021-10-01 NOTE — ANESTHESIA PROCEDURE NOTES
Peripheral Block    Performed by: Yue Osman DO  Authorized by: Yue Osman DO       Pre-procedure:    Indications: at surgeon's request and primary anesthetic    Preanesthetic Checklist: patient identified, risks and benefits discussed, site marked, timeout performed, anesthesia consent given and patient being monitored      Block Type:   Block Type:  Supraclavicular  Laterality:  Left  Monitoring:  Standard ASA monitoring, responsive to questions, continuous pulse ox, oxygen, frequent vital sign checks and heart rate  Injection Technique:  Single shot  Procedures: ultrasound guided    Patient Position: supine  Prep: chlorhexidine    Location:  Supraclavicular  Needle Type:  Stimuplex  Needle Gauge:  22 G  Needle Localization:  Ultrasound guidance  Medication Injected:  Mepivacaine PF (CARBOCAINE) 1.5 % injection, 30 mL    Assessment:  Number of attempts:  1  Injection Assessment:  Incremental injection every 5 mL, negative aspiration for CSF, no paresthesia, no intravascular symptoms, negative aspiration for blood and local visualized surrounding nerve on ultrasound  Patient tolerance:  Patient tolerated the procedure well with no immediate complications

## 2021-10-01 NOTE — H&P
H&P    Patient: Sushma Santana MRN: [id number]  SSN: [id number]    YOB: 1970  Age: 45 y.o. Sex: female      Subjective:      Lucille Gr is a 45 y.o. female who is being seen for creation LUE AV Graft. She is a very pleasant lady with high functioning Down syndrome. She is accompanied today by her family member. Unfortunately yesterday her grandmother gave her a banana to make sure her potassium would \"stay up\" and her potassium this morning is 6.1.     Past Medical History:   Diagnosis Date    Allergic rhinitis, cause unspecified     Asthma     use inhalers    Diabetes ([address]     Down's syndrome     GERD (gastroesophageal reflux disease)     H/O impacted cerumen     Dr. Laura Marshall Headache     Hypothyroid     Mononucleosis     Pneumonia     Seizures ([address]     as a result of low blood glucose readings    Thyroid disease      Past Surgical History:   Procedure Laterality Date    HX CATARACT REMOVAL Bilateral 3/5/15    cataract left and right    HX CYST REMOVAL      right shoulder    HX HEENT Bilateral 2017    prior in one eye    HX HYSTERECTOMY  1987      Family History   Problem Relation Age of Onset    Thyroid Disease Mother         hypo    Hypertension Father     Abdominal aortic aneurysm Father     Neuropathy Father     No Known Problems Sister     Heart Disease Brother     Heart Attack Brother     Heart Surgery Brother     No Known Problems Sister     No Known Problems Brother     Anesth Problems Neg Hx      Social History     Tobacco Use    Smoking status: Never Smoker    Smokeless tobacco: Never Used   Substance Use Topics    Alcohol use: No      Current Facility-Administered Medications   Medication Dose Route Frequency Provider Last Rate Last Admin    sodium chloride (NS) flush 5-40 mL  5-40 mL IntraVENous Q8H Ailyn Torres MD        sodium chloride (NS) flush 5-40 mL  5-40 mL IntraVENous PRN Ailyn Torres MD        lidocaine (PF) (XYLOCAINE) 10 mg/mL (1 %) injection 0.1 mL  0.1 mL SubCUTAneous PRN Madiha Huertas MD        fentaNYL citrate (PF) injection 50 mcg  50 mcg IntraVENous PRN Madiha Huertas MD        midazolam (VERSED) injection 1 mg  1 mg IntraVENous PRN Madiha Huertas MD        midazolam (VERSED) injection 1 mg  1 mg IntraVENous PRN Madiha Huertas MD        ropivacaine (PF) (NAROPIN) 5 mg/mL (0.5 %) injection 30 mL  30 mL Intra artICUlar PRN Madiha Huertas MD        clindamycin (CLEOCIN) 600mg D5W 50mL IVPB (premix)  600 mg IntraVENous ON CALL TO OR Scaife, Tesfaye Combs MD        sodium bicarbonate (8.4%) 150 mEq in sterile water 1,000 mL infusion   IntraVENous CONTINUOUS Tamara Leger MD        sodium zirconium cyclosilicate (LOKELMA) powder packet 15 g  15 g Oral NOW Tamara Leger MD        calcium gluconate 2 g/100 mL sodium chloride (ISO-OSM)  2 g IntraVENous ONCE Tamara Leger MD            Allergies   Allergen Reactions    Codeine Nausea and Vomiting    Nitrofurantoin Rash    Pcn [Penicillins] Hives and Rash       Review of Systems:  A comprehensive review of systems was negative except for that written in the History of Present Illness. Objective:     Vitals:    10/01/21 0624 10/01/21 0706 10/01/21 0733   BP: (!) 141/82 (!) 181/88 (!) 175/92   Pulse: 79  87   Resp: 18  16   Temp: 98.3 °F (36.8 °C)     SpO2: 100%  97%   Weight: 70 kg (154 lb 5.2 oz)     Height: 5' 1\" (1.549 m)          Physical Exam:  General: Patient is pleasant and cooperative and appears to be in no acute distress. HEENT: Normocephalic atraumatic. Appropriate tracking. No scleral icterus. Nares patent. Trachea is midline. Chest: Unlabored respirations. Symmetrical chest expansion. Cardiac: Regular rate and rhythm. Acyanotic  Abdomen: Abdomen is soft, nontender, nondistended. Extremities: Moves all extremities.     Assessment:     Hospital Problems  Date Reviewed: 1/18/2020    None          Plan:     ESRD requiring imminent start dialysis    Planning LUE AV Graft today. Due to her difficult living situation, transport issues and need for short term initiation of dialysis we will try to get this done for her today. I discussed her case with Dr Christopher Tapia of nephrology and very much appreciate his assistance. --Will plan for admission to observation.  Medical treatment to lower potassium with a recheck at 2pm. If <5.5 will then plan to proceed with the LUE AVG as originally planned    Signed By: Nury Galvan MD     October 1, 2021

## 2021-10-02 NOTE — PERIOP NOTES
1915 Called and informed Dr. Kirkwood Merlin Left radial pulse is only assessed by doppler. Capillary refill less than 3 second and skin color wnl. No new orders at this time. Stated, he will come and assessed her. 1925 Dr. Kirkwood Merlin at the bedside and assessed the patient. No new order at this time. Pippa Urias for patient to be discharge. 2005 I have reviewed discharge instructions with the caregiver. The caregiver verbalized understanding.

## 2021-10-04 NOTE — PROGRESS NOTES
Care Transitions Initial Call    Call within 2 business days of discharge: Yes     Patient: Beverly Gr Patient : 1970 MRN: [id number]    Last Discharge [address]       Complaint Diagnosis Description Type Department Provider    10/1/21  ESRD (end stage renal disease) [facility]) Admission (Discharged) Ranjan Rm MD          Was this an external facility discharge? No     Challenges to be reviewed by the provider   Additional needs identified to be addressed with provider: yes    Patient is s/p AV graft placement to start dialysis. Patient mother declined CTN offer to schedule follow up appts for PCP, Vascular surgeon and nephrologist . She states she will have to do this herself as they depend on other family for transportation. CTN contacted vascular surgeon office to ask them to call patient mother for post op instructions/graft care as mother states she received no instructions from hospital.       Method of communication with provider : chart routing    Discussed COVID-19 related testing which was available at this time. Test results were negative. Patient informed of results, if available? yes     Advance Care Planning:   Does patient have an Advance Directive:  yes; reviewed and needs to be updated    Inpatient Readmission Risk score: No data recorded  Was this a readmission? no       Patients top risk factors for readmission: none identified at time of call   Interventions to address risk factors: Scheduled appointment with PCP-see notes, Scheduled appointment with Specialist-see notes and Assistance in accessing community resources-[facility]    Care Transition Nurse (CTN) contacted the family by telephone to perform post hospital discharge assessment. Verified name and  with family as identifiers. Provided introduction to self, and explanation of the CTN role.      CTN reviewed discharge instructions, medical action plan and red flags with family who verbalized understanding. Were discharge instructions available to patient? yes. Reviewed appropriate site of care based on symptoms and resources available to patient including: PCP and Specialist. Family given an opportunity to ask questions and does not have any further questions or concerns at this time. The family agrees to contact the PCP office for questions related to their healthcare. Medication reconciliation was performed with family, who verbalizes understanding of administration of home medications. Advised obtaining a 90-day supply of all daily and as-needed medications. Referral to Pharm D needed: no     Home Health/Outpatient orders at discharge: none    Durable Medical Equipment ordered at discharge: None    Covid Risk Education    Educated patient about risk for severe COVID-19 due to risk factors according to CDC guidelines. CTN reviewed discharge instructions, medical action plan and red flag symptoms with the family who verbalized understanding. Discussed COVID vaccination status: yes. Education provided on COVID-19 vaccination as appropriate. Discussed exposure protocols and quarantine with CDC Guidelines. Family was given an opportunity to verbalize any questions and concerns and agrees to contact CTN or health care provider for questions related to their healthcare. Discussed follow-up appointments. If no appointment was previously scheduled, appointment scheduling offered: yes. Is follow up appointment scheduled within 7 days of discharge? pending family scheduling appts. [facility] follow up appointment(s): No future appointments. Non-[facility] follow up appointment(s): Dr Katy Borjas pending, Dr Chase Torres pending, Dr Devon Bailey pending--per mother she will schedule appts    Plan for follow-up call in 5-7 days based on severity of symptoms and risk factors. Plan for next call: follow up appointment-all MD follow ups--see note  CTN provided contact information for future needs.     Goals Addressed This Visit's Progress     Attends follow-up appointments as directed. 10/04/21  CTN spoke to patient mother Talia Alberts. She declined CTN offer to assist with appointment scheduling as she states she does not drive and has another family member provide transport. CTN informed her that patient needs to follow up with the following MD:    PCP Dr Minh Zaragoza mother, Dr Gigi Beard comes to [facility] every Wednesday and she will call to make an appt for Wednesday. Dr Monica De La Vega, vascular surgeon--for post op follow up    Dr Ramirez--nephrology---to schedule dialysis for patient     CTN will follow up next week---mkrw       Prevent complications post hospitalization. 10/04/21  CTN called patient mother who states patient is sleeping at time of call. Mother reports that sutures are intact and that incision \"looks good. \"    CTN looked over d/c instructions and can not find any post op wound care orders. Mother states that she does not have any additional directions as to how to care for graft. CTN contacted Vascular Surgeon office, spoke to Alexa to report that there are no care orders and that pain medication script was sent to mail order pharmacy. Alexa states that she will have a nurse call patient mother to review graft care and address pain meds. Per patient mother, patient has been doing well on just tylenol over the weekend and has not c/o pain. She denies patient has fever or any s/sx infection.     CTN will follow up next week---mkrw

## 2021-10-08 PROBLEM — D64.9 SEVERE ANEMIA: Status: ACTIVE | Noted: 2021-01-01

## 2021-10-08 PROBLEM — N18.5 CKD (CHRONIC KIDNEY DISEASE), STAGE V (HCC): Status: ACTIVE | Noted: 2021-01-01

## 2021-10-08 PROBLEM — E87.70 FLUID OVERLOAD: Status: ACTIVE | Noted: 2021-01-01

## 2021-10-08 NOTE — CONSULTS
[facility] Consult Note        NAME: Donna Gr       :  1970       MRN:  [id number]     Date/Time: 10/8/2021    Risk of deterioration: low       Assessment:    Plan:  ESRD  Diabetic nephropathy  Anemia  Uremia with acidosis  (L) avg with staples HD to start tonight thru avg--4k, 2.5 ca, 35 bicarb  Graft placed 10/1  D/W [id number] signed for prbcs/hd  2 hours tonight with gentle fluid removal  3 hours tomorrow with uf for 1 hour  Prbcs on HD tonight  Start nephrocap  Added retacrit/check iron panel-iv venofer if needed  Check bone metabolism labs  Note-pt is a DNR-reviewed with sister, Judd Vines. D/W Nursing/[place]/Surgery/IM   Called to see pt for uremia, severe anemia in the setting of end stage diabetic nephropathy, not yet on HD    Subjective:     Chief Complaint:  You sound like Ardyth Alicia    Review of Systems: sob/kahn  (-)n /v/dysuria,hematuria    Objective:     VITALS:   Last 24hrs VS reviewed since prior progress note.  Most recent are:  Visit Vitals  /66 (BP 1 Location: Left upper arm, BP Patient Position: At rest)   Pulse 95   Temp 98.4 °F (36.9 °C)   Resp (!) 32   Ht 4' 11\" (1.499 m)   Wt 72.6 kg (160 lb)   SpO2 91%   BMI 32.32 kg/m²     SpO2 Readings from Last 6 Encounters:   10/08/21 91%   10/01/21 97%   21 94%   20 100%   20 97%   18 100%        No intake or output data in the 24 hours ending 10/08/21 1723     Telemetry Reviewed    PHYSICAL EXAM:    General   well developed, well nourished, appears stated age, in no acute distress  EENT  Normocephalic, Atraumatic,  EOMI, sclera clear  Respiratory   Clear To Auscultation bilaterally  Cardiology  Regular Rate and Rythmn   Extremities  No clubbing, cyanosis, (L) ue AVG with t/b-sutures intact  Neuro-down syndrome-high functioning         Lab Data Reviewed: (see below)    Medications Reviewed: (see below)    PMH/SH reviewed - no change compared to H&P    ___________________________________________________    Attending Physician: Rosalind Harrington MD     ____________________________________________________  MEDICATIONS:  Current Facility-Administered Medications   Medication Dose Route Frequency    insulin lispro (HUMALOG) injection   SubCUTAneous AC&HS    glucose chewable tablet 16 g  4 Tablet Oral PRN    dextrose (D50W) injection syrg 12.5-25 g  12.5-25 g IntraVENous PRN    glucagon (GLUCAGEN) injection 1 mg  1 mg IntraMUSCular PRN    [START ON 10/9/2021] aspirin delayed-release tablet 81 mg  81 mg Oral DAILY    atorvastatin (LIPITOR) tablet 20 mg  20 mg Oral QHS    [START ON 10/9/2021] pantoprazole (PROTONIX) tablet 40 mg  40 mg Oral ACB    [START ON 10/9/2021] levothyroxine (SYNTHROID) tablet 100 mcg  100 mcg Oral ACB    [START ON 10/9/2021] L.acidophilus-paracasei-S.thermophil-bifidobacter (RISAQUAD) 8 billion cell capsule  1 Capsule Oral DAILY    [START ON 10/9/2021] montelukast (SINGULAIR) tablet 10 mg  10 mg Oral DAILY    ondansetron (ZOFRAN ODT) tablet 4 mg  4 mg Oral Q4H PRN    fluticasone propionate (FLONASE) 50 mcg/actuation nasal spray 2 Spray  2 Spray Both Nostrils BID    diphenhydrAMINE (BENADRYL) capsule 25 mg  25 mg Oral QHS PRN    [START ON 10/9/2021] cholecalciferol (VITAMIN D3) (1000 Units /25 mcg) tablet 4,000 Units  4,000 Units Oral DAILY    [START ON 10/9/2021] cetirizine (ZYRTEC) tablet 10 mg  10 mg Oral DAILY    sodium bicarbonate 8.4 % (1 mEq/mL) injection 100 mEq  100 mEq IntraVENous ONCE    0.9% sodium chloride infusion 250 mL  250 mL IntraVENous PRN    lidocaine (XYLOCAINE) 20 mg/mL (2 %) injection 400 mg  20 mL SubCUTAneous RAD ONCE    heparin (porcine) 1,000 unit/mL injection 10,000 Units  10,000 Units IntraVENous RAD ONCE    sodium bicarbonate (8.4%) 1 mEq/mL (8.4 %) injection        epoetin jerry-epbx (RETACRIT) injection 10,000 Units  10,000 Units SubCUTAneous Q MON, WED & FRI    albumin human 25% (BUMINATE) solution 25 g  25 g IntraVENous PRN    [START ON 10/9/2021] B complex-vitaminC-folic acid (NEPHROCAP) cap  1 Capsule Oral DAILY     Current Outpatient Medications   Medication Sig    patiromer calcium sorbitex (Veltassa) 16.8 gram powder Take  by mouth daily.  diphenhydrAMINE (BenadryL) 25 mg capsule Take 25 mg by mouth nightly as needed.  furosemide (LASIX PO) Take 10 mg by mouth every morning.  cetirizine (ZyrTEC) 10 mg tablet Take 10 mg by mouth daily.  cranberry 500 mg capsule Take 500 mg by mouth daily.  guaiFENesin (ROBITUSSIN) 100 mg/5 mL liquid Take 100 mg by mouth every four (4) hours as needed for Cough.  fluticasone propionate (Flonase Allergy Relief) 50 mcg/actuation nasal spray 2 Sprays by Both Nostrils route two (2) times a day.  guaiFENesin-dextromethorphan (Diabetic Tussin DM)  mg/5 mL liqd Take 10 mL by mouth every six (6) hours as needed for Cough or Congestion.  esomeprazole (NexIUM) 40 mg capsule Take 40 mg by mouth daily.  L.acid,para-B. bifidum-S.therm (RISAQUAD) 8 billion cell cap cap Take 1 Capsule by mouth daily.  insulin degludec Silver Nay FlexTouch U-100) 100 unit/mL (3 mL) inpn 10 Units by SubCUTAneous route nightly.  ergocalciferol (Vitamin D2) 1,250 mcg (50,000 unit) capsule Take 50,000 Units by mouth every Wednesday.  ondansetron hcl (Zofran) 4 mg tablet Take 4 mg by mouth every four (4) hours as needed for Nausea or Vomiting.  montelukast (Singulair) 10 mg tablet Take 10 mg by mouth daily.  levothyroxine (SYNTHROID) 100 mcg tablet Take 1 Tablet by mouth Daily (before breakfast).  biotin 5,000 mcg TbDi Take 5,000 mcg by mouth daily.  cholecalciferol, vitamin D3, (VITAMIN D3) 2,000 unit tab Take 4,000 Units by mouth daily.  insulin lispro (HUMALOG U-100 INSULIN) 100 unit/mL injection 3-4 Units by SubCUTAneous route Before breakfast, lunch, and dinner. Hold insulin if BS <80.  Hold if patient does not eat her meal. If BS >150 give with SS.    atorvastatin (LIPITOR) 10 mg tablet Take 20 mg by mouth nightly.  ibuprofen (MOTRIN) 400 mg tablet Take 400 mg by mouth every six (6) hours as needed for Pain (Fever).  albuterol (PROVENTIL HFA, VENTOLIN HFA, PROAIR HFA) 90 mcg/actuation inhaler Take 1 Puff by inhalation every four (4) hours as needed for Wheezing.  glucose 4 gram chewable tablet Take 12 g by mouth as needed (BS < 60).  glucagon (GLUCAGEN) 1 mg injection 1 mg by IntraMUSCular route once as needed (hypoglycemia if patient is unresponsive).  aspirin delayed-release 81 mg tablet Take 81 mg by mouth daily. LABS:  Recent Labs     10/08/21  1407   WBC 10.0   HGB 6.6*   HCT 21.4*        Recent Labs     10/08/21  1407   *   K 4.9      CO2 12*   *   CREA 11.90*   *   CA 7.8*     Recent Labs     10/08/21  1407   ALT 17   AP 97   TBILI 0.2   TP 6.7   ALB 2.0*   GLOB 4.7*     No results for input(s): INR, PTP, APTT, INREXT in the last 72 hours. No results for input(s): FE, TIBC, PSAT, FERR in the last 72 hours. No results for input(s): PH, PCO2, PO2 in the last 72 hours. No results for input(s): CPK, CKNDX, TROIQ in the last 72 hours.     No lab exists for component: CPKMB  Lab Results   Component Value Date/Time    Glucose (POC) 94 10/01/2021 07:25 PM    Glucose (POC) 277 (H) 10/01/2021 02:35 PM    Glucose (POC) 273 (H) 10/01/2021 01:08 PM    Glucose (POC) 370 (H) 10/01/2021 12:03 PM    Glucose (POC) 376 (H) 10/01/2021 11:04 AM    Glucose (POC) 371 (H) 10/01/2021 10:16 AM    Glucose (POC) 375 (H) 10/01/2021 07:28 AM

## 2021-10-08 NOTE — ED NOTES
Pt had dialysis shunt placed last week for new dialysis. Pt seen by Dr. Aman Ayoub for dialysis. Pt with audible wheezes and SOB in triage.

## 2021-10-08 NOTE — ED NOTES
Bedside shift change report given to Chris Sheldon (oncoming nurse) by Aisha Millard (offgoing nurse). Report included the following information SBAR, Kardex, ED Summary and MAR.

## 2021-10-08 NOTE — PROGRESS NOTES
Patient has a left arm graft placed a week ago. Her brother/POA Colby Nunes stated he was told by Dr. Denice Matute who put in the graft that this is a type of graft that can be used within 48 hours of placement. I spoke with vascular Dr. Luz Marina Tang who then checked with Dr. Denice Matute and confirmed that the graft could be used. It is AcuSeal/quick stick graft. Timoteo Henson, brother/POA gave verbal consent for transfusion.

## 2021-10-08 NOTE — PROCEDURES
PRETX 10/08/21 1911   During Hemodialysis    Temp 98.2 °F (36.8 °C)   Temp source Oral   Pulse (Heart Rate) 98   Resp Rate 23   BP (!) 164/74   MAP (Calculated) 104   Transducer Checks Dry   Saline Given (mL) 200 mL   Heparin Bolus (units) 0 units   Continuous Heparin Infusion (Units/hr) 0 Units/hr   Blood Flow Rate (ml/min) 200 ml/min   Dialysate Flow Rate (ml/hr) 500 ml/hr   Arterial Access Pressure (mmHg) -80   Venous Return Pressure (mmHg) 180   Transmembrane Pressure (mmHg) 60 mmHg   Ultrafiltration Rate (ml/hr) 750 ml/hr   Fluid Removed (mL) 0   $$ Dialysis Charges   $$ Method Hemodialysis   A/OX3, DENIES PAIN, TELE MONITORED HW IN ED 12, +3-4 PITTING EDEMA TO ALL 4 EXTREMITIES, SOB W/ EXERTION, DIMINISHED LUNG SOUNDS BILATERALLY  POST 10/08/21 2110   During Hemodialysis    Pulse (Heart Rate) 93   Resp Rate 17   BP (!) 162/88   MAP (Calculated) 113   Fluid Removed (mL) 1800   NET Fluid Removed (mL) 1000 ml   Post-Dialysis   Rinseback Volume (ml) 200 ml   Duration of Treatment (hours) 2 hours   Patient Response to Treatment Well   Patient Disposition Other (comment)(remained in room)   Condition of Dialyzer Filter Good   Hemodialysis End Time 2110   A/OX3, DENIES PAIN, TELE MONITORED HW IN ED 12, +3-4 PITTING EDEMA TO ALL 4 EXTREMITIES, SOB W/ EXERTION, DIMINISHED LUNG SOUNDS BILATERALLY    Orders   Duration: Start: 1911 End: 2110 Total: 2   Dialyzer: Dialyzer/Set Up Inspection: Macey Song (10/08/21 1911)   K Bath: Dialysate K (mEq/L): 4 (10/08/21 1911)   Ca Bath: Dialysate CA (mEq/L): 2.5 (10/08/21 1911)   Na: Dialysate NA (mEq/L): 35 (10/08/21 1911)   Bicarb: Dialysate HCO3 (mEq/L): 138 (10/08/21 1911)   Target Fluid Removal: Goal/Amount of Fluid to Remove (mL): 1000 mL (10/08/21 1911)     Access   Type & Location: LUE AVG, QUICKSTICK, SWELLING NOTED TO BUE. Comments: ACCESSED W/ 2 17G 1\" NEEDLES. SOME DIFFICULTY W/ ARTERIAL D/T SWELLING. ACCESS IS SHALLOW.                   Labs   Lab Results Component Value Date/Time    Hepatitis B surface Ag <0.10 10/08/2021 02:07 PM    Hepatitis B surface Ab <3.10 10/08/2021 02:07 PM      Obtained/Reviewed  Critical Results Called HGB   Date Value Ref Range Status   10/08/2021 6.6 (L) 11.5 - 16.0 g/dL Final     Comment:     RESULTS REPEATED     Potassium   Date Value Ref Range Status   10/08/2021 4.9 3.5 - 5.1 mmol/L Final     Calcium   Date Value Ref Range Status   10/08/2021 7.8 (L) 8.5 - 10.1 MG/DL Final     BUN   Date Value Ref Range Status   10/08/2021 116 (H) 6 - 20 MG/DL Final     Creatinine   Date Value Ref Range Status   10/08/2021 11.90 (H) 0.55 - 1.02 MG/DL Final        Meds Given              N/A     Adequacy / Fluid    Total Liters Process: 25.2      Comments   Time Out Done:   (Time) 1900   Admitting Diagnosis: CKD, FLUID OVERLOAD, ANEMIA   Consent obtained/signed: Informed Consent Verified: Yes (10/08/21 1911)   Machine / RO # Machine Number: [id number] (10/08/21 1911)   Primary Nurse Rpt Pre: KO FERMIN, RN   Primary Nurse Rpt PostDelfdivine Kelly RN   Pt Education: SISTER: PROCEDURE, ACCESS CARE   LINES: [id number] DIALYZER: [id number]  Tx Summary   Comments:  @1724 TX STARTED VIA LUE AVG, PT VERY ANXIOUS AND TEARFUL R/T HD [facility]. SISTER @ BEDSIDE.         @1922 PRBC transfusion started through HD machine  @1930 no s/s blood transfusion reaction. Fluid removal goal increased to compensate for blood transfusion. @4050 pt BP cuff uncomfortable, pt repositioned in bed     @2030 sitting up in bed, watching tv, sister @ bedside, nad        @2110 TX COMPLETED W/O ISSUES, BLOOD RETURNED, LINES FLUSHED, NEEDLES REMOVED 1 @ TIME MANUAL PRESSURE APPLIED TO EACH SITE UNTIL HEMOSTASIS ACHIEVED, PT ANXIOUS/TEARFUL W/ REMOVAL, BAND AID TO SITES. NO S/S ACTIVE BLEEDING UPON LEAVING PT ROOM. BED IN LOWEST POSITION, CALL BUSTOS IN REACH, SISTER @ BEDSIDE. 33.9

## 2021-10-08 NOTE — H&P
HISTORY AND PHYSICAL  Priya Erickson MD        PCP: Meli Rodrigez MD    Please note that this dictation was completed with [employer], the computer voice recognition software. Quite often unanticipated grammatical, syntax, homophones, and other interpretive errors are inadvertently transcribed by the computer software. Please disregard these errors. Please excuse any errors that have escaped final proofreading. CHIEF COMPLAINTS    Shortness of breath    HISTORY OF PRESENT ILLNESS   This is a 54-year-old female with a significant medical history of CKD 5 due to diabetic nephropathy came to the ED for shortness of breath. Patient was being prepared for dialysis and had left arm AVG a week ago. During my exam, patient sister with at the bedside. Patient sitting upright in the bed, appears tachypneic. She denied chest pain. She has edema to both arms and legs. She denied cough, fever or chills. She is fully vaccinated against Covid. In the ED vital signs: RR 32, SPO2 91% on room air. /66, heart rate 95, temperature 98.4  Lab: Hemoglobin 6.6. CMP: CO2 12, anion gap 14, , creatinine 11.9, troponin high-sensitivity 891. EKG NSR.  VBG, pH 7.1  PMH/PSH:  Past Medical History:   Diagnosis Date    Allergic rhinitis, cause unspecified     Asthma     use inhalers    Diabetes ([address]     Down's syndrome     GERD (gastroesophageal reflux disease)     H/O impacted cerumen     Dr. Merlinda Mouton Headache     Hypothyroid     Mononucleosis     Pneumonia     Seizures [address] [address]     as a result of low blood glucose readings    Thyroid disease      Past Surgical History:   Procedure Laterality Date    HX CATARACT REMOVAL Bilateral 3/5/15    cataract left and right    HX CYST REMOVAL      right shoulder    HX HEENT Bilateral 2017    prior in one eye    HX HYSTERECTOMY  1987       Home meds:   Prior to Admission medications    Medication Sig Start Date End Date Taking?  Authorizing Provider patiromer calcium sorbitex (Veltassa) 16.8 gram powder Take  by mouth daily. Yes Provider, Historical   diphenhydrAMINE (BenadryL) 25 mg capsule Take 25 mg by mouth nightly as needed. Provider, Historical   furosemide (LASIX PO) Take 10 mg by mouth every morning. Provider, Historical   cetirizine (ZyrTEC) 10 mg tablet Take 10 mg by mouth daily. Provider, Historical   cranberry 500 mg capsule Take 500 mg by mouth daily. Provider, Historical   guaiFENesin (ROBITUSSIN) 100 mg/5 mL liquid Take 100 mg by mouth every four (4) hours as needed for Cough. Provider, Historical   fluticasone propionate (Flonase Allergy Relief) 50 mcg/actuation nasal spray 2 Sprays by Both Nostrils route two (2) times a day. Provider, Historical   guaiFENesin-dextromethorphan (Diabetic Tussin DM)  mg/5 mL liqd Take 10 mL by mouth every six (6) hours as needed for Cough or Congestion. Provider, Historical   esomeprazole (NexIUM) 40 mg capsule Take 40 mg by mouth daily. Provider, Historical   L.acid,para-B. bifidum-S.therm (RISAQUAD) 8 billion cell cap cap Take 1 Capsule by mouth daily. Provider, Historical   insulin degludec Tenny Bologna FlexTouch U-100) 100 unit/mL (3 mL) inpn 10 Units by SubCUTAneous route nightly. Provider, Historical   ergocalciferol (Vitamin D2) 1,250 mcg (50,000 unit) capsule Take 50,000 Units by mouth every Wednesday. Provider, Historical   ondansetron hcl (Zofran) 4 mg tablet Take 4 mg by mouth every four (4) hours as needed for Nausea or Vomiting. Provider, Historical   montelukast (Singulair) 10 mg tablet Take 10 mg by mouth daily. Provider, Historical   levothyroxine (SYNTHROID) 100 mcg tablet Take 1 Tablet by mouth Daily (before breakfast). 9/8/21   Daniel Monson MD   biotin 5,000 mcg TbDi Take 5,000 mcg by mouth daily. Provider, Historical   cholecalciferol, vitamin D3, (VITAMIN D3) 2,000 unit tab Take 4,000 Units by mouth daily.     Provider, Historical   insulin lispro (HUMALOG U-100 INSULIN) 100 unit/mL injection 3-4 Units by SubCUTAneous route Before breakfast, lunch, and dinner. Hold insulin if BS <80. Hold if patient does not eat her meal. If BS >150 give with SS. Provider, Historical   atorvastatin (LIPITOR) 10 mg tablet Take 20 mg by mouth nightly. Provider, Historical   ibuprofen (MOTRIN) 400 mg tablet Take 400 mg by mouth every six (6) hours as needed for Pain (Fever). Provider, Historical   albuterol (PROVENTIL HFA, VENTOLIN HFA, PROAIR HFA) 90 mcg/actuation inhaler Take 1 Puff by inhalation every four (4) hours as needed for Wheezing. 12/16/18   Wendi Valdivia MD   glucose 4 gram chewable tablet Take 12 g by mouth as needed (BS < 60). Provider, Historical   glucagon (GLUCAGEN) 1 mg injection 1 mg by IntraMUSCular route once as needed (hypoglycemia if patient is unresponsive). Provider, Historical   aspirin delayed-release 81 mg tablet Take 81 mg by mouth daily. Provider, Historical       Allergies: Allergies   Allergen Reactions    Codeine Nausea and Vomiting    Nitrofurantoin Rash    Pcn [Penicillins] Hives and Rash       FH:  Family History   Problem Relation Age of Onset    Thyroid Disease Mother         hypo    Hypertension Father     Abdominal aortic aneurysm Father     Neuropathy Father     No Known Problems Sister     Heart Disease Brother     Heart Attack Brother     Heart Surgery Brother     No Known Problems Sister     No Known Problems Brother     Anesth Problems Neg Hx        SH:  Social History     Tobacco Use    Smoking status: Never Smoker    Smokeless tobacco: Never Used   Substance Use Topics    Alcohol use: No       ROS: A comprehensive review of systems was negative except for that written in the HPI.       PHYSICAL EXAM:  Visit Vitals  /66 (BP 1 Location: Left upper arm, BP Patient Position: At rest)   Pulse 95   Temp 98.4 °F (36.9 °C)   Resp (!) 32   Ht 4' 11\" (1.499 m)   Wt 72.6 kg (160 lb)   LMP 03/27/1986   SpO2 91%   BMI 32.32 kg/m²       General:           Alert, tachypneic. HEENT:           Atraumatic, anicteric sclerae, pink conjunctivae                          No oral ulcers, mucosa moist, throat clear, dentition fair  Neck:               Supple, symmetrical,  thyroid: non tender  Lungs:              Rales and minimal wheezing. Tachypneic, on room air. Chest wall:      No tenderness  No Accessory muscle use. Heart:              Regular  rhythm,  No  murmur   No edema  Abdomen:        Soft, non-tender. Not distended. Bowel sounds normal  Extremities:    Edema in both upper lower extremity. AVG on left arm  Neurologic:     Alert , CNII-XII intact. Motor and sensory exam grossly intact.   Labs/Imaging:  Recent Results (from the past 24 hour(s))   EKG, 12 LEAD, INITIAL    Collection Time: 10/08/21  1:55 PM   Result Value Ref Range    Ventricular Rate 96 BPM    Atrial Rate 96 BPM    P-R Interval 152 ms    QRS Duration 68 ms    Q-T Interval 346 ms    QTC Calculation (Bezet) 437 ms    Calculated P Axis 50 degrees    Calculated R Axis 84 degrees    Calculated T Axis 27 degrees    Diagnosis       Normal sinus rhythm  Nonspecific ST and T wave abnormality  Abnormal ECG  When compared with ECG of 27-SEP-2021 11:50,  premature ventricular complexes are no longer present  ST no longer elevated in Inferior leads  Nonspecific T wave abnormality now evident in Inferior leads  Confirmed by Lesly Hurt M.D., Chantel Fenton ([id number]) on 10/8/2021 3:15:15 PM     CBC WITH AUTOMATED DIFF    Collection Time: 10/08/21  2:07 PM   Result Value Ref Range    WBC 10.0 3.6 - 11.0 K/uL    RBC 2.10 (L) 3.80 - 5.20 M/uL    HGB 6.6 (L) 11.5 - 16.0 g/dL    HCT 21.4 (L) 35.0 - 47.0 %    .9 (H) 80.0 - 99.0 FL    MCH 31.4 26.0 - 34.0 PG    MCHC 30.8 30.0 - 36.5 g/dL    RDW 16.5 (H) 11.5 - 14.5 %    PLATELET 306 571 - 541 K/uL    MPV 10.8 8.9 - 12.9 FL    NRBC 0.2 (H) 0  WBC    ABSOLUTE NRBC 0.02 (H) 0.00 - 0.01 K/uL NEUTROPHILS 87 (H) 32 - 75 %    LYMPHOCYTES 2 (L) 12 - 49 %    MONOCYTES 7 5 - 13 %    EOSINOPHILS 1 0 - 7 %    BASOPHILS 3 (H) 0 - 1 %    IMMATURE GRANULOCYTES 0 %    ABS. NEUTROPHILS 8.7 (H) 1.8 - 8.0 K/UL    ABS. LYMPHOCYTES 0.2 (L) 0.8 - 3.5 K/UL    ABS. MONOCYTES 0.7 0.0 - 1.0 K/UL    ABS. EOSINOPHILS 0.1 0.0 - 0.4 K/UL    ABS. BASOPHILS 0.3 (H) 0.0 - 0.1 K/UL    ABS. IMM. GRANS. 0.0 K/UL    DF MANUAL      RBC COMMENTS ANISOCYTOSIS  1+       METABOLIC PANEL, COMPREHENSIVE    Collection Time: 10/08/21  2:07 PM   Result Value Ref Range    Sodium 134 (L) 136 - 145 mmol/L    Potassium 4.9 3.5 - 5.1 mmol/L    Chloride 108 97 - 108 mmol/L    CO2 12 (LL) 21 - 32 mmol/L    Anion gap 14 5 - 15 mmol/L    Glucose 213 (H) 65 - 100 mg/dL     (H) 6 - 20 MG/DL    Creatinine 11.90 (H) 0.55 - 1.02 MG/DL    BUN/Creatinine ratio 10 (L) 12 - 20      GFR est AA 4 (L) >60 ml/min/1.73m2    GFR est non-AA 3 (L) >60 ml/min/1.73m2    Calcium 7.8 (L) 8.5 - 10.1 MG/DL    Bilirubin, total 0.2 0.2 - 1.0 MG/DL    ALT (SGPT) 17 12 - 78 U/L    AST (SGOT) 18 15 - 37 U/L    Alk. phosphatase 97 45 - 117 U/L    Protein, total 6.7 6.4 - 8.2 g/dL    Albumin 2.0 (L) 3.5 - 5.0 g/dL    Globulin 4.7 (H) 2.0 - 4.0 g/dL    A-G Ratio 0.4 (L) 1.1 - 2.2     SAMPLES BEING HELD    Collection Time: 10/08/21  2:07 PM   Result Value Ref Range    SAMPLES BEING HELD 1red     COMMENT        Add-on orders for these samples will be processed based on acceptable specimen integrity and analyte stability, which may vary by analyte.    TROPONIN-HIGH SENSITIVITY    Collection Time: 10/08/21  2:07 PM   Result Value Ref Range    Troponin-High Sensitivity 891 (HH) 0 - 51 ng/L   TYPE & SCREEN    Collection Time: 10/08/21  3:03 PM   Result Value Ref Range    Crossmatch Expiration 10/11/2021,2359     ABO/Rh(D) O NEGATIVE     Antibody screen NEG     Unit number [id number]     Blood component type RC LR,1     Unit division 00     Status of unit ALLOCATED     Crossmatch result Compatible    TROPONIN-HIGH SENSITIVITY    Collection Time: 10/08/21  4:01 PM   Result Value Ref Range    Troponin-High Sensitivity 846 (HH) 0 - 51 ng/L   POC VENOUS BLOOD GAS    Collection Time: 10/08/21  4:38 PM   Result Value Ref Range    pH, venous (POC) 7.10 (LL) 7.32 - 7.42      pCO2, venous (POC) 36.5 (L) 41 - 51 MMHG    pO2, venous (POC) 28 25 - 40 mmHg    HCO3, venous (POC) 11.2 (L) 23.0 - 28.0 MMOL/L    sO2, venous (POC) 34.5 (L) 65 - 88 %    Base deficit, venous (POC) 16.9 mmol/L    Specimen type (POC) VENOUS BLOOD      Performed by Power Grimaldo RN    RBC, ALLOCATE    Collection Time: 10/08/21  5:00 PM   Result Value Ref Range    HISTORY CHECKED? Historical check performed        Recent Labs     10/08/21  1407   WBC 10.0   HGB 6.6*   HCT 21.4*        Recent Labs     10/08/21  1407   *   K 4.9      CO2 12*   *   CREA 11.90*   *   CA 7.8*     Recent Labs     10/08/21  1407   ALT 17   AP 97   TBILI 0.2   TP 6.7   ALB 2.0*   GLOB 4.7*       No results for input(s): CPK, CKNDX, TROIQ in the last 72 hours. No lab exists for component: CPKMB    No results for input(s): INR, PTP, APTT, INREXT in the last 72 hours. No results for input(s): PH, PCO2, PO2 in the last 72 hours. XR CHEST PA LAT  Narrative: Indication:  PREOP     Exam: PA and lateral views of the chest.    Direct comparison is made to prior CXR dated 1/2020. Findings: Cardiomediastinal silhouette is mildly enlarged. There is mild linear  scarring or atelectasis in the mid lungs bilaterally. There is no pleural fluid. There is no pneumothorax. Impression: Mild cardiomegaly. Mild linear scarring/atelectasis in the mid lungs  bilaterally. Assessment & Plan: This is a 27-year-old female with CKD 5 due to diabetic nephropathy presented with dyspnea. She was being prepared for dialysis and had left arm AVG a week ago    #1.   Dyspnea due to fluid overload, uremia, metabolic acidosis due to ESRD and severe anemia  · Admit to a monitored bed  · pH is low, administer IV bicarb push  · She will need emergent dialysis, discussed with nephrologist.  · Per vascular surgeon, okay to use left arm AVG. #2.  Severe anemia due to ESRD. · Transfuse a unit of blood during dialysis tonight  · Check iron profile  · She will need Epogen    #3. Elevated troponin due to fluid overload, ESRD and severe anemia without MI  · No chest pain, EKG is unremarkable. Trend troponin. #4.  Brittle diabetes. Patient is on Humalog 3-4 units before each meal and Tresiba 10 units nightly  · POC with AC&HS Accu-Cheks, Humalog sliding scale, high-sensitivity. Will reassess and restart long-acting insulin and preprandial based on BG profile in the next 12-24 hours. Of note, patient has history of hypoglycemia induced seizure. #5.  Asthma with minimal wheezing: Bronchodilators as needed  #6. GERD, continue PPI  #7. History of Down syndrome        Patient's Baseline: Ambulates with walking  DVT ppx: Heparin. Code status: DNR  --Advanced directive papers on file stating her parents, her brother Geovany Padron and sister Tamera Mg are her MPOA in that order. Care plan was discussed with patient's sister at the bedside on her brother Geovany Padron on the phone. Disposition: Back to prior living arrangement. She resides in an assisted living facility with her elderly mother.                 Signed By: Zayda Cool MD     October 8, 2021

## 2021-10-08 NOTE — ED PROVIDER NOTES
Pt w CKD and new dialysis requirement sent in for worsening dyspnea on exertion. Nephrology is concerned she may need emergent dialysis. The history is provided by the patient. Shortness of Breath  This is a new problem. The average episode lasts 2 weeks. The problem occurs continuously. The problem has been gradually worsening. Associated symptoms include wheezing. Pertinent negatives include no fever, no cough and no sputum production. Precipitated by: need for hemodialysis. Risk factors: renal failure, anemia.         Past Medical History:   Diagnosis Date    Allergic rhinitis, cause unspecified     Asthma     use inhalers    Diabetes ([address]     Down's syndrome     GERD (gastroesophageal reflux disease)     H/O impacted cerumen     Dr. Karma Cisneros Headache     Hypothyroid     Mononucleosis     Pneumonia     Seizures ([address]     as a result of low blood glucose readings    Thyroid disease        Past Surgical History:   Procedure Laterality Date    HX CATARACT REMOVAL Bilateral 3/5/15    cataract left and right    HX CYST REMOVAL      right shoulder    HX HEENT Bilateral 2017    prior in one eye    HX HYSTERECTOMY  1987         Family History:   Problem Relation Age of Onset    Thyroid Disease Mother         hypo    Hypertension Father     Abdominal aortic aneurysm Father     Neuropathy Father     No Known Problems Sister     Heart Disease Brother     Heart Attack Brother     Heart Surgery Brother     No Known Problems Sister     No Known Problems Brother     Anesth Problems Neg Hx        Social History     Socioeconomic History    Marital status: SINGLE     Spouse name: Not on file    Number of children: Not on file    Years of education: Not on file    Highest education level: Not on file   Occupational History    Not on file   Tobacco Use    Smoking status: Never Smoker    Smokeless tobacco: Never Used   Vaping Use    Vaping Use: Never used   Substance and Sexual Activity  Alcohol use: No    Drug use: No    Sexual activity: Not Currently   Other Topics Concern    Not on file   Social History Narrative    Not on file     Social Determinants of Health     Financial Resource Strain:     Difficulty of Paying Living Expenses:    Food Insecurity:     Worried About Running Out of Food in the Last Year:     [address] in the Last Year:    Transportation Needs:     Lack of Transportation (Medical):  Lack of Transportation (Non-Medical):    Physical Activity:     Days of Exercise per Week:     Minutes of Exercise per Session:    Stress:     Feeling of Stress :    Social Connections:     Frequency of Communication with Friends and Family:     Frequency of Social Gatherings with Friends and Family:     Attends [employer] Services:     Active Member of Clubs or Organizations:     Attends Club or Organization Meetings:     Marital Status:    Intimate Partner Violence:     Fear of Current or Ex-Partner:     Emotionally Abused:     Physically Abused:     Sexually Abused: ALLERGIES: Codeine, Nitrofurantoin, and Pcn [penicillins]    Review of Systems   Constitutional: Negative for fever. Respiratory: Positive for shortness of breath and wheezing. Negative for cough and sputum production. Vitals:    10/08/21 1342   BP: 121/66   Pulse: 95   Resp: (!) 32   Temp: 98.4 °F (36.9 °C)   SpO2: 91%   Weight: 72.6 kg (160 lb)   Height: 4' 11\" (1.499 m)            Physical Exam  Vitals and nursing note reviewed. Constitutional:       Appearance: She is well-developed. HENT:      Head: Normocephalic and atraumatic. Eyes:      Pupils: Pupils are equal, round, and reactive to light. Cardiovascular:      Rate and Rhythm: Normal rate and regular rhythm. Pulmonary:      Effort: Pulmonary effort is normal.      Breath sounds: Wheezing and rales present. Abdominal:      General: There is no distension. Palpations: Abdomen is soft. Tenderness:  There is no abdominal tenderness. Musculoskeletal:      Cervical back: Normal range of motion and neck supple. Comments: Swelling to the left arm, dialysis fistula in upper arm with thrill and staples in place   Skin:     General: Skin is warm and dry. Capillary Refill: Capillary refill takes less than 2 seconds. Neurological:      General: No focal deficit present. Mental Status: She is alert and oriented to person, place, and time. Psychiatric:         Mood and Affect: Mood normal.         Behavior: Behavior normal.          MDM       Procedures      MEDICAL DECISION MAKIN y.o. female presents with Shortness of Breath    Differential diagnosis includes but not limited to:  Fluid overload, ACS, anemia of renal disease, electrolyte disturbance, metabolic acidosis, sepsis      LABORATORY TESTS:  Labs Reviewed   CBC WITH AUTOMATED DIFF - Abnormal; Notable for the following components:       Result Value    RBC 2.10 (*)     HGB 6.6 (*)     HCT 21.4 (*)     .9 (*)     RDW 16.5 (*)     NRBC 0.2 (*)     ABSOLUTE NRBC 0.02 (*)     NEUTROPHILS 87 (*)     LYMPHOCYTES 2 (*)     BASOPHILS 3 (*)     ABS. NEUTROPHILS 8.7 (*)     ABS. LYMPHOCYTES 0.2 (*)     ABS.  BASOPHILS 0.3 (*)     All other components within normal limits   METABOLIC PANEL, COMPREHENSIVE - Abnormal; Notable for the following components:    Sodium 134 (*)     CO2 12 (*)     Glucose 213 (*)      (*)     Creatinine 11.90 (*)     BUN/Creatinine ratio 10 (*)     GFR est AA 4 (*)     GFR est non-AA 3 (*)     Calcium 7.8 (*)     Albumin 2.0 (*)     Globulin 4.7 (*)     A-G Ratio 0.4 (*)     All other components within normal limits   TROPONIN-HIGH SENSITIVITY - Abnormal; Notable for the following components:    Troponin-High Sensitivity 891 (*)     All other components within normal limits   SAMPLES BEING HELD   TROPONIN-HIGH SENSITIVITY   TYPE & SCREEN   SAMPLE TO BLOOD BANK       IMAGING RESULTS:  No orders to display MEDICATIONS GIVEN:  Medications - No data to display    PROGRESS NOTE:   The patient's ED course has been uncomplicated    EKG:  NSR, 96 bpm, non-specific ST changes, non-specific T wave abnormalities in inferior leads, no ectopy   EKG interpreted by Arpita Justice MD      CONSULTS:  Hospitalist Consult: [address] for Admission  3:53 PM    ED Room Number: ER12/12  Patient Name and age:  Donna Gr 45 y.o.  female  Working Diagnosis:   1. Metabolic acidosis    2. Anemia due to stage 5 chronic kidney disease, not on chronic dialysis (HCC)    3. Elevated troponin        COVID-19 Suspicion:  no  Sepsis present:  no  Reassessment needed: no  Code Status:  Full Code  Readmission: no  Isolation Requirements:  no  Recommended Level of Care:  telemetry  Department:[facility] Adult ED - 21       IMPRESSION:  1. Metabolic acidosis    2. Anemia due to stage 5 chronic kidney disease, not on chronic dialysis (HCC)    3. Elevated troponin        PLAN:  - Admit to hospitalist    Total critical care time spent exclusive of procedures:  35 minutes    Arpita Justice MD          Please note that this dictation was completed with [employer], the [employer] voice recognition software. Quite often unanticipated grammatical, syntax, homophones, and other interpretive errors are inadvertently transcribed by the computer software. Please disregard these errors. Please excuse any errors that have escaped final proofreading.

## 2021-10-09 NOTE — PROGRESS NOTES
[facility] Progress Note        NAME: Sotero Gr       :  1970       MRN:  [id number]     Date/Time: 10/9/2021    Risk of deterioration: low       Assessment:    Plan:  ESRD  Diabetic nephropathy  Anemia  Uremia with acidosis  (L) avg with staples HD #2 thru avg--4k, 2.5 ca, 35 bicarb  Graft placed 10/1--quick stick  1 hour uf today  Prbcs on HD yesterday, start iv iron/ct retacrit  Started nephrocap  Start Phoslo for hyperphosphatemia  Check uric acid/pth/25 vit d  Will fu Monday,-#3 hd then, call if problems arise on   CM --outpatient HD to be set up at [facility] unit       Seen early this am on rounds    Subjective:     Chief Complaint:  Asleep on rounds-didn't awaken    Review of Systems: as above    Objective:     VITALS:   Last 24hrs VS reviewed since prior progress note.  Most recent are:  Visit Vitals  BP (!) 149/74 (BP 1 Location: Right upper arm, BP Patient Position: At rest)   Pulse 85   Temp 98.3 °F (36.8 °C)   Resp 16   Ht 4' 11\" (1.499 m)   Wt 72.6 kg (160 lb)   SpO2 94%   BMI 32.32 kg/m²     SpO2 Readings from Last 6 Encounters:   10/09/21 94%   10/01/21 97%   21 94%   20 100%   20 97%   18 100%    O2 Flow Rate (L/min): 2 l/min       Intake/Output Summary (Last 24 hours) at 10/9/2021 0920  Last data filed at 10/8/2021 2110  Gross per 24 hour   Intake --   Output 1000 ml   Net -1000 ml        Telemetry Reviewed    PHYSICAL EXAM:    General   well developed, well nourished, appears stated age, in no acute distress  EENT  Normocephalic, Atraumatic  Respiratory   Clear anteriorly  Cardiology  Regular Rate and Rythmn   Extremities  (+) edema in (L) hand, ext, (L) ue AVG with t/b-sutures intact  Neuro-down syndrome-high functioning         Lab Data Reviewed: (see below)    Medications Reviewed: (see below)    PMH/SH reviewed - no change compared to H&P    ___________________________________________________    Attending Physician: Marsha Ching MD ____________________________________________________  MEDICATIONS:  Current Facility-Administered Medications   Medication Dose Route Frequency    insulin lispro (HUMALOG) injection   SubCUTAneous AC&HS    glucose chewable tablet 16 g  4 Tablet Oral PRN    dextrose (D50W) injection syrg 12.5-25 g  12.5-25 g IntraVENous PRN    glucagon (GLUCAGEN) injection 1 mg  1 mg IntraMUSCular PRN    aspirin delayed-release tablet 81 mg  81 mg Oral DAILY    atorvastatin (LIPITOR) tablet 20 mg  20 mg Oral QHS    pantoprazole (PROTONIX) tablet 40 mg  40 mg Oral ACB    levothyroxine (SYNTHROID) tablet 100 mcg  100 mcg Oral ACB    L.acidophilus-paracasei-S.thermophil-bifidobacter (RISAQUAD) 8 billion cell capsule  1 Capsule Oral DAILY    montelukast (SINGULAIR) tablet 10 mg  10 mg Oral DAILY    ondansetron (ZOFRAN ODT) tablet 4 mg  4 mg Oral Q4H PRN    fluticasone propionate (FLONASE) 50 mcg/actuation nasal spray 2 Spray  2 Spray Both Nostrils BID    diphenhydrAMINE (BENADRYL) capsule 25 mg  25 mg Oral QHS PRN    cholecalciferol (VITAMIN D3) (1000 Units /25 mcg) tablet 4,000 Units  4,000 Units Oral DAILY    cetirizine (ZYRTEC) tablet 10 mg  10 mg Oral DAILY    0.9% sodium chloride infusion 250 mL  250 mL IntraVENous PRN    epoetin jerry-epbx (RETACRIT) injection 10,000 Units  10,000 Units SubCUTAneous Q MON, WED & FRI    albumin human 25% (BUMINATE) solution 25 g  25 g IntraVENous PRN    B complex-vitaminC-folic acid (NEPHROCAP) cap  1 Capsule Oral DAILY    albuterol-ipratropium (DUO-NEB) 2.5 MG-0.5 MG/3 ML  3 mL Nebulization Q4H PRN        LABS:  Recent Labs     10/09/21  0421 10/08/21  1407   WBC 9.2 10.0   HGB 7.5* 6.6*   HCT 23.4* 21.4*    196     Recent Labs     10/09/21  0421 10/08/21  1407    134*   K 4.2 4.9    108   CO2 19* 12*   BUN 91* 116*   CREA 9.16* 11.90*   * 213*   CA 7.2* 7.8*   PHOS 8.0*  --      Recent Labs     10/09/21  0421 10/08/21  1407   ALT 16 17   AP 96 97 TBILI 0.3 0.2   TP 6.1* 6.7   ALB 1.9* 2.0*   GLOB 4.2* 4.7*     No results for input(s): INR, PTP, APTT, INREXT, INREXT in the last 72 hours. Recent Labs     10/08/21  1715   TIBC 199*   PSAT 15*   FERR 104      No results for input(s): PH, PCO2, PO2 in the last 72 hours. No results for input(s): CPK, CKNDX, TROIQ in the last 72 hours.     No lab exists for component: CPKMB  Lab Results   Component Value Date/Time    Glucose (POC) 239 (H) 10/09/2021 06:43 AM    Glucose (POC) 164 (H) 10/08/2021 09:08 PM    Glucose (POC) 94 10/01/2021 07:25 PM    Glucose (POC) 277 (H) 10/01/2021 02:35 PM    Glucose (POC) 273 (H) 10/01/2021 01:08 PM    Glucose (POC) 370 (H) 10/01/2021 12:03 PM

## 2021-10-09 NOTE — PROGRESS NOTES
TRANSFER - IN REPORT:    Verbal report received from Zenia(name) on Ness Gr  being received from ED(unit) for routine progression of care      Report consisted of patients Situation, Background, Assessment and   Recommendations(SBAR). Information from the following report(s) ED Summary was reviewed with the receiving nurse. Opportunity for questions and clarification was provided. Assessment completed upon patients arrival to unit and care assumed.

## 2021-10-09 NOTE — PROGRESS NOTES
Bedside and Verbal shift change report given to Allyson Mckeon (oncoming nurse) by Mariana Abdalla RN (offgoing nurse). Report included the following information SBAR and Kardex.

## 2021-10-09 NOTE — PROGRESS NOTES
Problem: Diabetes Self-Management  Goal: *Disease process and treatment process  Description: Define diabetes and identify own type of diabetes; list 3 options for treating diabetes. Outcome: Progressing Towards Goal  Goal: *Incorporating nutritional management into lifestyle  Description: Describe effect of type, amount and timing of food on blood glucose; list 3 methods for planning meals. Outcome: Progressing Towards Goal  Goal: *Incorporating physical activity into lifestyle  Description: State effect of exercise on blood glucose levels. Outcome: Progressing Towards Goal  Goal: *Developing strategies to promote health/change behavior  Description: Define the ABC's of diabetes; identify appropriate screenings, schedule and personal plan for screenings. Outcome: Progressing Towards Goal  Goal: *Using medications safely  Description: State effect of diabetes medications on diabetes; name diabetes medication taking, action and side effects. Outcome: Progressing Towards Goal  Goal: *Monitoring blood glucose, interpreting and using results  Description: Identify recommended blood glucose targets  and personal targets. Outcome: Progressing Towards Goal  Goal: *Prevention, detection, treatment of acute complications  Description: List symptoms of hyper- and hypoglycemia; describe how to treat low blood sugar and actions for lowering  high blood glucose level. Outcome: Progressing Towards Goal  Goal: *Prevention, detection and treatment of chronic complications  Description: Define the natural course of diabetes and describe the relationship of blood glucose levels to long term complications of diabetes.   Outcome: Progressing Towards Goal  Goal: *Developing strategies to address psychosocial issues  Description: Describe feelings about living with diabetes; identify support needed and support network  Outcome: Progressing Towards Goal  Goal: *Insulin pump training  Outcome: Progressing Towards Goal  Goal: *Sick day guidelines  Outcome: Progressing Towards Goal  Goal: *Patient Specific Goal (EDIT GOAL, INSERT TEXT)  Outcome: Progressing Towards Goal     Problem: Patient Education: Go to Patient Education Activity  Goal: Patient/Family Education  Outcome: Progressing Towards Goal set-up required/verbal cues/1 person assist

## 2021-10-09 NOTE — PROGRESS NOTES
Care Management Interventions  PCP Verified by CM: Yes (Dr Janneth Reid  goes to the [facility])  Palliative Care Criteria Met (RRAT>21 & CHF Dx)?: No  Mode of Transport at Discharge:  (most likely car with sister or brother transporting  patient to the [address] [place] [address])  Transition of Care Consult (CM Consult): Other (Pt to start hemodialysis at [facility] outpatient post hospital stay)  Discharge Durable Medical Equipment: No  Physical Therapy Consult: No  Occupational Therapy Consult: No  Speech Therapy Consult: No  Support Systems: Spouse/Significant Other, Assisted Living (see notes lives with mom in assisted living )   This  met with patient, her [facility] and brother Tello Singh. Patient resides at the [facility] with her [facility]. In the assisted living section of facility. Per mom and brother patient has severe diabetes, and renal failure along with a list of other medical problems including down's syndrome. Patient had an AV graft to upper left forearm on 10/1/2021 and she currently has hand elevated on a pillow while she sits in a chair. She will have her staples removed early next week. Patient's sister has Medical poa and her brother has legal poa. She has a niece who will fly in to see her on 10/11/2021. Patient is alert and able to tell me that she has a  at [employer] who has set up dialysis at [facility] [address] and their phone is [phone]. Per brother of patient the days and times of dialysis have not been determined as of this time. Family will speak with Nephrologist on Monday. Patient is due to have a hemodialysis session tonite and on 10/11/21. Care management will follow for transitions of care needs during this hospitalization.

## 2021-10-09 NOTE — PROGRESS NOTES
Renal-paged by Ceci Vickers, pt's sister. According to Мария White told her pt was not getting hd today. As per my note, HD #2 today, HD #3 on Monday. donna is aware. Lidocaine cream has been ordered for pt as local anesthetic pre stick.      Westley Matthews MD

## 2021-10-09 NOTE — ROUTINE PROCESS
Bedside shift change report given to christy mcnulty rn (oncoming nurse) by Patito Barth (offgoing nurse). Report included the following information SBAR and Kardex.

## 2021-10-09 NOTE — PROGRESS NOTES
Medicare pt has received, reviewed, and signed 1std IM letter informing them of their right to appeal the discharge. Signed copy has been placed on pt bedside chart.

## 2021-10-09 NOTE — PROGRESS NOTES
TRANSITIONS OF CARE:  CRM did follow up with son Riky Contreras and per his sister Yu Julian [phone] patient will receive dialysis with Yuliana Burnette at [facility]. THe Clem  Kamila has been working with BETHANY and per Riky Contreras has set up transportation for his sister to go to dialysis at the sdheduled times.  will need to follow up on Monday Oct 11,2021 with the Clem.

## 2021-10-09 NOTE — PROCEDURES
Hemodialysis / [phone]    Vitals Pre Post Assessment Pre Post   BP BP: (!) 155/73 (10/09/21 1954) 145/72 LOC A/Ox3 A/Ox3   HR Pulse (Heart Rate): 86 (10/09/21 1956) 85 Lungs Clear Clear   Resp Resp Rate: 16 (10/09/21 1954) 16 Cardiac Good RR Good RR   Temp Temp: 98.2 °F (36.8 °C) (10/09/21 1954) 98.1 Skin Warm/Dry Warm/Dry   Weight Pre-Dialysis Weight: 72.6 kg (160 lb 0.9 oz) (10/08/21 1911)  Edema Generalized 2+ Generalized 2+   Tele status NSR NSR Pain Pain Intensity 1: 0 (10/09/21 1300) 0     Orders   Duration: Start: 1954 End: 4060 Total: 3hr   Dialyzer: Dialyzer/Set Up Inspection: Revaclear (10/09/21 1954)   K Bath: Dialysate K (mEq/L): 4 (10/09/21 1954)   Ca Bath: Dialysate CA (mEq/L): 2.5 (10/09/21 1954)   Na: Dialysate NA (mEq/L): 140 (10/09/21 1954)   Bicarb: Dialysate HCO3 (mEq/L): 35 (10/09/21 1954)   Target Fluid Removal: Goal/Amount of Fluid to Remove (mL): 1500 mL (10/09/21 1954)     Access   Type & Location: YENIFER AVG: skin with lidocaine cream in place. Arm cleansed per p&p. No s/s of infection. + B/T. No issues with cannulation or hemostasis.  Running well at    Comments:                                        Labs   HBsAg (Antigen) / date: Negative 10/8/2021                                              HBsAb (Antibody) / date: Susceptible 10/8/2021   Source: Epic   Obtained/Reviewed  Critical Results Called HGB   Date Value Ref Range Status   10/09/2021 7.5 (L) 11.5 - 16.0 g/dL Final     Potassium   Date Value Ref Range Status   10/09/2021 4.2 3.5 - 5.1 mmol/L Final     Calcium   Date Value Ref Range Status   10/09/2021 7.2 (L) 8.5 - 10.1 MG/DL Final     BUN   Date Value Ref Range Status   10/09/2021 91 (H) 6 - 20 MG/DL Final     Creatinine   Date Value Ref Range Status   10/09/2021 9.16 (H) 0.55 - 1.02 MG/DL Final        Meds Given   Name Dose Route   None ordered                 Adequacy / Fluid    Total Liters Process: 23.1L   Net Fluid Removed: 1500ml      Comments   Time Out Done: (Time) 1950   Admitting Diagnosis: Metabolic Acidosis   Consent obtained/signed: Informed Consent Verified: Yes (10/09/21 1954)   Machine / Wanda Camejo # Machine Number: [id number] (10/09/21 1954)   Primary Nurse Rpt Pre: Gudelia Marte RN   Primary Nurse Rpt Post: Gudelia Marte RN   Pt Education: Procedural   Care Plan: Ongoing   Pts outpatient clinic: N/A     Tx Summary   Arrived to pt room, pt A/Ox3. Consent signed & on file. SBAR received from Primary RN. 1954: Pt cannulated with 11M needles per policy & without issue. Labs drawn per request/ order. VSS. Dialysis Tx initiated. 2254: Tx ended. VSS. All possible blood returned to patient. Hemostasis achieved without issue. Bed locked and in the lowest position, call bell and belongings in reach. SBAR given to Primary, RN. Patient is stable at time of their/ my departure. All Dialysis related medications have been reviewed.

## 2021-10-09 NOTE — PROGRESS NOTES
[facility] Adult  Hospitalist Group                                                                                          Hospitalist Progress Note  Nikki Flores MD  Answering service: [phone] from in house phone        Date of Service:  10/9/2021  NAME:  Jonnathan Gr  :  1970  MRN:  [id number]      Admission Summary: This is a 59-year-old female with a significant medical history of CKD 5 due to diabetic nephropathy came to the ED for shortness of breath. Patient was being prepared for dialysis and had left arm AVG a week ago. During my exam, patient sister with at the bedside. Patient sitting upright in the bed, appears tachypneic. She denied chest pain. She has edema to both arms and legs. She denied cough, fever or chills. She is fully vaccinated against Covid. In the ED vital signs: RR 32, SPO2 91% on room air. /66, heart rate 95, temperature 98.4  Lab: Hemoglobin 6.6. CMP: CO2 12, anion gap 14, , creatinine 11.9, troponin high-sensitivity 891.   EKG NSR.  VBG, pH 7.1    Interval history / Subjective:     She said she has coughing, shortness of breathing slightly better, no left side chest pain     Assessment & Plan:     Fluid over load secondary to ESRD    -probnp 10,176  -started HD on 10/8, plan for HD 10/9  -Nephrology on board    Severe anemia of chronic disease, iron deficiency   -Hgb was 6.6, s/p one unit of pRBC on 10/8, improved to 7.5  -on iv iron sucrose  -on epoetin  -no evidence of active bleeding, monitor H/H and to transfuse for Hgb <7.0    Elevated troponin due to fluid overload, ESRD and severe anemia without MI  -no left side chest pain   -EKG normal sinus, vent rate 90 bpm, non specific st t wave  -continue on aspirin and lipitor    T2DM poorly controlled with possible brittle diabetes   -had episode of hypoglycemia in the past  -A1c 6.6  -continue sliding scale and monitor finger stick glucose    Asthma with minimal wheezing  -has on and off cough  -on flonase, montelukast , prn duo neb, oxygen support  -SpO2 93-94% RA  -add muccinex    GERD  -continue PPI    Hypothyroidism  -continue synthroid    Hx of Down syndrome  -continue supportive care       Code status: DNR  DVT prophylaxis: SCD    Care Plan discussed with: Patient/Family, Nurse and   Anticipated Disposition: Home w/Family  Anticipated Discharge: Greater than 48 hours     Hospital Problems  Date Reviewed: 1/18/2020        Codes Class Noted POA    CKD (chronic kidney disease), stage V ([facility] [address] ICD-10-CM: N18.5  ICD-9-CM: 585.5  10/8/2021 Unknown        Fluid overload ICD-10-CM: E87.70  ICD-9-CM: 276.69  10/8/2021 Unknown        Severe anemia ICD-10-CM: D64.9  ICD-9-CM: 285.9  10/8/2021 Unknown        Metabolic acidosis [facility]: E87.2  ICD-9-CM: 276.2  1/19/2020 Unknown                   Vital Signs:    Last 24hrs VS reviewed since prior progress note. Most recent are:  Visit Vitals  BP (!) 149/74 (BP 1 Location: Right upper arm, BP Patient Position: At rest)   Pulse 85   Temp 98.3 °F (36.8 °C)   Resp 16   Ht 4' 11\" (1.499 m)   Wt 72.6 kg (160 lb)   SpO2 94%   BMI 32.32 kg/m²         Intake/Output Summary (Last 24 hours) at 10/9/2021 0188  Last data filed at 10/8/2021 2110  Gross per 24 hour   Intake --   Output 1000 ml   Net -1000 ml        Physical Examination:     I had a face to face encounter with this patient and independently examined them on 10/9/2021 as outlined below:          Constitutional:  No acute distress, cooperative, pleasant    ENT:  Oral mucosa moist, oropharynx benign. Resp:  Decrease bronchial breath sound bilaterally. No wheezing/rhonchi/rales. No accessory muscle use   CV:  Regular rhythm, normal rate, no murmurs, gallops, rubs    GI:  Soft, non distended, non tender. normoactive bowel sounds, no hepatosplenomegaly     Musculoskeletal:  Edema both hands, pretibial and pedal    Neurologic:  Moves all extremities.   AAOx3, CN II-XII reviewed            Data Review:    Review and/or order of clinical lab test  Review and/or order of tests in the radiology section of CPT  Review and/or order of tests in the medicine section of CPT      Labs:     Recent Labs     10/09/21  0421 10/08/21  1407   WBC 9.2 10.0   HGB 7.5* 6.6*   HCT 23.4* 21.4*    196     Recent Labs     10/09/21  0421 10/08/21  1407    134*   K 4.2 4.9    108   CO2 19* 12*   BUN 91* 116*   CREA 9.16* 11.90*   * 213*   CA 7.2* 7.8*   PHOS 8.0*  --      Recent Labs     10/09/21  0421 10/08/21  1407   ALT 16 17   AP 96 97   TBILI 0.3 0.2   TP 6.1* 6.7   ALB 1.9* 2.0*   GLOB 4.2* 4.7*     No results for input(s): INR, PTP, APTT, INREXT in the last 72 hours. Recent Labs     10/08/21  1715   TIBC 199*   PSAT 15*   FERR 104      Lab Results   Component Value Date/Time    Folate 28.7 (H) 12/15/2018 05:09 AM      No results for input(s): PH, PCO2, PO2 in the last 72 hours. No results for input(s): CPK, CKNDX, TROIQ in the last 72 hours.     No lab exists for component: CPKMB  Lab Results   Component Value Date/Time    Cholesterol, total 107 12/16/2018 04:08 AM    HDL Cholesterol 42 12/16/2018 04:08 AM    LDL, calculated 36.4 12/16/2018 04:08 AM    Triglyceride 143 12/16/2018 04:08 AM    CHOL/HDL Ratio 2.5 12/16/2018 04:08 AM     Lab Results   Component Value Date/Time    Glucose (POC) 239 (H) 10/09/2021 06:43 AM    Glucose (POC) 164 (H) 10/08/2021 09:08 PM    Glucose (POC) 94 10/01/2021 07:25 PM    Glucose (POC) 277 (H) 10/01/2021 02:35 PM    Glucose (POC) 273 (H) 10/01/2021 01:08 PM    Glucose (POC) 370 (H) 10/01/2021 12:03 PM     Lab Results   Component Value Date/Time    Color YELLOW/STRAW 01/15/2020 11:59 AM    Appearance CLEAR 01/15/2020 11:59 AM    Specific gravity 1.010 01/15/2020 11:59 AM    Specific gravity 1.015 04/29/2013 01:50 PM    pH (UA) 6.5 01/15/2020 11:59 AM    Protein 300 (A) 01/15/2020 11:59 AM    Glucose 250 (A) 01/15/2020 11:59 AM    Ketone NEGATIVE  01/15/2020 11:59 AM    Bilirubin NEGATIVE  01/15/2020 11:59 AM    Urobilinogen 0.2 01/15/2020 11:59 AM    Nitrites NEGATIVE  01/15/2020 11:59 AM    Leukocyte Esterase NEGATIVE  01/15/2020 11:59 AM    Glucose/Ketones  02/08/2009 07:00 AM     GLUCOSE AND KETONES SIGNIFICANTLY ELEVATED.  RESULTS PHONED TO AND READ BACK BY    Epithelial cells FEW 01/15/2020 11:59 AM    Bacteria NEGATIVE  01/15/2020 11:59 AM    WBC 0-4 01/15/2020 11:59 AM    RBC 0-5 01/15/2020 11:59 AM         Medications Reviewed:     Current Facility-Administered Medications   Medication Dose Route Frequency    insulin lispro (HUMALOG) injection   SubCUTAneous AC&HS    glucose chewable tablet 16 g  4 Tablet Oral PRN    dextrose (D50W) injection syrg 12.5-25 g  12.5-25 g IntraVENous PRN    glucagon (GLUCAGEN) injection 1 mg  1 mg IntraMUSCular PRN    aspirin delayed-release tablet 81 mg  81 mg Oral DAILY    atorvastatin (LIPITOR) tablet 20 mg  20 mg Oral QHS    pantoprazole (PROTONIX) tablet 40 mg  40 mg Oral ACB    levothyroxine (SYNTHROID) tablet 100 mcg  100 mcg Oral ACB    L.acidophilus-paracasei-S.thermophil-bifidobacter (RISAQUAD) 8 billion cell capsule  1 Capsule Oral DAILY    montelukast (SINGULAIR) tablet 10 mg  10 mg Oral DAILY    ondansetron (ZOFRAN ODT) tablet 4 mg  4 mg Oral Q4H PRN    fluticasone propionate (FLONASE) 50 mcg/actuation nasal spray 2 Spray  2 Spray Both Nostrils BID    diphenhydrAMINE (BENADRYL) capsule 25 mg  25 mg Oral QHS PRN    cholecalciferol (VITAMIN D3) (1000 Units /25 mcg) tablet 4,000 Units  4,000 Units Oral DAILY    cetirizine (ZYRTEC) tablet 10 mg  10 mg Oral DAILY    0.9% sodium chloride infusion 250 mL  250 mL IntraVENous PRN    epoetin jerry-epbx (RETACRIT) injection 10,000 Units  10,000 Units SubCUTAneous Q MON, WED & FRI    albumin human 25% (BUMINATE) solution 25 g  25 g IntraVENous PRN    B complex-vitaminC-folic acid (NEPHROCAP) cap  1 Capsule Oral DAILY    albuterol-ipratropium (DUO-NEB) 2.5 MG-0.5 MG/3 ML  3 mL Nebulization Q4H PRN     ______________________________________________________________________  EXPECTED LENGTH OF STAY: - - -  ACTUAL LENGTH OF STAY:          1                 Alan Hayden MD

## 2021-10-10 NOTE — PROGRESS NOTES
Patient up to chair, family at bedside. Problem: Diabetes Self-Management  Goal: *Disease process and treatment process  Description: Define diabetes and identify own type of diabetes; list 3 options for treating diabetes. Outcome: Progressing Towards Goal  Goal: *Incorporating nutritional management into lifestyle  Description: Describe effect of type, amount and timing of food on blood glucose; list 3 methods for planning meals. Outcome: Progressing Towards Goal  Goal: *Incorporating physical activity into lifestyle  Description: State effect of exercise on blood glucose levels. Outcome: Progressing Towards Goal  Goal: *Developing strategies to promote health/change behavior  Description: Define the ABC's of diabetes; identify appropriate screenings, schedule and personal plan for screenings. Outcome: Progressing Towards Goal  Goal: *Using medications safely  Description: State effect of diabetes medications on diabetes; name diabetes medication taking, action and side effects. Outcome: Progressing Towards Goal  Goal: *Monitoring blood glucose, interpreting and using results  Description: Identify recommended blood glucose targets  and personal targets. Outcome: Progressing Towards Goal  Goal: *Prevention, detection, treatment of acute complications  Description: List symptoms of hyper- and hypoglycemia; describe how to treat low blood sugar and actions for lowering  high blood glucose level. Outcome: Progressing Towards Goal  Goal: *Prevention, detection and treatment of chronic complications  Description: Define the natural course of diabetes and describe the relationship of blood glucose levels to long term complications of diabetes.   Outcome: Progressing Towards Goal  Goal: *Developing strategies to address psychosocial issues  Description: Describe feelings about living with diabetes; identify support needed and support network  Outcome: Progressing Towards Goal  Goal: *Insulin pump training  Outcome: Progressing Towards Goal  Goal: *Sick day guidelines  Outcome: Progressing Towards Goal  Goal: *Patient Specific Goal (EDIT GOAL, INSERT TEXT)  Outcome: Progressing Towards Goal     Problem: Patient Education: Go to Patient Education Activity  Goal: Patient/Family Education  Outcome: Progressing Towards Goal     Problem: Falls - Risk of  Goal: *Absence of Falls  Description: Document Diogo Reilly Fall Risk and appropriate interventions in the flowsheet. Outcome: Progressing Towards Goal  Note: Fall Risk Interventions:  Mobility Interventions: Communicate number of staff needed for ambulation/transfer, Bed/chair exit alarm    Mentation Interventions: Adequate sleep, hydration, pain control, Bed/chair exit alarm, Door open when patient unattended    Medication Interventions: Evaluate medications/consider consulting pharmacy, Patient to call before getting OOB, Teach patient to arise slowly    Elimination Interventions: Toileting schedule/hourly rounds, Patient to call for help with toileting needs, Call light in reach, Bed/chair exit alarm              Problem: Patient Education: Go to Patient Education Activity  Goal: Patient/Family Education  Outcome: Progressing Towards Goal     Problem: Pressure Injury - Risk of  Goal: *Prevention of pressure injury  Description: Document Raul Scale and appropriate interventions in the flowsheet.   Outcome: Progressing Towards Goal  Note: Pressure Injury Interventions:  Sensory Interventions: Float heels, Keep linens dry and wrinkle-free, Maintain/enhance activity level, Minimize linen layers    Moisture Interventions: Apply protective barrier, creams and emollients, Absorbent underpads    Activity Interventions: Increase time out of bed, Pressure redistribution bed/mattress(bed type)    Mobility Interventions: HOB 30 degrees or less, Pressure redistribution bed/mattress (bed type), Float heels    Nutrition Interventions: Offer support with meals,snacks and hydration    Friction and Shear Interventions: HOB 30 degrees or less                Problem: Patient Education: Go to Patient Education Activity  Goal: Patient/Family Education  Outcome: Progressing Towards Goal

## 2021-10-10 NOTE — CONSULTS
Cardiology Consult Note    CC: SOB  Reason for consult:  Elevated troponin  Requesting MD:  Dr. Chely Du     Subjective:      Date of  Admission: 10/8/2021  3:42 PM     Admission type:Emergency    Guerita Gr is a 45 y.o. female admitted for CKD (chronic kidney disease), stage V ([facility] [address] [N18.5]  Fluid overload [J02.33]  Metabolic acidosis [I37.0]  Severe anemia [D64.9]. Patient complains of SOB/wheezes and was admitted with volume overload due to ESRD. She now received two HDs with much improved breathing. Her troponins have been elevated around 900s range. EkG shows diffuse flattened T waves. Her CRFs are positive for family history, HTN, DM, & HLD. She has Down syndrome but apparently no cardiac involvement. No prior history of MI, PAF, or CHF.     Patient Active Problem List    Diagnosis Date Noted    CKD (chronic kidney disease), stage V ([facility] [address] 10/08/2021    Fluid overload 10/08/2021    Severe anemia 10/08/2021    ESRD (end stage renal disease) ([facility] [address] 52/67/8798    Metabolic acidosis 50/05/2840    Asthma exacerbation 01/19/2020    ROBBY (acute kidney injury) ([facility] [address] 01/15/2020    Advance directive on file 12/28/2015    Acne 08/20/2014    Asthma 04/29/2013    HTN (hypertension) 04/29/2013    DM (diabetes mellitus), type 1, uncontrolled ([facility] [address] 11/14/2012    Hypoglycemia 06/27/2012    Down syndrome 07/23/2010    Acquired hypothyroidism 07/23/2010    Anemia, unspecified 07/23/2010      Jesenia Morrissey MD  Past Medical History:   Diagnosis Date    Allergic rhinitis, cause unspecified     Asthma     use inhalers    Diabetes ([facility] [address]     Down's syndrome     GERD (gastroesophageal reflux disease)     H/O impacted cerumen     Dr. Kermit Grossman Headache     Hypothyroid     Mononucleosis     Pneumonia     Seizures ([facility] [address]     as a result of low blood glucose readings    Thyroid disease       Past Surgical History:   Procedure Laterality Date    HX CATARACT REMOVAL Bilateral 3/5/15    cataract left and right    HX CYST REMOVAL      right shoulder    HX HEENT Bilateral 2017    prior in one eye    HX HYSTERECTOMY  1987     Allergies   Allergen Reactions    Codeine Nausea and Vomiting    Nitrofurantoin Rash    Pcn [Penicillins] Hives and Rash      Family History   Problem Relation Age of Onset    Thyroid Disease Mother         hypo    Hypertension Father     Abdominal aortic aneurysm Father     Neuropathy Father     No Known Problems Sister     Heart Disease Brother     Heart Attack Brother     Heart Surgery Brother     No Known Problems Sister     No Known Problems Brother     Anesth Problems Neg Hx       Current Facility-Administered Medications   Medication Dose Route Frequency    [START ON 10/11/2021] iron sucrose (VENOFER) injection 100 mg  100 mg IntraVENous DIALYSIS MON, WED & FRI    lidocaine (XYLOCAINE) 4 % cream   Topical DIALYSIS PRN    guaiFENesin ER (MUCINEX) tablet 600 mg  600 mg Oral Q12H    insulin lispro (HUMALOG) injection   SubCUTAneous AC&HS    glucose chewable tablet 16 g  4 Tablet Oral PRN    dextrose (D50W) injection syrg 12.5-25 g  12.5-25 g IntraVENous PRN    glucagon (GLUCAGEN) injection 1 mg  1 mg IntraMUSCular PRN    aspirin delayed-release tablet 81 mg  81 mg Oral DAILY    atorvastatin (LIPITOR) tablet 20 mg  20 mg Oral QHS    pantoprazole (PROTONIX) tablet 40 mg  40 mg Oral ACB    levothyroxine (SYNTHROID) tablet 100 mcg  100 mcg Oral ACB    L.acidophilus-paracasei-S.thermophil-bifidobacter (RISAQUAD) 8 billion cell capsule  1 Capsule Oral DAILY    montelukast (SINGULAIR) tablet 10 mg  10 mg Oral DAILY    ondansetron (ZOFRAN ODT) tablet 4 mg  4 mg Oral Q4H PRN    fluticasone propionate (FLONASE) 50 mcg/actuation nasal spray 2 Spray  2 Spray Both Nostrils BID    diphenhydrAMINE (BENADRYL) capsule 25 mg  25 mg Oral QHS PRN    cholecalciferol (VITAMIN D3) (1000 Units /25 mcg) tablet 4,000 Units  4,000 Units Oral DAILY    cetirizine (ZYRTEC) tablet 10 mg 10 mg Oral DAILY    0.9% sodium chloride infusion 250 mL  250 mL IntraVENous PRN    epoetin jerry-epbx (RETACRIT) injection 10,000 Units  10,000 Units SubCUTAneous Q MON, WED & FRI    albumin human 25% (BUMINATE) solution 25 g  25 g IntraVENous PRN    B complex-vitaminC-folic acid (NEPHROCAP) cap  1 Capsule Oral DAILY    albuterol-ipratropium (DUO-NEB) 2.5 MG-0.5 MG/3 ML  3 mL Nebulization Q4H PRN        Prior to Admission Medications:  Prior to Admission medications    Medication Sig Start Date End Date Taking? Authorizing Provider   patiromer calcium sorbitex (Veltassa) 16.8 gram powder Take  by mouth daily. Yes Provider, Historical   diphenhydrAMINE (BenadryL) 25 mg capsule Take 25 mg by mouth nightly as needed. Provider, Historical   furosemide (LASIX PO) Take 10 mg by mouth every morning. Provider, Historical   cetirizine (ZyrTEC) 10 mg tablet Take 10 mg by mouth daily. Provider, Historical   cranberry 500 mg capsule Take 500 mg by mouth daily. Provider, Historical   guaiFENesin (ROBITUSSIN) 100 mg/5 mL liquid Take 100 mg by mouth every four (4) hours as needed for Cough. Provider, Historical   fluticasone propionate (Flonase Allergy Relief) 50 mcg/actuation nasal spray 2 Sprays by Both Nostrils route two (2) times a day. Provider, Historical   guaiFENesin-dextromethorphan (Diabetic Tussin DM)  mg/5 mL liqd Take 10 mL by mouth every six (6) hours as needed for Cough or Congestion. Provider, Historical   esomeprazole (NexIUM) 40 mg capsule Take 40 mg by mouth daily. Provider, Historical   L.acid,para-B. bifidum-S.therm (RISAQUAD) 8 billion cell cap cap Take 1 Capsule by mouth daily. Provider, Historical   insulin degludec Liz Hilts FlexTouch U-100) 100 unit/mL (3 mL) inpn 10 Units by SubCUTAneous route nightly. Provider, Historical   ergocalciferol (Vitamin D2) 1,250 mcg (50,000 unit) capsule Take 50,000 Units by mouth every Wednesday.     Provider, Historical   ondansetron hcl (Zofran) 4 mg tablet Take 4 mg by mouth every four (4) hours as needed for Nausea or Vomiting. Provider, Historical   montelukast (Singulair) 10 mg tablet Take 10 mg by mouth daily. Provider, Historical   levothyroxine (SYNTHROID) 100 mcg tablet Take 1 Tablet by mouth Daily (before breakfast). 9/8/21   Patricia Garcia MD   biotin 5,000 mcg TbDi Take 5,000 mcg by mouth daily. Provider, Historical   cholecalciferol, vitamin D3, (VITAMIN D3) 2,000 unit tab Take 4,000 Units by mouth daily. Provider, Historical   insulin lispro (HUMALOG U-100 INSULIN) 100 unit/mL injection 3-4 Units by SubCUTAneous route Before breakfast, lunch, and dinner. Hold insulin if BS <80. Hold if patient does not eat her meal. If BS >150 give with SS. Provider, Historical   atorvastatin (LIPITOR) 10 mg tablet Take 20 mg by mouth nightly. Provider, Historical   ibuprofen (MOTRIN) 400 mg tablet Take 400 mg by mouth every six (6) hours as needed for Pain (Fever). Provider, Historical   albuterol (PROVENTIL HFA, VENTOLIN HFA, PROAIR HFA) 90 mcg/actuation inhaler Take 1 Puff by inhalation every four (4) hours as needed for Wheezing. 12/16/18   Guerita Leonard MD   glucose 4 gram chewable tablet Take 12 g by mouth as needed (BS < 60). Provider, Historical   glucagon (GLUCAGEN) 1 mg injection 1 mg by IntraMUSCular route once as needed (hypoglycemia if patient is unresponsive). Provider, Historical   aspirin delayed-release 81 mg tablet Take 81 mg by mouth daily. Provider, Historical        Review of Symptoms:  Except as noted in HPI, patient denies recent fever or chills, nausea, vomiting, diarrhea, hemoptysis, hematemesis, dysuria, myalgias, focal neurologic symptoms, ecchymosis, angioedema, odynophagia, dysphagia, sore throat, earache,rash, melena, hematochezia, depression, GERD, cold intolerance, petechia, bleeding gums, or significant weight loss. Review of systems not obtained due to patient factors. Subjective:    24 hr VS reviewed, overall VSSAF  Temp (24hrs), Av.4 °F (36.9 °C), Min:98.1 °F (36.7 °C), Max:98.8 °F (37.1 °C)    Patient Vitals for the past 8 hrs:   Pulse   10/10/21 1410 89   10/10/21 1354 88   10/10/21 1209 83   10/10/21 1021 88   10/10/21 0759 89    Patient Vitals for the past 8 hrs:   Resp   10/10/21 1354 20   10/10/21 0759 20    Patient Vitals for the past 8 hrs:   BP   10/10/21 1354 (!) 153/73   10/10/21 0759 (!) 157/72        No intake or output data in the 24 hours ending 10/10/21 1529      Physical Exam (complete single organ system exam)    Visit Vitals  BP (!) 153/73 (BP 1 Location: Right upper arm, BP Patient Position: At rest)   Pulse 89   Temp 98.7 °F (37.1 °C)   Resp 20   Ht 4' 11\" (1.499 m)   Wt 167 lb 8.8 oz (76 kg)   LMP 1986   SpO2 100%   BMI 33.84 kg/m²     General Appearance:  Well developed, well nourished,alert and oriented x 3, and individual in no acute distress. Ears/Nose/Mouth/Throat:   Hearing grossly normal.         Neck: Supple. Chest:   Lungs rales but no wheezes   Cardiovascular:  Regular rate and rhythm, S1, S2 normal, no murmur. Abdomen:   Soft, non-tender, bowel sounds are active. Extremities: 1+ edema bilaterally. Skin: Warm and dry.                Cardiographics    Telemetry: normal sinus rhythm  ECG: normal EKG, normal sinus rhythm, unchanged from previous tracings  Echocardiogram: Not done    Labs:   Recent Results (from the past 24 hour(s))   GLUCOSE, POC    Collection Time: 10/09/21  5:14 PM   Result Value Ref Range    Glucose (POC) 329 (H) 65 - 117 mg/dL    Performed by Abdoul GONZALEZ    GLUCOSE, POC    Collection Time: 10/09/21 10:07 PM   Result Value Ref Range    Glucose (POC) 384 (H) 65 - 117 mg/dL    Performed by JUWAN RESTREPO    TROPONIN-HIGH SENSITIVITY    Collection Time: 10/09/21 11:04 PM   Result Value Ref Range    Troponin-High Sensitivity 856 (HH) 0 - 51 ng/L   GLUCOSE, POC    Collection Time: 10/10/21  7:22 AM   Result Value Ref Range    Glucose (POC) 276 (H) 65 - 117 mg/dL    Performed by Carson Rushing,[address] [facility]    Collection Time: 10/10/21 12:51 PM   Result Value Ref Range    Glucose (POC) 370 (H) 65 - 117 mg/dL    Performed by Elvia Obrine         Assessment:     Assessment:   Elevated troponins; due to most likely demand ischemia and underlying ESRD/anemia  ESRD; now on HD  HTN  HLD  Down syndrome  Family history of CAD      Plan:   Tele  No more troponins  HD  ASA/NTP  BB  echo  Would offer her stress test to document ischemia    Nasir Mathis MD

## 2021-10-10 NOTE — PROGRESS NOTES
Problem: Diabetes Self-Management  Goal: *Disease process and treatment process  Description: Define diabetes and identify own type of diabetes; list 3 options for treating diabetes. Outcome: Progressing Towards Goal  Goal: *Incorporating nutritional management into lifestyle  Description: Describe effect of type, amount and timing of food on blood glucose; list 3 methods for planning meals. Outcome: Progressing Towards Goal  Goal: *Incorporating physical activity into lifestyle  Description: State effect of exercise on blood glucose levels. Outcome: Progressing Towards Goal  Goal: *Developing strategies to promote health/change behavior  Description: Define the ABC's of diabetes; identify appropriate screenings, schedule and personal plan for screenings. Outcome: Progressing Towards Goal  Goal: *Using medications safely  Description: State effect of diabetes medications on diabetes; name diabetes medication taking, action and side effects. Outcome: Progressing Towards Goal  Goal: *Monitoring blood glucose, interpreting and using results  Description: Identify recommended blood glucose targets  and personal targets. Outcome: Progressing Towards Goal  Goal: *Prevention, detection, treatment of acute complications  Description: List symptoms of hyper- and hypoglycemia; describe how to treat low blood sugar and actions for lowering  high blood glucose level. Outcome: Progressing Towards Goal  Goal: *Prevention, detection and treatment of chronic complications  Description: Define the natural course of diabetes and describe the relationship of blood glucose levels to long term complications of diabetes.   Outcome: Progressing Towards Goal  Goal: *Developing strategies to address psychosocial issues  Description: Describe feelings about living with diabetes; identify support needed and support network  Outcome: Progressing Towards Goal  Goal: *Insulin pump training  Outcome: Progressing Towards Goal  Goal: *Sick day guidelines  Outcome: Progressing Towards Goal  Goal: *Patient Specific Goal (EDIT GOAL, INSERT TEXT)  Outcome: Progressing Towards Goal     Problem: Patient Education: Go to Patient Education Activity  Goal: Patient/Family Education  Outcome: Progressing Towards Goal     Problem: Falls - Risk of  Goal: *Absence of Falls  Description: Document Lauren Jules Fall Risk and appropriate interventions in the flowsheet. Outcome: Progressing Towards Goal  Note: Fall Risk Interventions:  Mobility Interventions: Patient to call before getting OOB    Mentation Interventions: Door open when patient unattended         Elimination Interventions: Call light in reach              Problem: Patient Education: Go to Patient Education Activity  Goal: Patient/Family Education  Outcome: Progressing Towards Goal     Problem: Pressure Injury - Risk of  Goal: *Prevention of pressure injury  Description: Document Raul Scale and appropriate interventions in the flowsheet.   Outcome: Progressing Towards Goal  Note: Pressure Injury Interventions:  Sensory Interventions: Assess changes in LOC    Moisture Interventions: Absorbent underpads, Apply protective barrier, creams and emollients    Activity Interventions: Increase time out of bed    Mobility Interventions: HOB 30 degrees or less    Nutrition Interventions: Document food/fluid/supplement intake    Friction and Shear Interventions: HOB 30 degrees or less                Problem: Patient Education: Go to Patient Education Activity  Goal: Patient/Family Education  Outcome: Progressing Towards Goal

## 2021-10-10 NOTE — PROGRESS NOTES
[facility]                                                                                          Hospitalist Progress Note  Fran Figueredo MD  Answering service: [phone] from in house phone        Date of Service:  10/10/2021  NAME:  Amrit Gr  :  1970  MRN:  [id number]      Admission Summary: This is a 79-year-old female with a significant medical history of CKD 5 due to diabetic nephropathy came to the ED for shortness of breath. Patient was being prepared for dialysis and had left arm AVG a week ago. During my exam, patient sister with at the bedside. Patient sitting upright in the bed, appears tachypneic. She denied chest pain. She has edema to both arms and legs. She denied cough, fever or chills. She is fully vaccinated against Covid. In the ED vital signs: RR 32, SPO2 91% on room air. /66, heart rate 95, temperature 98.4  Lab: Hemoglobin 6.6. CMP: CO2 12, anion gap 14, , creatinine 11.9, troponin high-sensitivity 891.   EKG NSR.  VBG, pH 7.1    Interval history / Subjective:     Patient poor historian, she denied chest pain or shortness of breath, has on and off cough     Assessment & Plan:     Fluid over load secondary to ESRD    -probnp 10,176  -started HD on 10/8, plan for HD 10/9  -Nephrology on board    Severe anemia of chronic disease, iron deficiency   -Hgb was 6.6, s/p one unit of pRBC on 10/8, improved to 7.5  -on iv iron sucrose  -on epoetin  -no evidence of active bleeding, monitor H/H and to transfuse for Hgb <7.0    Elevated troponin   -possible due to fluid overload, ESRD and severe anemia , she has also poorly controlled diabtes   -no left side chest pain   -EKG normal sinus, vent rate 90 bpm, non specific st t wave  -continue on aspirin and lipitor  -Echo   -consult to cardiologist    T2DM poorly controlled with possible brittle diabetes   -had episode of hypoglycemia in the past  -A1c 6.6  -continue sliding scale and monitor finger stick glucose  -consult to DTC    Asthma with minimal wheezing  -has on and off cough  -on flonase, montelukast , prn duo neb, oxygen support  -SpO2 93-94% RA  -add muccinex    GERD  -continue PPI    Hypothyroidism  -continue synthroid    Hx of Down syndrome  -continue supportive care       Code status: DNR  DVT prophylaxis: SCD    Care Plan discussed with: Patient/Family, Nurse and   Anticipated Disposition: Home w/Family  Anticipated Discharge: Greater than 48 hours     Her mother at bedside, case discussed and answered her questions     Hospital Problems  Date Reviewed: 1/18/2020        Codes Class Noted POA    CKD (chronic kidney disease), stage V ([facility] [address] ICD-10-CM: N18.5  ICD-9-CM: 585.5  10/8/2021 Unknown        Fluid overload ICD-10-CM: E87.70  ICD-9-CM: 276.69  10/8/2021 Unknown        Severe anemia ICD-10-CM: D64.9  ICD-9-CM: 285.9  10/8/2021 Unknown        Metabolic acidosis [facility]-87-CN: E87.2  ICD-9-CM: 276.2  1/19/2020 Unknown                   Vital Signs:    Last 24hrs VS reviewed since prior progress note. Most recent are:  Visit Vitals  BP (!) 157/72 (BP 1 Location: Right upper arm, BP Patient Position: At rest)   Pulse 88   Temp 98.8 °F (37.1 °C)   Resp 20   Ht 4' 11\" (1.499 m)   Wt 76 kg (167 lb 8.8 oz)   SpO2 97%   BMI 33.84 kg/m²         Intake/Output Summary (Last 24 hours) at 10/10/2021 1149  Last data filed at 10/9/2021 1300  Gross per 24 hour   Intake 120 ml   Output --   Net 120 ml        Physical Examination:     I had a face to face encounter with this patient and independently examined them on 10/10/2021 as outlined below:          Constitutional:  No acute distress, cooperative, pleasant    ENT:  Oral mucosa moist, oropharynx benign. Resp:  Decrease bronchial breath sound bilaterally. No wheezing/rhonchi/rales.  No accessory muscle use   CV:  Regular rhythm, normal rate, no murmurs, gallops, rubs    GI:  Soft, non distended, non tender. normoactive bowel sounds, no hepatosplenomegaly     Musculoskeletal:  Bilateral pretibial, pedal and both hands edema    Neurologic:  Conscious and alert, answer some questions, moves all extremities, CN II-XII reviewed            Data Review:    Review and/or order of clinical lab test  Review and/or order of tests in the radiology section of [facility]  Review and/or order of tests in the medicine section of [facility]      Labs:     Recent Labs     10/09/21  0421 10/08/21  1407   WBC 9.2 10.0   HGB 7.5* 6.6*   HCT 23.4* 21.4*    196     Recent Labs     10/09/21  0421 10/08/21  1407    134*   K 4.2 4.9    108   CO2 19* 12*   BUN 91* 116*   CREA 9.16* 11.90*   * 213*   CA 7.2* 7.8*   PHOS 8.0*  --      Recent Labs     10/09/21  0421 10/08/21  1407   ALT 16 17   AP 96 97   TBILI 0.3 0.2   TP 6.1* 6.7   ALB 1.9* 2.0*   GLOB 4.2* 4.7*     No results for input(s): INR, PTP, APTT, INREXT, INREXT in the last 72 hours. Recent Labs     10/08/21  1715   TIBC 199*   PSAT 15*   FERR 104      Lab Results   Component Value Date/Time    Folate 28.7 (H) 12/15/2018 05:09 AM      No results for input(s): PH, PCO2, PO2 in the last 72 hours. No results for input(s): CPK, CKNDX, TROIQ in the last 72 hours.     No lab exists for component: CPKMB  Lab Results   Component Value Date/Time    Cholesterol, total 107 12/16/2018 04:08 AM    HDL Cholesterol 42 12/16/2018 04:08 AM    LDL, calculated 36.4 12/16/2018 04:08 AM    Triglyceride 143 12/16/2018 04:08 AM    CHOL/HDL Ratio 2.5 12/16/2018 04:08 AM     Lab Results   Component Value Date/Time    Glucose (POC) 276 (H) 10/10/2021 07:22 AM    Glucose (POC) 384 (H) 10/09/2021 10:07 PM    Glucose (POC) 329 (H) 10/09/2021 05:14 PM    Glucose (POC) 174 (H) 10/09/2021 11:13 AM    Glucose (POC) 239 (H) 10/09/2021 06:43 AM     Lab Results   Component Value Date/Time    Color YELLOW/STRAW 01/15/2020 11:59 AM    Appearance CLEAR 01/15/2020 11:59 AM    Specific gravity 1.010 01/15/2020 11:59 AM    Specific gravity 1.015 04/29/2013 01:50 PM    pH (UA) 6.5 01/15/2020 11:59 AM    Protein 300 (A) 01/15/2020 11:59 AM    Glucose 250 (A) 01/15/2020 11:59 AM    Ketone NEGATIVE  01/15/2020 11:59 AM    Bilirubin NEGATIVE  01/15/2020 11:59 AM    Urobilinogen 0.2 01/15/2020 11:59 AM    Nitrites NEGATIVE  01/15/2020 11:59 AM    Leukocyte Esterase NEGATIVE  01/15/2020 11:59 AM    Glucose/Ketones  02/08/2009 07:00 AM     GLUCOSE AND KETONES SIGNIFICANTLY ELEVATED.  RESULTS PHONED TO AND READ BACK BY    Epithelial cells FEW 01/15/2020 11:59 AM    Bacteria NEGATIVE  01/15/2020 11:59 AM    WBC 0-4 01/15/2020 11:59 AM    RBC 0-5 01/15/2020 11:59 AM         Medications Reviewed:     Current Facility-Administered Medications   Medication Dose Route Frequency    [START ON 10/11/2021] iron sucrose (VENOFER) injection 100 mg  100 mg IntraVENous DIALYSIS MON, WED & FRI    lidocaine (XYLOCAINE) 4 % cream   Topical DIALYSIS PRN    guaiFENesin ER (MUCINEX) tablet 600 mg  600 mg Oral Q12H    insulin lispro (HUMALOG) injection   SubCUTAneous AC&HS    glucose chewable tablet 16 g  4 Tablet Oral PRN    dextrose (D50W) injection syrg 12.5-25 g  12.5-25 g IntraVENous PRN    glucagon (GLUCAGEN) injection 1 mg  1 mg IntraMUSCular PRN    aspirin delayed-release tablet 81 mg  81 mg Oral DAILY    atorvastatin (LIPITOR) tablet 20 mg  20 mg Oral QHS    pantoprazole (PROTONIX) tablet 40 mg  40 mg Oral ACB    levothyroxine (SYNTHROID) tablet 100 mcg  100 mcg Oral ACB    L.acidophilus-paracasei-S.thermophil-bifidobacter (RISAQUAD) 8 billion cell capsule  1 Capsule Oral DAILY    montelukast (SINGULAIR) tablet 10 mg  10 mg Oral DAILY    ondansetron (ZOFRAN ODT) tablet 4 mg  4 mg Oral Q4H PRN    fluticasone propionate (FLONASE) 50 mcg/actuation nasal spray 2 Spray  2 Spray Both Nostrils BID    diphenhydrAMINE (BENADRYL) capsule 25 mg  25 mg Oral QHS PRN    cholecalciferol (VITAMIN D3) (1000 Units /25 mcg) tablet 4,000 Units  4,000 Units Oral DAILY    cetirizine (ZYRTEC) tablet 10 mg  10 mg Oral DAILY    0.9% sodium chloride infusion 250 mL  250 mL IntraVENous PRN    epoetin jerry-epbx (RETACRIT) injection 10,000 Units  10,000 Units SubCUTAneous Q MON, WED & FRI    albumin human 25% (BUMINATE) solution 25 g  25 g IntraVENous PRN    B complex-vitaminC-folic acid (NEPHROCAP) cap  1 Capsule Oral DAILY    albuterol-ipratropium (DUO-NEB) 2.5 MG-0.5 MG/3 ML  3 mL Nebulization Q4H PRN     ______________________________________________________________________  EXPECTED LENGTH OF STAY: - - -  ACTUAL LENGTH OF STAY:          2                 Madeleine Hodge MD

## 2021-10-10 NOTE — PROGRESS NOTES
Bedside shift change report given to [address] (oncoming nurse) by Tatum Miranda  (offgoing nurse). Report included the following information SBAR, Kardex, MAR and Recent Results.

## 2021-10-11 NOTE — PROGRESS NOTES
Problem: Diabetes Self-Management  Goal: *Disease process and treatment process  Description: Define diabetes and identify own type of diabetes; list 3 options for treating diabetes. Outcome: Progressing Towards Goal  Goal: *Incorporating nutritional management into lifestyle  Description: Describe effect of type, amount and timing of food on blood glucose; list 3 methods for planning meals. Outcome: Progressing Towards Goal  Goal: *Incorporating physical activity into lifestyle  Description: State effect of exercise on blood glucose levels. Outcome: Progressing Towards Goal  Goal: *Developing strategies to promote health/change behavior  Description: Define the ABC's of diabetes; identify appropriate screenings, schedule and personal plan for screenings. Outcome: Progressing Towards Goal  Goal: *Using medications safely  Description: State effect of diabetes medications on diabetes; name diabetes medication taking, action and side effects. Outcome: Progressing Towards Goal  Goal: *Monitoring blood glucose, interpreting and using results  Description: Identify recommended blood glucose targets  and personal targets. Outcome: Progressing Towards Goal  Goal: *Prevention, detection, treatment of acute complications  Description: List symptoms of hyper- and hypoglycemia; describe how to treat low blood sugar and actions for lowering  high blood glucose level. Outcome: Progressing Towards Goal  Goal: *Prevention, detection and treatment of chronic complications  Description: Define the natural course of diabetes and describe the relationship of blood glucose levels to long term complications of diabetes.   Outcome: Progressing Towards Goal  Goal: *Developing strategies to address psychosocial issues  Description: Describe feelings about living with diabetes; identify support needed and support network  Outcome: Progressing Towards Goal  Goal: *Insulin pump training  Outcome: Progressing Towards Goal  Goal: *Sick day guidelines  Outcome: Progressing Towards Goal  Goal: *Patient Specific Goal (EDIT GOAL, INSERT TEXT)  Outcome: Progressing Towards Goal     Problem: Patient Education: Go to Patient Education Activity  Goal: Patient/Family Education  Outcome: Progressing Towards Goal     Problem: Falls - Risk of  Goal: *Absence of Falls  Description: Document Bard Mccullough Fall Risk and appropriate interventions in the flowsheet. Outcome: Progressing Towards Goal  Note: Fall Risk Interventions:  Mobility Interventions: Communicate number of staff needed for ambulation/transfer, Bed/chair exit alarm    Mentation Interventions: Adequate sleep, hydration, pain control, Bed/chair exit alarm, Door open when patient unattended    Medication Interventions: Evaluate medications/consider consulting pharmacy, Patient to call before getting OOB, Teach patient to arise slowly    Elimination Interventions: Toileting schedule/hourly rounds, Patient to call for help with toileting needs, Call light in reach, Bed/chair exit alarm              Problem: Patient Education: Go to Patient Education Activity  Goal: Patient/Family Education  Outcome: Progressing Towards Goal     Problem: Pressure Injury - Risk of  Goal: *Prevention of pressure injury  Description: Document Raul Scale and appropriate interventions in the flowsheet.   Outcome: Progressing Towards Goal  Note: Pressure Injury Interventions:  Sensory Interventions: Assess changes in LOC    Moisture Interventions: Absorbent underpads, Apply protective barrier, creams and emollients    Activity Interventions: Increase time out of bed    Mobility Interventions: HOB 30 degrees or less    Nutrition Interventions: Offer support with meals,snacks and hydration    Friction and Shear Interventions: HOB 30 degrees or less                Problem: Patient Education: Go to Patient Education Activity  Goal: Patient/Family Education  Outcome: Progressing Towards Goal

## 2021-10-11 NOTE — DISCHARGE INSTRUCTIONS
Discharge SNF/Rehab Instructions/LTAC       PATIENT ID: Valeri Mcdowell  MRN: [id number]   YOB: 1970    DATE OF ADMISSION: 10/8/2021  3:42 PM    DATE OF DISCHARGE: 10/11/2021    PRIMARY CARE PROVIDER: Simone Chun MD       ATTENDING PHYSICIAN: Grover Carrera MD  DISCHARGING PROVIDER: Natividad Marquez MD     To contact this individual call [phone] and ask the  to page. If unavailable ask to be transferred the Adult Hospitalist Department. CONSULTATIONS: IP CONSULT TO NEPHROLOGY  IP CONSULT TO CARDIOLOGY    PROCEDURES/SURGERIES: * No surgery found *    ADMITTING DIAGNOSES & HOSPITAL COURSE:     This is a 39-year-old female with a significant medical history of CKD 5 due to diabetic nephropathy came to the ED for shortness of breath.  Patient was being prepared for dialysis and had left arm AVG a week ago. During my exam, patient sister with at the bedside.  Patient sitting upright in the bed, appears tachypneic. She denied chest pain.  She has edema to both arms and legs.  She denied cough, fever or chills.  She is fully vaccinated against Covid.   In the ED vital signs: RR 32, SPO2 91% on room air.  /66, heart rate 95, temperature 98.4  Lab: Hemoglobin 6.6.  CMP: CO2 12, anion gap 14, , creatinine 11.9, troponin high-sensitivity 891.  EKG NSR.  VBG, pH 7.1  Fluid over load secondary to ESRD    -probnp 10,176  -started HD on 10/8, 10/9  -improving, and plan for 10/11  -Nephrology on board  Severe anemia of chronic disease, iron deficiency   -Hgb was 6.6, s/p one unit of pRBC on 10/8, improved to 7.3  -on iv iron sucrose, continue on dialysis  -on epoetin  -no evidence of active bleeding, monitor H/H and to transfuse for Hgb <7.0  Elevated troponin   -possible due to fluid overload, ESRD and severe anemia , she has also poorly controlled diabtes   -no left side chest pain   -EKG normal sinus, vent rate 90 bpm, non specific st t wave  -continue on aspirin and lipitor  -stress test negative  -seen by cardiologist and cleared for discharge  T2DM poorly controlled with possible brittle diabetes   -had episode of hypoglycemia in the past  -A1c 6.6  -continue sliding scale and monitor finger stick glucose  -consult to [facility]  Asthma with minimal wheezing  -stable, has on and off cough  -on flonase, montelukast , prn duo neb, oxygen support  -SpO2 93-94% RA  -continue muccinex  GERD  -continue PPI  Hypothyroidism  -continue synthroid  Hx of Down syndrome  -continue supportive care        Code status: DNR  DVT prophylaxis: SCD      DISCHARGE DIAGNOSES / PLAN:       Fluid over load secondary to ESRD    -continue HD per Nephrology recommendation.   Severe anemia of chronic disease, iron deficiency   -improved to 7.3  -continue iv iron sucrose  on dialysis  -on epoetin  Elevated troponin possible due to fluid overload, ESRD and severe anemia   -continue on aspirin and lipitor  -stress test negative  T2DM poorly controlled with possible brittle diabetes   -continue home regimen, sliding scale and monitor finger stick glucose  Asthma with minimal wheezing  -stable, has on and off cough  -on flonase, montelukast , prn duo neb,continue muccinex  GERD  -continue PPI  Hypothyroidism  -continue synthroid  Hx of Down syndrome  -continue supportive care    Recent Results (from the past 24 hour(s))   TROPONIN-HIGH SENSITIVITY    Collection Time: 10/10/21  3:20 PM   Result Value Ref Range    Troponin-High Sensitivity 665 (HH) 0 - 51 ng/L   CBC W/O DIFF    Collection Time: 10/10/21  3:20 PM   Result Value Ref Range    WBC 9.6 3.6 - 11.0 K/uL    RBC 2.24 (L) 3.80 - 5.20 M/uL    HGB 7.2 (L) 11.5 - 16.0 g/dL    HCT 22.0 (L) 35.0 - 47.0 %    MCV 98.2 80.0 - 99.0 FL    MCH 32.1 26.0 - 34.0 PG    MCHC 32.7 30.0 - 36.5 g/dL    RDW 15.7 (H) 11.5 - 14.5 %    PLATELET 370 568 - 572 K/uL    MPV 10.7 8.9 - 12.9 FL    NRBC 0.0 0  WBC    ABSOLUTE NRBC 0.00 0.00 - 2.30 K/uL   METABOLIC PANEL, BASIC Collection Time: 10/10/21  3:20 PM   Result Value Ref Range    Sodium 134 (L) 136 - 145 mmol/L    Potassium 4.5 3.5 - 5.1 mmol/L    Chloride 104 97 - 108 mmol/L    CO2 19 (L) 21 - 32 mmol/L    Anion gap 11 5 - 15 mmol/L    Glucose 446 (H) 65 - 100 mg/dL    BUN 71 (H) 6 - 20 MG/DL    Creatinine 8.00 (H) 0.55 - 1.02 MG/DL    BUN/Creatinine ratio 9 (L) 12 - 20      GFR est AA 6 (L) >60 ml/min/1.73m2    GFR est non-AA 5 (L) >60 ml/min/1.73m2    Calcium 7.1 (L) 8.5 - 10.1 MG/DL   URIC ACID    Collection Time: 10/10/21  3:20 PM   Result Value Ref Range    Uric acid 7.0 (H) 2.6 - 6.0 MG/DL   VITAMIN D, 25 HYDROXY    Collection Time: 10/10/21  3:20 PM   Result Value Ref Range    Vitamin D 25-Hydroxy 87.5 30 - 100 ng/mL   GLUCOSE, POC    Collection Time: 10/10/21  5:25 PM   Result Value Ref Range    Glucose (POC) 436 (H) 65 - 117 mg/dL    Performed by Brown Holms   PCT    GLUCOSE, POC    Collection Time: 10/10/21  9:39 PM   Result Value Ref Range    Glucose (POC) 331 (H) 65 - 117 mg/dL    Performed by Brownse Kellys   PCT    GLUCOSE, POC    Collection Time: 10/11/21 12:22 AM   Result Value Ref Range    Glucose (POC) 225 (H) 65 - 117 mg/dL    Performed by Anand Huber    TROPONIN-HIGH SENSITIVITY    Collection Time: 10/11/21 12:55 AM   Result Value Ref Range    Troponin-High Sensitivity 644 (HH) 0 - 51 ng/L   CBC W/O DIFF    Collection Time: 10/11/21 12:55 AM   Result Value Ref Range    WBC 10.0 3.6 - 11.0 K/uL    RBC 2.36 (L) 3.80 - 5.20 M/uL    HGB 7.3 (L) 11.5 - 16.0 g/dL    HCT 23.3 (L) 35.0 - 47.0 %    MCV 98.7 80.0 - 99.0 FL    MCH 30.9 26.0 - 34.0 PG    MCHC 31.3 30.0 - 36.5 g/dL    RDW 15.5 (H) 11.5 - 14.5 %    PLATELET 678 035 - 528 K/uL    MPV 10.7 8.9 - 12.9 FL    NRBC 0.0 0  WBC    ABSOLUTE NRBC 0.00 0.00 - 7.53 K/uL   METABOLIC PANEL, COMPREHENSIVE    Collection Time: 10/11/21 12:55 AM   Result Value Ref Range    Sodium 139 136 - 145 mmol/L    Potassium 4.5 3.5 - 5.1 mmol/L    Chloride 107 97 - 108 mmol/L    CO2 21 21 - 32 mmol/L    Anion gap 11 5 - 15 mmol/L    Glucose 188 (H) 65 - 100 mg/dL    BUN 81 (H) 6 - 20 MG/DL    Creatinine 8.08 (H) 0.55 - 1.02 MG/DL    BUN/Creatinine ratio 10 (L) 12 - 20      GFR est AA 6 (L) >60 ml/min/1.73m2    GFR est non-AA 5 (L) >60 ml/min/1.73m2    Calcium 7.2 (L) 8.5 - 10.1 MG/DL    Bilirubin, total 0.5 0.2 - 1.0 MG/DL    ALT (SGPT) 13 12 - 78 U/L    AST (SGOT) 18 15 - 37 U/L    Alk.  phosphatase 87 45 - 117 U/L    Protein, total 6.2 (L) 6.4 - 8.2 g/dL    Albumin 1.8 (L) 3.5 - 5.0 g/dL    Globulin 4.4 (H) 2.0 - 4.0 g/dL    A-G Ratio 0.4 (L) 1.1 - 2.2     GLUCOSE, POC    Collection Time: 10/11/21  7:29 AM   Result Value Ref Range    Glucose (POC) 200 (H) 65 - 117 mg/dL    Performed by Carson Rushing,[facility], POC    Collection Time: 10/11/21  1:18 PM   Result Value Ref Range    Glucose (POC) 178 (H) 65 - 117 mg/dL    Performed by Jenna Barnes    NUCLEAR CARDIAC STRESS TEST    Collection Time: 10/11/21  1:27 PM   Result Value Ref Range    Target  bpm    Exercise duration time 00:03:00     Stress Base Systolic  mmHg    Stress Base Diastolic BP 67 mmHg    Post peak  BPM    Baseline HR 71 BPM    Estimated workload 1.0 METS    Baseline  mmHg    Percent HR 61 %    Stress Base Diastolic BP 67 mmHg    Stress Rate Pressure Product 16,218 BPM*mmHg    Recovery Stage 1  bpm    Recovery Stage 1 /75 mmHg    Recovery Stage 2 HR 96 bpm    Recovery Stage 2 /71 mmHg       PENDING TEST RESULTS:   At the time of discharge the following test results are still pending: None    FOLLOW UP APPOINTMENTS:    Follow-up Information    None          ADDITIONAL CARE RECOMMENDATIONS:      DIET: Diabetic Diet     TUBE FEEDING INSTRUCTIONS: None    OXYGEN / BiPAP SETTINGS: None    ACTIVITY: Activity as tolerated    WOUND CARE: None    EQUIPMENT needed: None      DISCHARGE MEDICATIONS:   See Medication Reconciliation Form      NOTIFY YOUR PHYSICIAN FOR ANY OF THE FOLLOWING:   Fever over 101 degrees for 24 hours. Chest pain, shortness of breath, fever, chills, nausea, vomiting, diarrhea, change in mentation, falling, weakness, bleeding. Severe pain or pain not relieved by medications. Or, any other signs or symptoms that you may have questions about.     DISPOSITION:    Home With:   OT  PT  HH  RN       SNF/Inpatient Rehab/LTAC    Independent/assisted living   x Group Home    Other:       PATIENT CONDITION AT DISCHARGE:     Functional status    Poor     Deconditioned     Independent      Cognition   x  Down syndrome    Forgetful     Dementia      Catheters/lines (plus indication)    [facility]     PICC     PEG    x None      Code status   x  Full code     DNR      PHYSICAL EXAMINATION AT DISCHARGE:   Refer to Progress Note       CHRONIC MEDICAL DIAGNOSES:  Problem List as of 10/11/2021 Date Reviewed: 1/18/2020        Codes Class Noted - Resolved    CKD (chronic kidney disease), stage V ([facility] ICD-10-CM: N18.5  ICD-9-CM: 585.5  10/8/2021 - Present        Fluid overload ICD-10-CM: E87.70  ICD-9-CM: 276.69  10/8/2021 - Present        Severe anemia ICD-10-CM: D64.9  ICD-9-CM: 285.9  10/8/2021 - Present        ESRD (end stage renal disease) ([facility] ICD-10-CM: N18.6  ICD-9-CM: 585.6  10/1/2021 - Present        Metabolic acidosis FQZ-56-NL: E87.2  ICD-9-CM: 276.2  1/19/2020 - Present        Asthma exacerbation ICD-10-CM: J45.901  ICD-9-CM: 493.92  1/19/2020 - Present        ROBBY (acute kidney injury) ([facility] ICD-10-CM: N17.9  ICD-9-CM: 584.9  1/15/2020 - Present        Advance directive on file ICD-10-CM: Z78.9  ICD-9-CM: V49.89  12/28/2015 - Present        Acne ICD-10-CM: L70.9  ICD-9-CM: 706.1  8/20/2014 - Present        Asthma (Chronic) ICD-10-CM: J45.909  ICD-9-CM: 493.90  4/29/2013 - Present        HTN (hypertension) (Chronic) ICD-10-CM: I10  ICD-9-CM: 401.9  4/29/2013 - Present        DM (diabetes mellitus), type 1, uncontrolled ([facility] ICD-10-CM: E10.65  ICD-9-CM: 250.03 11/14/2012 - Present        Hypoglycemia ICD-10-CM: E16.2  ICD-9-CM: 251.2  6/27/2012 - Present        Down syndrome ICD-10-CM: Q90.9  ICD-9-CM: 758.0  7/23/2010 - Present        Acquired hypothyroidism ICD-10-CM: E03.9  ICD-9-CM: 244.9  7/23/2010 - Present        Anemia, unspecified ICD-10-CM: D64.9  ICD-9-CM: 285.9  7/23/2010 - Present        RESOLVED: Hyponatremia ICD-10-CM: E87.1  ICD-9-CM: 276.1  2/28/2018 - 1/1/2019        RESOLVED: DKA (diabetic ketoacidoses) ICD-10-CM: E11.10  ICD-9-CM: 250.12  9/14/2017 - 1/1/2019        RESOLVED: Hyperglycemia ICD-10-CM: R73.9  ICD-9-CM: 790.29  7/28/2014 - 7/29/2014        RESOLVED: Syncope and collapse ICD-10-CM: R55  ICD-9-CM: 780.2  4/29/2013 - 1/1/2019        RESOLVED: Type II or unspecified type diabetes mellitus without mention of complication, uncontrolled ICD-10-CM: IOF6094  ICD-9-CM: 250.02  7/23/2010 - 11/14/2012                CDMP Checked:   Yes x     PROBLEM LIST Updated:  Yes x         Signed:   Natividad Marquez MD  10/11/2021  2:32 PM

## 2021-10-11 NOTE — PROGRESS NOTES
Spiritual Care Assessment/Progress Note  [facility]      NAME: Rajesh Turner      MRN: [id number]  AGE: 45 y.o. SEX: female  Orthodox Affiliation: No Voodoo   Language: English     10/11/2021     Total Time (in minutes): 5     Spiritual Assessment begun in [facility] SURG through conversation with:         []Patient        [] Family    [] Friend(s)        Reason for Consult: Initial/Spiritual assessment, patient floor     Spiritual beliefs: (Please include comment if needed)     [] Identifies with a sri tradition:         [] Supported by a sri community:            [] Claims no spiritual orientation:           [] Seeking spiritual identity:                [] Adheres to an individual form of spirituality:           [x] Not able to assess:  Pt not available                         Identified resources for coping:      [] Prayer                               [] Music                  [] Guided Imagery     [] Family/friends                 [] Pet visits     [] Devotional reading                         [x] Unknown     [] Other                                            Interventions offered during this visit: (See comments for more details)    Patient Interventions: Initial visit           Plan of Care:     [] Support spiritual and/or cultural needs    [] Support AMD and/or advance care planning process      [] Support grieving process   [] Coordinate Rites and/or Rituals    [] Coordination with community clergy   [] No spiritual needs identified at this time   [] Detailed Plan of Care below (See Comments)  [] Make referral to Music Therapy  [] Make referral to Pet Therapy     [] Make referral to Addiction services  [] Make referral to [facility]  [] Make referral to Spiritual Care Partner  [] No future visits requested        [x] Follow up upon further referrals     Comments: Attempted visit with Ms. Gr while doing routine visits on 2N. Pt was out of room at the time of my visit. Chaplains can be paged if any spiritual needs arise during this hospitalization.     Blas Anderson , [facility]  [phone]

## 2021-10-11 NOTE — PROGRESS NOTES
Cardiology Progress Note                                        Admit Date: 10/8/2021    Assessment/Plan:     CP; stress test just came and is negative for ischemia and infarct with EF 74%; we can stop ASA/NTP  HTN; better; Amrit Gr is a 45 y.o. female with     PROBLEM LIST:  Patient Active Problem List    Diagnosis Date Noted    CKD (chronic kidney disease), stage V ([facility] 10/08/2021    Fluid overload 10/08/2021    Severe anemia 10/08/2021    ESRD (end stage renal disease) ([facility] 86/77/8712    Metabolic acidosis 32/84/7030    Asthma exacerbation 01/19/2020    ROBBY (acute kidney injury) ([facility] 01/15/2020    Advance directive on file 12/28/2015    Acne 08/20/2014    Asthma 04/29/2013    HTN (hypertension) 04/29/2013    DM (diabetes mellitus), type 1, uncontrolled ([facility] 11/14/2012    Hypoglycemia 06/27/2012    Down syndrome 07/23/2010    Acquired hypothyroidism 07/23/2010    Anemia, unspecified 07/23/2010         Subjective:     Mancel Quant RADHA Gr reports none. Visit Vitals  BP (!) 145/67 (BP 1 Location: Right upper arm, BP Patient Position: Sitting)   Pulse 81   Temp 98.3 °F (36.8 °C)   Resp 16   Ht 4' 11\" (1.499 m)   Wt 167 lb 1.7 oz (75.8 kg)   LMP 03/27/1986   SpO2 93%   BMI 33.75 kg/m²       Intake/Output Summary (Last 24 hours) at 10/11/2021 1309  Last data filed at 10/10/2021 1814  Gross per 24 hour   Intake 360 ml   Output --   Net 360 ml       Objective:      Physical Exam:  HEENT: Perrla, EOMI  Neck: No JVD,  No thyroidmegaly  Resp: CTA bilaterally;  No wheezes or rales  CV: RRR s1s2 No murmur no s3  Abd:Soft, Nontender  Ext: No edema  Neuro: Alert and oriented; Nonfocal  Skin: Warm, Dry, Intact  Pulses: 2+ DP/PT/Rad      Telemetry: normal sinus rhythm    Current Facility-Administered Medications   Medication Dose Route Frequency    metoprolol tartrate (LOPRESSOR) tablet 25 mg  25 mg Oral Q12H    nitroglycerin (NITROBID) 2 % ointment 0.5 Inch  0.5 Inch Topical Q6H    iron sucrose (VENOFER) injection 100 mg  100 mg IntraVENous DIALYSIS MON, WED & FRI    lidocaine (XYLOCAINE) 4 % cream   Topical DIALYSIS PRN    guaiFENesin ER (MUCINEX) tablet 600 mg  600 mg Oral Q12H    insulin lispro (HUMALOG) injection   SubCUTAneous AC&HS    glucose chewable tablet 16 g  4 Tablet Oral PRN    dextrose (D50W) injection syrg 12.5-25 g  12.5-25 g IntraVENous PRN    glucagon (GLUCAGEN) injection 1 mg  1 mg IntraMUSCular PRN    aspirin delayed-release tablet 81 mg  81 mg Oral DAILY    atorvastatin (LIPITOR) tablet 20 mg  20 mg Oral QHS    pantoprazole (PROTONIX) tablet 40 mg  40 mg Oral ACB    levothyroxine (SYNTHROID) tablet 100 mcg  100 mcg Oral ACB    L.acidophilus-paracasei-S.thermophil-bifidobacter (RISAQUAD) 8 billion cell capsule  1 Capsule Oral DAILY    montelukast (SINGULAIR) tablet 10 mg  10 mg Oral DAILY    ondansetron (ZOFRAN ODT) tablet 4 mg  4 mg Oral Q4H PRN    fluticasone propionate (FLONASE) 50 mcg/actuation nasal spray 2 Spray  2 Spray Both Nostrils BID    diphenhydrAMINE (BENADRYL) capsule 25 mg  25 mg Oral QHS PRN    cholecalciferol (VITAMIN D3) (1000 Units /25 mcg) tablet 4,000 Units  4,000 Units Oral DAILY    cetirizine (ZYRTEC) tablet 10 mg  10 mg Oral DAILY    0.9% sodium chloride infusion 250 mL  250 mL IntraVENous PRN    epoetin jerry-epbx (RETACRIT) injection 10,000 Units  10,000 Units SubCUTAneous Q MON, WED & FRI    albumin human 25% (BUMINATE) solution 25 g  25 g IntraVENous PRN    B complex-vitaminC-folic acid (NEPHROCAP) cap  1 Capsule Oral DAILY    albuterol-ipratropium (DUO-NEB) 2.5 MG-0.5 MG/3 ML  3 mL Nebulization Q4H PRN         Data Review:   Labs:    Recent Results (from the past 24 hour(s))   TROPONIN-HIGH SENSITIVITY    Collection Time: 10/10/21  3:20 PM   Result Value Ref Range    Troponin-High Sensitivity 665 (HH) 0 - 51 ng/L   CBC W/O DIFF    Collection Time: 10/10/21  3:20 PM   Result Value Ref Range    WBC 9.6 3.6 - 11.0 K/uL    RBC 2.24 (L) 3.80 - 5.20 M/uL    HGB 7.2 (L) 11.5 - 16.0 g/dL    HCT 22.0 (L) 35.0 - 47.0 %    MCV 98.2 80.0 - 99.0 FL    MCH 32.1 26.0 - 34.0 PG    MCHC 32.7 30.0 - 36.5 g/dL    RDW 15.7 (H) 11.5 - 14.5 %    PLATELET 895 834 - 004 K/uL    MPV 10.7 8.9 - 12.9 FL    NRBC 0.0 0  WBC    ABSOLUTE NRBC 0.00 0.00 - 2.55 K/uL   METABOLIC PANEL, BASIC    Collection Time: 10/10/21  3:20 PM   Result Value Ref Range    Sodium 134 (L) 136 - 145 mmol/L    Potassium 4.5 3.5 - 5.1 mmol/L    Chloride 104 97 - 108 mmol/L    CO2 19 (L) 21 - 32 mmol/L    Anion gap 11 5 - 15 mmol/L    Glucose 446 (H) 65 - 100 mg/dL    BUN 71 (H) 6 - 20 MG/DL    Creatinine 8.00 (H) 0.55 - 1.02 MG/DL    BUN/Creatinine ratio 9 (L) 12 - 20      GFR est AA 6 (L) >60 ml/min/1.73m2    GFR est non-AA 5 (L) >60 ml/min/1.73m2    Calcium 7.1 (L) 8.5 - 10.1 MG/DL   URIC ACID    Collection Time: 10/10/21  3:20 PM   Result Value Ref Range    Uric acid 7.0 (H) 2.6 - 6.0 MG/DL   VITAMIN D, 25 HYDROXY    Collection Time: 10/10/21  3:20 PM   Result Value Ref Range    Vitamin D 25-Hydroxy 87.5 30 - 100 ng/mL   GLUCOSE, POC    Collection Time: 10/10/21  5:25 PM   Result Value Ref Range    Glucose (POC) 436 (H) 65 - 117 mg/dL    Performed by [facility]   [facility]    GLUCOSE, POC    Collection Time: 10/10/21  9:39 PM   Result Value Ref Range    Glucose (POC) 331 (H) 65 - 117 mg/dL    Performed by [facility]   [facility]    GLUCOSE, POC    Collection Time: 10/11/21 12:22 AM   Result Value Ref Range    Glucose (POC) 225 (H) 65 - 117 mg/dL    Performed by Sherice Suarez    TROPONIN-HIGH SENSITIVITY    Collection Time: 10/11/21 12:55 AM   Result Value Ref Range    Troponin-High Sensitivity 644 (HH) 0 - 51 ng/L   CBC W/O DIFF    Collection Time: 10/11/21 12:55 AM   Result Value Ref Range    WBC 10.0 3.6 - 11.0 K/uL    RBC 2.36 (L) 3.80 - 5.20 M/uL    HGB 7.3 (L) 11.5 - 16.0 g/dL    HCT 23.3 (L) 35.0 - 47.0 %    MCV 98.7 80.0 - 99.0 FL    MCH 30.9 26.0 - 34.0 PG    MCHC 31.3 30.0 - 36.5 g/dL    RDW 15.5 (H) 11.5 - 14.5 %    PLATELET 328 876 - 973 K/uL    MPV 10.7 8.9 - 12.9 FL    NRBC 0.0 0  WBC    ABSOLUTE NRBC 0.00 0.00 - 9.30 K/uL   METABOLIC PANEL, COMPREHENSIVE    Collection Time: 10/11/21 12:55 AM   Result Value Ref Range    Sodium 139 136 - 145 mmol/L    Potassium 4.5 3.5 - 5.1 mmol/L    Chloride 107 97 - 108 mmol/L    CO2 21 21 - 32 mmol/L    Anion gap 11 5 - 15 mmol/L    Glucose 188 (H) 65 - 100 mg/dL    BUN 81 (H) 6 - 20 MG/DL    Creatinine 8.08 (H) 0.55 - 1.02 MG/DL    BUN/Creatinine ratio 10 (L) 12 - 20      GFR est AA 6 (L) >60 ml/min/1.73m2    GFR est non-AA 5 (L) >60 ml/min/1.73m2    Calcium 7.2 (L) 8.5 - 10.1 MG/DL    Bilirubin, total 0.5 0.2 - 1.0 MG/DL    ALT (SGPT) 13 12 - 78 U/L    AST (SGOT) 18 15 - 37 U/L    Alk.  phosphatase 87 45 - 117 U/L    Protein, total 6.2 (L) 6.4 - 8.2 g/dL    Albumin 1.8 (L) 3.5 - 5.0 g/dL    Globulin 4.4 (H) 2.0 - 4.0 g/dL    A-G Ratio 0.4 (L) 1.1 - 2.2     GLUCOSE, POC    Collection Time: 10/11/21  7:29 AM   Result Value Ref Range    Glucose (POC) 200 (H) 65 - 117 mg/dL    Performed by [facility] STRESS TEST    Collection Time: 10/11/21 11:30 AM   Result Value Ref Range    Target  bpm    Exercise duration time 00:03:00     Stress Base Systolic  mmHg    Stress Base Diastolic BP 67 mmHg    Post peak  BPM    Baseline HR 71 BPM    Estimated workload 1.0 METS    Baseline  mmHg    Percent HR 61 %    Stress Base Diastolic BP 67 mmHg    Stress Rate Pressure Product 16,218 BPM*mmHg    Recovery Stage 1  bpm    Recovery Stage 1 /75 mmHg    Recovery Stage 2 HR 96 bpm    Recovery Stage 2 /71 mmHg

## 2021-10-11 NOTE — PROGRESS NOTES
Bedside and Verbal shift change report given to Juanjose Morgan RN (oncoming nurse) by Juel Jeans (offgoing nurse). Report included the following information SBAR and Kardex.

## 2021-10-11 NOTE — DIALYSIS
Hemodialysis / [phone]    Vitals Pre Post Assessment Pre Post   BP BP: (!) 165/71 (10/11/21 1600) 158/84 LOC aox3 aox3   HR Pulse (Heart Rate): 85 (10/11/21 1600) 84 Lungs cta cta   Resp Resp Rate: 16 (10/11/21 1600) 16 Cardiac nsr nsr   Temp Temp: 98.5 °F (36.9 °C) (10/11/21 1530) 97.9 Skin wdi wdi   Weight Pre-Dialysis Weight: 72.6 kg (160 lb 0.9 oz) (10/08/21 1911)  Edema generalized generalized   Tele status   Pain Pain Intensity 1: 0 (10/11/21 1503) 0/10     Orders   Duration: Start: 9265 End: 1760 Total: 3hr   Dialyzer: Dialyzer/Set Up Inspection: Revaclear (10/11/21 1530)   K Bath: Dialysate K (mEq/L): 3 (10/11/21 1530)   Ca Bath: Dialysate CA (mEq/L): 2.5 (10/11/21 1530)   Na: Dialysate NA (mEq/L): 138 (10/11/21 1530)   Bicarb: Dialysate HCO3 (mEq/L): 35 (10/11/21 1530)   Target Fluid Removal: Goal/Amount of Fluid to Remove (mL): 1500 mL (10/11/21 1530)     Access   Type & Location: YENIFER Graft-edema to entire limb, +b/t, skin intact, skin prepped per policy and procedure   Comments:                                        Labs   HBsAg (Antigen) / date: Hepatitis B surface Ag   Date Value Ref Range Status   10/08/2021 <0.10 Index Final                                       HBsAb (Antibody) / date: Hep B surface Ab Interp. Date Value Ref Range Status   10/08/2021 NONREACTIVE NR   Final     Comment:     (NOTE)  The [employer] Anti-HBs2 assay is traceable to the [address] [place] [address]) Hepatitis B Immunoglobulin 1st International   Reference Preparation (4466). Samples with a calculated value of 10   mIU/mL or greater are considered reactive (protective) in accordance   with the CDC guidelines. The accepted criteria for immunity to HBV is   anti-HBs activity greater than or equal to 10 mIU/mL, as defined by   the [facility] International Reference Preparation.   Assay performance has not been established in pregnant women,   patients who are immunosuppressed or immunocompromised, nor have performance characteristics been established in conjunction with   other 's assays for specific HBV serologic markers. This   assay does not differentiate between vaccine induced immune response   and a response due to infection with HBV. Passively acquired anti-HBs   may be identified following patient transfusion, receipt of   immunoglobulin products, etc.        Source:    Obtained/Reviewed  Critical Results Called HGB   Date Value Ref Range Status   10/11/2021 7.3 (L) 11.5 - 16.0 g/dL Final     Potassium   Date Value Ref Range Status   10/11/2021 4.5 3.5 - 5.1 mmol/L Final     Calcium   Date Value Ref Range Status   10/11/2021 7.2 (L) 8.5 - 10.1 MG/DL Final     BUN   Date Value Ref Range Status   10/11/2021 81 (H) 6 - 20 MG/DL Final     Creatinine   Date Value Ref Range Status   10/11/2021 8.08 (H) 0.55 - 1.02 MG/DL Final        Meds Given   Name Dose Route   none                 Adequacy / Fluid    Total Liters Process:    Net Fluid Removed:       Comments   Time Out Done:   (Time) 1164   Admitting Diagnosis: esrd   Consent obtained/signed: Informed Consent Verified: Yes (10/11/21 1530)   Machine / Mary Chung # Machine Number: [id number] (10/11/21 1530)   Primary Nurse Rpt Pre: Mckenzie Sloan RN   Primary Nurse Rpt PostMauricsunday Garcia RN   Pt Education: Procedural   Care Plan: Cont current hd poc   Pts outpatient clinic: [facility]     Tx Summary   Comments:                       Patient tolerated well.  No issues during hd

## 2021-10-11 NOTE — DISCHARGE SUMMARY
Discharge Summary       PATIENT ID: Ryan Agudelo  MRN: [id number]   YOB: 1970    DATE OF ADMISSION: 10/8/2021  3:42 PM    DATE OF DISCHARGE: 10/11/2021   PRIMARY CARE PROVIDER: Mina Michaels MD     ATTENDING PHYSICIAN: Jelani Robison MD   DISCHARGING PROVIDER: Sunita uW MD    To contact this individual call [phone] and ask the  to page. If unavailable ask to be transferred the Adult Hospitalist Department. CONSULTATIONS: IP CONSULT TO NEPHROLOGY  IP CONSULT TO CARDIOLOGY    PROCEDURES/SURGERIES: * No surgery found *    ADMITTING DIAGNOSES & HOSPITAL COURSE:     This is a 62-year-old female with a significant medical history of CKD 5 due to diabetic nephropathy came to the ED for shortness of breath.  Patient was being prepared for dialysis and had left arm AVG a week ago. During my exam, patient sister with at the bedside.  Patient sitting upright in the bed, appears tachypneic. She denied chest pain.  She has edema to both arms and legs.  She denied cough, fever or chills.  She is fully vaccinated against Covid.   In the ED vital signs: RR 32, SPO2 91% on room air.  /66, heart rate 95, temperature 98.4  Lab: Hemoglobin 6.6.  CMP: CO2 12, anion gap 14, , creatinine 11.9, troponin high-sensitivity 891.  EKG NSR.  VBG, pH 7.1  Fluid over load secondary to ESRD    -probnp 10,176  -started HD on 10/8, 10/9  -improving, and plan for 10/11  -Nephrology on board  Severe anemia of chronic disease, iron deficiency   -Hgb was 6.6, s/p one unit of pRBC on 10/8, improved to 7.3  -on iv iron sucrose, continue on dialysis  -on epoetin  -no evidence of active bleeding, monitor H/H and to transfuse for Hgb <7.0  Elevated troponin   -possible due to fluid overload, ESRD and severe anemia , she has also poorly controlled diabtes   -no left side chest pain   -EKG normal sinus, vent rate 90 bpm, non specific st t wave  -continue on aspirin and lipitor  -stress test negative  -seen by cardiologist and cleared for discharge  T2DM poorly controlled with possible brittle diabetes   -had episode of hypoglycemia in the past  -A1c 6.6  -continue sliding scale and monitor finger stick glucose  -cons[facility] to [facility]  Asthma with minimal wheezing  -stable, has on and off cough  -on flonase, montelukast , prn duo neb, oxygen support  -SpO2 93-94% RA  -continue muccinex  GERD  -continue PPI  Hypothyroidism  -continue synthroid  Hx of Down syndrome  -continue supportive care        Code status: DNR  DVT prophylaxis: SCD      DISCHARGE DIAGNOSES / PLAN:       Fluid over load secondary to ESRD    -continue HD per Nephrology recommendation. Severe anemia of chronic disease, iron deficiency   -improved to 7.3  -continue iv iron sucrose  on dialysis  -on epoetin  Elevated troponin possible due to fluid overload, ESRD and severe anemia   -continue on aspirin and lipitor  -stress test negative  T2DM poorly controlled with possible brittle diabetes   -continue home regimen, sliding scale and monitor finger stick glucose  Asthma with minimal wheezing  -stable, has on and off cough  -on flonase, montelukast , prn duo neb,continue muccinex  GERD  -continue PPI  Hypothyroidism  -continue synthroid  Hx of Down syndrome  -continue supportive care         PENDING TEST RESULTS:   At the time of discharge the following test results are still pending: None    FOLLOW UP APPOINTMENTS:    Follow-up Information     Follow up With Specialties Details Why Contact Info   1. Giorgio Gottlieb MD Internal Medicine In one week   [address] [place] [address]  [address]. [phone]       2.  Follow up with Nephrology for HD    DIET: Diabetic Diet       ACTIVITY: Activity as tolerated    WOUND CARE: None    EQUIPMENT needed: None      DISCHARGE MEDICATIONS:  Current Discharge Medication List      START taking these medications    Details   lidocaine (XYLOCAINE) 4 % topical cream Apply  to affected area DIALYSIS PRN for Pain. Qty: 15 g, Refills: 0  Start date: 10/11/2021      metoprolol tartrate (LOPRESSOR) 25 mg tablet Take 1 Tablet by mouth every twelve (12) hours. Qty: 60 Tablet, Refills: 0  Start date: 10/11/2021      b complex-vitamin c-folic acid (NEPHROCAPS) 1 mg capsule Take 1 Capsule by mouth daily. Qty: 30 Capsule, Refills: 0  Start date: 10/12/2021         CONTINUE these medications which have NOT CHANGED    Details   patiromer calcium sorbitex (Veltassa) 16.8 gram powder Take  by mouth daily. diphenhydrAMINE (BenadryL) 25 mg capsule Take 25 mg by mouth nightly as needed. furosemide (LASIX PO) Take 10 mg by mouth every morning. cetirizine (ZyrTEC) 10 mg tablet Take 10 mg by mouth daily. cranberry 500 mg capsule Take 500 mg by mouth daily. guaiFENesin (ROBITUSSIN) 100 mg/5 mL liquid Take 100 mg by mouth every four (4) hours as needed for Cough. fluticasone propionate (Flonase Allergy Relief) 50 mcg/actuation nasal spray 2 Sprays by Both Nostrils route two (2) times a day. guaiFENesin-dextromethorphan (Diabetic Tussin DM)  mg/5 mL liqd Take 10 mL by mouth every six (6) hours as needed for Cough or Congestion. esomeprazole (NexIUM) 40 mg capsule Take 40 mg by mouth daily. L.acid,para-B. bifidum-S.therm (RISAQUAD) 8 billion cell cap cap Take 1 Capsule by mouth daily. insulin degludec Donavan Ilene FlexTouch U-100) 100 unit/mL (3 mL) inpn 10 Units by SubCUTAneous route nightly.      ergocalciferol (Vitamin D2) 1,250 mcg (50,000 unit) capsule Take 50,000 Units by mouth every Wednesday. ondansetron hcl (Zofran) 4 mg tablet Take 4 mg by mouth every four (4) hours as needed for Nausea or Vomiting.      montelukast (Singulair) 10 mg tablet Take 10 mg by mouth daily. levothyroxine (SYNTHROID) 100 mcg tablet Take 1 Tablet by mouth Daily (before breakfast). Qty: 90 Tablet, Refills: 3      biotin 5,000 mcg TbDi Take 5,000 mcg by mouth daily. cholecalciferol, vitamin D3, (VITAMIN D3) 2,000 unit tab Take 4,000 Units by mouth daily. insulin lispro (HUMALOG U-100 INSULIN) 100 unit/mL injection 3-4 Units by SubCUTAneous route Before breakfast, lunch, and dinner. Hold insulin if BS <80. Hold if patient does not eat her meal. If BS >150 give with SS.      atorvastatin (LIPITOR) 10 mg tablet Take 20 mg by mouth nightly. ibuprofen (MOTRIN) 400 mg tablet Take 400 mg by mouth every six (6) hours as needed for Pain (Fever). albuterol (PROVENTIL HFA, VENTOLIN HFA, PROAIR HFA) 90 mcg/actuation inhaler Take 1 Puff by inhalation every four (4) hours as needed for Wheezing. Qty: 1 Inhaler, Refills: 0      glucose 4 gram chewable tablet Take 12 g by mouth as needed (BS < 60). glucagon (GLUCAGEN) 1 mg injection 1 mg by IntraMUSCular route once as needed (hypoglycemia if patient is unresponsive). aspirin delayed-release 81 mg tablet Take 81 mg by mouth daily. NOTIFY YOUR PHYSICIAN FOR ANY OF THE FOLLOWING:   Fever over 101 degrees for 24 hours. Chest pain, shortness of breath, fever, chills, nausea, vomiting, diarrhea, change in mentation, falling, weakness, bleeding. Severe pain or pain not relieved by medications. Or, any other signs or symptoms that you may have questions about.     DISPOSITION:    Home With:   OT  PT  HH  RN      x Long term SNF/Inpatient Rehab    Independent/assisted living    Hospice    Other:       PATIENT CONDITION AT DISCHARGE:     Functional status    Poor     Deconditioned    x Independent      Cognition    x Down Syndrome    Forgetful     Dementia      Catheters/lines (plus indication)    Kaiser     PICC     PEG    x None      Code status    x Full code     DNR      PHYSICAL EXAMINATION AT DISCHARGE:  Patient Vitals for the past 24 hrs:   Temp Pulse Resp BP SpO2   10/11/21 1745 -- 83 16 139/61 --   10/11/21 1730 -- 88 16 (!) 148/67 --   10/11/21 1715 -- 85 16 (!) 124/56 --   10/11/21 1700 -- 83 16 (!) 177/61 --   10/11/21 1647 -- 86 16 (!) 144/61 --   10/11/21 1630 -- 85 16 133/64 --   10/11/21 1615 -- 86 16 (!) 159/68 --   10/11/21 1600 -- 85 16 (!) 165/71 --   10/11/21 1530 98.5 °F (36.9 °C) 80 16 (!) 180/72 95 %   10/11/21 1317 98.5 °F (36.9 °C) 83 16 (!) 152/75 95 %   10/11/21 0841 98.3 °F (36.8 °C) 81 16 (!) 145/67 93 %   10/11/21 0550 -- 77 -- -- --   10/11/21 0442 98.8 °F (37.1 °C) 71 16 (!) 148/74 97 %   10/11/21 0351 -- 70 -- -- --   10/11/21 0151 -- 74 -- -- --   10/10/21 2344 99.1 °F (37.3 °C) 76 18 139/68 99 %   10/10/21 1945 100 °F (37.8 °C) 71 20 (!) 156/69 99 %     General:          Alert, cooperative, no distress, appears stated age. HEENT:           Atraumatic, anicteric sclerae, pink conjunctivae                          No oral ulcers, mucosa moist, throat clear, dentition fair  Neck:               Supple, symmetrical  Lungs:             Clear to auscultation bilaterally. No Wheezing or Rhonchi. No rales. Chest wall:      No tenderness  No Accessory muscle use. Heart:              Regular  rhythm,  No  murmur   No edema  Abdomen:        Soft, non-tender. Not distended. Bowel sounds normal  Extremities:     No cyanosis. No clubbing,                            Skin turgor normal, Capillary refill normal  Skin:                Not pale. Not Jaundiced  No rashes   Psych:             Not anxious or agitated.   Neurologic:      Alert, moves all extremities, answers questions appropriately and responds to commands       Recent Results (from the past 24 hour(s))   GLUCOSE, POC    Collection Time: 10/10/21  9:39 PM   Result Value Ref Range    Glucose (POC) 331 (H) 65 - 117 mg/dL    Performed by Juli Bardales   [facility]    GLUCOSE, POC    Collection Time: 10/11/21 12:22 AM   Result Value Ref Range    Glucose (POC) 225 (H) 65 - 117 mg/dL    Performed by Lakia Beaver    TROPONIN-HIGH SENSITIVITY    Collection Time: 10/11/21 12:55 AM   Result Value Ref Range    Troponin-High Sensitivity 644 (HH) 0 - 51 ng/L   CBC W/O DIFF    Collection Time: 10/11/21 12:55 AM   Result Value Ref Range    WBC 10.0 3.6 - 11.0 K/uL    RBC 2.36 (L) 3.80 - 5.20 M/uL    HGB 7.3 (L) 11.5 - 16.0 g/dL    HCT 23.3 (L) 35.0 - 47.0 %    MCV 98.7 80.0 - 99.0 FL    MCH 30.9 26.0 - 34.0 PG    MCHC 31.3 30.0 - 36.5 g/dL    RDW 15.5 (H) 11.5 - 14.5 %    PLATELET 881 708 - 827 K/uL    MPV 10.7 8.9 - 12.9 FL    NRBC 0.0 0  WBC    ABSOLUTE NRBC 0.00 0.00 - 9.41 K/uL   METABOLIC PANEL, COMPREHENSIVE    Collection Time: 10/11/21 12:55 AM   Result Value Ref Range    Sodium 139 136 - 145 mmol/L    Potassium 4.5 3.5 - 5.1 mmol/L    Chloride 107 97 - 108 mmol/L    CO2 21 21 - 32 mmol/L    Anion gap 11 5 - 15 mmol/L    Glucose 188 (H) 65 - 100 mg/dL    BUN 81 (H) 6 - 20 MG/DL    Creatinine 8.08 (H) 0.55 - 1.02 MG/DL    BUN/Creatinine ratio 10 (L) 12 - 20      GFR est AA 6 (L) >60 ml/min/1.73m2    GFR est non-AA 5 (L) >60 ml/min/1.73m2    Calcium 7.2 (L) 8.5 - 10.1 MG/DL    Bilirubin, total 0.5 0.2 - 1.0 MG/DL    ALT (SGPT) 13 12 - 78 U/L    AST (SGOT) 18 15 - 37 U/L    Alk.  phosphatase 87 45 - 117 U/L    Protein, total 6.2 (L) 6.4 - 8.2 g/dL    Albumin 1.8 (L) 3.5 - 5.0 g/dL    Globulin 4.4 (H) 2.0 - 4.0 g/dL    A-G Ratio 0.4 (L) 1.1 - 2.2     GLUCOSE, POC    Collection Time: 10/11/21  7:29 AM   Result Value Ref Range    Glucose (POC) 200 (H) 65 - 117 mg/dL    Performed by Carson Rushing,[facility], POC    Collection Time: 10/11/21  1:18 PM   Result Value Ref Range    Glucose (POC) 178 (H) 65 - 117 mg/dL    Performed by [facility]    NUCLEAR CARDIAC STRESS TEST    Collection Time: 10/11/21  1:27 PM   Result Value Ref Range    Target  bpm    Exercise duration time 00:03:00     Stress Base Systolic  mmHg    Stress Base Diastolic BP 67 mmHg    Post peak  BPM    Baseline HR 71 BPM    Estimated workload 1.0 METS    Baseline  mmHg    Percent HR 61 %    Stress Base Diastolic BP 67 mmHg    Stress Rate Pressure Product 00,027 BPM*mmHg    Recovery Stage 1  bpm    Recovery Stage 1 /75 mmHg    Recovery Stage 2 HR 96 bpm    Recovery Stage 2 /71 mmHg   COVID-19 RAPID TEST    Collection Time: 10/11/21  3:06 PM   Result Value Ref Range    Specimen source Nasopharyngeal      COVID-19 rapid test Not detected NOTD     SARS-COV-2    Collection Time: 10/11/21  3:06 PM   Result Value Ref Range    SARS-CoV-2 Please find results under separate order       CHRONIC MEDICAL DIAGNOSES:  Problem List as of 10/11/2021 Date Reviewed: 1/18/2020        Codes Class Noted - Resolved    CKD (chronic kidney disease), stage V ([facility] ICD-10-CM: N18.5  ICD-9-CM: 585.5  10/8/2021 - Present        Fluid overload ICD-10-CM: E87.70  ICD-9-CM: 276.69  10/8/2021 - Present        Severe anemia ICD-10-CM: D64.9  ICD-9-CM: 285.9  10/8/2021 - Present        ESRD (end stage renal disease) ([facility] ICD-10-CM: N18.6  ICD-9-CM: 585.6  10/1/2021 - Present        Metabolic acidosis CZV-39-YM: E87.2  ICD-9-CM: 276.2  1/19/2020 - Present        Asthma exacerbation ICD-10-CM: J45.901  ICD-9-CM: 493.92  1/19/2020 - Present        ROBBY (acute kidney injury) ([facility] ICD-10-CM: N17.9  ICD-9-CM: 584.9  1/15/2020 - Present        Advance directive on file ICD-10-CM: Z78.9  ICD-9-CM: V49.89  12/28/2015 - Present        Acne ICD-10-CM: L70.9  ICD-9-CM: 706.1  8/20/2014 - Present        Asthma (Chronic) ICD-10-CM: J45.909  ICD-9-CM: 493.90  4/29/2013 - Present        HTN (hypertension) (Chronic) ICD-10-CM: I10  ICD-9-CM: 401.9  4/29/2013 - Present        DM (diabetes mellitus), type 1, uncontrolled ([facility] ICD-10-CM: E10.65  ICD-9-CM: 250.03  11/14/2012 - Present        Hypoglycemia ICD-10-CM: E16.2  ICD-9-CM: 251.2  6/27/2012 - Present        Down syndrome ICD-10-CM: Q90.9  ICD-9-CM: 758.0  7/23/2010 - Present        Acquired hypothyroidism ICD-10-CM: E03.9  ICD-9-CM: 244.9  7/23/2010 - Present        Anemia, unspecified ICD-10-CM: D64.9  ICD-9-CM: 285.9  7/23/2010 - Present RESOLVED: Hyponatremia ICD-10-CM: E87.1  ICD-9-CM: 276.1  2/28/2018 - 1/1/2019        RESOLVED: DKA (diabetic ketoacidoses) ICD-10-CM: E11.10  ICD-9-CM: 250.12  9/14/2017 - 1/1/2019        RESOLVED: Hyperglycemia ICD-10-CM: R73.9  ICD-9-CM: 790.29  7/28/2014 - 7/29/2014        RESOLVED: Syncope and collapse ICD-10-CM: R55  ICD-9-CM: 780.2  4/29/2013 - 1/1/2019        RESOLVED: Type II or unspecified type diabetes mellitus without mention of complication, uncontrolled ICD-10-CM: [id number]  ICD-9-CM: 250.02  7/23/2010 - 11/14/2012              Greater than 45 minutes were spent with the patient on counseling and coordination of care    Signed:   Salomón Florian MD  10/11/2021  2:42 PM

## 2021-10-11 NOTE — PROGRESS NOTES
RENAL  PROGRESS NOTE        Subjective:   Unable to find in room  Objective:   VITALS SIGNS:    Visit Vitals  BP (!) 145/67 (BP 1 Location: Right upper arm, BP Patient Position: Sitting)   Pulse 81   Temp 98.3 °F (36.8 °C)   Resp 16   Ht 4' 11\" (1.499 m)   Wt 75.8 kg (167 lb 1.7 oz)   LMP 1986   SpO2 93%   BMI 33.75 kg/m²       O2 Device: None (Room air)   O2 Flow Rate (L/min): 2 l/min   Temp (24hrs), Av °F (37.2 °C), Min:98.3 °F (36.8 °C), Max:100 °F (37.8 °C)         PHYSICAL EXAM:  deferred  DATA REVIEW:     INTAKE / OUTPUT:   Last shift:      No intake/output data recorded. Last 3 shifts: 10/09 1901 - 10/11 0700  In: 600 [P.O.:600]  Out: -     Intake/Output Summary (Last 24 hours) at 10/11/2021 1219  Last data filed at 10/10/2021 1814  Gross per 24 hour   Intake 360 ml   Output --   Net 360 ml         LABS:   Recent Labs     10/11/21  0055 10/10/21  1520 10/09/21  0421   WBC 10.0 9.6 9.2   HGB 7.3* 7.2* 7.5*   HCT 23.3* 22.0* 23.4*    177 166     Recent Labs     10/11/21  0055 10/10/21  1520 10/09/21  0421 10/08/21  1407 10/08/21  1407    134* 138   < > 134*   K 4.5 4.5 4.2   < > 4.9    104 107   < > 108   CO2 21 19* 19*   < > 12*   * 446* 225*   < > 213*   BUN 81* 71* 91*   < > 116*   CREA 8.08* 8.00* 9.16*   < > 11.90*   CA 7.2* 7.1* 7.2*   < > 7.8*   PHOS  --   --  8.0*  --   --    ALB 1.8*  --  1.9*  --  2.0*   TBILI 0.5  --  0.3  --  0.2   ALT 13  --  16  --  17    < > = values in this interval not displayed.        ESRD  Diabetic nephropathy  Anemia  Uremia with acidosis  (L) avg with staples    Graft placed 10/1   HD # 3 today  CM --outpatient HD to be set up at [facility] unit   discussed with her family

## 2021-10-11 NOTE — PROGRESS NOTES
JEAN:  1. RUR-21%  2. Admitted from the [facility] healthcare unit. 3. Cardiology/Nephrology following. 4. Family transport today on discharge. 5. HD OP unit setup MWF 10:45 a.m.     RN call report to [facility] at [phone] Ask for Jeanne Quesada. RN can also call [phone]. HOA spoke with Regina Kenji at [facility], [phone]. She confirmed that the patient family is very involved with her care and arranging transport for HD. HOA spoke with Srikanth Blanco at [facility] where the patient plans to go. ([phone]). Ileana Anguiano confirmed they have the referral from Nephrology Specialist but was waiting to accept her until she had permanent access for HD, and waiting for the graft to heal.  There is no chair time in place at this time. HOA will follow up with medical team during IDRs. [phone]- HOA spoke with Sabrina Schultz at [facility] [phone], she confirmed that the patient has a chair time of 10:45 a.m. MWF. She also notified the patient sister who said that would out for transportation needs. HOA faxed hep b and chest xray to Sabrina Schultz at [phone] HOA notified by attending MD that patient would discharge today after HD. HOA spoke with Mary Snyder [phone] who requested for chest xray, covid rapid and PCR and the patient could come today. HOA updated Sabrina Schultz at the HD unit, and informed her to expect the patient on Wednesday. Her family will transport her back home today.     [facility] GauthierSt[facility] Facility

## 2021-10-11 NOTE — PROGRESS NOTES
[address] [facility] Adult  Hospitalist Group                                                                                          Hospitalist Progress Note  Ivette Gates MD  Answering service: [phone] from in house phone        Date of Service:  10/11/2021  NAME:  Lafonda Phalen Noftsinger  :  1970  MRN:  [id number]      Admission Summary: This is a 54-year-old female with a significant medical history of CKD 5 due to diabetic nephropathy came to the ED for shortness of breath. Patient was being prepared for dialysis and had left arm AVG a week ago. During my exam, patient sister with at the bedside. Patient sitting upright in the bed, appears tachypneic. She denied chest pain. She has edema to both arms and legs. She denied cough, fever or chills. She is fully vaccinated against Covid. In the ED vital signs: RR 32, SPO2 91% on room air. /66, heart rate 95, temperature 98.4  Lab: Hemoglobin 6.6. CMP: CO2 12, anion gap 14, , creatinine 11.9, troponin high-sensitivity 891.   EKG NSR.  VBG, pH 7.1    Interval history / Subjective:     Patient poor historian, she denied chest pain or shortness of breath, has on and off cough     Assessment & Plan:     Fluid over load secondary to ESRD    -probnp 10,176  -started HD on 10/8, 10/9  -improving, and plan for 10/11  -Nephrology on board    Severe anemia of chronic disease, iron deficiency   -Hgb was 6.6, s/p one unit of pRBC on 10/8, improved to 7.3  -on iv iron sucrose  -on epoetin  -no evidence of active bleeding, monitor H/H and to transfuse for Hgb <7.0    Elevated troponin   -possible due to fluid overload, ESRD and severe anemia , she has also poorly controlled diabtes   -no left side chest pain   -EKG normal sinus, vent rate 90 bpm, non specific st t wave  -continue on aspirin and lipitor  -stress test negative  -seen by cardiologist and cleared for discharge    T2DM poorly controlled with possible brittle diabetes -had episode of hypoglycemia in the past  -A1c 6.6  -continue sliding scale and monitor finger stick glucose  -consult to DTC    Asthma with minimal wheezing  -has on and off cough  -on flonase, montelukast , prn duo neb, oxygen support  -SpO2 93-94% RA  -add muccinex    GERD  -continue PPI    Hypothyroidism  -continue synthroid    Hx of Down syndrome  -continue supportive care       Code status: DNR  DVT prophylaxis: SCD    Care Plan discussed with: Patient/Family, Nurse and   Anticipated Disposition: Home w/Family  Anticipated Discharge: In 24 hours    Her mother at bedside, case discussed and answered her questions     Hospital Problems  Date Reviewed: 1/18/2020        Codes Class Noted POA    CKD (chronic kidney disease), stage V ([facility] ICD-10-CM: N18.5  ICD-9-CM: 585.5  10/8/2021 Unknown        Fluid overload ICD-10-CM: E87.70  ICD-9-CM: 276.69  10/8/2021 Unknown        Severe anemia ICD-10-CM: D64.9  ICD-9-CM: 285.9  10/8/2021 Unknown        Metabolic acidosis SPL-70-BJ: E87.2  ICD-9-CM: 276.2  1/19/2020 Unknown                   Vital Signs:    Last 24hrs VS reviewed since prior progress note. Most recent are:  Visit Vitals  BP (!) 152/75 (BP 1 Location: Right upper arm, BP Patient Position: At rest)   Pulse 83   Temp 98.5 °F (36.9 °C)   Resp 16   Ht 4' 11\" (1.499 m)   Wt 75.8 kg (167 lb 1.7 oz)   SpO2 95%   BMI 33.75 kg/m²         Intake/Output Summary (Last 24 hours) at 10/11/2021 1419  Last data filed at 10/10/2021 1814  Gross per 24 hour   Intake 120 ml   Output --   Net 120 ml        Physical Examination:     I had a face to face encounter with this patient and independently examined them on 10/11/2021 as outlined below:          Constitutional:  No acute distress, cooperative, pleasant    ENT:  Oral mucosa moist, oropharynx benign. Resp:  Decrease bronchial breath sound bilaterally. No wheezing/rhonchi/rales.  No accessory muscle use   CV:  Regular rhythm, normal rate, no murmurs, gallops, rubs    GI:  Soft, non distended, non tender. normoactive bowel sounds, no hepatosplenomegaly     Musculoskeletal:  Bilateral pretibial, pedal and both hands edema    Neurologic:  Conscious and alert, answer some questions, moves all extremities, CN II-XII reviewed            Data Review:    Review and/or order of clinical lab test  Review and/or order of tests in the radiology section of CPT  Review and/or order of tests in the medicine section of CPT      Labs:     Recent Labs     10/11/21  0055 10/10/21  1520   WBC 10.0 9.6   HGB 7.3* 7.2*   HCT 23.3* 22.0*    177     Recent Labs     10/11/21  0055 10/10/21  1520 10/09/21  0421    134* 138   K 4.5 4.5 4.2    104 107   CO2 21 19* 19*   BUN 81* 71* 91*   CREA 8.08* 8.00* 9.16*   * 446* 225*   CA 7.2* 7.1* 7.2*   PHOS  --   --  8.0*   URICA  --  7.0*  --      Recent Labs     10/11/21  0055 10/09/21  0421   ALT 13 16   AP 87 96   TBILI 0.5 0.3   TP 6.2* 6.1*   ALB 1.8* 1.9*   GLOB 4.4* 4.2*     No results for input(s): INR, PTP, APTT, INREXT, INREXT in the last 72 hours. Recent Labs     10/08/21  1715   TIBC 199*   PSAT 15*   FERR 104      Lab Results   Component Value Date/Time    Folate 28.7 (H) 12/15/2018 05:09 AM      No results for input(s): PH, PCO2, PO2 in the last 72 hours. No results for input(s): CPK, CKNDX, TROIQ in the last 72 hours.     No lab exists for component: CPKMB  Lab Results   Component Value Date/Time    Cholesterol, total 107 12/16/2018 04:08 AM    HDL Cholesterol 42 12/16/2018 04:08 AM    LDL, calculated 36.4 12/16/2018 04:08 AM    Triglyceride 143 12/16/2018 04:08 AM    CHOL/HDL Ratio 2.5 12/16/2018 04:08 AM     Lab Results   Component Value Date/Time    Glucose (POC) 178 (H) 10/11/2021 01:18 PM    Glucose (POC) 200 (H) 10/11/2021 07:29 AM    Glucose (POC) 225 (H) 10/11/2021 12:22 AM    Glucose (POC) 331 (H) 10/10/2021 09:39 PM    Glucose (POC) 436 (H) 10/10/2021 05:25 PM     Lab Results   Component Value Date/Time    Color YELLOW/STRAW 01/15/2020 11:59 AM    Appearance CLEAR 01/15/2020 11:59 AM    Specific gravity 1.010 01/15/2020 11:59 AM    Specific gravity 1.015 04/29/2013 01:50 PM    pH (UA) 6.5 01/15/2020 11:59 AM    Protein 300 (A) 01/15/2020 11:59 AM    Glucose 250 (A) 01/15/2020 11:59 AM    Ketone NEGATIVE  01/15/2020 11:59 AM    Bilirubin NEGATIVE  01/15/2020 11:59 AM    Urobilinogen 0.2 01/15/2020 11:59 AM    Nitrites NEGATIVE  01/15/2020 11:59 AM    Leukocyte Esterase NEGATIVE  01/15/2020 11:59 AM    Glucose/Ketones  02/08/2009 07:00 AM     GLUCOSE AND KETONES SIGNIFICANTLY ELEVATED.  RESULTS PHONED TO AND READ BACK BY    Epithelial cells RODOLFO 01/15/2020 11:59 AM    Bacteria NEGATIVE  01/15/2020 11:59 AM    WBC 0-4 01/15/2020 11:59 AM    RBC 0-5 01/15/2020 11:59 AM         Medications Reviewed:     Current Facility-Administered Medications   Medication Dose Route Frequency    metoprolol tartrate (LOPRESSOR) tablet 25 mg  25 mg Oral Q12H    iron sucrose (VENOFER) injection 100 mg  100 mg IntraVENous DIALYSIS MON, WED & FRI    lidocaine (XYLOCAINE) 4 % cream   Topical DIALYSIS PRN    guaiFENesin ER (MUCINEX) tablet 600 mg  600 mg Oral Q12H    insulin lispro (HUMALOG) injection   SubCUTAneous AC&HS    glucose chewable tablet 16 g  4 Tablet Oral PRN    dextrose (D50W) injection syrg 12.5-25 g  12.5-25 g IntraVENous PRN    glucagon (GLUCAGEN) injection 1 mg  1 mg IntraMUSCular PRN    atorvastatin (LIPITOR) tablet 20 mg  20 mg Oral QHS    pantoprazole (PROTONIX) tablet 40 mg  40 mg Oral ACB    levothyroxine (SYNTHROID) tablet 100 mcg  100 mcg Oral ACB    L.acidophilus-paracasei-S.thermophil-bifidobacter (RISAQUAD) 8 billion cell capsule  1 Capsule Oral DAILY    montelukast (SINGULAIR) tablet 10 mg  10 mg Oral DAILY    ondansetron (ZOFRAN ODT) tablet 4 mg  4 mg Oral Q4H PRN    fluticasone propionate (FLONASE) 50 mcg/actuation nasal spray 2 Spray  2 Spray Both Nostrils BID    diphenhydrAMINE (BENADRYL) capsule 25 mg  25 mg Oral QHS PRN    cholecalciferol (VITAMIN D3) (1000 Units /25 mcg) tablet 4,000 Units  4,000 Units Oral DAILY    cetirizine (ZYRTEC) tablet 10 mg  10 mg Oral DAILY    0.9% sodium chloride infusion 250 mL  250 mL IntraVENous PRN    epoetin jerry-epbx (RETACRIT) injection 10,000 Units  10,000 Units SubCUTAneous Q MON, WED & FRI    albumin human 25% (BUMINATE) solution 25 g  25 g IntraVENous PRN    B complex-vitaminC-folic acid (NEPHROCAP) cap  1 Capsule Oral DAILY    albuterol-ipratropium (DUO-NEB) 2.5 MG-0.5 MG/3 ML  3 mL Nebulization Q4H PRN     ______________________________________________________________________  EXPECTED LENGTH OF STAY: 2d 14h  ACTUAL LENGTH OF STAY:          3                 Larissa Boudreaux MD

## 2021-10-11 NOTE — PROGRESS NOTES
Bedside shift change report given to Cameron Azul (oncoming nurse) by Nisa Lawrence (offgoing nurse). Report included the following information SBAR, Kardex and MAR.

## 2021-10-14 NOTE — PROGRESS NOTES
Patient has graduated from the Transitions of Care Coordination  program on 10/14/2021. Patient readmitted and discharged to [facility]. Goals Addressed                 This Visit's Progress     COMPLETED: Attends follow-up appointments as directed. 10/04/21  CTN spoke to patient mother Vanessa Jacques. She declined CTN offer to assist with appointment scheduling as she states she does not drive and has another family member provide transport. CTN informed her that patient needs to follow up with the following MD:    PCP Dr Elda Aly mother, Dr Shweta Feng comes to [facility] every Wednesday and she will call to make an appt for Wednesday. Dr Sanjiv Hurt, vascular surgeon--for post op follow up    Dr Ramirez--nephrology---to schedule dialysis for patient     CTN will follow up next week---mkrw       COMPLETED: Prevent complications post hospitalization. 10/04/21  CTN called patient mother who states patient is sleeping at time of call. Mother reports that sutures are intact and that incision \"looks good. \"    CTN looked over d/c instructions and can not find any post op wound care orders. Mother states that she does not have any additional directions as to how to care for graft. CTN contacted Vascular Surgeon office, spoke to Alexa to report that there are no care orders and that pain medication script was sent to mail order pharmacy. Alexa states that she will have a nurse call patient mother to review graft care and address pain meds. Per patient mother, patient has been doing well on just tylenol over the weekend and has not c/o pain. She denies patient has fever or any s/sx infection. CTN will follow up next week---mkrw    10/14/21  CTN reviewed chart to find that patient readmitted to [facility] on 10/8--10/11 , was discharged to [facility] unit.  CTN will close JEAN at this time---mkrw            Patient has Care Transition Nurse's contact information for any further questions, concerns, or needs. Patients upcoming visits:  No future appointments.

## 2021-10-25 PROBLEM — J96.01 ACUTE RESPIRATORY FAILURE WITH HYPOXIA (HCC): Status: ACTIVE | Noted: 2021-01-01

## 2021-10-25 NOTE — CONSULTS
Assessment:  ESRD: MWF [facility]  Acute resp failure: +Hypoxia/Pulm edema. Requiring BIPAP  HTN: stable  DM2  Anemia 2 to ESRD: hgb below goal    Plan/Recommendations:  HD today  Consider PUF tomorrow if needed  Ct efforts to drive EDW down as tolerated  ESRD education-> needs to do better limiting dietary salt intake and fluid intake  LUCERO  AM labs    Discussed with patient/brother and HD RN    Thanks for the consultation. Renal service will follow patient with you. Please contact me with any questions or concerns. Initial Consult note         Patient name: Rosa Isela Chu  MR no: [id number]  Date of admission: 10/24/2021  Date of consultation: 10/25/2021  Requested by: Dr. Rishabh Shannon  Reason for consult: ESRD    Patient seen and examined. History obtained from patient and chart review. Relevant labs, data and notes reviewed. HPI: Rosa Isela Chu is a 45 y.o. female with PMH significant for recent ESRD on chronic HD MWF at [facility], DM2 presented with worsening SOB.+Hypoxia. Placed on BIPAP. CXR c/w pulmonary edema. Emergent HD set up  Seen during HD at 10:45am. VSS. Patient feeling better. Brother at bedside. Last HD was 10/22/21. Admits to some excessive dietary salt intake yesterday.      PMH:  Past Medical History:   Diagnosis Date    Allergic rhinitis, cause unspecified     Asthma     use inhalers    Diabetes ([address]     Down's syndrome     GERD (gastroesophageal reflux disease)     H/O impacted cerumen     Dr. Solomon Fairly Headache     Hypothyroid     Mononucleosis     Pneumonia     Seizures ([address]     as a result of low blood glucose readings    Thyroid disease      PSH:  Past Surgical History:   Procedure Laterality Date    HX CATARACT REMOVAL Bilateral 3/5/15    cataract left and right    HX CYST REMOVAL      right shoulder    HX HEENT Bilateral 2017    [facility]   [address] [facility]       Social history:   Social History     Tobacco Use    Smoking status: Never Smoker    Smokeless tobacco: Never Used   Vaping Use    Vaping Use: Never used   Substance Use Topics    Alcohol use: No    Drug use: No       Family history:  Not contributory    Allergies   Allergen Reactions    Codeine Nausea and Vomiting    Nitrofurantoin Rash    Pcn [Penicillins] Hives and Rash       Current Facility-Administered Medications   Medication Dose Route Frequency Last Admin    sodium chloride (NS) flush 5-40 mL  5-40 mL IntraVENous Q8H 10 mL at 10/25/21 0505    sodium chloride (NS) flush 5-40 mL  5-40 mL IntraVENous PRN      acetaminophen (TYLENOL) tablet 650 mg  650 mg Oral Q6H PRN      Or    acetaminophen (TYLENOL) suppository 650 mg  650 mg Rectal Q6H PRN      polyethylene glycol (MIRALAX) packet 17 g  17 g Oral DAILY PRN      ondansetron (ZOFRAN ODT) tablet 4 mg  4 mg Oral Q8H PRN      Or    ondansetron (ZOFRAN) injection 4 mg  4 mg IntraVENous Q6H PRN      heparin (porcine) injection 5,000 Units  5,000 Units SubCUTAneous Q12H      atorvastatin (LIPITOR) tablet 20 mg  20 mg Oral QHS      montelukast (SINGULAIR) tablet 10 mg  10 mg Oral DAILY      metoprolol tartrate (LOPRESSOR) tablet 25 mg  25 mg Oral Q12H      albuterol (PROVENTIL VENTOLIN) nebulizer solution 2.5 mg  2.5 mg Nebulization Q4H PRN      cetirizine (ZYRTEC) tablet 10 mg  10 mg Oral DAILY      levothyroxine (SYNTHROID) tablet 100 mcg  100 mcg Oral ACB      insulin lispro (HUMALOG) injection   SubCUTAneous AC&HS 7 Units at 10/25/21 1019    glucose chewable tablet 16 g  4 Tablet Oral PRN      dextrose (D50W) injection syrg 12.5-25 g  12.5-25 g IntraVENous PRN      glucagon (GLUCAGEN) injection 1 mg  1 mg IntraMUSCular PRN      insulin glargine (LANTUS) injection 10 Units  10 Units SubCUTAneous DAILY 10 Units at 10/25/21 1019     Current Outpatient Medications   Medication Sig Dispense    lidocaine (XYLOCAINE) 4 % topical cream Apply  to affected area DIALYSIS PRN for Pain. 15 g    metoprolol tartrate (LOPRESSOR) 25 mg tablet Take 1 Tablet by mouth every twelve (12) hours. 60 Tablet    b complex-vitamin c-folic acid (NEPHROCAPS) 1 mg capsule Take 1 Capsule by mouth daily. 30 Capsule    patiromer calcium sorbitex (Veltassa) 16.8 gram powder Take  by mouth daily.  diphenhydrAMINE (BenadryL) 25 mg capsule Take 25 mg by mouth nightly as needed.  furosemide (LASIX PO) Take 10 mg by mouth every morning.  cetirizine (ZyrTEC) 10 mg tablet Take 10 mg by mouth daily.  cranberry 500 mg capsule Take 500 mg by mouth daily.  guaiFENesin (ROBITUSSIN) 100 mg/5 mL liquid Take 100 mg by mouth every four (4) hours as needed for Cough.  fluticasone propionate (Flonase Allergy Relief) 50 mcg/actuation nasal spray 2 Sprays by Both Nostrils route two (2) times a day.  guaiFENesin-dextromethorphan (Diabetic Tussin DM)  mg/5 mL liqd Take 10 mL by mouth every six (6) hours as needed for Cough or Congestion.  esomeprazole (NexIUM) 40 mg capsule Take 40 mg by mouth daily.  L.acid,para-B. bifidum-S.therm (RISAQUAD) 8 billion cell cap cap Take 1 Capsule by mouth daily.  insulin degludec Lucas [employer] FlexTouch U-100) 100 unit/mL (3 mL) inpn 10 Units by SubCUTAneous route nightly.  ergocalciferol (Vitamin D2) 1,250 mcg (50,000 unit) capsule Take 50,000 Units by mouth every Wednesday.  ondansetron hcl (Zofran) 4 mg tablet Take 4 mg by mouth every four (4) hours as needed for Nausea or Vomiting.  montelukast (Singulair) 10 mg tablet Take 10 mg by mouth daily.  levothyroxine (SYNTHROID) 100 mcg tablet Take 1 Tablet by mouth Daily (before breakfast). 90 Tablet    biotin 5,000 mcg TbDi Take 5,000 mcg by mouth daily.  cholecalciferol, vitamin D3, (VITAMIN D3) 2,000 unit tab Take 4,000 Units by mouth daily.  insulin lispro (HUMALOG U-100 INSULIN) 100 unit/mL injection 3-4 Units by SubCUTAneous route Before breakfast, lunch, and dinner.  Hold insulin if BS <80. Hold if patient does not eat her meal. If BS >150 give with SS.     atorvastatin (LIPITOR) 10 mg tablet Take 20 mg by mouth nightly.  ibuprofen (MOTRIN) 400 mg tablet Take 400 mg by mouth every six (6) hours as needed for Pain (Fever).  albuterol (PROVENTIL HFA, VENTOLIN HFA, PROAIR HFA) 90 mcg/actuation inhaler Take 1 Puff by inhalation every four (4) hours as needed for Wheezing. 1 Inhaler    glucose 4 gram chewable tablet Take 12 g by mouth as needed (BS < 60).  glucagon (GLUCAGEN) 1 mg injection 1 mg by IntraMUSCular route once as needed (hypoglycemia if patient is unresponsive).  aspirin delayed-release 81 mg tablet Take 81 mg by mouth daily. ROS (besides HPI):    UTO/BIPAP    Objective   Visit Vitals  BP (!) 101/59   Pulse 76   Temp 98.7 °F (37.1 °C)   Resp 20   LMP 03/27/1986   SpO2 98%       Physical Exam:    Gen:BIPAP/Down syndrome facies    HEENT: AT/NC, BIPAP    Neck: no JVD, no cervical lymphadenopathy, no carotid bruit    Lungs/Chest wall: B/l diminished breath sounds     Cardiovascular: Normal S1/S2, normal rate, regular rhythm. Abdomen: soft, NT, ND, BS+, no HSM    Ext: no clubbing or cyanosis. No edema    Skin: warm and dry.  No rashes    : no  colin    CNS: listless    Labs/Data:    Lab Results   Component Value Date/Time    Sodium 139 10/24/2021 11:58 PM    Potassium 4.3 10/24/2021 11:58 PM    Chloride 106 10/24/2021 11:58 PM    CO2 27 10/24/2021 11:58 PM    Anion gap 6 10/24/2021 11:58 PM    Glucose 139 (H) 10/24/2021 11:58 PM    BUN 46 (H) 10/24/2021 11:58 PM    Creatinine 5.59 (H) 10/24/2021 11:58 PM    BUN/Creatinine ratio 8 (L) 10/24/2021 11:58 PM    GFR est AA 10 (L) 10/24/2021 11:58 PM    GFR est non-AA 8 (L) 10/24/2021 11:58 PM    Calcium 8.1 (L) 10/24/2021 11:58 PM       Lab Results   Component Value Date/Time    WBC 13.9 (H) 10/24/2021 11:58 PM    Hemoglobin (POC) 12.2 11/05/2012 03:10 PM    HGB 8.0 (L) 10/24/2021 11:58 PM    Hematocrit (POC) 36 11/05/2012 03:10 PM    HCT 26.7 (L) 10/24/2021 11:58 PM    PLATELET 827 20/90/6110 11:58 PM    .4 (H) 10/24/2021 11:58 PM       Urine analysis:   Results for orders placed or performed in visit on 01/26/15   AMB POC URINALYSIS DIP STICK AUTO W/O MICRO     Status: Abnormal   Result Value Ref Range Status    Color (UA POC) Yellow  Final    Clarity (UA POC) Clear  Final    Glucose (UA POC) Negative Negative Final    Bilirubin (UA POC) Negative Negative Final    Ketones (UA POC) Negative Negative Final    Specific gravity (UA POC) 1.015 1.001 - 1.035 Final    Blood (UA POC) Negative Negative Final    pH (UA POC) 6.0 4.6 - 8.0 Final    Protein (UA POC) 2+ Negative mg/dL Final    Urobilinogen (UA POC) 0.2 mg/dL 0.2 - 1 Final    Nitrites (UA POC) Negative Negative Final    Leukocyte esterase (UA POC) Negative Negative Final         No components found for: SPEP, UPEP  No results found for: PUQ, PROTU2, PROTU1, BJP1, CPE1, IMEL1, MET2  No results found for: MCACR, MCA1, MCA2, MCA3, MCAU, MCAU2, MCALPOCT      Intake/Output Summary (Last 24 hours) at 10/25/2021 1248  Last data filed at 10/25/2021 1205  Gross per 24 hour   Intake --   Output 2300 ml   Net -2300 ml       Wt Readings from Last 3 Encounters:   10/11/21 75.8 kg (167 lb 1.7 oz)   10/01/21 70 kg (154 lb 5.2 oz)   09/27/21 69.7 kg (153 lb 10.6 oz)       Signed by:  Blossom Llanos MD  Nephrology and Hypertension  [facility]

## 2021-10-25 NOTE — PROCEDURES
Hemodialysis / [phone]    Vitals Pre Post Assessment Pre Post   BP BP: (!) 154/70 (10/25/21 0905) 101/59 LOC A&OX4 A&OX4   HR Pulse (Heart Rate): 76 (10/25/21 0905) 76 Lungs diminished diminished   Resp Resp Rate: 20 (10/25/21 0905) 20 Cardiac NSR NSR   Temp Temp: 98.7 °F (37.1 °C) (10/25/21 0905) 98.5 Skin Warm and dry Warm and dry   Weight    Edema none none   Tele status   Pain Pain Intensity 1: 0 (10/25/21 0900)      Orders   Duration: Start: 2592 End: 1205 Total: 3 hours   Dialyzer: Dialyzer/Set Up Inspection: Revaclear (10/25/21 0905)   Verlyn Bolus Bath: Dialysate K (mEq/L): 3 (10/25/21 0905)   Ca Bath: Dialysate CA (mEq/L): 2.5 (10/25/21 0905)   Na: Dialysate NA (mEq/L): 140 (10/25/21 0905)   Bicarb: Dialysate HCO3 (mEq/L): 35 (10/25/21 0905)   Target Fluid Removal: Goal/Amount of Fluid to Remove (mL): 3000 mL (10/25/21 0905)     Access   Type & Location:    Comments:                                        Labs   HBsAg (Antigen) / date:                                               HBsAb (Antibody) / date:    Source:    Obtained/Reviewed  Critical Results Called HGB   Date Value Ref Range Status   10/24/2021 8.0 (L) 11.5 - 16.0 g/dL Final     Potassium   Date Value Ref Range Status   10/24/2021 4.3 3.5 - 5.1 mmol/L Final     Comment:     SPECIMEN HEMOLYZED, RESULTS MAY BE AFFECTED     Calcium   Date Value Ref Range Status   10/24/2021 8.1 (L) 8.5 - 10.1 MG/DL Final     BUN   Date Value Ref Range Status   10/24/2021 46 (H) 6 - 20 MG/DL Final     Creatinine   Date Value Ref Range Status   10/24/2021 5.59 (H) 0.55 - 1.02 MG/DL Final        Meds Given   Name Dose Route   none                 Adequacy / Fluid    Total Liters Process: 62.5 liters   Net Fluid Removed: 2300 ml      Comments   Time Out Done:   (Time) Yes, 1867   Admitting Diagnosis:    Consent obtained/signed: Informed Consent Verified: Yes (10/25/21 0905)   Machine / Hoy Cocks # Machine Number: [id number] (10/25/21 6589)   Primary Nurse Rpt Pre: David Constantino RN Primary Nurse Rpt Post: Samra Lynn RN   Pt Education: procedural   Care Plan: HD with volume removal   Pts outpatient clinic: [facility] Summary   Comments: Patients BP dropped into the 80s/low 90s a couple of times. Saline given and UF off, then it recovered . No other problems and no complaints. Report to Abraham Rojas.

## 2021-10-25 NOTE — PROGRESS NOTES
Bedside shift change report given to Igor Dao RN (oncoming nurse) by Kimberlyn Hood (offgoing nurse).  Report included the following information SBAR, Intake/Output, MAR, Recent Results and Cardiac Rhythm SR.

## 2021-10-25 NOTE — ED NOTES
Spoke with Dr. Olga Chow in nephrology.  He will speak to [facility] about dialysis today

## 2021-10-25 NOTE — PROGRESS NOTES
[address] [facility] Adult  Hospitalist Group                                                                                          Hospitalist Progress Note  Kelby Pereira MD  Answering service: [phone] OR 36 from in house phone        Date of Service:  10/25/2021  NAME:  Rajesh Turner  :  1970  MRN:  [id number]      Admission Summary:   45 y.o lady w/ Down syndrome, asthma, DM, ESRD recently started on HD MWF, who presents with shortness of breath. Interval history / Subjective:     Pt seen and examined  ABG showed Respiratory acidosis with pH 7.2, PCO2 51, placed on BIPAP  Pt seen in ER #17, sitting up  In bed, calm and cooperative, wants BIPAP off       Assessment & Plan:     Acute respiratory failure with hypoxia and hypercapnia  Volume Overload   - BIPAP for now   - Consulted Nephrology for urgent HD  - minimal urine output with Lasix 120mg IV in Er   - Add duoneb     ESRD on HD  - c/w MWF  - management per renal team     Type 2 DM   - lantus 10U and SSI    HTN   -c/w lopressor   - recent stress negative for ischemia, will d/c ASA    HLD  - c/w statin    Hypothyroidism  - c/w synthroid    Ashtma  - c/w Singulair     Down Syndrome  - supportive care     Code status: DNR  DVT prophylaxis: Hep SQ    Care Plan discussed with: Patient/Family and Nurse  Anticipated Disposition: Home w/Family  Anticipated Discharge: 24 hours to 48 hours     Hospital Problems  Date Reviewed: 2020        Codes Class Noted POA    Acute respiratory failure with hypoxia [facility]) ICD-10-CM: J96.01  ICD-9-CM: 518.81  10/25/2021 Unknown                Review of Systems:   A comprehensive review of systems was negative except for that written in the HPI. Vital Signs:    Last 24hrs VS reviewed since prior progress note.  Most recent are:  Visit Vitals  BP (!) 107/59   Pulse 80   Temp 98.7 °F (37.1 °C)   Resp 20   SpO2 98%       No intake or output data in the 24 hours ending 10/25/21 1127     Physical Examination:     I had a face to face encounter with this patient and independently examined them on 10/25/2021 as outlined below:          Constitutional:  No acute distress, cooperative, pleasant    ENT:  Oral mucosa moist, oropharynx benign. Resp:  bilateral rales,  Decreased BS    CV:  Regular rhythm, normal rate,     GI:  Soft, non distended, non tender. normoactive bowel sounds,    Musculoskeletal:  No edema, warm, 2+ pulses throughout    Neurologic:  Moves all extremities. Data Review:    Review and/or order of clinical lab test  Review and/or order of tests in the radiology section of CPT  Review and/or order of tests in the medicine section of CPT      Labs:     Recent Labs     10/24/21  2358   WBC 13.9*   HGB 8.0*   HCT 26.7*        Recent Labs     10/24/21  2358      K 4.3      CO2 27   BUN 46*   CREA 5.59*   *   CA 8.1*     Recent Labs     10/24/21  2358   ALT 28   AP 91   TBILI 0.3   TP 7.2   ALB 2.0*   GLOB 5.2*     No results for input(s): INR, PTP, APTT, INREXT in the last 72 hours. No results for input(s): FE, TIBC, PSAT, FERR in the last 72 hours. Lab Results   Component Value Date/Time    Folate 28.7 (H) 12/15/2018 05:09 AM      No results for input(s): PH, PCO2, PO2 in the last 72 hours. No results for input(s): CPK, CKNDX, TROIQ in the last 72 hours.     No lab exists for component: CPKMB  Lab Results   Component Value Date/Time    Cholesterol, total 107 12/16/2018 04:08 AM    HDL Cholesterol 42 12/16/2018 04:08 AM    LDL, calculated 36.4 12/16/2018 04:08 AM    Triglyceride 143 12/16/2018 04:08 AM    CHOL/HDL Ratio 2.5 12/16/2018 04:08 AM     Lab Results   Component Value Date/Time    Glucose (POC) 344 (H) 10/25/2021 10:17 AM    Glucose (POC) 307 (H) 10/25/2021 06:33 AM    Glucose (POC) 178 (H) 10/11/2021 01:18 PM    Glucose (POC) 200 (H) 10/11/2021 07:29 AM    Glucose (POC) 225 (H) 10/11/2021 12:22 AM    Glucose,  (H) 10/25/2021 01:00 AM Lab Results   Component Value Date/Time    Color YELLOW/STRAW 01/15/2020 11:59 AM    Appearance CLEAR 01/15/2020 11:59 AM    Specific gravity 1.010 01/15/2020 11:59 AM    Specific gravity 1.015 04/29/2013 01:50 PM    pH (UA) 6.5 01/15/2020 11:59 AM    Protein 300 (A) 01/15/2020 11:59 AM    Glucose 250 (A) 01/15/2020 11:59 AM    Ketone NEGATIVE  01/15/2020 11:59 AM    Bilirubin NEGATIVE  01/15/2020 11:59 AM    Urobilinogen 0.2 01/15/2020 11:59 AM    Nitrites NEGATIVE  01/15/2020 11:59 AM    Leukocyte Esterase NEGATIVE  01/15/2020 11:59 AM    Glucose/Ketones  02/08/2009 07:00 AM     GLUCOSE AND KETONES SIGNIFICANTLY ELEVATED.  RESULTS PHONED TO AND READ BACK BY    Epithelial cells RODOLFO 01/15/2020 11:59 AM    Bacteria NEGATIVE  01/15/2020 11:59 AM    WBC 0-4 01/15/2020 11:59 AM    RBC 0-5 01/15/2020 11:59 AM         Medications Reviewed:     Current Facility-Administered Medications   Medication Dose Route Frequency    sodium chloride (NS) flush 5-40 mL  5-40 mL IntraVENous Q8H    sodium chloride (NS) flush 5-40 mL  5-40 mL IntraVENous PRN    acetaminophen (TYLENOL) tablet 650 mg  650 mg Oral Q6H PRN    Or    acetaminophen (TYLENOL) suppository 650 mg  650 mg Rectal Q6H PRN    polyethylene glycol (MIRALAX) packet 17 g  17 g Oral DAILY PRN    ondansetron (ZOFRAN ODT) tablet 4 mg  4 mg Oral Q8H PRN    Or    ondansetron (ZOFRAN) injection 4 mg  4 mg IntraVENous Q6H PRN    heparin (porcine) injection 5,000 Units  5,000 Units SubCUTAneous Q12H    aspirin delayed-release tablet 81 mg  81 mg Oral DAILY    atorvastatin (LIPITOR) tablet 20 mg  20 mg Oral QHS    montelukast (SINGULAIR) tablet 10 mg  10 mg Oral DAILY    metoprolol tartrate (LOPRESSOR) tablet 25 mg  25 mg Oral Q12H    albuterol (PROVENTIL VENTOLIN) nebulizer solution 2.5 mg  2.5 mg Nebulization Q4H PRN    cetirizine (ZYRTEC) tablet 10 mg  10 mg Oral DAILY    levothyroxine (SYNTHROID) tablet 100 mcg  100 mcg Oral ACB    insulin lispro (HUMALOG) injection   SubCUTAneous AC&HS    glucose chewable tablet 16 g  4 Tablet Oral PRN    dextrose (D50W) injection syrg 12.5-25 g  12.5-25 g IntraVENous PRN    glucagon (GLUCAGEN) injection 1 mg  1 mg IntraMUSCular PRN    insulin glargine (LANTUS) injection 10 Units  10 Units SubCUTAneous DAILY     Current Outpatient Medications   Medication Sig    lidocaine (XYLOCAINE) 4 % topical cream Apply  to affected area DIALYSIS PRN for Pain.  metoprolol tartrate (LOPRESSOR) 25 mg tablet Take 1 Tablet by mouth every twelve (12) hours.  b complex-vitamin c-folic acid (NEPHROCAPS) 1 mg capsule Take 1 Capsule by mouth daily.  patiromer calcium sorbitex (Veltassa) 16.8 gram powder Take  by mouth daily.  diphenhydrAMINE (BenadryL) 25 mg capsule Take 25 mg by mouth nightly as needed.  furosemide (LASIX PO) Take 10 mg by mouth every morning.  cetirizine (ZyrTEC) 10 mg tablet Take 10 mg by mouth daily.  cranberry 500 mg capsule Take 500 mg by mouth daily.  guaiFENesin (ROBITUSSIN) 100 mg/5 mL liquid Take 100 mg by mouth every four (4) hours as needed for Cough.  fluticasone propionate (Flonase Allergy Relief) 50 mcg/actuation nasal spray 2 Sprays by Both Nostrils route two (2) times a day.  guaiFENesin-dextromethorphan (Diabetic Tussin DM)  mg/5 mL liqd Take 10 mL by mouth every six (6) hours as needed for Cough or Congestion.  esomeprazole (NexIUM) 40 mg capsule Take 40 mg by mouth daily.  L.acid,para-B. bifidum-S.therm (RISAQUAD) 8 billion cell cap cap Take 1 Capsule by mouth daily.  insulin degludec [place] FlexTouch U-100) 100 unit/mL (3 mL) inpn 10 Units by SubCUTAneous route nightly.  ergocalciferol (Vitamin D2) 1,250 mcg (50,000 unit) capsule Take 50,000 Units by mouth every Wednesday.  ondansetron hcl (Zofran) 4 mg tablet Take 4 mg by mouth every four (4) hours as needed for Nausea or Vomiting.  montelukast (Singulair) 10 mg tablet Take 10 mg by mouth daily.     levothyroxine (SYNTHROID) 100 mcg tablet Take 1 Tablet by mouth Daily (before breakfast).  biotin 5,000 mcg TbDi Take 5,000 mcg by mouth daily.  cholecalciferol, vitamin D3, (VITAMIN D3) 2,000 unit tab Take 4,000 Units by mouth daily.  insulin lispro (HUMALOG U-100 INSULIN) 100 unit/mL injection 3-4 Units by SubCUTAneous route Before breakfast, lunch, and dinner. Hold insulin if BS <80. Hold if patient does not eat her meal. If BS >150 give with SS.    atorvastatin (LIPITOR) 10 mg tablet Take 20 mg by mouth nightly.  ibuprofen (MOTRIN) 400 mg tablet Take 400 mg by mouth every six (6) hours as needed for Pain (Fever).  albuterol (PROVENTIL HFA, VENTOLIN HFA, PROAIR HFA) 90 mcg/actuation inhaler Take 1 Puff by inhalation every four (4) hours as needed for Wheezing.  glucose 4 gram chewable tablet Take 12 g by mouth as needed (BS < 60).  glucagon (GLUCAGEN) 1 mg injection 1 mg by IntraMUSCular route once as needed (hypoglycemia if patient is unresponsive).  aspirin delayed-release 81 mg tablet Take 81 mg by mouth daily.      ______________________________________________________________________  EXPECTED LENGTH OF STAY: - - -  ACTUAL LENGTH OF STAY:          0                 Missy Mann MD

## 2021-10-25 NOTE — PROGRESS NOTES
15:00 Pt arrived to unit w/ brother Hasmukh Ferreira at bedside. Pt's older brother Rika Barclay arrived on unit to switch off w/ brother Hasmukh Ferreira. Family members very calm and sweet with sister. 18:00 Was informed by Pt's sister who is a POA and she stated Pt has type 1 diabetes NOT type 2 diabetes.

## 2021-10-25 NOTE — ROUTINE PROCESS
TRANSFER - OUT REPORT:    Verbal report given to 4w(name) on Ness Gr  being transferred to 4w(unit) for routine progression of care       Report consisted of patients Situation, Background, Assessment and   Recommendations(SBAR). Information from the following report(s) Kardex was reviewed with the receiving nurse. Lines:   Peripheral IV 10/25/21 Right;Other (Comment)  (Active)   Site Assessment Clean, dry, & intact 10/25/21 0012   Phlebitis Assessment 0 10/25/21 0012   Infiltration Assessment 0 10/25/21 0012   Dressing Status Clean, dry, & intact 10/25/21 0012        Opportunity for questions and clarification was provided.       Patient transported with:   adQ

## 2021-10-25 NOTE — PROGRESS NOTES
Occupational Therapy   10.25.2021    Chart reviewed in prep for OT evaluation. Patient currently being moved to . Will defer and f/u as able and medically appropriate. Thank you. Peter Celis MS, OTR/L

## 2021-10-25 NOTE — ED NOTES
Called Dr. Satya Louis due to paitient's oxygen sautration maintaining in the [de-identified] despite being on 6L NC. Called respiratory to assess. RT recommends CPAP    Spoke to [place] again and mentioned patient is very drowsy. New orders placed for ABG and BiPAP if hypercapnic and CPAP if not.

## 2021-10-25 NOTE — ED TRIAGE NOTES
Triage: Pt arrives via EMS with CC of difficulty swallowing and feeling as though something is stuck in her throat. She reports she has had this feeling since. Dinner.

## 2021-10-25 NOTE — ED PROVIDER NOTES
HPI     45 female with a history of Down syndrome, acid reflux, diabetes, asthma, end-stage renal disease recently put on dialysis less than 2 weeks ago, presents the emergency department with shortness of breath and sore throat. Patient also states she feels her asthma is acting up. Patient states she feels like something is stuck in her windpipe after eating dinner this evening around 4 30-5. However, her family member at the bedside states she was complaining of difficulty breathing prior to eating. Patient states she thinks she is cleared whenever food was in her windpipe. Patient is not normally on oxygen. She denies chest pain currently. She does have swelling in her legs. It appears this has been an ongoing issue. She denies a fever or cough. She has been vaccinated for Covid. She did not use her inhalers prior to coming.   She tried to lay down to go to sleep tonight but was unable to due to shortness of breath    Past Medical History:   Diagnosis Date    Allergic rhinitis, cause unspecified     Asthma     use inhalers    Diabetes ([address]     Down's syndrome     GERD (gastroesophageal reflux disease)     H/O impacted cerumen     Dr. Berlin Molina Headache     Hypothyroid     Mononucleosis     Pneumonia     Seizures ([address]     as a result of low blood glucose readings    Thyroid disease        Past Surgical History:   Procedure Laterality Date    HX CATARACT REMOVAL Bilateral 3/5/15    cataract left and right    HX CYST REMOVAL      right shoulder    HX HEENT Bilateral 2017    prior in one eye    HX HYSTERECTOMY  1987         Family History:   Problem Relation Age of Onset    Thyroid Disease Mother         hypo    Hypertension Father     Abdominal aortic aneurysm Father     Neuropathy Father     No Known Problems Sister     Heart Disease Brother     Heart Attack Brother     Heart Surgery Brother     No Known Problems Sister     No Known Problems Brother     Anesth Problems Neg Hx        Social History     Socioeconomic History    Marital status: SINGLE     Spouse name: Not on file    Number of children: Not on file    Years of education: Not on file    Highest education level: Not on file   Occupational History    Not on file   Tobacco Use    Smoking status: Never Smoker    Smokeless tobacco: Never Used   Vaping Use    Vaping Use: Never used   Substance and Sexual Activity    Alcohol use: No    Drug use: No    Sexual activity: Not Currently   Other Topics Concern    Not on file   Social History Narrative    Not on file     Social Determinants of Health     Financial Resource Strain:     Difficulty of Paying Living Expenses:    Food Insecurity:     Worried About Running Out of Food in the Last Year:     [address] in the Last Year:    Transportation Needs:     Lack of Transportation (Medical):  Lack of Transportation (Non-Medical):    Physical Activity:     Days of Exercise per Week:     Minutes of Exercise per Session:    Stress:     Feeling of Stress :    Social Connections:     Frequency of Communication with Friends and Family:     Frequency of Social Gatherings with Friends and Family:     Attends Sikhism Services:     Active Member of Clubs or Organizations:     Attends Club or Organization Meetings:     Marital Status:    Intimate Partner Violence:     Fear of Current or Ex-Partner:     Emotionally Abused:     Physically Abused:     Sexually Abused: ALLERGIES: Codeine, Nitrofurantoin, and Pcn [penicillins]    Review of Systems   Constitutional: Negative for fever. HENT: Positive for sore throat and trouble swallowing. Negative for voice change. Respiratory: Positive for shortness of breath. Negative for cough. Cardiovascular: Positive for leg swelling. Negative for chest pain. Gastrointestinal: Negative for abdominal pain, nausea and vomiting. Endocrine: Negative for polyuria. Genitourinary: Negative for dysuria. Musculoskeletal: Negative for gait problem. Skin: Negative for rash. Neurological: Negative for headaches. Psychiatric/Behavioral: Negative for dysphoric mood. Vitals:    10/24/21 2230 10/24/21 2301   BP: (!) 186/74    Pulse: 86    Resp: 16    Temp: 99.1 °F (37.3 °C)    SpO2: (!) 86% 95%            Physical Exam  Constitutional:       General: She is not in acute distress. Appearance: She is well-developed. HENT:      Head: Normocephalic and atraumatic. Mouth/Throat:      Mouth: Mucous membranes are moist.      Pharynx: No pharyngeal swelling, oropharyngeal exudate, posterior oropharyngeal erythema or uvula swelling. Tonsils: No tonsillar exudate or tonsillar abscesses. Eyes:      General: No scleral icterus. Right eye: No discharge. Left eye: No discharge. Pupils: Pupils are equal, round, and reactive to light. Neck:      Vascular: No JVD. Cardiovascular:      Rate and Rhythm: Normal rate and regular rhythm. Heart sounds: Normal heart sounds. No murmur heard. Pulmonary:      Effort: Pulmonary effort is normal. No respiratory distress. Breath sounds: No stridor. Wheezing (scant end expiratory wheeze) present. No rales. Chest:      Chest wall: No tenderness. Abdominal:      General: Bowel sounds are normal. There is no distension. Palpations: Abdomen is soft. There is no mass. Tenderness: There is no abdominal tenderness. There is no guarding or rebound. Musculoskeletal:         General: Normal range of motion. Cervical back: Normal range of motion and neck supple. Right lower leg: Edema present. Left lower leg: Edema present. Skin:     General: Skin is warm and dry. Capillary Refill: Capillary refill takes less than 2 seconds. Findings: No rash. Neurological:      Mental Status: She is oriented to person, place, and time. Psychiatric:         Behavior: Behavior normal.         Thought Content:  Thought content normal.         Judgment: Judgment normal.          MDM   40 minutes of critical care time    Procedures    Patient is hypoxic on arrival, responding nicely to 2L nasal cannula. ED EKG interpretation:  Rhythm: normal sinus rhythm; and regular . Rate (approx.): 86; Axis: normal; P wave: normal; QRS interval: normal ; ST/T wave: non-specific changes; low voltage. This EKG was interpreted by Hattie Call MD,ED Provider. CXR shows persistent edema, however improved. DOes not explain hypoxia. WBC elevated with slight shift, normal lactate. Will check ddimer- CT chest with contrast if elevated. Perfect Serve Consult for Admission  3:03 AM    ED Room Number: RL16/79  Patient Name and age:  Darlene Gr 45 y.o.  female  Working Diagnosis:   1. Hypoxia    2. Acute pulmonary edema (HCC)    3. ESRD (end stage renal disease) ([facility]        COVID-19 Suspicion:  no  Sepsis present:  no  Reassessment needed: no  Code Status:  DNR  Readmission: yes  Isolation Requirements:  no  Recommended Level of Care:  telemetry  Department:[facility] Adult ED - [phone]  Other:  45year old female with down syndrome, ESRD on dialysis presents with SOB. Difficult historian. She was hypoxic on arrival- on 4l NC now. She was recently started on dialysis with this last admission 2 weeks ago. Due tomorrow. CXR showed edema but improved. CT shows edema, effusions and pericardial effusion.  WBC elevated but no fever an lactate is normal.

## 2021-10-25 NOTE — H&P
HISTORY AND PHYSICAL      PCP: Vania Brand MD  History source: ER, pt is a limited hisotorian      CC: shortness of breath      HPI: 45 y.o lady w/ Down syndrome, asthma, DM, ESRD recently started on HD, who presents with shortness of breath. She is a limited historian. She developed dyspnea while eating dinner last night. Associated symptoms include orthopnea. She denies chest pain, cough, or fever. PMH/PSH:  Past Medical History:   Diagnosis Date    Allergic rhinitis, cause unspecified     Asthma     use inhalers    Diabetes ([address]     Down's syndrome     GERD (gastroesophageal reflux disease)     H/O impacted cerumen     Dr. Moniac Mann Headache     Hypothyroid     Mononucleosis     Pneumonia     Seizures ([address]     as a result of low blood glucose readings    Thyroid disease      Past Surgical History:   Procedure Laterality Date    HX CATARACT REMOVAL Bilateral 3/5/15    cataract left and right    HX CYST REMOVAL      right shoulder    HX HEENT Bilateral 2017    prior in one eye    HX HYSTERECTOMY  1987       Home meds:   Prior to Admission medications    Medication Sig Start Date End Date Taking? Authorizing Provider   lidocaine (XYLOCAINE) 4 % topical cream Apply  to affected area DIALYSIS PRN for Pain. 10/11/21   Camden Reina MD   metoprolol tartrate (LOPRESSOR) 25 mg tablet Take 1 Tablet by mouth every twelve (12) hours. 10/11/21   Camden Reina MD   b complex-vitamin c-folic acid (NEPHROCAPS) 1 mg capsule Take 1 Capsule by mouth daily. 10/12/21   Camden Reina MD   patiromer calcium sorbitex (Veltassa) 16.8 gram powder Take  by mouth daily. Provider, Historical   diphenhydrAMINE (BenadryL) 25 mg capsule Take 25 mg by mouth nightly as needed. Provider, Historical   furosemide (LASIX PO) Take 10 mg by mouth every morning. Provider, Historical   cetirizine (ZyrTEC) 10 mg tablet Take 10 mg by mouth daily.     Provider, Historical   cranberry 500 mg capsule Take 500 mg by mouth daily. Provider, Historical   guaiFENesin (ROBITUSSIN) 100 mg/5 mL liquid Take 100 mg by mouth every four (4) hours as needed for Cough. Provider, Historical   fluticasone propionate (Flonase Allergy Relief) 50 mcg/actuation nasal spray 2 Sprays by Both Nostrils route two (2) times a day. Provider, Historical   guaiFENesin-dextromethorphan (Diabetic Tussin DM)  mg/5 mL liqd Take 10 mL by mouth every six (6) hours as needed for Cough or Congestion. Provider, Historical   esomeprazole (NexIUM) 40 mg capsule Take 40 mg by mouth daily. Provider, Historical   L.acid,para-B. bifidum-S.therm (RISAQUAD) 8 billion cell cap cap Take 1 Capsule by mouth daily. Provider, Historical   insulin degludec [place] FlexTouch U-100) 100 unit/mL (3 mL) inpn 10 Units by SubCUTAneous route nightly. Provider, Historical   ergocalciferol (Vitamin D2) 1,250 mcg (50,000 unit) capsule Take 50,000 Units by mouth every Wednesday. Provider, Historical   ondansetron hcl (Zofran) 4 mg tablet Take 4 mg by mouth every four (4) hours as needed for Nausea or Vomiting. Provider, Historical   montelukast (Singulair) 10 mg tablet Take 10 mg by mouth daily. Provider, Historical   levothyroxine (SYNTHROID) 100 mcg tablet Take 1 Tablet by mouth Daily (before breakfast). 9/8/21   Kaela España MD   biotin 5,000 mcg TbDi Take 5,000 mcg by mouth daily. Provider, Historical   cholecalciferol, vitamin D3, (VITAMIN D3) 2,000 unit tab Take 4,000 Units by mouth daily. Provider, Historical   insulin lispro (HUMALOG U-100 INSULIN) 100 unit/mL injection 3-4 Units by SubCUTAneous route Before breakfast, lunch, and dinner. Hold insulin if BS <80. Hold if patient does not eat her meal. If BS >150 give with SS. Provider, Historical   atorvastatin (LIPITOR) 10 mg tablet Take 20 mg by mouth nightly.     Provider, Historical   ibuprofen (MOTRIN) 400 mg tablet Take 400 mg by mouth every six (6) hours as needed for Pain (Fever). Provider, Historical   albuterol (PROVENTIL HFA, VENTOLIN HFA, PROAIR HFA) 90 mcg/actuation inhaler Take 1 Puff by inhalation every four (4) hours as needed for Wheezing. 12/16/18   Marci Rangel MD   glucose 4 gram chewable tablet Take 12 g by mouth as needed (BS < 60). Provider, Historical   glucagon (GLUCAGEN) 1 mg injection 1 mg by IntraMUSCular route once as needed (hypoglycemia if patient is unresponsive). Provider, Historical   aspirin delayed-release 81 mg tablet Take 81 mg by mouth daily. Provider, Historical       Allergies: Allergies   Allergen Reactions    Codeine Nausea and Vomiting    Nitrofurantoin Rash    Pcn [Penicillins] Hives and Rash       FH:  Family History   Problem Relation Age of Onset    Thyroid Disease Mother         hypo    Hypertension Father     Abdominal aortic aneurysm Father     Neuropathy Father     No Known Problems Sister     Heart Disease Brother     Heart Attack Brother     Heart Surgery Brother     No Known Problems Sister     No Known Problems Brother     Anesth Problems Neg Hx        SH:  Social History     Tobacco Use    Smoking status: Never Smoker    Smokeless tobacco: Never Used   Substance Use Topics    Alcohol use: No       ROS: Review of systems not obtained due to patient factors.       PHYSICAL EXAM:  Visit Vitals  BP (!) 186/74   Pulse 99   Temp 99.1 °F (37.3 °C)   Resp 14   LMP 03/27/1986   SpO2 95%       Gen: NAD, non-toxic  HEENT: anicteric sclerae, normal conjunctiva  Neck: supple, trachea midline, no adenopathy  Heart: RRR, no MRG, no JVD, ++ peripheral edema  Lungs: feint b/l crackles, diminished breath sounds in both bases, non-labored respirations  Abd: soft, NT, ND, BS+, no organomegaly  Extr: warm  Skin: dry, no rash  Neuro: grossly non-focal  Psych: not anxious nor agitated      Labs/Imaging:  Recent Results (from the past 24 hour(s))   CBC WITH AUTOMATED DIFF    Collection Time: 10/24/21 11:58 PM   Result Value Ref Range    WBC 13.9 (H) 3.6 - 11.0 K/uL    RBC 2.51 (L) 3.80 - 5.20 M/uL    HGB 8.0 (L) 11.5 - 16.0 g/dL    HCT 26.7 (L) 35.0 - 47.0 %    .4 (H) 80.0 - 99.0 FL    MCH 31.9 26.0 - 34.0 PG    MCHC 30.0 30.0 - 36.5 g/dL    RDW 15.9 (H) 11.5 - 14.5 %    PLATELET 187 778 - 833 K/uL    MPV 10.7 8.9 - 12.9 FL    NRBC 0.0 0  WBC    ABSOLUTE NRBC 0.00 0.00 - 0.01 K/uL    NEUTROPHILS 87 (H) 32 - 75 %    LYMPHOCYTES 5 (L) 12 - 49 %    MONOCYTES 5 5 - 13 %    EOSINOPHILS 1 0 - 7 %    BASOPHILS 1 0 - 1 %    IMMATURE GRANULOCYTES 1 (H) 0.0 - 0.5 %    ABS. NEUTROPHILS 12.2 (H) 1.8 - 8.0 K/UL    ABS. LYMPHOCYTES 0.7 (L) 0.8 - 3.5 K/UL    ABS. MONOCYTES 0.7 0.0 - 1.0 K/UL    ABS. EOSINOPHILS 0.1 0.0 - 0.4 K/UL    ABS. BASOPHILS 0.1 0.0 - 0.1 K/UL    ABS. IMM. GRANS. 0.1 (H) 0.00 - 0.04 K/UL    DF SMEAR SCANNED      PLATELET COMMENTS Large Platelets      RBC COMMENTS ANISOCYTOSIS  1+        RBC COMMENTS MACROCYTOSIS  1+       METABOLIC PANEL, COMPREHENSIVE    Collection Time: 10/24/21 11:58 PM   Result Value Ref Range    Sodium 139 136 - 145 mmol/L    Potassium 4.3 3.5 - 5.1 mmol/L    Chloride 106 97 - 108 mmol/L    CO2 27 21 - 32 mmol/L    Anion gap 6 5 - 15 mmol/L    Glucose 139 (H) 65 - 100 mg/dL    BUN 46 (H) 6 - 20 MG/DL    Creatinine 5.59 (H) 0.55 - 1.02 MG/DL    BUN/Creatinine ratio 8 (L) 12 - 20      GFR est AA 10 (L) >60 ml/min/1.73m2    GFR est non-AA 8 (L) >60 ml/min/1.73m2    Calcium 8.1 (L) 8.5 - 10.1 MG/DL    Bilirubin, total 0.3 0.2 - 1.0 MG/DL    ALT (SGPT) 28 12 - 78 U/L    AST (SGOT) 39 (H) 15 - 37 U/L    Alk.  phosphatase 91 45 - 117 U/L    Protein, total 7.2 6.4 - 8.2 g/dL    Albumin 2.0 (L) 3.5 - 5.0 g/dL    Globulin 5.2 (H) 2.0 - 4.0 g/dL    A-G Ratio 0.4 (L) 1.1 - 2.2     TROPONIN-HIGH SENSITIVITY    Collection Time: 10/24/21 11:58 PM   Result Value Ref Range    Troponin-High Sensitivity 175 (HH) 0 - 51 ng/L   NT-PRO BNP    Collection Time: 10/24/21 11:58 PM   Result Value Ref Range    NT pro-BNP 5,304 (H) <125 PG/ML   EKG, 12 LEAD, INITIAL    Collection Time: 10/25/21 12:04 AM   Result Value Ref Range    Ventricular Rate 86 BPM    Atrial Rate 86 BPM    P-R Interval 140 ms    QRS Duration 62 ms    Q-T Interval 386 ms    QTC Calculation (Bezet) 461 ms    Calculated P Axis 61 degrees    Calculated R Axis 69 degrees    Calculated T Axis 36 degrees    Diagnosis       Normal sinus rhythm  Low voltage QRS  Cannot rule out Anterior infarct , age undetermined  Abnormal ECG  When compared with ECG of 08-OCT-2021 22:13,  Nonspecific T wave abnormality now evident in Inferior leads     BLOOD GAS,CHEM8,LACTIC ACID POC    Collection Time: 10/25/21  1:00 AM   Result Value Ref Range    Calcium, ionized (POC) 1.03 (L) 1.12 - 1.32 mmol/L    BICARBONATE 26 mmol/L    Base excess (POC) 0.7 mmol/L    Sample source VENOUS BLOOD      CO2, POC 27 (H) 19 - 24 MMOL/L    Sodium,  136 - 145 MMOL/L    Potassium, POC 3.6 3.5 - 5.5 MMOL/L    Chloride,  100 - 108 MMOL/L    Glucose,  (H) 74 - 106 MG/DL    Creatinine, POC 5.6 (H) 0.6 - 1.3 MG/DL    Lactic Acid (POC) 1.15 0.40 - 2.00 mmol/L    pH, venous (POC) 7.37 7.32 - 7.42      pCO2, venous (POC) 45.2 41 - 51 MMHG    pO2, venous (POC) 27 25 - 40 mmHg   D DIMER    Collection Time: 10/25/21  1:02 AM   Result Value Ref Range    D-dimer 6.66 (H) 0.00 - 0.65 mg/L FEU       Recent Labs     10/24/21  2358   WBC 13.9*   HGB 8.0*   HCT 26.7*        Recent Labs     10/24/21  2358      K 4.3      CO2 27   BUN 46*   CREA 5.59*   *   CA 8.1*     Recent Labs     10/24/21  2358   ALT 28   AP 91   TBILI 0.3   TP 7.2   ALB 2.0*   GLOB 5.2*       No results for input(s): CPK, CKNDX, TROIQ in the last 72 hours. No lab exists for component: CPKMB    No results for input(s): INR, PTP, APTT, INREXT in the last 72 hours. No results for input(s): PH, PCO2, PO2 in the last 72 hours.       CTA CHEST W OR W WO CONT    Result Date: 10/25/2021  1. Extensive motion artifact limits evaluation. No large pulmonary embolus. 2.  Cardiomegaly. Moderate-sized pericardial effusion. Bilateral pleural effusions. Mild pulmonary edema. 3.  Diffuse subsegmental atelectasis in the lungs. 4.  Narrowing of the lower trachea and proximal mainstem bronchi. Question history of tracheomalacia. XR CHEST PORT    Result Date: 10/24/2021  Moderate interstitial edema with overall improvement from the prior study.  Small right pleural effusion          Assessment & Plan:     Acute respiratory failure with hypoxia: due to volume overload  -continue oxygen support  -needs dialysis for fluid removal  -not sure how much urine she makes but will try 120 mg IV Lasix    Mild leukocytosis: no s/s of infection    Type 2 DM:  -SSI/POC checks    Anemia    Asthma    GERD    Hypothyroidism    Down syndrome    DVT ppx: sq heparin  Code status: DNR per recent admission  Disposition: home when ready    Signed By: Hari Jackson MD     October 25, 2021

## 2021-10-26 NOTE — PROGRESS NOTES
Problem: Diabetes Self-Management  Goal: *Monitoring blood glucose, interpreting and using results  Description: Identify recommended blood glucose targets  and personal targets. Outcome: Progressing Towards Goal     Problem: Falls - Risk of  Goal: *Absence of Falls  Description: Document Kacy Mcknight Fall Risk and appropriate interventions in the flowsheet.   Outcome: Progressing Towards Goal  Note: Fall Risk Interventions:  Mobility Interventions: Bed/chair exit alarm              Elimination Interventions: Call light in reach

## 2021-10-26 NOTE — TELEPHONE ENCOUNTER
Scheduled appt for 11/2 at 1:45 pm. PT stated they wanted to schedule with Clem instead, I informed them we do not schedule for them and they will have to call them to schedule. They understood and will call back if they decide to cancel.  As of now, appt is confirmed for 11/2 at 1:45 pm.

## 2021-10-26 NOTE — PROGRESS NOTES
Patient name: Jillian Josue  MRN: [id number]    Nephrology Progress note:    Assessment:  ESRD: [facility] BETHANY Wakeman  Acute resp failure: +Hypoxia/Pulm edema. Off BIPAP now. 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 Mother and sister at bedside. ROS:   No nausea, no vomiting  No chest pain, no shortness of breath    Exam:  Visit Vitals  BP (!) 115/50   Pulse 73   Temp 98 °F (36.7 °C)   Resp 20   Wt 68 kg (150 lb)   LMP 03/27/1986   SpO2 95%   BMI 30.30 kg/m²     Wt Readings from Last 3 Encounters:   10/25/21 68 kg (150 lb)   10/11/21 75.8 kg (167 lb 1.7 oz)   10/01/21 70 kg (154 lb 5.2 oz)     No intake or output data in the 24 hours ending 10/26/21 1210    Gen: NAD/Down syndrome  HEENT: Down facies  Lungs/Chest wall: Clear. No accessory muscle use. Permacath  Cardiovascular: Regular rate, normal rhythm. Abdomen/: Soft, NT, ND, BS+. No palpable organomegaly  Ext:  No peripheral edema  CNS: alert and awake.  Answers appropriately      Current Facility-Administered Medications   Medication Dose Route Frequency Last Admin    sodium chloride (NS) flush 5-40 mL  5-40 mL IntraVENous Q8H 10 mL at 10/26/21 0600    sodium chloride (NS) flush 5-40 mL  5-40 mL IntraVENous PRN      acetaminophen (TYLENOL) tablet 650 mg  650 mg Oral Q6H PRN      Or    acetaminophen (TYLENOL) suppository 650 mg  650 mg Rectal Q6H PRN      polyethylene glycol (MIRALAX) packet 17 g  17 g Oral DAILY PRN      ondansetron (ZOFRAN ODT) tablet 4 mg  4 mg Oral Q8H PRN      Or    ondansetron (ZOFRAN) injection 4 mg  4 mg IntraVENous Q6H PRN      heparin (porcine) injection 5,000 Units  5,000 Units SubCUTAneous Q12H 5,000 Units at 10/26/21 0720    atorvastatin (LIPITOR) tablet 20 mg  20 mg Oral QHS 20 mg at 10/25/21 2146    montelukast (SINGULAIR) tablet 10 mg  10 mg Oral DAILY 10 mg at 10/26/21 0918    metoprolol tartrate (LOPRESSOR) tablet 25 mg  25 mg Oral Q12H 25 mg at 10/26/21 0918    albuterol (PROVENTIL VENTOLIN) nebulizer solution 2.5 mg  2.5 mg Nebulization Q4H PRN      cetirizine (ZYRTEC) tablet 10 mg  10 mg Oral DAILY 10 mg at 10/26/21 0918    levothyroxine (SYNTHROID) tablet 100 mcg  100 mcg Oral  mcg at 10/26/21 0721    insulin lispro (HUMALOG) injection   SubCUTAneous AC&HS 2 Units at 10/26/21 0721    glucose chewable tablet 16 g  4 Tablet Oral PRN      dextrose (D50W) injection syrg 12.5-25 g  12.5-25 g IntraVENous PRN      glucagon (GLUCAGEN) injection 1 mg  1 mg IntraMUSCular PRN      insulin glargine (LANTUS) injection 10 Units  10 Units SubCUTAneous DAILY 10 Units at 10/26/21 0918    epoetin jerry-epbx (RETACRIT) injection 10,000 Units  10,000 Units SubCUTAneous DIALYSIS MON, WED & FRI 10,000 Units at 10/25/21 2025       Labs/Data:    Lab Results   Component Value Date/Time    WBC 12.9 (H) 10/26/2021 02:40 AM    Hemoglobin (POC) 12.2 11/05/2012 03:10 PM    HGB 7.2 (L) 10/26/2021 02:40 AM    Hematocrit (POC) 36 11/05/2012 03:10 PM    HCT 23.7 (L) 10/26/2021 02:40 AM    PLATELET 325 57/99/9121 02:40 AM    .8 (H) 10/26/2021 02:40 AM       Lab Results   Component Value Date/Time    Sodium 134 (L) 10/26/2021 02:40 AM    Potassium 4.1 10/26/2021 02:40 AM    Chloride 99 10/26/2021 02:40 AM    CO2 31 10/26/2021 02:40 AM    Anion gap 4 (L) 10/26/2021 02:40 AM    Glucose 178 (H) 10/26/2021 02:40 AM    BUN 38 (H) 10/26/2021 02:40 AM    Creatinine 4.10 (H) 10/26/2021 02:40 AM    BUN/Creatinine ratio 9 (L) 10/26/2021 02:40 AM    GFR est AA 14 (L) 10/26/2021 02:40 AM    GFR est non-AA 11 (L) 10/26/2021 02:40 AM    Calcium 8.4 (L) 10/26/2021 02:40 AM       Patient seen and examined. Chart reviewed. Labs, data and other pertinent notes reviewed in last 24 hrs.     Discussed with patient/family and Dr. Alex Hoyos by:  Edwin Tena MD  [address] [facility]

## 2021-10-26 NOTE — TELEPHONE ENCOUNTER
----- Message from Sheryl Mohr sent at 10/26/2021 11:49 AM EDT -----  Subject: Appointment Request    Reason for Call: Routine Hospital Follow Up    QUESTIONS  Type of Appointment? Established Patient  Reason for appointment request? Available appointments did not meet   patient need  Additional Information for Provider? Dr. Keily Kaur ()   from [facility] called to schedule pt her follow up appt. Pt was   admitted for breathing problems and is being discharged today 10/26/2021. No appts were available until after the 2 week window so the [facility] is   sending a message requesting Dr. Chana Romero staff reach out to the pt to get   her scheduled for her f/u. Pt does have dialysis on Mon, Wed & Fri. Pt can   ONLY do Tues or Thurs. Please contact her when possible.  ---------------------------------------------------------------------------  --------------  CALL BACK INFO  What is the best way for the office to contact you? Do not leave any   message, patient will call back for answer  Preferred Call Back Phone Number? [phone]  ---------------------------------------------------------------------------  --------------  SCRIPT ANSWERS  Relationship to Patient? Third Party  Representative Name? [facility] Case mgmt)  (Patient requests to see provider urgently. )? No  (Has the patient been discharged from the hospital within 2 business days   AND does not have a Telephone Encounter  Follow Up From [address] [place] [address]   documented in [address]?)? No  Do you have any questions for your primary care provider that need to be   answered prior to your appointment? (Use RN Triage if question pertains to   anything on the red flag list)? No  (Patient needs follow up visit after hospital discharge) Book first   available appointment within 7 days OF DISCHARGE, if no appt, proceed to   book the next available time slot within 14 days OF DISCHARGE AND Send   Message to Provider.  [facility] Follow Up appointment cannot be booked   beyond 14 Days and should result in a Message to Provider. ? Yes   Have you been diagnosed with, awaiting test results for, or told that you   are suspected of having COVID-19 (Coronavirus)? (If patient has tested   negative or was tested as a requirement for work, school, or travel and   not based on symptoms, answer no)? No  Within the past two weeks have you developed any of the following symptoms   (answer no if symptoms have been present longer than 2 weeks or began   more than 2 weeks ago)? Fever or Chills, Cough, Shortness of breath or   difficulty breathing, Loss of taste or smell, Sore throat, Nasal   congestion, Sneezing or runny nose, Fatigue or generalized body aches   (answer no if pain is specific to a body part e.g. back pain), Diarrhea,   Headache?  Yes

## 2021-10-26 NOTE — PROGRESS NOTES
OCCUPATIONAL THERAPY EVALUATION/DISCHARGE  Patient: Zackery Gr [de-identified]45 y.o. female)  Date: 10/26/2021  Primary Diagnosis: Acute respiratory failure with hypoxia ([facility] [J96.01]       Precautions:        ASSESSMENT  Based on the objective data described below, the patient presents with near baseline performance during ADL tasks and related mobility. Patient is generally fatigued which is expected at this time- HgB 7.2. Patient demonstrates good safety awareness during mobility, no assistive device needed. Educated patient and her mother on shower chair if needed to reduce fall risk while bathing- pediatric size may be needed for appropriate fit due to patient's short stature. No further instruction needed. Completing OT order     Current Level of Function (ADLs/self-care): no more than supervision     Functional Outcome Measure: The patient scored 90/100 on the Barthel Index outcome measure which is indicative of minimal dependence during ADL tasks and mobility . Other factors to consider for discharge: family assistance available if needed     PLAN :  Recommend with staff: OOB for meals    Recommendation for discharge: (in order for the patient to meet his/her long term goals)  No skilled occupational therapy/ follow up rehabilitation needs identified at this time.     This discharge recommendation:  Has been made in collaboration with the attending provider and/or case management    IF patient discharges home will need the following DME: none       SUBJECTIVE:   Patient pleasant and cooperative    OBJECTIVE DATA SUMMARY:   HISTORY:   Past Medical History:   Diagnosis Date    Allergic rhinitis, cause unspecified     Asthma     use inhalers    Diabetes ([facility]     Down's syndrome     GERD (gastroesophageal reflux disease)     H/O impacted cerumen     Dr. Danae Rivera Headache     Hypothyroid     Mononucleosis     Pneumonia     Seizures ([facility]     as a result of low blood glucose readings    Thyroid disease      Past Surgical History:   Procedure Laterality Date    HX CATARACT REMOVAL Bilateral 3/5/15    cataract left and right    HX CYST REMOVAL      right shoulder    HX HEENT Bilateral 2017    prior in one eye    HX HYSTERECTOMY  1987       Prior Level of Function/Environment/Context: patient and mother live in 2 bedroom apt in [address] [place] [address]   Expanded or extensive additional review of patient history:   Home Situation  Home Environment: [place] [address] Name:  (Clem )  Living Alone: No  Support Systems: Parent(s), Other Family Member(s), Assisted Living  Patient Expects to be Discharged to[de-identified] Independent living facility  Current DME Used/Available at Home: Grab bars  Tub or Shower Type: Shower    Hand dominance: Right    EXAMINATION OF PERFORMANCE DEFICITS:  Cognitive/Behavioral Status:  Neurologic State: Alert  Orientation Level: Oriented X4  Cognition: Appropriate decision making; Follows commands                   Range of Motion:    AROM: Within functional limits                         Strength:    Strength: Within functional limits                Coordination:  Coordination: Within functional limits  Fine Motor Skills-Upper: Left Intact; Right Intact    Gross Motor Skills-Upper: Left Intact; Right Intact    Tone & Sensation:    Tone: Normal  Sensation: Intact                      Balance:  Sitting: Intact; Without support  Standing: Impaired; Without support  Standing - Static: Good  Standing - Dynamic : Fair    Functional Mobility and Transfers for ADLs:  Bed Mobility:  Supine to Sit: Modified independent  Sit to Supine: Modified independent    Transfers:  Sit to Stand: Modified independent  Stand to Sit: Modified independent  Bed to Chair: Supervision  Toilet Transfer : Supervision    ADL Assessment:  Feeding: Independent    Oral Facial Hygiene/Grooming: Independent    Bathing: Supervision    Upper Body Dressing: Modified independent    Lower Body Dressing: Modified independent    Toileting: Modified independent                ADL Intervention and task modifications:         Educated patient and her mother on shower chair if needed to reduce fall risk while bathing- pediatric size may be needed for appropriate fit due to patient's short stature            Functional Measure:    Barthel Index:  Bathin  Bladder: 10  Bowels: 10  Groomin  Dressing: 10  Feeding: 10  Mobility: 10  Stairs: 5  Toilet Use: 10  Transfer (Bed to Chair and Back): 15  Total: 90/100      The Barthel ADL Index: Guidelines  1. The index should be used as a record of what a patient does, not as a record of what a patient could do. 2. The main aim is to establish degree of independence from any help, physical or verbal, however minor and for whatever reason. 3. The need for supervision renders the patient not independent. 4. A patient's performance should be established using the best available evidence. Asking the patient, friends/relatives and nurses are the usual sources, but direct observation and common sense are also important. However direct testing is not needed. 5. Usually the patient's performance over the preceding 24-48 hours is important, but occasionally longer periods will be relevant. 6. Middle categories imply that the patient supplies over 50 per cent of the effort. 7. Use of aids to be independent is allowed. Score Interpretation (from [address] [place] [address])    Independent   60-79 Minimally independent   40-59 Partially dependent   20-39 Very dependent   <20 Totally dependent     -Frederick Diamond., Barthel, D.W. (1965). Functional evaluation: the Barthel Index. [address] [facility] ([address] [facility] [address] (). The Barthel activities of daily living index: self-reporting versus actual performance in the old (> or = 75 years). Journal of [address] [place] [address] 45(7), [address] [facility], JMELISA, Geraldo Franks., [facility]. ().  Measuring the change in disability after inpatient rehabilitation; comparison of the responsiveness of the Barthel Index and Functional Morris Measure. Journal of Neurology, Neurosurgery, and Psychiatry, 66(4), 839-653. RICKY Thibodeaux, DEBORA Roberts, & Ivan Gonzales M.A. (2004) Assessment of post-stroke quality of life in cost-effectiveness studies: The usefulness of the Barthel Index and the EuroQoL-5D. Quality of Life Research, 15, 215-28           Occupational Therapy Evaluation Charge Determination   History Examination Decision-Making   LOW Complexity : Brief history review  LOW Complexity : 1-3 performance deficits relating to physical, cognitive , or psychosocial skils that result in activity limitations and / or participation restrictions  LOW Complexity : No comorbidities that affect functional and no verbal or physical assistance needed to complete eval tasks       Based on the above components, the patient evaluation is determined to be of the following complexity level: LOW   Pain Rating:  none    Activity Tolerance:   Fair and requires rest breaks    After treatment patient left in no apparent distress:    Supine in bed, Call bell within reach and Caregiver / family present    COMMUNICATION/EDUCATION:   The patients plan of care was discussed with: Physical therapist and Registered nurse.      Thank you for this referral.  Christin Abbott OT  Time Calculation: 20 mins

## 2021-10-26 NOTE — PROGRESS NOTES
Problem: Diabetes Self-Management  Goal: *Disease process and treatment process  Description: Define diabetes and identify own type of diabetes; list 3 options for treating diabetes. Outcome: Resolved/Met  Goal: *Incorporating nutritional management into lifestyle  Description: Describe effect of type, amount and timing of food on blood glucose; list 3 methods for planning meals. Outcome: Resolved/Met  Goal: *Incorporating physical activity into lifestyle  Description: State effect of exercise on blood glucose levels. Outcome: Resolved/Met  Goal: *Developing strategies to promote health/change behavior  Description: Define the ABC's of diabetes; identify appropriate screenings, schedule and personal plan for screenings. Outcome: Resolved/Met  Goal: *Using medications safely  Description: State effect of diabetes medications on diabetes; name diabetes medication taking, action and side effects. Outcome: Resolved/Met  Goal: *Monitoring blood glucose, interpreting and using results  Description: Identify recommended blood glucose targets  and personal targets. Outcome: Resolved/Met  Goal: *Prevention, detection, treatment of acute complications  Description: List symptoms of hyper- and hypoglycemia; describe how to treat low blood sugar and actions for lowering  high blood glucose level. Outcome: Resolved/Met  Goal: *Prevention, detection and treatment of chronic complications  Description: Define the natural course of diabetes and describe the relationship of blood glucose levels to long term complications of diabetes.   Outcome: Resolved/Met  Goal: *Developing strategies to address psychosocial issues  Description: Describe feelings about living with diabetes; identify support needed and support network  Outcome: Resolved/Met  Goal: *Insulin pump training  Outcome: Resolved/Met  Goal: *Sick day guidelines  Outcome: Resolved/Met  Goal: *Patient Specific Goal (EDIT GOAL, INSERT TEXT)  Outcome: Resolved/Met Problem: Patient Education: Go to Patient Education Activity  Goal: Patient/Family Education  Outcome: Resolved/Met     Problem: Falls - Risk of  Goal: *Absence of Falls  Description: Document Kaykay Aljeandra Fall Risk and appropriate interventions in the flowsheet.   Outcome: Resolved/Met     Problem: Patient Education: Go to Patient Education Activity  Goal: Patient/Family Education  Outcome: Resolved/Met     Problem: Discharge Planning  Goal: *Discharge to safe environment  Outcome: Resolved/Met  Goal: *Knowledge of medication management  Outcome: Resolved/Met  Goal: *Knowledge of discharge instructions  Outcome: Resolved/Met     Problem: Patient Education: Go to Patient Education Activity  Goal: Patient/Family Education  Outcome: Resolved/Met

## 2021-10-26 NOTE — PROGRESS NOTES
Bedside and Verbal shift change report given to Tray Yu (oncoming nurse) by Whit Brown (offgoing nurse). Report included the following information SBAR, Kardex, ED Summary, Intake/Output, MAR, Recent Results, Med Rec Status and Cardiac Rhythm Sinus Rhythm.

## 2021-10-26 NOTE — DISCHARGE INSTRUCTIONS
Please bring this form with you to show your primary care provider at your follow-up appointment. Primary care provider:  Dr. Bhavana Salinas MD    Discharging provider:  Nicolasa Felder MD    You have been admitted to the hospital with the following diagnoses:  · Acute respiratory failure with hypoxia ([facility] [J96.01]   · Volume Overload     FOLLOW-UP CARE RECOMMENDATIONS:    APPOINTMENTS:  Follow-up Information     Follow up With Specialties Details Why Contact Info    Bhavana Salinas MD Internal Medicine In 1 week Discharge follow up  [address] [facility].  [phone]      Nephrology- HD unit   resume HD on M-W-F             FOLLOW-UP TESTS recommended:   - Low salt diet (2gm) and 1200cc fluid restriction       PENDING TEST RESULTS:  At the time of your discharge the following test results are still pending: none  Please make sure you review these results with your outpatient follow-up provider(s). SYMPTOMS to watch for: chest pain, shortness of breath, fever, chills, nausea, vomiting, diarrhea, change in mentation, falling, weakness, bleeding. DIET/what to eat:  Renal Diet, 1200cc fluid restriction    ACTIVITY:  Activity as tolerated    WOUND CARE: NONE    EQUIPMENT needed:  NONE      What to do if new or unexpected symptoms occur? If you experience any of the above symptoms (or should other concerns or questions arise after discharge) please call your primary care physician. Return to the emergency room if you cannot get hold of your doctor. · It is very important that you keep your follow-up appointment(s). · Please bring discharge papers, medication list (and/or medication bottles) to your follow-up appointments for review by your outpatient provider(s). · Please check the list of medications and be sure it includes every medication (even non-prescription medications) that your provider wants you to take.     · It is important that you take the medication exactly as they are prescribed. · Keep your medication in the bottles provided by the pharmacist and keep a list of the medication names, dosages, and times to be taken in your wallet. · Do not take other medications without consulting your doctor. · If you have any questions about your medications or other instructions, please talk to your nurse or care provider before you leave the hospital.    I understand that if any problems occur once I am at home I am to contact my physician. These instructions were explained to me and I had the opportunity to ask questions.

## 2021-10-26 NOTE — PROGRESS NOTES
Attempted to schedule hospital follow up PCP appointment. Office nurse will contact the patient with appointment information as there are no available appointments in [place] [address] within the required time frames.   Albino Soares, Care Management Specialist.

## 2021-10-26 NOTE — PROGRESS NOTES
Physical Therapy 10/26/2021    Orders received and chart reviewed up to date. Discussed with OT, no acute PT needs. Per CM, pt planned to DC AUGUST, declined HH. Will complete orders. Thank you.   Jian Barrera, PT, DPT

## 2021-10-26 NOTE — DISCHARGE SUMMARY
Discharge Summary     Patient:  Tristan Goss       MRN: [id number]       YOB: 1970       Age: 45 y.o. Date of admission:  10/24/2021    Date of discharge:  10/26/2021    Primary care provider: Dr. Mireya Coy MD    Admitting provider:  Jasmin Johnson MD    Discharging provider:  Claudette Ovalles MD - [phone]  If unavailable, call [phone] and ask the  to page the triage hospitalist.    Consultations  · IP CONSULT TO NEPHROLOGY    Procedures  · * No surgery found *    Discharge destination: HOME . The patient is stable for discharge. Admission diagnosis  · Acute respiratory failure with hypoxia ([facility] [J96.01]    Current Discharge Medication List      CONTINUE these medications which have NOT CHANGED    Details   lidocaine (XYLOCAINE) 4 % topical cream Apply  to affected area DIALYSIS PRN for Pain. Qty: 15 g, Refills: 0      metoprolol tartrate (LOPRESSOR) 25 mg tablet Take 1 Tablet by mouth every twelve (12) hours. Qty: 60 Tablet, Refills: 0      b complex-vitamin c-folic acid (NEPHROCAPS) 1 mg capsule Take 1 Capsule by mouth daily. Qty: 30 Capsule, Refills: 0      patiromer calcium sorbitex (Veltassa) 16.8 gram powder Take  by mouth daily. diphenhydrAMINE (BenadryL) 25 mg capsule Take 25 mg by mouth nightly as needed. furosemide (LASIX PO) Take 10 mg by mouth every morning. cetirizine (ZyrTEC) 10 mg tablet Take 10 mg by mouth daily. cranberry 500 mg capsule Take 500 mg by mouth daily. guaiFENesin (ROBITUSSIN) 100 mg/5 mL liquid Take 100 mg by mouth every four (4) hours as needed for Cough. fluticasone propionate (Flonase Allergy Relief) 50 mcg/actuation nasal spray 2 Sprays by Both Nostrils route two (2) times a day.       guaiFENesin-dextromethorphan (Diabetic Tussin DM)  mg/5 mL liqd Take 10 mL by mouth every six (6) hours as needed for Cough or Congestion. esomeprazole (NexIUM) 40 mg capsule Take 40 mg by mouth daily. L.acid,para-B. bifidum-S.therm (RISAQUAD) 8 billion cell cap cap Take 1 Capsule by mouth daily. insulin degludec Donavan Ilene FlexTouch U-100) 100 unit/mL (3 mL) inpn 10 Units by SubCUTAneous route nightly.      ergocalciferol (Vitamin D2) 1,250 mcg (50,000 unit) capsule Take 50,000 Units by mouth every Wednesday. ondansetron hcl (Zofran) 4 mg tablet Take 4 mg by mouth every four (4) hours as needed for Nausea or Vomiting.      montelukast (Singulair) 10 mg tablet Take 10 mg by mouth daily. levothyroxine (SYNTHROID) 100 mcg tablet Take 1 Tablet by mouth Daily (before breakfast). Qty: 90 Tablet, Refills: 3      biotin 5,000 mcg TbDi Take 5,000 mcg by mouth daily. cholecalciferol, vitamin D3, (VITAMIN D3) 2,000 unit tab Take 4,000 Units by mouth daily. insulin lispro (HUMALOG U-100 INSULIN) 100 unit/mL injection 3-4 Units by SubCUTAneous route Before breakfast, lunch, and dinner. Hold insulin if BS <80. Hold if patient does not eat her meal. If BS >150 give with SS.      atorvastatin (LIPITOR) 10 mg tablet Take 20 mg by mouth nightly. ibuprofen (MOTRIN) 400 mg tablet Take 400 mg by mouth every six (6) hours as needed for Pain (Fever). albuterol (PROVENTIL HFA, VENTOLIN HFA, PROAIR HFA) 90 mcg/actuation inhaler Take 1 Puff by inhalation every four (4) hours as needed for Wheezing. Qty: 1 Inhaler, Refills: 0      glucose 4 gram chewable tablet Take 12 g by mouth as needed (BS < 60). glucagon (GLUCAGEN) 1 mg injection 1 mg by IntraMUSCular route once as needed (hypoglycemia if patient is unresponsive). STOP taking these medications       aspirin delayed-release 81 mg tablet Comments:   Reason for Stopping:                Follow-up Information     Follow up With Specialties Details Why Contact Urszula Alamo MD Internal Medicine In 1 week Discharge follow up  [address]  [phone]      Nephrology- HD unit   resume HD on M-W-F           Final discharge diagnoses and brief hospital course  Please also refer to the admission H&P for details on the presenting problem. 45 y.o lady w/ Down syndrome, asthma, DM, ESRD recently started on HD MWF, who presents with shortness of breath.     Acute respiratory failure with hypoxia and hypercapnia  Volume Overload   - minimal urine output with Lasix 120mg IV in Er   - improved with BIPAP and HD with 2.3L removal   - seen by renal, no need for UF today as she is off oxygen and maintaining okay.      ESRD on HD  - c/w MWF  - management per renal team      Type 2 DM   - resume [place] and sliding scale      HTN   -c/w lopressor   - recent stress negative for ischemia, will d/c ASA     HLD  - c/w statin     Hypothyroidism  - c/w synthroid     Ashtma  - c/w Singulair      Down Syndrome  - supportive care      Code status: DNR      FOLLOW-UP TESTS recommended:   - Low salt diet (2gm) and 1200cc fluid restriction         PENDING TEST RESULTS:  At the time of your discharge the following test results are still pending: none  Please make sure you review these results with your outpatient follow-up provider(s).     SYMPTOMS to watch for: chest pain, shortness of breath, fever, chills, nausea, vomiting, diarrhea, change in mentation, falling, weakness, bleeding.     DIET/what to eat:  Renal Diet, 1200cc fluid restriction     ACTIVITY:  Activity as tolerated     WOUND CARE: NONE     EQUIPMENT needed:  NONE    Physical examination at discharge  Visit Vitals  BP (!) 114/92   Pulse 81   Temp 97.7 °F (36.5 °C)   Resp 18   Wt 68 kg (150 lb)   SpO2 97%   BMI 30.30 kg/m²     Alert and awake  Lung Clear  CVS: RRR  Abd: soft NT ND  Ext; no edema     Pertinent imaging studies:      Recent Labs     10/26/21  0240 10/24/21  2358   WBC 12.9* 13.9*   HGB 7.2* 8.0*   HCT 23.7* 26.7*    290     Recent Labs 10/26/21  0240 10/24/21  2358   * 139   K 4.1 4.3   CL 99 106   CO2 31 27   BUN 38* 46*   CREA 4.10* 5.59*   * 139*   CA 8.4* 8.1*   MG 2.1  --    PHOS 4.4  --      Recent Labs     10/26/21  0240 10/24/21  2358   AP 81 91   TP 7.0 7.2   ALB 1.9* 2.0*   GLOB 5.1* 5.2*     No results for input(s): INR, PTP, APTT, INREXT in the last 72 hours. No results for input(s): FE, TIBC, PSAT, FERR in the last 72 hours. No results for input(s): PH, PCO2, PO2 in the last 72 hours. No results for input(s): CPK, CKMB in the last 72 hours.     No lab exists for component: TROPONINI  No components found for: [place]    Chronic Diagnoses:    Problem List as of 10/26/2021 Date Reviewed: 1/18/2020        Codes Class Noted - Resolved    Acute respiratory failure with hypoxia ([facility] ICD-10-CM: J96.01  ICD-9-CM: 518.81  10/25/2021 - Present        CKD (chronic kidney disease), stage V ([facility] ICD-10-CM: N18.5  ICD-9-CM: 585.5  10/8/2021 - Present        Fluid overload ICD-10-CM: E87.70  ICD-9-CM: 276.69  10/8/2021 - Present        Severe anemia ICD-10-CM: D64.9  ICD-9-CM: 285.9  10/8/2021 - Present        ESRD (end stage renal disease) ([facility] ICD-10-CM: N18.6  ICD-9-CM: 585.6  10/1/2021 - Present        Metabolic acidosis BAYRON-25-KM: E87.2  ICD-9-CM: 276.2  1/19/2020 - Present        Asthma exacerbation ICD-10-CM: J45.901  ICD-9-CM: 493.92  1/19/2020 - Present        ROBBY (acute kidney injury) ([facility] ICD-10-CM: N17.9  ICD-9-CM: 584.9  1/15/2020 - Present        Advance directive on file ICD-10-CM: Z78.9  ICD-9-CM: V49.89  12/28/2015 - Present        Acne ICD-10-CM: L70.9  ICD-9-CM: 706.1  8/20/2014 - Present        Asthma (Chronic) ICD-10-CM: J45.909  ICD-9-CM: 493.90  4/29/2013 - Present        HTN (hypertension) (Chronic) ICD-10-CM: I10  ICD-9-CM: 401.9  4/29/2013 - Present        DM (diabetes mellitus), type 1, uncontrolled ([facility] ICD-10-CM: E10.65  ICD-9-CM: 250.03  11/14/2012 - Present        Hypoglycemia ICD-10-CM: E16.2  ICD-9-CM: 251.2  6/27/2012 - Present        Down syndrome ICD-10-CM: Q90.9  ICD-9-CM: 758.0  7/23/2010 - Present        Acquired hypothyroidism ICD-10-CM: E03.9  ICD-9-CM: 244.9  7/23/2010 - Present        Anemia, unspecified ICD-10-CM: D64.9  ICD-9-CM: 285.9  7/23/2010 - Present        RESOLVED: Hyponatremia ICD-10-CM: E87.1  ICD-9-CM: 276.1  2/28/2018 - 1/1/2019        RESOLVED: DKA (diabetic ketoacidoses) ICD-10-CM: E11.10  ICD-9-CM: 250.12  9/14/2017 - 1/1/2019        RESOLVED: Hyperglycemia ICD-10-CM: R73.9  ICD-9-CM: 790.29  7/28/2014 - 7/29/2014        RESOLVED: Syncope and collapse ICD-10-CM: R55  ICD-9-CM: 780.2  4/29/2013 - 1/1/2019        RESOLVED: Type II or unspecified type diabetes mellitus without mention of complication, uncontrolled ICD-10-CM: [id number]  ICD-9-CM: 250.02  7/23/2010 - 11/14/2012              Time spent on discharge related activities today greater than 30 minutes. Signed:  Collin Rowland MD                 Hospitalist, Internal Medicine      Cc:  Daniel Monson MD

## 2021-10-26 NOTE — PROGRESS NOTES
JEAN: Anticipate discharge back to [facility], [facility], with mother. Transportation in car with sisterMiryam ([phone]). Reason for Readmission:  Acute respiratory failure            RUR Score/Risk Level:  23%       PCP: First and Last name:  Dr. Mitchell Hayes   Name of Practice:    Are you a current patient: Yes/No: Yes   Approximate date of last visit: Within the month   Can you participate in a virtual visit with your PCP:     Is a Care Conference indicated: No      Did you attend your follow up appointment (s): If not, why not:Yes         Resources/supports as identified by patient/family:   Access to assisted living staff, including nursing and MD on site weekly. Family very involved. Top Challenges facing patient (as identified by patient/family and CM): Finances/Medication cost?    N/A   Transportation      N/A  Support system or lack thereof? N/A    Living arrangements? N/A     Self-care/ADLs/Cognition? Nutrition is a concern. Patient's sister, Miryam Huertas, stated she will be meeting with the dietician at [facility] to discuss patient's nutrition needs. Needs extremely low sodium diet. Current Advanced Directive/Advance Care Plan:             Plan for utilizing home health:   N/A             Transition of Care Plan:    Based on readmission, the patient's previous Plan of Care   has been evaluated and/or modified. The current Transition of Care Plan is:   CM met with patient's sister, Miryam Huertas, at bedside. Patient was using the bathroom with the assistance of her mother. Mother, Phil Stubbs, is hard of hearing so this CM confirmed information with sister. Demographic information confirmed. Patient resides in an [facility], with her mother. Patient is vaccinated for Covid and has had a booster. She has also received her flu vaccine. Hx: down sydrome, asthma, ESRD, diabetes. No DME. PCP services and medications accessed through [facility].  MWF dialysis patient at [facility] with 10:45amn chair time. No discharge concerns at this time. Readmission Assessment  Number of days since last admission?: 8-30 days (10/8-10/11/21 admission for metabolic acidosis)  Previous disposition: Home with Family  Who is being interviewed?: Caregiver (Jannie small)  What was the patient's/caregiver's perception as to why they think they needed to return back to the hospital?: Other (Comment) (new condition)  Did you visit your Primary Care Physician after you left the hospital, before you returned this time?: Yes (located in [place])  Did you see a specialist, such as Cardiac, Pulmonary, Orthopedic Physician, etc. after you left the hospital?: Other (Comment) (dialysis)  Who advised the patient to return to the hospital?: Caregiver  Does the patient report anything that got in the way of taking their medications?: No  In our efforts to provide the best possible care to you and others like you, can you think of anything that we could have done to help you after you left the hospital the first time, so that you might not have needed to return so soon?: Other (Comment) (no additional needs)    Care Management Interventions  PCP Verified by CM: Yes (Dr. Yariel Mora)  Mode of Transport at Discharge:  Other (see comment) (in car with Jannie small)  [address] [place] N[place]co: Yes  Discharge Durable Medical Equipment: No  Physical Therapy Consult: Yes  Occupational Therapy Consult: Yes  Speech Therapy Consult: No  Support Systems: Parent(s), Other Family Member(s), Assisted Living  Confirm Follow Up Transport: Family (in car with Jannie small)  The Plan for Transition of Care is Related to the Following Treatment Goals : back to assisted living with family ([facility])  Discharge Location  Discharge Placement: Assisted Living     Stephen Clay, [facility],[address]  [phone]

## 2021-10-29 NOTE — TELEPHONE ENCOUNTER
[facility] called to advise patient needs a Hospital F/U appt within 6 days of Discharge on 10/26/21. PSR advised patient would have to be called Directly as No Appts in Time required at time of call. Please call Patient.  Thank you

## 2021-12-02 NOTE — TELEPHONE ENCOUNTER
Called spoke with Sandip Gr ([facility]). Two pt identifiers confirmed. Sandip Osborne stated is doing dialysis. Sandip Osborne stated pt is very nauseous, barely eating/drinking. Sandip Osborne stated yesterday believes patient is becoming dehydrated. Sandip Osborne stated pt cries a lot and anxious, has concerns. Sandip Osborne wants to know if there is something that pt can take that relax her. Also wants to know if you need an appt with patient, she can bring her to the office. Sandip Osborne informed Dr. Lona Degroot is in clinic and I will reach out to him and get back to her when I can. Sandip Osborne stated ok thank you. Sandip Osborne verbalized understanding of information discussed w/ no further questions at this time.

## 2021-12-02 NOTE — TELEPHONE ENCOUNTER
----- Message from Letitia Bentley sent at 12/1/2021  4:58 PM EST -----  Subject: Message to Provider    QUESTIONS  Information for Provider? pt having new concerns due to her dialysis. ---------------------------------------------------------------------------  --------------  Karoline BISHOP  What is the best way for the office to contact you? OK to leave message on   voicemail  Preferred Call Back Phone Number? [phone]  ---------------------------------------------------------------------------  --------------  SCRIPT ANSWERS  Relationship to Patient? Other  Representative Name? Yajaira Lo  Additional information verified (besides Name and Date of Birth)? Address  (Is the patient requesting to be seen urgently for their symptoms?)? No  Is this follow up request related to routine Diabetes Management? No  Are you having any new concerns about your existing condition?  Yes

## 2021-12-03 NOTE — TELEPHONE ENCOUNTER
Discussed situation with her. She is anxious the morning of dialysis. \"Oh, I'm throwing up I can't go. \" Stays up all night the night before dialysis. We'll try nightly trazodone to help with sleep and anxiety.

## 2021-12-04 PROBLEM — E11.10 DKA (DIABETIC KETOACIDOSIS) (HCC): Status: ACTIVE | Noted: 2021-01-01

## 2021-12-04 NOTE — ED NOTES
Verbal shift change report given to [facility]-TOOTIE AHMADI (oncoming nurse) by Andrew Oneill RN (offgoing nurse). Report included the following information SBAR, Kardex and ED Summary.

## 2021-12-04 NOTE — ED NOTES
TRANSFER - OUT REPORT:    Verbal report given to TOOTIE West (name) on Yasmani Gr  being transferred to [facility] (unit) for routine progression of care       Report consisted of patients Situation, Background, Assessment and   Recommendations(SBAR). Information from the following report(s) SBAR, Kardex and ED Summary was reviewed with the receiving nurse. Lines:   Peripheral IV Anterior; Right Forearm (Active)       Peripheral IV 12/04/21 Right Antecubital (Active)        Opportunity for questions and clarification was provided.       Patient transported with:   Registered Nurse

## 2021-12-04 NOTE — PROGRESS NOTES
Patient requested for admission. Could not find any notes, pended or otherwise as to reason for admission. Lab review shows patient is in DKA.   Will see patient shortly

## 2021-12-04 NOTE — ED PROVIDER NOTES
54-year-old female with a history of Down syndrome, DM, asthma, ESRD on HD reportedly last dialyzed yesterday, presents to the emergency department by EMS admitting the earlier she may have had any dietary indiscretion and eaten something that was very sweet causing her blood sugar to . earlier she had an episode of nausea and vomiting after she ate dinner and after she \"ate something that she was not supposed to. \"  She states that earlier she felt short of breath but now seems to feel better. She was found to be mildly hypoxic on room air and was placed on nasal cannula and improved. She is seen to drop down to 86% on room air without supplemental oxygen. She reportedly does not wear oxygen at her facility at baseline. She notes an occasional cough but denies any other cold symptoms, fever, chest pain. While in route to the ED she was found to have hyperglycemia with EMS reading \"high. \"           Past Medical History:   Diagnosis Date    Allergic rhinitis, cause unspecified     Asthma     use inhalers    Diabetes ([address]     Down's syndrome     GERD (gastroesophageal reflux disease)     H/O impacted cerumen     Dr. Liu [place] Headache     Hypothyroid     Mononucleosis     Pneumonia     Seizures ([address]     as a result of low blood glucose readings    Thyroid disease        Past Surgical History:   Procedure Laterality Date    HX CATARACT REMOVAL Bilateral 3/5/15    cataract left and right    HX CYST REMOVAL      right shoulder    HX HEENT Bilateral 2017    prior in one eye    HX HYSTERECTOMY  1987         Family History:   Problem Relation Age of Onset    Thyroid Disease Mother         hypo    Hypertension Father     Abdominal aortic aneurysm Father     Neuropathy Father     No Known Problems Sister     Heart Disease Brother     Heart Attack Brother     Heart Surgery Brother     No Known Problems Sister     No Known Problems Brother     Anesth Problems Neg Hx        Social History Socioeconomic History    Marital status: SINGLE     Spouse name: Not on file    Number of children: Not on file    Years of education: Not on file    Highest education level: Not on file   Occupational History    Not on file   Tobacco Use    Smoking status: Never Smoker    Smokeless tobacco: Never Used   Vaping Use    Vaping Use: Never used   Substance and Sexual Activity    Alcohol use: No    Drug use: No    Sexual activity: Not Currently   Other Topics Concern    Not on file   Social History Narrative    Not on file     Social Determinants of Health     Financial Resource Strain:     Difficulty of Paying Living Expenses: Not on file   Food Insecurity:     Worried About Running Out of Food in the Last Year: Not on file    Cornelius of Food in the Last Year: Not on file   Transportation Needs:     Lack of Transportation (Medical): Not on file    Lack of Transportation (Non-Medical):  Not on file   Physical Activity:     Days of Exercise per Week: Not on file    Minutes of Exercise per Session: Not on file   Stress:     Feeling of Stress : Not on file   Social Connections:     Frequency of Communication with Friends and Family: Not on file    Frequency of Social Gatherings with Friends and Family: Not on file    Attends Buddhist Services: Not on file    Active Member of [address] [place] [address] [employer] or Organizations: Not on file    Attends Club or Organization Meetings: Not on file    Marital Status: Not on file   Intimate Partner Violence:     Fear of Current or Ex-Partner: Not on file    Emotionally Abused: Not on file    Physically Abused: Not on file    Sexually Abused: Not on file   Housing Stability:     Unable to Pay for Housing in the Last Year: Not on file    Number of [place] in the Last Year: Not on file    Unstable Housing in the Last Year: Not on file         ALLERGIES: Codeine, Nitrofurantoin, and Pcn [penicillins]    Review of Systems   Constitutional: Positive for activity change and appetite change. Negative for chills and fever. HENT: Negative for congestion, rhinorrhea, sinus pressure, sneezing and sore throat. Eyes: Negative for photophobia and visual disturbance. Respiratory: Positive for shortness of breath. Negative for cough. Cardiovascular: Negative for chest pain. Gastrointestinal: Positive for nausea and vomiting. Negative for abdominal pain, blood in stool, constipation and diarrhea. Genitourinary: Negative for difficulty urinating, dysuria, flank pain, frequency, hematuria, menstrual problem, urgency, vaginal bleeding and vaginal discharge. Musculoskeletal: Negative for arthralgias, back pain, myalgias and neck pain. Skin: Negative for rash and wound. Neurological: Negative for syncope, weakness, numbness and headaches. Psychiatric/Behavioral: Negative for self-injury and suicidal ideas. All other systems reviewed and are negative. Vitals:    12/04/21 0326 12/04/21 0347 12/04/21 0501   BP:  (!) 114/53    Pulse: (!) 106 99    Resp: 18 14    Temp: 97.8 °F (36.6 °C)     SpO2: 98% 92% 93%   Weight: 69.5 kg (153 lb 3.5 oz)              Physical Exam  Vitals and nursing note reviewed. Constitutional:       General: She is not in acute distress. Appearance: Normal appearance. She is well-developed. She is obese. She is not diaphoretic. Comments: Congenital facies of Down syndrome. Pleasant, no acute distress. Speaking in full sentences. Nasal cannula in place. HENT:      Head: Normocephalic and atraumatic. Nose: Nose normal.   Eyes:      Extraocular Movements: Extraocular movements intact. Conjunctiva/sclera: Conjunctivae normal.      Pupils: Pupils are equal, round, and reactive to light. Cardiovascular:      Rate and Rhythm: Normal rate and regular rhythm. Heart sounds: Normal heart sounds. Pulmonary:      Effort: Pulmonary effort is normal.      Breath sounds: Examination of the right-lower field reveals rales.  Examination of the left-lower field reveals rales. Rales present. Abdominal:      General: There is no distension. Palpations: Abdomen is soft. Tenderness: There is no abdominal tenderness. Musculoskeletal:         General: No tenderness. Cervical back: Neck supple. Skin:     General: Skin is warm and dry. Neurological:      General: No focal deficit present. Mental Status: She is alert and oriented to person, place, and time. Cranial Nerves: No cranial nerve deficit. Sensory: No sensory deficit. Motor: No weakness. Coordination: Coordination normal.          MDM   79-year-old female with Down syndrome and ESRD, DM presents with nausea, vomiting, admitted dietary indiscretion and hyperglycemia. She is afebrile but hypoxic on room air, stable on nasal cannula. She is tachycardic but with stable blood pressure    Labs returned showing evidence of DKA with pH 7.29, bicarb 17, O2 63, normal CO2 35.7. Point-of-care glucose is greater than 600. Metabolic panel glucose 708, bicarb 17, anion gap 21, . Creatinine elevated at 4.5, BUN 42. BNP elevated at 2048, positive troponin at 59. CT abdomen pelvis shows no acute abnormality in the abdomen or pelvis other than stable moderate pericardial and bilateral pleural effusions. Chest x-ray was viewed by myself and read by radiology showing small bilateral pleural effusions and pulmonary edema, though decreased from prior measure. She was started on insulin GTT. Hesitant to fluid overload with IV saline given ESRD, hypoxia, with pulmonary edema already noted on chest x-ray. We will admit to the hospitalist service for further care and assessment. Total critical care time spent exclusive of procedures:  60 minutes    358 EKG shows normal sinus rhythm with a rate of 100 bpm with nonspecific T wave flattening inferiorly but no ST elevation or depression.   Procedures    Perfect Serve Consult for Admission  5:56 AM    ED Room Number: QI06/97  Patient Name and age:  Amrit Gr 45 y.o.  female  Working Diagnosis:   1. Diabetic ketoacidosis without coma associated with type 2 diabetes mellitus ([facility]    2. Hyperglycemia    3. Hypoxia    4. ESRD on dialysis ([facility]    5. Metabolic acidosis    6. Pleural effusion    7. Acute pulmonary edema (HCC)        COVID-19 Suspicion:  no  Sepsis present:  no  Reassessment needed: no  Code Status:  Do Not Resuscitate  Readmission: no  Isolation Requirements:  no  Recommended Level of Care:  step down  Department:[facility] - 21   Other: 50-year-old female presents with possible dietary indiscretion and nausea, vomiting, now has DKA. Started on insulin GTT. Also hypoxic on room air satting in the mid 80s. Has some pulmonary edema on CXR. History of ESRD but reportedly was last dialyzed yesterday.

## 2021-12-04 NOTE — H&P
HISTORY AND PHYSICAL  Marcie Sierra MD        PCP: Jesenia Morrissey MD    Please note that this dictation was completed with [employer], the computer voice recognition software. Quite often unanticipated grammatical, syntax, homophones, and other interpretive errors are inadvertently transcribed by the computer software. Please disregard these errors. Please excuse any errors that have escaped final proofreading. CHIEF COMPLAINTS      Nausea and vomiting. Hyperglycemia  HISTORY OF PRESENT ILLNESS   This is a 30-year-old female with a history of Down syndrome, diabetes, ESRD started on hemodialysis about a month ago, gets dialyzed MWF presents to the ED for nausea, vomiting and hyperglycemia. Per her brother, she ate sugary stuff last night at the assisted-living facility. She has had her insulin last night. Patient has cognitive impairment but able to give some history. Her brother/MPOA was at the bedside. She denied fever, chills. She has cough from asthma but that is not changed. She denied diarrhea. She still makes urine and denied dysuria, urgency or frequency. In the ED she was found to be in DKA. She was started on insulin infusion and will consult for admission evaluation.     PMH/PSH:  Past Medical History:   Diagnosis Date    Allergic rhinitis, cause unspecified     Asthma     use inhalers    Diabetes ([address]     Down's syndrome     GERD (gastroesophageal reflux disease)     H/O impacted cerumen     Dr. Kermit Grossman Headache     Hypothyroid     Mononucleosis     Pneumonia     Seizures ([address]     as a result of low blood glucose readings    Thyroid disease      Past Surgical History:   Procedure Laterality Date    HX CATARACT REMOVAL Bilateral 3/5/15    cataract left and right    HX CYST REMOVAL      right shoulder    HX HEENT Bilateral 2017    prior in one eye    HX HYSTERECTOMY  1987       Home meds:   Prior to Admission medications    Medication Sig Start Date End Date Taking? Authorizing Provider   lidocaine (XYLOCAINE) 4 % topical cream Apply  to affected area DIALYSIS PRN for Pain. 10/11/21   Janna Tristan MD   metoprolol tartrate (LOPRESSOR) 25 mg tablet Take 1 Tablet by mouth every twelve (12) hours. 10/11/21   Janna Tristan MD   b complex-vitamin c-folic acid (NEPHROCAPS) 1 mg capsule Take 1 Capsule by mouth daily. 10/12/21   Janna Tristan MD   patiromer calcium sorbitex (Veltassa) 16.8 gram powder Take  by mouth daily. Provider, Historical   diphenhydrAMINE (BenadryL) 25 mg capsule Take 25 mg by mouth nightly as needed. Provider, Historical   furosemide (LASIX PO) Take 10 mg by mouth every morning. Provider, Historical   cetirizine (ZyrTEC) 10 mg tablet Take 10 mg by mouth daily. Provider, Historical   cranberry 500 mg capsule Take 500 mg by mouth daily. Provider, Historical   guaiFENesin (ROBITUSSIN) 100 mg/5 mL liquid Take 100 mg by mouth every four (4) hours as needed for Cough. Provider, Historical   fluticasone propionate (Flonase Allergy Relief) 50 mcg/actuation nasal spray 2 Sprays by Both Nostrils route two (2) times a day. Provider, Historical   guaiFENesin-dextromethorphan (Diabetic Tussin DM)  mg/5 mL liqd Take 10 mL by mouth every six (6) hours as needed for Cough or Congestion. Provider, Historical   esomeprazole (NexIUM) 40 mg capsule Take 40 mg by mouth daily. Provider, Historical   L.acid,para-B. bifidum-S.therm (RISAQUAD) 8 billion cell cap cap Take 1 Capsule by mouth daily. Provider, Historical   insulin degludec Macarena Evin FlexTouch U-100) 100 unit/mL (3 mL) inpn 10 Units by SubCUTAneous route nightly. Provider, Historical   ergocalciferol (Vitamin D2) 1,250 mcg (50,000 unit) capsule Take 50,000 Units by mouth every Wednesday. Provider, Historical   ondansetron hcl (Zofran) 4 mg tablet Take 4 mg by mouth every four (4) hours as needed for Nausea or Vomiting.     Provider, Historical montelukast (Singulair) 10 mg tablet Take 10 mg by mouth daily. Provider, Historical   levothyroxine (SYNTHROID) 100 mcg tablet Take 1 Tablet by mouth Daily (before breakfast). 9/8/21   Socorro Cintron MD   biotin 5,000 mcg TbDi Take 5,000 mcg by mouth daily. Provider, Historical   cholecalciferol, vitamin D3, (VITAMIN D3) 2,000 unit tab Take 4,000 Units by mouth daily. Provider, Historical   insulin lispro (HUMALOG U-100 INSULIN) 100 unit/mL injection 3-4 Units by SubCUTAneous route Before breakfast, lunch, and dinner. Hold insulin if BS <80. Hold if patient does not eat her meal. If BS >150 give with SS. Provider, Historical   atorvastatin (LIPITOR) 10 mg tablet Take 20 mg by mouth nightly. Provider, Historical   ibuprofen (MOTRIN) 400 mg tablet Take 400 mg by mouth every six (6) hours as needed for Pain (Fever). Provider, Historical   albuterol (PROVENTIL HFA, VENTOLIN HFA, PROAIR HFA) 90 mcg/actuation inhaler Take 1 Puff by inhalation every four (4) hours as needed for Wheezing. 12/16/18   Kaleb Crespo MD   glucose 4 gram chewable tablet Take 12 g by mouth as needed (BS < 60). Provider, Historical   glucagon (GLUCAGEN) 1 mg injection 1 mg by IntraMUSCular route once as needed (hypoglycemia if patient is unresponsive). Provider, Historical       Allergies:   Allergies   Allergen Reactions    Codeine Nausea and Vomiting    Nitrofurantoin Rash    Pcn [Penicillins] Hives and Rash       FH:  Family History   Problem Relation Age of Onset    Thyroid Disease Mother         hypo    Hypertension Father     Abdominal aortic aneurysm Father     Neuropathy Father     No Known Problems Sister     Heart Disease Brother     Heart Attack Brother     Heart Surgery Brother     No Known Problems Sister     No Known Problems Brother     Anesth Problems Neg Hx        SH:  Social History     Tobacco Use    Smoking status: Never Smoker    Smokeless tobacco: Never Used   Substance Use Topics    Alcohol use: No       ROS: A comprehensive review of systems was negative except for that written in the HPI. PHYSICAL EXAM:  Visit Vitals  /64 (BP 1 Location: Right upper arm, BP Patient Position: At rest)   Pulse 100   Temp 97.9 °F (36.6 °C)   Resp 19   Wt 69.5 kg (153 lb 3.5 oz)   LMP 03/27/1986   SpO2 99%   BMI 30.95 kg/m²       General:           Alert, not in distress. Sitting in bed, on oxygen via nasal cannula. HEENT:           Atraumatic, anicteric sclerae, pink conjunctivae                          No oral ulcers, mucosa moist, throat clear, dentition fair  Neck:               Supple, symmetrical,  thyroid: non tender  Lungs: On oxygen via nasal cannula, symmetrical air entry without wheezing. Hi. No rales. Chest wall:      No tenderness  No Accessory muscle use. Heart:              Regular  rhythm,  No  murmur   No edema  Abdomen:        Soft, non-tender. Not distended. Bowel sounds normal  Extremities:   No peripheral edema. She has left arm fistula with palpable thrill. Neurologic:     Alert , CNII-XII intact. Motor and sensory exam grossly intact.   Labs/Imaging:  Recent Results (from the past 24 hour(s))   D DIMER    Collection Time: 12/04/21  3:00 AM   Result Value Ref Range    D-dimer 11.03 (H) 0.00 - 0.65 mg/L FEU   LIPASE    Collection Time: 12/04/21  3:00 AM   Result Value Ref Range    Lipase 116 73 - 393 U/L   NT-PRO BNP    Collection Time: 12/04/21  3:00 AM   Result Value Ref Range    NT pro-BNP 2,048 (H) <125 PG/ML   TROPONIN-HIGH SENSITIVITY    Collection Time: 12/04/21  3:00 AM   Result Value Ref Range    Troponin-High Sensitivity 59 (HH) 0 - 51 ng/L   GLUCOSE, POC    Collection Time: 12/04/21  3:41 AM   Result Value Ref Range    Glucose (POC) >600 (HH) 65 - 117 mg/dL    Performed by Angie Eden   PCT    CBC WITH AUTOMATED DIFF    Collection Time: 12/04/21  3:42 AM   Result Value Ref Range    WBC 8.9 3.6 - 11.0 K/uL    RBC 2.80 (L) 3.80 - 5.20 M/uL    HGB 9.3 (L) 11.5 - 16.0 g/dL    HCT 30.3 (L) 35.0 - 47.0 %    .2 (H) 80.0 - 99.0 FL    MCH 33.2 26.0 - 34.0 PG    MCHC 30.7 30.0 - 36.5 g/dL    RDW 15.8 (H) 11.5 - 14.5 %    PLATELET 585 723 - 165 K/uL    MPV 10.3 8.9 - 12.9 FL    NRBC 0.0 0  WBC    ABSOLUTE NRBC 0.00 0.00 - 0.01 K/uL    NEUTROPHILS 83 (H) 32 - 75 %    LYMPHOCYTES 6 (L) 12 - 49 %    MONOCYTES 9 5 - 13 %    EOSINOPHILS 0 0 - 7 %    BASOPHILS 1 0 - 1 %    IMMATURE GRANULOCYTES 1 (H) 0.0 - 0.5 %    ABS. NEUTROPHILS 7.4 1.8 - 8.0 K/UL    ABS. LYMPHOCYTES 0.5 (L) 0.8 - 3.5 K/UL    ABS. MONOCYTES 0.8 0.0 - 1.0 K/UL    ABS. EOSINOPHILS 0.0 0.0 - 0.4 K/UL    ABS. BASOPHILS 0.1 0.0 - 0.1 K/UL    ABS. IMM. GRANS. 0.1 (H) 0.00 - 0.04 K/UL    DF SMEAR SCANNED      RBC COMMENTS MACROCYTOSIS  1+        RBC COMMENTS ANISOCYTOSIS  1+       METABOLIC PANEL, COMPREHENSIVE    Collection Time: 12/04/21  3:42 AM   Result Value Ref Range    Sodium 124 (L) 136 - 145 mmol/L    Potassium 4.2 3.5 - 5.1 mmol/L    Chloride 86 (L) 97 - 108 mmol/L    CO2 17 (L) 21 - 32 mmol/L    Anion gap 21 (H) 5 - 15 mmol/L    Glucose 807 (HH) 65 - 100 mg/dL    BUN 42 (H) 6 - 20 MG/DL    Creatinine 4.50 (H) 0.55 - 1.02 MG/DL    BUN/Creatinine ratio 9 (L) 12 - 20      GFR est AA 12 (L) >60 ml/min/1.73m2    GFR est non-AA 10 (L) >60 ml/min/1.73m2    Calcium 8.5 8.5 - 10.1 MG/DL    Bilirubin, total 0.5 0.2 - 1.0 MG/DL    ALT (SGPT) 15 12 - 78 U/L    AST (SGOT) 13 (L) 15 - 37 U/L    Alk. phosphatase 101 45 - 117 U/L    Protein, total 7.7 6.4 - 8.2 g/dL    Albumin 2.1 (L) 3.5 - 5.0 g/dL    Globulin 5.6 (H) 2.0 - 4.0 g/dL    A-G Ratio 0.4 (L) 1.1 - 2.2     SAMPLES BEING HELD    Collection Time: 12/04/21  3:42 AM   Result Value Ref Range    SAMPLES BEING HELD 1RED,1BLUE     COMMENT        Add-on orders for these samples will be processed based on acceptable specimen integrity and analyte stability, which may vary by analyte.    LACTIC ACID    Collection Time: 12/04/21  3:55 AM Result Value Ref Range    Lactic acid 1.8 0.4 - 2.0 MMOL/L   COVID-19 RAPID TEST    Collection Time: 12/04/21  3:57 AM   Result Value Ref Range    Specimen source Nasopharyngeal      COVID-19 rapid test Not detected NOTD     EKG, 12 LEAD, INITIAL    Collection Time: 12/04/21  3:57 AM   Result Value Ref Range    Ventricular Rate 100 BPM    Atrial Rate 100 BPM    P-R Interval 174 ms    QRS Duration 68 ms    Q-T Interval 362 ms    QTC Calculation (Bezet) 466 ms    Calculated P Axis 41 degrees    Calculated R Axis 47 degrees    Calculated T Axis 0 degrees    Diagnosis       Normal sinus rhythm  Nonspecific ST and T wave abnormality  Prolonged QT  Abnormal ECG  When compared with ECG of 25-OCT-2021 00:04,  Nonspecific T wave abnormality now evident in Lateral leads     POC G3 - PUL    Collection Time: 12/04/21  5:02 AM   Result Value Ref Range    FIO2 (POC) 1.5 %    pH (POC) 7.29 (L) 7.35 - 7.45      pCO2 (POC) 35.7 35.0 - 45.0 MMHG    pO2 (POC) 63 (L) 80 - 100 MMHG    HCO3 (POC) 17.0 (L) 22 - 26 MMOL/L    sO2 (POC) 89.1 (L) 92 - 97 %    Base deficit (POC) 8.9 mmol/L    Site RIGHT RADIAL      Device: NASAL CANNULA      Allens test (POC) Positive      Specimen type (POC) ARTERIAL     GLUCOSTABILIZER    Collection Time: 12/04/21  5:40 AM   Result Value Ref Range    Glucose 601 mg/dL    Insulin order 10.8 units/hour    Insulin adminstered 10.8 units/hour    Multiplier 0.020     Low target 150 mg/dL    High target 250 mg/dL    D50 order 0.0 ml    D50 administered 0.00 ml    Minutes until next BG 60 min    Order initials sf     Administered initials sf     GLSCOM Comments     GLUCOSE, POC    Collection Time: 12/04/21  6:36 AM   Result Value Ref Range    Glucose (POC) >600 (HH) 65 - 117 mg/dL    Performed by Daquan Mejia    Collection Time: 12/04/21  6:37 AM   Result Value Ref Range    Glucose 601 mg/dL    Insulin order 16.2 units/hour    Insulin adminstered 16.2 units/hour    Multiplier 0.030     Low target 150 mg/dL    High target 250 mg/dL    D50 order 0.0 ml    D50 administered 0.00 ml    Minutes until next BG 60 min    Order initials sf     Administered initials sf     GLSCOM Comments     GLUCOSE, POC    Collection Time: 12/04/21  7:28 AM   Result Value Ref Range    Glucose (POC) >600 (HH) 65 - 117 mg/dL    Performed by Shaylee Quinones    Collection Time: 12/04/21  7:29 AM   Result Value Ref Range    Glucose 601 mg/dL    Insulin order 21.6 units/hour    Insulin adminstered 21.6 units/hour    Multiplier 0.040     Low target 150 mg/dL    High target 250 mg/dL    D50 order 0.0 ml    D50 administered 0.00 ml    Minutes until next BG 60 min    Order initials sf     Administered initials sf     GLSCOM Comments     GLUCOSE, POC    Collection Time: 12/04/21  8:53 AM   Result Value Ref Range    Glucose (POC) 549 (H) 65 - 117 mg/dL    Performed by Kiah Hayes    Collection Time: 12/04/21  8:54 AM   Result Value Ref Range    Glucose 549 mg/dL    Insulin order 24.5 units/hour    Insulin adminstered 24.5 units/hour    Multiplier 0.050     Low target 150 mg/dL    High target 250 mg/dL    D50 order 0.0 ml    D50 administered 0.00 ml    Minutes until next BG 60 min    Order initials ME     Administered initials ME     GLSCOM Comments         Recent Labs     12/04/21  0342   WBC 8.9   HGB 9.3*   HCT 30.3*        Recent Labs     12/04/21  0342   *   K 4.2   CL 86*   CO2 17*   BUN 42*   CREA 4.50*   *   CA 8.5     Recent Labs     12/04/21  0342 12/04/21  0300   ALT 15  --      --    TBILI 0.5  --    TP 7.7  --    ALB 2.1*  --    GLOB 5.6*  --    LPSE  --  116       No results for input(s): CPK, CKNDX, TROIQ in the last 72 hours. No lab exists for component: CPKMB    No results for input(s): INR, PTP, APTT, INREXT in the last 72 hours. No results for input(s): PH, PCO2, PO2 in the last 72 hours.     CT ABD PELV WO CONT  Narrative: EXAM:  CT abdomen pelvis without contrast    INDICATION: Nausea and vomiting    COMPARISON: CT chest 10/25/2021. CT abdomen pelvis 12/20/2006. TECHNIQUE: Helical CT of the abdomen  and pelvis  without contrast. Coronal and  sagittal reformats are performed. CT dose reduction was achieved through use of  a standardized protocol tailored for this examination and automatic exposure  control for dose modulation. Adaptive statistical iterative reconstruction  (ASIR) was utilized. FINDINGS:   Solid organ evaluation is limited without contrast.     The visualized lung bases demonstrate no mass or consolidation. The heart size  is normal. There is a stable moderate pericardial effusion and small bilateral  pleural effusions. There is no renal, ureteral, or bladder calculus. The kidneys are symmetric  without hydronephrosis. There is no perinephric fluid or fat stranding. The liver, spleen, pancreas, and adrenal glands are normal.  The gall bladder is  present  without intra- or extra-hepatic biliary dilatation. There are no dilated bowel loops. The appendix is not visualized. There are no enlarged lymph nodes. There is no free fluid or free air. The  aorta tapers without aneurysm. The urinary bladder is nondistended. There is no pelvic mass. The uterus is  surgically absent. There are hemangiomas in the L1 and L2 vertebral bodies. A stable lucent lesion  at T9 may also represent an hemangioma. There is no aggressive bony lesion. Impression: 1. Note acute abnormality in the abdomen or pelvis. 2. Stable moderate pericardial and small bilateral pleural effusions. XR CHEST PORT  Narrative: EXAM:  XR CHEST PORT    INDICATION: Shortness of breath and hypoxia    COMPARISON: CT 10/25/2021    TECHNIQUE: Portable AP upright chest view at 0415 hours    FINDINGS: There is stable cardiac silhouette enlargement.     There are small pleural effusions and diffuse interstitial and patchy airspace  opacities, decreased compared to 10/25/2021. There is no pneumothorax. The bones  and upper abdomen are stable. Impression: Small pleural effusions and pulmonary edema, decreased compared to 10/25/2021. Assessment & Plan:  DKA possibly precipitated by dietary indiscretion. Type II DM, insulin-dependent, poorly controlled  --Admit to intermediate care  --Initiate insulin infusion per DKA protocol  --Accu-Cheks every hourly and  BMP every 4 hourly for DKA protocol  --Keep n.p.o.  --Administer normal saline  --D5 NS when BG is under 250      Pseudohyponatremia due to hyperglycemia: Management as above    Mild hypoxia. Afebrile. CXR improved pleural effusion pulmonary edema compared to 10/25. Rapid Covid test negative. ESRD on hemodialysis MWF schedule. Access: Left upper arm AV fistula  --Nephrology consulted for dialysis management    Hypothyroidism: Continue levothyroxine    Asthma without bronchospasm: Continue Singulair, add as needed bronchodilators    Hypertension: Continue home regimen    Hyperlipidemia: Continue statin    Down syndrome. Patient's Baseline: Ambulates with walking  DVT ppx: heparin  Code status: DNR,confirmed with her brother. mPOA. HAd DDNR on file as well  Disposition: back to facility                Signed By: Ashley Meade MD     December 4, 2021

## 2021-12-05 NOTE — PROGRESS NOTES
Day #1 of Meropenem  Indication:  UTI  Current regimen:  500 mg IV every 12 hours  Abx regimen: monotherapy  Recent Labs     21  0858 21  1636 21  1307 21  0918 21  0342   WBC  --   --   --   --  8.9   CREA 4.16* 4.74* 4.98*   < > 4.50*   BUN 39* 45* 46*   < > 42*    < > = values in this interval not displayed. Est CrCl: 14.1 ml/min; UO: 0 ml/kg/hr (pt to start dialysis MWF)  Temp (24hrs), Av.1 °F (36.7 °C), Min:96.9 °F (36.1 °C), Max:98.6 °F (37 °C)    Cultures: urine culture collection pending    Plan: Change to meropenem 500 mg IV every 24 hours. The duration of meropenem therapy has been automatically adjusted to 72 hours per [facility] and P&[employer] approved protocol. For therapy to continue beyond 72 hours, one of the following criteria must be met:  Current infection with ESBL-producing organism or other multidrug-resistant organism resistant to other formulary antibiotics  ID consult    Please contact inpatient pharmacy with any questions.     David Oliver, BRODIED

## 2021-12-05 NOTE — PROGRESS NOTES
1720 - Pt transferred in from ED. Pt alert and accompanied by sister. Lines: R arm PIV x 2   Drips: MIVF    Skin intact. Tucked pt in. Will continue to monitor.

## 2021-12-05 NOTE — PROGRESS NOTES
0730:Bedside and Verbal shift change report given to Andreia Canela RN (oncoming nurse) by Alfonzo Crowder RN (offgoing nurse). Report included the following information SBAR, Kardex, Intake/Output, MAR, Recent Results, and Cardiac Rhythm Sinus Rhythm . 0945: Critical Calcium received from [place] in lab. Andi Valadez MD notified of calcium of 6.2 and Andi Valadez MD placed orders. 1035Riting Rosario MD notified of potassium of 5.6 and held morning oral potassium. Andi Valadez MD asked to ensure nephrology consult was called. no other orders received. 1930: Bedside and Verbal shift change report given to Rolando Silvestre RN (oncoming nurse) by Andreia Canela RN (offgoing nurse). Report included the following information SBAR, Kardex, Intake/Output, MAR, Recent Results and Cardiac Rhythm Sinus Rhythm. Care Plans:   Problem: Diabetes Self-Management  Goal: *Disease process and treatment process  Description: Define diabetes and identify own type of diabetes; list 3 options for treating diabetes. Outcome: Progressing Towards Goal  Goal: *Incorporating nutritional management into lifestyle  Description: Describe effect of type, amount and timing of food on blood glucose; list 3 methods for planning meals. Outcome: Progressing Towards Goal  Goal: *Incorporating physical activity into lifestyle  Description: State effect of exercise on blood glucose levels. Outcome: Progressing Towards Goal  Goal: *Developing strategies to promote health/change behavior  Description: Define the ABC's of diabetes; identify appropriate screenings, schedule and personal plan for screenings. Outcome: Progressing Towards Goal  Goal: *Using medications safely  Description: State effect of diabetes medications on diabetes; name diabetes medication taking, action and side effects. Outcome: Progressing Towards Goal  Goal: *Monitoring blood glucose, interpreting and using results  Description: Identify recommended blood glucose targets  and personal targets.   Outcome: Progressing Towards Goal  Goal: *Prevention, detection, treatment of acute complications  Description: List symptoms of hyper- and hypoglycemia; describe how to treat low blood sugar and actions for lowering  high blood glucose level. Outcome: Progressing Towards Goal  Goal: *Prevention, detection and treatment of chronic complications  Description: Define the natural course of diabetes and describe the relationship of blood glucose levels to long term complications of diabetes. Outcome: Progressing Towards Goal  Goal: *Developing strategies to address psychosocial issues  Description: Describe feelings about living with diabetes; identify support needed and support network  Outcome: Progressing Towards Goal  Goal: *Insulin pump training  Outcome: Progressing Towards Goal  Goal: *Sick day guidelines  Outcome: Progressing Towards Goal  Goal: *Patient Specific Goal (EDIT GOAL, INSERT TEXT)  Outcome: Progressing Towards Goal     Problem: Patient Education: Go to Patient Education Activity  Goal: Patient/Family Education  Outcome: Progressing Towards Goal     Problem: Falls - Risk of  Goal: *Absence of Falls  Description: Document Vickey Fall Risk and appropriate interventions in the flowsheet.   Outcome: Progressing Towards Goal  Note: Fall Risk Interventions:  Mobility Interventions: Bed/chair exit alarm, Patient to call before getting OOB, Communicate number of staff needed for ambulation/transfer         Medication Interventions: Bed/chair exit alarm, Patient to call before getting OOB, Teach patient to arise slowly                   Problem: Patient Education: Go to Patient Education Activity  Goal: Patient/Family Education  Outcome: Progressing Towards Goal

## 2021-12-05 NOTE — CONSULTS
Consultation Note    NAME: Marco A Gr   :  1970   MRN:  [id number]     Date/Time:  2021 12:51 PM    I have been asked to see this patient by Dr. Juan Alberto Roberts  for advice/opinion re: ESRD. Assessment :    [facility]  [facility]  Hyperkalemia  anemia   No acute need for HD today. Plan HD in AM.    Potassium is up today due to elevated glucose. I will stop oral potassium supplement    H/H not at goal.  Start LUCERO. Subjective:   CHIEF COMPLAINT:  \"I'm OK. \"    HISTORY OF PRESENT ILLNESS:     Ronald Parks is a 45 y.o.   female who has a history of ESRD admitted with DKA. I spoke to her HD unit and they say that she went to HD Friday and did fine. Her sister says that Friday night they have hot apple cider and chocolate covered strawberries. She had N/V and came to the ER and was found to have an elevated glucose and she was admitted. She says that she feels better today. No N/V. No dyspnea.       Past Medical History:   Diagnosis Date    Allergic rhinitis, cause unspecified     Asthma     use inhalers    Diabetes ([address]     Down's syndrome     GERD (gastroesophageal reflux disease)     H/O impacted cerumen     Dr. Khan  Headache     Hypothyroid     Mononucleosis     Pneumonia     Seizures ([address]     as a result of low blood glucose readings    Thyroid disease       Past Surgical History:   Procedure Laterality Date    HX CATARACT REMOVAL Bilateral 3/5/15    cataract left and right    HX CYST REMOVAL      right shoulder    HX HEENT Bilateral 2017    prior in one eye    HX HYSTERECTOMY       Social History     Tobacco Use    Smoking status: Never Smoker    Smokeless tobacco: Never Used   Substance Use Topics    Alcohol use: No      Family History   Problem Relation Age of Onset    Thyroid Disease Mother         hypo    Hypertension Father     Abdominal aortic aneurysm Father     Neuropathy Father     No Known Problems Sister     Heart Disease Brother  Heart Attack Brother     Heart Surgery Brother     No Known Problems Sister     No Known Problems Brother     Anesth Problems Neg Hx       Allergies   Allergen Reactions    Codeine Nausea and Vomiting    Nitrofurantoin Rash    Pcn [Penicillins] Hives and Rash      Prior to Admission medications    Medication Sig Start Date End Date Taking? Authorizing Provider   lidocaine (XYLOCAINE) 4 % topical cream Apply  to affected area DIALYSIS PRN for Pain. 10/11/21   Lorraine Davis MD   metoprolol tartrate (LOPRESSOR) 25 mg tablet Take 1 Tablet by mouth every twelve (12) hours. 10/11/21   Lorraine Davis MD   b complex-vitamin c-folic acid (NEPHROCAPS) 1 mg capsule Take 1 Capsule by mouth daily. 10/12/21   Lorraine Davis MD   patiromer calcium sorbitex (Veltassa) 16.8 gram powder Take  by mouth daily. Provider, Historical   diphenhydrAMINE (BenadryL) 25 mg capsule Take 25 mg by mouth nightly as needed. Provider, Historical   furosemide (LASIX PO) Take 10 mg by mouth every morning. Provider, Historical   cetirizine (ZyrTEC) 10 mg tablet Take 10 mg by mouth daily. Provider, Historical   cranberry 500 mg capsule Take 500 mg by mouth daily. Provider, Historical   guaiFENesin (ROBITUSSIN) 100 mg/5 mL liquid Take 100 mg by mouth every four (4) hours as needed for Cough. Provider, Historical   fluticasone propionate (Flonase Allergy Relief) 50 mcg/actuation nasal spray 2 Sprays by Both Nostrils route two (2) times a day. Provider, Historical   guaiFENesin-dextromethorphan (Diabetic Tussin DM)  mg/5 mL liqd Take 10 mL by mouth every six (6) hours as needed for Cough or Congestion. Provider, Historical   esomeprazole (NexIUM) 40 mg capsule Take 40 mg by mouth daily. Provider, Historical   L.acid,para-B. bifidum-S.therm (RISAQUAD) 8 billion cell cap cap Take 1 Capsule by mouth daily.     Provider, Historical   insulin degludec Louvenia Leatha FlexTouch U-100) 100 unit/mL (3 mL) inpn 10 Units by SubCUTAneous route nightly. Provider, Historical   ergocalciferol (Vitamin D2) 1,250 mcg (50,000 unit) capsule Take 50,000 Units by mouth every Wednesday. Provider, Historical   ondansetron hcl (Zofran) 4 mg tablet Take 4 mg by mouth every four (4) hours as needed for Nausea or Vomiting. Provider, Historical   montelukast (Singulair) 10 mg tablet Take 10 mg by mouth daily. Provider, Historical   levothyroxine (SYNTHROID) 100 mcg tablet Take 1 Tablet by mouth Daily (before breakfast). 9/8/21   Keya Shannon MD   biotin 5,000 mcg TbDi Take 5,000 mcg by mouth daily. Provider, Historical   cholecalciferol, vitamin D3, (VITAMIN D3) 2,000 unit tab Take 4,000 Units by mouth daily. Provider, Historical   insulin lispro (HUMALOG U-100 INSULIN) 100 unit/mL injection 3-4 Units by SubCUTAneous route Before breakfast, lunch, and dinner. Hold insulin if BS <80. Hold if patient does not eat her meal. If BS >150 give with SS. Provider, Historical   atorvastatin (LIPITOR) 10 mg tablet Take 20 mg by mouth nightly. Provider, Historical   ibuprofen (MOTRIN) 400 mg tablet Take 400 mg by mouth every six (6) hours as needed for Pain (Fever). Provider, Historical   albuterol (PROVENTIL HFA, VENTOLIN HFA, PROAIR HFA) 90 mcg/actuation inhaler Take 1 Puff by inhalation every four (4) hours as needed for Wheezing. 12/16/18   Asim Bruce MD   glucose 4 gram chewable tablet Take 12 g by mouth as needed (BS < 60). Provider, Historical   glucagon (GLUCAGEN) 1 mg injection 1 mg by IntraMUSCular route once as needed (hypoglycemia if patient is unresponsive).     Provider, Historical     REVIEW OF SYSTEMS:     []  Unable to obtain reliable ROS due to  [] mental status  [] sedated   [] intubated   [x] Total of 12 systems reviewed as follows:  Constitutional: negative fever, negative chills, negative weight loss  Eyes:   negative visual changes  ENT:   negative sore throat, tongue or lip swelling  Respiratory:  negative cough, negative dyspnea  Cards:  negative for chest pain, palpitations, lower extremity edema  GI:   negative for nausea, vomiting, diarrhea, and abdominal pain  :  negative for frequency, dysuria  Integument:  negative for rash and pruritus  Heme:  negative for easy bruising and gum/nose bleeding  Musculoskel: negative for myalgias,  back pain and muscle weakness  Neuro:  negative for headaches, dizziness, vertigo  Psych:  negative for feelings of anxiety, depression   Travel?: none    Objective:   VITALS:    Visit Vitals  BP (!) 114/55   Pulse 66   Temp 97.8 °F (36.6 °C)   Resp 13   Wt 68.1 kg (150 lb 2.1 oz)   SpO2 100%   BMI 30.32 kg/m²     PHYSICAL EXAM:  Gen:  []  WD []  WN  [] cachectic []  thin [x]  obese []  disheveled             []  ill apearing  []   Critical  []   Chronic    [x]  No acute distress    HEENT:   [x] NC/AT/PERRL    [x] pink conjunctivae      [] pale conjunctivae                  PERRL  [] yes  [] no      [] moist mucosa    [] dry mucosa    hearing intact to voice [] yes  [] No                 NECK:   supple [x] yes  [] no        masses [] yes  [x] No               thyroid  []  non tender  []  tender    RESP:   [x] CTA bilaterally/no wheezing/rhonchi/rales/crackles    [] rhonchi bilaterally - no dullness  [] wheezing   [] rhonchi   [] crackles     use of accessory muscles [] yes [] no    CARD:   [x]  regular rate and rhythm/No murmurs/rubs/gallops    murmur  [] yes ()  [] no      Rubs  [] yes  [] no       Gallops [] yes  [] no    Rate []  regular  []  irregular        carotid bruits  [] Right  []  Left                 LE edema [] yes  [x] no           JVP  []  yes   []  no    ABD:    [x] soft/non distended/non tender/+bowel sounds/no HSM    []  Rigid    tenderness [] yes [] no   Liver enlargement  []  yes []  no                Spleen enlargement  []  yes []  no     distended []  yes [] no     bowel sound  [] hypoactive   [] hyperactive    LYMPH:    Neck [] yes [x]  no       Axillae []  yes [x]  no    SKIN:   Rashes []  yes   [x]  no    Ulcers []  yes   [x]  no               [] tight to palpitation    skin turgor []  good  [] poor  [] decreased               Cyanosis/clubbing []  yes []  no    NEUR:   [x] cranial nerves II-XII grossly intact       [] Cranial nerves deficit                 []  facial droop    []  slurred speech   [] aphasic     [] Strength normal     []  weakness  []  LUE  []   RUE/ []  LLE  []   RLE    follows commands  [x]  yes []  no           PSYCH:   insight [] poor [x] good   Alert and Oriented to  [x] person  [x] place  [x]  time                    [] depressed [] anxious [] agitated  [] lethargic [] stuporous  [] sedated     LAB DATA REVIEWED:    Recent Labs     12/04/21 0342   WBC 8.9   HGB 9.3*   HCT 30.3*        Recent Labs     12/05/21  0858 12/04/21  1636 12/04/21  1307 12/04/21  0918 12/04/21  0918   * 128* 130*   < > 126*   K 5.6* 5.7* 3.0*   < > 3.2*    95* 95*   < > 90*   CO2 20* 24 27   < > 24   BUN 39* 45* 46*   < > 45*   CREA 4.16* 4.74* 4.98*   < > 4.96*   * 364* 149*   < > 561*   CA 6.2* 7.7* 9.0   < > 8.8   MG  --  1.8 2.0  --  2.0   PHOS  --   --   --   --  4.4    < > = values in this interval not displayed. Recent Labs     12/04/21  0342 12/04/21  0300   ALT 15  --      --    TBILI 0.5  --    ALB 2.1*  --    GLOB 5.6*  --    LPSE  --  116     No results for input(s): INR, PTP, APTT, INREXT in the last 72 hours. No results for input(s): FE, TIBC, PSAT, FERR in the last 72 hours. No results for input(s): PH, PCO2, PO2 in the last 72 hours. No results for input(s): CPK, CKMB in the last 72 hours.     No lab exists for component: TROPONINI  Lab Results   Component Value Date/Time    Glucose (POC) 189 (H) 12/05/2021 11:48 AM    Glucose (POC) 277 (H) 12/05/2021 06:15 AM    Glucose (POC) 305 (H) 12/04/2021 09:34 PM    Glucose (POC) 179 (H) 12/04/2021 05:58 PM    Glucose (POC) 77 12/04/2021 04:18 PM       Procedures: see electronic medical records for all procedures/Xrays and details which were not copied into this note but were reviewed prior to creation of Plan.    ________________________________________________________________________       ___________________________________________________  Consulting Physician: Dimitri Langston MD

## 2021-12-06 NOTE — PROGRESS NOTES
Chart reviewed and spoke with PT who reported patient is close to functional baseline with her ADL tasks. Her brother reported that she is hoping to discharge today as soon as dialysis needs are met. Will sign off OT services. Thank you.

## 2021-12-06 NOTE — PROGRESS NOTES
PHYSICAL THERAPY EVALUATION/DISCHARGE  Patient: Samanta Gr (20 y.o. female)  Date: 12/6/2021  Primary Diagnosis: DKA (diabetic ketoacidosis) ([facility] [E11.10]       Precautions:          ASSESSMENT  Based on the objective data described below, the patient presents with near baseline mobility of independent. She is moving slowly but able to demonstrate ability to manage bed mobility, transfer and gait. Brother present during evaluation and states patient moves very slowly and more deliberately. Likely more cautious and limited just by \"lines\". Feel she will be safe to return to her assisted living facility. May benefit from some home health/outpatient PT to \"tune up\" after having started dialysis in the last 6 weeks. .    Functional Outcome Measure: The patient scored 27/28 on the Tinetti outcome measure which is indicative of low fall risk. Other factors to consider for discharge:      Further skilled acute physical therapy is not indicated at this time. PLAN :  Recommendation for discharge: (in order for the patient to meet his/her long term goals)  Physical therapy at least 2 days/week in the home     This discharge recommendation:  Has not yet been discussed the attending provider and/or case management    IF patient discharges home will need the following DME: none       SUBJECTIVE:   Patient stated I can do it my self.   Regarding dressing/bathing/walking  OBJECTIVE DATA SUMMARY:   HISTORY:    Past Medical History:   Diagnosis Date    Allergic rhinitis, cause unspecified     Asthma     use inhalers    Diabetes ([facility]     Down's syndrome     GERD (gastroesophageal reflux disease)     H/O impacted cerumen     Dr. Lewis Tesfaye Headache     Hypothyroid     Mononucleosis     Pneumonia     [facility] ([facility]     as a result of low blood glucose readings    Thyroid disease      Past Surgical History:   Procedure Laterality Date    HX CATARACT REMOVAL Bilateral 3/5/15    cataract left and right    HX CYST REMOVAL      right shoulder    HX HEENT Bilateral 2017    prior in one eye    HX HYSTERECTOMY  1987       Prior level of function: independent  Personal factors and/or comorbidities impacting plan of care:     Home Situation  Home Environment: (P) Assisted living  One/Two Story Residence: (P) One story  Support Systems: (P) Assisted Living  Patient Expects to be Discharged to[de-identified] (P) Assisted living    EXAMINATION/PRESENTATION/DECISION MAKING:   Critical Behavior:  Neurologic State: Alert  Orientation Level: Oriented X4  Cognition: Impaired decision making, Follows commands   Range Of Motion:      WFL                    Strength:     within functional limits-poor following directions for strength testing             Coordination:   generally decreased; funtional  Functional Mobility:  Bed Mobility:   modified independent           Transfers:   supervision/mod I               Balance:    sitting good  Standing -good  Ambulation/Gait Training:   no assistive device  Slow steady pace  With supervision          Functional Measure:  Tinetti test:    Sitting Balance: 1  Arises: 2  Attempts to Rise: 2  Immediate Standing Balance: 2  Standing Balance: 2  Nudged: 2  Eyes Closed: 1  Turn 360 Degrees - Continuous/Discontinuous: 1  Turn 360 Degrees - Steady/Unsteady: 1  Sitting Down: 2  Balance Score: 16 Balance total score  Indication of Gait: 1  R Step Length/Height: 1  L Step Length/Height: 1  R Foot Clearance: 1  L Foot Clearance: 1  Step Symmetry: 1  Step Continuity: 1  Path: 2  Trunk: 1  Walking Time: 1  Gait Score: 11 Gait total score  Total Score: 27/28 Overall total score         Tinetti Tool Score Risk of Falls  <19 = High Fall Risk  19-24 = Moderate Fall Risk  25-28 = Low Fall Risk  Tinetti ME. Performance-Oriented Assessment of Mobility Problems in Elderly Patients. Wei 66; [id number].  (Scoring Description: PT Bulletin Feb. 10, 1993)    Older adults: Norma Durbin et al, 2009; n = [address] elderly evaluated with ABC, BRITTA, ADL, and IADL)  · Mean BRITTA score for males aged 69-68 years = 26.21(3.40)  · Mean BRITTA score for females age 69-68 years = 25.16(4.30)  · Mean BRITTA score for males over 80 years = 23.29(6.02)  · Mean BRITTA score for females over 80 years = 17.20(8.32)            Physical Therapy Evaluation Charge Determination   History Examination Presentation Decision-Making   MEDIUM  Complexity : 1-2 comorbidities / personal factors will impact the outcome/ POC  LOW Complexity : 1-2 Standardized tests and measures addressing body structure, function, activity limitation and / or participation in recreation  LOW Complexity : Stable, uncomplicated        Based on the above components, the patient evaluation is determined to be of the following complexity level: LOW     Pain Ratin    Activity Tolerance:   Good      After treatment patient left in no apparent distress:   Supine in bed, Call bell within reach, Caregiver / family present and Side rails x 3    COMMUNICATION/EDUCATION:   The patients plan of care was discussed with: Registered nurse.          Thank you for this referral.  Lanette Torres, PT   Time Calculation: 31 mins

## 2021-12-06 NOTE — PROGRESS NOTES
[facility]  Hospitalist Group                                                                                          Hospitalist Progress Note  Denis Mendez MD  Answering service: [phone] from in house phone        Date of Service:  2021  NAME:  Lucille Gr  :  1970  MRN:  [id number]      Admission Summary: This is a 58-year-old female with a history of Down syndrome, diabetes, ESRD started on hemodialysis about a month ago, gets dialyzed MWF presents to the ED for nausea, vomiting and hyperglycemia. Per her brother, she ate sugary stuff last night at the assisted-living facility. She has had her insulin last night. Patient has cognitive impairment but able to give some history. Her brother/MPOA was at the bedside. She denied fever, chills. She has cough from asthma but that is not changed. She denied diarrhea. She still makes urine and denied dysuria, urgency or frequency. In the ED she was found to be in DKA. She was started on insulin infusion and will consult for admission evaluation    Interval history / Subjective:   Reviewed her med list from facility and updated med rec  Adjusting insulin due to hyperglycemia  UA showing UTI- started abx     Assessment & Plan:     DKA possibly precipitated by dietary indiscretion. Type II DM, insulin-dependent, poorly controlled  --Accu-Cheks every hourly and  BMP every 4 hourly for DKA protocol  Adjusted insulin regimen    UTI- hx of abx resistance  Starting IV meropenem     Pseudohyponatremia due to hyperglycemia: Management as above     Mild hypoxia. Afebrile. CXR improved pleural effusion pulmonary edema compared to 10/25. Rapid Covid test negative.     ESRD on hemodialysis MWF schedule.   Access: Left upper arm AV fistula  --Nephrology consulted for dialysis management     Hypothyroidism: Continue levothyroxine     Asthma without bronchospasm: Continue Singulair, add as needed bronchodilators     Hypertension: Continue home regimen     Hyperlipidemia: Continue statin     Down syndrome- stable mood and affect    Code status: DNR  DVT prophylaxis:SCDs    Care Plan discussed with: Patient/Family  Anticipated Disposition: SNF/LTC  Anticipated Discharge: 24 hours to 50 hours     Hospital Problems  Date Reviewed: 1/18/2020          Codes Class Noted POA    DKA (diabetic ketoacidosis) ([facility] ICD-10-CM: E11.10  ICD-9-CM: 250.12  12/4/2021 Unknown                Review of Systems:   A comprehensive review of systems was negative except for that written in the HPI. Vital Signs:    Last 24hrs VS reviewed since prior progress note. Most recent are:  Visit Vitals  /65 (BP 1 Location: Right upper arm, BP Patient Position: At rest)   Pulse 72   Temp 97.5 °F (36.4 °C)   Resp 18   Wt 68.1 kg (150 lb 2.1 oz)   SpO2 100%   BMI 30.32 kg/m²         Intake/Output Summary (Last 24 hours) at 12/6/2021 0010  Last data filed at 12/5/2021 1559  Gross per 24 hour   Intake 1205 ml   Output 20 ml   Net 1185 ml        Physical Examination:        Constitutional:  No acute distress, cooperative, pleasant    ENT:  Oral mucosa moist,)2 via NC    Resp:  CTA bilaterally. No wheezing/rhonchi/rales. No accessory muscle use   CV:  Regular rhythm, normal rate, no murmurs, gallops, rubs    GI:  Soft, non distended, non tender. normoactive bowel sounds, no hepatosplenomegaly     Musculoskeletal:  No edema, warm, 2+ pulses throughout    Neurologic:  Moves all extremities.   AAOx3, CN II-XII reviewed            Data Review:    Review and/or order of clinical lab test  Review and/or order of tests in the radiology section of CPT  Review and/or order of tests in the medicine section of CPT      Labs:     Recent Labs     12/04/21  0342   WBC 8.9   HGB 9.3*   HCT 30.3*        Recent Labs     12/05/21  0858 12/04/21  1636 12/04/21  1307 12/04/21  0918 12/04/21  0918   * 128* 130*   < > 126*   K 5.6* 5.7* 3.0*   < > 3.2*    95* 95*   < > 90*   CO2 20* 24 27   < > 24   BUN 39* 45* 46*   < > 45*   CREA 4.16* 4.74* 4.98*   < > 4.96*   * 364* 149*   < > 561*   CA 6.2* 7.7* 9.0   < > 8.8   MG  --  1.8 2.0  --  2.0   PHOS  --   --   --   --  4.4    < > = values in this interval not displayed. Recent Labs     12/04/21  0342 12/04/21  0300   ALT 15  --      --    TBILI 0.5  --    TP 7.7  --    ALB 2.1*  --    GLOB 5.6*  --    LPSE  --  116     No results for input(s): INR, PTP, APTT, INREXT in the last 72 hours. No results for input(s): FE, TIBC, PSAT, FERR in the last 72 hours. Lab Results   Component Value Date/Time    Folate 28.7 (H) 12/15/2018 05:09 AM      No results for input(s): PH, PCO2, PO2 in the last 72 hours. No results for input(s): CPK, CKNDX, TROIQ in the last 72 hours.     No lab exists for component: CPKMB  Lab Results   Component Value Date/Time    Cholesterol, total 107 12/16/2018 04:08 AM    HDL Cholesterol 42 12/16/2018 04:08 AM    LDL, calculated 36.4 12/16/2018 04:08 AM    Triglyceride 143 12/16/2018 04:08 AM    CHOL/HDL Ratio 2.5 12/16/2018 04:08 AM     Lab Results   Component Value Date/Time    Glucose (POC) 159 (H) 12/05/2021 09:25 PM    Glucose (POC) 166 (H) 12/05/2021 04:15 PM    Glucose (POC) 189 (H) 12/05/2021 11:48 AM    Glucose (POC) 277 (H) 12/05/2021 06:15 AM    Glucose (POC) 305 (H) 12/04/2021 09:34 PM     Lab Results   Component Value Date/Time    Color YELLOW/STRAW 12/05/2021 12:29 PM    Appearance TURBID (A) 12/05/2021 12:29 PM    Specific gravity 1.021 12/05/2021 12:29 PM    Specific gravity 1.015 04/29/2013 01:50 PM    pH (UA) 5.0 12/05/2021 12:29 PM    Protein >300 (A) 12/05/2021 12:29 PM    Glucose 250 (A) 12/05/2021 12:29 PM    Ketone Negative 12/05/2021 12:29 PM    Bilirubin Negative 12/05/2021 12:29 PM    Urobilinogen 0.2 12/05/2021 12:29 PM    Nitrites Negative 12/05/2021 12:29 PM    Leukocyte Esterase SMALL (A) 12/05/2021 12:29 PM    Glucose/Ketones  02/08/2009 07:00 AM     GLUCOSE AND KETONES SIGNIFICANTLY ELEVATED.  RESULTS PHONED TO AND READ BACK BY    [facility] cells MODERATE (A) 12/05/2021 12:29 PM    Bacteria 1+ (A) 12/05/2021 12:29 PM    WBC  12/05/2021 12:29 PM    RBC 5-10 12/05/2021 12:29 PM         Medications Reviewed:     Current Facility-Administered Medications   Medication Dose Route Frequency    epoetin jerry-epbx (RETACRIT) injection 6,000 Units  6,000 Units SubCUTAneous Q MON, WED & FRI    meropenem (MERREM) 500 mg in sterile water (preservative free) 10 mL IV syringe  0.5 g IntraVENous Q24H    insulin glargine (LANTUS) injection 12 Units  12 Units SubCUTAneous DAILY    heparin (porcine) injection 5,000 Units  5,000 Units SubCUTAneous Q12H    sodium chloride (NS) flush 5-40 mL  5-40 mL IntraVENous Q8H    sodium chloride (NS) flush 5-40 mL  5-40 mL IntraVENous PRN    acetaminophen (TYLENOL) tablet 650 mg  650 mg Oral Q6H PRN    Or    acetaminophen (TYLENOL) suppository 650 mg  650 mg Rectal Q6H PRN    polyethylene glycol (MIRALAX) packet 17 g  17 g Oral DAILY PRN    ondansetron (ZOFRAN ODT) tablet 4 mg  4 mg Oral Q8H PRN    Or    ondansetron (ZOFRAN) injection 4 mg  4 mg IntraVENous Q6H PRN    atorvastatin (LIPITOR) tablet 20 mg  20 mg Oral QHS    [START ON 12/8/2021] ergocalciferol capsule 50,000 Units  50,000 Units Oral every Wednesday    pantoprazole (PROTONIX) tablet 40 mg  40 mg Oral ACB    L.acidophilus-paracasei-S.thermophil-bifidobacter (RISAQUAD) 8 billion cell capsule  1 Capsule Oral DAILY    levothyroxine (SYNTHROID) tablet 100 mcg  100 mcg Oral ACB    metoprolol tartrate (LOPRESSOR) tablet 25 mg  25 mg Oral Q12H    montelukast (SINGULAIR) tablet 10 mg  10 mg Oral DAILY    insulin lispro (HUMALOG) injection   SubCUTAneous AC&HS    glucose chewable tablet 16 g  4 Tablet Oral PRN    dextrose (D50W) injection syrg 12.5-25 g  12.5-25 g IntraVENous PRN    glucagon (GLUCAGEN) injection 1 mg  1 mg IntraMUSCular PRN ______________________________________________________________________  EXPECTED LENGTH OF STAY: - - -  ACTUAL LENGTH OF STAY:          2                 Kiet Jackson MD

## 2021-12-06 NOTE — PROGRESS NOTES
Orders received, chart reviewed and patient evaluated by physical therapy. recommend:  Physical therapy at least 2 days/week in the home     Recommend with nursing OOB to chair 3x/day and walking daily with supervision assist. Thank you for completing as able in order to maintain patient strength, endurance and independence. Full evaluation to follow.    [place], PT

## 2021-12-06 NOTE — PROGRESS NOTES
[address] [facility] Adult  Hospitalist Group                                                                                          Hospitalist Progress Note  Kirsten Vogel MD  Answering service: [phone] from in house phone        Date of Service:  2021  NAME:  Guerita Gr  :  1970  MRN:  [id number]      Admission Summary: This is a 54-year-old female with a history of Down syndrome, diabetes, ESRD started on hemodialysis about a month ago, gets dialyzed MWF presents to the ED for nausea, vomiting and hyperglycemia. Per her brother, she ate sugary stuff last night at the assisted-living facility. She has had her insulin last night. Patient has cognitive impairment but able to give some history. Her brother/MPOA was at the bedside. She denied fever, chills. She has cough from asthma but that is not changed. She denied diarrhea. She still makes urine and denied dysuria, urgency or frequency. In the ED she was found to be in DKA. She was started on insulin infusion and will consult for admission evaluation    Interval history / Subjective:   Patient sleeping on exam this am  Improved glucose levels after insulin dose adjustments  UA showing UTI- cont abx, requested urine on hold be cultured     Assessment & Plan:     DKA possibly precipitated by dietary indiscretion. Resolved  Type II DM, insulin-dependent,   --stable glucose levels after adjusted insulin regimen    UTI- hx of abx resistance  cont IV meropenem x 3days and monitor urine culture results     hyponatremia - should correct with dialysis     Mild hypoxia. Afebrile. CXR improved pleural effusion pulmonary edema compared to 10/25. Rapid Covid test negative. Wean off O2     ESRD on hemodialysis MWF schedule.   Access: Left upper arm AV fistula  --Nephrology consulted for dialysis management- dialysis planned for today     Hypothyroidism: Continue levothyroxine   Asthma without bronchospasm: Continue Singulair, add as needed bronchodilators   Hypertension: BP stable on home regimen   Hyperlipidemia: Continue statin   Down syndrome- stable mood and affect    Code status: DNR  DVT prophylaxis:SCDs    Care Plan discussed with: Patient/Family  Anticipated Disposition: SNF/LTC  Anticipated Discharge: 24 hours to 48 hours     Hospital Problems  Date Reviewed: 1/18/2020          Codes Class Noted POA    DKA (diabetic ketoacidosis) ([facility] [address] ICD-10-CM: E11.10  ICD-9-CM: 250.12  12/4/2021 Unknown                Review of Systems:   A comprehensive review of systems was negative except for that written in the HPI. Vital Signs:    Last 24hrs VS reviewed since prior progress note. Most recent are:  Visit Vitals  BP (!) 116/46 (BP 1 Location: Right upper arm, BP Patient Position: At rest)   Pulse 70   Temp 98.1 °F (36.7 °C)   Resp 14   Wt 69.7 kg (153 lb 10.6 oz)   SpO2 94%   BMI 31.04 kg/m²         Intake/Output Summary (Last 24 hours) at 12/6/2021 0757  Last data filed at 12/6/2021 0400  Gross per 24 hour   Intake 1070 ml   Output 20 ml   Net 1050 ml        Physical Examination:        Constitutional:  No acute distress, sleeping on exam    ENT:  Oral mucosa moist,)2 via NC    Resp:  CTA bilaterally. No wheezing/rhonchi/rales. No accessory muscle use   CV:  Regular rhythm, normal rate, no murmurs, gallops, rubs    GI:  Soft, non distended, non tender. normoactive bowel sounds, no hepatosplenomegaly     Musculoskeletal:  No edema, warm, 2+ pulses throughout    Neurologic:  Moves all extremities.   AAOx3, CN II-XII reviewed            Data Review:    Review and/or order of clinical lab test  Review and/or order of tests in the radiology section of CPT  Review and/or order of tests in the medicine section of CPT      Labs:     Recent Labs     12/06/21  0358 12/04/21  0342   WBC 6.5 8.9   HGB 9.2* 9.3*   HCT 29.5* 30.3*    260     Recent Labs     12/06/21  0358 12/05/21  0858 12/04/21  1636 12/04/21  1307 12/04/21  1307 12/04/21  0918 12/04/21  0918   * 131* 128*   < > 130*   < > 126*   K 4.0 5.6* 5.7*   < > 3.0*   < > 3.2*   CL 97 105 95*   < > 95*   < > 90*   CO2 23 20* 24   < > 27   < > 24   BUN 60* 39* 45*   < > 46*   < > 45*   CREA 6.31* 4.16* 4.74*   < > 4.98*   < > 4.96*   GLU 73 402* 364*   < > 149*   < > 561*   CA 8.9 6.2* 7.7*   < > 9.0   < > 8.8   MG  --   --  1.8  --  2.0  --  2.0   PHOS  --   --   --   --   --   --  4.4    < > = values in this interval not displayed. Recent Labs     12/04/21  0342 12/04/21  0300   ALT 15  --      --    TBILI 0.5  --    TP 7.7  --    ALB 2.1*  --    GLOB 5.6*  --    LPSE  --  116     No results for input(s): INR, PTP, APTT, INREXT, INREXT in the last 72 hours. No results for input(s): FE, TIBC, PSAT, FERR in the last 72 hours. Lab Results   Component Value Date/Time    Folate 28.7 (H) 12/15/2018 05:09 AM      No results for input(s): PH, PCO2, PO2 in the last 72 hours. No results for input(s): CPK, CKNDX, TROIQ in the last 72 hours.     No lab exists for component: CPKMB  Lab Results   Component Value Date/Time    Cholesterol, total 107 12/16/2018 04:08 AM    HDL Cholesterol 42 12/16/2018 04:08 AM    LDL, calculated 36.4 12/16/2018 04:08 AM    Triglyceride 143 12/16/2018 04:08 AM    CHOL/HDL Ratio 2.5 12/16/2018 04:08 AM     Lab Results   Component Value Date/Time    Glucose (POC) 90 12/06/2021 06:34 AM    Glucose (POC) 159 (H) 12/05/2021 09:25 PM    Glucose (POC) 166 (H) 12/05/2021 04:15 PM    Glucose (POC) 189 (H) 12/05/2021 11:48 AM    Glucose (POC) 277 (H) 12/05/2021 06:15 AM     Lab Results   Component Value Date/Time    Color YELLOW/STRAW 12/05/2021 12:29 PM    Appearance TURBID (A) 12/05/2021 12:29 PM    Specific gravity 1.021 12/05/2021 12:29 PM    Specific gravity 1.015 04/29/2013 01:50 PM    pH (UA) 5.0 12/05/2021 12:29 PM    Protein >300 (A) 12/05/2021 12:29 PM    Glucose 250 (A) 12/05/2021 12:29 PM    Ketone Negative 12/05/2021 12:29 PM Bilirubin Negative 12/05/2021 12:29 PM    Urobilinogen 0.2 12/05/2021 12:29 PM    Nitrites Negative 12/05/2021 12:29 PM    Leukocyte Esterase SMALL (A) 12/05/2021 12:29 PM    Glucose/Ketones  02/08/2009 07:00 AM     GLUCOSE AND KETONES SIGNIFICANTLY ELEVATED.  RESULTS PHONED TO AND READ BACK BY    Epithelial cells MODERATE (A) 12/05/2021 12:29 PM    Bacteria 1+ (A) 12/05/2021 12:29 PM    WBC  12/05/2021 12:29 PM    RBC 5-10 12/05/2021 12:29 PM         Medications Reviewed:     Current Facility-Administered Medications   Medication Dose Route Frequency    epoetin jerry-epbx (RETACRIT) injection 6,000 Units  6,000 Units SubCUTAneous Q MON, WED & FRI    meropenem (MERREM) 500 mg in sterile water (preservative free) 10 mL IV syringe  0.5 g IntraVENous Q24H    insulin glargine (LANTUS) injection 12 Units  12 Units SubCUTAneous DAILY    heparin (porcine) injection 5,000 Units  5,000 Units SubCUTAneous Q12H    sodium chloride (NS) flush 5-40 mL  5-40 mL IntraVENous Q8H    sodium chloride (NS) flush 5-40 mL  5-40 mL IntraVENous PRN    acetaminophen (TYLENOL) tablet 650 mg  650 mg Oral Q6H PRN    Or    acetaminophen (TYLENOL) suppository 650 mg  650 mg Rectal Q6H PRN    polyethylene glycol (MIRALAX) packet 17 g  17 g Oral DAILY PRN    ondansetron (ZOFRAN ODT) tablet 4 mg  4 mg Oral Q8H PRN    Or    ondansetron (ZOFRAN) injection 4 mg  4 mg IntraVENous Q6H PRN    atorvastatin (LIPITOR) tablet 20 mg  20 mg Oral QHS    [START ON 12/8/2021] ergocalciferol capsule 50,000 Units  50,000 Units Oral every Wednesday    pantoprazole (PROTONIX) tablet 40 mg  40 mg Oral ACB    L.acidophilus-paracasei-S.thermophil-bifidobacter (RISAQUAD) 8 billion cell capsule  1 Capsule Oral DAILY    levothyroxine (SYNTHROID) tablet 100 mcg  100 mcg Oral ACB    metoprolol tartrate (LOPRESSOR) tablet 25 mg  25 mg Oral Q12H    montelukast (SINGULAIR) tablet 10 mg  10 mg Oral DAILY    insulin lispro (HUMALOG) injection   SubCUTAneous AC&HS    glucose chewable tablet 16 g  4 Tablet Oral PRN    dextrose (D50W) injection syrg 12.5-25 g  12.5-25 g IntraVENous PRN    glucagon (GLUCAGEN) injection 1 mg  1 mg IntraMUSCular PRN     ______________________________________________________________________  EXPECTED LENGTH OF STAY: - - -  ACTUAL LENGTH OF STAY:          2                 Montana Bates MD

## 2021-12-06 NOTE — PROGRESS NOTES
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: Hgb below goal  Hyponatremia: +volume overload    Plan/Recommendations:  HD today   UF 2L as tolerated  Fluid restriction  LUCERO  AM labs      Subjective:  Resting comfortably    ROS:   deferred    Exam:  Visit Vitals  BP (!) 116/46 (BP 1 Location: Right upper arm, BP Patient Position: At rest)   Pulse 70   Temp 98.1 °F (36.7 °C)   Resp 14   Wt 69.7 kg (153 lb 10.6 oz)   LMP 03/27/1986   SpO2 94%   BMI 31.04 kg/m²     Wt Readings from Last 3 Encounters:   12/06/21 69.7 kg (153 lb 10.6 oz)   10/25/21 68 kg (150 lb)   10/11/21 75.8 kg (167 lb 1.7 oz)       Intake/Output Summary (Last 24 hours) at 12/6/2021 0915  Last data filed at 12/6/2021 0400  Gross per 24 hour   Intake 770 ml   Output 20 ml   Net 750 ml       Gen: NAD/Down facies  HEENT:  AT/NC  Lungs/Chest wall: Coarse  Cardiovascular: Regular rate, normal rhythm. Abdomen/: Soft, NT, ND, BS+.    Ext: Trace peripheral edema      Current Facility-Administered Medications   Medication Dose Route Frequency Last Admin    epoetin jerry-epbx (RETACRIT) injection 6,000 Units  6,000 Units SubCUTAneous Q MON, WED & FRI      meropenem (MERREM) 500 mg in sterile water (preservative free) 10 mL IV syringe  0.5 g IntraVENous Q24H 500 mg at 12/05/21 1622    insulin glargine (LANTUS) injection 12 Units  12 Units SubCUTAneous DAILY      heparin (porcine) injection 5,000 Units  5,000 Units SubCUTAneous Q12H 5,000 Units at 12/05/21 2245    sodium chloride (NS) flush 5-40 mL  5-40 mL IntraVENous Q8H 10 mL at 12/06/21 0702    sodium chloride (NS) flush 5-40 mL  5-40 mL IntraVENous PRN      acetaminophen (TYLENOL) tablet 650 mg  650 mg Oral Q6H PRN      Or    acetaminophen (TYLENOL) suppository 650 mg  650 mg Rectal Q6H PRN      polyethylene glycol (MIRALAX) packet 17 g  17 g Oral DAILY PRN      ondansetron (ZOFRAN ODT) tablet 4 mg  4 mg Oral Q8H PRN      Or    ondansetron (ZOFRAN) injection 4 mg  4 mg IntraVENous Q6H PRN      atorvastatin (LIPITOR) tablet 20 mg  20 mg Oral QHS 20 mg at 12/05/21 2245    [START ON 12/8/2021] ergocalciferol capsule 50,000 Units  50,000 Units Oral every Wednesday      pantoprazole (PROTONIX) tablet 40 mg  40 mg Oral ACB 40 mg at 12/06/21 3165    L.acidophilus-paracasei-S.thermophil-bifidobacter (RISAQUAD) 8 billion cell capsule  1 Capsule Oral DAILY 1 Capsule at 12/05/21 0955    levothyroxine (SYNTHROID) tablet 100 mcg  100 mcg Oral  mcg at 12/06/21 6708    metoprolol tartrate (LOPRESSOR) tablet 25 mg  25 mg Oral Q12H 25 mg at 12/05/21 2245    montelukast (SINGULAIR) tablet 10 mg  10 mg Oral DAILY 10 mg at 12/05/21 0955    insulin lispro (HUMALOG) injection   SubCUTAneous AC&HS 3 Units at 12/05/21 1621    glucose chewable tablet 16 g  4 Tablet Oral PRN      dextrose (D50W) injection syrg 12.5-25 g  12.5-25 g IntraVENous PRN      glucagon (GLUCAGEN) injection 1 mg  1 mg IntraMUSCular PRN         Labs/Data:    Lab Results   Component Value Date/Time    WBC 6.5 12/06/2021 03:58 AM    Hemoglobin (POC) 12.2 11/05/2012 03:10 PM    HGB 9.2 (L) 12/06/2021 03:58 AM    Hematocrit (POC) 36 11/05/2012 03:10 PM    HCT 29.5 (L) 12/06/2021 03:58 AM    PLATELET 455 73/18/2381 03:58 AM    .7 (H) 12/06/2021 03:58 AM       Lab Results   Component Value Date/Time    Sodium 129 (L) 12/06/2021 03:58 AM    Potassium 4.0 12/06/2021 03:58 AM    Chloride 97 12/06/2021 03:58 AM    CO2 23 12/06/2021 03:58 AM    Anion gap 9 12/06/2021 03:58 AM    Glucose 73 12/06/2021 03:58 AM    BUN 60 (H) 12/06/2021 03:58 AM    Creatinine 6.31 (H) 12/06/2021 03:58 AM    BUN/Creatinine ratio 10 (L) 12/06/2021 03:58 AM    GFR est AA 8 (L) 12/06/2021 03:58 AM    GFR est non-AA 7 (L) 12/06/2021 03:58 AM    Calcium 8.9 12/06/2021 03:58 AM       Patient seen and examined. Chart reviewed. Labs, data and other pertinent notes reviewed in last 24 hrs.         Signed by:  Jessica Veras MD  [id number] [employer]

## 2021-12-07 NOTE — PROGRESS NOTES
[address] [facility] Adult  Hospitalist Group                                                                                          Hospitalist Progress Note  Koby Verdin MD  Answering service: [phone] from in house phone        Date of Service:  2021  NAME:  Juan Gr  :  1970  MRN:  [id number]      Admission Summary: This is a 65-year-old female with a history of Down syndrome, diabetes, ESRD started on hemodialysis about a month ago, gets dialyzed MWF presents to the ED for nausea, vomiting and hyperglycemia. Per her brother, she ate sugary stuff last night at the assisted-living facility. She has had her insulin last night. Patient has cognitive impairment but able to give some history. Her brother/MPOA was at the bedside. She denied fever, chills. She has cough from asthma but that is not changed. She denied diarrhea. She still makes urine and denied dysuria, urgency or frequency. In the ED she was found to be in DKA. She was started on insulin infusion and will consult for admission evaluation    Interval history / Subjective:   Patient awake and alert on exam this am  stable glucose levels after insulin dose adjustments  Dialysis planned for today and tomorrow     Assessment & Plan:     DKA possibly precipitated by dietary indiscretion. Resolved  Type II DM, insulin-dependent,   --stable glucose levels after adjusted insulin regimen    UTI- hx of abx resistance  cont IV meropenem x 3days and monitor urine culture results     hyponatremia - improved-should correct with dialysis     Mild hypoxia. Afebrile. CXR improved pleural effusion pulmonary edema compared to 10/25. Rapid Covid test negative. Wean off O2     ESRD on hemodialysis MWF schedule.   Access: Left upper arm AV fistula  --Nephrology consulted for dialysis management- dialysis planned for today     Hypothyroidism:Continue levothyroxine   Asthma without bronchospasm: Continue Singulair, add as needed bronchodilators   Hypertension: BP stable on home regimen   Hyperlipidemia: Continue statin   Down syndrome- stable mood and affect    Code status: DNR  DVT prophylaxis:SCDs    Care Plan discussed with: Patient/Family  Anticipated Disposition: SNF/LTC  Anticipated Discharge: back to the [place] on Wed afternoon assuming dialysis done early enough to set up DC back facility before dark     Hospital Problems  Date Reviewed: 1/18/2020          Codes Class Noted POA    DKA (diabetic ketoacidosis) ([facility] [address] ICD-10-CM: E11.10  ICD-9-CM: 250.12  12/4/2021 Unknown                Review of Systems:   A comprehensive review of systems was negative except for that written in the HPI. Vital Signs:    Last 24hrs VS reviewed since prior progress note. Most recent are:  Visit Vitals  BP (!) 125/39   Pulse 75   Temp 98 °F (36.7 °C)   Resp 18   Wt 67.4 kg (148 lb 9.4 oz)   SpO2 96%   BMI 30.01 kg/m²         Intake/Output Summary (Last 24 hours) at 12/7/2021 1108  Last data filed at 12/7/2021 0816  Gross per 24 hour   Intake 570 ml   Output 0 ml   Net 570 ml        Physical Examination:        Constitutional:  No acute distress, sleeping on exam    ENT:  Oral mucosa moist,)2 via NC    Resp:  CTA bilaterally. No wheezing/rhonchi/rales. No accessory muscle use   CV:  Regular rhythm, normal rate, no murmurs, gallops, rubs    GI:  Soft, non distended, non tender. normoactive bowel sounds, no hepatosplenomegaly     Musculoskeletal:  No edema, warm, 2+ pulses throughout    Neurologic:  Moves all extremities.   AAOx3, CN II-XII reviewed            Data Review:    Review and/or order of clinical lab test  Review and/or order of tests in the radiology section of CPT  Review and/or order of tests in the medicine section of CPT      Labs:     Recent Labs     12/07/21 0331 12/06/21  0358   WBC 6.1 6.5   HGB 9.5* 9.2*   HCT 29.7* 29.5*    257     Recent Labs     12/07/21  0331 12/06/21  0358 12/05/21  0858 12/04/21  1636 12/04/21  1636 12/04/21  1307 12/04/21  1307   * 129* 131*   < > 128*   < > 130*   K 4.0 4.0 5.6*   < > 5.7*   < > 3.0*   CL 96* 97 105   < > 95*   < > 95*   CO2 21 23 20*   < > 24   < > 27   BUN 69* 60* 39*   < > 45*   < > 46*   CREA 7.14* 6.31* 4.16*   < > 4.74*   < > 4.98*   * 73 402*   < > 364*   < > 149*   CA 8.8 8.9 6.2*   < > 7.7*   < > 9.0   MG  --   --   --   --  1.8  --  2.0    < > = values in this interval not displayed. No results for input(s): ALT, AP, TBIL, TBILI, TP, ALB, GLOB, GGT, AML, LPSE in the last 72 hours. No lab exists for component: SGOT, GPT, AMYP, HLPSE  No results for input(s): INR, PTP, APTT, INREXT, INREXT in the last 72 hours. No results for input(s): FE, TIBC, PSAT, FERR in the last 72 hours. Lab Results   Component Value Date/Time    Folate 28.7 (H) 12/15/2018 05:09 AM      No results for input(s): PH, PCO2, PO2 in the last 72 hours. No results for input(s): CPK, CKNDX, TROIQ in the last 72 hours.     No lab exists for component: CPKMB  Lab Results   Component Value Date/Time    Cholesterol, total 107 12/16/2018 04:08 AM    HDL Cholesterol 42 12/16/2018 04:08 AM    LDL, calculated 36.4 12/16/2018 04:08 AM    Triglyceride 143 12/16/2018 04:08 AM    CHOL/HDL Ratio 2.5 12/16/2018 04:08 AM     Lab Results   Component Value Date/Time    Glucose (POC) 118 (H) 12/07/2021 06:32 AM    Glucose (POC) 145 (H) 12/06/2021 09:34 PM    Glucose (POC) 132 (H) 12/06/2021 04:06 PM    Glucose (POC) 153 (H) 12/06/2021 11:06 AM    Glucose (POC) 90 12/06/2021 06:34 AM     Lab Results   Component Value Date/Time    Color YELLOW/STRAW 12/05/2021 12:29 PM    Appearance TURBID (A) 12/05/2021 12:29 PM    Specific gravity 1.021 12/05/2021 12:29 PM    Specific gravity 1.015 04/29/2013 01:50 PM    pH (UA) 5.0 12/05/2021 12:29 PM    Protein >300 (A) 12/05/2021 12:29 PM    Glucose 250 (A) 12/05/2021 12:29 PM    Ketone Negative 12/05/2021 12:29 PM    Bilirubin Negative 12/05/2021 12:29 PM Urobilinogen 0.2 12/05/2021 12:29 PM    Nitrites Negative 12/05/2021 12:29 PM    Leukocyte Esterase SMALL (A) 12/05/2021 12:29 PM    Glucose/Ketones  02/08/2009 07:00 AM     GLUCOSE AND KETONES SIGNIFICANTLY ELEVATED.  RESULTS PHONED TO AND READ BACK BY    Epithelial cells MODERATE (A) 12/05/2021 12:29 PM    Bacteria 1+ (A) 12/05/2021 12:29 PM    WBC  12/05/2021 12:29 PM    RBC 5-10 12/05/2021 12:29 PM         Medications Reviewed:     Current Facility-Administered Medications   Medication Dose Route Frequency    epoetin jerry-epbx (RETACRIT) injection 6,000 Units  6,000 Units SubCUTAneous Q MON, WED & FRI    meropenem (MERREM) 500 mg in sterile water (preservative free) 10 mL IV syringe  0.5 g IntraVENous Q24H    insulin glargine (LANTUS) injection 12 Units  12 Units SubCUTAneous DAILY    heparin (porcine) injection 5,000 Units  5,000 Units SubCUTAneous Q12H    sodium chloride (NS) flush 5-40 mL  5-40 mL IntraVENous Q8H    sodium chloride (NS) flush 5-40 mL  5-40 mL IntraVENous PRN    acetaminophen (TYLENOL) tablet 650 mg  650 mg Oral Q6H PRN    Or    acetaminophen (TYLENOL) suppository 650 mg  650 mg Rectal Q6H PRN    polyethylene glycol (MIRALAX) packet 17 g  17 g Oral DAILY PRN    ondansetron (ZOFRAN ODT) tablet 4 mg  4 mg Oral Q8H PRN    Or    ondansetron (ZOFRAN) injection 4 mg  4 mg IntraVENous Q6H PRN    atorvastatin (LIPITOR) tablet 20 mg  20 mg Oral QHS    [START ON 12/8/2021] ergocalciferol capsule 50,000 Units  50,000 Units Oral every Wednesday    pantoprazole (PROTONIX) tablet 40 mg  40 mg Oral ACB    L.acidophilus-paracasei-S.thermophil-bifidobacter (RISAQUAD) 8 billion cell capsule  1 Capsule Oral DAILY    levothyroxine (SYNTHROID) tablet 100 mcg  100 mcg Oral ACB    metoprolol tartrate (LOPRESSOR) tablet 25 mg  25 mg Oral Q12H    montelukast (SINGULAIR) tablet 10 mg  10 mg Oral DAILY    insulin lispro (HUMALOG) injection   SubCUTAneous AC&HS    glucose chewable tablet 16 g  4 Tablet Oral PRN    dextrose (D50W) injection syrg 12.5-25 g  12.5-25 g IntraVENous PRN    glucagon (GLUCAGEN) injection 1 mg  1 mg IntraMUSCular PRN     ______________________________________________________________________  EXPECTED LENGTH OF STAY: - - -  ACTUAL LENGTH OF STAY:          3                 Emeka Gan MD

## 2021-12-07 NOTE — PROCEDURES
Hemodialysis / [phone]    Vitals Pre Post Assessment Pre Post   BP BP: (!) 120/52 (12/07/21 1446) 133/61 LOC A&OX4 A&OX4   HR Pulse (Heart Rate): 69 (12/07/21 1446) 75 Lungs Clear anterior, fine crackles in posterior bases Clear anterior, diminished posterior   Resp Resp Rate: 20 (12/07/21 1446) 18 Cardiac HRR S1 S2 present HRR S1 S2 present   Temp Temp: 98 °F (36.7 °C) (12/07/21 1446) 98.1 Skin Warm and dry, rash on upper thighs and buttocks Warm and dry, rash on upper thighs and buttocks   Weight  n/a n/a Edema Trace BLE Trace BLE   Tele status Bedside telemetry, NSR Bedside telemetry, NSR Pain Pain Intensity 1: 0 (12/07/21 0333) 0     Orders   Duration: Start: 14:46 End: 17:47 Total: 3 hrs   Dialyzer: Dialyzer/Set Up Inspection: Yang Stark (12/07/21 1446)   K Bath: Dialysate K (mEq/L): 3 (12/07/21 1446)   Ca Bath: Dialysate CA (mEq/L): 2.5 (12/07/21 1446)   Na: Dialysate NA (mEq/L): 140 (12/07/21 1446)   Bicarb: Dialysate HCO3 (mEq/L): 35 (12/07/21 1446)   Target Fluid Removal: Goal/Amount of Fluid to Remove (mL): 2500 mL (12/07/21 1446)     Access   Type & Location: YENIFER AVG   Comments:  AVG skin dry and intact. Site + B/T with no S/S of infection.  Access running well at -450                                      Labs   HBsAg (Antigen) / date: Negative 12/01/21                                              HBsAb (Antibody) / date: Susceptible 12/01/21   Source: Gina Vincent report   Obtained/Reviewed  Critical Results Called HGB   Date Value Ref Range Status   12/07/2021 9.5 (L) 11.5 - 16.0 g/dL Final     Potassium   Date Value Ref Range Status   12/07/2021 4.0 3.5 - 5.1 mmol/L Final     Calcium   Date Value Ref Range Status   12/07/2021 8.8 8.5 - 10.1 MG/DL Final     BUN   Date Value Ref Range Status   12/07/2021 69 (H) 6 - 20 MG/DL Final     Creatinine   Date Value Ref Range Status   12/07/2021 7.14 (H) 0.55 - 1.02 MG/DL Final        Meds Given   Name Dose Route Adequacy / Fluid    Total Liters Process: 61.9 L   Net Fluid Removed: 2000 ml      Comments   Time Out Done:   (Time) 14:40   Admitting Diagnosis: Diabetic DKA   Consent obtained/signed: Informed Consent Verified: Yes (12/07/21 1446)   Machine / RO # Machine Number: [id number] (12/07/21 2996)   Primary Nurse Rpt Pre: Soo Thibodeaux RN   Primary Nurse Rpt Post: Soo Thibodeaux RN   Pt Education: procedural   Care Plan: ongoing   Pts outpatient clinic: [facility]     Tx Summary   Comments:   Arrived to patient room, set up and tested machine and water per policy. Patient is  A&Ox4. Consent signed & on file. SBAR received from Primary RN. 14:46- Pt cannulated with 58B needles per policy & without issue. VSS. Dialysis Tx initiated. 16:15- Patient BP is 93/55, UF goal decreased to 2L. BFR lowered to prevent venous pressures from fluctuating above 300. Patient tolerated procedure well. VSS, no S/S of distress noted. 17:47- Tx ended. VSS. All possible blood returned to patient. Hemostasis achieved without issue. Bed locked and in the lowest position, call bell and belongings in reach. SBAR given to Primary, RN. Patient is stable at time of my departure. All Dialysis related medications have been reviewed.

## 2021-12-07 NOTE — PROGRESS NOTES
[facility] called, spoke with Luis Del Valle in regards to continuity of care upon discharge.  Items needed to be faxed as followed:  1) Negative Covid test within 24hr of discharge  2) Negative chest XR prior to discharge  3) Report called to facility day of discharge (Report number [phone])  Fax results to fax # [phone]

## 2021-12-07 NOTE — PROGRESS NOTES
Transition of Care Plan   RUR: 27%    Disposition: The disposition plan is return to The [address] [place] [address] Would like Home Health through the [address] [place] [address] if recommended    F/U with PCP/Specialist     Transport: Family     Reason for Admission:   DKA                 RUR Score:   27%        PCP: First and Last name:  Giorgio Gottlieb MD     Name of Practice:   Are you a current patient: Yes/No:   Approximate date of last visit:    Can you do a virtual visit with your PCP:              Resources/supports as identified by patient/family:   Family                 Top Challenges facing patient (as identified by patient/family and CM): No issues has family to support                      Finances/Medication cost?   Has health insurance                 Transportation? Has 4 siblings who provide transportation              Support system or lack thereof? Family                     Living arrangements? Lives at [facility] with her mother             Self-care/ADLs/Cognition? Independent; needs assistance in taking medications which the nursing staff at [facility] provide           Current Advanced Directive/Advance Care Plan:  DNR      Healthcare Decision Maker:       Payor Source Payor: [facility] / Plan: [address] / Product Type: Managed Care Medicare /                             Plan for utilizing home health: If recommended would like PT/OT through the [place]                   Transition of Care Plan:                  CRM spoke with patient's sister/MARQUITA Young, introduced self, explained role, verified demographics, and offered assistance. The patient lives with her mother at the [address] [place] [address]. The patient is independent in her ADL's/IADL's but does require some assistance with her medications in which the nursing staff provide. The patient does not have any DME. Care Management Interventions  PCP Verified by CM:  Yes  Palliative Care Criteria Met (RRAT>21 & CHF Dx)?: No  Mode of Transport at Discharge:  Other (see comment) (siblings)  Transition of Care Consult (CM Consult): Discharge Planning  MyChart Signup: No  Discharge Durable Medical Equipment: No  Health Maintenance Reviewed: Yes  Physical Therapy Consult: Yes  Occupational Therapy Consult: Yes  Speech Therapy Consult: No  Support Systems: Parent(s), Assisted Living, Other Family Member(s)  Confirm Follow Up Transport: Family  The Patient and/or Patient Representative was Provided with a Choice of Provider and Agrees with the Discharge Plan?: Yes  Freedom of Choice List was Provided with Basic Dialogue that Supports the Patient's Individualized Plan of Care/Goals, Treatment Preferences and Shares the Quality Data Associated with the Providers?: Yes  The [employer] Information Provided?: No  Discharge Location  Discharge Placement: Home with home health    Bharat Agrawal, [address]

## 2021-12-07 NOTE — PROGRESS NOTES
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 Eating breakfast. Denies any SOB. Father at bedside. ROS:   No n/v      Exam:  Visit Vitals  BP (!) 119/46   Pulse 70   Temp 98.2 °F (36.8 °C)   Resp 16   Wt 67.4 kg (148 lb 9.4 oz)   LMP 03/27/1986   SpO2 97%   BMI 30.01 kg/m²     Wt Readings from Last 3 Encounters:   12/07/21 67.4 kg (148 lb 9.4 oz)   10/25/21 68 kg (150 lb)   10/11/21 75.8 kg (167 lb 1.7 oz)       Intake/Output Summary (Last 24 hours) at 12/7/2021 1227  Last data filed at 12/7/2021 0816  Gross per 24 hour   Intake 570 ml   Output 0 ml   Net 570 ml       Gen: NAD/Down facies  HEENT:  AT/NC  Lungs/Chest wall: Coarse  Cardiovascular: Regular rate, normal rhythm. Abdomen/: Soft, NT, ND, BS+.    Ext: No significant peripheral edema      Current Facility-Administered Medications   Medication Dose Route Frequency Last Admin    epoetin jerry-epbx (RETACRIT) injection 6,000 Units  6,000 Units SubCUTAneous Q MON, WED & FRI 6,000 Units at 12/06/21 2140    meropenem (MERREM) 500 mg in sterile water (preservative free) 10 mL IV syringe  0.5 g IntraVENous Q24H 500 mg at 12/06/21 1649    insulin glargine (LANTUS) injection 12 Units  12 Units SubCUTAneous DAILY 12 Units at 12/07/21 0901    heparin (porcine) injection 5,000 Units  5,000 Units SubCUTAneous Q12H 5,000 Units at 12/07/21 0902    sodium chloride (NS) flush 5-40 mL  5-40 mL IntraVENous Q8H 10 mL at 12/07/21 0651    sodium chloride (NS) flush 5-40 mL  5-40 mL IntraVENous PRN      acetaminophen (TYLENOL) tablet 650 mg  650 mg Oral Q6H PRN      Or    acetaminophen (TYLENOL) suppository 650 mg  650 mg Rectal Q6H PRN      polyethylene glycol (MIRALAX) packet 17 g  17 g Oral DAILY PRN      ondansetron (ZOFRAN ODT) tablet 4 mg  4 mg Oral Q8H PRN      Or    ondansetron (ZOFRAN) injection 4 mg  4 mg IntraVENous Q6H PRN      atorvastatin (LIPITOR) tablet 20 mg  20 mg Oral QHS 20 mg at 12/06/21 2140    [START ON 12/8/2021] ergocalciferol capsule 50,000 Units  50,000 Units Oral every Wednesday      pantoprazole (PROTONIX) tablet 40 mg  40 mg Oral ACB 40 mg at 12/07/21 0651    L.acidophilus-paracasei-S.thermophil-bifidobacter (RISAQUAD) 8 billion cell capsule  1 Capsule Oral DAILY 1 Capsule at 12/07/21 0901    levothyroxine (SYNTHROID) tablet 100 mcg  100 mcg Oral  mcg at 12/07/21 0651    metoprolol tartrate (LOPRESSOR) tablet 25 mg  25 mg Oral Q12H 25 mg at 12/07/21 0902    montelukast (SINGULAIR) tablet 10 mg  10 mg Oral DAILY 10 mg at 12/07/21 0902    insulin lispro (HUMALOG) injection   SubCUTAneous AC&HS 3 Units at 12/06/21 1208    glucose chewable tablet 16 g  4 Tablet Oral PRN      dextrose (D50W) injection syrg 12.5-25 g  12.5-25 g IntraVENous PRN      glucagon (GLUCAGEN) injection 1 mg  1 mg IntraMUSCular PRN         Labs/Data:    Lab Results   Component Value Date/Time    WBC 6.1 12/07/2021 03:31 AM    Hemoglobin (POC) 12.2 11/05/2012 03:10 PM    HGB 9.5 (L) 12/07/2021 03:31 AM    Hematocrit (POC) 36 11/05/2012 03:10 PM    HCT 29.7 (L) 12/07/2021 03:31 AM    PLATELET 962 77/61/9755 03:31 AM    .1 (H) 12/07/2021 03:31 AM       Lab Results   Component Value Date/Time    Sodium 130 (L) 12/07/2021 03:31 AM    Potassium 4.0 12/07/2021 03:31 AM    Chloride 96 (L) 12/07/2021 03:31 AM    CO2 21 12/07/2021 03:31 AM    Anion gap 13 12/07/2021 03:31 AM    Glucose 130 (H) 12/07/2021 03:31 AM    BUN 69 (H) 12/07/2021 03:31 AM    Creatinine 7.14 (H) 12/07/2021 03:31 AM    BUN/Creatinine ratio 10 (L) 12/07/2021 03:31 AM    GFR est AA 7 (L) 12/07/2021 03:31 AM    GFR est non-AA 6 (L) 12/07/2021 03:31 AM    Calcium 8.8 12/07/2021 03:31 AM       Patient seen and examined. Chart reviewed. Labs, data and other pertinent notes reviewed in last 24 hrs.         Signed by:  Colt Patel MD  [id number]

## 2021-12-08 NOTE — PROGRESS NOTES
Charting and patient care of Tesfaye Lino Maile by Marta Santiago RN  from 3709 to 0800  was supervised and reviewed by this RN.     Kia Lin RN

## 2021-12-08 NOTE — PROGRESS NOTES
Transitions of Care Plan   RUR: 27% - high   Admission Dx: DKA   Consults: DTC; Nephrology   Baseline: Resides at [facility]   Barriers to Discharge: HD today; d/c this evening   Disposition: [facility] with [facility]; family to transport   Estimated Discharge Date: 12/8/21      CM spoke with attending MD. Patient medically stable for hospital discharge today. Disposition:  Home Health: [address] at The [address] faxed [facility] order to Sedrick Santoro with [employer] at f: [phone]    Assisted Living: [facility]  CM spoke with Andrew Marshall at [address] [place] [address] for transition of care coordination (p: [phone]; f: [phone]). Able to accept patient back today after HD treatment. CM to fax d/c summary, renal note to The [facility] at f: [phone]). Outpatient HD:   [facility] MWF: CM notified clinic of patient's discharge today. Renal note and d/c summary to be faxed to f: [phone]. Transportation:  Family - patient states she will call her family for transportation back to The [employer] pt has received, reviewed, and signed 2nd IM letter informing them of their right to appeal the discharge. Signed copied has been placed on pt bedside chart.     Boogie Gonzalez, MPH  Care Manager [facility]  Available via [address] [place] [address] or  [phone]

## 2021-12-08 NOTE — PROGRESS NOTES
1930:Bedside and Verbal shift change report given to Rukhsana Atkinson and Ninfa RN (oncoming nurse) by Migdalia Das (offgoing nurse). Report included the following information SBAR, Kardex, ED Summary, Recent Results and Cardiac Rhythm NSR.     0730: Bedside and Verbal shift change report given to [address] [place] [address] (oncoming nurse) by Rukhsana Atkinson and Ninfa RN (offgoing nurse). Report included the following information SBAR, Kardex, ED Summary, Recent Results and Cardiac Rhythm NSR.

## 2021-12-08 NOTE — PROGRESS NOTES
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 Denies any SOB. Addendum: seen on HD at 2:15pm. VSS. Discussed with HD RN    ROS:   No n/v      Exam:  Visit Vitals  /72   Pulse 71   Temp 97.8 °F (36.6 °C)   Resp 18   Wt 64.8 kg (142 lb 13.7 oz)   LMP 03/27/1986   SpO2 96%   BMI 28.85 kg/m²     Wt Readings from Last 3 Encounters:   12/08/21 64.8 kg (142 lb 13.7 oz)   10/25/21 68 kg (150 lb)   10/11/21 75.8 kg (167 lb 1.7 oz)       Intake/Output Summary (Last 24 hours) at 12/8/2021 1206  Last data filed at 12/8/2021 0845  Gross per 24 hour   Intake 480 ml   Output 2000 ml   Net -1520 ml       Gen: NAD/Down facies  HEENT:  AT/NC  Lungs/Chest wall: Coarse  Cardiovascular: Regular rate, normal rhythm. Abdomen/: Soft, NT, ND, BS+.    Ext: No significant peripheral edema      Current Facility-Administered Medications   Medication Dose Route Frequency Last Admin    epoetin jerry-epbx (RETACRIT) injection 6,000 Units  6,000 Units SubCUTAneous Q MON, WED & FRI 6,000 Units at 12/06/21 2140    insulin glargine (LANTUS) injection 12 Units  12 Units SubCUTAneous DAILY 12 Units at 12/08/21 0919    heparin (porcine) injection 5,000 Units  5,000 Units SubCUTAneous Q12H 5,000 Units at 12/07/21 2213    sodium chloride (NS) flush 5-40 mL  5-40 mL IntraVENous Q8H 10 mL at 12/08/21 0700    sodium chloride (NS) flush 5-40 mL  5-40 mL IntraVENous PRN      acetaminophen (TYLENOL) tablet 650 mg  650 mg Oral Q6H PRN      Or    acetaminophen (TYLENOL) suppository 650 mg  650 mg Rectal Q6H PRN      polyethylene glycol (MIRALAX) packet 17 g  17 g Oral DAILY PRN      ondansetron (ZOFRAN ODT) tablet 4 mg  4 mg Oral Q8H PRN      Or    ondansetron (ZOFRAN) injection 4 mg  4 mg IntraVENous Q6H PRN      atorvastatin (LIPITOR) tablet 20 mg  20 mg Oral QHS 20 mg at 12/07/21 2212    ergocalciferol capsule 50,000 Units  50,000 Units Oral every Wednesday 50,000 Units at 12/08/21 0655    pantoprazole (PROTONIX) tablet 40 mg  40 mg Oral ACB 40 mg at 12/08/21 0656    L.acidophilus-paracasei-S.thermophil-bifidobacter (RISAQUAD) 8 billion cell capsule  1 Capsule Oral DAILY 1 Capsule at 12/08/21 0919    levothyroxine (SYNTHROID) tablet 100 mcg  100 mcg Oral  mcg at 12/08/21 0655    metoprolol tartrate (LOPRESSOR) tablet 25 mg  25 mg Oral Q12H 25 mg at 12/08/21 0919    montelukast (SINGULAIR) tablet 10 mg  10 mg Oral DAILY 10 mg at 12/08/21 0919    insulin lispro (HUMALOG) injection   SubCUTAneous AC&HS 3 Units at 12/08/21 0658    glucose chewable tablet 16 g  4 Tablet Oral PRN      dextrose (D50W) injection syrg 12.5-25 g  12.5-25 g IntraVENous PRN      glucagon (GLUCAGEN) injection 1 mg  1 mg IntraMUSCular PRN         Labs/Data:    Lab Results   Component Value Date/Time    WBC 4.3 12/08/2021 03:50 AM    Hemoglobin (POC) 12.2 11/05/2012 03:10 PM    HGB 9.7 (L) 12/08/2021 03:50 AM    Hematocrit (POC) 36 11/05/2012 03:10 PM    HCT 30.3 (L) 12/08/2021 03:50 AM    PLATELET 011 19/85/9527 03:50 AM    .7 (H) 12/08/2021 03:50 AM       Lab Results   Component Value Date/Time    Sodium 132 (L) 12/08/2021 03:50 AM    Potassium 3.9 12/08/2021 03:50 AM    Chloride 97 12/08/2021 03:50 AM    CO2 25 12/08/2021 03:50 AM    Anion gap 10 12/08/2021 03:50 AM    Glucose 197 (H) 12/08/2021 03:50 AM    BUN 45 (H) 12/08/2021 03:50 AM    Creatinine 5.21 (H) 12/08/2021 03:50 AM    BUN/Creatinine ratio 9 (L) 12/08/2021 03:50 AM    GFR est AA 11 (L) 12/08/2021 03:50 AM    GFR est non-AA 9 (L) 12/08/2021 03:50 AM    Calcium 8.5 12/08/2021 03:50 AM       Patient seen and examined. Chart reviewed. Labs, data and other pertinent notes reviewed in last 24 hrs.         Signed by:  oDmi Saldivar MD  [id number]

## 2021-12-08 NOTE — DISCHARGE SUMMARY
Discharge Summary       PATIENT ID: Flex Rg  MRN: [id number]   YOB: 1970    DATE OF ADMISSION: 12/4/2021  3:16 AM    DATE OF DISCHARGE: 12/8 21 6pm  PRIMARY CARE PROVIDER: Guillermo Del Cid MD     ATTENDING [facility]  DISCHARGING PROVIDER: Bhavana Forbes MD    To contact this individual call [phone] and ask the  to page. If unavailable ask to be transferred the Adult Hospitalist Department. CONSULTATIONS: IP CONSULT TO HOSPITALIST  IP CONSULT TO NEPHROLOGY    PROCEDURES/SURGERIES: * No surgery found *    ADMITTING DIAGNOSES & HOSPITAL COURSE:   This is a 54-year-old female with a history of Down syndrome, diabetes, ESRD started on hemodialysis about a month ago, gets dialyzed MWF presents to the ED for nausea, vomiting and hyperglycemia.  Per her brother, she ate sugary stuff last night at the assisted-living facility. Diane Canela has had her insulin last night.  Patient has cognitive impairment but able to give some history.  Her brother/MPOA was at the bedside.  She denied fever, chills.  She has cough from asthma but that is not changed.  She denied diarrhea.  She still makes urine and denied dysuria, urgency or frequency.  In the ED she was found to be in DKA.  She was started on insulin infusion and will consult for admission evaluation    DISCHARGE DIAGNOSES / PLAN:      DKA possibly precipitated by dietary indiscretion. Resolved  Type II DM, insulin-dependent,   --stable glucose levels after adjusted insulin regimen     UTI- hx of abx resistance  completed IV meropenem x 3days   urine culture results showing mixed janeth     hyponatremia - improved-should correct with dialysis     Mild hypoxia.  Afebrile.  CXR improved pleural effusion pulmonary edema compared to 10/25.  Rapid Covid test negative.  Wean off O2     ESRD on hemodialysis MWF schedule.  Access: Left upper arm AV fistula  --Nephrology consulted for dialysis management- dialysis planned for today     Hypothyroidism:Continue levothyroxine   Asthma without bronchospasm: Continue Singulair, add as needed bronchodilators   Hypertension: BP stable on home regimen   Hyperlipidemia: Continue statin   Down syndrome- stable mood and affect     ADDITIONAL CARE RECOMMENDATIONS:   Diabetic diet  accuchecks qid  Adjust insulin to keep premeal glucose levels 100-130    PENDING TEST RESULTS:   At the time of discharge the following test results are still pending: none    FOLLOW UP APPOINTMENTS:    Follow-up Information     Follow up With Specialties Details Why Praful Jackson MD Internal Medicine   [address]  [place] [address]  [facility]  [phone]           DIET: Diabetic Diet  ACTIVITY: Activity as tolerated  WOUND CARE: NA  EQUIPMENT needed: none    DISCHARGE MEDICATIONS:  Current Discharge Medication List      CONTINUE these medications which have NOT CHANGED    Details   loratadine 10 mg cap Take  by mouth.      loratadine (CLARITIN) 10 mg tablet Take 10 mg by mouth daily. guaiFENesin ER (Mucinex) 600 mg ER tablet Take 600 mg by mouth two (2) times daily as needed for Congestion. traZODone (DESYREL) 50 mg tablet Take 50 mg by mouth nightly. lidocaine (XYLOCAINE) 4 % topical cream Apply  to affected area DIALYSIS PRN for Pain. Qty: 15 g, Refills: 0      metoprolol tartrate (LOPRESSOR) 25 mg tablet Take 1 Tablet by mouth every twelve (12) hours. Qty: 60 Tablet, Refills: 0      b complex-vitamin c-folic acid (NEPHROCAPS) 1 mg capsule Take 1 Capsule by mouth daily. Qty: 30 Capsule, Refills: 0      patiromer calcium sorbitex (Veltassa) 16.8 gram powder Take  by mouth daily. diphenhydrAMINE (BenadryL) 25 mg capsule Take 25 mg by mouth nightly as needed for Allergies. furosemide (LASIX PO) Take 10 mg by mouth every morning. cetirizine (ZyrTEC) 10 mg tablet Take 10 mg by mouth daily. cranberry 500 mg capsule Take 500 mg by mouth daily. guaiFENesin (ROBITUSSIN) 100 mg/5 mL liquid Take 100 mg by mouth every four (4) hours as needed for Cough. fluticasone propionate (Flonase Allergy Relief) 50 mcg/actuation nasal spray 2 Sprays by Both Nostrils route two (2) times a day. guaiFENesin-dextromethorphan (Diabetic Tussin DM)  mg/5 mL liqd Take 10 mL by mouth every six (6) hours as needed for Cough or Congestion. esomeprazole (NexIUM) 40 mg capsule Take 40 mg by mouth daily. L.acid,para-B. bifidum-S.therm (RISAQUAD) 8 billion cell cap cap Take 1 Capsule by mouth daily. insulin degludec Embre Janusz FlexTouch U-100) 100 unit/mL (3 mL) inpn 12 Units by SubCUTAneous route nightly.      ergocalciferol (Vitamin D2) 1,250 mcg (50,000 unit) capsule Take 50,000 Units by mouth every Wednesday. ondansetron hcl (Zofran) 4 mg tablet Take 4 mg by mouth every four (4) hours as needed for Nausea or Vomiting.      montelukast (Singulair) 10 mg tablet Take 10 mg by mouth daily. levothyroxine (SYNTHROID) 100 mcg tablet Take 1 Tablet by mouth Daily (before breakfast). Qty: 90 Tablet, Refills: 3      biotin 5,000 mcg TbDi Take 5,000 mcg by mouth daily. cholecalciferol, vitamin D3, (VITAMIN D3) 2,000 unit tab Take 4,000 Units by mouth daily. insulin lispro (HUMALOG U-100 INSULIN) 100 unit/mL injection 3-4 Units by SubCUTAneous route Before breakfast, lunch, and dinner. Hold insulin if BS <80. Hold if patient does not eat her meal. If BS >150 give with SS.      atorvastatin (LIPITOR) 20 mg tablet Take 20 mg by mouth nightly. ibuprofen (MOTRIN) 400 mg tablet Take 400 mg by mouth every six (6) hours as needed for Pain (Fever). albuterol (PROVENTIL HFA, VENTOLIN HFA, PROAIR HFA) 90 mcg/actuation inhaler Take 1 Puff by inhalation every four (4) hours as needed for Wheezing. Qty: 1 Inhaler, Refills: 0      glucose 4 gram chewable tablet Take 12 g by mouth as needed (BS < 60).       glucagon (GLUCAGEN) 1 mg injection 1 mg by IntraMUSCular route once as needed (hypoglycemia if patient is unresponsive). NOTIFY YOUR PHYSICIAN FOR ANY OF THE FOLLOWING:   Fever over 101 degrees for 24 hours. Chest pain, shortness of breath, fever, chills, nausea, vomiting, diarrhea, change in mentation, falling, weakness, bleeding. Severe pain or pain not relieved by medications. Or, any other signs or symptoms that you may have questions about.     DISPOSITION:    Home With:   OT  PT  HH  RN      X Long term care facility    Independent/assisted living    Hospice    Other:       PATIENT CONDITION AT DISCHARGE:     Functional status    Poor     Deconditioned    X Independent      Cognition   X  Lucid     Forgetful     Dementia      Catheters/lines (plus indication)    [facility]     PICC     PEG    X None      Code status     Full code    X DNR      PHYSICAL EXAMINATION AT DISCHARGE:  Patient Vitals for the past 24 hrs:   Temp Pulse Resp BP SpO2   12/08/21 1747 98 °F (36.7 °C) 75 16 137/64 100 %   12/08/21 1526 -- 73 16 136/65 --   12/08/21 1515 -- 60 -- -- --   12/08/21 1500 -- 61 16 (!) 115/51 --   12/08/21 1445 -- 68 15 (!) 153/71 --   12/08/21 1430 -- 67 15 133/66 --   12/08/21 1415 -- 66 15 (!) 100/56 --   12/08/21 1400 -- 68 15 123/69 --   12/08/21 1345 -- 68 15 (!) 124/59 --   12/08/21 1326 98.1 °F (36.7 °C) 65 15 126/73 96 %   12/08/21 1200 -- 68 -- -- --   12/08/21 1000 -- 73 -- -- --   12/08/21 0922 -- -- -- 116/72 --   12/08/21 0702 97.8 °F (36.6 °C) 71 18 131/66 96 %   12/08/21 0600 -- 71 -- -- --   12/08/21 0400 -- 74 -- -- --   12/08/21 0321 98.2 °F (36.8 °C) -- 16 (!) 147/64 94 %   12/08/21 0200 -- 76 -- -- --   12/08/21 0000 -- 72 -- -- --   12/07/21 2314 98.4 °F (36.9 °C) 73 16 (!) 153/45 99 %   12/07/21 2206 -- 89 -- -- --   12/07/21 2204 -- 80 -- 121/65 --   12/07/21 2012 98.2 °F (36.8 °C) 79 18 (!) 154/59 100 %   12/07/21 2000 -- 83 -- -- --        Constitutional:  No acute distress, sleeping on exam    ENT:  Oral mucosa moist,)2 via NC    Resp:  CTA bilaterally. No wheezing/rhonchi/rales. No accessory muscle use   CV:  Regular rhythm, normal rate, no murmurs, gallops, rubs    GI:  Soft, non distended, non tender. normoactive bowel sounds, no hepatosplenomegaly     Musculoskeletal:  No edema, warm, 2+ pulses throughout    Neurologic:  Moves all extremities. AAOx3, CN II-XII reviewed                                  Data Review:    Review and/or order of clinical lab test  Review and/or order of tests in the radiology section of CPT  Review and/or order of tests in the medicine section of CPT        Labs:           Recent Labs     12/07/21 0331 12/06/21  0358   WBC 6.1 6.5   HGB 9.5* 9.2*   HCT 29.7* 29.5*    257                Recent Labs     12/07/21  0331 12/06/21  0358 12/05/21  0858 12/04/21  1636 12/04/21  1636 12/04/21  1307 12/04/21  1307   * 129* 131*   < > 128*   < > 130*   K 4.0 4.0 5.6*   < > 5.7*   < > 3.0*   CL 96* 97 105   < > 95*   < > 95*   CO2 21 23 20*   < > 24   < > 27   BUN 69* 60* 39*   < > 45*   < > 46*   CREA 7.14* 6.31* 4.16*   < > 4.74*   < > 4.98*   * 73 402*   < > 364*   < > 149*   CA 8.8 8.9 6.2*   < > 7.7*   < > 9.0   MG  --   --   --   --  1.8  --  2.0    < > = values in this interval not displayed.      No results for input(s): ALT, AP, TBIL, TBILI, TP, ALB, GLOB, GGT, AML, LPSE in the last 72 hours.     No lab exists for component: SGOT, GPT, AMYP, HLPSE  No results for input(s): INR, PTP, APTT, INREXT, INREXT in the last 72 hours. No results for input(s): FE, TIBC, PSAT, FERR in the last 72 hours. Lab Results   Component Value Date/Time     Folate 28.7 (H) 12/15/2018 05:09 AM      No results for input(s): PH, PCO2, PO2 in the last 72 hours.   No results for input(s): CPK, CKNDX, TROIQ in the last 72 hours.     No lab exists for component: CPKMB        Lab Results   Component Value Date/Time     Cholesterol, total 107 12/16/2018 04:08 AM     HDL Cholesterol 42 12/16/2018 04:08 AM     LDL, calculated 36.4 12/16/2018 04:08 AM     Triglyceride 143 12/16/2018 04:08 AM     CHOL/HDL Ratio 2.5 12/16/2018 04:08 AM            Lab Results   Component Value Date/Time     Glucose (POC) 118 (H) 12/07/2021 06:32 AM     Glucose (POC) 145 (H) 12/06/2021 09:34 PM     Glucose (POC) 132 (H) 12/06/2021 04:06 PM     Glucose (POC) 153 (H) 12/06/2021 11:06 AM     Glucose (POC) 90 12/06/2021 06:34 AM             Lab Results   Component Value Date/Time     Color YELLOW/STRAW 12/05/2021 12:29 PM     Appearance TURBID (A) 12/05/2021 12:29 PM     Specific gravity 1.021 12/05/2021 12:29 PM     Specific gravity 1.015 04/29/2013 01:50 PM     pH (UA) 5.0 12/05/2021 12:29 PM     Protein >300 (A) 12/05/2021 12:29 PM     Glucose 250 (A) 12/05/2021 12:29 PM     Ketone Negative 12/05/2021 12:29 PM     Bilirubin Negative 12/05/2021 12:29 PM     Urobilinogen 0.2 12/05/2021 12:29 PM     Nitrites Negative 12/05/2021 12:29 PM     Leukocyte Esterase SMALL (A) 12/05/2021 12:29 PM     Glucose/Ketones   02/08/2009 07:00 AM       GLUCOSE AND KETONES SIGNIFICANTLY ELEVATED. RESULTS PHONED TO AND READ BACK BY     Epithelial cells MODERATE (A) 12/05/2021 12:29 PM     Bacteria 1+ (A) 12/05/2021 12:29 PM     WBC  12/05/2021 12:29 PM     RBC 5-10 12/05/2021 12:29 PM     Results     Procedure Component Value Units Date/Time    COVID-19 RAPID TEST [[id number]] Collected: 12/08/21 1647    Order Status: Completed Specimen: Nasopharyngeal Updated: 12/08/21 1727     Specimen source Nasopharyngeal        COVID-19 rapid test Not detected        Comment: Rapid Abbott ID Now       Rapid NAAT:  The specimen is NEGATIVE for SARS-CoV-2, the novel coronavirus associated with COVID-19. Negative results should be treated as presumptive and, if inconsistent with clinical signs and symptoms or necessary for patient management, should be tested with an alternative molecular assay.   Negative results do not preclude SARS-CoV-2 infection and should not be used as the sole basis for patient management decisions. This test has been authorized by the FDA under an Emergency Use Authorization (EUA) for use by authorized laboratories. Fact sheet for Healthcare Providers: [employer]nz  Fact sheet for Patients: [employer]nz       Methodology: Isothermal Nucleic Acid Amplification         URINE CULTURE HOLD SAMPLE [[id number]] Collected: 12/05/21 1229    Order Status: Completed Specimen: Urine from Serum Updated: 12/06/21 0022     Urine culture hold       Urine on hold in Microbiology dept for 2 days. If unpreserved urine is submitted, it cannot be used for addtional testing after 24 hours, recollection will be required. CULTURE, URINE [[id number]] Collected: 12/05/21 1229    Order Status: Completed Specimen: Cath Urine Updated: 12/07/21 1015     Special Requests: NO SPECIAL REQUESTS        [place] Count --        54446  COLONIES/mL       Culture result:       MIXED UROGENITAL JEN ISOLATED          COVID-19 RAPID TEST [[id number]] Collected: 12/04/21 0357    Order Status: Completed Specimen: Nasopharyngeal Updated: 12/04/21 0515     Specimen source Nasopharyngeal        COVID-19 rapid test Not detected        Comment: Rapid Abbott ID Now       Rapid NAAT:  The specimen is NEGATIVE for SARS-CoV-2, the novel coronavirus associated with COVID-19. Negative results should be treated as presumptive and, if inconsistent with clinical signs and symptoms or necessary for patient management, should be tested with an alternative molecular assay. Negative results do not preclude SARS-CoV-2 infection and should not be used as the sole basis for patient management decisions. This test has been authorized by the FDA under an Emergency Use Authorization (EUA) for use by authorized laboratories.    Fact sheet for Healthcare Providers: [employer]co[employer]nz  Fact sheet for Patients: [facility].co.       Methodology: Isothermal Nucleic Acid Amplification         URINE CULTURE HOLD SAMPLE [[id number]]     Order Status: Canceled Specimen: Urine             CHRONIC MEDICAL DIAGNOSES:  Problem List as of 12/8/2021 Date Reviewed: 1/18/2020          Codes Class Noted - Resolved    DKA (diabetic ketoacidosis) ([place] [address].) ICD-10-CM: E11.10  ICD-9-CM: 250.12  12/4/2021 - Present        Acute respiratory failure with hypoxia ([place] [address].) ICD-10-CM: J96.01  ICD-9-CM: 518.81  10/25/2021 - Present        CKD (chronic kidney disease), stage V ([place] [address].) ICD-10-CM: N18.5  ICD-9-CM: 585.5  10/8/2021 - Present        Fluid overload ICD-10-CM: E87.70  ICD-9-CM: 276.69  10/8/2021 - Present        Severe anemia ICD-10-CM: D64.9  ICD-9-CM: 285.9  10/8/2021 - Present        ESRD (end stage renal disease) ([place] [address].) ICD-10-CM: N18.6  ICD-9-CM: 585.6  10/1/2021 - Present        Metabolic acidosis OYL-26-MU: E87.2  ICD-9-CM: 276.2  1/19/2020 - Present        Asthma exacerbation ICD-10-CM: J45.901  ICD-9-CM: 493.92  1/19/2020 - Present        ROBBY (acute kidney injury) ([place] [address].) ICD-10-CM: N17.9  ICD-9-CM: 584.9  1/15/2020 - Present        Advance directive on file ICD-10-CM: Z78.9  ICD-9-CM: V49.89  12/28/2015 - Present        Acne ICD-10-CM: L70.9  ICD-9-CM: 706.1  8/20/2014 - Present        Asthma (Chronic) ICD-10-CM: J45.909  ICD-9-CM: 493.90  4/29/2013 - Present        HTN (hypertension) (Chronic) ICD-10-CM: I10  ICD-9-CM: 401.9  4/29/2013 - Present        DM (diabetes mellitus), type 1, uncontrolled ([email or web address].) ICD-10-CM: E10.65  ICD-9-CM: 250.03  11/14/2012 - Present        Hypoglycemia ICD-10-CM: E16.2  ICD-9-CM: 251.2  6/27/2012 - Present        Down syndrome ICD-10-CM: Q90.9  ICD-9-CM: 758.0  7/23/2010 - Present        Acquired hypothyroidism ICD-10-CM: E03.9  ICD-9-CM: 244.9  7/23/2010 - Present        Anemia, unspecified ICD-10-CM: D64.9  ICD-9-CM: 285.9  7/23/2010 - Present        RESOLVED: Hyponatremia ICD-10-CM: E87.1  ICD-9-CM: 276.1  2/28/2018 - 1/1/2019        RESOLVED: DKA (diabetic ketoacidoses) ICD-10-CM: E11.10  ICD-9-CM: 250.12  9/14/2017 - 1/1/2019        RESOLVED: Hyperglycemia ICD-10-CM: R73.9  ICD-9-CM: 790.29  7/28/2014 - 7/29/2014        RESOLVED: Syncope and collapse ICD-10-CM: R55  ICD-9-CM: 780.2  4/29/2013 - 1/1/2019        RESOLVED: Type II or unspecified type diabetes mellitus without mention of complication, uncontrolled ICD-10-CM: FTC1302  ICD-9-CM: 250.02  7/23/2010 - 11/14/2012              Greater than 30 minutes were spent with the patient on counseling and coordination of care    Signed:   Bijal Reich MD  12/8/2021  6:03 PM   .

## 2021-12-08 NOTE — DIALYSIS
Hemodialysis / [phone]    Vitals Pre Post Assessment Pre Post   BP BP: 126/73 (12/08/21 1326) 136/65 LOC Alert and oriented *4 No change   HR Pulse (Heart Rate): 65 (12/08/21 1326) 73 Lungs crackles No change     Resp Resp Rate: 15 (12/08/21 1326) 16 Cardiac regular No change   Temp Temp: 98.1 °F (36.7 °C) (12/08/21 1326) 97.8 Skin Warm dry and intact No change   Weight    Edema generalized No change   Tele status   Pain Pain Intensity 1: 0 (12/08/21 0845)      Orders   Duration: Start: 1326 End: 1526 Total: 2 hours   Dialyzer: Dialyzer/Set Up Inspection: Revaclear (12/08/21 1326)   K Bath: Dialysate K (mEq/L): 3 (12/08/21 1326)   Ca Bath: Dialysate CA (mEq/L): 2.5 (12/08/21 1326)   Na: Dialysate NA (mEq/L): 138 (12/08/21 1326)   Bicarb: Dialysate HCO3 (mEq/L): 35 (12/08/21 1326)   Target Fluid Removal: Goal/Amount of Fluid to Remove (mL): 1000 mL (12/08/21 1326)     Access   Type & Location: YENIFER AVG: skin CDI. No s/s of infection. + B/T. No issues with cannulation or hemostasis. Running well at .     Comments:                                        Labs   HBsAg (Antigen) / date:     Negative as of 12/1/21                                          HBsAb (Antibody) / date: suspectible as of 12/1/21   Source:    Obtained/Reviewed  Critical Results Called HGB   Date Value Ref Range Status   12/08/2021 9.7 (L) 11.5 - 16.0 g/dL Final     Potassium   Date Value Ref Range Status   12/08/2021 3.9 3.5 - 5.1 mmol/L Final     Calcium   Date Value Ref Range Status   12/08/2021 8.5 8.5 - 10.1 MG/DL Final     BUN   Date Value Ref Range Status   12/08/2021 45 (H) 6 - 20 MG/DL Final     Comment:     INVESTIGATED PER DELTA CHECK PROTOCOL     Creatinine   Date Value Ref Range Status   12/08/2021 5.21 (H) 0.55 - 1.02 MG/DL Final     Comment:     INVESTIGATED PER DELTA CHECK PROTOCOL        Meds Given   Name Dose Route                    Adequacy / Fluid    Total Liters Process: 48.3   Net Fluid Removed: 1000 ml Comments   Time Out Done:   (8881)    Admitting Diagnosis:    Consent obtained/signed: Informed Consent Verified: Yes (12/08/21 1326)   Machine / RO # Machine Number: [id number] [facility] (12/08/21 1326)   Primary Nurse Rpt Pre: Cara Parks   Primary Nurse Rpt Post: Andrew roberto   Pt Education: yes   Care Plan: Continue hd   Pts outpatient clinic:      Tx Summary      . Consent signed & on file. SBAR received from Primary RN.  9054: Pt cannulated with 29E needles per policy & without issue. Labs drawn per request/ order. VSS. Dialysis Tx initiated. 1526: Tx ended. VSS. All possible blood returned to patient. Hemostasis achieved without issue. Bed locked and in the lowest position, call bell and belongings in reach. SBAR given to Primary, RN. Patient is stable at time of their/ my departure. All Dialysis related medications have been reviewed.    Comments:

## 2021-12-08 NOTE — PROGRESS NOTES
0730: Bedside shift change report given to [address] [facility] (oncoming nurse) by Jett Enciso  (offgoing nurse). Report included the following information SBAR, Kardex, Intake/Output, MAR, Recent Results, and Cardiac Rhythm NSR .         1835: TRANSFER - OUT REPORT:    Verbal report given to RN at [facility]  (name[facility]    Report consisted of patients Situation, Background, Assessment and   Recommendations(SBAR). Information from the following report(s) SBAR, Kardex, Intake/Output, MAR and Recent Results was reviewed with the receiving nurse. Opportunity for questions and clarification was provided. Patient transported with:  Family                               Problem: Diabetes Self-Management  Goal: *Disease process and treatment process  Description: Define diabetes and identify own type of diabetes; list 3 options for treating diabetes. Outcome: Progressing Towards Goal  Goal: *Incorporating nutritional management into lifestyle  Description: Describe effect of type, amount and timing of food on blood glucose; list 3 methods for planning meals. Outcome: Progressing Towards Goal  Goal: *Incorporating physical activity into lifestyle  Description: State effect of exercise on blood glucose levels. Outcome: Progressing Towards Goal  Goal: *Developing strategies to promote health/change behavior  Description: Define the ABC's of diabetes; identify appropriate screenings, schedule and personal plan for screenings. Outcome: Progressing Towards Goal  Goal: *Using medications safely  Description: State effect of diabetes medications on diabetes; name diabetes medication taking, action and side effects. Outcome: Progressing Towards Goal  Goal: *Monitoring blood glucose, interpreting and using results  Description: Identify recommended blood glucose targets  and personal targets.   Outcome: Progressing Towards Goal  Goal: *Prevention, detection, treatment of acute complications  Description: List symptoms of hyper- and hypoglycemia; describe how to treat low blood sugar and actions for lowering  high blood glucose level. Outcome: Progressing Towards Goal  Goal: *Prevention, detection and treatment of chronic complications  Description: Define the natural course of diabetes and describe the relationship of blood glucose levels to long term complications of diabetes. Outcome: Progressing Towards Goal  Goal: *Developing strategies to address psychosocial issues  Description: Describe feelings about living with diabetes; identify support needed and support network  Outcome: Progressing Towards Goal  Goal: *Insulin pump training  Outcome: Progressing Towards Goal  Goal: *Sick day guidelines  Outcome: Progressing Towards Goal  Goal: *Patient Specific Goal (EDIT GOAL, INSERT TEXT)  Outcome: Progressing Towards Goal     Problem: Patient Education: Go to Patient Education Activity  Goal: Patient/Family Education  Outcome: Progressing Towards Goal     Problem: Falls - Risk of  Goal: *Absence of Falls  Description: Document Vickey Fall Risk and appropriate interventions in the flowsheet.   Outcome: Progressing Towards Goal  Note: Fall Risk Interventions:  Mobility Interventions: Communicate number of staff needed for ambulation/transfer         Medication Interventions: Patient to call before getting OOB, Teach patient to arise slowly                   Problem: Patient Education: Go to Patient Education Activity  Goal: Patient/Family Education  Outcome: Progressing Towards Goal     Problem: Discharge Planning  Goal: *Discharge to safe environment  Outcome: Progressing Towards Goal

## 2021-12-08 NOTE — PROGRESS NOTES
Problem: Diabetes Self-Management  Goal: *Developing strategies to promote health/change behavior  Description: Define the ABC's of diabetes; identify appropriate screenings, schedule and personal plan for screenings. Outcome: Progressing Towards Goal     Problem: Falls - Risk of  Goal: *Absence of Falls  Description: Document Lauren Jules Fall Risk and appropriate interventions in the flowsheet.   Outcome: Progressing Towards Goal  Note: Fall Risk Interventions:  Mobility Interventions: Communicate number of staff needed for ambulation/transfer, Patient to call before getting OOB         Medication Interventions: Evaluate medications/consider consulting pharmacy                   Problem: Discharge Planning  Goal: *Discharge to safe environment  Outcome: Progressing Towards Goal

## 2022-01-01 ENCOUNTER — APPOINTMENT (OUTPATIENT)
Dept: CT IMAGING | Age: 52
End: 2022-01-01
Attending: FAMILY MEDICINE
Payer: MEDICARE

## 2022-01-01 ENCOUNTER — PATIENT OUTREACH (OUTPATIENT)
Dept: CASE MANAGEMENT | Age: 52
End: 2022-01-01

## 2022-01-01 ENCOUNTER — HOSPITAL ENCOUNTER (OUTPATIENT)
Age: 52
Setting detail: OBSERVATION
Discharge: HOME | End: 2022-04-06
Attending: EMERGENCY MEDICINE | Admitting: FAMILY MEDICINE
Payer: MEDICARE

## 2022-01-01 ENCOUNTER — TELEPHONE (OUTPATIENT)
Dept: ENDOCRINOLOGY | Age: 52
End: 2022-01-01

## 2022-01-01 ENCOUNTER — APPOINTMENT (OUTPATIENT)
Dept: CT IMAGING | Age: 52
DRG: 871 | End: 2022-01-01
Attending: EMERGENCY MEDICINE
Payer: MEDICARE

## 2022-01-01 ENCOUNTER — OFFICE VISIT (OUTPATIENT)
Dept: ENDOCRINOLOGY | Age: 52
End: 2022-01-01
Payer: MEDICARE

## 2022-01-01 ENCOUNTER — HOSPICE ADMISSION (OUTPATIENT)
Dept: HOSPICE | Facility: HOSPICE | Age: 52
End: 2022-01-01
Payer: MEDICARE

## 2022-01-01 ENCOUNTER — HOSPITAL ENCOUNTER (INPATIENT)
Age: 52
LOS: 1 days | Discharge: HOSPICE | DRG: 871 | End: 2022-07-07
Attending: EMERGENCY MEDICINE | Admitting: FAMILY MEDICINE
Payer: MEDICARE

## 2022-01-01 ENCOUNTER — APPOINTMENT (OUTPATIENT)
Dept: GENERAL RADIOLOGY | Age: 52
DRG: 637 | End: 2022-01-01
Attending: NURSE PRACTITIONER
Payer: MEDICARE

## 2022-01-01 ENCOUNTER — HOSPITAL ENCOUNTER (INPATIENT)
Age: 52
LOS: 2 days | Discharge: HOME HEALTH | DRG: 637 | End: 2022-05-19
Attending: EMERGENCY MEDICINE | Admitting: ANESTHESIOLOGY
Payer: MEDICARE

## 2022-01-01 ENCOUNTER — APPOINTMENT (OUTPATIENT)
Dept: GENERAL RADIOLOGY | Age: 52
DRG: 871 | End: 2022-01-01
Attending: EMERGENCY MEDICINE
Payer: MEDICARE

## 2022-01-01 ENCOUNTER — APPOINTMENT (OUTPATIENT)
Dept: GENERAL RADIOLOGY | Age: 52
DRG: 637 | End: 2022-01-01
Attending: STUDENT IN AN ORGANIZED HEALTH CARE EDUCATION/TRAINING PROGRAM
Payer: MEDICARE

## 2022-01-01 ENCOUNTER — APPOINTMENT (OUTPATIENT)
Dept: GENERAL RADIOLOGY | Age: 52
End: 2022-01-01
Attending: STUDENT IN AN ORGANIZED HEALTH CARE EDUCATION/TRAINING PROGRAM
Payer: MEDICARE

## 2022-01-01 ENCOUNTER — HOSPITAL ENCOUNTER (OUTPATIENT)
Age: 52
Setting detail: OBSERVATION
Discharge: NURSING, REHAB OR LONG-TERM CARE | End: 2022-04-10
Attending: EMERGENCY MEDICINE | Admitting: STUDENT IN AN ORGANIZED HEALTH CARE EDUCATION/TRAINING PROGRAM
Payer: MEDICARE

## 2022-01-01 ENCOUNTER — APPOINTMENT (OUTPATIENT)
Dept: GENERAL RADIOLOGY | Age: 52
End: 2022-01-01
Attending: EMERGENCY MEDICINE
Payer: MEDICARE

## 2022-01-01 ENCOUNTER — APPOINTMENT (OUTPATIENT)
Dept: NON INVASIVE DIAGNOSTICS | Age: 52
End: 2022-01-01
Attending: FAMILY MEDICINE
Payer: MEDICARE

## 2022-01-01 ENCOUNTER — VIRTUAL VISIT (OUTPATIENT)
Dept: DIABETES SERVICES | Age: 52
End: 2022-01-01
Payer: MEDICARE

## 2022-01-01 ENCOUNTER — HOSPITAL ENCOUNTER (INPATIENT)
Age: 52
LOS: 1 days | DRG: 064 | End: 2022-07-07
Attending: FAMILY MEDICINE | Admitting: FAMILY MEDICINE
Payer: OTHER MISCELLANEOUS

## 2022-01-01 VITALS
HEIGHT: 59 IN | DIASTOLIC BLOOD PRESSURE: 62 MMHG | TEMPERATURE: 98.2 F | HEART RATE: 65 BPM | OXYGEN SATURATION: 98 % | RESPIRATION RATE: 18 BRPM | BODY MASS INDEX: 27.42 KG/M2 | WEIGHT: 136 LBS | SYSTOLIC BLOOD PRESSURE: 131 MMHG

## 2022-01-01 VITALS
RESPIRATION RATE: 19 BRPM | TEMPERATURE: 101.2 F | SYSTOLIC BLOOD PRESSURE: 79 MMHG | OXYGEN SATURATION: 96 % | HEART RATE: 90 BPM | DIASTOLIC BLOOD PRESSURE: 12 MMHG

## 2022-01-01 VITALS
DIASTOLIC BLOOD PRESSURE: 64 MMHG | TEMPERATURE: 98.7 F | RESPIRATION RATE: 19 BRPM | SYSTOLIC BLOOD PRESSURE: 143 MMHG | HEIGHT: 59 IN | OXYGEN SATURATION: 99 % | HEART RATE: 56 BPM | BODY MASS INDEX: 27.24 KG/M2 | WEIGHT: 135.14 LBS

## 2022-01-01 VITALS
WEIGHT: 132.06 LBS | SYSTOLIC BLOOD PRESSURE: 179 MMHG | RESPIRATION RATE: 18 BRPM | DIASTOLIC BLOOD PRESSURE: 68 MMHG | BODY MASS INDEX: 26.67 KG/M2 | TEMPERATURE: 98.1 F | OXYGEN SATURATION: 97 % | HEART RATE: 78 BPM

## 2022-01-01 VITALS
BODY MASS INDEX: 25.88 KG/M2 | SYSTOLIC BLOOD PRESSURE: 143 MMHG | WEIGHT: 128.4 LBS | DIASTOLIC BLOOD PRESSURE: 66 MMHG | HEART RATE: 78 BPM | HEIGHT: 59 IN

## 2022-01-01 VITALS
HEIGHT: 59 IN | HEART RATE: 60 BPM | BODY MASS INDEX: 26 KG/M2 | SYSTOLIC BLOOD PRESSURE: 130 MMHG | DIASTOLIC BLOOD PRESSURE: 56 MMHG | WEIGHT: 129 LBS

## 2022-01-01 VITALS
HEART RATE: 88 BPM | OXYGEN SATURATION: 96 % | DIASTOLIC BLOOD PRESSURE: 24 MMHG | RESPIRATION RATE: 20 BRPM | TEMPERATURE: 100.6 F | SYSTOLIC BLOOD PRESSURE: 59 MMHG

## 2022-01-01 DIAGNOSIS — Z99.2 ESRD ON DIALYSIS (HCC): ICD-10-CM

## 2022-01-01 DIAGNOSIS — E10.65 HYPERGLYCEMIA DUE TO TYPE 1 DIABETES MELLITUS (HCC): Primary | ICD-10-CM

## 2022-01-01 DIAGNOSIS — N18.6 ESRD ON DIALYSIS (HCC): ICD-10-CM

## 2022-01-01 DIAGNOSIS — Q90.9 DOWN SYNDROME: ICD-10-CM

## 2022-01-01 DIAGNOSIS — I16.1 HYPERTENSIVE EMERGENCY: ICD-10-CM

## 2022-01-01 DIAGNOSIS — Z99.2 ESRD (END STAGE RENAL DISEASE) ON DIALYSIS (HCC): ICD-10-CM

## 2022-01-01 DIAGNOSIS — E87.5 HYPERKALEMIA: ICD-10-CM

## 2022-01-01 DIAGNOSIS — S06.320A INTRAPARENCHYMAL HEMATOMA OF BRAIN, LEFT, WITHOUT LOSS OF CONSCIOUSNESS, INITIAL ENCOUNTER (HCC): Primary | ICD-10-CM

## 2022-01-01 DIAGNOSIS — E03.9 HYPOTHYROIDISM, UNSPECIFIED TYPE: Primary | ICD-10-CM

## 2022-01-01 DIAGNOSIS — R73.9 HYPERGLYCEMIA: ICD-10-CM

## 2022-01-01 DIAGNOSIS — E10.10 DIABETIC KETOACIDOSIS WITHOUT COMA ASSOCIATED WITH TYPE 1 DIABETES MELLITUS (HCC): Primary | ICD-10-CM

## 2022-01-01 DIAGNOSIS — N18.6 ESRD (END STAGE RENAL DISEASE) ON DIALYSIS (HCC): ICD-10-CM

## 2022-01-01 DIAGNOSIS — R73.9 HYPERGLYCEMIA: Primary | ICD-10-CM

## 2022-01-01 DIAGNOSIS — R93.89 ABNORMAL CHEST X-RAY: ICD-10-CM

## 2022-01-01 DIAGNOSIS — E03.9 HYPOTHYROIDISM, UNSPECIFIED TYPE: ICD-10-CM

## 2022-01-01 DIAGNOSIS — Z99.2 TYPE 1 DIABETES MELLITUS WITH CHRONIC KIDNEY DISEASE ON CHRONIC DIALYSIS (HCC): Primary | ICD-10-CM

## 2022-01-01 DIAGNOSIS — I10 HYPERTENSION, UNSPECIFIED TYPE: ICD-10-CM

## 2022-01-01 DIAGNOSIS — E10.65 HYPERGLYCEMIA DUE TO TYPE 1 DIABETES MELLITUS (HCC): ICD-10-CM

## 2022-01-01 DIAGNOSIS — N18.6 TYPE 1 DIABETES MELLITUS WITH CHRONIC KIDNEY DISEASE ON CHRONIC DIALYSIS (HCC): Primary | ICD-10-CM

## 2022-01-01 DIAGNOSIS — E66.3 OVERWEIGHT: ICD-10-CM

## 2022-01-01 DIAGNOSIS — R41.82 ALTERED MENTAL STATUS, UNSPECIFIED ALTERED MENTAL STATUS TYPE: Primary | ICD-10-CM

## 2022-01-01 DIAGNOSIS — E10.22 TYPE 1 DIABETES MELLITUS WITH CHRONIC KIDNEY DISEASE ON CHRONIC DIALYSIS (HCC): Primary | ICD-10-CM

## 2022-01-01 LAB
ADMINISTERED INITIALS, ADMINIT: NORMAL
ALBUMIN SERPL-MCNC: 2.3 G/DL (ref 3.5–5)
ALBUMIN SERPL-MCNC: 2.8 G/DL (ref 3.5–5)
ALBUMIN SERPL-MCNC: 2.8 G/DL (ref 3.5–5)
ALBUMIN SERPL-MCNC: 2.9 G/DL (ref 3.5–5)
ALBUMIN SERPL-MCNC: 2.9 G/DL (ref 3.5–5)
ALBUMIN SERPL-MCNC: 3 G/DL (ref 3.5–5)
ALBUMIN/GLOB SERPL: 0.6 {RATIO} (ref 1.1–2.2)
ALBUMIN/GLOB SERPL: 0.6 {RATIO} (ref 1.1–2.2)
ALBUMIN/GLOB SERPL: 0.7 {RATIO} (ref 1.1–2.2)
ALP SERPL-CCNC: 110 U/L (ref 45–117)
ALP SERPL-CCNC: 116 U/L (ref 45–117)
ALP SERPL-CCNC: 150 U/L (ref 45–117)
ALP SERPL-CCNC: 73 U/L (ref 45–117)
ALP SERPL-CCNC: 77 U/L (ref 45–117)
ALP SERPL-CCNC: 78 U/L (ref 45–117)
ALT SERPL-CCNC: 21 U/L (ref 12–78)
ALT SERPL-CCNC: 28 U/L (ref 12–78)
ALT SERPL-CCNC: 29 U/L (ref 12–78)
ALT SERPL-CCNC: 30 U/L (ref 12–78)
ALT SERPL-CCNC: 31 U/L (ref 12–78)
ALT SERPL-CCNC: 40 U/L (ref 12–78)
AMORPH CRY URNS QL MICRO: ABNORMAL
ANION GAP SERPL CALC-SCNC: 11 MMOL/L (ref 5–15)
ANION GAP SERPL CALC-SCNC: 12 MMOL/L (ref 5–15)
ANION GAP SERPL CALC-SCNC: 13 MMOL/L (ref 5–15)
ANION GAP SERPL CALC-SCNC: 14 MMOL/L (ref 5–15)
ANION GAP SERPL CALC-SCNC: 21 MMOL/L (ref 5–15)
ANION GAP SERPL CALC-SCNC: 24 MMOL/L (ref 5–15)
ANION GAP SERPL CALC-SCNC: 24 MMOL/L (ref 5–15)
ANION GAP SERPL CALC-SCNC: 28 MMOL/L (ref 5–15)
ANION GAP SERPL CALC-SCNC: 5 MMOL/L (ref 5–15)
ANION GAP SERPL CALC-SCNC: 6 MMOL/L (ref 5–15)
ANION GAP SERPL CALC-SCNC: 7 MMOL/L (ref 5–15)
ANION GAP SERPL CALC-SCNC: 8 MMOL/L (ref 5–15)
ANION GAP SERPL CALC-SCNC: 9 MMOL/L (ref 5–15)
APPEARANCE UR: CLEAR
APTT PPP: 23 SEC (ref 22.1–31)
APTT PPP: 28.1 SEC (ref 22.1–31)
AST SERPL-CCNC: 21 U/L (ref 15–37)
AST SERPL-CCNC: 24 U/L (ref 15–37)
AST SERPL-CCNC: 30 U/L (ref 15–37)
AST SERPL-CCNC: 32 U/L (ref 15–37)
AST SERPL-CCNC: 41 U/L (ref 15–37)
AST SERPL-CCNC: ABNORMAL U/L (ref 15–37)
ATRIAL RATE: 106 BPM
B-OH-BUTYR SERPL-SCNC: >4.42 MMOL/L
BACTERIA SPEC CULT: NORMAL
BACTERIA URNS QL MICRO: NEGATIVE /HPF
BASE DEFICIT BLD-SCNC: 4 MMOL/L
BASE DEFICIT BLDV-SCNC: 21.7 MMOL/L
BASE DEFICIT BLDV-SCNC: 6.6 MMOL/L
BASOPHILS # BLD: 0 K/UL (ref 0–0.1)
BASOPHILS # BLD: 0 K/UL (ref 0–0.1)
BASOPHILS # BLD: 0.1 K/UL (ref 0–0.1)
BASOPHILS NFR BLD: 0 % (ref 0–1)
BASOPHILS NFR BLD: 0 % (ref 0–1)
BASOPHILS NFR BLD: 1 % (ref 0–1)
BILIRUB SERPL-MCNC: 0.3 MG/DL (ref 0.2–1)
BILIRUB SERPL-MCNC: 0.4 MG/DL (ref 0.2–1)
BILIRUB SERPL-MCNC: 0.4 MG/DL (ref 0.2–1)
BILIRUB SERPL-MCNC: 0.5 MG/DL (ref 0.2–1)
BILIRUB SERPL-MCNC: 0.6 MG/DL (ref 0.2–1)
BILIRUB SERPL-MCNC: 0.6 MG/DL (ref 0.2–1)
BILIRUB UR QL: NEGATIVE
BUN SERPL-MCNC: 21 MG/DL (ref 6–20)
BUN SERPL-MCNC: 35 MG/DL (ref 6–20)
BUN SERPL-MCNC: 35 MG/DL (ref 6–20)
BUN SERPL-MCNC: 36 MG/DL (ref 6–20)
BUN SERPL-MCNC: 38 MG/DL (ref 6–20)
BUN SERPL-MCNC: 39 MG/DL (ref 6–20)
BUN SERPL-MCNC: 41 MG/DL (ref 6–20)
BUN SERPL-MCNC: 43 MG/DL (ref 6–20)
BUN SERPL-MCNC: 43 MG/DL (ref 6–20)
BUN SERPL-MCNC: 45 MG/DL (ref 6–20)
BUN SERPL-MCNC: 45 MG/DL (ref 6–20)
BUN SERPL-MCNC: 46 MG/DL (ref 6–20)
BUN SERPL-MCNC: 50 MG/DL (ref 6–20)
BUN SERPL-MCNC: 54 MG/DL (ref 6–20)
BUN SERPL-MCNC: 71 MG/DL (ref 6–20)
BUN/CREAT SERPL: 10 (ref 12–20)
BUN/CREAT SERPL: 11 (ref 12–20)
BUN/CREAT SERPL: 11 (ref 12–20)
BUN/CREAT SERPL: 6 (ref 12–20)
BUN/CREAT SERPL: 8 (ref 12–20)
BUN/CREAT SERPL: 9 (ref 12–20)
CA-I BLD-MCNC: 1.09 MMOL/L (ref 1.12–1.32)
CA-I BLD-SCNC: 0.98 MMOL/L (ref 1.12–1.32)
CA-I BLD-SCNC: 1.02 MMOL/L (ref 1.12–1.32)
CALCIUM SERPL-MCNC: 10 MG/DL (ref 8.5–10.1)
CALCIUM SERPL-MCNC: 7.5 MG/DL (ref 8.5–10.1)
CALCIUM SERPL-MCNC: 7.7 MG/DL (ref 8.5–10.1)
CALCIUM SERPL-MCNC: 7.7 MG/DL (ref 8.5–10.1)
CALCIUM SERPL-MCNC: 7.8 MG/DL (ref 8.5–10.1)
CALCIUM SERPL-MCNC: 7.9 MG/DL (ref 8.5–10.1)
CALCIUM SERPL-MCNC: 8.2 MG/DL (ref 8.5–10.1)
CALCIUM SERPL-MCNC: 8.2 MG/DL (ref 8.5–10.1)
CALCIUM SERPL-MCNC: 8.3 MG/DL (ref 8.5–10.1)
CALCIUM SERPL-MCNC: 8.4 MG/DL (ref 8.5–10.1)
CALCIUM SERPL-MCNC: 8.5 MG/DL (ref 8.5–10.1)
CALCIUM SERPL-MCNC: 8.8 MG/DL (ref 8.5–10.1)
CALCULATED P AXIS, ECG09: 50 DEGREES
CALCULATED R AXIS, ECG10: 79 DEGREES
CALCULATED T AXIS, ECG11: 39 DEGREES
CHLORIDE BLD-SCNC: 94 MMOL/L (ref 100–108)
CHLORIDE SERPL-SCNC: 103 MMOL/L (ref 97–108)
CHLORIDE SERPL-SCNC: 106 MMOL/L (ref 97–108)
CHLORIDE SERPL-SCNC: 85 MMOL/L (ref 97–108)
CHLORIDE SERPL-SCNC: 89 MMOL/L (ref 97–108)
CHLORIDE SERPL-SCNC: 89 MMOL/L (ref 97–108)
CHLORIDE SERPL-SCNC: 92 MMOL/L (ref 97–108)
CHLORIDE SERPL-SCNC: 92 MMOL/L (ref 97–108)
CHLORIDE SERPL-SCNC: 94 MMOL/L (ref 97–108)
CHLORIDE SERPL-SCNC: 94 MMOL/L (ref 97–108)
CHLORIDE SERPL-SCNC: 95 MMOL/L (ref 97–108)
CHLORIDE SERPL-SCNC: 96 MMOL/L (ref 97–108)
CHLORIDE SERPL-SCNC: 96 MMOL/L (ref 97–108)
CHLORIDE SERPL-SCNC: 97 MMOL/L (ref 97–108)
CHLORIDE SERPL-SCNC: 98 MMOL/L (ref 97–108)
CHLORIDE SERPL-SCNC: 99 MMOL/L (ref 97–108)
CO2 BLD-SCNC: 22 MMOL/L (ref 19–24)
CO2 SERPL-SCNC: 11 MMOL/L (ref 21–32)
CO2 SERPL-SCNC: 11 MMOL/L (ref 21–32)
CO2 SERPL-SCNC: 15 MMOL/L (ref 21–32)
CO2 SERPL-SCNC: 19 MMOL/L (ref 21–32)
CO2 SERPL-SCNC: 20 MMOL/L (ref 21–32)
CO2 SERPL-SCNC: 20 MMOL/L (ref 21–32)
CO2 SERPL-SCNC: 22 MMOL/L (ref 21–32)
CO2 SERPL-SCNC: 24 MMOL/L (ref 21–32)
CO2 SERPL-SCNC: 25 MMOL/L (ref 21–32)
CO2 SERPL-SCNC: 25 MMOL/L (ref 21–32)
CO2 SERPL-SCNC: 27 MMOL/L (ref 21–32)
CO2 SERPL-SCNC: 28 MMOL/L (ref 21–32)
CO2 SERPL-SCNC: 28 MMOL/L (ref 21–32)
CO2 SERPL-SCNC: 32 MMOL/L (ref 21–32)
CO2 SERPL-SCNC: 8 MMOL/L (ref 21–32)
COLOR UR: ABNORMAL
COMMENT, HOLDF: NORMAL
COVID-19 RAPID TEST, COVR: NOT DETECTED
CREAT SERPL-MCNC: 3.28 MG/DL (ref 0.55–1.02)
CREAT SERPL-MCNC: 3.6 MG/DL (ref 0.55–1.02)
CREAT SERPL-MCNC: 3.82 MG/DL (ref 0.55–1.02)
CREAT SERPL-MCNC: 3.88 MG/DL (ref 0.55–1.02)
CREAT SERPL-MCNC: 3.95 MG/DL (ref 0.55–1.02)
CREAT SERPL-MCNC: 4.08 MG/DL (ref 0.55–1.02)
CREAT SERPL-MCNC: 4.45 MG/DL (ref 0.55–1.02)
CREAT SERPL-MCNC: 4.49 MG/DL (ref 0.55–1.02)
CREAT SERPL-MCNC: 4.51 MG/DL (ref 0.55–1.02)
CREAT SERPL-MCNC: 4.88 MG/DL (ref 0.55–1.02)
CREAT SERPL-MCNC: 5.24 MG/DL (ref 0.55–1.02)
CREAT SERPL-MCNC: 5.93 MG/DL (ref 0.55–1.02)
CREAT SERPL-MCNC: 5.95 MG/DL (ref 0.55–1.02)
CREAT SERPL-MCNC: 5.96 MG/DL (ref 0.55–1.02)
CREAT SERPL-MCNC: 7.63 MG/DL (ref 0.55–1.02)
CREAT UR-MCNC: 3.5 MG/DL (ref 0.6–1.3)
D50 ADMINISTERED, D50ADM: 0 ML
D50 ADMINISTERED, D50ADM: 15 ML
D50 ORDER, D50ORD: 0 ML
D50 ORDER, D50ORD: 15 ML
DIAGNOSIS, 93000: NORMAL
DIFFERENTIAL METHOD BLD: ABNORMAL
ECHO AO ROOT DIAM: 2.7 CM
ECHO AO ROOT INDEX: 1.72 CM/M2
ECHO AV AREA PEAK VELOCITY: 1.7 CM2
ECHO AV AREA/BSA PEAK VELOCITY: 1.1 CM2/M2
ECHO AV PEAK GRADIENT: 11 MMHG
ECHO AV PEAK VELOCITY: 1.7 M/S
ECHO AV VELOCITY RATIO: 0.71
ECHO EST RA PRESSURE: 10 MMHG
ECHO LA DIAMETER INDEX: 2.61 CM/M2
ECHO LA DIAMETER: 4.1 CM
ECHO LA TO AORTIC ROOT RATIO: 1.52
ECHO LV E' LATERAL VELOCITY: 7 CM/S
ECHO LV E' SEPTAL VELOCITY: 5 CM/S
ECHO LV FRACTIONAL SHORTENING: 26 % (ref 28–44)
ECHO LV INTERNAL DIMENSION DIASTOLE INDEX: 2.68 CM/M2
ECHO LV INTERNAL DIMENSION DIASTOLIC: 4.2 CM (ref 3.9–5.3)
ECHO LV INTERNAL DIMENSION SYSTOLIC INDEX: 1.97 CM/M2
ECHO LV INTERNAL DIMENSION SYSTOLIC: 3.1 CM
ECHO LV IVSD: 0.9 CM (ref 0.6–0.9)
ECHO LV MASS 2D: 127.8 G (ref 67–162)
ECHO LV MASS INDEX 2D: 81.4 G/M2 (ref 43–95)
ECHO LV POSTERIOR WALL DIASTOLIC: 1 CM (ref 0.6–0.9)
ECHO LV RELATIVE WALL THICKNESS RATIO: 0.48
ECHO LVOT AREA: 2.3 CM2
ECHO LVOT DIAM: 1.7 CM
ECHO LVOT PEAK GRADIENT: 6 MMHG
ECHO LVOT PEAK VELOCITY: 1.2 M/S
ECHO MV A VELOCITY: 1.05 M/S
ECHO MV E DECELERATION TIME (DT): 218.1 MS
ECHO MV E VELOCITY: 1.32 M/S
ECHO MV E/A RATIO: 1.26
ECHO MV E/E' LATERAL: 18.86
ECHO MV E/E' RATIO (AVERAGED): 22.63
ECHO MV E/E' SEPTAL: 26.4
ECHO MV MAX VELOCITY: 1.2 M/S
ECHO MV MEAN GRADIENT: 2 MMHG
ECHO MV MEAN VELOCITY: 0.7 M/S
ECHO MV PEAK GRADIENT: 6 MMHG
ECHO MV PRESSURE HALF TIME (PHT): 63.2 MS
ECHO MV PRESSURE HALF TIME (PHT): 89.7 MS
ECHO MV VTI: 35 CM
ECHO PV MAX VELOCITY: 0.9 M/S
ECHO PV PEAK GRADIENT: 3 MMHG
EOSINOPHIL # BLD: 0 K/UL (ref 0–0.4)
EOSINOPHIL # BLD: 0.1 K/UL (ref 0–0.4)
EOSINOPHIL # BLD: 0.1 K/UL (ref 0–0.4)
EOSINOPHIL # BLD: 0.3 K/UL (ref 0–0.4)
EOSINOPHIL NFR BLD: 0 % (ref 0–7)
EOSINOPHIL NFR BLD: 1 % (ref 0–7)
EOSINOPHIL NFR BLD: 2 % (ref 0–7)
EOSINOPHIL NFR BLD: 4 % (ref 0–7)
EPITH CASTS URNS QL MICRO: ABNORMAL /LPF
ERYTHROCYTE [DISTWIDTH] IN BLOOD BY AUTOMATED COUNT: 12.7 % (ref 11.5–14.5)
ERYTHROCYTE [DISTWIDTH] IN BLOOD BY AUTOMATED COUNT: 12.7 % (ref 11.5–14.5)
ERYTHROCYTE [DISTWIDTH] IN BLOOD BY AUTOMATED COUNT: 12.9 % (ref 11.5–14.5)
ERYTHROCYTE [DISTWIDTH] IN BLOOD BY AUTOMATED COUNT: 13.6 % (ref 11.5–14.5)
ERYTHROCYTE [DISTWIDTH] IN BLOOD BY AUTOMATED COUNT: 13.9 % (ref 11.5–14.5)
ERYTHROCYTE [DISTWIDTH] IN BLOOD BY AUTOMATED COUNT: 14 % (ref 11.5–14.5)
ERYTHROCYTE [DISTWIDTH] IN BLOOD BY AUTOMATED COUNT: 14 % (ref 11.5–14.5)
ERYTHROCYTE [DISTWIDTH] IN BLOOD BY AUTOMATED COUNT: 14.3 % (ref 11.5–14.5)
ERYTHROCYTE [DISTWIDTH] IN BLOOD BY AUTOMATED COUNT: 15.4 % (ref 11.5–14.5)
EST. AVERAGE GLUCOSE BLD GHB EST-MCNC: 169 MG/DL
EST. AVERAGE GLUCOSE BLD GHB EST-MCNC: 171 MG/DL
EST. AVERAGE GLUCOSE BLD GHB EST-MCNC: 177 MG/DL
FOLATE SERPL-MCNC: 63.4 NG/ML (ref 5–21)
GLOBULIN SER CALC-MCNC: 4 G/DL (ref 2–4)
GLOBULIN SER CALC-MCNC: 4.4 G/DL (ref 2–4)
GLOBULIN SER CALC-MCNC: 4.4 G/DL (ref 2–4)
GLOBULIN SER CALC-MCNC: 4.5 G/DL (ref 2–4)
GLSCOM COMMENTS: NORMAL
GLUCOSE BLD STRIP.AUTO-MCNC: 100 MG/DL (ref 65–117)
GLUCOSE BLD STRIP.AUTO-MCNC: 100 MG/DL (ref 65–117)
GLUCOSE BLD STRIP.AUTO-MCNC: 102 MG/DL (ref 65–117)
GLUCOSE BLD STRIP.AUTO-MCNC: 105 MG/DL (ref 65–117)
GLUCOSE BLD STRIP.AUTO-MCNC: 106 MG/DL (ref 65–117)
GLUCOSE BLD STRIP.AUTO-MCNC: 106 MG/DL (ref 65–117)
GLUCOSE BLD STRIP.AUTO-MCNC: 114 MG/DL (ref 65–117)
GLUCOSE BLD STRIP.AUTO-MCNC: 117 MG/DL (ref 65–117)
GLUCOSE BLD STRIP.AUTO-MCNC: 130 MG/DL (ref 65–117)
GLUCOSE BLD STRIP.AUTO-MCNC: 135 MG/DL (ref 65–117)
GLUCOSE BLD STRIP.AUTO-MCNC: 142 MG/DL (ref 65–117)
GLUCOSE BLD STRIP.AUTO-MCNC: 165 MG/DL (ref 65–117)
GLUCOSE BLD STRIP.AUTO-MCNC: 167 MG/DL (ref 65–117)
GLUCOSE BLD STRIP.AUTO-MCNC: 168 MG/DL (ref 65–117)
GLUCOSE BLD STRIP.AUTO-MCNC: 174 MG/DL (ref 65–117)
GLUCOSE BLD STRIP.AUTO-MCNC: 180 MG/DL (ref 65–117)
GLUCOSE BLD STRIP.AUTO-MCNC: 194 MG/DL (ref 65–117)
GLUCOSE BLD STRIP.AUTO-MCNC: 195 MG/DL (ref 65–117)
GLUCOSE BLD STRIP.AUTO-MCNC: 237 MG/DL (ref 65–117)
GLUCOSE BLD STRIP.AUTO-MCNC: 241 MG/DL (ref 65–117)
GLUCOSE BLD STRIP.AUTO-MCNC: 247 MG/DL (ref 65–117)
GLUCOSE BLD STRIP.AUTO-MCNC: 275 MG/DL (ref 65–117)
GLUCOSE BLD STRIP.AUTO-MCNC: 287 MG/DL (ref 65–117)
GLUCOSE BLD STRIP.AUTO-MCNC: 287 MG/DL (ref 65–117)
GLUCOSE BLD STRIP.AUTO-MCNC: 306 MG/DL (ref 65–117)
GLUCOSE BLD STRIP.AUTO-MCNC: 314 MG/DL (ref 65–117)
GLUCOSE BLD STRIP.AUTO-MCNC: 317 MG/DL (ref 65–117)
GLUCOSE BLD STRIP.AUTO-MCNC: 324 MG/DL (ref 65–117)
GLUCOSE BLD STRIP.AUTO-MCNC: 339 MG/DL (ref 65–117)
GLUCOSE BLD STRIP.AUTO-MCNC: 357 MG/DL (ref 65–117)
GLUCOSE BLD STRIP.AUTO-MCNC: 398 MG/DL (ref 65–117)
GLUCOSE BLD STRIP.AUTO-MCNC: 430 MG/DL (ref 65–117)
GLUCOSE BLD STRIP.AUTO-MCNC: 434 MG/DL (ref 65–117)
GLUCOSE BLD STRIP.AUTO-MCNC: 507 MG/DL (ref 65–117)
GLUCOSE BLD STRIP.AUTO-MCNC: 522 MG/DL (ref 65–117)
GLUCOSE BLD STRIP.AUTO-MCNC: 62 MG/DL (ref 65–117)
GLUCOSE BLD STRIP.AUTO-MCNC: 62 MG/DL (ref 65–117)
GLUCOSE BLD STRIP.AUTO-MCNC: 68 MG/DL (ref 65–117)
GLUCOSE BLD STRIP.AUTO-MCNC: 75 MG/DL (ref 65–117)
GLUCOSE BLD STRIP.AUTO-MCNC: 80 MG/DL (ref 65–117)
GLUCOSE BLD STRIP.AUTO-MCNC: 88 MG/DL (ref 65–117)
GLUCOSE BLD STRIP.AUTO-MCNC: 90 MG/DL (ref 65–117)
GLUCOSE BLD STRIP.AUTO-MCNC: 91 MG/DL (ref 65–117)
GLUCOSE BLD STRIP.AUTO-MCNC: 97 MG/DL (ref 65–117)
GLUCOSE BLD STRIP.AUTO-MCNC: 98 MG/DL (ref 65–117)
GLUCOSE BLD STRIP.AUTO-MCNC: 98 MG/DL (ref 65–117)
GLUCOSE BLD STRIP.AUTO-MCNC: >600 MG/DL (ref 65–117)
GLUCOSE BLD STRIP.AUTO-MCNC: >700 MG/DL (ref 74–106)
GLUCOSE SERPL-MCNC: 125 MG/DL (ref 65–100)
GLUCOSE SERPL-MCNC: 143 MG/DL (ref 65–100)
GLUCOSE SERPL-MCNC: 166 MG/DL (ref 65–100)
GLUCOSE SERPL-MCNC: 172 MG/DL (ref 65–100)
GLUCOSE SERPL-MCNC: 201 MG/DL (ref 65–100)
GLUCOSE SERPL-MCNC: 221 MG/DL (ref 65–100)
GLUCOSE SERPL-MCNC: 294 MG/DL (ref 65–100)
GLUCOSE SERPL-MCNC: 456 MG/DL (ref 65–100)
GLUCOSE SERPL-MCNC: 649 MG/DL (ref 65–100)
GLUCOSE SERPL-MCNC: 705 MG/DL (ref 65–100)
GLUCOSE SERPL-MCNC: 741 MG/DL (ref 65–100)
GLUCOSE SERPL-MCNC: 78 MG/DL (ref 65–100)
GLUCOSE SERPL-MCNC: 915 MG/DL (ref 65–100)
GLUCOSE SERPL-MCNC: 967 MG/DL (ref 65–100)
GLUCOSE SERPL-MCNC: 982 MG/DL (ref 65–100)
GLUCOSE UR STRIP.AUTO-MCNC: 250 MG/DL
GLUCOSE UR STRIP.AUTO-MCNC: 500 MG/DL
GLUCOSE UR STRIP.AUTO-MCNC: >1000 MG/DL
GLUCOSE UR STRIP.AUTO-MCNC: >1000 MG/DL
GLUCOSE, GLC: 100 MG/DL
GLUCOSE, GLC: 105 MG/DL
GLUCOSE, GLC: 106 MG/DL
GLUCOSE, GLC: 135 MG/DL
GLUCOSE, GLC: 167 MG/DL
GLUCOSE, GLC: 168 MG/DL
GLUCOSE, GLC: 241 MG/DL
GLUCOSE, GLC: 275 MG/DL
GLUCOSE, GLC: 285 MG/DL
GLUCOSE, GLC: 324 MG/DL
GLUCOSE, GLC: 398 MG/DL
GLUCOSE, GLC: 430 MG/DL
GLUCOSE, GLC: 507 MG/DL
GLUCOSE, GLC: 522 MG/DL
GLUCOSE, GLC: 601 MG/DL
GLUCOSE, GLC: 62 MG/DL
GLUCOSE, GLC: 75 MG/DL
GLUCOSE, GLC: 80 MG/DL
GRAN CASTS URNS QL MICRO: ABNORMAL /LPF
HBA1C MFR BLD: 7.5 % (ref 4–5.6)
HBA1C MFR BLD: 7.6 % (ref 4–5.6)
HBA1C MFR BLD: 7.8 % (ref 4–5.6)
HBV SURFACE AG SER QL: <0.1 INDEX
HBV SURFACE AG SER QL: NEGATIVE
HCO3 BLDA-SCNC: 22 MMOL/L
HCO3 BLDV-SCNC: 21.1 MMOL/L (ref 23–28)
HCO3 BLDV-SCNC: 8.5 MMOL/L (ref 23–28)
HCT VFR BLD AUTO: 25.4 % (ref 35–47)
HCT VFR BLD AUTO: 27.7 % (ref 35–47)
HCT VFR BLD AUTO: 29.2 % (ref 35–47)
HCT VFR BLD AUTO: 30.5 % (ref 35–47)
HCT VFR BLD AUTO: 31.9 % (ref 35–47)
HCT VFR BLD AUTO: 32 % (ref 35–47)
HCT VFR BLD AUTO: 32 % (ref 35–47)
HCT VFR BLD AUTO: 34.4 % (ref 35–47)
HCT VFR BLD AUTO: 38 % (ref 35–47)
HGB BLD-MCNC: 10.1 G/DL (ref 11.5–16)
HGB BLD-MCNC: 10.2 G/DL (ref 11.5–16)
HGB BLD-MCNC: 10.3 G/DL (ref 11.5–16)
HGB BLD-MCNC: 10.3 G/DL (ref 11.5–16)
HGB BLD-MCNC: 13 G/DL (ref 11.5–16)
HGB BLD-MCNC: 8.4 G/DL (ref 11.5–16)
HGB BLD-MCNC: 9.2 G/DL (ref 11.5–16)
HGB BLD-MCNC: 9.6 G/DL (ref 11.5–16)
HGB BLD-MCNC: 9.8 G/DL (ref 11.5–16)
HGB UR QL STRIP: ABNORMAL
HIGH TARGET, HITG: 250 MG/DL
HYALINE CASTS URNS QL MICRO: ABNORMAL /LPF (ref 0–5)
IMM GRANULOCYTES # BLD AUTO: 0 K/UL (ref 0–0.04)
IMM GRANULOCYTES # BLD AUTO: 0.1 K/UL (ref 0–0.04)
IMM GRANULOCYTES NFR BLD AUTO: 0 % (ref 0–0.5)
IMM GRANULOCYTES NFR BLD AUTO: 1 % (ref 0–0.5)
INR PPP: 1.1 (ref 0.9–1.1)
INR PPP: 1.1 (ref 0.9–1.1)
INSULIN ADMINSTERED, INSADM: 0 UNITS/HOUR
INSULIN ADMINSTERED, INSADM: 0.2 UNITS/HOUR
INSULIN ADMINSTERED, INSADM: 1 UNITS/HOUR
INSULIN ADMINSTERED, INSADM: 10.8 UNITS/HOUR
INSULIN ADMINSTERED, INSADM: 13.5 UNITS/HOUR
INSULIN ADMINSTERED, INSADM: 14.5 UNITS/HOUR
INSULIN ADMINSTERED, INSADM: 16.2 UNITS/HOUR
INSULIN ADMINSTERED, INSADM: 16.2 UNITS/HOUR
INSULIN ADMINSTERED, INSADM: 16.9 UNITS/HOUR
INSULIN ADMINSTERED, INSADM: 2.9 UNITS/HOUR
INSULIN ADMINSTERED, INSADM: 21.1 UNITS/HOUR
INSULIN ADMINSTERED, INSADM: 21.6 UNITS/HOUR
INSULIN ADMINSTERED, INSADM: 21.6 UNITS/HOUR
INSULIN ADMINSTERED, INSADM: 22.4 UNITS/HOUR
INSULIN ADMINSTERED, INSADM: 27.1 UNITS/HOUR
INSULIN ADMINSTERED, INSADM: 29.6 UNITS/HOUR
INSULIN ADMINSTERED, INSADM: 32.3 UNITS/HOUR
INSULIN ADMINSTERED, INSADM: 32.5 UNITS/HOUR
INSULIN ADMINSTERED, INSADM: 6.4 UNITS/HOUR
INSULIN ADMINSTERED, INSADM: 8.6 UNITS/HOUR
INSULIN ORDER, INSORD: 0 UNITS/HOUR
INSULIN ORDER, INSORD: 0.2 UNITS/HOUR
INSULIN ORDER, INSORD: 1 UNITS/HOUR
INSULIN ORDER, INSORD: 10.8 UNITS/HOUR
INSULIN ORDER, INSORD: 13.5 UNITS/HOUR
INSULIN ORDER, INSORD: 14.5 UNITS/HOUR
INSULIN ORDER, INSORD: 16.2 UNITS/HOUR
INSULIN ORDER, INSORD: 16.2 UNITS/HOUR
INSULIN ORDER, INSORD: 16.9 UNITS/HOUR
INSULIN ORDER, INSORD: 2.9 UNITS/HOUR
INSULIN ORDER, INSORD: 21.1 UNITS/HOUR
INSULIN ORDER, INSORD: 21.6 UNITS/HOUR
INSULIN ORDER, INSORD: 21.6 UNITS/HOUR
INSULIN ORDER, INSORD: 22.4 UNITS/HOUR
INSULIN ORDER, INSORD: 27.1 UNITS/HOUR
INSULIN ORDER, INSORD: 29.6 UNITS/HOUR
INSULIN ORDER, INSORD: 32.3 UNITS/HOUR
INSULIN ORDER, INSORD: 32.5 UNITS/HOUR
INSULIN ORDER, INSORD: 6.4 UNITS/HOUR
INSULIN ORDER, INSORD: 8.6 UNITS/HOUR
KETONES UR QL STRIP.AUTO: 15 MG/DL
KETONES UR QL STRIP.AUTO: 40 MG/DL
KETONES UR QL STRIP.AUTO: NEGATIVE MG/DL
KETONES UR QL STRIP.AUTO: NEGATIVE MG/DL
LACTATE BLD-SCNC: 1.26 MMOL/L (ref 0.4–2)
LACTATE SERPL-SCNC: 0.8 MMOL/L (ref 0.4–2)
LACTATE SERPL-SCNC: 0.9 MMOL/L (ref 0.4–2)
LACTATE SERPL-SCNC: 2.2 MMOL/L (ref 0.4–2)
LACTATE SERPL-SCNC: 2.5 MMOL/L (ref 0.4–2)
LACTATE SERPL-SCNC: 3.3 MMOL/L (ref 0.4–2)
LACTATE SERPL-SCNC: 4.5 MMOL/L (ref 0.4–2)
LACTATE SERPL-SCNC: 6.2 MMOL/L (ref 0.4–2)
LEUKOCYTE ESTERASE UR QL STRIP.AUTO: NEGATIVE
LOW TARGET, LOT: 150 MG/DL
LYMPHOCYTES # BLD: 0.4 K/UL (ref 0.8–3.5)
LYMPHOCYTES # BLD: 0.7 K/UL (ref 0.8–3.5)
LYMPHOCYTES # BLD: 0.7 K/UL (ref 0.8–3.5)
LYMPHOCYTES # BLD: 0.9 K/UL (ref 0.8–3.5)
LYMPHOCYTES # BLD: 1 K/UL (ref 0.8–3.5)
LYMPHOCYTES NFR BLD: 10 % (ref 12–49)
LYMPHOCYTES NFR BLD: 12 % (ref 12–49)
LYMPHOCYTES NFR BLD: 15 % (ref 12–49)
LYMPHOCYTES NFR BLD: 3 % (ref 12–49)
LYMPHOCYTES NFR BLD: 4 % (ref 12–49)
LYMPHOCYTES NFR BLD: 4 % (ref 12–49)
LYMPHOCYTES NFR BLD: 7 % (ref 12–49)
MAGNESIUM SERPL-MCNC: 1.7 MG/DL (ref 1.6–2.4)
MAGNESIUM SERPL-MCNC: 1.8 MG/DL (ref 1.6–2.4)
MAGNESIUM SERPL-MCNC: 1.8 MG/DL (ref 1.6–2.4)
MAGNESIUM SERPL-MCNC: 1.9 MG/DL (ref 1.6–2.4)
MAGNESIUM SERPL-MCNC: 1.9 MG/DL (ref 1.6–2.4)
MAGNESIUM SERPL-MCNC: 2 MG/DL (ref 1.6–2.4)
MAGNESIUM SERPL-MCNC: 2.1 MG/DL (ref 1.6–2.4)
MAGNESIUM SERPL-MCNC: 2.1 MG/DL (ref 1.6–2.4)
MAGNESIUM SERPL-MCNC: 2.2 MG/DL (ref 1.6–2.4)
MAGNESIUM SERPL-MCNC: 2.5 MG/DL (ref 1.6–2.4)
MCH RBC QN AUTO: 34.9 PG (ref 26–34)
MCH RBC QN AUTO: 35.3 PG (ref 26–34)
MCH RBC QN AUTO: 35.5 PG (ref 26–34)
MCH RBC QN AUTO: 35.5 PG (ref 26–34)
MCH RBC QN AUTO: 35.7 PG (ref 26–34)
MCH RBC QN AUTO: 35.8 PG (ref 26–34)
MCH RBC QN AUTO: 35.9 PG (ref 26–34)
MCH RBC QN AUTO: 36.1 PG (ref 26–34)
MCH RBC QN AUTO: 36.4 PG (ref 26–34)
MCHC RBC AUTO-ENTMCNC: 29.9 G/DL (ref 30–36.5)
MCHC RBC AUTO-ENTMCNC: 31.6 G/DL (ref 30–36.5)
MCHC RBC AUTO-ENTMCNC: 32 G/DL (ref 30–36.5)
MCHC RBC AUTO-ENTMCNC: 32.1 G/DL (ref 30–36.5)
MCHC RBC AUTO-ENTMCNC: 32.2 G/DL (ref 30–36.5)
MCHC RBC AUTO-ENTMCNC: 32.9 G/DL (ref 30–36.5)
MCHC RBC AUTO-ENTMCNC: 33.1 G/DL (ref 30–36.5)
MCHC RBC AUTO-ENTMCNC: 33.2 G/DL (ref 30–36.5)
MCHC RBC AUTO-ENTMCNC: 34.2 G/DL (ref 30–36.5)
MCV RBC AUTO: 103.3 FL (ref 80–99)
MCV RBC AUTO: 108.1 FL (ref 80–99)
MCV RBC AUTO: 108.2 FL (ref 80–99)
MCV RBC AUTO: 109.8 FL (ref 80–99)
MCV RBC AUTO: 110.5 FL (ref 80–99)
MCV RBC AUTO: 110.7 FL (ref 80–99)
MCV RBC AUTO: 111.1 FL (ref 80–99)
MCV RBC AUTO: 113.1 FL (ref 80–99)
MCV RBC AUTO: 119.4 FL (ref 80–99)
MINUTES UNTIL NEXT BG, NBG: 15 MIN
MINUTES UNTIL NEXT BG, NBG: 60 MIN
MONOCYTES # BLD: 0.5 K/UL (ref 0–1)
MONOCYTES # BLD: 0.6 K/UL (ref 0–1)
MONOCYTES # BLD: 0.6 K/UL (ref 0–1)
MONOCYTES # BLD: 0.7 K/UL (ref 0–1)
MONOCYTES # BLD: 1.3 K/UL (ref 0–1)
MONOCYTES NFR BLD: 10 % (ref 5–13)
MONOCYTES NFR BLD: 5 % (ref 5–13)
MONOCYTES NFR BLD: 5 % (ref 5–13)
MONOCYTES NFR BLD: 7 % (ref 5–13)
MONOCYTES NFR BLD: 8 % (ref 5–13)
MONOCYTES NFR BLD: 8 % (ref 5–13)
MONOCYTES NFR BLD: 9 % (ref 5–13)
MULTIPLIER, MUL: 0
MULTIPLIER, MUL: 0.01
MULTIPLIER, MUL: 0.02
MULTIPLIER, MUL: 0.03
MULTIPLIER, MUL: 0.04
MULTIPLIER, MUL: 0.04
MULTIPLIER, MUL: 0.05
MULTIPLIER, MUL: 0.06
MULTIPLIER, MUL: 0.07
MULTIPLIER, MUL: 0.08
NEUTS SEG # BLD: 12.3 K/UL (ref 1.8–8)
NEUTS SEG # BLD: 4.3 K/UL (ref 1.8–8)
NEUTS SEG # BLD: 5.3 K/UL (ref 1.8–8)
NEUTS SEG # BLD: 5.7 K/UL (ref 1.8–8)
NEUTS SEG # BLD: 8.8 K/UL (ref 1.8–8)
NEUTS SEG # BLD: 8.9 K/UL (ref 1.8–8)
NEUTS SEG # BLD: 9.1 K/UL (ref 1.8–8)
NEUTS SEG NFR BLD: 69 % (ref 32–75)
NEUTS SEG NFR BLD: 78 % (ref 32–75)
NEUTS SEG NFR BLD: 78 % (ref 32–75)
NEUTS SEG NFR BLD: 85 % (ref 32–75)
NEUTS SEG NFR BLD: 87 % (ref 32–75)
NEUTS SEG NFR BLD: 89 % (ref 32–75)
NEUTS SEG NFR BLD: 89 % (ref 32–75)
NITRITE UR QL STRIP.AUTO: NEGATIVE
NRBC # BLD: 0 K/UL (ref 0–0.01)
NRBC # BLD: 0.02 K/UL (ref 0–0.01)
NRBC BLD-RTO: 0 PER 100 WBC
NRBC BLD-RTO: 0.3 PER 100 WBC
ORDER INITIALS, ORDINIT: NORMAL
P-R INTERVAL, ECG05: 196 MS
PCO2 BLDV: 33.4 MMHG (ref 41–51)
PCO2 BLDV: 40.8 MMHG (ref 41–51)
PCO2 BLDV: 49.8 MMHG (ref 41–51)
PH BLDV: 7.01 [PH] (ref 7.32–7.42)
PH BLDV: 7.24 [PH] (ref 7.32–7.42)
PH BLDV: 7.33 [PH] (ref 7.32–7.42)
PH UR STRIP: 6 [PH] (ref 5–8)
PH UR STRIP: 7 [PH] (ref 5–8)
PH UR STRIP: 7.5 [PH] (ref 5–8)
PH UR STRIP: 7.5 [PH] (ref 5–8)
PHOSPHATE SERPL-MCNC: 3.5 MG/DL (ref 2.6–4.7)
PHOSPHATE SERPL-MCNC: 4 MG/DL (ref 2.6–4.7)
PHOSPHATE SERPL-MCNC: 4.3 MG/DL (ref 2.6–4.7)
PHOSPHATE SERPL-MCNC: 6.2 MG/DL (ref 2.6–4.7)
PLATELET # BLD AUTO: 131 K/UL (ref 150–400)
PLATELET # BLD AUTO: 146 K/UL (ref 150–400)
PLATELET # BLD AUTO: 154 K/UL (ref 150–400)
PLATELET # BLD AUTO: 161 K/UL (ref 150–400)
PLATELET # BLD AUTO: 166 K/UL (ref 150–400)
PLATELET # BLD AUTO: 169 K/UL (ref 150–400)
PLATELET # BLD AUTO: 171 K/UL (ref 150–400)
PLATELET # BLD AUTO: 181 K/UL (ref 150–400)
PLATELET # BLD AUTO: 198 K/UL (ref 150–400)
PMV BLD AUTO: 10.2 FL (ref 8.9–12.9)
PMV BLD AUTO: 10.6 FL (ref 8.9–12.9)
PMV BLD AUTO: 10.7 FL (ref 8.9–12.9)
PMV BLD AUTO: 10.8 FL (ref 8.9–12.9)
PMV BLD AUTO: 10.8 FL (ref 8.9–12.9)
PMV BLD AUTO: 10.9 FL (ref 8.9–12.9)
PMV BLD AUTO: 10.9 FL (ref 8.9–12.9)
PMV BLD AUTO: 11.4 FL (ref 8.9–12.9)
PO2 BLDV: 38 MMHG (ref 25–40)
PO2 BLDV: 49 MMHG (ref 25–40)
PO2 BLDV: 55 MMHG (ref 25–40)
POTASSIUM BLD-SCNC: 5.5 MMOL/L (ref 3.5–5.5)
POTASSIUM SERPL-SCNC: 3.6 MMOL/L (ref 3.5–5.1)
POTASSIUM SERPL-SCNC: 3.8 MMOL/L (ref 3.5–5.1)
POTASSIUM SERPL-SCNC: 3.8 MMOL/L (ref 3.5–5.1)
POTASSIUM SERPL-SCNC: 3.9 MMOL/L (ref 3.5–5.1)
POTASSIUM SERPL-SCNC: 3.9 MMOL/L (ref 3.5–5.1)
POTASSIUM SERPL-SCNC: 4.1 MMOL/L (ref 3.5–5.1)
POTASSIUM SERPL-SCNC: 4.3 MMOL/L (ref 3.5–5.1)
POTASSIUM SERPL-SCNC: 4.4 MMOL/L (ref 3.5–5.1)
POTASSIUM SERPL-SCNC: 4.7 MMOL/L (ref 3.5–5.1)
POTASSIUM SERPL-SCNC: 5 MMOL/L (ref 3.5–5.1)
POTASSIUM SERPL-SCNC: 5.2 MMOL/L (ref 3.5–5.1)
POTASSIUM SERPL-SCNC: 5.7 MMOL/L (ref 3.5–5.1)
POTASSIUM SERPL-SCNC: 5.7 MMOL/L (ref 3.5–5.1)
POTASSIUM SERPL-SCNC: 6.3 MMOL/L (ref 3.5–5.1)
POTASSIUM SERPL-SCNC: ABNORMAL MMOL/L (ref 3.5–5.1)
PROCALCITONIN SERPL-MCNC: 0.62 NG/ML
PROCALCITONIN SERPL-MCNC: 6.13 NG/ML
PROT SERPL-MCNC: 6.3 G/DL (ref 6.4–8.2)
PROT SERPL-MCNC: 6.8 G/DL (ref 6.4–8.2)
PROT SERPL-MCNC: 6.9 G/DL (ref 6.4–8.2)
PROT SERPL-MCNC: 7.2 G/DL (ref 6.4–8.2)
PROT SERPL-MCNC: 7.3 G/DL (ref 6.4–8.2)
PROT SERPL-MCNC: 7.5 G/DL (ref 6.4–8.2)
PROT UR STRIP-MCNC: 300 MG/DL
PROT UR STRIP-MCNC: 300 MG/DL
PROT UR STRIP-MCNC: >300 MG/DL
PROT UR STRIP-MCNC: >300 MG/DL
PROTHROMBIN TIME: 11.4 SEC (ref 9–11.1)
PROTHROMBIN TIME: 11.5 SEC (ref 9–11.1)
Q-T INTERVAL, ECG07: 350 MS
QRS DURATION, ECG06: 78 MS
QTC CALCULATION (BEZET), ECG08: 464 MS
RBC # BLD AUTO: 2.35 M/UL (ref 3.8–5.2)
RBC # BLD AUTO: 2.56 M/UL (ref 3.8–5.2)
RBC # BLD AUTO: 2.66 M/UL (ref 3.8–5.2)
RBC # BLD AUTO: 2.76 M/UL (ref 3.8–5.2)
RBC # BLD AUTO: 2.83 M/UL (ref 3.8–5.2)
RBC # BLD AUTO: 2.87 M/UL (ref 3.8–5.2)
RBC # BLD AUTO: 2.88 M/UL (ref 3.8–5.2)
RBC # BLD AUTO: 2.89 M/UL (ref 3.8–5.2)
RBC # BLD AUTO: 3.68 M/UL (ref 3.8–5.2)
RBC #/AREA URNS HPF: ABNORMAL /HPF (ref 0–5)
RBC MORPH BLD: ABNORMAL
SAMPLES BEING HELD,HOLD: NORMAL
SAO2 % BLDV: 61.5 % (ref 65–88)
SAO2 % BLDV: 65.2 % (ref 65–88)
SERVICE CMNT-IMP: ABNORMAL
SERVICE CMNT-IMP: NORMAL
SODIUM BLD-SCNC: 128 MMOL/L (ref 136–145)
SODIUM SERPL-SCNC: 120 MMOL/L (ref 136–145)
SODIUM SERPL-SCNC: 124 MMOL/L (ref 136–145)
SODIUM SERPL-SCNC: 125 MMOL/L (ref 136–145)
SODIUM SERPL-SCNC: 125 MMOL/L (ref 136–145)
SODIUM SERPL-SCNC: 127 MMOL/L (ref 136–145)
SODIUM SERPL-SCNC: 128 MMOL/L (ref 136–145)
SODIUM SERPL-SCNC: 130 MMOL/L (ref 136–145)
SODIUM SERPL-SCNC: 131 MMOL/L (ref 136–145)
SODIUM SERPL-SCNC: 133 MMOL/L (ref 136–145)
SODIUM SERPL-SCNC: 133 MMOL/L (ref 136–145)
SODIUM SERPL-SCNC: 136 MMOL/L (ref 136–145)
SOURCE, COVRS: NORMAL
SP GR UR REFRACTOMETRY: 1.01 (ref 1–1.03)
SP GR UR REFRACTOMETRY: 1.02 (ref 1–1.03)
SPECIMEN SITE: ABNORMAL
SPECIMEN TYPE: ABNORMAL
SPECIMEN TYPE: ABNORMAL
THERAPEUTIC RANGE,PTTT: NORMAL SECS (ref 58–77)
THERAPEUTIC RANGE,PTTT: NORMAL SECS (ref 58–77)
TSH SERPL DL<=0.05 MIU/L-ACNC: 19.9 UIU/ML (ref 0.36–3.74)
TSH SERPL DL<=0.05 MIU/L-ACNC: 41.3 UIU/ML (ref 0.36–3.74)
UR CULT HOLD, URHOLD: NORMAL
UROBILINOGEN UR QL STRIP.AUTO: 0.2 EU/DL (ref 0.2–1)
VENTRICULAR RATE, ECG03: 106 BPM
VIT B12 SERPL-MCNC: 952 PG/ML (ref 193–986)
WBC # BLD AUTO: 10 K/UL (ref 3.6–11)
WBC # BLD AUTO: 10.2 K/UL (ref 3.6–11)
WBC # BLD AUTO: 10.3 K/UL (ref 3.6–11)
WBC # BLD AUTO: 14.1 K/UL (ref 3.6–11)
WBC # BLD AUTO: 4.9 K/UL (ref 3.6–11)
WBC # BLD AUTO: 6.4 K/UL (ref 3.6–11)
WBC # BLD AUTO: 6.8 K/UL (ref 3.6–11)
WBC # BLD AUTO: 7.4 K/UL (ref 3.6–11)
WBC # BLD AUTO: 8.5 K/UL (ref 3.6–11)
WBC MORPH BLD: ABNORMAL
WBC URNS QL MICRO: ABNORMAL /HPF (ref 0–4)

## 2022-01-01 PROCEDURE — 74011000250 HC RX REV CODE- 250: Performed by: FAMILY MEDICINE

## 2022-01-01 PROCEDURE — 74011636637 HC RX REV CODE- 636/637: Performed by: STUDENT IN AN ORGANIZED HEALTH CARE EDUCATION/TRAINING PROGRAM

## 2022-01-01 PROCEDURE — 74011636637 HC RX REV CODE- 636/637: Performed by: FAMILY MEDICINE

## 2022-01-01 PROCEDURE — 84100 ASSAY OF PHOSPHORUS: CPT

## 2022-01-01 PROCEDURE — 96375 TX/PRO/DX INJ NEW DRUG ADDON: CPT

## 2022-01-01 PROCEDURE — G0378 HOSPITAL OBSERVATION PER HR: HCPCS

## 2022-01-01 PROCEDURE — 84443 ASSAY THYROID STIM HORMONE: CPT

## 2022-01-01 PROCEDURE — 85610 PROTHROMBIN TIME: CPT

## 2022-01-01 PROCEDURE — 36415 COLL VENOUS BLD VENIPUNCTURE: CPT

## 2022-01-01 PROCEDURE — 99231 SBSQ HOSP IP/OBS SF/LOW 25: CPT | Performed by: CLINICAL NURSE SPECIALIST

## 2022-01-01 PROCEDURE — 74011250636 HC RX REV CODE- 250/636: Performed by: NURSE PRACTITIONER

## 2022-01-01 PROCEDURE — 80053 COMPREHEN METABOLIC PANEL: CPT

## 2022-01-01 PROCEDURE — 97116 GAIT TRAINING THERAPY: CPT

## 2022-01-01 PROCEDURE — 82803 BLOOD GASES ANY COMBINATION: CPT

## 2022-01-01 PROCEDURE — 74011000250 HC RX REV CODE- 250: Performed by: EMERGENCY MEDICINE

## 2022-01-01 PROCEDURE — 85025 COMPLETE CBC W/AUTO DIFF WBC: CPT

## 2022-01-01 PROCEDURE — 82962 GLUCOSE BLOOD TEST: CPT

## 2022-01-01 PROCEDURE — 71045 X-RAY EXAM CHEST 1 VIEW: CPT

## 2022-01-01 PROCEDURE — 83735 ASSAY OF MAGNESIUM: CPT

## 2022-01-01 PROCEDURE — 85027 COMPLETE CBC AUTOMATED: CPT

## 2022-01-01 PROCEDURE — 80048 BASIC METABOLIC PNL TOTAL CA: CPT

## 2022-01-01 PROCEDURE — 74011250636 HC RX REV CODE- 250/636: Performed by: EMERGENCY MEDICINE

## 2022-01-01 PROCEDURE — 5A1D70Z PERFORMANCE OF URINARY FILTRATION, INTERMITTENT, LESS THAN 6 HOURS PER DAY: ICD-10-PCS | Performed by: INTERNAL MEDICINE

## 2022-01-01 PROCEDURE — 74011636637 HC RX REV CODE- 636/637: Performed by: EMERGENCY MEDICINE

## 2022-01-01 PROCEDURE — 87086 URINE CULTURE/COLONY COUNT: CPT

## 2022-01-01 PROCEDURE — 70450 CT HEAD/BRAIN W/O DYE: CPT

## 2022-01-01 PROCEDURE — 96376 TX/PRO/DX INJ SAME DRUG ADON: CPT

## 2022-01-01 PROCEDURE — 90935 HEMODIALYSIS ONE EVALUATION: CPT

## 2022-01-01 PROCEDURE — 74011000258 HC RX REV CODE- 258: Performed by: STUDENT IN AN ORGANIZED HEALTH CARE EDUCATION/TRAINING PROGRAM

## 2022-01-01 PROCEDURE — 85730 THROMBOPLASTIN TIME PARTIAL: CPT

## 2022-01-01 PROCEDURE — 83036 HEMOGLOBIN GLYCOSYLATED A1C: CPT

## 2022-01-01 PROCEDURE — 65270000032 HC RM SEMIPRIVATE

## 2022-01-01 PROCEDURE — 99232 SBSQ HOSP IP/OBS MODERATE 35: CPT | Performed by: CLINICAL NURSE SPECIALIST

## 2022-01-01 PROCEDURE — 87340 HEPATITIS B SURFACE AG IA: CPT

## 2022-01-01 PROCEDURE — 84145 PROCALCITONIN (PCT): CPT

## 2022-01-01 PROCEDURE — 3336500001 HSPC ELECTION

## 2022-01-01 PROCEDURE — 65270000029 HC RM PRIVATE

## 2022-01-01 PROCEDURE — 65210000002 HC RM PRIVATE GYN

## 2022-01-01 PROCEDURE — 82010 KETONE BODYS QUAN: CPT

## 2022-01-01 PROCEDURE — 74011250636 HC RX REV CODE- 250/636: Performed by: FAMILY MEDICINE

## 2022-01-01 PROCEDURE — 81001 URINALYSIS AUTO W/SCOPE: CPT

## 2022-01-01 PROCEDURE — 83605 ASSAY OF LACTIC ACID: CPT

## 2022-01-01 PROCEDURE — 97165 OT EVAL LOW COMPLEX 30 MIN: CPT

## 2022-01-01 PROCEDURE — 99204 OFFICE O/P NEW MOD 45 MIN: CPT | Performed by: GENERAL ACUTE CARE HOSPITAL

## 2022-01-01 PROCEDURE — 99222 1ST HOSP IP/OBS MODERATE 55: CPT | Performed by: PSYCHIATRY & NEUROLOGY

## 2022-01-01 PROCEDURE — 51701 INSERT BLADDER CATHETER: CPT

## 2022-01-01 PROCEDURE — 99285 EMERGENCY DEPT VISIT HI MDM: CPT

## 2022-01-01 PROCEDURE — 74011250637 HC RX REV CODE- 250/637: Performed by: EMERGENCY MEDICINE

## 2022-01-01 PROCEDURE — 99233 SBSQ HOSP IP/OBS HIGH 50: CPT | Performed by: CLINICAL NURSE SPECIALIST

## 2022-01-01 PROCEDURE — 82607 VITAMIN B-12: CPT

## 2022-01-01 PROCEDURE — 74011250637 HC RX REV CODE- 250/637: Performed by: INTERNAL MEDICINE

## 2022-01-01 PROCEDURE — 87040 BLOOD CULTURE FOR BACTERIA: CPT

## 2022-01-01 PROCEDURE — 96365 THER/PROPH/DIAG IV INF INIT: CPT

## 2022-01-01 PROCEDURE — C9113 INJ PANTOPRAZOLE SODIUM, VIA: HCPCS | Performed by: STUDENT IN AN ORGANIZED HEALTH CARE EDUCATION/TRAINING PROGRAM

## 2022-01-01 PROCEDURE — 94762 N-INVAS EAR/PLS OXIMTRY CONT: CPT

## 2022-01-01 PROCEDURE — 96374 THER/PROPH/DIAG INJ IV PUSH: CPT

## 2022-01-01 PROCEDURE — 74011000250 HC RX REV CODE- 250: Performed by: NURSE PRACTITIONER

## 2022-01-01 PROCEDURE — 74011250636 HC RX REV CODE- 250/636: Performed by: STUDENT IN AN ORGANIZED HEALTH CARE EDUCATION/TRAINING PROGRAM

## 2022-01-01 PROCEDURE — 65660000001 HC RM ICU INTERMED STEPDOWN

## 2022-01-01 PROCEDURE — 96366 THER/PROPH/DIAG IV INF ADDON: CPT

## 2022-01-01 PROCEDURE — 71260 CT THORAX DX C+: CPT

## 2022-01-01 PROCEDURE — 87635 SARS-COV-2 COVID-19 AMP PRB: CPT

## 2022-01-01 PROCEDURE — G0257 UNSCHED DIALYSIS ESRD PT HOS: HCPCS

## 2022-01-01 PROCEDURE — 65660000000 HC RM CCU STEPDOWN

## 2022-01-01 PROCEDURE — 74011250636 HC RX REV CODE- 250/636: Performed by: INTERNAL MEDICINE

## 2022-01-01 PROCEDURE — 97161 PT EVAL LOW COMPLEX 20 MIN: CPT

## 2022-01-01 PROCEDURE — 74011250637 HC RX REV CODE- 250/637: Performed by: FAMILY MEDICINE

## 2022-01-01 PROCEDURE — 74011250636 HC RX REV CODE- 250/636: Performed by: PHYSICAL MEDICINE & REHABILITATION

## 2022-01-01 PROCEDURE — 74011636637 HC RX REV CODE- 636/637: Performed by: HOSPITALIST

## 2022-01-01 PROCEDURE — 74011636637 HC RX REV CODE- 636/637: Performed by: INTERNAL MEDICINE

## 2022-01-01 PROCEDURE — 74011250637 HC RX REV CODE- 250/637: Performed by: STUDENT IN AN ORGANIZED HEALTH CARE EDUCATION/TRAINING PROGRAM

## 2022-01-01 PROCEDURE — 82330 ASSAY OF CALCIUM: CPT

## 2022-01-01 PROCEDURE — 74011250637 HC RX REV CODE- 250/637: Performed by: NURSE PRACTITIONER

## 2022-01-01 PROCEDURE — 97530 THERAPEUTIC ACTIVITIES: CPT

## 2022-01-01 PROCEDURE — 97535 SELF CARE MNGMENT TRAINING: CPT

## 2022-01-01 PROCEDURE — 93005 ELECTROCARDIOGRAM TRACING: CPT

## 2022-01-01 PROCEDURE — 93306 TTE W/DOPPLER COMPLETE: CPT | Performed by: SPECIALIST

## 2022-01-01 PROCEDURE — 65610000006 HC RM INTENSIVE CARE

## 2022-01-01 PROCEDURE — 74011000636 HC RX REV CODE- 636: Performed by: EMERGENCY MEDICINE

## 2022-01-01 PROCEDURE — 3051F HG A1C>EQUAL 7.0%<8.0%: CPT | Performed by: GENERAL ACUTE CARE HOSPITAL

## 2022-01-01 PROCEDURE — 74011000250 HC RX REV CODE- 250: Performed by: STUDENT IN AN ORGANIZED HEALTH CARE EDUCATION/TRAINING PROGRAM

## 2022-01-01 PROCEDURE — G0108 DIAB MANAGE TRN  PER INDIV: HCPCS | Performed by: DIETITIAN, REGISTERED

## 2022-01-01 PROCEDURE — 96372 THER/PROPH/DIAG INJ SC/IM: CPT

## 2022-01-01 PROCEDURE — 93306 TTE W/DOPPLER COMPLETE: CPT

## 2022-01-01 PROCEDURE — 74011000258 HC RX REV CODE- 258: Performed by: EMERGENCY MEDICINE

## 2022-01-01 PROCEDURE — 82746 ASSAY OF FOLIC ACID SERUM: CPT

## 2022-01-01 PROCEDURE — 99214 OFFICE O/P EST MOD 30 MIN: CPT | Performed by: GENERAL ACUTE CARE HOSPITAL

## 2022-01-01 PROCEDURE — 0656 HSPC GENERAL INPATIENT

## 2022-01-01 PROCEDURE — 74011636637 HC RX REV CODE- 636/637: Performed by: NURSE PRACTITIONER

## 2022-01-01 RX ORDER — FENTANYL CITRATE 50 UG/ML
50 INJECTION, SOLUTION INTRAMUSCULAR; INTRAVENOUS ONCE
Status: COMPLETED | OUTPATIENT
Start: 2022-01-01 | End: 2022-01-01

## 2022-01-01 RX ORDER — SODIUM CHLORIDE 0.9 % (FLUSH) 0.9 %
5-40 SYRINGE (ML) INJECTION AS NEEDED
Status: DISCONTINUED | OUTPATIENT
Start: 2022-01-01 | End: 2022-01-01 | Stop reason: HOSPADM

## 2022-01-01 RX ORDER — POLYETHYLENE GLYCOL 3350 17 G/17G
17 POWDER, FOR SOLUTION ORAL DAILY PRN
Status: DISCONTINUED | OUTPATIENT
Start: 2022-01-01 | End: 2022-01-01 | Stop reason: HOSPADM

## 2022-01-01 RX ORDER — FENTANYL CITRATE 50 UG/ML
25 INJECTION, SOLUTION INTRAMUSCULAR; INTRAVENOUS
Status: DISCONTINUED | OUTPATIENT
Start: 2022-01-01 | End: 2022-01-01

## 2022-01-01 RX ORDER — INSULIN GLARGINE 100 [IU]/ML
12 INJECTION, SOLUTION SUBCUTANEOUS DAILY
Status: DISCONTINUED | OUTPATIENT
Start: 2022-01-01 | End: 2022-01-01

## 2022-01-01 RX ORDER — SODIUM CHLORIDE 0.9 % (FLUSH) 0.9 %
10 SYRINGE (ML) INJECTION DAILY
Status: DISCONTINUED | OUTPATIENT
Start: 2022-01-01 | End: 2022-01-01 | Stop reason: HOSPADM

## 2022-01-01 RX ORDER — MAGNESIUM SULFATE 100 %
4 CRYSTALS MISCELLANEOUS AS NEEDED
Status: DISCONTINUED | OUTPATIENT
Start: 2022-01-01 | End: 2022-01-01 | Stop reason: HOSPADM

## 2022-01-01 RX ORDER — HEPARIN SODIUM 5000 [USP'U]/ML
5000 INJECTION, SOLUTION INTRAVENOUS; SUBCUTANEOUS EVERY 8 HOURS
Status: DISCONTINUED | OUTPATIENT
Start: 2022-01-01 | End: 2022-01-01 | Stop reason: HOSPADM

## 2022-01-01 RX ORDER — HALOPERIDOL 5 MG/ML
2 INJECTION INTRAMUSCULAR
Status: DISCONTINUED | OUTPATIENT
Start: 2022-01-01 | End: 2022-01-01

## 2022-01-01 RX ORDER — KETOROLAC TROMETHAMINE 30 MG/ML
15 INJECTION, SOLUTION INTRAMUSCULAR; INTRAVENOUS
Status: DISCONTINUED | OUTPATIENT
Start: 2022-01-01 | End: 2022-07-08 | Stop reason: HOSPADM

## 2022-01-01 RX ORDER — ONDANSETRON 2 MG/ML
4 INJECTION INTRAMUSCULAR; INTRAVENOUS
Status: DISCONTINUED | OUTPATIENT
Start: 2022-01-01 | End: 2022-01-01

## 2022-01-01 RX ORDER — ONDANSETRON 4 MG/1
4 TABLET, FILM COATED ORAL
COMMUNITY

## 2022-01-01 RX ORDER — IPRATROPIUM BROMIDE AND ALBUTEROL SULFATE 2.5; .5 MG/3ML; MG/3ML
3 SOLUTION RESPIRATORY (INHALATION)
Status: DISCONTINUED | OUTPATIENT
Start: 2022-01-01 | End: 2022-01-01 | Stop reason: HOSPADM

## 2022-01-01 RX ORDER — HALOPERIDOL 2 MG/ML
2 SOLUTION ORAL
Status: DISCONTINUED | OUTPATIENT
Start: 2022-01-01 | End: 2022-01-01

## 2022-01-01 RX ORDER — ONDANSETRON 2 MG/ML
4 INJECTION INTRAMUSCULAR; INTRAVENOUS
Status: DISCONTINUED | OUTPATIENT
Start: 2022-01-01 | End: 2022-01-01 | Stop reason: HOSPADM

## 2022-01-01 RX ORDER — CALCIUM GLUCONATE 20 MG/ML
1 INJECTION, SOLUTION INTRAVENOUS ONCE
Status: COMPLETED | OUTPATIENT
Start: 2022-01-01 | End: 2022-01-01

## 2022-01-01 RX ORDER — DIPHENHYDRAMINE HCL 25 MG
25 CAPSULE ORAL
Status: DISCONTINUED | OUTPATIENT
Start: 2022-01-01 | End: 2022-01-01 | Stop reason: HOSPADM

## 2022-01-01 RX ORDER — MELATONIN
4000 DAILY
Status: DISCONTINUED | OUTPATIENT
Start: 2022-01-01 | End: 2022-01-01 | Stop reason: HOSPADM

## 2022-01-01 RX ORDER — SODIUM CHLORIDE 0.9 % (FLUSH) 0.9 %
5-40 SYRINGE (ML) INJECTION EVERY 8 HOURS
Status: DISCONTINUED | OUTPATIENT
Start: 2022-01-01 | End: 2022-01-01 | Stop reason: HOSPADM

## 2022-01-01 RX ORDER — ERGOCALCIFEROL 1.25 MG/1
50000 CAPSULE ORAL
Status: DISCONTINUED | OUTPATIENT
Start: 2022-01-01 | End: 2022-01-01 | Stop reason: HOSPADM

## 2022-01-01 RX ORDER — FACIAL-BODY WIPES
10 EACH TOPICAL DAILY PRN
Status: DISCONTINUED | OUTPATIENT
Start: 2022-01-01 | End: 2022-07-08 | Stop reason: HOSPADM

## 2022-01-01 RX ORDER — LEVOTHYROXINE SODIUM 20 UG/ML
75 INJECTION, SOLUTION INTRAVENOUS EVERY 24 HOURS
Status: DISCONTINUED | OUTPATIENT
Start: 2022-07-14 | End: 2022-01-01 | Stop reason: HOSPADM

## 2022-01-01 RX ORDER — FLUTICASONE PROPIONATE 50 MCG
2 SPRAY, SUSPENSION (ML) NASAL
Status: DISCONTINUED | OUTPATIENT
Start: 2022-01-01 | End: 2022-01-01 | Stop reason: HOSPADM

## 2022-01-01 RX ORDER — MIDAZOLAM HYDROCHLORIDE 1 MG/ML
1 INJECTION, SOLUTION INTRAMUSCULAR; INTRAVENOUS ONCE
Status: COMPLETED | OUTPATIENT
Start: 2022-01-01 | End: 2022-01-01

## 2022-01-01 RX ORDER — FLUTICASONE PROPIONATE 50 MCG
2 SPRAY, SUSPENSION (ML) NASAL 2 TIMES DAILY
Status: DISCONTINUED | OUTPATIENT
Start: 2022-01-01 | End: 2022-01-01 | Stop reason: SDUPTHER

## 2022-01-01 RX ORDER — INSULIN LISPRO 100 [IU]/ML
8 INJECTION, SOLUTION INTRAVENOUS; SUBCUTANEOUS
Qty: 1 EACH | Refills: 0 | Status: ON HOLD
Start: 2022-01-01 | End: 2022-01-01

## 2022-01-01 RX ORDER — MICONAZOLE NITRATE 2 %
POWDER (GRAM) TOPICAL 2 TIMES DAILY
Status: DISCONTINUED | OUTPATIENT
Start: 2022-01-01 | End: 2022-01-01 | Stop reason: HOSPADM

## 2022-01-01 RX ORDER — INSULIN LISPRO 100 [IU]/ML
INJECTION, SOLUTION INTRAVENOUS; SUBCUTANEOUS
Qty: 1 EACH | Refills: 0 | Status: SHIPPED
Start: 2022-01-01 | End: 2022-01-01 | Stop reason: SDUPTHER

## 2022-01-01 RX ORDER — MONTELUKAST SODIUM 10 MG/1
10 TABLET ORAL DAILY
Status: DISCONTINUED | OUTPATIENT
Start: 2022-01-01 | End: 2022-01-01 | Stop reason: SDUPTHER

## 2022-01-01 RX ORDER — MICONAZOLE NITRATE 2 %
POWDER (GRAM) TOPICAL 2 TIMES DAILY
Qty: 43 G | Refills: 0 | Status: SHIPPED | OUTPATIENT
Start: 2022-01-01 | End: 2022-01-01

## 2022-01-01 RX ORDER — BLOOD-GLUCOSE,RECEIVER,CONT
EACH MISCELLANEOUS
Qty: 1 EACH | Refills: 0 | Status: CANCELLED | OUTPATIENT
Start: 2022-01-01

## 2022-01-01 RX ORDER — INSULIN LISPRO 100 [IU]/ML
INJECTION, SOLUTION INTRAVENOUS; SUBCUTANEOUS
Qty: 1 EACH | Refills: 0 | Status: ON HOLD
Start: 2022-01-01 | End: 2022-01-01

## 2022-01-01 RX ORDER — LEVOFLOXACIN 5 MG/ML
500 INJECTION, SOLUTION INTRAVENOUS ONCE
Status: COMPLETED | OUTPATIENT
Start: 2022-01-01 | End: 2022-01-01

## 2022-01-01 RX ORDER — INSULIN DEGLUDEC INJECTION 100 U/ML
INJECTION, SOLUTION SUBCUTANEOUS
Qty: 1 PEN | Refills: 0 | Status: SHIPPED | OUTPATIENT
Start: 2022-01-01 | End: 2022-01-01 | Stop reason: SDUPTHER

## 2022-01-01 RX ORDER — INSULIN LISPRO 100 [IU]/ML
INJECTION, SOLUTION INTRAVENOUS; SUBCUTANEOUS EVERY 6 HOURS
Status: DISCONTINUED | OUTPATIENT
Start: 2022-01-01 | End: 2022-01-01

## 2022-01-01 RX ORDER — LEVOFLOXACIN 5 MG/ML
750 INJECTION, SOLUTION INTRAVENOUS
Status: COMPLETED | OUTPATIENT
Start: 2022-01-01 | End: 2022-01-01

## 2022-01-01 RX ORDER — FENTANYL CITRATE 50 UG/ML
12.5 INJECTION, SOLUTION INTRAMUSCULAR; INTRAVENOUS
Status: DISCONTINUED | OUTPATIENT
Start: 2022-01-01 | End: 2022-01-01 | Stop reason: HOSPADM

## 2022-01-01 RX ORDER — BLOOD-GLUCOSE TRANSMITTER
EACH MISCELLANEOUS
Qty: 1 EACH | Refills: 3 | Status: CANCELLED | OUTPATIENT
Start: 2022-01-01

## 2022-01-01 RX ORDER — ACETAMINOPHEN 650 MG/1
650 SUPPOSITORY RECTAL
Status: DISCONTINUED | OUTPATIENT
Start: 2022-01-01 | End: 2022-07-08 | Stop reason: HOSPADM

## 2022-01-01 RX ORDER — ONDANSETRON 4 MG/1
4 TABLET, ORALLY DISINTEGRATING ORAL
Status: DISCONTINUED | OUTPATIENT
Start: 2022-01-01 | End: 2022-01-01 | Stop reason: HOSPADM

## 2022-01-01 RX ORDER — ACETAMINOPHEN 650 MG/1
650 SUPPOSITORY RECTAL
Status: DISCONTINUED | OUTPATIENT
Start: 2022-01-01 | End: 2022-01-01 | Stop reason: HOSPADM

## 2022-01-01 RX ORDER — INSULIN LISPRO 100 [IU]/ML
INJECTION, SOLUTION INTRAVENOUS; SUBCUTANEOUS
Status: DISCONTINUED | OUTPATIENT
Start: 2022-01-01 | End: 2022-01-01

## 2022-01-01 RX ORDER — MAGNESIUM SULFATE HEPTAHYDRATE 40 MG/ML
2 INJECTION, SOLUTION INTRAVENOUS ONCE
Status: COMPLETED | OUTPATIENT
Start: 2022-01-01 | End: 2022-01-01

## 2022-01-01 RX ORDER — LIDOCAINE 4 G/100G
1 PATCH TOPICAL EVERY 24 HOURS
Status: DISCONTINUED | OUTPATIENT
Start: 2022-01-01 | End: 2022-01-01 | Stop reason: HOSPADM

## 2022-01-01 RX ORDER — TRAZODONE HYDROCHLORIDE 50 MG/1
50 TABLET ORAL
Status: DISCONTINUED | OUTPATIENT
Start: 2022-01-01 | End: 2022-01-01 | Stop reason: HOSPADM

## 2022-01-01 RX ORDER — LABETALOL HYDROCHLORIDE 5 MG/ML
10 INJECTION, SOLUTION INTRAVENOUS
Status: DISCONTINUED | OUTPATIENT
Start: 2022-01-01 | End: 2022-01-01 | Stop reason: HOSPADM

## 2022-01-01 RX ORDER — HYDROMORPHONE HYDROCHLORIDE 1 MG/ML
0.5 INJECTION, SOLUTION INTRAMUSCULAR; INTRAVENOUS; SUBCUTANEOUS
Status: DISCONTINUED | OUTPATIENT
Start: 2022-01-01 | End: 2022-07-08 | Stop reason: HOSPADM

## 2022-01-01 RX ORDER — BIOTIN 5 MG
5 TABLET ORAL DAILY
Status: DISCONTINUED | OUTPATIENT
Start: 2022-01-01 | End: 2022-01-01 | Stop reason: HOSPADM

## 2022-01-01 RX ORDER — CETIRIZINE HCL 10 MG
10 TABLET ORAL DAILY
Status: DISCONTINUED | OUTPATIENT
Start: 2022-01-01 | End: 2022-01-01

## 2022-01-01 RX ORDER — PANTOPRAZOLE SODIUM 40 MG/1
40 TABLET, DELAYED RELEASE ORAL
Qty: 30 TABLET | Refills: 0 | Status: SHIPPED | OUTPATIENT
Start: 2022-01-01 | End: 2022-01-01

## 2022-01-01 RX ORDER — MIDAZOLAM HYDROCHLORIDE 1 MG/ML
2 INJECTION, SOLUTION INTRAMUSCULAR; INTRAVENOUS ONCE
Status: COMPLETED | OUTPATIENT
Start: 2022-01-01 | End: 2022-01-01

## 2022-01-01 RX ORDER — INSULIN LISPRO 100 [IU]/ML
INJECTION, SOLUTION INTRAVENOUS; SUBCUTANEOUS
Status: DISCONTINUED | OUTPATIENT
Start: 2022-01-01 | End: 2022-01-01 | Stop reason: HOSPADM

## 2022-01-01 RX ORDER — LEVOTHYROXINE SODIUM 100 UG/1
100 TABLET ORAL
Status: DISCONTINUED | OUTPATIENT
Start: 2022-01-01 | End: 2022-01-01 | Stop reason: HOSPADM

## 2022-01-01 RX ORDER — INSULIN LISPRO 100 [IU]/ML
INJECTION, SOLUTION INTRAVENOUS; SUBCUTANEOUS
COMMUNITY
Start: 2022-01-01

## 2022-01-01 RX ORDER — HYDRALAZINE HYDROCHLORIDE 20 MG/ML
10 INJECTION INTRAMUSCULAR; INTRAVENOUS
Status: DISCONTINUED | OUTPATIENT
Start: 2022-01-01 | End: 2022-01-01

## 2022-01-01 RX ORDER — HEPARIN SODIUM 5000 [USP'U]/ML
5000 INJECTION, SOLUTION INTRAVENOUS; SUBCUTANEOUS EVERY 12 HOURS
Status: DISCONTINUED | OUTPATIENT
Start: 2022-01-01 | End: 2022-01-01 | Stop reason: HOSPADM

## 2022-01-01 RX ORDER — INSULIN LISPRO 100 [IU]/ML
8 INJECTION, SOLUTION INTRAVENOUS; SUBCUTANEOUS
COMMUNITY

## 2022-01-01 RX ORDER — ACETAMINOPHEN 650 MG/1
650 SUPPOSITORY RECTAL
Status: DISCONTINUED | OUTPATIENT
Start: 2022-01-01 | End: 2022-01-01

## 2022-01-01 RX ORDER — INSULIN GLARGINE 100 [IU]/ML
7 INJECTION, SOLUTION SUBCUTANEOUS DAILY
Status: DISCONTINUED | OUTPATIENT
Start: 2022-01-01 | End: 2022-01-01

## 2022-01-01 RX ORDER — ONDANSETRON 2 MG/ML
4 INJECTION INTRAMUSCULAR; INTRAVENOUS
Status: COMPLETED | OUTPATIENT
Start: 2022-01-01 | End: 2022-01-01

## 2022-01-01 RX ORDER — GUAIFENESIN 100 MG/5ML
100 SOLUTION ORAL
Status: DISCONTINUED | OUTPATIENT
Start: 2022-01-01 | End: 2022-01-01 | Stop reason: HOSPADM

## 2022-01-01 RX ORDER — MAGNESIUM SULFATE 100 %
4 CRYSTALS MISCELLANEOUS AS NEEDED
Status: DISCONTINUED | OUTPATIENT
Start: 2022-01-01 | End: 2022-01-01

## 2022-01-01 RX ORDER — FACIAL-BODY WIPES
10 EACH TOPICAL DAILY PRN
Status: DISCONTINUED | OUTPATIENT
Start: 2022-01-01 | End: 2022-01-01 | Stop reason: HOSPADM

## 2022-01-01 RX ORDER — INSULIN GLARGINE 100 [IU]/ML
8 INJECTION, SOLUTION SUBCUTANEOUS
Status: DISCONTINUED | OUTPATIENT
Start: 2022-01-01 | End: 2022-01-01 | Stop reason: HOSPADM

## 2022-01-01 RX ORDER — DEXTROSE MONOHYDRATE AND SODIUM CHLORIDE 5; .45 G/100ML; G/100ML
75 INJECTION, SOLUTION INTRAVENOUS CONTINUOUS
Status: DISPENSED | OUTPATIENT
Start: 2022-01-01 | End: 2022-01-01

## 2022-01-01 RX ORDER — PANTOPRAZOLE SODIUM 40 MG/10ML
40 INJECTION, POWDER, LYOPHILIZED, FOR SOLUTION INTRAVENOUS DAILY
Status: DISCONTINUED | OUTPATIENT
Start: 2022-01-01 | End: 2022-01-01 | Stop reason: HOSPADM

## 2022-01-01 RX ORDER — INSULIN GLARGINE 100 [IU]/ML
8 INJECTION, SOLUTION SUBCUTANEOUS DAILY
Status: DISCONTINUED | OUTPATIENT
Start: 2022-01-01 | End: 2022-01-01 | Stop reason: HOSPADM

## 2022-01-01 RX ORDER — PANTOPRAZOLE SODIUM 40 MG/1
40 TABLET, DELAYED RELEASE ORAL
Status: DISCONTINUED | OUTPATIENT
Start: 2022-01-01 | End: 2022-01-01 | Stop reason: HOSPADM

## 2022-01-01 RX ORDER — ACETAMINOPHEN 325 MG/1
650 TABLET ORAL
Status: DISCONTINUED | OUTPATIENT
Start: 2022-01-01 | End: 2022-01-01

## 2022-01-01 RX ORDER — ACETAMINOPHEN 325 MG/1
650 TABLET ORAL
Status: DISCONTINUED | OUTPATIENT
Start: 2022-01-01 | End: 2022-01-01 | Stop reason: HOSPADM

## 2022-01-01 RX ORDER — NYSTATIN 100000 U/G
CREAM TOPICAL
COMMUNITY
End: 2022-01-01

## 2022-01-01 RX ORDER — ACETAMINOPHEN 650 MG/1
650 SUPPOSITORY RECTAL
Status: COMPLETED | OUTPATIENT
Start: 2022-01-01 | End: 2022-01-01

## 2022-01-01 RX ORDER — LEVOFLOXACIN 5 MG/ML
250 INJECTION, SOLUTION INTRAVENOUS
Status: DISCONTINUED | OUTPATIENT
Start: 2022-01-01 | End: 2022-01-01

## 2022-01-01 RX ORDER — DEXTROMETHORPHAN POLISTIREX 30 MG/5ML
30 SUSPENSION ORAL
COMMUNITY

## 2022-01-01 RX ORDER — NYSTATIN 100000 U/G
CREAM TOPICAL DAILY
COMMUNITY
End: 2022-01-01

## 2022-01-01 RX ORDER — HYDRALAZINE HYDROCHLORIDE 20 MG/ML
10 INJECTION INTRAMUSCULAR; INTRAVENOUS
Status: DISCONTINUED | OUTPATIENT
Start: 2022-01-01 | End: 2022-01-01 | Stop reason: HOSPADM

## 2022-01-01 RX ORDER — INSULIN LISPRO 100 [IU]/ML
14 INJECTION, SOLUTION INTRAVENOUS; SUBCUTANEOUS ONCE
Status: DISCONTINUED | OUTPATIENT
Start: 2022-01-01 | End: 2022-01-01 | Stop reason: HOSPADM

## 2022-01-01 RX ORDER — LORAZEPAM 2 MG/ML
1 CONCENTRATE ORAL
Status: DISCONTINUED | OUTPATIENT
Start: 2022-01-01 | End: 2022-07-08 | Stop reason: HOSPADM

## 2022-01-01 RX ORDER — INSULIN DEGLUDEC INJECTION 100 U/ML
INJECTION, SOLUTION SUBCUTANEOUS
Qty: 1 PEN | Refills: 0 | Status: SHIPPED | OUTPATIENT
Start: 2022-01-01 | End: 2022-01-01

## 2022-01-01 RX ORDER — INSULIN GLARGINE 100 [IU]/ML
12 INJECTION, SOLUTION SUBCUTANEOUS
Status: DISCONTINUED | OUTPATIENT
Start: 2022-01-01 | End: 2022-01-01 | Stop reason: HOSPADM

## 2022-01-01 RX ORDER — VANCOMYCIN/0.9 % SOD CHLORIDE 1.5G/250ML
1500 PLASTIC BAG, INJECTION (ML) INTRAVENOUS ONCE
Status: DISCONTINUED | OUTPATIENT
Start: 2022-01-01 | End: 2022-01-01

## 2022-01-01 RX ORDER — ATORVASTATIN CALCIUM 20 MG/1
20 TABLET, FILM COATED ORAL
Status: DISCONTINUED | OUTPATIENT
Start: 2022-01-01 | End: 2022-01-01 | Stop reason: HOSPADM

## 2022-01-01 RX ORDER — MIDAZOLAM HYDROCHLORIDE 1 MG/ML
INJECTION, SOLUTION INTRAMUSCULAR; INTRAVENOUS
Status: DISPENSED
Start: 2022-01-01 | End: 2022-01-01

## 2022-01-01 RX ORDER — LABETALOL HYDROCHLORIDE 5 MG/ML
10 INJECTION, SOLUTION INTRAVENOUS
Status: DISCONTINUED | OUTPATIENT
Start: 2022-01-01 | End: 2022-01-01

## 2022-01-01 RX ORDER — SODIUM CHLORIDE 9 MG/ML
125 INJECTION, SOLUTION INTRAVENOUS CONTINUOUS
Status: DISCONTINUED | OUTPATIENT
Start: 2022-01-01 | End: 2022-01-01

## 2022-01-01 RX ORDER — NYSTATIN 100000 [USP'U]/G
POWDER TOPICAL 2 TIMES DAILY
COMMUNITY
End: 2022-01-01

## 2022-01-01 RX ORDER — FENTANYL CITRATE 50 UG/ML
INJECTION, SOLUTION INTRAMUSCULAR; INTRAVENOUS
Status: DISPENSED
Start: 2022-01-01 | End: 2022-01-01

## 2022-01-01 RX ORDER — LEVETIRACETAM 500 MG/5ML
250 INJECTION, SOLUTION, CONCENTRATE INTRAVENOUS DAILY
Status: DISCONTINUED | OUTPATIENT
Start: 2022-01-01 | End: 2022-01-01 | Stop reason: HOSPADM

## 2022-01-01 RX ORDER — MONTELUKAST SODIUM 10 MG/1
10 TABLET ORAL
Status: DISCONTINUED | OUTPATIENT
Start: 2022-01-01 | End: 2022-01-01 | Stop reason: HOSPADM

## 2022-01-01 RX ORDER — HYDROMORPHONE HYDROCHLORIDE 1 MG/ML
0.5 INJECTION, SOLUTION INTRAMUSCULAR; INTRAVENOUS; SUBCUTANEOUS
Status: DISCONTINUED | OUTPATIENT
Start: 2022-01-01 | End: 2022-01-01 | Stop reason: HOSPADM

## 2022-01-01 RX ORDER — LIDOCAINE AND PRILOCAINE 25; 25 MG/G; MG/G
CREAM TOPICAL
COMMUNITY

## 2022-01-01 RX ORDER — INSULIN LISPRO 100 [IU]/ML
3 INJECTION, SOLUTION INTRAVENOUS; SUBCUTANEOUS
Status: DISCONTINUED | OUTPATIENT
Start: 2022-01-01 | End: 2022-01-01 | Stop reason: HOSPADM

## 2022-01-01 RX ORDER — INSULIN LISPRO 100 [IU]/ML
INJECTION, SOLUTION INTRAVENOUS; SUBCUTANEOUS
Status: ON HOLD | COMMUNITY
End: 2022-01-01 | Stop reason: SDUPTHER

## 2022-01-01 RX ORDER — DIAZEPAM 10 MG/2ML
5 INJECTION INTRAMUSCULAR EVERY 6 HOURS
Status: DISCONTINUED | OUTPATIENT
Start: 2022-01-01 | End: 2022-07-08 | Stop reason: HOSPADM

## 2022-01-01 RX ORDER — METOPROLOL TARTRATE 25 MG/1
25 TABLET, FILM COATED ORAL EVERY 12 HOURS
Status: DISCONTINUED | OUTPATIENT
Start: 2022-01-01 | End: 2022-01-01 | Stop reason: HOSPADM

## 2022-01-01 RX ORDER — INSULIN GLARGINE 100 [IU]/ML
20 INJECTION, SOLUTION SUBCUTANEOUS DAILY
Status: DISCONTINUED | OUTPATIENT
Start: 2022-01-01 | End: 2022-01-01 | Stop reason: ALTCHOICE

## 2022-01-01 RX ORDER — INSULIN LISPRO 100 [IU]/ML
10 INJECTION, SOLUTION INTRAVENOUS; SUBCUTANEOUS
COMMUNITY

## 2022-01-01 RX ORDER — INSULIN LISPRO 100 [IU]/ML
INJECTION, SOLUTION INTRAVENOUS; SUBCUTANEOUS EVERY 6 HOURS
Status: DISCONTINUED | OUTPATIENT
Start: 2022-01-01 | End: 2022-01-01 | Stop reason: HOSPADM

## 2022-01-01 RX ORDER — INSULIN LISPRO 100 [IU]/ML
10 INJECTION, SOLUTION INTRAVENOUS; SUBCUTANEOUS
Status: DISCONTINUED | OUTPATIENT
Start: 2022-01-01 | End: 2022-01-01 | Stop reason: HOSPADM

## 2022-01-01 RX ORDER — FUROSEMIDE 20 MG/1
10 TABLET ORAL DAILY
Status: DISCONTINUED | OUTPATIENT
Start: 2022-01-01 | End: 2022-01-01

## 2022-01-01 RX ORDER — MAGNESIUM SULFATE 100 %
4 CRYSTALS MISCELLANEOUS AS NEEDED
Status: DISCONTINUED | OUTPATIENT
Start: 2022-01-01 | End: 2022-01-01 | Stop reason: SDUPTHER

## 2022-01-01 RX ORDER — GLYCOPYRROLATE 0.2 MG/ML
0.2 INJECTION INTRAMUSCULAR; INTRAVENOUS
Status: DISCONTINUED | OUTPATIENT
Start: 2022-01-01 | End: 2022-07-08 | Stop reason: HOSPADM

## 2022-01-01 RX ORDER — INSULIN LISPRO 100 [IU]/ML
5 INJECTION, SOLUTION INTRAVENOUS; SUBCUTANEOUS
Status: DISCONTINUED | OUTPATIENT
Start: 2022-01-01 | End: 2022-01-01 | Stop reason: HOSPADM

## 2022-01-01 RX ORDER — NALOXONE HYDROCHLORIDE 0.4 MG/ML
0.4 INJECTION, SOLUTION INTRAMUSCULAR; INTRAVENOUS; SUBCUTANEOUS AS NEEDED
Status: DISCONTINUED | OUTPATIENT
Start: 2022-01-01 | End: 2022-01-01

## 2022-01-01 RX ORDER — BLOOD-GLUCOSE SENSOR
EACH MISCELLANEOUS
Qty: 3 EACH | Refills: 11 | Status: CANCELLED | OUTPATIENT
Start: 2022-01-01

## 2022-01-01 RX ORDER — SODIUM CHLORIDE 0.9 % (FLUSH) 0.9 %
5 SYRINGE (ML) INJECTION AS NEEDED
Status: DISCONTINUED | OUTPATIENT
Start: 2022-01-01 | End: 2022-07-08 | Stop reason: HOSPADM

## 2022-01-01 RX ADMIN — ATORVASTATIN CALCIUM 20 MG: 20 TABLET, FILM COATED ORAL at 21:19

## 2022-01-01 RX ADMIN — LEVETIRACETAM 250 MG: 100 INJECTION, SOLUTION INTRAVENOUS at 16:30

## 2022-01-01 RX ADMIN — LEVOTHYROXINE SODIUM 100 MCG: 0.1 TABLET ORAL at 06:35

## 2022-01-01 RX ADMIN — LEVOFLOXACIN 500 MG: 500 INJECTION, SOLUTION INTRAVENOUS at 15:56

## 2022-01-01 RX ADMIN — SODIUM CHLORIDE 22.6 UNITS/HR: 9 INJECTION, SOLUTION INTRAVENOUS at 10:11

## 2022-01-01 RX ADMIN — MONTELUKAST 10 MG: 10 TABLET, FILM COATED ORAL at 22:15

## 2022-01-01 RX ADMIN — LEVOTHYROXINE SODIUM 100 MCG: 0.1 TABLET ORAL at 14:38

## 2022-01-01 RX ADMIN — METOPROLOL TARTRATE 25 MG: 25 TABLET, FILM COATED ORAL at 08:49

## 2022-01-01 RX ADMIN — SODIUM CHLORIDE 5 MG/HR: 9 INJECTION, SOLUTION INTRAVENOUS at 15:49

## 2022-01-01 RX ADMIN — LEVOFLOXACIN 750 MG: 5 INJECTION, SOLUTION INTRAVENOUS at 16:44

## 2022-01-01 RX ADMIN — TRAZODONE HYDROCHLORIDE 50 MG: 50 TABLET ORAL at 21:54

## 2022-01-01 RX ADMIN — Medication 10 UNITS: at 12:21

## 2022-01-01 RX ADMIN — Medication 1 CAPSULE: at 08:47

## 2022-01-01 RX ADMIN — SODIUM CHLORIDE 29.6 UNITS/HR: 9 INJECTION, SOLUTION INTRAVENOUS at 15:18

## 2022-01-01 RX ADMIN — NEPHROCAP 1 CAPSULE: 1 CAP ORAL at 09:03

## 2022-01-01 RX ADMIN — Medication 3 UNITS: at 08:44

## 2022-01-01 RX ADMIN — Medication 4 UNITS: at 09:04

## 2022-01-01 RX ADMIN — PANTOPRAZOLE SODIUM 40 MG: 40 TABLET, DELAYED RELEASE ORAL at 11:54

## 2022-01-01 RX ADMIN — METOPROLOL TARTRATE 25 MG: 25 TABLET, FILM COATED ORAL at 21:54

## 2022-01-01 RX ADMIN — Medication 5 MG: at 09:07

## 2022-01-01 RX ADMIN — ATORVASTATIN CALCIUM 20 MG: 20 TABLET, FILM COATED ORAL at 23:01

## 2022-01-01 RX ADMIN — HYDRALAZINE HYDROCHLORIDE 10 MG: 20 INJECTION, SOLUTION INTRAMUSCULAR; INTRAVENOUS at 05:36

## 2022-01-01 RX ADMIN — SODIUM CHLORIDE, PRESERVATIVE FREE 10 ML: 5 INJECTION INTRAVENOUS at 08:52

## 2022-01-01 RX ADMIN — SODIUM CHLORIDE 1000 ML: 9 INJECTION, SOLUTION INTRAVENOUS at 06:11

## 2022-01-01 RX ADMIN — ACETAMINOPHEN 650 MG: 650 SUPPOSITORY RECTAL at 06:41

## 2022-01-01 RX ADMIN — INSULIN GLARGINE 12 UNITS: 100 INJECTION, SOLUTION SUBCUTANEOUS at 21:55

## 2022-01-01 RX ADMIN — SODIUM CHLORIDE, PRESERVATIVE FREE 10 ML: 5 INJECTION INTRAVENOUS at 06:46

## 2022-01-01 RX ADMIN — FENTANYL CITRATE 12.5 MCG: 50 INJECTION, SOLUTION INTRAMUSCULAR; INTRAVENOUS at 01:43

## 2022-01-01 RX ADMIN — Medication 2 UNITS: at 11:55

## 2022-01-01 RX ADMIN — DEXTROSE AND SODIUM CHLORIDE 75 ML/HR: 5; 450 INJECTION, SOLUTION INTRAVENOUS at 18:57

## 2022-01-01 RX ADMIN — Medication 4 UNITS: at 12:21

## 2022-01-01 RX ADMIN — Medication 1 CAPSULE: at 09:54

## 2022-01-01 RX ADMIN — TRAZODONE HYDROCHLORIDE 50 MG: 50 TABLET ORAL at 23:00

## 2022-01-01 RX ADMIN — SODIUM CHLORIDE 10.8 UNITS/HR: 9 INJECTION, SOLUTION INTRAVENOUS at 08:34

## 2022-01-01 RX ADMIN — Medication 7 UNITS: at 17:45

## 2022-01-01 RX ADMIN — SODIUM CHLORIDE 6.7 UNITS/HR: 9 INJECTION, SOLUTION INTRAVENOUS at 14:07

## 2022-01-01 RX ADMIN — MICONAZOLE NITRATE 2 % TOPICAL POWDER: at 09:06

## 2022-01-01 RX ADMIN — DEXTROSE MONOHYDRATE 125 ML: 10 INJECTION, SOLUTION INTRAVENOUS at 20:33

## 2022-01-01 RX ADMIN — Medication 4000 UNITS: at 08:48

## 2022-01-01 RX ADMIN — Medication 2 UNITS: at 17:48

## 2022-01-01 RX ADMIN — HEPARIN SODIUM 5000 UNITS: 5000 INJECTION INTRAVENOUS; SUBCUTANEOUS at 11:54

## 2022-01-01 RX ADMIN — INSULIN GLARGINE 12 UNITS: 100 INJECTION, SOLUTION SUBCUTANEOUS at 09:04

## 2022-01-01 RX ADMIN — Medication 5 UNITS: at 07:30

## 2022-01-01 RX ADMIN — CEFEPIME 2 G: 2 INJECTION, POWDER, FOR SOLUTION INTRAVENOUS at 15:31

## 2022-01-01 RX ADMIN — Medication 3 UNITS: at 17:49

## 2022-01-01 RX ADMIN — HEPARIN SODIUM 5000 UNITS: 5000 INJECTION INTRAVENOUS; SUBCUTANEOUS at 23:00

## 2022-01-01 RX ADMIN — IOPAMIDOL 100 ML: 755 INJECTION, SOLUTION INTRAVENOUS at 15:00

## 2022-01-01 RX ADMIN — METOPROLOL TARTRATE 25 MG: 25 TABLET, FILM COATED ORAL at 09:54

## 2022-01-01 RX ADMIN — FENTANYL CITRATE 12.5 MCG: 50 INJECTION, SOLUTION INTRAMUSCULAR; INTRAVENOUS at 06:28

## 2022-01-01 RX ADMIN — SODIUM CHLORIDE 8.6 UNITS/HR: 9 INJECTION, SOLUTION INTRAVENOUS at 18:29

## 2022-01-01 RX ADMIN — HEPARIN SODIUM 5000 UNITS: 5000 INJECTION INTRAVENOUS; SUBCUTANEOUS at 00:39

## 2022-01-01 RX ADMIN — MICONAZOLE NITRATE 2 % TOPICAL POWDER: at 18:11

## 2022-01-01 RX ADMIN — CALCIUM GLUCONATE 1000 MG: 20 INJECTION, SOLUTION INTRAVENOUS at 08:40

## 2022-01-01 RX ADMIN — SODIUM CHLORIDE, PRESERVATIVE FREE 10 ML: 5 INJECTION INTRAVENOUS at 01:44

## 2022-01-01 RX ADMIN — LEVOTHYROXINE SODIUM 100 MCG: 0.1 TABLET ORAL at 06:31

## 2022-01-01 RX ADMIN — METOPROLOL TARTRATE 25 MG: 25 TABLET, FILM COATED ORAL at 21:19

## 2022-01-01 RX ADMIN — ONDANSETRON HYDROCHLORIDE 4 MG: 2 SOLUTION INTRAMUSCULAR; INTRAVENOUS at 06:41

## 2022-01-01 RX ADMIN — Medication 1 CAPSULE: at 09:03

## 2022-01-01 RX ADMIN — Medication 3 UNITS: at 13:05

## 2022-01-01 RX ADMIN — HYDROMORPHONE HYDROCHLORIDE 0.5 MG: 1 INJECTION, SOLUTION INTRAMUSCULAR; INTRAVENOUS; SUBCUTANEOUS at 15:24

## 2022-01-01 RX ADMIN — INSULIN GLARGINE 8 UNITS: 100 INJECTION, SOLUTION SUBCUTANEOUS at 22:15

## 2022-01-01 RX ADMIN — HEPARIN SODIUM 5000 UNITS: 5000 INJECTION INTRAVENOUS; SUBCUTANEOUS at 08:49

## 2022-01-01 RX ADMIN — Medication 5 UNITS: at 12:25

## 2022-01-01 RX ADMIN — Medication 2 UNITS: at 23:07

## 2022-01-01 RX ADMIN — METOPROLOL TARTRATE 25 MG: 25 TABLET, FILM COATED ORAL at 09:03

## 2022-01-01 RX ADMIN — Medication 5 MG: at 08:48

## 2022-01-01 RX ADMIN — SODIUM CHLORIDE, PRESERVATIVE FREE 10 ML: 5 INJECTION INTRAVENOUS at 21:56

## 2022-01-01 RX ADMIN — SODIUM CHLORIDE, PRESERVATIVE FREE 10 ML: 5 INJECTION INTRAVENOUS at 21:18

## 2022-01-01 RX ADMIN — SODIUM CHLORIDE 14.5 UNITS/HR: 9 INJECTION, SOLUTION INTRAVENOUS at 17:24

## 2022-01-01 RX ADMIN — SODIUM CHLORIDE, PRESERVATIVE FREE 10 ML: 5 INJECTION INTRAVENOUS at 14:40

## 2022-01-01 RX ADMIN — METOPROLOL TARTRATE 25 MG: 25 TABLET, FILM COATED ORAL at 08:43

## 2022-01-01 RX ADMIN — Medication 3 UNITS: at 12:25

## 2022-01-01 RX ADMIN — Medication 5 UNITS: at 14:15

## 2022-01-01 RX ADMIN — TRAZODONE HYDROCHLORIDE 50 MG: 50 TABLET ORAL at 21:19

## 2022-01-01 RX ADMIN — FENTANYL CITRATE 50 MCG: 50 INJECTION INTRAMUSCULAR; INTRAVENOUS at 16:12

## 2022-01-01 RX ADMIN — INSULIN GLARGINE 12 UNITS: 100 INJECTION, SOLUTION SUBCUTANEOUS at 21:21

## 2022-01-01 RX ADMIN — LEVOTHYROXINE SODIUM 100 MCG: 0.1 TABLET ORAL at 09:20

## 2022-01-01 RX ADMIN — ACETAMINOPHEN 650 MG: 650 SUPPOSITORY RECTAL at 21:33

## 2022-01-01 RX ADMIN — SODIUM CHLORIDE, POTASSIUM CHLORIDE, SODIUM LACTATE AND CALCIUM CHLORIDE 1000 ML: 600; 310; 30; 20 INJECTION, SOLUTION INTRAVENOUS at 11:47

## 2022-01-01 RX ADMIN — SODIUM CHLORIDE, PRESERVATIVE FREE 10 ML: 5 INJECTION INTRAVENOUS at 06:29

## 2022-01-01 RX ADMIN — Medication 1 CAPSULE: at 08:44

## 2022-01-01 RX ADMIN — SODIUM CHLORIDE, PRESERVATIVE FREE 10 ML: 5 INJECTION INTRAVENOUS at 06:36

## 2022-01-01 RX ADMIN — MEROPENEM 1 G: 1 INJECTION, POWDER, FOR SOLUTION INTRAVENOUS at 06:31

## 2022-01-01 RX ADMIN — SODIUM CHLORIDE 10.8 UNITS/HR: 9 INJECTION, SOLUTION INTRAVENOUS at 06:41

## 2022-01-01 RX ADMIN — Medication 4000 UNITS: at 09:04

## 2022-01-01 RX ADMIN — MAGNESIUM SULFATE HEPTAHYDRATE 2 G: 2 INJECTION, SOLUTION INTRAVENOUS at 09:20

## 2022-01-01 RX ADMIN — SODIUM CHLORIDE, PRESERVATIVE FREE 10 ML: 5 INJECTION INTRAVENOUS at 06:31

## 2022-01-01 RX ADMIN — SODIUM CHLORIDE 500 ML: 9 INJECTION, SOLUTION INTRAVENOUS at 12:38

## 2022-01-01 RX ADMIN — ACETAMINOPHEN 650 MG: 650 SUPPOSITORY RECTAL at 15:22

## 2022-01-01 RX ADMIN — SODIUM CHLORIDE, PRESERVATIVE FREE 10 ML: 5 INJECTION INTRAVENOUS at 13:06

## 2022-01-01 RX ADMIN — ATORVASTATIN CALCIUM 20 MG: 20 TABLET, FILM COATED ORAL at 21:54

## 2022-01-01 RX ADMIN — LEVOTHYROXINE SODIUM 100 MCG: 0.1 TABLET ORAL at 06:51

## 2022-01-01 RX ADMIN — INSULIN GLARGINE 20 UNITS: 100 INJECTION, SOLUTION SUBCUTANEOUS at 11:14

## 2022-01-01 RX ADMIN — LEVOTHYROXINE SODIUM 100 MCG: 0.1 TABLET ORAL at 06:47

## 2022-01-01 RX ADMIN — SODIUM CHLORIDE, PRESERVATIVE FREE 10 ML: 5 INJECTION INTRAVENOUS at 21:38

## 2022-01-01 RX ADMIN — Medication 10 UNITS: at 12:38

## 2022-01-01 RX ADMIN — HEPARIN SODIUM 5000 UNITS: 5000 INJECTION INTRAVENOUS; SUBCUTANEOUS at 14:38

## 2022-01-01 RX ADMIN — Medication 3 UNITS: at 07:31

## 2022-01-01 RX ADMIN — KETOROLAC TROMETHAMINE 15 MG: 30 INJECTION, SOLUTION INTRAMUSCULAR; INTRAVENOUS at 12:46

## 2022-01-01 RX ADMIN — SODIUM CHLORIDE 21.6 UNITS/HR: 9 INJECTION, SOLUTION INTRAVENOUS at 08:40

## 2022-01-01 RX ADMIN — LABETALOL HYDROCHLORIDE 10 MG: 5 INJECTION INTRAVENOUS at 23:29

## 2022-01-01 RX ADMIN — INSULIN GLARGINE 8 UNITS: 100 INJECTION, SOLUTION SUBCUTANEOUS at 01:15

## 2022-01-01 RX ADMIN — PANTOPRAZOLE SODIUM 40 MG: 40 INJECTION, POWDER, FOR SOLUTION INTRAVENOUS at 09:04

## 2022-01-01 RX ADMIN — DIAZEPAM 5 MG: 5 INJECTION, SOLUTION INTRAMUSCULAR; INTRAVENOUS at 11:52

## 2022-01-01 RX ADMIN — HYDROMORPHONE HYDROCHLORIDE 0.5 MG: 1 INJECTION, SOLUTION INTRAMUSCULAR; INTRAVENOUS; SUBCUTANEOUS at 11:51

## 2022-01-01 RX ADMIN — METOPROLOL TARTRATE 25 MG: 25 TABLET, FILM COATED ORAL at 23:00

## 2022-01-01 RX ADMIN — FUROSEMIDE 10 MG: 20 TABLET ORAL at 09:54

## 2022-01-01 RX ADMIN — Medication 2 UNITS: at 21:54

## 2022-01-01 RX ADMIN — HEPARIN SODIUM 5000 UNITS: 5000 INJECTION INTRAVENOUS; SUBCUTANEOUS at 06:36

## 2022-01-01 RX ADMIN — SODIUM CHLORIDE, PRESERVATIVE FREE 10 ML: 5 INJECTION INTRAVENOUS at 09:06

## 2022-01-01 RX ADMIN — MIDAZOLAM 2 MG: 1 INJECTION INTRAMUSCULAR; INTRAVENOUS at 15:34

## 2022-01-01 RX ADMIN — PANTOPRAZOLE SODIUM 40 MG: 40 INJECTION, POWDER, FOR SOLUTION INTRAVENOUS at 08:49

## 2022-01-01 RX ADMIN — PANTOPRAZOLE SODIUM 40 MG: 40 TABLET, DELAYED RELEASE ORAL at 06:35

## 2022-01-01 RX ADMIN — MIDAZOLAM 1 MG: 1 INJECTION INTRAMUSCULAR; INTRAVENOUS at 13:24

## 2022-01-01 RX ADMIN — INSULIN GLARGINE 7 UNITS: 100 INJECTION, SOLUTION SUBCUTANEOUS at 14:38

## 2022-01-01 RX ADMIN — MIDAZOLAM 1 MG: 1 INJECTION INTRAMUSCULAR; INTRAVENOUS at 14:29

## 2022-01-01 RX ADMIN — NEPHROCAP 1 CAPSULE: 1 CAP ORAL at 08:48

## 2022-01-01 RX ADMIN — SODIUM CHLORIDE 13.5 UNITS/HR: 9 INJECTION, SOLUTION INTRAVENOUS at 13:15

## 2022-01-01 RX ADMIN — FENTANYL CITRATE 25 MCG: 0.05 INJECTION, SOLUTION INTRAMUSCULAR; INTRAVENOUS at 21:38

## 2022-01-01 RX ADMIN — HYDROMORPHONE HYDROCHLORIDE 0.5 MG: 1 INJECTION, SOLUTION INTRAMUSCULAR; INTRAVENOUS; SUBCUTANEOUS at 10:05

## 2022-04-04 PROBLEM — R41.82 AMS (ALTERED MENTAL STATUS): Status: ACTIVE | Noted: 2022-01-01

## 2022-04-04 NOTE — H&P
[address] [facility] Adult  Hospitalist Group  History and Physical    Date of Service:  4/4/2022  Primary Care Provider: Harry Negron MD  Source of information: The patient and Chart review    Chief Complaint: Altered mental status      History of Presenting Illness:   Job Law is a 45 y.o. female who presents with altered mental status    Patient with history of Down syndrome, history was primary obtained from the family member present at the bedside, patient with history of ESRD, she was due for dialysis today, blood glucose was noted to be in the 600 and she was having some confusion, the family reports that patient was recently admitted to [facility] with elevated blood glucose and diabetic ketoacidosis, was recently discharged, post that started having some altered mental status, it got worse, the facility got concerned and decided to send her to the hospital patient currently resting in bed and denies any complaints or problems         REVIEW OF SYSTEMS:  Review of systems not obtained due to patient factors. History of Down syndrome    Past Medical History:   Diagnosis Date    Allergic rhinitis, cause unspecified     Asthma     use inhalers    Diabetes ([address]     Down's syndrome     GERD (gastroesophageal reflux disease)     H/O impacted cerumen     Dr. Selena Scott Headache     Hypothyroid     Mononucleosis     Pneumonia     Seizures ([facility] [address]     as a result of low blood glucose readings    Thyroid disease       Past Surgical History:   Procedure Laterality Date    HX CATARACT REMOVAL Bilateral 3/5/15    cataract left and right    HX CYST REMOVAL      right shoulder    HX HEENT Bilateral 2017    prior in one eye    HX HYSTERECTOMY  1987     Prior to Admission medications    Medication Sig Start Date End Date Taking? Authorizing Provider   loratadine 10 mg cap Take  by mouth. Provider, Historical   loratadine (CLARITIN) 10 mg tablet Take 10 mg by mouth daily. Provider, Historical   guaiFENesin ER (Mucinex) 600 mg ER tablet Take 600 mg by mouth two (2) times daily as needed for Congestion. Provider, Historical   traZODone (DESYREL) 50 mg tablet Take 50 mg by mouth nightly. Provider, Historical   lidocaine (XYLOCAINE) 4 % topical cream Apply  to affected area DIALYSIS PRN for Pain. 10/11/21   Gwyn Espinoza MD   metoprolol tartrate (LOPRESSOR) 25 mg tablet Take 1 Tablet by mouth every twelve (12) hours. 10/11/21   Gwyn Espinoza MD   b complex-vitamin c-folic acid (NEPHROCAPS) 1 mg capsule Take 1 Capsule by mouth daily. 10/12/21   Gwyn Espinoza MD   patiromer calcium sorbitex (Veltassa) 16.8 gram powder Take  by mouth daily. Provider, Historical   diphenhydrAMINE (BenadryL) 25 mg capsule Take 25 mg by mouth nightly as needed for Allergies. Provider, Historical   furosemide (LASIX PO) Take 10 mg by mouth every morning. Provider, Historical   cetirizine (ZyrTEC) 10 mg tablet Take 10 mg by mouth daily. Provider, Historical   cranberry 500 mg capsule Take 500 mg by mouth daily. Provider, Historical   guaiFENesin (ROBITUSSIN) 100 mg/5 mL liquid Take 100 mg by mouth every four (4) hours as needed for Cough. Provider, Historical   fluticasone propionate (Flonase Allergy Relief) 50 mcg/actuation nasal spray 2 Sprays by Both Nostrils route two (2) times a day. Provider, Historical   guaiFENesin-dextromethorphan (Diabetic Tussin DM)  mg/5 mL liqd Take 10 mL by mouth every six (6) hours as needed for Cough or Congestion. Provider, Historical   esomeprazole (NexIUM) 40 mg capsule Take 40 mg by mouth daily. Provider, Historical   L.acid,para-B. bifidum-S.therm (RISAQUAD) 8 billion cell cap cap Take 1 Capsule by mouth daily. Provider, Historical   insulin degludec [facility] FlexTouch U-100) 100 unit/mL (3 mL) inpn 12 Units by SubCUTAneous route nightly.     Provider, Historical   ergocalciferol (Vitamin D2) 1,250 mcg (50,000 unit) capsule Take 50,000 Units by mouth every Wednesday. Provider, Historical   ondansetron hcl (Zofran) 4 mg tablet Take 4 mg by mouth every four (4) hours as needed for Nausea or Vomiting. Provider, Historical   montelukast (Singulair) 10 mg tablet Take 10 mg by mouth daily. Provider, Historical   levothyroxine (SYNTHROID) 100 mcg tablet Take 1 Tablet by mouth Daily (before breakfast). 9/8/21   Isaiah Merino MD   biotin 5,000 mcg TbDi Take 5,000 mcg by mouth daily. Provider, Historical   cholecalciferol, vitamin D3, (VITAMIN D3) 2,000 unit tab Take 4,000 Units by mouth daily. Provider, Historical   insulin lispro (HUMALOG U-100 INSULIN) 100 unit/mL injection 3-4 Units by SubCUTAneous route Before breakfast, lunch, and dinner. Hold insulin if BS <80. Hold if patient does not eat her meal. If BS >150 give with SS. Provider, Historical   atorvastatin (LIPITOR) 20 mg tablet Take 20 mg by mouth nightly. Provider, Historical   ibuprofen (MOTRIN) 400 mg tablet Take 400 mg by mouth every six (6) hours as needed for Pain (Fever). Provider, Historical   albuterol (PROVENTIL HFA, VENTOLIN HFA, PROAIR HFA) 90 mcg/actuation inhaler Take 1 Puff by inhalation every four (4) hours as needed for Wheezing. 12/16/18   Bhumika Hester MD   glucose 4 gram chewable tablet Take 12 g by mouth as needed (BS < 60). Provider, Historical   glucagon (GLUCAGEN) 1 mg injection 1 mg by IntraMUSCular route once as needed (hypoglycemia if patient is unresponsive).     Provider, Historical     Allergies   Allergen Reactions    Codeine Nausea and Vomiting    Lettuce Other (comments)    Nitrofurantoin Rash    Pcn [Penicillins] Hives and Rash    Tomato Other (comments)      Family History   Problem Relation Age of Onset    Thyroid Disease Mother         hypo    Hypertension Father     Abdominal aortic aneurysm Father     Neuropathy Father     No Known Problems Sister     Heart Disease Brother     Heart Attack Brother     Heart Surgery Brother     No Known Problems Sister     No Known Problems Brother     Anesth Problems Neg Hx       Social History:  reports that she has never smoked. She has never used smokeless tobacco. She reports that she does not drink alcohol and does not use drugs. Family and social history were personally reviewed, all pertinent and relevant details are outlined as above. Objective:     Visit Vitals  BP (!) 160/78 (BP 1 Location: Right upper arm, BP Patient Position: At rest)   Pulse 92   Temp 99.1 °F (37.3 °C)   Resp 20   LMP 03/27/1986   SpO2 95%      O2 Device: None (Room air)    PHYSICAL EXAM:   General: Alert and awake  HEENT: PEERL, moist mucus membranes  Neck: Supple,   Chest: Decreased basal breath sound  CVS: S1-S2 heard  Abd: Soft, non-tender, non-distended, +bowel sounds   Ext: No clubbing, no cyanosis, no edema  Neuro/Psych: Limited exam but no focal neurological deficit grossly  Cap refill: Brisk, less than 3 seconds  Pulses: 2+, symmetric in all extremities  Skin: Warm, dry, without rashes or lesions    Data Review: All diagnostic labs and studies have been reviewed. Abnormal Labs Reviewed   CBC WITH AUTOMATED DIFF - Abnormal; Notable for the following components:       Result Value    RBC 2.66 (*)     HGB 9.6 (*)     HCT 29.2 (*)     .8 (*)     MCH 36.1 (*)     NEUTROPHILS 89 (*)     LYMPHOCYTES 4 (*)     IMMATURE GRANULOCYTES 1 (*)     ABS. NEUTROPHILS 8.9 (*)     ABS. LYMPHOCYTES 0.4 (*)     ABS. IMM. GRANS. 0.1 (*)     All other components within normal limits   METABOLIC PANEL, COMPREHENSIVE - Abnormal; Notable for the following components:    Sodium 131 (*)     CO2 20 (*)     Glucose 456 (*)     BUN 50 (*)     Creatinine 5.95 (*)     BUN/Creatinine ratio 8 (*)     GFR est AA 9 (*)     GFR est non-AA 7 (*)     Calcium 8.2 (*)     Alk.  phosphatase 150 (*)     Albumin 2.8 (*)     Globulin 4.4 (*)     A-G Ratio 0.6 (*)     All other components within normal limits   LACTIC ACID - Abnormal; Notable for the following components:    Lactic acid 2.2 (*)     All other components within normal limits   URINALYSIS W/MICROSCOPIC - Abnormal; Notable for the following components:    Protein >300 (*)     Glucose 500 (*)     Blood SMALL (*)     All other components within normal limits   GLUCOSE, POC - Abnormal; Notable for the following components:    Glucose (POC) 434 (*)     All other components within normal limits   GLUCOSE, POC - Abnormal; Notable for the following components:    Glucose (POC) 306 (*)     All other components within normal limits       All Micro Results     Procedure Component Value Units Date/Time    URINE CULTURE HOLD SAMPLE [[id number]] Collected: 04/04/22 1626    Order Status: Completed Specimen: Serum Updated: 04/04/22 1631     Urine culture hold       Urine on hold in Microbiology dept for 2 days. If unpreserved urine is submitted, it cannot be used for addtional testing after 24 hours, recollection will be required. CULTURE, BLOOD [[id number]] Collected: 04/04/22 1502    Order Status: Completed Specimen: Blood Updated: 04/04/22 1621    CULTURE, BLOOD, PAIRED [[id number]]     Order Status: Canceled Specimen: Blood           IMAGING:   XR CHEST PORT   Final Result   Bilateral lower lobe interstitial infiltrates.          CT HEAD WO CONT    (Results Pending)        ECG/ECHO:    Results for orders placed or performed during the hospital encounter of 12/04/21   EKG, 12 LEAD, INITIAL   Result Value Ref Range    Ventricular Rate 100 BPM    Atrial Rate 100 BPM    P-R Interval 174 ms    QRS Duration 68 ms    Q-T Interval 362 ms    QTC Calculation (Bezet) 466 ms    Calculated P Axis 41 degrees    Calculated R Axis 47 degrees    Calculated T Axis 0 degrees    Diagnosis       Normal sinus rhythm  Nonspecific ST and T wave abnormality    When compared with ECG of 25-OCT-2021 00:04,  No significant change  Confirmed by Jan Jernigan M.D., [place] Shallow ([id number]) on 12/4/2021 4:25:59 PM          Assessment:   Given the patient's current clinical presentation, there is a high level of concern for decompensation if discharged from the emergency department. Complex decision making was performed, which includes reviewing the patient's available past medical records, laboratory results, and imaging studies. Altered mental status  Volume overload  Diabetes mellitus type 2 with hyperglycemia  Accelerated hypertension  Non-anion gap metabolic acidosis  Plan:   Patient will be admitted on telemetry bed  Unclear etiology for AMS, waxing and waning per family, check CT of the head to rule out any acute pathology, IV hydration, check for underlying UTI or other infections, TSH, B12, folate, I continue monitor if persist may consider further intervention and diagnostics  HD per nephrology, monitor  Sliding-scale insulin, Accu-Cheks, diet control, close monitoring, further intervention per hospital course  Hydralazine as needed, should improve with HD  Likely secondary renal disease, check VBG and monitor      DIET: No diet orders on file   ISOLATION PRECAUTIONS: There are currently no Active Isolations  CODE STATUS: Prior   DVT PROPHYLAXIS: Heparin  FUNCTIONAL STATUS PRIOR TO HOSPITALIZATION: Ambulatory and capable of self-care but relies on assistive devices (rolling walker/cane). EARLY MOBILITY ASSESSMENT: Recommend routine ambulation while hospitalized with the assistance of nursing staff    Signed By: Georgie Rodriges MD     April 4, 2022         Please note that this dictation may have been completed with Marcy Rehman, the computer voice recognition software. Quite often unanticipated grammatical, syntax, homophones, and other interpretive errors are inadvertently transcribed by the computer software. Please disregard these errors. Please excuse any errors that have escaped final proofreading.

## 2022-04-04 NOTE — CONSULTS
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 [facility] aware  IV Abx  Control bld sugars  LUCERO  AM labs    Discussed with patient and sister. Thanks for the consultation. Renal service will follow patient with you. Please contact me with any questions or concerns. Initial Consult note         Patient name: Marie Crespo  MR no: [id number]  Date of admission: 4/4/2022  Date of consultation: 4/4/2022  Requested by: Dr. Shawn Pollard  Reason for consult: ESRD    Patient seen and examined. History obtained from patient and chart review. Relevant labs, data and notes reviewed. HPI: Marie Crespo is a 45 y.o. female with PMH significant for ESRD on chronic HD MWF at [facility], DM2 recently discharged from [facility] after being admitted with DKA presented to ER today with AMS per family. Bld sugars ~500. CXR showing bibasilar infiltrates. No fevers/chills. No SOB. No cough. Sister at bedside.      PMH:  Past Medical History:   Diagnosis Date    Allergic rhinitis, cause unspecified     Asthma     use inhalers    Diabetes ([address]     Down's syndrome     GERD (gastroesophageal reflux disease)     H/O impacted cerumen     Dr. Deandra Nino Headache     Hypothyroid     Mononucleosis     Pneumonia     Seizures ([address]     as a result of low blood glucose readings    Thyroid disease      PSH:  Past Surgical History:   Procedure Laterality Date    HX CATARACT REMOVAL Bilateral 3/5/15    cataract left and right    HX CYST REMOVAL      right shoulder    HX HEENT Bilateral 2017    prior in one eye    HX HYSTERECTOMY  1987       Social history:   Social History     Tobacco Use    Smoking status: Never Smoker    Smokeless tobacco: Never Used Vaping Use    Vaping Use: Never used   Substance Use Topics    Alcohol use: No    Drug use: No       Family history:  No history of CKD or ESRD in the family. Allergies   Allergen Reactions    Codeine Nausea and Vomiting    Nitrofurantoin Rash    Pcn [Penicillins] Hives and Rash       Current Facility-Administered Medications   Medication Dose Route Frequency Last Admin    levoFLOXacin (LEVAQUIN) 750 mg in D5W IVPB  750 mg IntraVENous NOW       Current Outpatient Medications   Medication Sig Dispense    loratadine 10 mg cap Take  by mouth.  loratadine (CLARITIN) 10 mg tablet Take 10 mg by mouth daily.  guaiFENesin ER (Mucinex) 600 mg ER tablet Take 600 mg by mouth two (2) times daily as needed for Congestion.  traZODone (DESYREL) 50 mg tablet Take 50 mg by mouth nightly.  lidocaine (XYLOCAINE) 4 % topical cream Apply  to affected area DIALYSIS PRN for Pain. 15 g    metoprolol tartrate (LOPRESSOR) 25 mg tablet Take 1 Tablet by mouth every twelve (12) hours. 60 Tablet    b complex-vitamin c-folic acid (NEPHROCAPS) 1 mg capsule Take 1 Capsule by mouth daily. 30 Capsule    patiromer calcium sorbitex (Veltassa) 16.8 gram powder Take  by mouth daily.  diphenhydrAMINE (BenadryL) 25 mg capsule Take 25 mg by mouth nightly as needed for Allergies.  furosemide (LASIX PO) Take 10 mg by mouth every morning.  cetirizine (ZyrTEC) 10 mg tablet Take 10 mg by mouth daily.  cranberry 500 mg capsule Take 500 mg by mouth daily.  guaiFENesin (ROBITUSSIN) 100 mg/5 mL liquid Take 100 mg by mouth every four (4) hours as needed for Cough.  fluticasone propionate (Flonase Allergy Relief) 50 mcg/actuation nasal spray 2 Sprays by Both Nostrils route two (2) times a day.  guaiFENesin-dextromethorphan (Diabetic Tussin DM)  mg/5 mL liqd Take 10 mL by mouth every six (6) hours as needed for Cough or Congestion.  esomeprazole (NexIUM) 40 mg capsule Take 40 mg by mouth daily.      L.acid,para-B. bifidum-S.therm (RISAQUAD) 8 billion cell cap cap Take 1 Capsule by mouth daily.  insulin degludec Binh Berta FlexTouch U-100) 100 unit/mL (3 mL) inpn 12 Units by SubCUTAneous route nightly.  ergocalciferol (Vitamin D2) 1,250 mcg (50,000 unit) capsule Take 50,000 Units by mouth every Wednesday.  ondansetron hcl (Zofran) 4 mg tablet Take 4 mg by mouth every four (4) hours as needed for Nausea or Vomiting.  montelukast (Singulair) 10 mg tablet Take 10 mg by mouth daily.  levothyroxine (SYNTHROID) 100 mcg tablet Take 1 Tablet by mouth Daily (before breakfast). 90 Tablet    biotin 5,000 mcg TbDi Take 5,000 mcg by mouth daily.  cholecalciferol, vitamin D3, (VITAMIN D3) 2,000 unit tab Take 4,000 Units by mouth daily.  insulin lispro (HUMALOG U-100 INSULIN) 100 unit/mL injection 3-4 Units by SubCUTAneous route Before breakfast, lunch, and dinner. Hold insulin if BS <80. Hold if patient does not eat her meal. If BS >150 give with SS.     atorvastatin (LIPITOR) 20 mg tablet Take 20 mg by mouth nightly.  ibuprofen (MOTRIN) 400 mg tablet Take 400 mg by mouth every six (6) hours as needed for Pain (Fever).  albuterol (PROVENTIL HFA, VENTOLIN HFA, PROAIR HFA) 90 mcg/actuation inhaler Take 1 Puff by inhalation every four (4) hours as needed for Wheezing. 1 Inhaler    glucose 4 gram chewable tablet Take 12 g by mouth as needed (BS < 60).  glucagon (GLUCAGEN) 1 mg injection 1 mg by IntraMUSCular route once as needed (hypoglycemia if patient is unresponsive). ROS (besides HPI):    General: No fever. No weight changes  ENT: No hearing loss or visual changes  Cardiovascular: No Chest pain  Pulmonary: No SOB  GI: No abdominal pain. No Nausea/Vomiting/Diarrhea. No blood in stool  : No blood in urine. No foamy or cloudy urine  Musculoskeletal: No joint swelling or redness.  No morning stiffness  Endocrine: no cold or heat intolerance  Psych: denies anxiety or depression  Neuro: No light headedness or dizziness    Objective   Visit Vitals  BP (!) 176/71 (BP 1 Location: Right upper arm, BP Patient Position: At rest)   Pulse 95   Temp 99.3 °F (37.4 °C)   Resp 25   LMP 03/27/1986   SpO2 94%       Physical Exam:    Gen: NAD. HEENT: Down facies    Neck: no JVD, no cervical lymphadenopathy, no carotid bruit    Lungs/Chest wall: Decreased BS b/l bases    Cardiovascular: Normal S1/S2, normal rate, regular rhythm. Abdomen: soft, NT, ND, BS+, no HSM    Ext: no clubbing or cyanosis. +AVF, No edema    Skin: warm and dry. No rashes    : no colin    CNS: alert awake. Answers appropriately.      Labs/Data:    Lab Results   Component Value Date/Time    Sodium 131 (L) 04/04/2022 11:07 AM    Potassium 4.1 04/04/2022 11:07 AM    Chloride 99 04/04/2022 11:07 AM    CO2 20 (L) 04/04/2022 11:07 AM    Anion gap 12 04/04/2022 11:07 AM    Glucose 456 (H) 04/04/2022 11:07 AM    BUN 50 (H) 04/04/2022 11:07 AM    Creatinine 5.95 (H) 04/04/2022 11:07 AM    BUN/Creatinine ratio 8 (L) 04/04/2022 11:07 AM    GFR est AA 9 (L) 04/04/2022 11:07 AM    GFR est non-AA 7 (L) 04/04/2022 11:07 AM    Calcium 8.2 (L) 04/04/2022 11:07 AM       Lab Results   Component Value Date/Time    WBC 10.0 04/04/2022 11:07 AM    Hemoglobin (POC) 12.2 11/05/2012 03:10 PM    HGB 9.6 (L) 04/04/2022 11:07 AM    Hematocrit (POC) 36 11/05/2012 03:10 PM    HCT 29.2 (L) 04/04/2022 11:07 AM    PLATELET 949 15/01/9039 11:07 AM    .8 (H) 04/04/2022 11:07 AM       Urine analysis:   Results for orders placed or performed in visit on 01/26/15   AMB POC URINALYSIS DIP STICK AUTO W/O MICRO     Status: Abnormal   Result Value Ref Range Status    Color (UA POC) Yellow  Final    Clarity (UA POC) Clear  Final    Glucose (UA POC) Negative Negative Final    Bilirubin (UA POC) Negative Negative Final    Ketones (UA POC) Negative Negative Final    Specific gravity (UA POC) 1.015 1.001 - 1.035 Final    Blood (UA POC) Negative Negative Final    pH (UA POC) 6.0 4.6 - 8.0 Final    Protein (UA POC) 2+ Negative mg/dL Final    Urobilinogen (UA POC) 0.2 mg/dL 0.2 - 1 Final    Nitrites (UA POC) Negative Negative Final    Leukocyte esterase (UA POC) Negative Negative Final           No components found for: SPEP, UPEP  No results found for: PUQ, PROTU2, PROTU1, BJP1, CPE1, IMEL1, MET2  No results found for: MCACR, MCA1, MCA2, MCA3, MCAU, MCAU2, MCALPOCT    No intake or output data in the 24 hours ending 04/04/22 1434    Wt Readings from Last 3 Encounters:   12/08/21 64.8 kg (142 lb 13.7 oz)   10/25/21 68 kg (150 lb)   10/11/21 75.8 kg (167 lb 1.7 oz)       Signed by:  Joe Alberts MD  Nephrology and Hypertension  [facility]

## 2022-04-04 NOTE — ED NOTES
TRANSFER - OUT REPORT:    Verbal report given to [facility] RN (name) on Jc Gr  being transferred to [address] (unit) for routine progression of care       Report consisted of patients Situation, Background, Assessment and   Recommendations(SBAR). Information from the following report(s) SBAR, ED Summary, [facility] and Recent Results was reviewed with the receiving nurse. Lines:   Peripheral IV 04/04/22 Right Antecubital (Active)   Site Assessment Clean, dry, & intact 04/04/22 1153   Phlebitis Assessment 0 04/04/22 1153   Infiltration Assessment 0 04/04/22 1153   Dressing Status Clean, dry, & intact 04/04/22 1153   Dressing Type Transparent 04/04/22 1153   Hub Color/Line Status Pink 04/04/22 1153       Peripheral IV 04/04/22 Right Hand (Active)   Site Assessment Clean, dry, & intact 04/04/22 1513   Phlebitis Assessment 0 04/04/22 1513   Infiltration Assessment 0 04/04/22 1513   Dressing Status Clean, dry, & intact 04/04/22 1513   Dressing Type Transparent 04/04/22 1513   Hub Color/Line Status Blue;Flushed;Patent 04/04/22 1513   Action Taken Blood drawn 04/04/22 1513   Alcohol Cap Used Yes 04/04/22 1513        Opportunity for questions and clarification was provided.       Patient transported with:   [employer]

## 2022-04-04 NOTE — PROGRESS NOTES
TRANSFER - IN REPORT:    Verbal report received from Suman(name) on Ness Gr  being received from ED(unit) for routine progression of care      Report consisted of patients Situation, Background, Assessment and   Recommendations(SBAR). Information from the following report(s) SBAR, Kardex and Intake/Output was reviewed with the receiving nurse. Opportunity for questions and clarification was provided. Assessment completed upon patients arrival to unit and care assumed.

## 2022-04-04 NOTE — ED PROVIDER NOTES
This is a 76-year-old female with a history of Down's, diabetes and seizures. She also has a history of hypothyroidism and pneumonia. She lives at the [facility] and is due for dialysis today but her blood sugar was noted to be over 600 and she was having some confusion. For that reason she was sent here for further evaluation. Did have a normal dialysis on Friday. Patient denies any nausea or vomiting, diarrhea or urinary symptoms. She is has no cough or congestion and no chest pain. There has been no shortness of breath. Patient denies any other acute symptomatology but seems somewhat confused.            Past Medical History:   Diagnosis Date    Allergic rhinitis, cause unspecified     Asthma     use inhalers    Diabetes ([address]     Down's syndrome     GERD (gastroesophageal reflux disease)     H/O impacted cerumen     Dr. Lilibeth Gómez Headache     Hypothyroid     Mononucleosis     Pneumonia     Seizures ([address]     as a result of low blood glucose readings    Thyroid disease        Past Surgical History:   Procedure Laterality Date    HX CATARACT REMOVAL Bilateral 3/5/15    cataract left and right    HX CYST REMOVAL      right shoulder    HX HEENT Bilateral 2017    prior in one eye    HX HYSTERECTOMY  1987         Family History:   Problem Relation Age of Onset    Thyroid Disease Mother         hypo    Hypertension Father     Abdominal aortic aneurysm Father     Neuropathy Father     No Known Problems Sister     Heart Disease Brother     Heart Attack Brother     Heart Surgery Brother     No Known Problems Sister     No Known Problems Brother     Anesth Problems Neg Hx        Social History     Socioeconomic History    Marital status: SINGLE     Spouse name: Not on file    Number of children: Not on file    Years of education: Not on file    Highest education level: Not on file   Occupational History    Not on file   Tobacco Use    Smoking status: Never Smoker    Smokeless tobacco: Never Used   Vaping Use    Vaping Use: Never used   Substance and Sexual Activity    Alcohol use: No    Drug use: No    Sexual activity: Not Currently   Other Topics Concern    Not on file   Social History Narrative    Not on file     Social Determinants of Health     Financial Resource Strain:     Difficulty of Paying Living Expenses: Not on file   Food Insecurity:     Worried About Running Out of Food in the Last Year: Not on file    Cornelius of Food in the Last Year: Not on file   Transportation Needs:     Lack of Transportation (Medical): Not on file    Lack of Transportation (Non-Medical): Not on file   Physical Activity:     Days of Exercise per Week: Not on file    Minutes of Exercise per Session: Not on file   Stress:     Feeling of Stress : Not on file   Social Connections:     Frequency of Communication with Friends and Family: Not on file    Frequency of Social Gatherings with Friends and Family: Not on file    Attends Confucianist Services: Not on file    Active Member of [address] [place] [address] or Organizations: Not on file    Attends Club or Organization Meetings: Not on file    Marital Status: Not on file   Intimate Partner Violence:     Fear of Current or Ex-Partner: Not on file    Emotionally Abused: Not on file    Physically Abused: Not on file    Sexually Abused: Not on file   Housing Stability:     Unable to Pay for Housing in the Last Year: Not on file    Number of [place] in the Last Year: Not on file    Unstable Housing in the Last Year: Not on file         ALLERGIES: Codeine, Nitrofurantoin, and Pcn [penicillins]    Review of Systems   Unable to perform ROS: Mental status change       There were no vitals filed for this visit. Physical Exam  Constitutional:       General: She is not in acute distress. Appearance: She is ill-appearing. She is not diaphoretic. HENT:      Head: Normocephalic and atraumatic.       Mouth/Throat:      Mouth: Mucous membranes are moist.      Pharynx: Oropharynx is clear. Eyes:      Extraocular Movements: Extraocular movements intact. Right eye: Normal extraocular motion. Left eye: Normal extraocular motion. Pupils: Pupils are equal, round, and reactive to light. Cardiovascular:      Rate and Rhythm: Normal rate and regular rhythm. Heart sounds: No murmur heard. Pulmonary:      Effort: Respiratory distress present. Breath sounds: No stridor. Rhonchi present. No wheezing. Abdominal:      General: There is no distension. Palpations: Abdomen is soft. There is no mass. Tenderness: There is no guarding. Musculoskeletal:         General: No swelling or tenderness. Cervical back: Normal range of motion and neck supple. No rigidity. Lymphadenopathy:      Cervical: No cervical adenopathy. Skin:     Capillary Refill: Capillary refill takes more than 3 seconds. Coloration: Skin is pale. Neurological:      Mental Status: She is lethargic. Cranial Nerves: No cranial nerve deficit, dysarthria or facial asymmetry. Motor: Weakness ( generalized) present.    Psychiatric:      Comments: AMS          MDM  Number of Diagnoses or Management Options  Abnormal chest x-ray: new and requires workup  Altered mental status, unspecified altered mental status type: new and requires workup  Hyperglycemia: new and requires workup     Amount and/or Complexity of Data Reviewed  Clinical lab tests: ordered and reviewed  Tests in the radiology section of CPT®: ordered and reviewed  Decide to obtain previous medical records or to obtain history from someone other than the patient: yes  Review and summarize past medical records: yes  Discuss the patient with other providers: yes  Independent visualization of images, tracings, or specimens: yes    Risk of Complications, Morbidity, and/or Mortality  Presenting problems: high  Diagnostic procedures: high  Management options: high    Patient Progress  Patient progress: stable         Procedures    This is a 58-year-old female who presents with elevated sugar, some abdominal pain and back pain. There is been no nausea or vomiting or shortness of breath. She has had no fever or chill and no cough or congestion. She is on dialysis. It is unclear whether she is been eating or taking her medications as directed. Will attempt to find the patient's mother. She complains of some dry mouth. Patient also seems a little bit confused. Her blood sugar is 456  Her CO2 is 20 with a anion gap of 12. She was given some fluids and a dose of regular insulin and will recheck her sugar. Checking x-rays as well. The patient's chest x-ray suggest either atelectasis or infiltrates and/or fluid in both lower lung fields. With this finding, hyperglycemia and the patient's confusion, we will asked the hospitalist to admit and notify nephrology for possible dialysis. She will also be started on some antibiotics at this time until we can rule out pneumonia. Perfect Serve Consult for Admission  1:38 PM    ED Room Number: ER23/23  Patient Name and age:  Bhumika Gr 45 y.o.  female  Working Diagnosis:   1. Altered mental status, unspecified altered mental status type    2. Hyperglycemia    3. Abnormal chest x-ray        COVID-19 Suspicion:  no  Sepsis present:  no  Reassessment needed: no  Code Status:  Full Code  Readmission: no  Isolation Requirements:  no  Recommended Level of Care:  telemetry  Department:[facility] Adult ED - 21   Other:  Nephrology paged    I have spoken to Dr. Bernabe Boyd and he will make arrangements for dialysis.     Total critical care time spent exclusive of procedures:  35 minutes

## 2022-04-04 NOTE — ED TRIAGE NOTES
Patient from [address] [place] [address] due for dialysis today BS over 600. Patient altered per staff - Hallucinations. Patient seen at [facility] on Friday and had dialysis.

## 2022-04-04 NOTE — PROGRESS NOTES
Lantus 12 units @ bedtime was therapeutically interchanged for Tresiba 12 Units @ bedtime per the P&T Committee approved Therapeutic Interchanges Policy.     Cadence Torres MS, Pharmacist  4/4/2022 6:22 PM

## 2022-04-05 NOTE — PROGRESS NOTES
Comprehensive Nutrition Assessment      Type and Reason for Visit: Initial    Nutrition Recommendations/Plan:   1. Adjusted to Renal diet- 4 carb choice  2. Trial Nepro    Nutrition Assessment:       Pt admitted for AMS (altered mental status) [R41.82]. Past Medical History:   Diagnosis Date    Allergic rhinitis, cause unspecified     Asthma     use inhalers    Diabetes ([facility]     Down's syndrome     GERD (gastroesophageal reflux disease)     H/O impacted cerumen     Dr. Helen Blair Headache     Hypothyroid     Mononucleosis     Pneumonia     Seizures ([facility]     as a result of low blood glucose readings    Thyroid disease      Pt seen with sister at bedside. MST received for wt loss. Sister reports weight loss is from kidney failure and once HD started, she lost all the fluid weight. Pt now back to usual BW- ~130-135 lbs. Currently 136 lbs. Pt drinks Boost sometimes. Discussed nepro for protein with lower K/Phos. Sister appreciative of discussion. Sister reports pt can't have sugar alcohols as these raise pts BG. Pt tries to not use sweeteners of any sort. Appetite is normal currently. No Gi issues, no chew/swallow issues. Noted lettuce and tomato allergy. Wt Readings from Last 10 Encounters:   04/05/22 61.7 kg (136 lb)   12/08/21 64.8 kg (142 lb 13.7 oz)   10/25/21 68 kg (150 lb)   10/11/21 75.8 kg (167 lb 1.7 oz)   10/01/21 70 kg (154 lb 5.2 oz)   09/27/21 69.7 kg (153 lb 10.6 oz)   04/22/21 68 kg (150 lb)   01/19/20 76.7 kg (169 lb 1.6 oz)   12/16/18 66.4 kg (146 lb 6.2 oz)   04/01/18 89 kg (196 lb 3.4 oz)       Malnutrition Assessment:  Malnutrition Status:  No malnutrition        Estimated Daily Nutrient Needs:  Energy (kcal): 1477 (MSJx1.3); Weight Used for Energy Requirements: Current  Protein (g): 62 (1.0g/kg); Weight Used for Protein Requirements: Current  Fluid (ml/day): 1500; Method Used for Fluid Requirements: 1 ml/kcal    Documented meal intake:   No data found.     Documented Supplement intake:  No data found. Nutrition Related Findings:    Bowel sounds:   Not documented   Last BM:  ,   Not documented   Edema: No data recorded    Medications:     Current Facility-Administered Medications   Medication Dose Route Frequency    epoetin jerry-epbx (RETACRIT) injection 10,000 Units  10,000 Units SubCUTAneous DIALYSIS MON, WED & FRI    atorvastatin (LIPITOR) tablet 20 mg  20 mg Oral QHS    insulin glargine (LANTUS) injection 12 Units  12 Units SubCUTAneous QHS    insulin lispro (HUMALOG) injection 3 Units  3 Units SubCUTAneous TIDAC    L.acidophilus-paracasei-S.thermophil-bifidobacter (RISAQUAD) 8 billion cell capsule  1 Capsule Oral DAILY    levothyroxine (SYNTHROID) tablet 100 mcg  100 mcg Oral ACB    metoprolol tartrate (LOPRESSOR) tablet 25 mg  25 mg Oral Q12H    traZODone (DESYREL) tablet 50 mg  50 mg Oral QHS    sodium chloride (NS) flush 5-40 mL  5-40 mL IntraVENous Q8H    insulin lispro (HUMALOG) injection   SubCUTAneous AC&HS         Wounds:    None       Current Nutrition Therapies:  ADULT ORAL NUTRITION SUPPLEMENT Dinner, PM Snack; Renal Supplement  ADULT DIET Regular; 4 carb choices (60 gm/meal); Low Sodium (2 gm); Low Potassium (Less than 3000 mg/day); Low Phosphorus (Less than 1000 mg)    Anthropometric Measures:  · Height:  4' 11\" (149.9 cm)  · Current Body Wt:  62 kg (136 lb 11 oz)   · Admission Body Wt:   136 lbs    · Usual Body Wt:   135 lbs     · Ideal Body Wt:  95 lbs:  143.9 %    · BMI Category:    Overweight        Nutrition Diagnosis:   No nutrition diagnosis at this time     Nutrition Interventions:   Food and/or Nutrient Delivery: Continue current diet  Nutrition Education and Counseling: No recommendations at this time  Coordination of Nutrition Care: Continue to monitor while inpatient,Interdisciplinary rounds    Goals:  PO >50% of meals and trial ONS within 5-7 days       Nutrition Monitoring and Evaluation:   Behavioral-Environmental Outcomes: None identified  Food/Nutrient Intake Outcomes: Food and nutrient intake,Supplement intake  Physical Signs/Symptoms Outcomes: Biochemical data,Weight,Skin    Discharge Planning:    Continue current diet     Electronically signed by Jay Del Angel, [address]   Contact: [phone]

## 2022-04-05 NOTE — PROGRESS NOTES
[address] [facility] Adult  Hospitalist Group                                                                                          Hospitalist Progress Note  Demond Parisi MD  Answering service: [phone] from in house phone        Date of Service:  2022  NAME:  Paty Silvestre  :  1970  MRN:  [id number]      Admission Summary:   Sabrina Gr is a 45 y.o. female who presents with altered mental status     Patient with history of Down syndrome, history was primary obtained from the family member present at the bedside, patient with history of ESRD, she was due for dialysis today, blood glucose was noted to be in the 600 and she was having some confusion, the family reports that patient was recently admitted to [facility] with elevated blood glucose and diabetic ketoacidosis, was recently discharged, post that started having some altered mental status, it got worse, the facility got concerned and decided to send her to the hospital patient currently resting in bed and denies any complaints or problems       Interval history / Subjective:   Patient seen for follow up of hyperglycemia and AMS. Patient seen and examined earlier by me. Mental status has improved. No acute complaints. Family at bedside. Assessment & Plan:      Altered mental status  Improved  Continue to monitor    Volume overload  HD tomorrow    Diabetes mellitus type 2 with hyperglycemia  Hyperglycemia controlled  Continue lantus and lispro scheduled, with SSI    Accelerated hypertension  Improved  continye lopressor and PRN hydralazine    Non-anion gap metabolic acidosis  Monitor with labs    Hypothyroidism  Levothyroxine     Arachnoid cyst   Increased in size from previous CT  Some mass effect  Holding steroids for now   Neurosurgery consulted     Code status: full  DVT prophylaxis: [address] discussed with: Patient/Family and Nurse  Anticipated Disposition: nursing facility  Anticipated Discharge: 24 hours to 48 hours     Hospital Problems  Date Reviewed: 1/18/2020          Codes Class Noted POA    AMS (altered mental status) ICD-10-CM: R41.82  ICD-9-CM: 780.97  4/4/2022 Unknown                Review of Systems:   A comprehensive review of systems was negative except for that written in the HPI. Vital Signs:    Last 24hrs VS reviewed since prior progress note. Most recent are:  Visit Vitals  BP (!) 164/76 (BP 1 Location: Right upper arm, BP Patient Position: Sitting)   Pulse 67   Temp 97.8 °F (36.6 °C)   Resp 16   Ht 4' 11.02\" (1.499 m)   Wt 62 kg (136 lb 11 oz)   SpO2 97%   Breastfeeding No   BMI 27.59 kg/m²         Intake/Output Summary (Last 24 hours) at 4/5/2022 1251  Last data filed at 4/5/2022 6694  Gross per 24 hour   Intake --   Output 2500 ml   Net -2500 ml        Physical Examination:     I had a face to face encounter with this patient and independently examined them on 4/5/2022 as outlined below:          Constitutional:  No acute distress, cooperative, pleasant    ENT:  Oral mucosa moist, oropharynx benign. Resp:  CTA bilaterally. No wheezing/rhonchi/rales. No accessory muscle use   CV:  Regular rhythm, normal rate, no murmurs, gallops, rubs    GI:  Soft, non distended, non tender. normoactive bowel sounds, no hepatosplenomegaly     Musculoskeletal:  No edema, warm, 2+ pulses throughout    Neurologic:  Moves all extremities.   AAOx3, CN II-XII reviewed            Data Review:    Review and/or order of clinical lab test  Review and/or order of tests in the radiology section of CPT  Review and/or order of tests in the medicine section of CPT      Labs:     Recent Labs     04/05/22  0038 04/04/22  1107   WBC 7.4 10.0   HGB 9.2* 9.6*   HCT 27.7* 29.2*   * 154     Recent Labs     04/05/22  0038 04/04/22  1107   * 131*   K 3.9 4.1    99   CO2 20* 20*   BUN 54* 50*   CREA 5.96* 5.95*   * 456*   CA 8.5 8.2*   MG 1.7  --    PHOS 3.5  --      Recent Labs 04/05/22  0038 04/04/22  1107   ALT 30 40    150*   TBILI 0.3 0.4   TP 6.3* 7.2   ALB 2.3* 2.8*   GLOB 4.0 4.4*     No results for input(s): INR, PTP, APTT, INREXT in the last 72 hours. No results for input(s): FE, TIBC, PSAT, FERR in the last 72 hours. Lab Results   Component Value Date/Time    Folate 63.4 (H) 04/05/2022 12:38 AM      No results for input(s): PH, PCO2, PO2 in the last 72 hours. No results for input(s): CPK, CKNDX, TROIQ in the last 72 hours. No lab exists for component: CPKMB  Lab Results   Component Value Date/Time    Cholesterol, total 107 12/16/2018 04:08 AM    HDL Cholesterol 42 12/16/2018 04:08 AM    LDL, calculated 36.4 12/16/2018 04:08 AM    Triglyceride 143 12/16/2018 04:08 AM    CHOL/HDL Ratio 2.5 12/16/2018 04:08 AM     Lab Results   Component Value Date/Time    Glucose (POC) 117 04/05/2022 11:29 AM    Glucose (POC) 114 04/05/2022 06:29 AM    Glucose (POC) 247 (H) 04/04/2022 09:25 PM    Glucose (POC) 339 (H) 04/04/2022 05:30 PM    Glucose (POC) 306 (H) 04/04/2022 02:52 PM     Lab Results   Component Value Date/Time    Color YELLOW/STRAW 04/04/2022 04:26 PM    Appearance CLEAR 04/04/2022 04:26 PM    Specific gravity 1.014 04/04/2022 04:26 PM    Specific gravity 1.015 04/29/2013 01:50 PM    pH (UA) 7.0 04/04/2022 04:26 PM    Protein >300 (A) 04/04/2022 04:26 PM    Glucose 500 (A) 04/04/2022 04:26 PM    Ketone Negative 04/04/2022 04:26 PM    Bilirubin Negative 04/04/2022 04:26 PM    Urobilinogen 0.2 04/04/2022 04:26 PM    Nitrites Negative 04/04/2022 04:26 PM    Leukocyte Esterase Negative 04/04/2022 04:26 PM    Glucose/Ketones  02/08/2009 07:00 AM     GLUCOSE AND KETONES SIGNIFICANTLY ELEVATED.  RESULTS PHONED TO AND READ BACK BY    Epithelial cells FEW 04/04/2022 04:26 PM    Bacteria Negative 04/04/2022 04:26 PM    WBC 0-4 04/04/2022 04:26 PM    RBC 0-5 04/04/2022 04:26 PM         Medications Reviewed:     Current Facility-Administered Medications   Medication Dose Route Frequency    epoetin jerry-epbx (RETACRIT) injection 10,000 Units  10,000 Units SubCUTAneous DIALYSIS MON, WED & FRI    atorvastatin (LIPITOR) tablet 20 mg  20 mg Oral QHS    diphenhydrAMINE (BENADRYL) capsule 25 mg  25 mg Oral QHS PRN    guaiFENesin (ROBITUSSIN) 100 mg/5 mL oral liquid 100 mg  100 mg Oral Q4H PRN    insulin glargine (LANTUS) injection 12 Units  12 Units SubCUTAneous QHS    insulin lispro (HUMALOG) injection 3 Units  3 Units SubCUTAneous TIDAC    L.acidophilus-paracasei-S.thermophil-bifidobacter (RISAQUAD) 8 billion cell capsule  1 Capsule Oral DAILY    levothyroxine (SYNTHROID) tablet 100 mcg  100 mcg Oral ACB    metoprolol tartrate (LOPRESSOR) tablet 25 mg  25 mg Oral Q12H    traZODone (DESYREL) tablet 50 mg  50 mg Oral QHS    sodium chloride (NS) flush 5-40 mL  5-40 mL IntraVENous Q8H    sodium chloride (NS) flush 5-40 mL  5-40 mL IntraVENous PRN    acetaminophen (TYLENOL) tablet 650 mg  650 mg Oral Q4H PRN    glucose chewable tablet 16 g  4 Tablet Oral PRN    dextrose 10 % infusion 0-250 mL  0-250 mL IntraVENous PRN    glucagon (GLUCAGEN) injection 1 mg  1 mg IntraMUSCular PRN    insulin lispro (HUMALOG) injection   SubCUTAneous AC&HS    hydrALAZINE (APRESOLINE) 20 mg/mL injection 10 mg  10 mg IntraVENous Q6H PRN     ______________________________________________________________________  EXPECTED LENGTH OF STAY: - - -  ACTUAL LENGTH OF STAY:          Edilson Dyson MD

## 2022-04-05 NOTE — PROGRESS NOTES
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 No issues. Patient sleeping on recliner-> appears comfortable  Bld sugars improving      ROS:   UTO    Exam:  Visit Vitals  BP (!) 164/76 (BP 1 Location: Right upper arm, BP Patient Position: Sitting)   Pulse 90   Temp 97.8 °F (36.6 °C)   Resp 16   Wt 62 kg (136 lb 11 oz)   LMP 03/27/1986   SpO2 97%   Breastfeeding No   BMI 27.61 kg/m²     Wt Readings from Last 3 Encounters:   04/05/22 62 kg (136 lb 11 oz)   12/08/21 64.8 kg (142 lb 13.7 oz)   10/25/21 68 kg (150 lb)       Intake/Output Summary (Last 24 hours) at 4/5/2022 1028  Last data filed at 4/5/2022 0610  Gross per 24 hour   Intake --   Output 2500 ml   Net -2500 ml       Gen: NAD/Resting  HEENT: Down facies  Lungs/Chest wall: Clear. No accessory muscle use. Cardiovascular: Regular rate, normal rhythm. Abdomen/: Soft, NT, ND, BS+.    Ext: No peripheral edema        Current Facility-Administered Medications   Medication Dose Route Frequency Last Admin    epoetin jerry-epbx (RETACRIT) injection 10,000 Units  10,000 Units SubCUTAneous DIALYSIS MON, WED & FRI      atorvastatin (LIPITOR) tablet 20 mg  20 mg Oral QHS 20 mg at 04/04/22 2154    diphenhydrAMINE (BENADRYL) capsule 25 mg  25 mg Oral QHS PRN      furosemide (LASIX) tablet 10 mg  10 mg Oral DAILY 10 mg at 04/05/22 0954    guaiFENesin (ROBITUSSIN) 100 mg/5 mL oral liquid 100 mg  100 mg Oral Q4H PRN      insulin glargine (LANTUS) injection 12 Units  12 Units SubCUTAneous QHS 12 Units at 04/04/22 2155    insulin lispro (HUMALOG) injection 3 Units  3 Units SubCUTAneous TIDAC 3 Units at 04/05/22 0731    L.acidophilus-paracasei-S.thermophil-bifidobacter (RISAQUAD) 8 billion cell capsule  1 Capsule Oral DAILY 1 Capsule at 04/05/22 0954    levothyroxine (SYNTHROID) tablet 100 mcg  100 mcg Oral  mcg at 04/05/22 0631    metoprolol tartrate (LOPRESSOR) tablet 25 mg  25 mg Oral Q12H 25 mg at 04/05/22 0954    traZODone (DESYREL) tablet 50 mg  50 mg Oral QHS 50 mg at 04/04/22 2154    sodium chloride (NS) flush 5-40 mL  5-40 mL IntraVENous Q8H 10 mL at 04/05/22 0631    sodium chloride (NS) flush 5-40 mL  5-40 mL IntraVENous PRN 10 mL at 04/04/22 2156    acetaminophen (TYLENOL) tablet 650 mg  650 mg Oral Q4H PRN      glucose chewable tablet 16 g  4 Tablet Oral PRN      dextrose 10 % infusion 0-250 mL  0-250 mL IntraVENous PRN      glucagon (GLUCAGEN) injection 1 mg  1 mg IntraMUSCular PRN      insulin lispro (HUMALOG) injection   SubCUTAneous AC&HS 2 Units at 04/04/22 2154    hydrALAZINE (APRESOLINE) 20 mg/mL injection 10 mg  10 mg IntraVENous Q6H PRN         Labs/Data:    Lab Results   Component Value Date/Time    WBC 7.4 04/05/2022 12:38 AM    Hemoglobin (POC) 12.2 11/05/2012 03:10 PM    HGB 9.2 (L) 04/05/2022 12:38 AM    Hematocrit (POC) 36 11/05/2012 03:10 PM    HCT 27.7 (L) 04/05/2022 12:38 AM    PLATELET 299 (L) 91/17/8386 12:38 AM    .2 (H) 04/05/2022 12:38 AM       Lab Results   Component Value Date/Time    Sodium 133 (L) 04/05/2022 12:38 AM    Potassium 3.9 04/05/2022 12:38 AM    Chloride 106 04/05/2022 12:38 AM    CO2 20 (L) 04/05/2022 12:38 AM    Anion gap 7 04/05/2022 12:38 AM    Glucose 125 (H) 04/05/2022 12:38 AM    BUN 54 (H) 04/05/2022 12:38 AM    Creatinine 5.96 (H) 04/05/2022 12:38 AM    BUN/Creatinine ratio 9 (L) 04/05/2022 12:38 AM    GFR est AA 9 (L) 04/05/2022 12:38 AM    GFR est non-AA 7 (L) 04/05/2022 12:38 AM    Calcium 8.5 04/05/2022 12:38 AM       Patient seen and examined. Chart reviewed. Labs, data and other pertinent notes reviewed in last 24 hrs.         Signed by:  Hernandez Castillo MD  [id number]

## 2022-04-05 NOTE — CARDIO/PULMONARY
Cardiac Rehab: Consult received and chart reviewed. Echo pending no previous since 2018. Fredy Gr with AMS on admit and Down syndrome may not be appropriate for OP program. Will follow for echo and enroll if she meets the following criteria:  .  EF ? 35% at time of enrollment   Stable class 2-4 NYHA heart failure   Optimal medical therapy for > 6 weeks   No major hospitalizations within the last 6 weeks   No major hospitalizations within the next 6 weeks  Loli Encarnacion RN

## 2022-04-05 NOTE — PROGRESS NOTES
Problem: Discharge Planning  Goal: *Discharge to safe environment  4/5/2022 1525 by Tonia Rosas RN  Outcome: Progressing Towards Goal  4/5/2022 1524 by Tonia Rosas RN  Outcome: Progressing Towards Goal     Problem: Diabetes Maintenance:Ongoing  Goal: Nutrition  Outcome: Progressing Towards Goal  Goal: Medications  Outcome: Progressing Towards Goal  Goal: Treatments/Interventsions/Procedures  Outcome: Progressing Towards Goal  Goal: *Blood Glucose 80 to 180 md/dl  Outcome: Progressing Towards Goal     Problem: Diabetes Maintenance:Discharge Outcomes  Goal: *Describes follow-up/return visits to physicians  Outcome: Progressing Towards Goal  Goal: *Blood glucose at patient's target range or approaching  Outcome: Progressing Towards Goal  Goal: *Aware of nutrition guidelines  Outcome: Progressing Towards Goal  Goal: *Verbalizes information about medication  Description: Verbalizes name, dosage, time, side effects, and number of days to  continue medications.   Outcome: Progressing Towards Goal  Goal: *Describes goals, rules, symptoms, and treatments  Description: Describes blood glucose goals, monitoring, sick day rules,  hypo/hyperglycemia prevention, symptoms, and treatment  Outcome: Progressing Towards Goal  Goal: *Describes available outpatient diabetes resources and support systems  Outcome: Progressing Towards Goal

## 2022-04-05 NOTE — PROGRESS NOTES
Consult received to review an arachnoid cyst of the left midline cerebral hemisphere  Despite the radiologist reading of this lesion, it does not exert any mass effect, is a benign finding in this patient with Downs syndrome according to the chart and requires no further work up or surgical intervention.  Full note to follow  It is not likely that it is the cause of her altered mental status as that seems to be improved now  Will re evaluate on request  Thank you

## 2022-04-05 NOTE — PROGRESS NOTES
Occupational Therapy Screening:  Services maybe indicated at this time. [facility] based on results obtained during the nursing admission assessment. The patients chart was reviewed . Please order a consult for occupational therapy if patient has had a decline in function from baseline and you would like an evaluation to be completed. Thank you.

## 2022-04-05 NOTE — PROCEDURES
Hemodialysis / [phone]    Vitals Pre Post Assessment Pre Post   /79 148/69 LOC A&O x3 A&O x3   HR 89 78 Lungs clear clear   Resp 16 16 Cardiac regular regular   Temp 98.5 98.6 Skin Dry, warm Dry, warm   Weight    Edema mild none   Tele status Monitored remotely Monitored remotely Pain 0 0     Orders   Duration: Start: 0310 End: 0610 Total: 3 hrs   Dialyzer: Dialyzer/Set Up Inspection: Revaclear (04/05/22 0310)   K Bath: Dialysate K (mEq/L): 3 (04/05/22 0310)   Ca Bath: Dialysate CA (mEq/L): 2.5 (04/05/22 0310)   Na: Dialysate NA (mEq/L): 138 (04/05/22 0310)   Bicarb: Dialysate HCO3 (mEq/L): 35 (04/05/22 0310)   Target Fluid Removal: Goal/Amount of Fluid to Remove (mL): 2500 mL (04/05/22 0310)     Access   Type & Location: YENIFER AVG: +B&T, no S&S of infection, site cleaned per P&P, cannulated with 15G 1\" needles x2   Comments:                                        Labs   HBsAg (Antigen) / date:      04/04/22 negative                                         HBsAb (Antibody) / date: 10/08/21 susceptible   Source:    Obtained/Reviewed  Critical Results Called HGB   Date Value Ref Range Status   04/05/2022 9.2 (L) 11.5 - 16.0 g/dL Final     Potassium   Date Value Ref Range Status   04/05/2022 3.9 3.5 - 5.1 mmol/L Final     Comment:     SPECIMEN HEMOLYZED, RESULTS MAY BE AFFECTED     Calcium   Date Value Ref Range Status   04/05/2022 8.5 8.5 - 10.1 MG/DL Final     BUN   Date Value Ref Range Status   04/05/2022 54 (H) 6 - 20 MG/DL Final     Creatinine   Date Value Ref Range Status   04/05/2022 5.96 (H) 0.55 - 1.02 MG/DL Final        Meds Given   Name Dose Route                    Adequacy / Fluid    Total Liters Process: 52 L   Net Fluid Removed: 2500 cc      Comments   Time Out Done:   (Time) 6797   Admitting Diagnosis:    Consent obtained/signed: Informed Consent Verified: Yes (04/05/22 0310)   Machine / RO # Machine Number: [id number] (04/05/22 0310)   Primary Nurse Rpt Pre: Jerrel Hammans, RN   Primary Nurse Rpt Post: Jerzy Damian RN   Pt Education: Access care, procedure   Care Plan: Continue HD tx as per MD order   Pts outpatient clinic:      Tx Summary        Comments: Tolerated tx well, at the end remaining blood in circuit returned with 300 cc NS, cannulas removed x2, hemostasis achieved, dressing applied.

## 2022-04-05 NOTE — PROGRESS NOTES
Transition of Care- Anticipate discharge to [address] [place] [address] (P: [phone]  Fax: [phone])   Where she resides with her mother in a two bedroom apartment. Patients sister Viktoriya believes she is receiving home health services but does not recall who it's with. CM can contact [facility] to find out if patient is open to [facility] and which company they use. RUR 19%  Transportation at Discharge:  Mishel Wilson ([phone])    Cm contacted patients sister Viktoriya ([phone]) cm introduced self, explained role and offered support. Patient resides at the [facility]. No discharge concerns at this time    [facility] outpatient HD at [place] (M,W,F @ [address])  [phone], Fax: [phone] provides transportation on Mondays  Sister provides transportation on Wednesdays  [employer] transport provides on Fridays  Therapy       Reason for Admission:  Altered Mental Status                     RUR Score:  19%                   Plan for utilizing home health: Will contact [facility] to verify if patient is open to Homehealth at this time.       PCP: First and Last name:  Cayden Turner MD    Are you a current patient: Yes/No:  Yes   Approximate date of last visit: Recent past two weeks   Can you participate in a virtual visit with your PCP:  No                    Current Advanced Directive/Advance Care Plan: Full Code      Healthcare Decision Maker:   Click here to complete [address] including selection of the Healthcare Decision Maker Relationship (ie \"Primary\")             Care Management Interventions  Support Systems: Assisted Living  Discharge Location  Patient Expects to be Discharged to[de-identified] Assisted Living

## 2022-04-06 NOTE — DISCHARGE SUMMARY
Discharge Summary       PATIENT ID: Faby Elizabeth  MRN: [id number]   YOB: 1970    DATE OF ADMISSION: 4/4/2022 11:15 AM    DATE OF DISCHARGE:   PRIMARY CARE PROVIDER: Rylee Mcguire MD       DISCHARGING PHYSICIAN: Di Esqueda MD    To contact this individual call [phone] and ask the  to page. If unavailable ask to be transferred the Adult Hospitalist Department. CONSULTATIONS: IP CONSULT TO NEPHROLOGY  IP CONSULT TO NEUROSURGERY    PROCEDURES/SURGERIES: * No surgery found *    ADMITTING DIAGNOSES & HOSPITAL COURSE:   45 y. o. female who presents with altered mental status.    Patient with history of Down syndrome, history was primary obtained from the family member present at the bedside, patient with history of ESRD, she was due for dialysis today, blood glucose was noted to be in the 600 and she was having some confusion, the family reports that patient was recently admitted to [facility] with elevated blood glucose and diabetic ketoacidosis, was recently discharged, post that started having some altered mental status, it got worse, the facility got concerned and decided to send her to the hospital patient currently resting in bed and denies any complaints or problems  Patient was admitted for uncontrolled blood sugar and hypertension. 04/06/2022: Patient said she is doing fine. Denied any acute issues today. Wants to be discharged so she can follow-up with hemodialysis this morning. She states she should not have written candy that we are there and that is why her blood sugar was high. Patient said she will avoid candy from now on. Patient brother and sister live in law are at the bedside. Family are aware of the situation. Family said patient lives with her grandmother at the [facility]. DISCHARGE DIAGNOSES / PLAN:      Altered mental status  Due to hyperglycemia.     Resolved and at the baseline according to family at the bedside.     Volume overload  HD today. Diabetes mellitus type 2 with hyperglycemia  Hyperglycemia controlled  Continue lantus and lispro scheduled, with SSI     Accelerated hypertension  Improved  continye lopressor and PRN hydralazine     Non-anion gap metabolic acidosis  Resolved.      Hypothyroidism  Levothyroxine      Arachnoid cyst   Increased in size from previous CT  Some mass effect  Holding steroids for now   Neurosurgery consulted      Code status: full  DVT prophylaxis: scd     Care Plan discussed with: Patient/Family and Nurse  Anticipated Disposition: nursing facility  Anticipated Discharge:  Discharge today as per patient and her family at the bedside request.         PENDING TEST RESULTS:   At the time of discharge the following test results are still pending: NA    FOLLOW UP APPOINTMENTS:    Follow-up Information     Follow up With Specialties Details Why Contact Info    Harry Negron MD Internal Medicine   . [address]  [address] [place] [address]  [facility].  [phone]                   DISCHARGE MEDICATIONS:  Current Discharge Medication List      CONTINUE these medications which have NOT CHANGED    Details   traZODone (DESYREL) 50 mg tablet Take 50 mg by mouth nightly. lidocaine (XYLOCAINE) 4 % topical cream Apply  to affected area DIALYSIS PRN for Pain. Qty: 15 g, Refills: 0      b complex-vitamin c-folic acid (NEPHROCAPS) 1 mg capsule Take 1 Capsule by mouth daily. Qty: 30 Capsule, Refills: 0      cranberry 500 mg capsule Take 500 mg by mouth daily. fluticasone propionate (Flonase Allergy Relief) 50 mcg/actuation nasal spray 2 Sprays by Both Nostrils route two (2) times a day. esomeprazole (NexIUM) 40 mg capsule Take 40 mg by mouth daily. ergocalciferol (Vitamin D2) 1,250 mcg (50,000 unit) capsule Take 50,000 Units by mouth every Wednesday. montelukast (Singulair) 10 mg tablet Take 10 mg by mouth daily.       levothyroxine (SYNTHROID) 100 mcg tablet Take 1 Tablet by mouth Daily (before breakfast). Qty: 90 Tablet, Refills: 3      biotin 5,000 mcg TbDi Take 5,000 mcg by mouth daily. cholecalciferol, vitamin D3, (VITAMIN D3) 2,000 unit tab Take 4,000 Units by mouth daily. insulin lispro (HUMALOG U-100 INSULIN) 100 unit/mL injection 3-4 Units by SubCUTAneous route Before breakfast, lunch, and dinner. Hold insulin if BS <80. Hold if patient does not eat her meal. If BS >150 give with SS.      atorvastatin (LIPITOR) 20 mg tablet Take 20 mg by mouth nightly. albuterol (PROVENTIL HFA, VENTOLIN HFA, PROAIR HFA) 90 mcg/actuation inhaler Take 1 Puff by inhalation every four (4) hours as needed for Wheezing. Qty: 1 Inhaler, Refills: 0      glucose 4 gram chewable tablet Take 12 g by mouth as needed (BS < 60). glucagon (GLUCAGEN) 1 mg injection 1 mg by IntraMUSCular route once as needed (hypoglycemia if patient is unresponsive). metoprolol tartrate (LOPRESSOR) 25 mg tablet Take 1 Tablet by mouth every twelve (12) hours. Qty: 60 Tablet, Refills: 0      diphenhydrAMINE (BenadryL) 25 mg capsule Take 25 mg by mouth nightly as needed for Allergies. cetirizine (ZyrTEC) 10 mg tablet Take 10 mg by mouth daily. L.acid,para-B. bifidum-S.therm (RISAQUAD) 8 billion cell cap cap Take 1 Capsule by mouth daily. insulin degludec Sim Stapler FlexTouch U-100) 100 unit/mL (3 mL) inpn 12 Units by SubCUTAneous route nightly. ondansetron hcl (Zofran) 4 mg tablet Take 4 mg by mouth every four (4) hours as needed for Nausea or Vomiting.          STOP taking these medications       ibuprofen (MOTRIN) 400 mg tablet Comments:   Reason for Stopping:         loratadine 10 mg cap Comments:   Reason for Stopping:         loratadine (CLARITIN) 10 mg tablet Comments:   Reason for Stopping:         guaiFENesin ER (Mucinex) 600 mg ER tablet Comments:   Reason for Stopping:         patiromer calcium sorbitex (Veltassa) 16.8 gram powder Comments:   Reason for Stopping:         furosemide (LASIX PO) Comments:   Reason for Stopping:         guaiFENesin (ROBITUSSIN) 100 mg/5 mL liquid Comments:   Reason for Stopping:         guaiFENesin-dextromethorphan (Diabetic Tussin DM)  mg/5 mL liqd Comments:   Reason for Stopping:                 NOTIFY YOUR PHYSICIAN FOR ANY OF THE FOLLOWING:   Fever over 101 degrees for 24 hours. Chest pain, shortness of breath, fever, chills, nausea, vomiting, diarrhea, change in mentation, falling, weakness, bleeding. Severe pain or pain not relieved by medications. Or, any other signs or symptoms that you may have questions about. DISPOSITION:    Home With:   OT  PT  HH  RN       Long term SNF/Inpatient Rehab   X Independent/assisted living    Hospice    Other:       PATIENT CONDITION AT DISCHARGE:     Functional status    Poor     Deconditioned    X Independent      Cognition    X Lucid     Forgetful     Dementia      Catheters/lines (plus indication)    [facility]     PICC     PEG    X None      Code status   X  Full code     DNR      PHYSICAL EXAMINATION AT DISCHARGE:                                          Constitutional:   Patient is comfortably laying in the chair. She is eating her breakfast.  No acute distress, cooperative, pleasant. ENT:  Oral mucosa moist, oropharynx benign. Resp:  CTA bilaterally. No wheezing/rhonchi/rales. No accessory muscle use. CV:  Regular rhythm, normal rate, no murmurs, gallops, rubs. GI:  Soft, non distended, non tender. normoactive bowel sounds, no hepatosplenomegaly     Musculoskeletal:  No edema, warm, 2+ pulses throughout    Neurologic:   Patient has clear voice. She moves all of her extremities equally. She is well oriented in time place and person.                                          CHRONIC MEDICAL DIAGNOSES:  Problem List as of 4/6/2022 Date Reviewed: 1/18/2020          Codes Class Noted - Resolved    AMS (altered mental status) ICD-10-CM: R41.82  ICD-9-CM: 780.97  4/4/2022 - Present        DKA (diabetic ketoacidosis) ([facility] ICD-10-CM: E11.10  ICD-9-CM: 250.12  12/4/2021 - Present        Acute respiratory failure with hypoxia [facility]) ICD-10-CM: J96.01  ICD-9-CM: 518.81  10/25/2021 - Present        CKD (chronic kidney disease), stage V (HCC) ICD-10-CM: N18.5  ICD-9-CM: 585.5  10/8/2021 - Present        Fluid overload ICD-10-CM: E87.70  ICD-9-CM: 276.69  10/8/2021 - Present        Severe anemia ICD-10-CM: D64.9  ICD-9-CM: 285.9  10/8/2021 - Present        ESRD (end stage renal disease) ([facility].) ICD-10-CM: N18.6  ICD-9-CM: 585.6  10/1/2021 - Present        Metabolic acidosis FGP-36-VR: E87.2  ICD-9-CM: 276.2  1/19/2020 - Present        Asthma exacerbation ICD-10-CM: J45.901  ICD-9-CM: 493.92  1/19/2020 - Present        ROBBY (acute kidney injury) ([place] [address].) ICD-10-CM: N17.9  ICD-9-CM: 584.9  1/15/2020 - Present        Advance directive on file ICD-10-CM: Z78.9  ICD-9-CM: V49.89  12/28/2015 - Present        Acne ICD-10-CM: L70.9  ICD-9-CM: 706.1  8/20/2014 - Present        Asthma (Chronic) ICD-10-CM: J45.909  ICD-9-CM: 493.90  4/29/2013 - Present        HTN (hypertension) (Chronic) ICD-10-CM: I10  ICD-9-CM: 401.9  4/29/2013 - Present        DM (diabetes mellitus), type 1, uncontrolled ICD-10-CM: [id number]  ICD-9-CM: 250.03  11/14/2012 - Present        Hypoglycemia ICD-10-CM: E16.2  ICD-9-CM: 251.2  6/27/2012 - Present        Down syndrome ICD-10-CM: Q90.9  ICD-9-CM: 758.0  7/23/2010 - Present        Acquired hypothyroidism ICD-10-CM: E03.9  ICD-9-CM: 244.9  7/23/2010 - Present        Anemia, unspecified ICD-10-CM: D64.9  ICD-9-CM: 285.9  7/23/2010 - Present        RESOLVED: Hyponatremia ICD-10-CM: E87.1  ICD-9-CM: 276.1  2/28/2018 - 1/1/2019        RESOLVED: DKA (diabetic ketoacidoses) ICD-10-CM: E11.10  ICD-9-CM: 250.12  9/14/2017 - 1/1/2019        RESOLVED: Hyperglycemia ICD-10-CM: R73.9  ICD-9-CM: 790.29  7/28/2014 - 7/29/2014        RESOLVED: Syncope and collapse ICD-10-CM: R55  ICD-9-CM: 780.2 4/29/2013 - 1/1/2019        RESOLVED: Type II or unspecified type diabetes mellitus without mention of complication, uncontrolled ICD-10-CM: [id number]  ICD-9-CM: 250.02  7/23/2010 - 11/14/2012              Greater than 35 minutes were spent with the patient on counseling and coordination of care    Signed:   Madeleine Jules MD  4/6/2022  9:15 AM

## 2022-04-06 NOTE — PROGRESS NOTES
Transition of Care Plan   RUR- 19% Moderate Risk   DISPOSITION: The disposition plan is to transition back to [facility] - [phone] - patient accepted.  RAPID COVID TEST NEEDED   F/U with PCP/Specialist     Transport: Family - Sharon Mercer - [phone]    At 9:08am -  spoke with NS who confirmed from attending that patient is stable for discharge. At 9:11am - CM attempted to contact SW at the facility to provide the above update. CM left a VM awaiting call back. Medicare pt has received, reviewed, and signed 2nd IM letter informing them of their right to appeal the discharge. Signed copy has been placed on pt bedside chart. At 9:30am -  went to speak with patient and patients family at bedside. Patient was observed in a wheelchair to leave. CM spoke with assigned nurse to follow up. It is reported assigned nurse was informed to discharge patient. Patients family member reports that patient needed to leave by 9:00am to get to her dialysis appointment. At this time CM has not heard back from the SW at the [facility] to confirm that patient can return to facility. At 10:17am -  attempted to contact facility again to provide update, that patient has discharged and was picked up by family to go to her dialysis appointment. CM left a VM and awaiting call back. At 10:30am -  received a call from Brock Hays who requested that a RAPID COVID be completed before patient discharges, however patient has already discharge. CM encouraged to fax progress notes to [phone]. Requested documents were faxed.     May Amaro, KOFFI, CRM, [id number]  Available in Perfect [employer]

## 2022-04-06 NOTE — PROGRESS NOTES
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 Bld sugars better. NO SOB      ROS:   See above    Exam:  Visit Vitals  /62 (BP 1 Location: Right upper arm, BP Patient Position: Sitting)   Pulse 65   Temp 98.2 °F (36.8 °C)   Resp 18   Ht 4' 11\" (1.499 m)   Wt 61.7 kg (136 lb)   LMP 03/27/1986   SpO2 98%   Breastfeeding No   BMI 27.47 kg/m²     Wt Readings from Last 3 Encounters:   04/05/22 61.7 kg (136 lb)   12/08/21 64.8 kg (142 lb 13.7 oz)   10/25/21 68 kg (150 lb)     No intake or output data in the 24 hours ending 04/06/22 1139    Gen: NAD/Resting  HEENT: Down facies  Lungs/Chest wall: Clear. No accessory muscle use. Cardiovascular: Regular rate, normal rhythm. Abdomen/: Soft, NT, ND, BS+.    Ext: No peripheral edema        Current Facility-Administered Medications   Medication Dose Route Frequency Last Admin    epoetin jerry-epbx (RETACRIT) injection 10,000 Units  10,000 Units SubCUTAneous DIALYSIS MON, WED & FRI      atorvastatin (LIPITOR) tablet 20 mg  20 mg Oral QHS 20 mg at 04/05/22 2119    diphenhydrAMINE (BENADRYL) capsule 25 mg  25 mg Oral QHS PRN      guaiFENesin (ROBITUSSIN) 100 mg/5 mL oral liquid 100 mg  100 mg Oral Q4H PRN      insulin glargine (LANTUS) injection 12 Units  12 Units SubCUTAneous QHS 12 Units at 04/05/22 2121    insulin lispro (HUMALOG) injection 3 Units  3 Units SubCUTAneous TIDAC 3 Units at 04/06/22 0844    L.acidophilus-paracasei-S.thermophil-bifidobacter (RISAQUAD) 8 billion cell capsule  1 Capsule Oral DAILY 1 Capsule at 04/06/22 0844    levothyroxine (SYNTHROID) tablet 100 mcg  100 mcg Oral  mcg at 04/06/22 0647    metoprolol tartrate (LOPRESSOR) tablet 25 mg  25 mg Oral Q12H 25 mg at 04/06/22 0843    traZODone (DESYREL) tablet 50 mg  50 mg Oral QHS 50 mg at 04/05/22 2119    sodium chloride (NS) flush 5-40 mL  5-40 mL IntraVENous Q8H 10 mL at 04/06/22 0646    sodium chloride (NS) flush 5-40 mL  5-40 mL IntraVENous PRN 10 mL at 04/04/22 2156    acetaminophen (TYLENOL) tablet 650 mg  650 mg Oral Q4H PRN      glucose chewable tablet 16 g  4 Tablet Oral PRN      dextrose 10 % infusion 0-250 mL  0-250 mL IntraVENous PRN      glucagon (GLUCAGEN) injection 1 mg  1 mg IntraMUSCular PRN      insulin lispro (HUMALOG) injection   SubCUTAneous AC&HS 2 Units at 04/05/22 1748    hydrALAZINE (APRESOLINE) 20 mg/mL injection 10 mg  10 mg IntraVENous Q6H PRN       Current Outpatient Medications   Medication Sig Dispense    traZODone (DESYREL) 50 mg tablet Take 50 mg by mouth nightly.  lidocaine (XYLOCAINE) 4 % topical cream Apply  to affected area DIALYSIS PRN for Pain. 15 g    b complex-vitamin c-folic acid (NEPHROCAPS) 1 mg capsule Take 1 Capsule by mouth daily. 30 Capsule    cranberry 500 mg capsule Take 500 mg by mouth daily.  fluticasone propionate (Flonase Allergy Relief) 50 mcg/actuation nasal spray 2 Sprays by Both Nostrils route two (2) times a day.  esomeprazole (NexIUM) 40 mg capsule Take 40 mg by mouth daily.      ergocalciferol (Vitamin D2) 1,250 mcg (50,000 unit) capsule Take 50,000 Units by mouth every Wednesday.  montelukast (Singulair) 10 mg tablet Take 10 mg by mouth daily.  levothyroxine (SYNTHROID) 100 mcg tablet Take 1 Tablet by mouth Daily (before breakfast). 90 Tablet    biotin 5,000 mcg TbDi Take 5,000 mcg by mouth daily.  cholecalciferol, vitamin D3, (VITAMIN D3) 2,000 unit tab Take 4,000 Units by mouth daily.  insulin lispro (HUMALOG U-100 INSULIN) 100 unit/mL injection 3-4 Units by SubCUTAneous route Before breakfast, lunch, and dinner. Hold insulin if BS <80. Hold if patient does not eat her meal. If BS >150 give with SS.     atorvastatin (LIPITOR) 20 mg tablet Take 20 mg by mouth nightly.  albuterol (PROVENTIL HFA, VENTOLIN HFA, PROAIR HFA) 90 mcg/actuation inhaler Take 1 Puff by inhalation every four (4) hours as needed for Wheezing. 1 Inhaler    glucose 4 gram chewable tablet Take 12 g by mouth as needed (BS < 60).  glucagon (GLUCAGEN) 1 mg injection 1 mg by IntraMUSCular route once as needed (hypoglycemia if patient is unresponsive).  metoprolol tartrate (LOPRESSOR) 25 mg tablet Take 1 Tablet by mouth every twelve (12) hours. (Patient not taking: Reported on 4/4/2022) 60 Tablet    diphenhydrAMINE (BenadryL) 25 mg capsule Take 25 mg by mouth nightly as needed for Allergies.  cetirizine (ZyrTEC) 10 mg tablet Take 10 mg by mouth daily. (Patient not taking: Reported on 4/4/2022)     L.acid,para-B. bifidum-S.therm (RISAQUAD) 8 billion cell cap cap Take 1 Capsule by mouth daily.  insulin degludec Bridger Mcarthur FlexTouch U-100) 100 unit/mL (3 mL) inpn 12 Units by SubCUTAneous route nightly. (Patient not taking: Reported on 4/4/2022)     ondansetron hcl (Zofran) 4 mg tablet Take 4 mg by mouth every four (4) hours as needed for Nausea or Vomiting.         Labs/Data:    Lab Results   Component Value Date/Time    WBC 6.4 04/06/2022 01:37 AM    Hemoglobin (POC) 12.2 11/05/2012 03:10 PM    HGB 8.4 (L) 04/06/2022 01:37 AM    Hematocrit (POC) 36 11/05/2012 03:10 PM    HCT 25.4 (L) 04/06/2022 01:37 AM    PLATELET 359 74/37/7122 01:37 AM    .1 (H) 04/06/2022 01:37 AM       Lab Results   Component Value Date/Time    Sodium 136 04/06/2022 01:37 AM    Potassium 3.8 04/06/2022 01:37 AM    Chloride 103 04/06/2022 01:37 AM    CO2 27 04/06/2022 01:37 AM    Anion gap 6 04/06/2022 01:37 AM    Glucose 78 04/06/2022 01:37 AM    BUN 45 (H) 04/06/2022 01:37 AM    Creatinine 5.24 (H) 04/06/2022 01:37 AM    BUN/Creatinine ratio 9 (L) 04/06/2022 01:37 AM    GFR est AA 10 (L) 04/06/2022 01:37 AM    GFR est non-AA 9 (L) 04/06/2022 01:37 AM    Calcium 8.2 (L) 04/06/2022 01:37 AM       Patient seen and examined. Chart reviewed. Labs, data and other pertinent notes reviewed in last 24 hrs.         Signed by:  Giuseppe Bae MD  [phone]

## 2022-04-06 NOTE — CONSULTS
[address]    Name:  Arlene Boeck  MR#:  [id number]  :  1970  ACCOUNT #:  [de-identified]  DATE OF SERVICE:  2022    CHIEF COMPLAINT:  Altered mental status with a finding of an arachnoid cyst on CT scan of the brain. HISTORY OF PRESENT ILLNESS:  The history is obtained via the medical record as the patient was a rather poor historian. A 79-year-old woman presents with altered mental status, history of Down syndrome, end-stage renal disease. Had altered mental status, was due for dialysis yesterday. Blood sugar noted to be over 600. Recently admitted to another hospital for diabetic ketoacidosis. PAST MEDICAL HISTORY:  Quite extensive. Asthma, diabetes, Down syndrome, gastroesophageal reflux disease, hypothyroid, history of mononucleosis, pneumonia, seizure disorder associated with hypoglycemia, thyroid disease. She has had cataract removal, eye surgery, and hysterectomy. MEDICATIONS:  1. Loratadine. 2.  Mucinex. 3.  Desyrel. 4.  Xylocaine cream.  5.  Lopressor. 6.  Nephrocaps. 7.  Benadryl. 8.  Veltassa. 9.  Lasix. 10.  Zyrtec. 11.  Robitussin. 12.  Flonase. [address] 14.  Nexium. 15.  Insulin-dependent diabetic. 16.  Zofran. 17.  Singulair. 18.  Synthroid. 19.  Vitamin D3.  20.  Humalog as needed. 21.  Lipitor. 22.  Motrin. 23.  Proventil inhaler. 24.  Glucagon. ALLERGIES:  SHE IS ALLERGIC TO CODEINE AND PENICILLIN. FAMILY HISTORY:  Noncontributory. SOCIAL HISTORY:  No alcohol or illicit drug use. She is a nonsmoker. PHYSICAL EXAMINATION:  VITAL SIGNS:  On admission, blood pressure was noted to be 160/78. NEUROLOGIC:  Clearly with mental status change, she has slightly improved, but it is unclear what her baseline is. Moves all four extremities spontaneously. Pupils equal and reactive. Face is symmetric. Remainder of physical exam is difficult to obtain.     LABORATORY DATA:  Glucose was 78 today. Admission glucose was 456. DIAGNOSTIC DATA:  I reviewed the CT scan. She has a benign arachnoid cyst in the midline interhemispheric fissure. There is absolutely no neurosurgical intervention necessary despite the radiologist's note that it is exerting some mass effect. It is in my opinion not really exerting any mass effect by its presence and again there is no surgical intervention necessary for this. I will defer care to the primary medical team who was caring for her medical problems. Thank you for asking me to consult on the patient.       Pallavi Mccabe MD      [id number]  D:  04/06/2022 9:11  T:  04/06/2022 10:10  JOB #:  [id number]  CC:  Petar Le MD

## 2022-04-06 NOTE — PROGRESS NOTES
0800: Bedside and Verbal shift change report given to Manuela Brooks RN (oncoming nurse) by Ivan Mccracken RN (offgoing nurse). Report included the following information SBAR, Kardex, MAR and Recent Results. 12: Pt's family states pt is to be d/liane for dialysis OP early this morning; MD notified. Pt on 2 L but states she does not wear oxygen at home but \"likes to have it sometimes\"; 96% on RA, able to eat breakfast, maintain conversation without SOB.    0945: Reviewed discharge instructions with patient's mother. Pt to be discharged via wheelchair per volunteer.

## 2022-04-07 NOTE — PROGRESS NOTES
Care Transitions Initial Call    Call within 2 business days of discharge: Yes     Patient: Alexandru Kidney Maile Patient : 1970 MRN: [id number]    Last Discharge [address]       Complaint Diagnosis Description Type Department Provider    22 Altered mental status Altered mental status, unspecified altered mental status type . .. ED to Hosp-Admission (Discharged) (ADMIT) Guido Daily MD; Keith Monzon... Was this an external facility discharge? Yes, [facility] - Discharge Facility    Challenges to be reviewed by the provider   Additional needs identified to be addressed with provider: yes  [facility] - AMS d/t Hyperglycemia. Eating candy, bag under bed at [facility]. May not be taking thyroid med- is left on her dresser at [employer], when still asleep, staff does not monitor if she took the med or not per sister. Thyroid level in hospital- 19.90 on  (Dec 2018- TSH 0.13)       Method of communication with provider : chart routing    Discussed COVID-19 related testing which was not done at this time. Advance Care Planning:   Does patient have an Advance Directive:  yes; reviewed and current     Inpatient Readmission Risk score: Unplanned Readmit Risk Score: 19.4 ( )    Was this a readmission? no   Patient stated reason for the admission: AMS per sister    Patients top risk factors for readmission: functional cognitive ability, ineffective coping, medical condition-DM2,ESRD on HD,HTN,Hypothyroid. and medication management   Interventions to address risk factors: Scheduled appointment with PCP- sees patients at [facility] on , sister to request he see her . and Obtained and reviewed discharge summary and/or continuity of care documents    Care Transition Nurse (CTN) contacted the family by telephone to perform post hospital discharge assessment. Verified name and  with family as identifiers. Provided introduction to self, and explanation of the CTN role.  Spoke with sister, POA. Says sister seems much better. Non compliant with diet-found bag of candy under her bed. Unsure if taking thyroid med. CTN will ask [facility] staff to monitor. CTN reviewed discharge instructions, medical action plan and red flags with family who verbalized understanding. Were discharge instructions available to patient? yes. Reviewed appropriate site of care based on symptoms and resources available to patient including: PCP, Urgent [address], When to call 911 and [address]. Family given an opportunity to ask questions and does not have any further questions or concerns at this time. The family agrees to contact the PCP office for questions related to their healthcare. Medication reconciliation was performed with nursing staff at [facility], who verbalizes understanding of administration of home medications. Advised obtaining a 90-day supply of all daily and as-needed medications. Referral to Pharm D needed: no     Home Health/Outpatient orders at discharge: home health care  [address]: [facility]  Date of initial visit: 4/7/22    Durable Medical Equipment ordered at discharge: None  [address] [facility] [address] received: na    Covid Risk Education    Educated patient about risk for severe COVID-19 due to risk factors according to CDC guidelines. CTN reviewed discharge instructions, medical action plan and red flag symptoms with the family who verbalized understanding. Discussed COVID vaccination status: yes. Education provided on COVID-19 vaccination as appropriate. Discussed exposure protocols and quarantine with CDC Guidelines. Family was given an opportunity to verbalize any questions and concerns and agrees to contact CTN or health care provider for questions related to their healthcare. Was patient discharged with a pulse oximeter? no.       Discussed follow-up appointments.  If no appointment was previously scheduled, appointment scheduling offered: yes. Is follow up appointment scheduled within 7 days of discharge? yes. [facility] follow up appointment(s): No future appointments. [facility] follow up appointment(s): Concepcion Cedeno- 4/13 at [address] [place] [address] for follow-up call in 7-10 days based on severity of symptoms and risk factors. Plan for next call: symptom management-assess current symptoms, self management-following discharge instructions, follow up appointment-saw pcp for JEAN visit and medication management-taking meds as ordered. CTN provided contact information for future needs.     Goals Addressed    None

## 2022-04-09 NOTE — PROGRESS NOTES
Admission Medication Reconciliation:    Information obtained from:  [facility] medication list  RxQuery data available¹:  YES    Comments/Recommendations: Patient presenting from [address] [place] [address]. Contacted facility for current med list to be sent to inpatient pharmacy. Utilized current medication list to update PTA meds/review patient's allergies. Medication changes (since last review): Added  - Humalog sliding scale  - Nystatin cream  - Nystatin powder  - EMLA cream  - Delsym    Adjusted  - Flonase nasal spray (from BID to q12h PRN)  - Insulin lispro (from 3-4 units to 4 units TIDAC)  - Montelukast (from daily to QHS)    Removed  - Cetrizine  - Lidocaine cream     ¹RxQuery pharmacy benefit data reflects medications filled and processed through the patient's insurance, however   this data does NOT capture whether the medication was picked up or is currently being taken by the patient. Allergies:  Codeine, Lettuce, Nitrofurantoin, Pcn [penicillins], and Tomato    Significant PMH/Disease States:   Past Medical History:   Diagnosis Date    Allergic rhinitis, cause unspecified     Asthma     use inhalers    Diabetes ([address]     Down's syndrome     GERD (gastroesophageal reflux disease)     H/O impacted cerumen     Dr. Vasquez Solid    Headache     Hypothyroid     Mononucleosis     Pneumonia     Seizures ([facility] [address]     as a result of low blood glucose readings    Thyroid disease      Chief Complaint for this Admission:    Chief Complaint   Patient presents with    High Blood Sugar     Prior to Admission Medications:   Prior to Admission Medications   Prescriptions Last Dose Informant Taking? L.acid,para-B. bifidum-S.therm (RISAQUAD) 8 billion cell cap cap   Yes   Sig: Take 1 Capsule by mouth daily. albuterol (PROVENTIL HFA, VENTOLIN HFA, PROAIR HFA) 90 mcg/actuation inhaler   Yes   Sig: Take 1 Puff by inhalation every four (4) hours as needed for Wheezing.    atorvastatin (LIPITOR) 20 mg tablet   Yes   Sig: Take 20 mg by mouth nightly. b complex-vitamin c-folic acid (NEPHROCAPS) 1 mg capsule   Yes   Sig: Take 1 Capsule by mouth daily. biotin 5,000 mcg TbDi   Yes   Sig: Take 5,000 mcg by mouth daily. cholecalciferol, vitamin D3, (VITAMIN D3) 2,000 unit tab   Yes   Sig: Take 4,000 Units by mouth daily. cranberry 500 mg capsule   Yes   Sig: Take 500 mg by mouth daily. dextromethorphan (Delsym) 30 mg/5 mL liquid   Yes   Sig: Take 30 mg by mouth every twelve (12) hours as needed for Cough. diphenhydrAMINE (BenadryL) 25 mg capsule   Yes   Sig: Take 25 mg by mouth nightly as needed for Allergies. ergocalciferol (Vitamin D2) 1,250 mcg (50,000 unit) capsule   Yes   Sig: Take 50,000 Units by mouth every Wednesday. esomeprazole (NexIUM) 40 mg capsule   Yes   Sig: Take 40 mg by mouth daily. fluticasone propionate (Flonase Allergy Relief) 50 mcg/actuation nasal spray   Yes   Si Sprays by Both Nostrils route every twelve (12) hours as needed for Rhinitis or Allergies. glucagon (GLUCAGEN) 1 mg injection   Yes   Si mg by IntraMUSCular route once as needed (hypoglycemia if patient is unresponsive). glucose 4 gram chewable tablet   Yes   Sig: Take 12 g by mouth as needed (BS < 60). insulin degludec Rebeca Lopezme FlexTouch U-100) 100 unit/mL (3 mL) inpn   Yes   Si Units by SubCUTAneous route nightly. insulin lispro (HUMALOG U-100 INSULIN) 100 unit/mL injection   Yes   Si Units by SubCUTAneous route Before breakfast, lunch, and dinner. Hold insulin if BS <80. Hold if patient does not eat her meal. If BS >150 give with SS.    insulin lispro (HumaLOG U-100 Insulin) 100 unit/mL injection   Yes   Sig: by SubCUTAneous route Before breakfast, lunch, and dinner. Inject as per sliding scale:   If BG 60-80 = 2 units   = 3 units  121-170 = 4 units  171-220 = 5 units  221-270 = 6 units  271-320 = 7 units  321 + = 8 units   levothyroxine (SYNTHROID) 100 mcg tablet   Yes   Sig: Take 1 Tablet by mouth Daily (before breakfast). lidocaine-prilocaine (EMLA) topical cream   Yes   Sig: Apply  to affected area DIALYSIS MON, WED & FRI. Apply to left arm access site topically in the morning every M/W/F prior to dialysis   metoprolol tartrate (LOPRESSOR) 25 mg tablet   Yes   Sig: Take 1 Tablet by mouth every twelve (12) hours. montelukast (Singulair) 10 mg tablet   Yes   Sig: Take 10 mg by mouth nightly. nystatin (MYCOSTATIN) powder   Yes   Sig: Apply  to affected area two (2) times a day. Apply to bilateral groin topically every morning and at bedtime for redness   nystatin (MYCOSTATIN) topical cream   Yes   Sig: Apply  to affected area daily. Apply to abdomen and groin topically one time a day for redness   nystatin (MYCOSTATIN) topical cream   Yes   Sig: Apply  to affected area every twelve (12) hours as needed for Skin Irritation (Redness). Apply to groin topically every 12 hours as needed for redness   ondansetron hcl (Zofran) 4 mg tablet   Yes   Sig: Take 4 mg by mouth every four (4) hours as needed for Nausea or Vomiting. traZODone (DESYREL) 50 mg tablet   Yes   Sig: Take 50 mg by mouth nightly. Facility-Administered Medications: None     Please contact the main inpatient pharmacy with any questions or concerns at [phone] and we will direct you to the clinical pharmacist covering this patient's care while in-house.    JUANCARLOS Walker

## 2022-04-09 NOTE — ED TRIAGE NOTES
Pt arrived via EMS with complaint of elevated sugar after eating icecream. Pt reports she normally has sugar free ice cream after dinner but believes the nurses at her facility gave her the wrong kind. Called the RN at time off offload to confirm. Per RN pt received:     12 units of long acting followed by 20 units of humalog approx 2300.   8 unit humalog at 0100 today. Pt bgl still reads \"hi\" over 600. Pt has no new complaint of discomfort.  Family at beside

## 2022-04-09 NOTE — ROUTINE PROCESS
TRANSFER - OUT REPORT:    Verbal report given to Naty RN(name) on Ness Gr  being transferred to [facility](unit) for routine progression of care       Report consisted of patients Situation, Background, Assessment and   Recommendations(SBAR). Information from the following report(s) SBAR, Kardex, ED Summary, Intake/Output, MAR, Accordion, Recent Results and Quality Measures was reviewed with the receiving nurse. Lines:   Peripheral IV 04/09/22 Right Forearm (Active)   Site Assessment Clean, dry, & intact 04/09/22 0444   Phlebitis Assessment 0 04/09/22 0444   Infiltration Assessment 0 04/09/22 0444   Dressing Status Clean, dry, & intact 04/09/22 0444   Dressing Type Transparent 04/09/22 0444   Hub Color/Line Status Pink;Flushed;Patent 04/09/22 0444        Opportunity for questions and clarification was provided.       Patient transported with:   Monitor  Registered Nurse

## 2022-04-09 NOTE — ED NOTES
Bedside and Verbal shift change report given to [address] [place] [address] (oncoming nurse) by Eryn Khan and Lori Mckay RN (offgoing nurse). Report included the following information SBAR, Kardex, ED Summary, Intake/Output, MAR and Recent Results.

## 2022-04-09 NOTE — ED PROVIDER NOTES
HPI 79-year-old female with a history of Down syndrome, end-stage renal disease on dialysis Monday Wednesday Friday, diabetes, presents the emergency department with elevated blood glucose. Patient was just discharged in the hospital 3 days ago with altered mental status related to elevated blood sugar. She had a normal dialysis yesterday. Patient's mother thinks she may be got into regular ice cream today. She does get her insulin at the [facility] home and there is been no changes the family is aware of. She did get an extra dose this evening when her sugar was running high. Patient denies any pain anywhere, chest pain, shortness of breath, abdominal pain, nausea vomiting.     Past Medical History:   Diagnosis Date    Allergic rhinitis, cause unspecified     Asthma     use inhalers    Diabetes ([address]     Down's syndrome     GERD (gastroesophageal reflux disease)     H/O impacted cerumen     Dr. Lilibeth Gómez Headache     Hypothyroid     Mononucleosis     Pneumonia     Seizures ([address]     as a result of low blood glucose readings    Thyroid disease        Past Surgical History:   Procedure Laterality Date    HX CATARACT REMOVAL Bilateral 3/5/15    cataract left and right    HX CYST REMOVAL      right shoulder    HX HEENT Bilateral 2017    prior in one eye    HX HYSTERECTOMY  1987         Family History:   Problem Relation Age of Onset    Thyroid Disease Mother         hypo    Hypertension Father     Abdominal aortic aneurysm Father     Neuropathy Father     No Known Problems Sister     Heart Disease Brother     Heart Attack Brother     Heart Surgery Brother     No Known Problems Sister     No Known Problems Brother     Anesth Problems Neg Hx        Social History     Socioeconomic History    Marital status: SINGLE     Spouse name: Not on file    Number of children: Not on file    Years of education: Not on file    Highest education level: Not on file   Occupational History  Not on file   Tobacco Use    Smoking status: Never Smoker    Smokeless tobacco: Never Used   Vaping Use    Vaping Use: Never used   Substance and Sexual Activity    Alcohol use: No    Drug use: No    Sexual activity: Not Currently   Other Topics Concern    Not on file   Social History Narrative    Not on file     Social Determinants of Health     Financial Resource Strain:     Difficulty of Paying Living Expenses: Not on file   Food Insecurity:     Worried About Running Out of Food in the Last Year: Not on file    Cornelius of Food in the Last Year: Not on file   Transportation Needs:     Lack of Transportation (Medical): Not on file    Lack of Transportation (Non-Medical): Not on file   Physical Activity:     Days of Exercise per Week: Not on file    Minutes of Exercise per Session: Not on file   Stress:     Feeling of Stress : Not on file   Social Connections:     Frequency of Communication with Friends and Family: Not on file    Frequency of Social Gatherings with Friends and Family: Not on file    Attends [employer] Services: Not on file    Active Member of [address] [place] [address] or Organizations: Not on file    Attends Club or Organization Meetings: Not on file    Marital Status: Not on file   Intimate Partner Violence:     Fear of Current or Ex-Partner: Not on file    Emotionally Abused: Not on file    Physically Abused: Not on file    Sexually Abused: Not on file   Housing Stability:     Unable to Pay for Housing in the Last Year: Not on file    Number of [place] in the Last Year: Not on file    Unstable Housing in the Last Year: Not on file         ALLERGIES: Codeine, Lettuce, Nitrofurantoin, Pcn [penicillins], and Tomato    Review of Systems   Constitutional: Negative for fever. HENT: Negative for congestion. Eyes: Negative for visual disturbance. Respiratory: Negative for cough and shortness of breath. Cardiovascular: Negative for chest pain.    Gastrointestinal: Negative for abdominal pain, nausea and vomiting. Endocrine: Negative for polyuria. Genitourinary: Negative for dysuria. Musculoskeletal: Negative for gait problem. Neurological: Negative for headaches. Psychiatric/Behavioral: Negative for dysphoric mood. Vitals:    04/09/22 0410   BP: (!) 165/131   Pulse: 94   Resp: 19   Temp: 98.9 °F (37.2 °C)   SpO2: 90%   Weight: 61.3 kg (135 lb 2.3 oz)   Height: 4' 11\" (1.499 m)            Physical Exam  Constitutional:       General: She is not in acute distress. Appearance: She is well-developed. HENT:      Head: Normocephalic and atraumatic. Mouth/Throat:      Pharynx: No oropharyngeal exudate. Eyes:      General: No scleral icterus. Right eye: No discharge. Left eye: No discharge. Pupils: Pupils are equal, round, and reactive to light. Neck:      Vascular: No JVD. Cardiovascular:      Rate and Rhythm: Normal rate and regular rhythm. Heart sounds: Normal heart sounds. No murmur heard. Pulmonary:      Effort: Pulmonary effort is normal. No respiratory distress. Breath sounds: Normal breath sounds. No stridor. No wheezing or rales. Chest:      Chest wall: No tenderness. Abdominal:      General: Bowel sounds are normal. There is no distension. Palpations: Abdomen is soft. There is no mass. Tenderness: There is no abdominal tenderness. There is no guarding or rebound. Musculoskeletal:         General: Normal range of motion. Cervical back: Normal range of motion and neck supple. Skin:     General: Skin is warm and dry. Capillary Refill: Capillary refill takes less than 2 seconds. Findings: No rash. Neurological:      Mental Status: She is oriented to person, place, and time. Psychiatric:         Behavior: Behavior normal.         Thought Content:  Thought content normal.         Judgment: Judgment normal.          MDM   35 minutes of critical care time    Procedures    Perfect Serve Consult for Admission  6:02 AM    ED Room Number: ER09/09  Patient Name and age:  Nimisha Gr 45 y.o.  female  Working Diagnosis:   1. Hyperglycemia    2. ESRD (end stage renal disease) on dialysis ([facility] [address]        COVID-19 Suspicion:  no  Sepsis present:  no  Reassessment needed: no  Code Status:  Do Not Resuscitate  Readmission: yes  Isolation Requirements:  no  Recommended Level of Care:  step down  Department:[facility] Adult ED - ([phone]  Other:  45year old female with down syndrome, esrd on dialysis n/w/f, presents to ED again (just d/c'd 4/6 for same) with hyperglycemia,. She is a resident of a nursing home who gives her her insulin. Received her long acting in the evening pluse 20units of humalog and then another 8units at 1am. Sugar here is 741. No gap. I put her on a glucstabilizer given how high it is, can't really load her with fluids, etc. Otherwise looks ok.

## 2022-04-09 NOTE — H&P
[address] [facility] Adult  Hospitalist Group  History and Physical    Primary Care Provider: Jacob Jimenez MD  Date of Service:  4/9/2022    Chief Complaint: high blood sugar     Subjective:     Yobani Brandt is a 45 y.o. female past medical history of Down syndrome, ESRD on hemodialysis Monday Wednesday Friday, type 1 diabetes mellitus, hypertension, hypothyroidism, history of arachnoid cyst presents from her AUGUST with elevated blood glucose levels. Reportedly given ice cream that was \"sugar-free\" which was probably not. Blood glucose levels elevated at 700 prompting facility to get patient to ED. patient has had multiple prior episodes of DKA in the past per chart review. Patient's uncle and his wife are present at bedside to provide collateral history. She currently feels well no complaining of any acute symptoms no nausea vomiting fever chills headache abdominal pain UE/LE weakness. In the ER vital signs stable. Labs notable for glucose of 700 anion gap around 14. Rest of labs wnl     Review of Systems:    A comprehensive review of systems was negative except for that written in the History of Present Illness. Past Medical History:   Diagnosis Date    Allergic rhinitis, cause unspecified     Asthma     use inhalers    Diabetes ([address]     Down's syndrome     GERD (gastroesophageal reflux disease)     H/O impacted cerumen     Dr. Yung Quiroz Headache     Hypothyroid     Mononucleosis     Pneumonia     Seizures ([address]     as a result of low blood glucose readings    Thyroid disease       Past Surgical History:   Procedure Laterality Date    HX CATARACT REMOVAL Bilateral 3/5/15    cataract left and right    HX CYST REMOVAL      right shoulder    HX HEENT Bilateral 2017    prior in one eye    HX HYSTERECTOMY  1987     Prior to Admission medications    Medication Sig Start Date End Date Taking?  Authorizing Provider   dextromethorphan (Delsym) 30 mg/5 mL liquid Take 30 mg by mouth every twelve (12) hours as needed for Cough. Yes Provider, Historical   insulin lispro (HumaLOG U-100 Insulin) 100 unit/mL injection by SubCUTAneous route Before breakfast, lunch, and dinner. Inject as per sliding scale: If BG 60-80 = 2 units   = 3 units  121-170 = 4 units  171-220 = 5 units  221-270 = 6 units  271-320 = 7 units  321 + = 8 units   Yes Provider, Historical   nystatin (MYCOSTATIN) topical cream Apply  to affected area daily. Apply to abdomen and groin topically one time a day for redness   Yes Provider, Historical   nystatin (MYCOSTATIN) topical cream Apply  to affected area every twelve (12) hours as needed for Skin Irritation (Redness). Apply to groin topically every 12 hours as needed for redness   Yes Provider, Historical   nystatin (MYCOSTATIN) powder Apply  to affected area two (2) times a day. Apply to bilateral groin topically every morning and at bedtime for redness   Yes Provider, Historical   lidocaine-prilocaine (EMLA) topical cream Apply  to affected area DIALYSIS MON, WED & FRI. Apply to left arm access site topically in the morning every M/W/F prior to dialysis   Yes Provider, Historical   traZODone (DESYREL) 50 mg tablet Take 50 mg by mouth nightly. Yes Provider, Historical   metoprolol tartrate (LOPRESSOR) 25 mg tablet Take 1 Tablet by mouth every twelve (12) hours. 10/11/21  Yes Mary Wang MD   b complex-vitamin c-folic acid (NEPHROCAPS) 1 mg capsule Take 1 Capsule by mouth daily. 10/12/21  Yes Mary Wang MD   diphenhydrAMINE (BenadryL) 25 mg capsule Take 25 mg by mouth nightly as needed for Allergies. Yes Provider, Historical   cranberry 500 mg capsule Take 500 mg by mouth daily. Yes Provider, Historical   fluticasone propionate (Flonase Allergy Relief) 50 mcg/actuation nasal spray 2 Sprays by Both Nostrils route every twelve (12) hours as needed for Rhinitis or Allergies.    Yes Provider, Historical   esomeprazole (NexIUM) 40 mg capsule Take 40 mg by mouth daily. Yes Provider, Historical   L.acid,para-B. bifidum-S.therm (RISAQUAD) 8 billion cell cap cap Take 1 Capsule by mouth daily. Yes Provider, Historical   insulin degludec Edwarn Edward FlexTouch U-100) 100 unit/mL (3 mL) inpn 12 Units by SubCUTAneous route nightly. Yes Provider, Historical   ergocalciferol (Vitamin D2) 1,250 mcg (50,000 unit) capsule Take 50,000 Units by mouth every Wednesday. Yes Provider, Historical   ondansetron hcl (Zofran) 4 mg tablet Take 4 mg by mouth every four (4) hours as needed for Nausea or Vomiting. Yes Provider, Historical   montelukast (Singulair) 10 mg tablet Take 10 mg by mouth nightly. Yes Provider, Historical   levothyroxine (SYNTHROID) 100 mcg tablet Take 1 Tablet by mouth Daily (before breakfast). 9/8/21  Yes Tito Frank MD   biotin 5,000 mcg TbDi Take 5,000 mcg by mouth daily. Yes Provider, Historical   cholecalciferol, vitamin D3, (VITAMIN D3) 2,000 unit tab Take 4,000 Units by mouth daily. Yes Provider, Historical   insulin lispro (HUMALOG U-100 INSULIN) 100 unit/mL injection 4 Units by SubCUTAneous route Before breakfast, lunch, and dinner. Hold insulin if BS <80. Hold if patient does not eat her meal. If BS >150 give with SS. Yes Provider, Historical   atorvastatin (LIPITOR) 20 mg tablet Take 20 mg by mouth nightly. Yes Provider, Historical   albuterol (PROVENTIL HFA, VENTOLIN HFA, PROAIR HFA) 90 mcg/actuation inhaler Take 1 Puff by inhalation every four (4) hours as needed for Wheezing. 12/16/18  Yes Foster Goss MD   glucose 4 gram chewable tablet Take 12 g by mouth as needed (BS < 60). Yes Provider, Historical   glucagon (GLUCAGEN) 1 mg injection 1 mg by IntraMUSCular route once as needed (hypoglycemia if patient is unresponsive).    Yes Provider, Historical     Allergies   Allergen Reactions    Codeine Nausea and Vomiting    Lettuce Other (comments)    Nitrofurantoin Rash    Pcn [Penicillins] Hives and Rash    Tomato Other (comments)      Family History   Problem Relation Age of Onset    Thyroid Disease Mother         hypo    Hypertension Father     Abdominal aortic aneurysm Father     Neuropathy Father     No Known Problems Sister     Heart Disease Brother     Heart Attack Brother     Heart Surgery Brother     No Known Problems Sister     No Known Problems Brother     Anesth Problems Neg Hx         SOCIAL HISTORY:  Patient resides at [facility]  Patient ambulates with assist.   Smoking history: none  Alcohol history: none        Objective:       Physical Exam:   Visit Vitals  BP (!) 167/63 (BP 1 Location: Right upper arm, BP Patient Position: At rest)   Pulse 93   Temp 98.6 °F (37 °C)   Resp 21   Ht 4' 11\" (1.499 m)   Wt 61.3 kg (135 lb 2.3 oz)   LMP 03/27/1986   SpO2 98%   BMI 27.30 kg/m²     General appearance: alert, down syndrome features, no distress, appears stated age, slow to respond but baseline   Head: Normocephalic, without obvious abnormality, atraumatic  Lungs: clear to auscultation bilaterally  Heart: regular rate and rhythm, S1, S2 normal, no murmur, click, rub or gallop  Abdomen: soft, non-tender. Bowel sounds normal. No masses,  no organomegaly  Extremities: extremities normal, atraumatic, no cyanosis or edema,  Pulses: 2+ and symmetric  Skin: Skin color, texture, turgor normal.  candidiasis bilateral thigh folds  Neurologic: Grossly normal  Cap refill: Brisk, less than 3 seconds  Pulses: 2+, symmetric in all extremities  Skin: Warm, dry, without rashes or lesions    Data Review: All diagnostic labs and studies have been reviewed.         Assessment + Plan      DKA  Known type I diabetic  -currently insulin drip  -Cannot give of fluids due to ESRD   -Gap closed x2 this afternoon, will bridge to subcu insulin, SSI and advance diet   -Diabetic management team consulted  -Reiterated importance of compliance with insulin therapy and strict diet with patient and family at bedside    ESRD Monday Wednesday Friday  -appreciate nephro eval, no HD, clinically euvolemic    Down syndrome  Hypertension  Hypothyroidism  Cont home meds    History of arachnoid cyst  Noted by neurosurgery on last admission 4/5 - no evidence of mass-effect and has benign finding and no need for surgical intervention, f/u op     Diet: Diabetic  DVT PPx: Heparin subcu  Code: Full    CRITICAL CARE ATTESTATION:  I had a face to face encounter with the patient, reviewed and interpreted patient data including clinical events, labs, images, vital signs, I/O's, and examined patient. I have discussed the case and the plan and management of the patient's care with the consulting services, the bedside nurses and necessary ancillary providers. NOTE OF PERSONAL INVOLVEMENT IN CARE   This patient has a high probability of imminent, clinically significant deterioration, which requires the highest level of preparedness to intervene urgently. I participated in the decision-making and personally managed or directed the management of the following life and organ supporting interventions that required my frequent assessment to treat or prevent imminent deterioration. I personally spent 30 minutes of critical care time. This is time spent at this critically ill patient's bedside actively involved in patient care as well as the coordination of care and discussions with the patient's family. This does not include any procedural time which has been billed separately.     Dispo: DC back to [facility] 24 to 48 hours pending medical progression      FUNCTIONAL STATUS PRIOR TO HOSPITALIZATION Ambulatory with Use of Assistive Devices (including history of recent falls)      Signed By: Aisha Soulier, MD     April 9, 2022

## 2022-04-09 NOTE — PROGRESS NOTES
Verbal bedside report given to Floyd Sawant RN oncoming nurse by Ridge Sawyer. Natalia Noonan RN off-going nurse. Report included current pt status and condition, recent results, hx of present illness, heart rate and rhythm, and respiratory status. PT in bed watching HGTV and reading the paper. VSS. Transferred In:     Verbal report received by RAFAT Montgomery from [facility], RN  on pt incoming from ED for progression of care. Report consisted of SBAR, Kardex, ED Summary and Cardiac Rhythm. Patient arrived on IMCU via stretcher. Patient oriented to room and call bell, vital signs obtained. Opportunity for questions and clarification was provided. Assessment completed.

## 2022-04-09 NOTE — CONSULTS
Consultation Note    NAME: Cathi Gr   :  1970   MRN:  [id number]     Date/Time:  2022 12:32 PM    I have been asked to see this patient by Dr. Yoni Loyola  for advice/opinion re: ESRD. Assessment :    [id number]  DM  anemia   No acute need for HD today. Plan HD Monday. H/H at goal.  Hold LUCERO. We will see again Monday if she is still here. Subjective:   CHIEF COMPLAINT:  \"I ate some ice cream.\"      HISTORY OF PRESENT ILLNESS:     Rossi Chen is a 45 y.o.   female who has a history of ESRD admitted with poorly controlled glucose. She says that she had a bowl of ice cream last night  She thought that it was sugar free, but it wasn't. She came to the ER and glucose was over 700 and she is admitted.     Past Medical History:   Diagnosis Date    Allergic rhinitis, cause unspecified     Asthma     use inhalers    Diabetes ([address]     Down's syndrome     GERD (gastroesophageal reflux disease)     H/O impacted cerumen     Dr. Maria M Reece Headache     Hypothyroid     Mononucleosis     Pneumonia     Seizures ([address]     as a result of low blood glucose readings    Thyroid disease       Past Surgical History:   Procedure Laterality Date    HX CATARACT REMOVAL Bilateral 3/5/15    cataract left and right    HX CYST REMOVAL      right shoulder    HX HEENT Bilateral 2017    prior in one eye    HX HYSTERECTOMY  1987     Social History     Tobacco Use    Smoking status: Never Smoker    Smokeless tobacco: Never Used   Substance Use Topics    Alcohol use: No      Family History   Problem Relation Age of Onset    Thyroid Disease Mother         hypo    Hypertension Father     Abdominal aortic aneurysm Father     Neuropathy Father     No Known Problems Sister     Heart Disease Brother     Heart Attack Brother     Heart Surgery Brother     No Known Problems Sister     No Known Problems Brother     Anesth Problems Neg Hx       Allergies   Allergen Reactions    Codeine Nausea and Vomiting    Lettuce Other (comments)    Nitrofurantoin Rash    Pcn [Penicillins] Hives and Rash    Tomato Other (comments)      Prior to Admission medications    Medication Sig Start Date End Date Taking? Authorizing Provider   traZODone (DESYREL) 50 mg tablet Take 50 mg by mouth nightly. Provider, Historical   lidocaine (XYLOCAINE) 4 % topical cream Apply  to affected area DIALYSIS PRN for Pain. 10/11/21   Avery Bocanegra MD   metoprolol tartrate (LOPRESSOR) 25 mg tablet Take 1 Tablet by mouth every twelve (12) hours. 10/11/21   Avery Bocanegra MD   b complex-vitamin c-folic acid (NEPHROCAPS) 1 mg capsule Take 1 Capsule by mouth daily. 10/12/21   Avery Bocanegra MD   diphenhydrAMINE (BenadryL) 25 mg capsule Take 25 mg by mouth nightly as needed for Allergies. Provider, Historical   cetirizine (ZyrTEC) 10 mg tablet Take 10 mg by mouth daily. Provider, Historical   cranberry 500 mg capsule Take 500 mg by mouth daily. Provider, Historical   fluticasone propionate (Flonase Allergy Relief) 50 mcg/actuation nasal spray 2 Sprays by Both Nostrils route two (2) times a day. Provider, Historical   esomeprazole (NexIUM) 40 mg capsule Take 40 mg by mouth daily. Provider, Historical   L.acid,para-B. bifidum-S.therm (RISAQUAD) 8 billion cell cap cap Take 1 Capsule by mouth daily. Provider, Historical   insulin degludec Brittany Oiler FlexTouch U-100) 100 unit/mL (3 mL) inpn 12 Units by SubCUTAneous route nightly. Provider, Historical   ergocalciferol (Vitamin D2) 1,250 mcg (50,000 unit) capsule Take 50,000 Units by mouth every Wednesday. Provider, Historical   ondansetron hcl (Zofran) 4 mg tablet Take 4 mg by mouth every four (4) hours as needed for Nausea or Vomiting. Provider, Historical   montelukast (Singulair) 10 mg tablet Take 10 mg by mouth daily.     Provider, Historical   levothyroxine (SYNTHROID) 100 mcg tablet Take 1 Tablet by mouth Daily (before breakfast). 9/8/21   Clifford Mack MD   biotin 5,000 mcg TbDi Take 5,000 mcg by mouth daily. Provider, Historical   cholecalciferol, vitamin D3, (VITAMIN D3) 2,000 unit tab Take 4,000 Units by mouth daily. Provider, Historical   insulin lispro (HUMALOG U-100 INSULIN) 100 unit/mL injection 3-4 Units by SubCUTAneous route Before breakfast, lunch, and dinner. Hold insulin if BS <80. Hold if patient does not eat her meal. If BS >150 give with SS. Provider, Historical   atorvastatin (LIPITOR) 20 mg tablet Take 20 mg by mouth nightly. Provider, Historical   albuterol (PROVENTIL HFA, VENTOLIN HFA, PROAIR HFA) 90 mcg/actuation inhaler Take 1 Puff by inhalation every four (4) hours as needed for Wheezing. 12/16/18   Nicole Bardales MD   glucose 4 gram chewable tablet Take 12 g by mouth as needed (BS < 60). Provider, Historical   glucagon (GLUCAGEN) 1 mg injection 1 mg by IntraMUSCular route once as needed (hypoglycemia if patient is unresponsive).     Provider, Historical     REVIEW OF SYSTEMS:     []  Unable to obtain reliable ROS due to  [] mental status  [] sedated   [] intubated   [x] Total of 12 systems reviewed as follows:  Constitutional: negative fever, negative chills, negative weight loss  Eyes:   negative visual changes  ENT:   negative sore throat, tongue or lip swelling  Respiratory:  negative cough, negative dyspnea  Cards:  negative for chest pain, palpitations, lower extremity edema  GI:   negative for nausea, vomiting, diarrhea, and abdominal pain  :  negative for frequency, dysuria  Integument:  negative for rash and pruritus  Heme:  negative for easy bruising and gum/nose bleeding  Musculoskel: negative for myalgias,  back pain and muscle weakness  Neuro:  negative for headaches, dizziness, vertigo  Psych:  negative for feelings of anxiety, depression   Travel?: none    Objective:   VITALS:    Visit Vitals  BP (!) 167/63 (BP 1 Location: Right upper arm, BP Patient Position: At rest) Pulse 93   Temp 98.6 °F (37 °C)   Resp 21   Ht 4' 11\" (1.499 m)   Wt 61.3 kg (135 lb 2.3 oz)   SpO2 98%   BMI 27.30 kg/m²     PHYSICAL EXAM:  Gen:  []  WD []  WN  [] cachectic []  thin []  obese []  disheveled             []  ill apearing  []   Critical  [x]   Chronic    [x]  No acute distress    HEENT:   [x] NC/AT/PERRL    [] pink conjunctivae      [] pale conjunctivae                  PERRL  [] yes  [] no      [] moist mucosa    [] dry mucosa    hearing intact to voice [] yes  [] No                 NECK:   supple [x] yes  [] no        masses [] yes  [x] No               thyroid  []  non tender  []  tender    RESP:   [x] CTA bilaterally/no wheezing/rhonchi/rales/crackles    [] rhonchi bilaterally - no dullness  [] wheezing   [] rhonchi   [] crackles     use of accessory muscles [] yes [] no    CARD:   [x]  regular rate and rhythm/No murmurs/rubs/gallops    murmur  [] yes ()  [] no      Rubs  [] yes  [] no       Gallops [] yes  [] no    Rate []  regular  []  irregular        carotid bruits  [] Right  []  Left                 LE edema [] yes  [x] no           JVP  []  yes   []  no    ABD:    [x] soft/non distended/non tender/+bowel sounds/no HSM    []  Rigid    tenderness [] yes [] no   Liver enlargement  []  yes []  no                Spleen enlargement  []  yes []  no     distended []  yes [] no     bowel sound  [] hypoactive   [] hyperactive    LYMPH:    Neck []  yes [x]  no       Axillae []  yes [x]  no    SKIN:   Rashes []  yes   [x]  no    Ulcers []  yes   [x]  no               [] tight to palpitation    skin turgor []  good  [] poor  [] decreased               Cyanosis/clubbing []  yes []  no    NEUR:   [x] cranial nerves II-XII grossly intact       [] Cranial nerves deficit                 []  facial droop    []  slurred speech   [] aphasic     [] Strength normal     []  weakness  []  LUE  []   RUE/ []  LLE  []   RLE    follows commands  [x]  yes []  no           PSYCH:   insight [] poor [x] good   Alert and Oriented to  [x] person  [x] place  [x]  time                    [] depressed [] anxious [] agitated  [] lethargic [] stuporous  [] sedated     LAB DATA REVIEWED:    Recent Labs     04/09/22 0441   WBC 6.8   HGB 10.3*   HCT 32.0*        Recent Labs     04/09/22  0844 04/09/22 0441   * 127*   K 4.3 5.2*   CL 95* 92*   CO2 19* 22   BUN 43* 36*   CREA 3.95* 3.60*   * 741*   CA 8.4* 8.4*   MG 2.1  --    PHOS  --  4.0     Recent Labs     04/09/22 0441   ALT 21      TBILI 0.6   ALB 2.9*   GLOB 4.4*     No results for input(s): INR, PTP, APTT, INREXT in the last 72 hours. No results for input(s): FE, TIBC, PSAT, FERR in the last 72 hours. No results for input(s): PH, PCO2, PO2 in the last 72 hours. No results for input(s): CPK, CKMB in the last 72 hours.     No lab exists for component: TROPONINI  Lab Results   Component Value Date/Time    Glucose (POC) 275 (H) 04/09/2022 12:28 PM    Glucose (POC) 398 (H) 04/09/2022 11:03 AM    Glucose (POC) 507 (H) 04/09/2022 10:02 AM    Glucose (POC) >600 (HH) 04/09/2022 08:33 AM    Glucose (POC) >600 (HH) 04/09/2022 07:32 AM    Glucose, POC >700 ([facility]) 04/09/2022 04:44 AM       Procedures: see electronic medical records for all procedures/Xrays and details which were not copied into this note but were reviewed prior to creation of Plan.    ________________________________________________________________________       ___________________________________________________  Consulting Physician: Wing Samson MD

## 2022-04-09 NOTE — ED NOTES
Bedside shift change report given to [employer] SYSTM FERNANDO HLCARE GHISLAINE (oncoming nurse) by Page  (offgoing nurse). Report included the following information SBAR.

## 2022-04-10 NOTE — PROGRESS NOTES
Transition Plan of Care  RUR 22%-High  Disposition-plan to discharge today back to [facility] assisted living [phone]. Spoke with charge nurse Elo Lagos [phone] and they require a Rapid Covid and also a Cxray. Spoke with attending and both orders placed. CM will fax to facility [phone] medicals and results requested. Family can transport home when ready. Covid and Cxray results sent to Elo Lagos at the facility. Patient's glucose level has increased per bedside nurse. Attending aware waiting to see if it stabilizing and she is ready to discharge. Medicare pt has received, reviewed, and signed 2nd IM letter informing them of their right to appeal the discharge. Signed copy has been placed on pt bedside chart. Discharging back to [facility] today.

## 2022-04-10 NOTE — DISCHARGE SUMMARY
Discharge Summary       PATIENT ID: Chiquita Brand  MRN: [id number]   YOB: 1970    DATE OF ADMISSION: 4/9/2022  3:53 AM    DATE OF DISCHARGE: 04/10/22   PRIMARY CARE PROVIDER: Kala Zeng MD     ATTENDING PHYSICIAN: Tiff Dai MD  DISCHARGING PROVIDER: Tiff Dai MD    To contact this individual call [phone] and ask the  to page. If unavailable ask to be transferred the Adult Hospitalist Department. CONSULTATIONS: IP CONSULT TO NEPHROLOGY    PROCEDURES/SURGERIES: * No surgery found *    ADMITTING DIAGNOSES & HOSPITAL COURSE:   HPI: Lashon Gr is a 45 y.o. female past medical history of Down syndrome, ESRD on hemodialysis Monday Wednesday Friday, type 1 diabetes mellitus, hypertension, hypothyroidism, history of arachnoid cyst presents from her [facility] with elevated blood glucose levels. Reportedly given ice cream that was \"sugar-free\" which was probably not. Blood glucose levels elevated at 700 prompting facility to get patient to ED. patient has had multiple prior episodes of DKA in the past per chart review. Patient's uncle and his wife are present at bedside to provide collateral history. She currently feels well no complaining of any acute symptoms no nausea vomiting fever chills headache abdominal pain UE/LE weakness.      In the ER vital signs stable. Labs notable for glucose of 700 anion gap around 14. Rest of labs wnl \"    Patient was managed for DKA initially on insulin drip but successfully transition patient off to long-acting with subcu. Apparently sugars are extremely labile at [facility], cannot increase dose of Tresiba as she often has low am sugars, but per nurse after speaking to [facility] staff they are only giving 8 units SSI for blood glucose >320, will increase this to 15U, and increase to 10 U for the rages before that as well. Will increase to 8 U TID before meals as well as her blood sugar persisently elevated post meals while ip.  I instructed that sliding scale before meals needs to be closely monitored and reiterated strict compliance with diabetic diet given the patient is type I and extremely high risk for DKA. Sister of patient and brother both were instructed to have patient follow-up with PCP and endocrinologist asap. They state the endocrinologist they follow-up with their unsatisfied with her recommended PCP follow-up ASAP in order for alternate referral for endocrine. Referral for diabetic education op, d/c back to [facility].          DISCHARGE DIAGNOSES / PLAN:      DKA -resolved   Known type I diabetic  -stable off insulin drip  -Cannot give of fluids due to ESRD   -Gap closed x2 4/9, bridged to subcu insulin, SSI and advance diet   -His glucose is still slightly elevated given additional 20 units Lantus, increase dose of treciba on dc  -Diabetic management team consulted  -Reiterated importance of compliance with insulin therapy and strict diet with patient and family at bedside     ESRD Monday Wednesday Friday  -appreciate nephro eval, no HD, clinically euvolemic next HD 4/11     Down syndrome  Hypertension  Hypothyroidism  Cont home meds     History of arachnoid cyst  Noted by neurosurgery on last admission 4/5 - no evidence of mass-effect and has benign finding and no need for surgical intervention, f/u op      Diet: Diabetic  DVT PPx: Heparin subcu  Code: Full          FOLLOW UP APPOINTMENTS:    Follow-up Information     Follow up With Specialties Details Why Contact Kim Gonzalez MD Internal Medicine   [address] [id number] [facility]      Sharan Carvalho MD Nephrology In 1 week  [address] [phone]      Dr. Verito Beltre   In 1 week endocrinology             ADDITIONAL CARE RECOMMENDATIONS: rpt cbc and bmp 3-5 days   Please adhere to strict diabetic diet, limit carbs/sweets/sugar intake to min     DIET: Diabetic Diet    ACTIVITY: Activity as tolerated      DISCHARGE MEDICATIONS:  Current Discharge Medication List      START taking these medications    Details   miconazole (MICOTIN) 2 % topical powder Apply  to affected area two (2) times a day for 13 doses. Qty: 43 g, Refills: 0  Start date: 4/10/2022, End date: 4/17/2022         CONTINUE these medications which have CHANGED    Details   insulin degludec Isaura Ruffing FlexTouch U-100) 100 unit/mL (3 mL) inpn Please administer 20 U nightly  Qty: 1 Pen, Refills: 0  Start date: 4/10/2022         CONTINUE these medications which have NOT CHANGED    Details   dextromethorphan (Delsym) 30 mg/5 mL liquid Take 30 mg by mouth every twelve (12) hours as needed for Cough. !! insulin lispro (HumaLOG U-100 Insulin) 100 unit/mL injection by SubCUTAneous route Before breakfast, lunch, and dinner. Inject as per sliding scale: If BG 60-80 = 2 units   = 3 units  121-170 = 4 units  171-220 = 5 units  221-270 = 6 units  271-320 = 7 units  321 + = 8 units      nystatin (MYCOSTATIN) topical cream Apply  to affected area daily. Apply to abdomen and groin topically one time a day for redness      nystatin (MYCOSTATIN) powder Apply  to affected area two (2) times a day. Apply to bilateral groin topically every morning and at bedtime for redness      lidocaine-prilocaine (EMLA) topical cream Apply  to affected area DIALYSIS MON, WED & FRI. Apply to left arm access site topically in the morning every M/W/F prior to dialysis      traZODone (DESYREL) 50 mg tablet Take 50 mg by mouth nightly. metoprolol tartrate (LOPRESSOR) 25 mg tablet Take 1 Tablet by mouth every twelve (12) hours. Qty: 60 Tablet, Refills: 0      b complex-vitamin c-folic acid (NEPHROCAPS) 1 mg capsule Take 1 Capsule by mouth daily. Qty: 30 Capsule, Refills: 0      diphenhydrAMINE (BenadryL) 25 mg capsule Take 25 mg by mouth nightly as needed for Allergies. cranberry 500 mg capsule Take 500 mg by mouth daily.       fluticasone propionate (Flonase Allergy Relief) 50 mcg/actuation nasal spray 2 Sprays by Both Nostrils route every twelve (12) hours as needed for Rhinitis or Allergies. esomeprazole (NexIUM) 40 mg capsule Take 40 mg by mouth daily. L.acid,para-B. bifidum-S.therm (RISAQUAD) 8 billion cell cap cap Take 1 Capsule by mouth daily. ergocalciferol (Vitamin D2) 1,250 mcg (50,000 unit) capsule Take 50,000 Units by mouth every Wednesday. ondansetron hcl (Zofran) 4 mg tablet Take 4 mg by mouth every four (4) hours as needed for Nausea or Vomiting.      montelukast (Singulair) 10 mg tablet Take 10 mg by mouth nightly. levothyroxine (SYNTHROID) 100 mcg tablet Take 1 Tablet by mouth Daily (before breakfast). Qty: 90 Tablet, Refills: 3      biotin 5,000 mcg TbDi Take 5,000 mcg by mouth daily. cholecalciferol, vitamin D3, (VITAMIN D3) 2,000 unit tab Take 4,000 Units by mouth daily. !! insulin lispro (HUMALOG U-100 INSULIN) 100 unit/mL injection 4 Units by SubCUTAneous route Before breakfast, lunch, and dinner. Hold insulin if BS <80. Hold if patient does not eat her meal. If BS >150 give with SS.       atorvastatin (LIPITOR) 20 mg tablet Take 20 mg by mouth nightly. albuterol (PROVENTIL HFA, VENTOLIN HFA, PROAIR HFA) 90 mcg/actuation inhaler Take 1 Puff by inhalation every four (4) hours as needed for Wheezing. Qty: 1 Inhaler, Refills: 0      glucose 4 gram chewable tablet Take 12 g by mouth as needed (BS < 60). glucagon (GLUCAGEN) 1 mg injection 1 mg by IntraMUSCular route once as needed (hypoglycemia if patient is unresponsive). !! - Potential duplicate medications found. Please discuss with provider. NOTIFY YOUR PHYSICIAN FOR ANY OF THE FOLLOWING:   Fever over 101 degrees for 24 hours. Chest pain, shortness of breath, fever, chills, nausea, vomiting, diarrhea, change in mentation, falling, weakness, bleeding. Severe pain or pain not relieved by medications.   Or, any other signs or symptoms that you may have questions about. DISPOSITION:    Home With:   OT  PT  HH  RN       Long term SNF/Inpatient Rehab   x Independent/assisted living    Hospice    Other:        PATIENT CONDITION AT DISCHARGE:     Functional status    Poor    x Deconditioned     Independent      Cognition    x Lucid     Forgetful     Dementia        Code status    x Full code     DNR      PHYSICAL EXAMINATION AT DISCHARGE:    General appearance: alert, down syndrome features, no distress, appears stated age, slow to respond but baseline   Head: Normocephalic, without obvious abnormality, atraumatic  Lungs: clear to auscultation bilaterally  Heart: regular rate and rhythm, S1, S2 normal, no murmur, click, rub or gallop  Abdomen: soft, non-tender.  Bowel sounds normal. No masses,  no organomegaly  Extremities: extremities normal, atraumatic, no cyanosis or edema,  Pulses: 2+ and symmetric  Skin: Skin color, texture, turgor normal.  candidiasis bilateral thigh folds  Neurologic: Grossly normal  Cap refill: Brisk, less than 3 seconds  Pulses: 2+, symmetric in all extremities  Skin: Warm, dry, without rashes or lesions         CHRONIC MEDICAL DIAGNOSES:  Problem List as of 4/10/2022 Date Reviewed: 1/18/2020          Codes Class Noted - Resolved    AMS (altered mental status) ICD-10-CM: R41.82  ICD-9-CM: 780.97  4/4/2022 - Present        DKA (diabetic ketoacidosis) ([facility] ICD-10-CM: E11.10  ICD-9-CM: 250.12  12/4/2021 - Present        Acute respiratory failure with hypoxia ([facility] ICD-10-CM: J96.01  ICD-9-CM: 518.81  10/25/2021 - Present        CKD (chronic kidney disease), stage V ([facility] ICD-10-CM: N18.5  ICD-9-CM: 585.5  10/8/2021 - Present        Fluid overload ICD-10-CM: E87.70  ICD-9-CM: 276.69  10/8/2021 - Present        Severe anemia ICD-10-CM: D64.9  ICD-9-CM: 285.9  10/8/2021 - Present        ESRD (end stage renal disease) ([facility] ICD-10-CM: N18.6  ICD-9-CM: 585.6  10/1/2021 - Present        Metabolic acidosis ADX-51-YZ: E87.2  ICD-9-CM: 276.2  1/19/2020 - Present        Asthma exacerbation ICD-10-CM: J45.901  ICD-9-CM: 493.92  1/19/2020 - Present        ROBBY (acute kidney injury) [facility]) ICD-10-CM: N17.9  ICD-9-CM: 584.9  1/15/2020 - Present        Advance directive on file ICD-10-CM: Z78.9  ICD-9-CM: V49.89  12/28/2015 - Present        Acne ICD-10-CM: L70.9  ICD-9-CM: 706.1  8/20/2014 - Present        Asthma (Chronic) ICD-10-CM: J45.909  ICD-9-CM: 493.90  4/29/2013 - Present        HTN (hypertension) (Chronic) ICD-10-CM: I10  ICD-9-CM: 401.9  4/29/2013 - Present        DM (diabetes mellitus), type 1, uncontrolled ICD-10-CM: [id number]  ICD-9-CM: 250.03  11/14/2012 - Present        Hypoglycemia ICD-10-CM: E16.2  ICD-9-CM: 251.2  6/27/2012 - Present        Down syndrome ICD-10-CM: Q90.9  ICD-9-CM: 758.0  7/23/2010 - Present        Acquired hypothyroidism ICD-10-CM: E03.9  ICD-9-CM: 244.9  7/23/2010 - Present        Anemia, unspecified ICD-10-CM: D64.9  ICD-9-CM: 285.9  7/23/2010 - Present        RESOLVED: Hyponatremia ICD-10-CM: E87.1  ICD-9-CM: 276.1  2/28/2018 - 1/1/2019        RESOLVED: DKA (diabetic ketoacidoses) ICD-10-CM: E11.10  ICD-9-CM: 250.12  9/14/2017 - 1/1/2019        RESOLVED: Hyperglycemia ICD-10-CM: R73.9  ICD-9-CM: 790.29  7/28/2014 - 7/29/2014        RESOLVED: Syncope and collapse ICD-10-CM: R55  ICD-9-CM: 780.2  4/29/2013 - 1/1/2019        RESOLVED: Type II or unspecified type diabetes mellitus without mention of complication, uncontrolled ICD-10-CM: [id number]  ICD-9-CM: 250.02  7/23/2010 - 11/14/2012              Greater than 30 minutes were spent with the patient on counseling and coordination of care    Signed:   Monet Irby MD  4/10/2022  12:54 PM

## 2022-04-10 NOTE — PROGRESS NOTES
Pt given discharge paperwork. Reviewed med updates with pt and family, BEFAST and After Visit Report. Discussed follow-up visits and informed pt of where and how to get meds. Removed lines/leads, packed pt belongings and wheeled to discharge. [facility] with Maty Odonnell at [facility] and worked out BG trend and hx, updated MD, orders received. 1100  Attempted blood draw, unable to obtain at this time. Tried to draw off of existing IV when stick was unsuccessful, IV does not give blood return. 0915  Pt awake and alert, ready for breakfast.      BG has been trending upward again since last night, MD aware.

## 2022-04-10 NOTE — PROGRESS NOTES
Problem: Falls - Risk of  Goal: *Absence of Falls  Description: Document Liz Alexandra Fall Risk and appropriate interventions in the flowsheet. Outcome: Resolved/Met  Note: Fall Risk Interventions:  Mobility Interventions: Communicate number of staff needed for ambulation/transfer         Medication Interventions: Patient to call before getting OOB,Teach patient to arise slowly    Elimination Interventions: Call light in reach,Patient to call for help with toileting needs              Problem: Patient Education: Go to Patient Education Activity  Goal: Patient/Family Education  Outcome: Resolved/Met     Problem: Diabetes Self-Management  Goal: *Disease process and treatment process  Description: Define diabetes and identify own type of diabetes; list 3 options for treating diabetes. Outcome: Resolved/Met  Goal: *Incorporating nutritional management into lifestyle  Description: Describe effect of type, amount and timing of food on blood glucose; list 3 methods for planning meals. Outcome: Resolved/Met  Goal: *Incorporating physical activity into lifestyle  Description: State effect of exercise on blood glucose levels. Outcome: Resolved/Met  Goal: *Developing strategies to promote health/change behavior  Description: Define the ABC's of diabetes; identify appropriate screenings, schedule and personal plan for screenings. Outcome: Resolved/Met  Goal: *Using medications safely  Description: State effect of diabetes medications on diabetes; name diabetes medication taking, action and side effects. Outcome: Resolved/Met  Goal: *Monitoring blood glucose, interpreting and using results  Description: Identify recommended blood glucose targets  and personal targets. Outcome: Resolved/Met  Goal: *Prevention, detection, treatment of acute complications  Description: List symptoms of hyper- and hypoglycemia; describe how to treat low blood sugar and actions for lowering  high blood glucose level.   Outcome: Resolved/Met  Goal: *Prevention, detection and treatment of chronic complications  Description: Define the natural course of diabetes and describe the relationship of blood glucose levels to long term complications of diabetes.   Outcome: Resolved/Met  Goal: *Developing strategies to address psychosocial issues  Description: Describe feelings about living with diabetes; identify support needed and support network  Outcome: Resolved/Met  Goal: *Insulin pump training  Outcome: Resolved/Met  Goal: *Sick day guidelines  Outcome: Resolved/Met  Goal: *Patient Specific Goal (EDIT GOAL, INSERT TEXT)  Outcome: Resolved/Met     Problem: Patient Education: Go to Patient Education Activity  Goal: Patient/Family Education  Outcome: Resolved/Met

## 2022-04-11 NOTE — Clinical Note
Haven Michele,    I contact this patient's family for follow up on readmission. I let them know you would be reaching out next week or so for follow up. Hope you had a great week. I will be off next week, so please reach out to me when I return if you have any questions.     [facility] Transition Nurse  Phone- [phone]

## 2022-04-11 NOTE — PROGRESS NOTES
Care Transitions Initial Call    Call within 2 business days of discharge: Yes     Patient: Sharmin Gr Patient : 1970 MRN: [id number]    Last Discharge [address]       Complaint Diagnosis Description Type Department Provider    22 High Blood Sugar Hyperglycemia . .. ED to Hosp-Admission (Discharged) (ADMIT) Adriel Pickens MD; Darleen Mejía. .. Was this an external facility discharge? No   Patient resides at [facility]    Challenges to be reviewed by the provider   Patient sees Dr. Cammie Pardo at [facility] on Wednesday,     Family Ronan Dowell) will speak to nutritionist at facility. Method of communication with provider : none    Discussed COVID-19 related testing which was available at this time. Test results were negative. Patient informed of results, if available? Prior to discharge for [facility] back to [facility]     Advance Care Planning:   Does patient have an Advance Directive:  patient has AMD and DDNR on file. Inpatient Readmission Risk score: Unplanned Readmit Risk Score: 22 ( )    Was this a readmission? yes   Patient stated reason for the admission: DKA    Patients top risk factors for readmission: medical condition-DM, diet compliance   Interventions to address risk factors: Scheduled appointment with PCP-, Scheduled appointment with Specialist--endo and Obtained and reviewed discharge summary and/or continuity of care documents    Care Transition Nurse (CTN) contacted the family by telephone to perform post hospital discharge assessment. Verified name and  with family as identifiers. Provided introduction to self, and explanation of the CTN role. CTN reviewed discharge instructions, medical action plan and red flags with family who verbalized understanding. Were discharge instructions available to patient? yes.  Reviewed appropriate site of care based on symptoms and resources available to patient including: PCP and Specialist. Family given an opportunity to ask questions and does not have any further questions or concerns at this time. The family agrees to contact the PCP office for questions related to their healthcare. Medication reconciliation was performed with staff, who confirmed medication changes. Patient taking increased dose of Eunice Davidsonolf was 12 units now 20 units nightly, also taking sliding scale Humalog. Referral to Pharm D needed: no     Discussed follow-up appointments. If no appointment was previously scheduled, appointment scheduling offered: n/a. Is follow up appointment scheduled within 7 days of discharge? yes. [facility] follow up appointment(s):   Future Appointments   Date Time Provider Odell Shaw   4/19/2022  9:30 AM Karen Singh MD RDE [facility] [address] AMB   Per staff, patient will be seen by Dr. John Cueva on 4/13 at facility. HD- M-W-F    Non-[facility] follow up appointment(s): none listed    Goals Addressed                 This Visit's Progress     Prevent complications post hospitalization. 04/11/22   Diet- maintain consistent carb diet, eat 3 evenly spaced meals (4-5 hours apart) a day with snacks. Will avoid sugary snacks.  Staff advised BG this am was 122. CTN s/w Timoteo, patient's brother and he will contact facility because he had heard from other family member that patient had episode of hypoglycemia this am.   Staff at [facility] will monitor blood sugar frequency, insulin administration, endocrinologist f/up- family has chosen to switch endocrinologist to Dr. Gareth Magana on [email or web address] am.   [facility] Family will try to get BG log from facility and keep diet diary to share with endo at initial visit on 4/19. Timoteo plans to speak with DON and nutritionist to discuss concerns and request info.   BEB

## 2022-04-19 PROBLEM — E11.10 DKA (DIABETIC KETOACIDOSIS) (HCC): Status: RESOLVED | Noted: 2021-01-01 | Resolved: 2022-01-01

## 2022-04-19 PROBLEM — E10.65 HYPERGLYCEMIA DUE TO TYPE 1 DIABETES MELLITUS (HCC): Status: ACTIVE | Noted: 2022-01-01

## 2022-04-19 NOTE — PATIENT INSTRUCTIONS
Plan to adjust insulin as follows:    -Insulin Tresiba (degludec) 10 units at bedtime    Humalog insulin (lispro) Breakfast and Lunch: 4 units before mealtime (give 15 minutes before meal)  Dinner give 6 units before mealtime    Snacks: if large snack give 2 units before eating    Continue current Insulin sliding scale:  Before breakfast, lunch, and dinner. Inject as per sliding scale: If BG 60-80 = 2 units   = 3 units  121-170 = 4 units  171-220 = 5 units  221-270 = 6 units  271-320 = 7 units  321 + = 8 units      Hypoglycemia management discussed    Continue Levothyroxine 100 mcg daily every morning, discussed how to take appropriately. Obtain thyroid labs. Stop biotin 6 days before drawing the labs. Type 1 Diabetes: Care Instructions  Your Care Instructions     Type 1 diabetes is a lifelong disease that develops when the pancreas stops making insulin. The body needs insulin to let sugar (glucose) move from the blood into the body's cells, where it can be used for energy or stored for later use. Without insulin, the sugar cannot get into the cells to do its work. It stays in the blood instead. This can cause high blood sugar levels. A person has diabetes when the blood sugar is too high. Over time, diabetes can lead to diseases of the heart, blood vessels, nerves, kidneys, and eyes. To treat type 1 diabetes, you need insulin. You can give yourself insulin through an insulin pump, an insulin pen, or a syringe (needle). Insulin, exercise, and a healthy diet can help prevent or delay problems from diabetes. With education and support, you will treat diabetes as a part of your life--not your whole life. Seek support when you need it from your family, friends, and your doctor or other diabetes experts. Follow-up care is a key part of your treatment and safety. Be sure to make and go to all appointments, and call your doctor if you are having problems.  It's also a good idea to know your test results and keep a list of the medicines you take. How can you care for yourself at home? · Take your insulin on time and in the right dose. This helps keep your blood sugar steady. Do not stop or change your insulin without talking to your doctor first.  · Check and record your blood sugar as often as directed. These records can help your doctor see how you are doing and adjust your treatment if needed. It is important to keep track of any symptoms you have, such as low blood sugar, and any changes in your activities, diet, or insulin use. · Carbohydrate--the body's main source of fuel--affects blood sugar more than any other nutrient. Carbohydrate is in fruits, vegetables, milk, and yogurt. It also is in breads, cereals, vegetables such as potatoes and corn, and sugary foods such as candy and cakes. Follow your meal plan to know how much carbohydrate to eat at each meal and snack. · Aim for 30 minutes of exercise on most, preferably all, days of the week. Walking is a good choice. You also may want to do other activities, such as running, swimming, cycling, or playing tennis or team sports. Try to do muscle-strengthening exercises at least 2 times a week. · Control your cholesterol and blood pressure. Exercise and healthy eating can help with these goals. If you have medicine for cholesterol or high blood pressure, take it as directed. Do not stop or change a medicine without talking to your doctor first.  · If you have discussed it with your doctor, take a low-dose aspirin every day to help prevent heart attack and stroke. Do not start taking aspirin unless your doctor knows about it. · Do not smoke. If you need help quitting, talk to your doctor about stop-smoking programs and medicines. These can increase your chances of quitting for good. · Check your feet daily for blisters, cracks, and sores. Have your doctor look at your feet whenever you have a checkup. · Get a checkup every 3 to 6 months.  Your doctor will tell you how often to come in. You will need regular tests such as:  ? A hemoglobin A1c test. You may need this test more often than once a year. It is a good measure of how well your treatment is working. ? A cholesterol test.  ? A urine test for protein. This checks for kidney problems. ? A complete foot exam.  ? An eye exam, even if you do not think your vision has changed. When should you call for help? Call 911 anytime you think you may need emergency care. For example, call if:    · You passed out (lost consciousness), or you suddenly become very sleepy or confused. (You may have very low blood sugar.)     · You have symptoms of high blood sugar, such as:  ? Blurred vision. ? Trouble staying awake or being woken up. ? Fast, deep breathing. ? Breath that smells fruity. ? Belly pain, not feeling hungry, and vomiting. ? Feeling confused. Call your doctor now or seek immediate medical care if:    · Your blood sugar stays higher than the level your doctor has set for you.     · You have symptoms of low blood sugar, such as:  ? Sweating. ? Feeling nervous, shaky, and weak. ? Extreme hunger and slight nausea. ? Dizziness and headache.  ? Blurred vision. ? Confusion. Watch closely for changes in your health, and be sure to contact your doctor if:    · You often have problems controlling your blood sugar.     · You have symptoms of long-term diabetes problems, such as:  ? New vision changes. ? New pain, numbness, or tingling in your hands or feet. ? Skin problems. Where can you learn more? Go to http://www.gray.com/  Enter P859 in the search box to learn more about \"Type 1 Diabetes: Care Instructions. \"  Current as of: July 28, 2021               Content Version: 13.2  © 5554-4712 [employer]. Care instructions adapted under license by [employer] (which disclaims liability or warranty for this information).  If you have questions about a medical condition or this instruction, always ask your healthcare professional. [address] any warranty or liability for your use of this information. Learning About Meal Planning for Diabetes  Why plan your meals? Meal planning can be a key part of managing diabetes. Planning meals and snacks with the right balance of carbohydrate, protein, and fat can help you keep your blood sugar at the target level you set with your doctor. You don't have to eat special foods. You can eat what your family eats, including sweets once in a while. But you do have to pay attention to how often you eat and how much you eat of certain foods. You may want to work with a dietitian or a diabetes educator. They can give you tips and meal ideas and can answer your questions about meal planning. This health professional can also help you reach a healthy weight if that is one of your goals. What plan is right for you? Your dietitian or diabetes educator may suggest that you start with the plate format or carbohydrate counting. The plate format  The plate format is a simple way to help you manage how you eat. You plan meals by learning how much space each food should take on a plate. Using the plate format helps you manage the amount of carbohydrate you eat. It can make it easier to keep your blood sugar level within your target range. It also helps you see if you're eating healthy portion sizes. To use the plate format, you put non-starchy vegetables on half your plate. Add lean protein foods, such as fish, lean meats and poultry, or soy products, on one-quarter of the plate. Put a grain or starchy vegetable (such as brown rice or a potato) on the final quarter of the plate.  You can add a small piece of fruit and some low-fat or fat-free milk or yogurt, depending on your carbohydrate goal for each meal.  Here are some tips for using the plate format:  · Make sure that you are not using an oversized plate. A 9-inch plate is best. Many restaurants use larger plates. · Get used to using the plate format at home. Then you can use it when you eat out. · Write down your questions about using the plate format. Talk to your doctor, a dietitian, or a diabetes educator about your concerns. Carbohydrate counting  With carbohydrate counting, you plan meals based on the amount of carbohydrate in each food. Carbohydrate raises blood sugar higher and more quickly than any other nutrient. It is found in desserts, breads and cereals, and fruit. It's also found in starchy vegetables such as potatoes and corn, grains such as rice and pasta, and milk and yogurt. You can help keep your blood sugar levels within your target range by planning how much carbohydrate to have at meals and snacks. The amount you need depends on several things. These include your weight, how active you are, which diabetes medicines you take, and what your goals are for your blood sugar levels. A registered dietitian or diabetes educator can help you plan how much carbohydrate to include in each meal and snack. An example of a carbohydrate counting plan is:  · 45 to 60 grams at each meal. That's about the same as 3 to 4 carbohydrate servings. · 15 to 20 grams at each snack. That's about the same as 1 carbohydrate serving. The Nutrition Facts label on packaged foods tells you how much carbohydrate is in a serving of the food. First, look at the serving size on the food label. Is that the amount you eat in a serving? All of the nutrition information on a food label is based on that serving size. So if you eat more or less than that, you'll need to adjust the other numbers. Total carbohydrate is the next thing you need to look for on the label. If you count carbohydrate servings, one serving of carbohydrate is 15 grams.   For foods that don't come with labels, such as fresh fruits and vegetables, you'll need a guide that lists carbohydrate in these foods. Ask your doctor, dietitian, or diabetes educator about books or other nutrition guides you can use. If you take insulin, you need to know how many grams of carbohydrate are in a meal. This lets you know how much rapid-acting insulin to take before you eat. If you use an insulin pump, you get a constant rate of insulin during the day. So the pump must be programmed at meals to give you extra insulin to cover the rise in blood sugar after meals. When you know how much carbohydrate you will eat, you can take the right amount of insulin. Or, if you always use the same amount of insulin, you need to make sure that you eat the same amount of carbohydrate at meals. If you need more help to understand carbohydrate counting and food labels, ask your doctor, dietitian, or diabetes educator. How can you plan healthy meals? Here are some tips to get started:  · Plan your meals a week at a time. Don't forget to include snacks too. · Use cookbooks or online recipes to plan several main meals. Plan some quick meals for busy nights. You also can double some recipes that freeze well. Then you can save half for other busy nights when you don't have time to cook. · Make sure you have the ingredients you need for your recipes. If you're running low on basic items, put these items on your shopping list too. · List foods that you use to make breakfasts, lunches, and snacks. List plenty of fruits and vegetables. · Post this list on the refrigerator. Add to it as you think of more things you need. · Take the list to the store to do your weekly shopping. Follow-up care is a key part of your treatment and safety. Be sure to make and go to all appointments, and call your doctor if you are having problems. It's also a good idea to know your test results and keep a list of the medicines you take. Where can you learn more?   Go to http://www.gray.com/  Enter V716 in the search box to learn more about \"Learning About Meal Planning for Diabetes. \"  Current as of: September 8, 2021               Content Version: 13.2  © 2006-2022 [employer]. Care instructions adapted under license by [employer] (which disclaims liability or warranty for this information). If you have questions about a medical condition or this instruction, always ask your healthcare professional. [address] any warranty or liability for your use of this information. Learning About Diabetes and Your Teeth  How does diabetes affect your teeth and gums? When you have diabetes, managing blood sugar levels and taking good care of your teeth and gums are both important. When blood sugar levels are high, there's a greater risk for:  · Gum (periodontal) disease. · Tooth decay. · Fungal infections in the mouth, like thrush. · Dry mouth, or xerostomia (say \"zee-ruh-STO-robert-uh\"). The mouth needs saliva to neutralize the acids in your mouth. These acids can lead to gum disease and tooth decay. Keeping your blood sugar levels in your target range can help prevent problems with the teeth and gums. If you have any problems with your teeth or gums, see your dentist.  How do you care for your teeth and gums when you have diabetes? · Brush your teeth twice a day. · Floss daily. Make sure to press the floss against your teeth and not your gums. · Check each day for areas where your gums might be red or painful. Be sure to let your dentist know of any sores in your mouth. · See your dentist regularly for professional cleaning of your teeth and to look for gum problems. Many dentists recommend getting checkups twice a year. Remind your dentist that you have diabetes before any work is done. · Don't smoke or use smo     · Hypoglycemia: Care Instructions  · Overview  ·    · Hypoglycemia means that your blood sugar is low and your body is not getting enough fuel.  Some people get low blood sugar from not eating often enough. Some medicines to treat diabetes can cause low blood sugar. People who have had surgery on their stomachs or intestines may get hypoglycemia. Problems with the pancreas, kidneys, or liver also can cause low blood sugar. · A snack or drink with sugar in it will raise your blood sugar and should ease your symptoms right away. · Your doctor may recommend that you change or stop your medicines until you can get your blood sugar levels under control. In the long run, you may need to change your diet and eating habits so that you get enough fuel for your body throughout the day. · Follow-up care is a key part of your treatment and safety. Be sure to make and go to all appointments, and call your doctor if you are having problems. It's also a good idea to know your test results and keep a list of the medicines you take. · How can you care for yourself at home? · Learn your signs of low blood sugar. They are different for everyone. Some common early signs include:  · Nausea. · Hunger. · Feeling nervous, irritable, or shaky. · Cold, clammy skin. · Sweating (when you're not exercising). · Use the \"rule of 15\" to treat low blood sugar. This includes eating 15 grams of carbohydrate from a quick-sugar food, such as 3 or 4 glucose tablets or ½ cup of juice. Wait 15 minutes and check your blood sugar. If it is still below 70 mg/dL, eat another 15 grams of carbohydrate. Repeat this every 15 minutes until your blood sugar is in a safe target range. · Once your blood sugar is in a safe range, eat a snack or meal to prevent recurrent low blood sugar. · Make sure family, friends, and coworkers know the symptoms of low blood sugar and know how to get your sugar level up. · If you were prescribed a glucagon kit, always have it with you. Make sure friends and family know how to use it. · When should you call for help? ·  Call 911 anytime you think you may need emergency care.  For example, call if: ·   · You passed out (lost consciousness). ·   · You are confused or cannot think clearly. ·   · Your blood sugar is very high or very low. · Watch closely for changes in your health, and be sure to contact your doctor if:  ·   · Your blood sugar stays outside the level your doctor set for you. ·   · You have any problems. · Where can you learn more? · Go to http://www.gray.com/  · Enter X265 in the search box to learn more about \"Hypoglycemia: Care Instructions. \"  · Current as of: July 28, 2021               Content Version: 13.2  · © 5361-1406 [employer]. · Care instructions adapted under license by [employer] (which disclaims liability or warranty for this information). If you have questions about a medical condition or this instruction, always ask your healthcare professional. [address] any warranty or liability for your use of this information. ·    ·      · Diabetes Foot Health: Care Instructions  · Your Care Instructions  ·    · When you have diabetes, your feet need extra care and attention. Diabetes can damage the nerve endings and blood vessels in your feet, making you less likely to notice when your feet are injured. Diabetes also limits your body's ability to fight infection and get blood to areas that need it. If you get a minor foot injury, it could become an ulcer or a serious infection. With good foot care, you can prevent most of these problems. · Caring for your feet can be quick and easy. Most of the care can be done when you are bathing or getting ready for bed. · Follow-up care is a key part of your treatment and safety. Be sure to make and go to all appointments, and call your doctor if you are having problems. It's also a good idea to know your test results and keep a list of the medicines you take. · How can you care for yourself at home?   · Keep your blood sugar close to normal by watching what and how much you eat, monitoring blood sugar, taking medicines if prescribed, and getting regular exercise. · Do not smoke. Smoking affects blood flow and can make foot problems worse. If you need help quitting, talk to your doctor about stop-smoking programs and medicines. These can increase your chances of quitting for good. · Eat a diet that is low in fats. High fat intake can cause fat to build up in your blood vessels and decrease blood flow. · Inspect your feet daily for blisters, cuts, cracks, or sores. If you cannot see well, use a mirror or have someone help you. · Take care of your feet:  · Wash your feet every day. Use warm (not hot) water. Check the water temperature with your wrists or other part of your body, not your feet. · Dry your feet well. Pat them dry. Do not rub the skin on your feet too hard. Dry well between your toes. If the skin on your feet stays moist, bacteria or a fungus can grow, which can lead to infection. · Keep your skin soft. Use moisturizing skin cream to keep the skin on your feet soft and prevent calluses and cracks. But do not put the cream between your toes, and stop using any cream that causes a rash. · Clean underneath your toenails carefully. Do not use a sharp object to clean underneath your toenails. Use the blunt end of a nail file or other rounded tool. · Trim and file your toenails straight across to prevent ingrown toenails. Use a nail clipper, not scissors. Use an emery board to smooth the edges. · Change socks daily. Socks without seams are best, because seams often rub the feet. You can find socks for people with diabetes from specialty catalogs. · Look inside your shoes every day for things like gravel or torn linings, which could cause blisters or sores. · Buy shoes that fit well:  · Look for shoes that have plenty of space around the toes. This helps prevent bunions and blisters.   · Try on shoes while wearing the kind of socks you will usually wear with the shoes. · Avoid plastic shoes. They may rub your feet and cause blisters. Good shoes should be made of materials that are flexible and breathable, such as leather or cloth. · Break in new shoes slowly by wearing them for no more than an hour a day for several days. Take extra time to check your feet for red areas, blisters, or other problems after you wear new shoes. · Do not go barefoot. Do not wear sandals, and do not wear shoes with very thin soles. Thin soles are easy to puncture. They also do not protect your feet from hot pavement or cold weather. · Have your doctor check your feet during each visit. If you have a foot problem, see your doctor. Do not try to treat an early foot problem at home. Home remedies or treatments that you can buy without a prescription (such as corn removers) can be harmful. · Always get early treatment for foot problems. A minor irritation can lead to a major problem if not properly cared for early. · When should you call for help? ·  Call your doctor now or seek immediate medical care if:  ·   · You have a foot sore, an ulcer or break in the skin that is not healing after 4 days, bleeding corns or calluses, or an ingrown toenail. ·   · You have blue or black areas, which can mean bruising or blood flow problems. ·   · You have peeling skin or tiny blisters between your toes or cracking or oozing of the skin. ·   · You have a fever for more than 24 hours and a foot sore. ·   · You have new numbness or tingling in your feet that does not go away after you move your feet or change positions. ·   · You have unexplained or unusual swelling of the foot or ankle. · Watch closely for changes in your health, and be sure to contact your doctor if:  ·   · You cannot do proper foot care. · Where can you learn more?   · Go to http://rebecca-cornel.info/  · Enter A739 in the search box to learn more about \"Diabetes Foot Health: Care Instructions. \"  · Current as of: July 28, 2021               Content Version: 13.2  · © 2006-2022 [employer]. · Care instructions adapted under license by [employer] (which disclaims liability or warranty for this information). If you have questions about a medical condition or this instruction, always ask your healthcare professional. [address] any warranty or liability for your use of this information. ·    · keless tobacco. Tobacco use with diabetes can lead to a greater risk of severe gum disease. If you need help quitting, talk to your doctor about stop-smoking programs and medicines. These can increase your chances of quitting for good. Follow-up care is a key part of your treatment and safety. Be sure to make and go to all appointments, and call your doctor if you are having problems. It's also a good idea to know your test results and keep a list of the medicines you take. Where can you learn more? Go to http://www.greer.com/  Enter H523 in the search box to learn more about \"Learning About Diabetes and Your Teeth. \"  Current as of: July 28, 2021               Content Version: 13.2  © 0030-4985 [employer]. Care instructions adapted under license by [employer] (which disclaims liability or warranty for this information). If you have questions about a medical condition or this instruction, always ask your healthcare professional. [address] any warranty or liability for your use of this information. Hypothyroidism: Care Instructions  Your Care Instructions     When you have hypothyroidism, your body doesn't make enough thyroid hormone. This hormone helps your body use energy. If your thyroid level is low, you may feel tired, be constipated, have an increase in your blood pressure, or have dry skin or memory problems. You may also get cold easily, even when it is warm.  Women with low thyroid levels may have heavy menstrual periods. A blood test to find your thyroid-stimulating hormone (TSH) level is used to check for hypothyroidism. A high TSH level may mean that you have it. The treatment for hypothyroidism is thyroid hormone pills. You should start to feel better in 1 to 2 weeks. Most people need treatment for the rest of their lives. You will need regular visits with your doctor to make sure you are doing well and that you have the right dose of medicine. Follow-up care is a key part of your treatment and safety. Be sure to make and go to all appointments, and call your doctor if you are having problems. It's also a good idea to know your test results and keep a list of the medicines you take. How can you care for yourself at home? · Take your thyroid hormone medicine exactly as prescribed. Call your doctor if you think you are having a problem with your medicine. Most people do not have side effects if they take the right amount of medicine regularly. ? Take the medicine 30 minutes before breakfast, and do not take it with calcium, vitamins, or iron. ? Do not take extra doses of your thyroid medicine. It will not help you get better any faster, and it may cause side effects. ? If you forget to take a dose, do NOT take a double dose of medicine. Take your usual dose the next day. · Tell your doctor about all prescription, herbal, or over-the-counter products you take. · Take care of yourself. Eat a healthy diet, get enough sleep, and get regular exercise. When should you call for help? Call 911 anytime you think you may need emergency care. For example, call if:    · You passed out (lost consciousness).     · You have severe trouble breathing.     · You have a very slow heartbeat (less than 60 beats a minute).     · You have a low body temperature (95°F or below).    Call your doctor now or seek immediate medical care if:    · You feel tired, sluggish, or weak.     · You have trouble remembering things or concentrating.     · You do not begin to feel better 2 weeks after starting your medicine. Watch closely for changes in your health, and be sure to contact your doctor if you have any problems. Where can you learn more? Go to http://www.gray.com/  Enter A490 in the search box to learn more about \"Hypothyroidism: Care Instructions. \"  Current as of: July 28, 2021               Content Version: 13.2  © 2006-2022 [employer]. Care instructions adapted under license by [employer] (which disclaims liability or warranty for this information). If you have questions about a medical condition or this instruction, always ask your healthcare professional. [address] any warranty or liability for your use of this information.

## 2022-04-19 NOTE — PROGRESS NOTES
History of Present Illness: Julián Anrold is a 45 y.o. female who is a new patient for evaluation of type 1 diabetes. Past medical history includes Down syndrome, type 1 diabetes, end-stage renal disease on hemodialysis [Monday, Wednesday, Friday], hypertension, hyperlipidemia, and anemia. Was diagnosed with diabetes type 1 when she was 3 years ago . Current regimen is . .. [facility] ([phone]) called to mention correct doses patient is taking (different from ones noted in chart): The current sliding scale for the Humalog: If BG   <60 hold  60-80 = 2 units   = 3 units  121-170 = 4 units  171-220 = 5 units  221-270 = 6 units  271-320 = 10 units  321 + = 15 units  500+ = 20 units     The mealtime dose is 8 units (not 4 units) of Humalog. Tresiba 12 units at bedtime   No insulin for snacks    Blood glucose logs sent from the Assisted living for past 2 weeks:  Fasting: Ranging from 140 -230, before lunch 1 , before dinner 112-400s, bedtime 200s  The blood pressure is checked by the assisted living facility staff    Most recent A1c is 7.6%    Patient developed end-stage renal disease since October 2021    In the past was on an insulin pump however when she moved to the assisted living facility [[place]] to live with her mother, she was taken off the pump as the staff would not operate with it. Patient was also physically active in her job for 21 years and has retired for approximately 3 years. A typical day is as follows:  - breakfast: Eats breakfast from 8 30-9 a.m., oatmeal, eggs, sandwich wraps, sausage, turkey  - lunch: Eats lunch from 1130 to 12 PM, sandwich wraps, egg salad  - dinner: 430 to 5:30 PM, grilled hamburger, sweet potato fries, coleslaw, cream sauce  - beverages: Mainly water no soda  - snacks: Fruit, lace chips, crackers, cream cheese  Exercise consists of not much physical activity. No history of vascular disease.   No history of retinopathy, neuropathy, or nephropathy. Last eye exam was 2 years ago, missed last appointment due to Jose Luis. At cataract removal, had laser eye surgery. Past Medical History:   Diagnosis Date    Allergic rhinitis, cause unspecified     Asthma     use inhalers    Diabetes ([facility]     Diabetic macular edema ([facility] 2/4/2016    DKA (diabetic ketoacidosis) ([facility] 12/4/2021    Down's syndrome     GERD (gastroesophageal reflux disease)     H/O impacted cerumen     Dr. Garcia Quant    Headache     Hypoglycemia 6/27/2012    Hypothyroid     Mononucleosis     Pneumonia     Seizures ([facility]     as a result of low blood glucose readings    Thyroid disease      Past Surgical History:   Procedure Laterality Date    HX CATARACT REMOVAL Bilateral 3/5/15    cataract left and right    HX CYST REMOVAL      right shoulder    HX HEENT Bilateral 2017    prior in one eye    HX HYSTERECTOMY  1987     Current Outpatient Medications   Medication Sig    HumaLOG KwikPen Insulin 100 unit/mL kwikpen nject as per sliding scale: If BG 60-80 = 2 units   = 3 units  121-170 = 4 units  171-220 = 5 units  221-270 = 6 units  271-320 = 10 units  321 + = 15 units  500+ = 20 units    insulin lispro (HumaLOG U-100 Insulin) 100 unit/mL injection Inject as per sliding scale: If BG 60-80 = 2 units   = 3 units  121-170 = 4 units  171-220 = 5 units  221-270 = 6 units  271-320 = 10 units  321 + = 15 units  500+ = 20 units    dextromethorphan (Delsym) 30 mg/5 mL liquid Take 30 mg by mouth every twelve (12) hours as needed for Cough.  lidocaine-prilocaine (EMLA) topical cream Apply  to affected area DIALYSIS MON, WED & FRI. Apply to left arm access site topically in the morning every M/W/F prior to dialysis    traZODone (DESYREL) 50 mg tablet Take 50 mg by mouth nightly.  metoprolol tartrate (LOPRESSOR) 25 mg tablet Take 1 Tablet by mouth every twelve (12) hours.     b complex-vitamin c-folic acid (NEPHROCAPS) 1 mg capsule Take 1 Capsule by mouth daily.  diphenhydrAMINE (BenadryL) 25 mg capsule Take 25 mg by mouth nightly as needed for Allergies.  cranberry 500 mg capsule Take 500 mg by mouth daily.  fluticasone propionate (Flonase Allergy Relief) 50 mcg/actuation nasal spray 2 Sprays by Both Nostrils route every twelve (12) hours as needed for Rhinitis or Allergies.  esomeprazole (NexIUM) 40 mg capsule Take 40 mg by mouth daily.  L.acid,para-B. bifidum-S.therm (RISAQUAD) 8 billion cell cap cap Take 1 Capsule by mouth daily.  insulin degludec Isaura Ruffing FlexTouch U-100) 100 unit/mL (3 mL) inpn 12 Units by SubCUTAneous route nightly.  ergocalciferol (Vitamin D2) 1,250 mcg (50,000 unit) capsule Take 50,000 Units by mouth every Wednesday.  montelukast (Singulair) 10 mg tablet Take 10 mg by mouth nightly.  levothyroxine (SYNTHROID) 100 mcg tablet Take 1 Tablet by mouth Daily (before breakfast).  biotin 5,000 mcg TbDi Take 5,000 mcg by mouth daily.  cholecalciferol, vitamin D3, (VITAMIN D3) 2,000 unit tab Take 4,000 Units by mouth daily.  atorvastatin (LIPITOR) 20 mg tablet Take 20 mg by mouth nightly.  albuterol (PROVENTIL HFA, VENTOLIN HFA, PROAIR HFA) 90 mcg/actuation inhaler Take 1 Puff by inhalation every four (4) hours as needed for Wheezing.  glucose 4 gram chewable tablet Take 12 g by mouth as needed (BS < 60).  glucagon (GLUCAGEN) 1 mg injection 1 mg by IntraMUSCular route once as needed (hypoglycemia if patient is unresponsive).  insulin lispro (HumaLOG U-100 Insulin) 100 unit/mL injection 8 Units by SubCUTAneous route Before breakfast, lunch, and dinner. Hold insulin if BS <80. Hold if patient does not eat her meal. If BS >150 give with SS. (Patient not taking: Reported on 4/19/2022)     No current facility-administered medications for this visit.      Allergies   Allergen Reactions    Codeine Nausea and Vomiting    Lettuce Other (comments)    Nitrofurantoin Rash    Pcn [Penicillins] Hives and Rash    Tomato Other (comments)     Family History   Problem Relation Age of Onset    Thyroid Disease Mother         hypo    Hypertension Father     Abdominal aortic aneurysm Father     Neuropathy Father     No Known Problems Sister     Heart Disease Brother     Heart Attack Brother     Heart Surgery Brother     No Known Problems Sister     No Known Problems Brother     Anesth Problems Neg Hx      Social History     Socioeconomic History    Marital status: SINGLE     Spouse name: Not on file    Number of children: Not on file    Years of education: Not on file    Highest education level: Not on file   Occupational History    Not on file   Tobacco Use    Smoking status: Never Smoker    Smokeless tobacco: Never Used   Vaping Use    Vaping Use: Never used   Substance and Sexual Activity    Alcohol use: No    Drug use: No    Sexual activity: Not Currently   Other Topics Concern    Not on file   Social History Narrative    Not on file     Social Determinants of Health     Financial Resource Strain:     Difficulty of Paying Living Expenses: Not on file   Food Insecurity:     Worried About Running Out of Food in the Last Year: Not on file    Cornelius of Food in the Last Year: Not on file   Transportation Needs:     Lack of Transportation (Medical): Not on file    Lack of Transportation (Non-Medical):  Not on file   Physical Activity:     Days of Exercise per Week: Not on file    Minutes of Exercise per Session: Not on file   Stress:     Feeling of Stress : Not on file   Social Connections:     Frequency of Communication with Friends and Family: Not on file    Frequency of Social Gatherings with Friends and Family: Not on file    Attends Yarsani Services: Not on file    Active Member of Clubs or Organizations: Not on file    Attends Club or Organization Meetings: Not on file    Marital Status: Not on file   Intimate Partner Violence:     Fear of Current or Ex-Partner: Not on file    Emotionally Abused: Not on file    Physically Abused: Not on file    Sexually Abused: Not on file   Housing Stability:     Unable to Pay for Housing in the Last Year: Not on file    Number of Places Lived in the Last Year: Not on file    Unstable Housing in the Last Year: Not on file     Review of Systems:  - Constitutional Symptoms: no fevers, chills, weight loss  - Eyes: no blurry vision or double vision  - Cardiovascular: no chest pain or palpitations  - Respiratory: no cough or shortness of breath  - Gastrointestinal: no dysphagia or abdominal pain  - Musculoskeletal: no joint pains or weakness  - Integumentary: no rashes  - Neurological: no numbness, tingling, or headaches  - Psychiatric: no depression or anxiety  - Endocrine: no heat or cold intolerance, no polyuria or polydipsia    Physical Examination:  Blood pressure (!) 143/66, pulse 78, height 4' 11\" (1.499 m), weight 128 lb 6.4 oz (58.2 kg), last menstrual period 03/27/1986.  - General: pleasant, no distress, good eye contact  - HEENT: no exopthalmos, no periorbital edema, no scleral/conjunctival injection, EOMI, no lid lag or stare  - Neck: supple, no thyromegaly, masses, lymph nodes, or carotid bruits, no supraclavicular or dorsocervical fat pads  - Cardiovascular: regular, normal rate, normal S1 and S2, no murmurs/rubs/gallops,  - Respiratory: clear to auscultation bilaterally  - Gastrointestinal: soft, nontender, nondistended, no masses, no hepatosplenomegaly  - Musculoskeletal: no proximal muscle weakness in upper or lower extremities  - Integumentary: no acanthosis nigricans, no abdominal striae, no rashes, no edema, no foot ulcers  - Neurological: see foot exam  - Psychiatric: normal mood and affect    Diabetic foot exam:     Left Foot:   Visual Exam: normal    Pulse DP: 2+ (normal)   Filament test: normal sensation    Vibratory sensation: Vibratory sensation: normal       Right Foot:   Visual Exam: normal    Pulse DP: 2+ (normal)   Filament test: normal sensation    Vibratory sensation: Vibratory sensation: normal            Data Reviewed:   - Hgb A1c 7.6%  - lipids: Labs deferred for next visit  - BUN/Cr 4    Assessment/Plan:   1. Hyperglycemia due to type 1 diabetes mellitus ([facility] [address] [facility]  2. Hypothyroidism, unspecified type  -     TSH 3RD GENERATION; Future  -     T4, FREE; Future  3. Hypertension, unspecified type  4. Overweight  Patient with longstanding type 1 diabetes [7 years] with multiple complications including nephropathy [end-stage renal disease], retinopathy. Now she lives in assisted living facility with her mother, was on an insulin pump but was removed 5 years ago and is not an option. Receives doses by staff at the assisted living facility on insulin Tresiba 12 units at bedtime, insulin Humalog 8 units before each meal [no dose for snacks] and insulin sliding scale as below: If BG   <60 hold  60-80 = 2 units   = 3 units  121-170 = 4 units  171-220 = 5 units  221-270 = 6 units  271-320 = 10 units  321 + = 15 units  500+ = 20 units  Changes made our dinner dose Humalog increased to 10 units, breakfast and lunch 8 units  Tresiba reduced from 12 units to 10 units  Insulin sliding scale kept as such    Lifestyle modifications discussed with patient, including healthy dietary choices and physical activity  To log blood sugar readings taken 3 times a day, and assisted living facility to fax sugar logs for past 2 weeks  Hypoglycemia management discussed including use of glucagon inj   Foot care and Dental care discussed. To keep Ophthalm appts. Labs to be ordered for next visit.     *No hypothyroidism symptoms at this time, takes levothyroxine 100 mcg daily appropriately, counseled on avoiding calcium containing medications for 3 hours after taking the levothyroxine, to obtain thyroid labs after holding biotin for 6 days    *Elevated blood pressure has end-stage renal disease, taking tartrate 25 mg twice a day, following with nephrologist    *Healthy lifestyle choices with regards to food discussed, will monitor weight    Patient Instructions     Plan to adjust insulin as follows:    -Insulin Tresiba (degludec) 10 units at bedtime    Humalog insulin (lispro) Breakfast and Lunch: 8 units before mealtime (give 15 minutes before meal)  Dinner give 10 units before mealtime    Continue current Insulin sliding scale: If BG   <60 hold  60-80 = 2 units   = 3 units  121-170 = 4 units  171-220 = 5 units  221-270 = 6 units  271-320 = 10 units  321 + = 15 units  500+ = 20 units  Snacks: if large snack give 2 units before eating      Before breakfast, lunch, and dinner. Inject as per sliding scale:      Hypoglycemia management discussed    Continue Levothyroxine 100 mcg daily every morning, discussed how to take appropriately. Obtain thyroid labs. Stop biotin 6 days before drawing the labs. Type 1 Diabetes: Care Instructions  Your Care Instructions     Type 1 diabetes is a lifelong disease that develops when the pancreas stops making insulin. The body needs insulin to let sugar (glucose) move from the blood into the body's cells, where it can be used for energy or stored for later use. Without insulin, the sugar cannot get into the cells to do its work. It stays in the blood instead. This can cause high blood sugar levels. A person has diabetes when the blood sugar is too high. Over time, diabetes can lead to diseases of the heart, blood vessels, nerves, kidneys, and eyes. To treat type 1 diabetes, you need insulin. You can give yourself insulin through an insulin pump, an insulin pen, or a syringe (needle). Insulin, exercise, and a healthy diet can help prevent or delay problems from diabetes. With education and support, you will treat diabetes as a part of your life--not your whole life. Seek support when you need it from your family, friends, and your doctor or other diabetes experts.   Follow-up care is a key part of your treatment and safety. Be sure to make and go to all appointments, and call your doctor if you are having problems. It's also a good idea to know your test results and keep a list of the medicines you take. How can you care for yourself at home? · Take your insulin on time and in the right dose. This helps keep your blood sugar steady. Do not stop or change your insulin without talking to your doctor first.  · Check and record your blood sugar as often as directed. These records can help your doctor see how you are doing and adjust your treatment if needed. It is important to keep track of any symptoms you have, such as low blood sugar, and any changes in your activities, diet, or insulin use. · Carbohydrate--the body's main source of fuel--affects blood sugar more than any other nutrient. Carbohydrate is in fruits, vegetables, milk, and yogurt. It also is in breads, cereals, vegetables such as potatoes and corn, and sugary foods such as candy and cakes. Follow your meal plan to know how much carbohydrate to eat at each meal and snack. · Aim for 30 minutes of exercise on most, preferably all, days of the week. Walking is a good choice. You also may want to do other activities, such as running, swimming, cycling, or playing tennis or team sports. Try to do muscle-strengthening exercises at least 2 times a week. · Control your cholesterol and blood pressure. Exercise and healthy eating can help with these goals. If you have medicine for cholesterol or high blood pressure, take it as directed. Do not stop or change a medicine without talking to your doctor first.  · If you have discussed it with your doctor, take a low-dose aspirin every day to help prevent heart attack and stroke. Do not start taking aspirin unless your doctor knows about it. · Do not smoke. If you need help quitting, talk to your doctor about stop-smoking programs and medicines.  These can increase your chances of quitting for good.  · Check your feet daily for blisters, cracks, and sores. Have your doctor look at your feet whenever you have a checkup. · Get a checkup every 3 to 6 months. Your doctor will tell you how often to come in. You will need regular tests such as:  ? A hemoglobin A1c test. You may need this test more often than once a year. It is a good measure of how well your treatment is working. ? A cholesterol test.  ? A urine test for protein. This checks for kidney problems. ? A complete foot exam.  ? An eye exam, even if you do not think your vision has changed. When should you call for help? Call 911 anytime you think you may need emergency care. For example, call if:    · You passed out (lost consciousness), or you suddenly become very sleepy or confused. (You may have very low blood sugar.)     · You have symptoms of high blood sugar, such as:  ? Blurred vision. ? Trouble staying awake or being woken up. ? Fast, deep breathing. ? Breath that smells fruity. ? Belly pain, not feeling hungry, and vomiting. ? Feeling confused. Call your doctor now or seek immediate medical care if:    · Your blood sugar stays higher than the level your doctor has set for you.     · You have symptoms of low blood sugar, such as:  ? Sweating. ? Feeling nervous, shaky, and weak. ? Extreme hunger and slight nausea. ? Dizziness and headache.  ? Blurred vision. ? Confusion. Watch closely for changes in your health, and be sure to contact your doctor if:    · You often have problems controlling your blood sugar.     · You have symptoms of long-term diabetes problems, such as:  ? New vision changes. ? New pain, numbness, or tingling in your hands or feet. ? Skin problems. Where can you learn more? Go to http://www.gray.com/  Enter P859 in the search box to learn more about \"Type 1 Diabetes: Care Instructions. \"  Current as of: July 28, 2021               Content Version: 13.2  © 0787-5649 [employer]. Care instructions adapted under license by [employer] (which disclaims liability or warranty for this information). If you have questions about a medical condition or this instruction, always ask your healthcare professional. [address] any warranty or liability for your use of this information. Learning About Meal Planning for Diabetes  Why plan your meals? Meal planning can be a key part of managing diabetes. Planning meals and snacks with the right balance of carbohydrate, protein, and fat can help you keep your blood sugar at the target level you set with your doctor. You don't have to eat special foods. You can eat what your family eats, including sweets once in a while. But you do have to pay attention to how often you eat and how much you eat of certain foods. You may want to work with a dietitian or a diabetes educator. They can give you tips and meal ideas and can answer your questions about meal planning. This health professional can also help you reach a healthy weight if that is one of your goals. What plan is right for you? Your dietitian or diabetes educator may suggest that you start with the plate format or carbohydrate counting. The plate format  The plate format is a simple way to help you manage how you eat. You plan meals by learning how much space each food should take on a plate. Using the plate format helps you manage the amount of carbohydrate you eat. It can make it easier to keep your blood sugar level within your target range. It also helps you see if you're eating healthy portion sizes. To use the plate format, you put non-starchy vegetables on half your plate. Add lean protein foods, such as fish, lean meats and poultry, or soy products, on one-quarter of the plate. Put a grain or starchy vegetable (such as brown rice or a potato) on the final quarter of the plate.  You can add a small piece of fruit and some low-fat or fat-free milk or yogurt, depending on your carbohydrate goal for each meal.  Here are some tips for using the plate format:  · Make sure that you are not using an oversized plate. A 9-inch plate is best. Many restaurants use larger plates. · Get used to using the plate format at home. Then you can use it when you eat out. · Write down your questions about using the plate format. Talk to your doctor, a dietitian, or a diabetes educator about your concerns. Carbohydrate counting  With carbohydrate counting, you plan meals based on the amount of carbohydrate in each food. Carbohydrate raises blood sugar higher and more quickly than any other nutrient. It is found in desserts, breads and cereals, and fruit. It's also found in starchy vegetables such as potatoes and corn, grains such as rice and pasta, and milk and yogurt. You can help keep your blood sugar levels within your target range by planning how much carbohydrate to have at meals and snacks. The amount you need depends on several things. These include your weight, how active you are, which diabetes medicines you take, and what your goals are for your blood sugar levels. A registered dietitian or diabetes educator can help you plan how much carbohydrate to include in each meal and snack. An example of a carbohydrate counting plan is:  · 45 to 60 grams at each meal. That's about the same as 3 to 4 carbohydrate servings. · 15 to 20 grams at each snack. That's about the same as 1 carbohydrate serving. The Nutrition Facts label on packaged foods tells you how much carbohydrate is in a serving of the food. First, look at the serving size on the food label. Is that the amount you eat in a serving? All of the nutrition information on a food label is based on that serving size. So if you eat more or less than that, you'll need to adjust the other numbers. Total carbohydrate is the next thing you need to look for on the label.  If you count carbohydrate servings, one serving of carbohydrate is 15 grams. For foods that don't come with labels, such as fresh fruits and vegetables, you'll need a guide that lists carbohydrate in these foods. Ask your doctor, dietitian, or diabetes educator about books or other nutrition guides you can use. If you take insulin, you need to know how many grams of carbohydrate are in a meal. This lets you know how much rapid-acting insulin to take before you eat. If you use an insulin pump, you get a constant rate of insulin during the day. So the pump must be programmed at meals to give you extra insulin to cover the rise in blood sugar after meals. When you know how much carbohydrate you will eat, you can take the right amount of insulin. Or, if you always use the same amount of insulin, you need to make sure that you eat the same amount of carbohydrate at meals. If you need more help to understand carbohydrate counting and food labels, ask your doctor, dietitian, or diabetes educator. How can you plan healthy meals? Here are some tips to get started:  · Plan your meals a week at a time. Don't forget to include snacks too. · Use cookbooks or online recipes to plan several main meals. Plan some quick meals for busy nights. You also can double some recipes that freeze well. Then you can save half for other busy nights when you don't have time to cook. · Make sure you have the ingredients you need for your recipes. If you're running low on basic items, put these items on your shopping list too. · List foods that you use to make breakfasts, lunches, and snacks. List plenty of fruits and vegetables. · Post this list on the refrigerator. Add to it as you think of more things you need. · Take the list to the store to do your weekly shopping. Follow-up care is a key part of your treatment and safety. Be sure to make and go to all appointments, and call your doctor if you are having problems.  It's also a good idea to know your test results and keep a list of the medicines you take. Where can you learn more? Go to http://www.gray.com/  Enter Q803 in the search box to learn more about \"Learning About Meal Planning for Diabetes. \"  Current as of: September 8, 2021               Content Version: 13.2  © 2006-2022 [employer]. Care instructions adapted under license by [employer] (which disclaims liability or warranty for this information). If you have questions about a medical condition or this instruction, always ask your healthcare professional. [address] any warranty or liability for your use of this information. Learning About Diabetes and Your Teeth  How does diabetes affect your teeth and gums? When you have diabetes, managing blood sugar levels and taking good care of your teeth and gums are both important. When blood sugar levels are high, there's a greater risk for:  · Gum (periodontal) disease. · Tooth decay. · Fungal infections in the mouth, like thrush. · Dry mouth, or xerostomia (say \"maribelle-julia-STO-robert-\"). The mouth needs saliva to neutralize the acids in your mouth. These acids can lead to gum disease and tooth decay. Keeping your blood sugar levels in your target range can help prevent problems with the teeth and gums. If you have any problems with your teeth or gums, see your dentist.  How do you care for your teeth and gums when you have diabetes? · Brush your teeth twice a day. · Floss daily. Make sure to press the floss against your teeth and not your gums. · Check each day for areas where your gums might be red or painful. Be sure to let your dentist know of any sores in your mouth. · See your dentist regularly for professional cleaning of your teeth and to look for gum problems. Many dentists recommend getting checkups twice a year. Remind your dentist that you have diabetes before any work is done.   · Don't smoke or use smo · Hypoglycemia: Care Instructions  · Overview  ·    · Hypoglycemia means that your blood sugar is low and your body is not getting enough fuel. Some people get low blood sugar from not eating often enough. Some medicines to treat diabetes can cause low blood sugar. People who have had surgery on their stomachs or intestines may get hypoglycemia. Problems with the pancreas, kidneys, or liver also can cause low blood sugar. · A snack or drink with sugar in it will raise your blood sugar and should ease your symptoms right away. · Your doctor may recommend that you change or stop your medicines until you can get your blood sugar levels under control. In the long run, you may need to change your diet and eating habits so that you get enough fuel for your body throughout the day. · Follow-up care is a key part of your treatment and safety. Be sure to make and go to all appointments, and call your doctor if you are having problems. It's also a good idea to know your test results and keep a list of the medicines you take. · How can you care for yourself at home? · Learn your signs of low blood sugar. They are different for everyone. Some common early signs include:  · Nausea. · Hunger. · Feeling nervous, irritable, or shaky. · Cold, clammy skin. · Sweating (when you're not exercising). · Use the \"rule of 15\" to treat low blood sugar. This includes eating 15 grams of carbohydrate from a quick-sugar food, such as 3 or 4 glucose tablets or ½ cup of juice. Wait 15 minutes and check your blood sugar. If it is still below 70 mg/dL, eat another 15 grams of carbohydrate. Repeat this every 15 minutes until your blood sugar is in a safe target range. · Once your blood sugar is in a safe range, eat a snack or meal to prevent recurrent low blood sugar. · Make sure family, friends, and coworkers know the symptoms of low blood sugar and know how to get your sugar level up.   · If you were prescribed a glucagon kit, always have it with you. Make sure friends and family know how to use it. · When should you call for help? ·  Call 911 anytime you think you may need emergency care. For example, call if:  ·   · You passed out (lost consciousness). ·   · You are confused or cannot think clearly. ·   · Your blood sugar is very high or very low. · Watch closely for changes in your health, and be sure to contact your doctor if:  ·   · Your blood sugar stays outside the level your doctor set for you. ·   · You have any problems. · Where can you learn more? · Go to http://www.gray.com/  · Enter K155 in the search box to learn more about \"Hypoglycemia: Care Instructions. \"  · Current as of: July 28, 2021               Content Version: 13.2  · © 7939-1296 [employer]. · Care instructions adapted under license by [employer] (which disclaims liability or warranty for this information). If you have questions about a medical condition or this instruction, always ask your healthcare professional. [place] [address] any warranty or liability for your use of this information. ·    ·      · Diabetes Foot Health: Care Instructions  · Your Care Instructions  ·    · When you have diabetes, your feet need extra care and attention. Diabetes can damage the nerve endings and blood vessels in your feet, making you less likely to notice when your feet are injured. Diabetes also limits your body's ability to fight infection and get blood to areas that need it. If you get a minor foot injury, it could become an ulcer or a serious infection. With good foot care, you can prevent most of these problems. · Caring for your feet can be quick and easy. Most of the care can be done when you are bathing or getting ready for bed. · Follow-up care is a key part of your treatment and safety. Be sure to make and go to all appointments, and call your doctor if you are having problems.  It's also a good idea to know your test results and keep a list of the medicines you take. · How can you care for yourself at home? · Keep your blood sugar close to normal by watching what and how much you eat, monitoring blood sugar, taking medicines if prescribed, and getting regular exercise. · Do not smoke. Smoking affects blood flow and can make foot problems worse. If you need help quitting, talk to your doctor about stop-smoking programs and medicines. These can increase your chances of quitting for good. · Eat a diet that is low in fats. High fat intake can cause fat to build up in your blood vessels and decrease blood flow. · Inspect your feet daily for blisters, cuts, cracks, or sores. If you cannot see well, use a mirror or have someone help you. · Take care of your feet:  · Wash your feet every day. Use warm (not hot) water. Check the water temperature with your wrists or other part of your body, not your feet. · Dry your feet well. Pat them dry. Do not rub the skin on your feet too hard. Dry well between your toes. If the skin on your feet stays moist, bacteria or a fungus can grow, which can lead to infection. · Keep your skin soft. Use moisturizing skin cream to keep the skin on your feet soft and prevent calluses and cracks. But do not put the cream between your toes, and stop using any cream that causes a rash. · Clean underneath your toenails carefully. Do not use a sharp object to clean underneath your toenails. Use the blunt end of a nail file or other rounded tool. · Trim and file your toenails straight across to prevent ingrown toenails. Use a nail clipper, not scissors. Use an emery board to smooth the edges. · Change socks daily. Socks without seams are best, because seams often rub the feet. You can find socks for people with diabetes from specialty catalogs. · Look inside your shoes every day for things like gravel or torn linings, which could cause blisters or sores.   · Buy shoes that fit well:  · Look for shoes that have plenty of space around the toes. This helps prevent bunions and blisters. · Try on shoes while wearing the kind of socks you will usually wear with the shoes. · Avoid plastic shoes. They may rub your feet and cause blisters. Good shoes should be made of materials that are flexible and breathable, such as leather or cloth. · Break in new shoes slowly by wearing them for no more than an hour a day for several days. Take extra time to check your feet for red areas, blisters, or other problems after you wear new shoes. · Do not go barefoot. Do not wear sandals, and do not wear shoes with very thin soles. Thin soles are easy to puncture. They also do not protect your feet from hot pavement or cold weather. · Have your doctor check your feet during each visit. If you have a foot problem, see your doctor. Do not try to treat an early foot problem at home. Home remedies or treatments that you can buy without a prescription (such as corn removers) can be harmful. · Always get early treatment for foot problems. A minor irritation can lead to a major problem if not properly cared for early. · When should you call for help? ·  Call your doctor now or seek immediate medical care if:  ·   · You have a foot sore, an ulcer or break in the skin that is not healing after 4 days, bleeding corns or calluses, or an ingrown toenail. ·   · You have blue or black areas, which can mean bruising or blood flow problems. ·   · You have peeling skin or tiny blisters between your toes or cracking or oozing of the skin. ·   · You have a fever for more than 24 hours and a foot sore. ·   · You have new numbness or tingling in your feet that does not go away after you move your feet or change positions. ·   · You have unexplained or unusual swelling of the foot or ankle. · Watch closely for changes in your health, and be sure to contact your doctor if:  ·   · You cannot do proper foot care. · Where can you learn more? · Go to http://www.gray.com/  · Enter A739 in the search box to learn more about \"Diabetes Foot Health: Care Instructions. \"  · Current as of: July 28, 2021               Content Version: 13.2  · © 2006-2022 [employer]. · Care instructions adapted under license by [employer] (which disclaims liability or warranty for this information). If you have questions about a medical condition or this instruction, always ask your healthcare professional. [address] any warranty or liability for your use of this information. ·    · keless tobacco. Tobacco use with diabetes can lead to a greater risk of severe gum disease. If you need help quitting, talk to your doctor about stop-smoking programs and medicines. These can increase your chances of quitting for good. Follow-up care is a key part of your treatment and safety. Be sure to make and go to all appointments, and call your doctor if you are having problems. It's also a good idea to know your test results and keep a list of the medicines you take. Where can you learn more? Go to http://www.greer.com/  Enter H523 in the search box to learn more about \"Learning About Diabetes and Your Teeth. \"  Current as of: July 28, 2021               Content Version: 13.2  © 2006-2022 [employer]. Care instructions adapted under license by [employer] (which disclaims liability or warranty for this information). If you have questions about a medical condition or this instruction, always ask your healthcare professional. [address] any warranty or liability for your use of this information. Hypothyroidism: Care Instructions  Your Care Instructions     When you have hypothyroidism, your body doesn't make enough thyroid hormone. This hormone helps your body use energy.  If your thyroid level is low, you may feel tired, be constipated, have an increase in your blood pressure, or have dry skin or memory problems. You may also get cold easily, even when it is warm. Women with low thyroid levels may have heavy menstrual periods. A blood test to find your thyroid-stimulating hormone (TSH) level is used to check for hypothyroidism. A high TSH level may mean that you have it. The treatment for hypothyroidism is thyroid hormone pills. You should start to feel better in 1 to 2 weeks. Most people need treatment for the rest of their lives. You will need regular visits with your doctor to make sure you are doing well and that you have the right dose of medicine. Follow-up care is a key part of your treatment and safety. Be sure to make and go to all appointments, and call your doctor if you are having problems. It's also a good idea to know your test results and keep a list of the medicines you take. How can you care for yourself at home? · Take your thyroid hormone medicine exactly as prescribed. Call your doctor if you think you are having a problem with your medicine. Most people do not have side effects if they take the right amount of medicine regularly. ? Take the medicine 30 minutes before breakfast, and do not take it with calcium, vitamins, or iron. ? Do not take extra doses of your thyroid medicine. It will not help you get better any faster, and it may cause side effects. ? If you forget to take a dose, do NOT take a double dose of medicine. Take your usual dose the next day. · Tell your doctor about all prescription, herbal, or over-the-counter products you take. · Take care of yourself. Eat a healthy diet, get enough sleep, and get regular exercise. When should you call for help? Call 911 anytime you think you may need emergency care.  For example, call if:    · You passed out (lost consciousness).     · You have severe trouble breathing.     · You have a very slow heartbeat (less than 60 beats a minute).     · You have a low body temperature (95°F or below). Call your doctor now or seek immediate medical care if:    · You feel tired, sluggish, or weak.     · You have trouble remembering things or concentrating.     · You do not begin to feel better 2 weeks after starting your medicine. Watch closely for changes in your health, and be sure to contact your doctor if you have any problems. Where can you learn more? Go to http://www.gray.com/  Enter Y586 in the search box to learn more about \"Hypothyroidism: Care Instructions. \"  Current as of: July 28, 2021               Content Version: 13.2  © 4341-4870 [employer]. Care instructions adapted under license by [employer] (which disclaims liability or warranty for this information). If you have questions about a medical condition or this instruction, always ask your healthcare professional. [place] [address] any warranty or liability for your use of this information. Follow-up and Dispositions    · Return in about 4 weeks (around 5/17/2022). Copy sent to:    Henrry Gomez MD         Learning About Meal Planning for Diabetes  Why plan your meals? Meal planning can be a key part of managing diabetes. Planning meals and snacks with the right balance of carbohydrate, protein, and fat can help you keep your blood sugar at the target level you set with your doctor. You don't have to eat special foods. You can eat what your family eats, including sweets once in a while. But you do have to pay attention to how often you eat and how much you eat of certain foods. You may want to work with a dietitian or a diabetes educator. They can give you tips and meal ideas and can answer your questions about meal planning. This health professional can also help you reach a healthy weight if that is one of your goals. What plan is right for you?   Your dietitian or diabetes educator may suggest that you start with the plate format or carbohydrate counting. The plate format  The plate format is a simple way to help you manage how you eat. You plan meals by learning how much space each food should take on a plate. Using the plate format helps you manage the amount of carbohydrate you eat. It can make it easier to keep your blood sugar level within your target range. It also helps you see if you're eating healthy portion sizes. To use the plate format, you put non-starchy vegetables on half your plate. Add lean protein foods, such as fish, lean meats and poultry, or soy products, on one-quarter of the plate. Put a grain or starchy vegetable (such as brown rice or a potato) on the final quarter of the plate. You can add a small piece of fruit and some low-fat or fat-free milk or yogurt, depending on your carbohydrate goal for each meal.  Here are some tips for using the plate format:  · Make sure that you are not using an oversized plate. A 9-inch plate is best. Many restaurants use larger plates. · Get used to using the plate format at home. Then you can use it when you eat out. · Write down your questions about using the plate format. Talk to your doctor, a dietitian, or a diabetes educator about your concerns. Carbohydrate counting  With carbohydrate counting, you plan meals based on the amount of carbohydrate in each food. Carbohydrate raises blood sugar higher and more quickly than any other nutrient. It is found in desserts, breads and cereals, and fruit. It's also found in starchy vegetables such as potatoes and corn, grains such as rice and pasta, and milk and yogurt. You can help keep your blood sugar levels within your target range by planning how much carbohydrate to have at meals and snacks. The amount you need depends on several things. These include your weight, how active you are, which diabetes medicines you take, and what your goals are for your blood sugar levels.  A registered dietitian or diabetes educator can help you plan how much carbohydrate to include in each meal and snack. An example of a carbohydrate counting plan is:  · 45 to 60 grams at each meal. That's about the same as 3 to 4 carbohydrate servings. · 15 to 20 grams at each snack. That's about the same as 1 carbohydrate serving. The Nutrition Facts label on packaged foods tells you how much carbohydrate is in a serving of the food. First, look at the serving size on the food label. Is that the amount you eat in a serving? All of the nutrition information on a food label is based on that serving size. So if you eat more or less than that, you'll need to adjust the other numbers. Total carbohydrate is the next thing you need to look for on the label. If you count carbohydrate servings, one serving of carbohydrate is 15 grams. For foods that don't come with labels, such as fresh fruits and vegetables, you'll need a guide that lists carbohydrate in these foods. Ask your doctor, dietitian, or diabetes educator about books or other nutrition guides you can use. If you take insulin, you need to know how many grams of carbohydrate are in a meal. This lets you know how much rapid-acting insulin to take before you eat. If you use an insulin pump, you get a constant rate of insulin during the day. So the pump must be programmed at meals to give you extra insulin to cover the rise in blood sugar after meals. When you know how much carbohydrate you will eat, you can take the right amount of insulin. Or, if you always use the same amount of insulin, you need to make sure that you eat the same amount of carbohydrate at meals. If you need more help to understand carbohydrate counting and food labels, ask your doctor, dietitian, or diabetes educator. How can you plan healthy meals? Here are some tips to get started:  · Plan your meals a week at a time. Don't forget to include snacks too.   · Use cookbooks or online recipes to plan several main meals. Plan some quick meals for busy nights. You also can double some recipes that freeze well. Then you can save half for other busy nights when you don't have time to cook. · Make sure you have the ingredients you need for your recipes. If you're running low on basic items, put these items on your shopping list too. · List foods that you use to make breakfasts, lunches, and snacks. List plenty of fruits and vegetables. · Post this list on the refrigerator. Add to it as you think of more things you need. · Take the list to the store to do your weekly shopping. Follow-up care is a key part of your treatment and safety. Be sure to make and go to all appointments, and call your doctor if you are having problems. It's also a good idea to know your test results and keep a list of the medicines you take. Where can you learn more? Go to http://www.gray.com/  Enter A271 in the search box to learn more about \"Learning About Meal Planning for Diabetes. \"  Current as of: September 8, 2021               Content Version: 13.2  © 1998-9443 [email or web address]. Care instructions adapted under license by [employer] (which disclaims liability or warranty for this information). If you have questions about a medical condition or this instruction, always ask your healthcare professional. [address] any warranty or liability for your use of this information. Learning About Diabetes and Your Teeth  How does diabetes affect your teeth and gums? When you have diabetes, managing blood sugar levels and taking good care of your teeth and gums are both important. When blood sugar levels are high, there's a greater risk for:  · Gum (periodontal) disease. · Tooth decay. · Fungal infections in the mouth, like thrush. · Dry mouth, or xerostomia (say \"maribelle-juliah-STO-robert-uh\"). The mouth needs saliva to neutralize the acids in your mouth.  These acids can lead to gum disease and tooth decay. Keeping your blood sugar levels in your target range can help prevent problems with the teeth and gums. If you have any problems with your teeth or gums, see your dentist.  How do you care for your teeth and gums when you have diabetes? · Brush your teeth twice a day. · Floss daily. Make sure to press the floss against your teeth and not your gums. · Check each day for areas where your gums might be red or painful. Be sure to let your dentist know of any sores in your mouth. · See your dentist regularly for professional cleaning of your teeth and to look for gum problems. Many dentists recommend getting checkups twice a year. Remind your dentist that you have diabetes before any work is done. · Don't smoke or use smo     · Hypoglycemia: Care Instructions  · Overview  ·    · Hypoglycemia means that your blood sugar is low and your body is not getting enough fuel. Some people get low blood sugar from not eating often enough. Some medicines to treat diabetes can cause low blood sugar. People who have had surgery on their stomachs or intestines may get hypoglycemia. Problems with the pancreas, kidneys, or liver also can cause low blood sugar. · A snack or drink with sugar in it will raise your blood sugar and should ease your symptoms right away. · Your doctor may recommend that you change or stop your medicines until you can get your blood sugar levels under control. In the long run, you may need to change your diet and eating habits so that you get enough fuel for your body throughout the day. · Follow-up care is a key part of your treatment and safety. Be sure to make and go to all appointments, and call your doctor if you are having problems. It's also a good idea to know your test results and keep a list of the medicines you take. · How can you care for yourself at home? · Learn your signs of low blood sugar. They are different for everyone.  Some common early signs include:  · Nausea. · Hunger. · Feeling nervous, irritable, or shaky. · Cold, clammy skin. · Sweating (when you're not exercising). · Use the \"rule of 15\" to treat low blood sugar. This includes eating 15 grams of carbohydrate from a quick-sugar food, such as 3 or 4 glucose tablets or ½ cup of juice. Wait 15 minutes and check your blood sugar. If it is still below 70 mg/dL, eat another 15 grams of carbohydrate. Repeat this every 15 minutes until your blood sugar is in a safe target range. · Once your blood sugar is in a safe range, eat a snack or meal to prevent recurrent low blood sugar. · Make sure family, friends, and coworkers know the symptoms of low blood sugar and know how to get your sugar level up. · If you were prescribed a glucagon kit, always have it with you. Make sure friends and family know how to use it. · When should you call for help? ·  Call 911 anytime you think you may need emergency care. For example, call if:  ·   · You passed out (lost consciousness). ·   · You are confused or cannot think clearly. ·   · Your blood sugar is very high or very low. · Watch closely for changes in your health, and be sure to contact your doctor if:  ·   · Your blood sugar stays outside the level your doctor set for you. ·   · You have any problems. · Where can you learn more? · Go to http://www.gray.com/  · Enter A418 in the search box to learn more about \"Hypoglycemia: Care Instructions. \"  · Current as of: July 28, 2021               Content Version: 13.2  · © 3981-6145 [employer]. · Care instructions adapted under license by [employer] (which disclaims liability or warranty for this information). If you have questions about a medical condition or this instruction, always ask your healthcare professional. [address] any warranty or liability for your use of this information.   ·    ·      · Diabetes Foot Health: Care Instructions  · Your Care Instructions  ·    · When you have diabetes, your feet need extra care and attention. Diabetes can damage the nerve endings and blood vessels in your feet, making you less likely to notice when your feet are injured. Diabetes also limits your body's ability to fight infection and get blood to areas that need it. If you get a minor foot injury, it could become an ulcer or a serious infection. With good foot care, you can prevent most of these problems. · Caring for your feet can be quick and easy. Most of the care can be done when you are bathing or getting ready for bed. · Follow-up care is a key part of your treatment and safety. Be sure to make and go to all appointments, and call your doctor if you are having problems. It's also a good idea to know your test results and keep a list of the medicines you take. · How can you care for yourself at home? · Keep your blood sugar close to normal by watching what and how much you eat, monitoring blood sugar, taking medicines if prescribed, and getting regular exercise. · Do not smoke. Smoking affects blood flow and can make foot problems worse. If you need help quitting, talk to your doctor about stop-smoking programs and medicines. These can increase your chances of quitting for good. · Eat a diet that is low in fats. High fat intake can cause fat to build up in your blood vessels and decrease blood flow. · Inspect your feet daily for blisters, cuts, cracks, or sores. If you cannot see well, use a mirror or have someone help you. · Take care of your feet:  · Wash your feet every day. Use warm (not hot) water. Check the water temperature with your wrists or other part of your body, not your feet. · Dry your feet well. Pat them dry. Do not rub the skin on your feet too hard. Dry well between your toes. If the skin on your feet stays moist, bacteria or a fungus can grow, which can lead to infection. · Keep your skin soft.  Use moisturizing skin cream to keep the skin on your feet soft and prevent calluses and cracks. But do not put the cream between your toes, and stop using any cream that causes a rash. · Clean underneath your toenails carefully. Do not use a sharp object to clean underneath your toenails. Use the blunt end of a nail file or other rounded tool. · Trim and file your toenails straight across to prevent ingrown toenails. Use a nail clipper, not scissors. Use an emery board to smooth the edges. · Change socks daily. Socks without seams are best, because seams often rub the feet. You can find socks for people with diabetes from specialty catalogs. · Look inside your shoes every day for things like gravel or torn linings, which could cause blisters or sores. · Buy shoes that fit well:  · Look for shoes that have plenty of space around the toes. This helps prevent bunions and blisters. · Try on shoes while wearing the kind of socks you will usually wear with the shoes. · Avoid plastic shoes. They may rub your feet and cause blisters. Good shoes should be made of materials that are flexible and breathable, such as leather or cloth. · Break in new shoes slowly by wearing them for no more than an hour a day for several days. Take extra time to check your feet for red areas, blisters, or other problems after you wear new shoes. · Do not go barefoot. Do not wear sandals, and do not wear shoes with very thin soles. Thin soles are easy to puncture. They also do not protect your feet from hot pavement or cold weather. · Have your doctor check your feet during each visit. If you have a foot problem, see your doctor. Do not try to treat an early foot problem at home. Home remedies or treatments that you can buy without a prescription (such as corn removers) can be harmful. · Always get early treatment for foot problems. A minor irritation can lead to a major problem if not properly cared for early.   · When should you call for help?  ·  Call your doctor now or seek immediate medical care if:  ·   · You have a foot sore, an ulcer or break in the skin that is not healing after 4 days, bleeding corns or calluses, or an ingrown toenail. ·   · You have blue or black areas, which can mean bruising or blood flow problems. ·   · You have peeling skin or tiny blisters between your toes or cracking or oozing of the skin. ·   · You have a fever for more than 24 hours and a foot sore. ·   · You have new numbness or tingling in your feet that does not go away after you move your feet or change positions. ·   · You have unexplained or unusual swelling of the foot or ankle. · Watch closely for changes in your health, and be sure to contact your doctor if:  ·   · You cannot do proper foot care. · Where can you learn more? · Go to http://rebecca-cornel.info/  · Enter A739 in the search box to learn more about \"Diabetes Foot Health: Care Instructions. \"  · Current as of: July 28, 2021               Content Version: 13.2  · © 0382-7501 [employer]. · Care instructions adapted under license by [employer] (which disclaims liability or warranty for this information). If you have questions about a medical condition or this instruction, always ask your healthcare professional. [address] any warranty or liability for your use of this information. ·    · keless tobacco. Tobacco use with diabetes can lead to a greater risk of severe gum disease. If you need help quitting, talk to your doctor about stop-smoking programs and medicines. These can increase your chances of quitting for good. Follow-up care is a key part of your treatment and safety. Be sure to make and go to all appointments, and call your doctor if you are having problems. It's also a good idea to know your test results and keep a list of the medicines you take. Where can you learn more?   Go to http://www.gray.com/  Enter [id number] in the search box to learn more about \"Learning About Diabetes and Your Teeth. \"  Current as of: July 28, 2021               Content Version: 13.2  © 6833-7802 [employer]. Care instructions adapted under license by [employer] (which disclaims liability or warranty for this information). If you have questions about a medical condition or this instruction, always ask your healthcare professional. [address] any warranty or liability for your use of this information.

## 2022-04-20 NOTE — TELEPHONE ENCOUNTER
4/19/2022  1:58 PM    Melyssa [place] [facility] [place] get clarification on patient's insulin order.   Please call her back at [phone] thanks
Spoke to German (charge nurse at [facility]) in regards to the pt's Humalog. German stated that the information that we received today in regards to the pt's dosage was incorrect. German stated that this is the current sliding scale for the Humalog: If BG   <60 hold  60-80 = 2 units   = 3 units  121-170 = 4 units  171-220 = 5 units  221-270 = 6 units  271-320 = 10 units  321 + = 15 units  500+ = 20 units    Melyssa also stated that Dr. Chadd Cortez stated to increase the mealtime dose from 4 units to 6 units. German stated that the pt's mealtime dosage is not 4 units and that it is actually 8 units. She would like to know if Dr. Chadd Cortez would like to increase it from 8 to 10 units? German stated that she was able to change the Tresiba dose from 12 units down to 10 units, but will not change the Humalog dose until she hears back from Dr. Chadd Cortez.
Was told by [facility] night technician that Ms. Sony Hirsch had left for the day and no one else to take message in her stead. Was told she will be in the office at 7 AM tomorrow and to call then.
Transported with Mother

## 2022-05-12 NOTE — PROGRESS NOTES
Patient has graduated from the Transitions of Care Coordination  program on 5/12/22. Patient/family has the ability to self-manage at this time Care management goals have been completed. Patient was not referred to the [facility] team for further management. (sister declined referral to [facility] team at this time-may consider later)    Goals Addressed                 This Visit's Progress     COMPLETED: Prevent complications post hospitalization. 04/11/22   Diet- maintain consistent carb diet, eat 3 evenly spaced meals (4-5 hours apart) a day with snacks. Will avoid sugary snacks.  Staff advised BG this am was 122. CTN s/w Timoteo, patient's brother and he will contact facility because he had heard from other family member that patient had episode of hypoglycemia this am.   Staff at [facility] will monitor blood sugar frequency, insulin administration, endocrinologist f/up- family has chosen to switch endocrinologist to Dr. Dianne Fischer on [email or web address] am.   [employer] Family will try to get BG log from facility and keep diet diary to share with endo at initial visit on 4/19. Timoteo plans to speak with DON and nutritionist to discuss concerns and request info. BEB    4/25/22  Sister,Richardson, says blood sugars continue to be up and down. Was 500 this weekend, endo was notified, orders obtained. Summer Sosa eats food she shouldn't eat. Family monitoring what is in the apartment, brother cleared out refrig this weekend of high carb foods. She is not allowed to buy food in [facility] store. Dining room, Summer Sosa can pick out her food, but will not be served dessert per family. Beatriz Marie had bought some Built Bars for her, low in carbs, but then she ate several, along with a Boost, and blood sugar shot up again. Family encouraged by her new endo, . Takes calls from [facility] when blood sugar is high or low and gives orders prn. Advised to monitor blood sugar results, share with endo.    Encouraged to ask for meeting with dietician with [employer] to ensure DM appropriate food choices. CTN to check back in about a week, call sooner if concerns. mbt  5/12/22  Kermit Peralta reports blood sugars have been better in last few weeks. Her new Endo tweaked her insulin and seems to be helping. Occasional highs, thinks due to her mother picking up snacks that are not good for Heddie Curls. Family keeps reminding Olive Haley to follow her diet or may have to go back to the hospital and she doesn't want to go back to the hospital.  Sees endo again in a few weeks. Kermit Peralta and Olive Haley have a VV tomorrow with [facility] DM educator. Given support and encouragement. Wished her well. Declined CM at this time. mbt              Patient has Care Transition Nurse's contact information for any further questions, concerns, or needs.   Patients upcoming visits:    Future Appointments   Date Time Provider Odell Shaw   5/17/2022  2:10 PM Brian Marcos MD RDE [facility] [address] BS AMB   6/7/2022 11:00 AM Reymundo Decker RD PD[facility]LANAE BS AMB

## 2022-05-17 PROBLEM — E11.10 DKA (DIABETIC KETOACIDOSIS) (HCC): Status: ACTIVE | Noted: 2022-01-01

## 2022-05-17 NOTE — PROGRESS NOTES
F/u BMP with improving glucose, Acidosis and AG  High K resolved as expected    Kaur Atkinson MD  [address] [facility]

## 2022-05-17 NOTE — Clinical Note
Status[de-identified] INPATIENT [101]   Type of Bed: Intensive Care [6]   Cardiac Monitoring Required?: Yes   Inpatient Hospitalization Certified Necessary for the Following Reasons: 4.  Patient requires ICU level of care interventions (further clarification in H&P documentation)   Admitting Diagnosis: DKA (diabetic ketoacidosis) [facility]) [[id number]]   Admitting Physician: Joslyn Morris   Attending Physician: Joslyn Morris   Estimated Length of Stay: 3-4 Midnights   Discharge Plan[de-identified] Home with Office Follow-up

## 2022-05-17 NOTE — H&P
History and Physical    Patient: Pedro Patient MRN: [id number]  SSN: [id number]    YOB: 1970  Age: 45 y.o. Sex: female      Subjective:      Campbell Gr is a 45 y.o. female who has a PMH of DM1 c/b prior DKA, Down syndrome, ESRD on HD (since October), GERD and hypothyroidism. She last underwent HD the day prior to admission. She presented to the ED via EMS with vomiting and hyperglycemia with readings of \"high\". She lives at a SNF with her mother. Serum glucose on arrival 967. In the ED, she was accompanied by her mother and brother. Pertinent labs include WBC 10, K 5.7 glucose 967 AG 24 BUN/Cr 35/3. 82. ABG 7.01/33/49. The patient received 1L IVF and was started on DKA protocol insulin drip. Blood and urine cultures sent. U/A pending. CXR ordered. Code status discussed with family. Patient is a DNR/DNI. Documented in code status section.     Past Medical History:   Diagnosis Date    Allergic rhinitis, cause unspecified     Asthma     use inhalers    Diabetes ([address]     Diabetic macular edema ([address] 2/4/2016    DKA (diabetic ketoacidosis) ([address] 12/4/2021    Down's syndrome     GERD (gastroesophageal reflux disease)     H/O impacted cerumen     Dr. Richard Thornton    Headache     Hypoglycemia 6/27/2012    Hypothyroid     Mononucleosis     Pneumonia     Seizures ([address]     as a result of low blood glucose readings    Thyroid disease      Past Surgical History:   Procedure Laterality Date    HX CATARACT REMOVAL Bilateral 3/5/15    cataract left and right    HX CYST REMOVAL      right shoulder    HX HEENT Bilateral 2017    prior in one eye    HX HYSTERECTOMY  1987      Family History   Problem Relation Age of Onset    Thyroid Disease Mother         hypo    Hypertension Father     Abdominal aortic aneurysm Father     Neuropathy Father     No Known Problems Sister     Heart Disease Brother     Heart Attack Brother     Heart Surgery Brother     No Known Problems Sister     No Known Problems Brother     Anesth Problems Neg Hx      Social History     Tobacco Use    Smoking status: Never Smoker    Smokeless tobacco: Never Used   Substance Use Topics    Alcohol use: No      Prior to Admission medications    Medication Sig Start Date End Date Taking? Authorizing Provider   HumaLOG KwikPen Insulin 100 unit/mL kwikpen nject as per sliding scale: If BG 60-80 = 2 units   = 3 units  121-170 = 4 units  171-220 = 5 units  221-270 = 6 units  271-320 = 10 units  321 + = 15 units  500+ = 20 units 4/15/22   Provider, Historical   insulin lispro (HumaLOG U-100 Insulin) 100 unit/mL injection 8 Units by SubCUTAneous route Before breakfast, lunch, and dinner. Hold insulin if BS <80. Hold if patient does not eat her meal. If BS >150 give with SS. Patient not taking: Reported on 4/19/2022 4/10/22   Lorna Cardenas MD   insulin lispro (HumaLOG U-100 Insulin) 100 unit/mL injection Inject as per sliding scale: If BG 60-80 = 2 units   = 3 units  121-170 = 4 units  171-220 = 5 units  221-270 = 6 units  271-320 = 10 units  321 + = 15 units  500+ = 20 units 4/10/22   Lorna Cardenas MD   dextromethorphan (Delsym) 30 mg/5 mL liquid Take 30 mg by mouth every twelve (12) hours as needed for Cough. Provider, Historical   lidocaine-prilocaine (EMLA) topical cream Apply  to affected area DIALYSIS MON, WED & FRI. Apply to left arm access site topically in the morning every M/W/F prior to dialysis    Provider, Historical   traZODone (DESYREL) 50 mg tablet Take 50 mg by mouth nightly. Provider, Historical   metoprolol tartrate (LOPRESSOR) 25 mg tablet Take 1 Tablet by mouth every twelve (12) hours. 10/11/21   Jane Stevenson MD   b complex-vitamin c-folic acid (NEPHROCAPS) 1 mg capsule Take 1 Capsule by mouth daily. 10/12/21   Jane Stevenson MD   diphenhydrAMINE (BenadryL) 25 mg capsule Take 25 mg by mouth nightly as needed for Allergies.     Provider, Historical cranberry 500 mg capsule Take 500 mg by mouth daily. Provider, Historical   fluticasone propionate (Flonase Allergy Relief) 50 mcg/actuation nasal spray 2 Sprays by Both Nostrils route every twelve (12) hours as needed for Rhinitis or Allergies. Provider, Historical   esomeprazole (NexIUM) 40 mg capsule Take 40 mg by mouth daily. Provider, Historical   L.acid,para-B. bifidum-S.therm (RISAQUAD) 8 billion cell cap cap Take 1 Capsule by mouth daily. Provider, Historical   insulin degludec Jolena Cagey FlexTouch U-100) 100 unit/mL (3 mL) inpn 12 Units by SubCUTAneous route nightly. Provider, Historical   ergocalciferol (Vitamin D2) 1,250 mcg (50,000 unit) capsule Take 50,000 Units by mouth every Wednesday. Provider, Historical   montelukast (Singulair) 10 mg tablet Take 10 mg by mouth nightly. Provider, Historical   levothyroxine (SYNTHROID) 100 mcg tablet Take 1 Tablet by mouth Daily (before breakfast). 9/8/21   Ole Rose MD   biotin 5,000 mcg TbDi Take 5,000 mcg by mouth daily. Provider, Historical   cholecalciferol, vitamin D3, (VITAMIN D3) 2,000 unit tab Take 4,000 Units by mouth daily. Provider, Historical   atorvastatin (LIPITOR) 20 mg tablet Take 20 mg by mouth nightly. Provider, Historical   albuterol (PROVENTIL HFA, VENTOLIN HFA, PROAIR HFA) 90 mcg/actuation inhaler Take 1 Puff by inhalation every four (4) hours as needed for Wheezing. 12/16/18   Nubia Castellanos MD   glucose 4 gram chewable tablet Take 12 g by mouth as needed (BS < 60). Provider, Historical   glucagon (GLUCAGEN) 1 mg injection 1 mg by IntraMUSCular route once as needed (hypoglycemia if patient is unresponsive).     Provider, Historical      Allergies   Allergen Reactions    Codeine Nausea and Vomiting    Lettuce Other (comments)    Nitrofurantoin Rash    Pcn [Penicillins] Hives and Rash    Tomato Other (comments)     Review of Systems:  As per HPI    Objective:     Vitals:    05/17/22 0800 05/17/22 0815 05/17/22 0830 05/17/22 0842   BP: (!) 133/44 (!) 110/49 (!) 106/44    Pulse: (!) 107 (!) 108 (!) 108 (!) 105   Resp:  (!) 36 (!) 31 15   Temp:       SpO2: 98% 96% 93% 99%      Physical Exam:  GENERAL: Alert, awake, participates in exam  EYE: PERRLA  THROAT & NECK: Supple, excess soft tissue noted around neck  LUNG: CTAB  HEART:RRR, 2+ pulses, no edema  ABDOMEN: Soft, nondistended, +epigastric abdominal pain  SKIN: c/d/i  NEUROLOGIC: Alert, participates in exam, answers questions appropriately  PSYCHIATRIC: Occasional crying with anxiety; family reports that she sometimes has anxiety and outbursts if anxious    Assessment and Plan:     DKA: Occurring in the setting of DM1. Family reports compliance with insulin. Family reports that patient may have ingested additional lemonade and apple juice. Family and patient report abdominal pain and nausea. Patient's mother reports the patient has had a long history of recurrent UTIs and that she does still make some urine. Transaminases unrevealing.   - Blood and urine cultures ordered  - DKA insulin protocol  - CXR ordered  - Additional 1L of NS ordered; will follow up on volume status after this (total of 2L thus far)  - 1200 labs ordered    Hypothyroidism: Continue levothyroxine    Down syndrome: Family support; emotional support    Deconditioning: PT/OT      Signed By: Nancy Coronado NP     May 17, 2022      CRITICAL CARE CONSULTANT ATTESTATION  I had a face to face encounter with the patient, reviewed and interpreted patient data including clinical events, labs, images, vital signs, I/O's, and examined patient.   I have discussed the case and the plan and management of the patient's care with the consulting services, the bedside nurses and the respiratory therapist.       NOTE OF PERSONAL INVOLVEMENT IN CARE   This patient has a high probability of imminent, clinically significant deterioration, which requires the highest level of preparedness to intervene urgently. I participated in the decision-making and personally managed or directed the management of the following life and organ supporting interventions that required my frequent assessment to treat or prevent imminent deterioration. I personally spent 60 minutes of critical care time. This is time spent at this critically ill patient's bedside actively involved in patient care as well as the coordination of care and discussions with the patient's family. This does not include any procedural time which has been billed separately.     Prabha Morse NP  [facility]  5/17/2022

## 2022-05-17 NOTE — DIABETES MGMT
[facility]    CLINICAL NURSE SPECIALIST CONSULT     Initial Presentation   Nimisha Gr is a 45 y.o. female who presented to the ED 5/17/22 via EMS from her assisted living facility with a c/o HIGH sugar on her home glucometer. She gave 20 units lispro and follow-up glucose remained HIGH with 1 episode of vomiting. Pertinent labs in the ED included WBC 10, K 5.7 glucose 967 AG 24 BUN/Cr 35/3. 82. ABG 7.01/33/49. She was fluid resuscitated and started on an insulin gtt per DKA protocol. HX:   Past Medical History:   Diagnosis Date    Allergic rhinitis, cause unspecified     Asthma     use inhalers    Diabetes ([address]     Diabetic macular edema ([address] 2/4/2016    DKA (diabetic ketoacidosis) ([address] 12/4/2021    Down's syndrome     GERD (gastroesophageal reflux disease)     H/O impacted cerumen     Dr. Leticia Ayala Headache     Hypoglycemia 6/27/2012    Hypothyroid     Mononucleosis     Pneumonia     Seizures ([address]     as a result of low blood glucose readings    Thyroid disease     CKD on HD    INITIAL DX:   DKA (diabetic ketoacidosis) ([address] [E11.10]     Current Treatment     TX: IV Fluids, Insulin gtt    Consulted by Carlos Alberto España MD for advanced diabetes nursing assessment and care for:   [x] Inpatient management strategy    Hospital Course   Clinical progress has been uncomplicated. Diabetes History   Type 1 Diabetes- Diagnosed when she was 2years old  Ambulatory BG management provided by: Katlin Briones MD with [facility]- first visit was 4/19/22. Diabetes-related Medical History  Acute complications  DKA  Neurological complications  Peripheral neuropathy  Microvascular disease  Nephropathy  Other associated conditions     Hypothyroidism     Diabetes Medication History  Was previously on an insulin pump.   Pump was d/c when she moved into assisted living/staff would not operate it    Nawaf Johanny reduced to10 units daily at last endo visit    Humalo units at breakfast/lunch, 10 units at dinner PLUS sliding scale: If BG   <60 hold  60-80 = 2 units   = 3 units  121-170 = 4 units  171-220 = 5 units  221-270 = 6 units  271-320 = 10 units  321 + = 15 units  500+ = 20 units  Snacks: if large snack give 2 units before eating    Diabetes self-management practices: Obtained from last endo note 3 wks ago  Eating pattern  -breakfast: Eats breakfast from 8 30-9 a.m., oatmeal, eggs, sandwich wraps, sausage, turkey  - lunch: Eats lunch from 1130 to 12 PM, sandwich wraps, egg salad  - dinner: 430 to 5:30 PM, grilled hamburger, sweet potato fries, coleslaw, cream sauce  - beverages: Mainly water no soda  - snacks: Fruit, lace chips, crackers, cream cheese    Physical activity pattern  Limited    Monitoring pattern  Fasting: Ranging from 140 -230, before lunch 1 , before dinner 112-400s, bedtime 200s  The blood pressure is checked by the assisted living facility staff    Taking medications pattern  [x] Consistent administration   [x] Affordable    Social determinants of health impacting diabetes self-management practices   Concerned that you need to know more about how to stay healthy with diabetes     Overall evaluation:    [x] Achieving individualized A1c target with drug therapy & self-care practices    Subjective   Patient laying in bed. Will open eyes to voice, but not talking. Brother at bedside. Information gathered through discussion with brother at bedside and chart review. Downs syndrome  Lives at assisted living with mother  Did work full time, retired 3 years ago  Hospitalization for DKA 22, 22  Brother voiced concerns that his sister has the judgment close to a 8year old- if another adult- staff or another resident at their home gives her food/snack/dessert- she will eat them. Big problem with stashing sweets and candy in her room and eating without insulin coverage.   Had an art event at her assisted living facility and [place] had fruit juice/lemonade/sweet tea most of the afternoon  Chart history notes hypoglycemic seizure  Endo note reports fasting hypoglycemia on elevated basal rates   Objective   Physical exam  General Overweight white female, with downs syndrome. See in ED stretcher. Tired, not-conversant  Neuro  Opens eyes to voice. Vital Signs   Visit Vitals  BP (!) 143/55   Pulse 91   Temp 98.5 °F (36.9 °C)   Resp 14   SpO2 100%     Skin  Warm and dry. Acanthosis noted along neckline. No lipohypertrophy or lipoatrophy noted at injection sites  Heart   Regular rate and rhythm. No murmurs, rubs or gallops  Lungs  Clear to auscultation without rales or rhonchi  Extremities No foot wounds       Laboratory  Recent Labs     05/17/22  0612   *  967*   AGAP 28*  24*   WBC 10.2   CREA 3.88*  3.82*   GFRNA 12*  12*   AST 41*   ALT 28       Factors impacting BG management  Factor Dose Comments   Nutrition:  Standard meals     NPO    Other:   Kidney function GFR 10 on HD      Blood glucose pattern      Significant diabetes-related events over the past 24-72 hours  BG now 522  Last set of labs was at 1p: /K4.4/AG 21/GFR 10. B-hydroxy: >4.42/lac 6.2  A1C 4/9/22: 7.6%  Blood cx pending     Assessment and Plan   Nursing Diagnosis Risk for unstable blood glucose pattern   Nursing Intervention Domain 7641 Decision-making Support   Nursing Interventions Examined current inpatient diabetes/blood glucose control   Explored factors facilitating and impeding inpatient management  Explored corrective strategies with patient and responsible inpatient provider   Informed patient of rational for insulin strategy while hospitalized     Evaluation   Anoop Holliday is a 45year old female with down's syndrome and Type 1 Diabetes with hypoglycemia unawareness and ESRD on HD, who presented in moderate DKA s/t insulin deficiency. A1C this past spring was 7.6% which is likely at goal for her.   Family reports that despite routine BG monitoring and insulin being given at meals, Fabio lAva struggles with unintentional carbohydrate intake in the afternoons/evenings without adequate insulin coverage. She doesn't have the mental capability to determine if a food item that is given to her by other residents will impact her BG. This weekend, family reports that Fabio Alva participated in an art event where there was fruit juice and lemonade provided, for which Fabio Alva drank throughout the day, without insulin coverage. Initial labs in the ED were WBC 10, K 5.7 glucose 967 AG 24 BUN/Cr 35/3. 82. ABG 7.01/33/49. She was fluid resuscitated and started on an insulin gtt per DKA protocol. Glucose is trending down but anion gap remains open. Please continue insulin gtt until DKA resolved. Recommendations   1. Continue insulin infusion per DKA protocol  2. POC glucose hourly, BMP Q4h on insulin infusion  3. Add dextrose in IVF once glucose under 250 on insulin infusion  4. When anion gap closed x2 on BMP, can convert to SQ insulin. 5. Obtain and document an accurate weight  6. Consider obtaining UA/culture r/o UTI    When ready to convert off insulin gtt, initiate the subcutaneous insulin order set with:  1. Basal insulin: 12 units Lantus (home dose). Give the first dose, then turn off insulin gtt two hours later  2. Correctional insulin ACHS at normal sensitivity, start at a glucose of 200  3. Consistent Carbohydrate diet (60 grams CHO/meal)  4. Adjust to non-dextrose containing IVF  5. Will need to add carb coverage when eating with Type 1 Diabetes. Would start with 6 units Humalog/meal    Billing Code(s)   [x] 56120    Before making these care recommendations, I personally reviewed the hospitalization record, including notes, laboratory & diagnostic data and current medications, and examined the patient at the bedside (circumstances permitting) before making care recommendations.  More than fifty (50) percent of the time was spent in patient counseling and/or care coordination.   Total minutes: 48      JAILYN Rutherford  Diabetes Clinical Nurse Specialist  [facility]  Access via [employer]

## 2022-05-17 NOTE — TELEPHONE ENCOUNTER
Pt sister called 5/17 @ 9:07am. Stated the pt is in the [facility] with sugar readings of 1000.     Pt sister# [phone]

## 2022-05-17 NOTE — ED NOTES
7:00 AM  Turnover from Dr. Cadence Whittington pending bloodwork. Patient in DKA. Blood gas 7.01, Hco3 8.5, Base excess 21.7    CMP  Na 124, K 5.7, repeat 6.4. Glucose 967. Insulin started. Patient given calcium gluconate for her elevated K.    Plan to admit to ICU for her blood glucose and clinical status. I spoke with ICU who is in agreement with plan for admission.     MEDICAL DECISION MAKIN y.o. female presents with High Blood Sugar    LABORATORY TESTS:  Labs Reviewed   METABOLIC PANEL, COMPREHENSIVE - Abnormal; Notable for the following components:       Result Value    Sodium 124 (*)     Potassium 5.7 (*)     Chloride 89 (*)     CO2 11 (*)     Anion gap 24 (*)     Glucose 967 (*)     BUN 35 (*)     Creatinine 3.82 (*)     BUN/Creatinine ratio 9 (*)     GFR est AA 15 (*)     GFR est non-AA 12 (*)     Calcium 7.9 (*)     AST (SGOT) 41 (*)     Albumin 2.8 (*)     A-G Ratio 0.7 (*)     All other components within normal limits   GLUCOSE, POC - Abnormal; Notable for the following components:    Glucose (POC) >600 (*)     All other components within normal limits   GLUCOSE, POC - Abnormal; Notable for the following components:    Glucose (POC) >600 (*)     All other components within normal limits   POC VENOUS BLOOD GAS - Abnormal; Notable for the following components:    pH, venous (POC) 7.01 (*)     pCO2, venous (POC) 33.4 (*)     pO2, venous (POC) 49 (*)     HCO3, venous (POC) 8.5 (*)     All other components within normal limits   IONIZED CALCIUM - Abnormal; Notable for the following components:    Calcium, ionized 0.98 (*)     All other components within normal limits   IONIZED CALCIUM - Abnormal; Notable for the following components:    Calcium, ionized 1.02 (*)     All other components within normal limits   URINE CULTURE HOLD SAMPLE   CBC WITH AUTOMATED DIFF   BETA-HYDROXYBUTYRATE   SAMPLES BEING HELD   VENOUS BLOOD GAS   URINALYSIS W/MICROSCOPIC   METABOLIC PANEL, BASIC   MAGNESIUM   PHOSPHORUS   HEMOGLOBIN A1C WITH EAG   METABOLIC PANEL, BASIC   MAGNESIUM       IMAGING RESULTS:  No orders to display       MEDICATIONS GIVEN:  Medications   insulin regular (NOVOLIN R, HUMULIN R) 100 Units in 0.9% sodium chloride 100 mL infusion (has no administration in time range)   insulin lispro (HUMALOG) injection (0 Units SubCUTAneous Held 5/17/22 0801)   glucose chewable tablet 16 g (has no administration in time range)   dextrose 10 % infusion 250 mL (has no administration in time range)   glucagon (GLUCAGEN) injection 1 mg (has no administration in time range)   calcium gluconate 1 gram in sodium chloride (ISO-OSM) 50 mL infusion (has no administration in time range)   sodium chloride 0.9 % bolus infusion 1,000 mL (1,000 mL IntraVENous New Bag 5/17/22 0611)   ondansetron (ZOFRAN) injection 4 mg (4 mg IntraVENous Given 5/17/22 0641)       CONSULTS:  ICU    IMPRESSION:  1. Diabetic ketoacidosis without coma associated with type 1 diabetes mellitus ([facility]    2. Hyperglycemia    3. ESRD on dialysis ([facility]    4.  Hyperkalemia        PLAN:  - Admit to ICU      Katrina Richardson MD       Critical Care  Performed by: Nancyann Apley, MD  Authorized by: Nancyann Apley, MD     Critical care provider statement:     Critical care time (minutes):  45    Critical care was necessary to treat or prevent imminent or life-threatening deterioration of the following conditions:  Metabolic crisis, endocrine crisis and dehydration    Critical care was time spent personally by me on the following activities:  Blood draw for specimens, discussions with consultants, examination of patient, ordering and performing treatments and interventions, ordering and review of laboratory studies, ordering and review of radiographic studies, re-evaluation of patient's condition, review of old charts, development of treatment plan with patient or surrogate and evaluation of patient's response to treatment

## 2022-05-17 NOTE — ED PROVIDER NOTES
48yF with PMH sig4 IDDM, downs syndrome, ESRD with dialysis MWF, GERD p/w c/o eleavted blood sugar after drinking iced tea/lemonade with sugar yesterday afternoon. Multiple episodes vomiting after high blood sugar reading. H/o admission for DKA. No missed dialysis. Denies f/c/d/c/urinary complaints.    Nephrology-Deep Jennifer Hanks  Denies tob/drugs/etoh  Lives at nursing facility with mother           Past Medical History:   Diagnosis Date    Allergic rhinitis, cause unspecified     Asthma     use inhalers    Diabetes ([address]     Diabetic macular edema ([address] 2/4/2016    DKA (diabetic ketoacidosis) ([address] 12/4/2021    Down's syndrome     GERD (gastroesophageal reflux disease)     H/O impacted cerumen     Dr. Ally Sauceda    Headache     Hypoglycemia 6/27/2012    Hypothyroid     Mononucleosis     Pneumonia     Seizures ([facility]     as a result of low blood glucose readings    Thyroid disease        Past Surgical History:   Procedure Laterality Date    HX CATARACT REMOVAL Bilateral 3/5/15    cataract left and right    HX CYST REMOVAL      right shoulder    HX HEENT Bilateral 2017    prior in one eye    HX HYSTERECTOMY  1987         Family History:   Problem Relation Age of Onset    Thyroid Disease Mother         hypo    Hypertension Father     Abdominal aortic aneurysm Father     Neuropathy Father     No Known Problems Sister     Heart Disease Brother     Heart Attack Brother     Heart Surgery Brother     No Known Problems Sister     No Known Problems Brother     Anesth Problems Neg Hx        Social History     Socioeconomic History    Marital status: SINGLE     Spouse name: Not on file    Number of children: Not on file    Years of education: Not on file    Highest education level: Not on file   Occupational History    Not on file   Tobacco Use    Smoking status: Never Smoker    Smokeless tobacco: Never Used   Vaping Use    Vaping Use: Never used   Substance and Sexual Activity    Alcohol use: No    Drug use: No    Sexual activity: Not Currently   Other Topics Concern    Not on file   Social History Narrative    Not on file     Social Determinants of Health     Financial Resource Strain:     Difficulty of Paying Living Expenses: Not on file   Food Insecurity:     Worried About Running Out of Food in the Last Year: Not on file    Cornelius of Food in the Last Year: Not on file   Transportation Needs:     Lack of Transportation (Medical): Not on file    Lack of Transportation (Non-Medical): Not on file   Physical Activity:     Days of Exercise per Week: Not on file    Minutes of Exercise per Session: Not on file   Stress:     Feeling of Stress : Not on file   Social Connections:     Frequency of Communication with Friends and Family: Not on file    Frequency of Social Gatherings with Friends and Family: Not on file    Attends [employer] Services: Not on file    Active Member of [address] [place] [address] [employer] or Organizations: Not on file    Attends Club or Organization Meetings: Not on file    Marital Status: Not on file   Intimate Partner Violence:     Fear of Current or Ex-Partner: Not on file    Emotionally Abused: Not on file    Physically Abused: Not on file    Sexually Abused: Not on file   Housing Stability:     Unable to Pay for Housing in the Last Year: Not on file    Number of [place] in the Last Year: Not on file    Unstable Housing in the Last Year: Not on file         ALLERGIES: Codeine, Lettuce, Nitrofurantoin, Pcn [penicillins], and Tomato    Review of Systems   Constitutional: Positive for activity change, appetite change and fatigue. Negative for chills and fever. Respiratory: Negative for cough and shortness of breath. Cardiovascular: Negative for chest pain. Gastrointestinal: Positive for nausea and vomiting. Negative for abdominal pain. Genitourinary: Negative for dysuria and urgency. Skin: Negative for wound. Neurological: Negative for seizures and headaches. There were no vitals filed for this visit. Physical Exam  Vitals and nursing note reviewed. Constitutional:       Appearance: She is well-developed. HENT:      Head: Normocephalic and atraumatic. Mouth/Throat:      Mouth: Mucous membranes are dry. Eyes:      Pupils: Pupils are equal, round, and reactive to light. Neck:      Trachea: No tracheal deviation. Cardiovascular:      Rate and Rhythm: Normal rate and regular rhythm. Heart sounds: Normal heart sounds. Pulmonary:      Effort: Pulmonary effort is normal. No respiratory distress. Breath sounds: Normal breath sounds. No stridor. No wheezing or rales. Chest:      Chest wall: No tenderness. Abdominal:      General: Bowel sounds are normal. There is no distension. Palpations: Abdomen is soft. Tenderness: There is no abdominal tenderness. There is no rebound. Musculoskeletal:         General: No tenderness. Normal range of motion. Cervical back: Normal range of motion and neck supple. Skin:     General: Skin is warm and dry. Coloration: Skin is not pale. Findings: No rash. Neurological:      Mental Status: She is alert. Cranial Nerves: No cranial nerve deficit. MDM  Number of Diagnoses or Management Options  Diagnosis management comments: 48yF with downs syndrome, IDDM, ESRD p/w c/o elevated blood sugar with n/v. Appears dehydrated, afebrile, Hd stable, nontoxic. Plan-ekg, ivf, glucose, cbc/cmp/vbg/ua, anticipating admission for DKA       Amount and/or Complexity of Data Reviewed  Clinical lab tests: ordered and reviewed           Procedures      ED EKG interpretation:  Rhythm: sinus tachycardia; and regular . Rate (approx.): 106; Axis: normal; P wave: normal; QRS interval: normal ; ST/T wave: nonspecific changes; Other findings: no ischemia. This EKG was interpreted by Romie Verduzco MD,ED Provider. 7:22 AM  Change of shift. Care of patient signed over to [facility]. Bedside handoff complete. Awaiting labs and admission.

## 2022-05-17 NOTE — ED TRIAGE NOTES
Patient arrives from [facility] with glucose reading of HIGH earlier this evening, was given 20 units insulin, follow up reading was also HIGH and per EMS patient had glucose reading of HIGH on their glucometer. Patient has had episodes of vomiting since initial glucose reading and feels nauseated in triage.

## 2022-05-17 NOTE — PROGRESS NOTES
1420- TRANSFER - IN REPORT:    Verbal report received from TOOTIE Hollingsworth(name) on Ness Gr  being received from ED(unit) for routine progression of care      Report consisted of patients Situation, Background, Assessment and   Recommendations(SBAR). Information from the following report(s) SBAR, Kardex, Intake/Output, Recent Results and Cardiac Rhythm SR was reviewed with the receiving nurse. Opportunity for questions and clarification was provided. Assessment completed upon patients arrival to unit and care assumed. 1700-received order from Renee Workman NP that patient can have ice chips. Shift summary- patient a&ox4. Follows commands/moves all extremities. RA/NC 2L when sleeping. SR on monitor. Patient oliguric, has not voided since admission. Patient's family members updated on patient's condition.

## 2022-05-17 NOTE — CONSULTS
NEPHROLOGY SPECIALISTS ([facility])         Nephrology and Hypertension         Patient seen and examined. Full consult [id number]    ASSESSMENT:  ESRD ([facility] [address]; MWF)  Hyperkalemia- likely due to severe hyperglycemia/acidosis  DM-1  DKA  Severe metabolic acidosis- due to ESRD + DKA  Anemia of ESRD  Down syndrome    Pt had HD yesterday as OP. K is high from high glucose and as DKA improves, expect serum K to improve    PLAN:  Ct IV Insulin drip  S/p IVF bolus earlier. F/u repeat labs later in the day  If acidosis is not improving, may need gentle HCO3 drip  Avoid ringer lactate (risk of high K) unless f/u serum K is low  Plan for HD tomm per schedule    D/W pts sister at bedside  Thanks for Renal consult. We will follow patient with you.     Signed by:  Diana Justice MD  Nephrology and HTN

## 2022-05-17 NOTE — PROGRESS NOTES
Problem: Mobility Impaired (Adult and Pediatric)  Goal: *Acute Goals and Plan of Care (Insert Text)  Description: FUNCTIONAL STATUS PRIOR TO ADMISSION: Patient was independent with intermittent use of a rollator walker. HOME SUPPORT PRIOR TO ADMISSION: The patient lived with her elderly mother at an [facility] ([facility]). Physical Therapy Goals  Initiated 5/17/2022  1. Patient will move from supine to sit and sit to supine  in bed with modified independence within 7 days. 2.  Patient will transfer from bed to chair and chair to bed with modified independence using the least restrictive device within 7 day(s). 2.  Patient will perform sit to/from stand with modified independence within 7 days. 3.  Patient will ambulate 150 feet with least restrictive assistive device and modified independence within 7 days. Outcome: Not Met     PHYSICAL THERAPY EVALUATION  Patient: Dianne Gr [de-identified]45 y.o. female)  Date: 5/17/2022  Primary Diagnosis: DKA [facility]diabetic k[facility] [E11.10]        Precautions:  Fall    ASSESSMENT  Based on the objective data described below, the patient presents with decreased strength, endurance, and activity tolerance s/p admission with DKA. Received pt long sitting on the ED plinth = on 2L O2 w/ SpO2 100% with her brother in the room assisting with questions. Pt endorses feeling better as compared with time of admission. At baseline, pt is independent ambulating without a device though will use a rollator walker at times and has participated in physical therapy on/ off at the facility (none at this time). Today, pt required assistance to get OOB, stand, and take a few side steps. Ambulation was limited to bedside d/t fixed IV pole with meds running. Patient was mildly unsteady and weak appearing. She fatigued w/ brief time standing. Anticipate steady functional gains once her medical condition improves.     Current Level of Function Impacting Discharge (mobility/balance): As documented below    Functional Outcome Measure: The patient scored 55/100 on the Barthel outcome measure which is indicative of 45% impairment in functional mobility and tasks. Other factors to consider for discharge: [facility], has participated in PT at the facility     Patient will benefit from skilled therapy intervention to address the above noted impairments. PLAN :  Recommendations and Planned Interventions: bed mobility training, transfer training, gait training, therapeutic exercises, neuromuscular re-education, patient and family training/education, and therapeutic activities      Frequency/Duration: Patient will be followed by physical therapy:  5 times a week to address goals. Recommendation for discharge: (in order for the patient to meet his/her long term goals)  To be determined: pending progress . Likely return to the [facility] w/ PT vs none. This discharge recommendation:  Has not yet been discussed the attending provider and/or case management    IF patient discharges home will need the following DME: to be determined (TBD)- likely none (owns rollator walker)         SUBJECTIVE:   Patient stated I want to walk\" once standing.       OBJECTIVE DATA SUMMARY:   HISTORY:    Past Medical History:   Diagnosis Date    Allergic rhinitis, cause unspecified     Asthma     use inhalers    Diabetes ([address]     Diabetic macular edema ([address] 2/4/2016    DKA (diabetic ketoacidosis) ([address] 12/4/2021    Down's syndrome     GERD (gastroesophageal reflux disease)     H/O impacted cerumen     Dr. [facility]    Headache     Hypoglycemia 6/27/2012    Hypothyroid     Mononucleosis     Pneumonia     Seizures ([address]     as a result of low blood glucose readings    Thyroid disease      Past Surgical History:   Procedure Laterality Date    HX CATARACT REMOVAL Bilateral 3/5/15    cataract left and right    HX CYST REMOVAL      right shoulder    HX HEENT Bilateral 2017    prior in one eye    HX HYSTERECTOMY  1987       Personal factors and/or comorbidities impacting plan of care: PMH    Home Situation  Home Environment: Assisted living ([facility] with her 79 y/o mother)  One/Two Story Residence: One story  Living Alone: No  Support Systems: Parent(s),Assisted Living  Patient Expects to be Discharged to[de-identified] Assisted Living  Current DME Used/Available at Home: Walker, rollator (at times)    EXAMINATION/PRESENTATION/DECISION MAKING:   Critical Behavior:  Neurologic State: Alert  Orientation Level: Disoriented to person,Disoriented to place        Hearing: Auditory  Auditory Impairment: None    Range Of Motion:  Grossly functional                       Strength:    Strength: Generally decreased, functional                    Tone & Sensation:   Tone: Normal                              Coordination:  Coordination: Within functional limits  Vision:      Functional Mobility:  Bed Mobility:  Supine to Sit: Minimum assistance;Bed Modified;Assist x1;Additional time (HOB max elevated)  Sit to Supine: Minimum assistance; Other (comment) (for LE management)  Scooting: Minimum assistance; Other (comment) (to stabilize bed linens with cues required to bend LEs to bridge for scooting in the bed)  Transfers:  Sit to Stand: Minimum assistance;Assist x1  Stand to Sit: Minimum assistance;Assist x1                       Balance:   Sitting: Intact; Without support  Standing: Impaired; Without support  Standing - Static: Fair  Ambulation/Gait Training:              Gait Description (WDL): Exceptions to WDL (unable to assess d/t meds running on fixed IV pole )                           Functional Measure:  Barthel Index:    Bathin  Bladder: 10  Bowels: 10  Groomin  Dressin  Feeding: 10  Mobility: 0  Stairs: 0  Toilet Use: 5  Transfer (Bed to Chair and Back): 10  Total: 55/100       The Barthel ADL Index: Guidelines  1.  The index should be used as a record of what a patient does, not as a record of what a patient could do.  2. The main aim is to establish degree of independence from any help, physical or verbal, however minor and for whatever reason. 3. The need for supervision renders the patient not independent. 4. A patient's performance should be established using the best available evidence. Asking the patient, friends/relatives and nurses are the usual sources, but direct observation and common sense are also important. However direct testing is not needed. 5. Usually the patient's performance over the preceding 24-48 hours is important, but occasionally longer periods will be relevant. 6. Middle categories imply that the patient supplies over 50 per cent of the effort. 7. Use of aids to be independent is allowed. Score Interpretation (from [address] [facility])    Independent   60-79 Minimally independent   40-59 Partially dependent   20-39 Very dependent   <20 Totally dependent     -Frederick Diamond., Barthel, DMagnoliaW. (1965). Functional evaluation: the Barthel Index. [address] [facility] ([address] (1997). The Barthel activities of daily living index: self-reporting versus actual performance in the old (> or = 75 years). Journal [address] 45(7), [address] [facility], [facility], Jayshree Frank., [facility]. (1999). Measuring the change in disability after inpatient rehabilitation; comparison of the responsiveness of the Barthel Index and Functional Dubois Measure. Journal of Neurology, Neurosurgery, and Psychiatry, 66(4), 197-897. Shreyas Rodriguez, N.J.A, Tuyet Baxter  WMagnoliaJ.MARIN, & Klaudia Brito, MMagnoliaA. (2004) Assessment of post-stroke quality of life in cost-effectiveness studies: The usefulness of the Barthel Index and the EuroQoL-5D.  Quality of Life Research, 15, 873-16            Physical Therapy Evaluation Charge Determination   History Examination Presentation Decision-Making   MEDIUM  Complexity : 1-2 comorbidities / personal factors will impact the outcome/ POC  LOW Complexity : 1-2 Standardized tests and measures addressing body structure, function, activity limitation and / or participation in recreation  MEDIUM Complexity : Evolving with changing characteristics  Other outcome measures Barthel 55/100  MEDIUM      Based on the above components, the patient evaluation is determined to be of the following complexity level: LOW     Pain Rating:  None indicated    Activity Tolerance:   Fair, SpO2 stable (100%) on RA at rest; 92% after activity, and requires rest breaks. After treatment patient left in no apparent distress:   Supine in bed, Call bell within reach, Caregiver / family present, Side rails x 3, and RN arrived into the room, 2L NCO2 donned for sleep. COMMUNICATION/EDUCATION:   The patients plan of care was discussed with: Registered nurse. Fall prevention education was provided and the patient/caregiver indicated understanding., Patient/family have participated as able in goal setting and plan of care. , and Patient/family agree to work toward stated goals and plan of care.     Thank you for this referral.  Daisha Isabel, PT   Time Calculation: 20 mins

## 2022-05-17 NOTE — CONSULTS
[address]    Name:  Madison Patel  MR#:  [id number]  :  1970  ACCOUNT #:  [de-identified]  DATE OF SERVICE:  2022    RENAL CONSULT    REQUESTING PHYSICIAN:  Edd Robison NP    REASON FOR CONSULTATION:  For evaluation and management of ESRD and acidosis. HISTORY OF PRESENT ILLNESS:  The patient is a 70-year-old female with past medical history significant for type 1 diabetes, ESRD on HD, Down syndrome who presented to ED with vomiting and readings of very high glucose on her monitor. She lives at [facility] with her mother. Serum glucose on arrival was 967. She had evidence of DKA with severe metabolic acidosis, elevated anion gap and mild hyperkalemia. She received 1 L IV fluid bolus and started on insulin drip per DKA protocol. She gets HD at [facility] on MWF schedule. She got her usual HD as outpatient yesterday. The patient is currently lethargic and unable to provide any history or review of systems. The patient's sister at the bedside. Sister reports the patient having increase intake of calories, high sugar foods and cookies lately. REVIEW OF SYSTEMS:  As noted above, rest unable to obtain.     Past Medical History:   Diagnosis Date    Allergic rhinitis, cause unspecified     Asthma     use inhalers    Diabetes ([address]     Diabetic macular edema ([address] 2016    DKA (diabetic ketoacidosis) ([address] 2021    Down's syndrome     GERD (gastroesophageal reflux disease)     H/O impacted cerumen     Dr. Valeria Gonzales    Headache     Hypoglycemia 2012    Hypothyroid     Mononucleosis     Pneumonia     Seizures ([address]     as a result of low blood glucose readings    Thyroid disease      Past Surgical History:   Procedure Laterality Date    HX CATARACT REMOVAL Bilateral 3/5/15    cataract left and right    HX CYST REMOVAL      right shoulder    HX HEENT Bilateral 2017    prior in one eye    HX HYSTERECTOMY       Allergies Allergen Reactions    Codeine Nausea and Vomiting    Lettuce Other (comments)    Nitrofurantoin Rash    Pcn [Penicillins] Hives and Rash    Tomato Other (comments)     Prior to Admission Medications   Prescriptions Last Dose Informant Patient Reported? Taking? HumaLOG KwikPen Insulin 100 unit/mL kwikpen   Yes Yes   Sig: by SubCUTAneous route Before breakfast, lunch, and dinner. Inject as per sliding scale: If BG 60-80 = 2 units   = 3 units  121-170 = 4 units  171-220 = 5 units  221-270 = 6 units  271-320 = 10 units  321 + = 15 units  500+ = 20 units   L.acid,para-B. bifidum-S.therm (RISAQUAD) 8 billion cell cap cap   Yes Yes   Sig: Take 1 Capsule by mouth daily. albuterol (PROVENTIL HFA, VENTOLIN HFA, PROAIR HFA) 90 mcg/actuation inhaler   No Yes   Sig: Take 1 Puff by inhalation every four (4) hours as needed for Wheezing. atorvastatin (LIPITOR) 20 mg tablet   Yes Yes   Sig: Take 20 mg by mouth nightly. b complex-vitamin c-folic acid (NEPHROCAPS) 1 mg capsule   No Yes   Sig: Take 1 Capsule by mouth daily. biotin 5,000 mcg TbDi   Yes Yes   Sig: Take 5,000 mcg by mouth daily. cholecalciferol, vitamin D3, (VITAMIN D3) 2,000 unit tab   Yes Yes   Sig: Take 4,000 Units by mouth daily. cranberry 500 mg capsule   Yes Yes   Sig: Take 500 mg by mouth daily. dextromethorphan (Delsym) 30 mg/5 mL liquid   Yes Yes   Sig: Take 30 mg by mouth every twelve (12) hours as needed for Cough. diphenhydrAMINE (BenadryL) 25 mg capsule   Yes Yes   Sig: Take 25 mg by mouth daily as needed for Allergies. ergocalciferol (Vitamin D2) 1,250 mcg (50,000 unit) capsule   Yes Yes   Sig: Take 50,000 Units by mouth every Wednesday. esomeprazole (NexIUM) 40 mg capsule   Yes Yes   Sig: Take 40 mg by mouth daily. fluticasone propionate (Flonase Allergy Relief) 50 mcg/actuation nasal spray   Yes Yes   Si Sprays by Both Nostrils route every twelve (12) hours as needed for Rhinitis or Allergies.    glucagon (GLUCAGEN) 1 mg injection   Yes Yes   Si mg by IntraMUSCular route once as needed (hypoglycemia if patient is unresponsive). glucose 4 gram chewable tablet   Yes Yes   Sig: Take 12 g by mouth as needed (BS < 60). insulin degludec Jessie Feller FlexTouch U-100) 100 unit/mL (3 mL) inpn   Yes Yes   Sig: 10 Units by SubCUTAneous route nightly. insulin lispro (HumaLOG KwikPen Insulin) 100 unit/mL kwikpen   Yes Yes   Sig: 10 Units by SubCUTAneous route Daily (before dinner). 8 units before breakfast, 8 units before lunch and 10 units before dinner   insulin lispro (HumaLOG KwikPen Insulin) 100 unit/mL kwikpen   Yes Yes   Si Units by SubCUTAneous route Daily (before breakfast). 8 units before breakfast, 8 units before lunch and 10 units before dinner   insulin lispro (HumaLOG KwikPen Insulin) 100 unit/mL kwikpen   Yes Yes   Si Units by SubCUTAneous route Daily (before lunch). 8 units before breakfast, 8 units before lunch and 10 units before dinner   levothyroxine (SYNTHROID) 100 mcg tablet   No Yes   Sig: Take 1 Tablet by mouth Daily (before breakfast). lidocaine-prilocaine (EMLA) topical cream   Yes Yes   Sig: Apply  to affected area DIALYSIS MON, WED & FRI. Apply to left arm access site topically in the morning every // prior to dialysis   metoprolol tartrate (LOPRESSOR) 25 mg tablet   No Yes   Sig: Take 1 Tablet by mouth every twelve (12) hours. montelukast (Singulair) 10 mg tablet   Yes Yes   Sig: Take 10 mg by mouth nightly. ondansetron hcl (ZOFRAN) 4 mg tablet   Yes Yes   Sig: Take 4 mg by mouth every four (4) hours as needed for Nausea or Vomiting. traZODone (DESYREL) 50 mg tablet   Yes Yes   Sig: Take 50 mg by mouth nightly.       Facility-Administered Medications: None     Social History     Tobacco Use    Smoking status: Never Smoker    Smokeless tobacco: Never Used   Vaping Use    Vaping Use: Never used   Substance Use Topics    Alcohol use: No    Drug use: No       PHYSICAL EXAMINATION:  GENERAL:  Middle-aged female, who is ill-appearing and lethargic. No acute distress. VITAL SIGNS:  She is afebrile. Pulse is 100, /55, respiration of 18, saturating 100% on room air. HEENT:  Head is atraumatic. No scleral icterus. Mucous membrane appears dry. LUNGS:  Clear to auscultation. HEART:  Regular rate and rhythm. Borderline tachycardia. ABDOMEN:  Soft, nontender, active bowel sounds. EXTREMITIES:  No significant peripheral edema. AV access is patent. CNS:  She is lethargic, barely arousable. LABORATORY DATA:  CBC is normal except anemia with a hemoglobin of 10.3, BUN 35, creatinine 3.82, potassium 5.7, CO2 of 11. Anion gap of 24. ASSESSMENT AND PLAN:  Please refer to my note in Epic.       Dez Tyler MD      [id number]  D:  05/17/2022 12:20  T:  05/17/2022 12:39  JOB #:  [id number]

## 2022-05-18 NOTE — TELEPHONE ENCOUNTER
5/18/2022  10:06 AM      Pt sister Peyton Cloud called and wanted to let Danika Davalos know that the pt thyroid number is 39 down from [phone] stated the pt is headed in the right direction but is not quite there yet Peyton Cloud stated there is no need to call her back but she just wanted  to know the pt numbers. [facility]#[phone]      Thanks,  Liane Toledo

## 2022-05-18 NOTE — DIABETES MGMT
[facility]    CLINICAL NURSE SPECIALIST CONSULT     Initial Presentation   Juan Gr is a 45 y.o. female who presented to the ED 5/17/22 via EMS from her assisted living facility with a c/o HIGH sugar on her home glucometer. She gave 20 units lispro and follow-up glucose remained HIGH with 1 episode of vomiting. Pertinent labs in the ED included WBC 10, K 5.7 glucose 967 AG 24 BUN/Cr 35/3. 82. ABG 7.01/33/49. She was fluid resuscitated and started on an insulin gtt per DKA protocol. HX:   Past Medical History:   Diagnosis Date    Allergic rhinitis, cause unspecified     Asthma     use inhalers    Diabetes ([address]     Diabetic macular edema ([address] 2/4/2016    DKA (diabetic ketoacidosis) ([address] 12/4/2021    Down's syndrome     GERD (gastroesophageal reflux disease)     H/O impacted cerumen     Dr. Pan Ramos Headache     Hypoglycemia 6/27/2012    Hypothyroid     Mononucleosis     Pneumonia     Seizures ([address]     as a result of low blood glucose readings    Thyroid disease     CKD on HD    INITIAL DX:   DKA (diabetic ketoacidosis) ([address] [E11.10]     Current Treatment     TX: IV Fluids, Insulin gtt    Consulted by Gilberto Anderson MD for advanced diabetes nursing assessment and care for:   [x] Inpatient management strategy    Hospital Course   Clinical progress has been uncomplicated. Diabetes History   Type 1 Diabetes- Diagnosed when she was 2years old  Ambulatory BG management provided by: Letitia Camargo MD with [facility]- first visit was 4/19/22. Diabetes-related Medical History  Acute complications  DKA  Neurological complications  Peripheral neuropathy  Microvascular disease  Nephropathy  Other associated conditions     Hypothyroidism     Diabetes Medication History  Was previously on an insulin pump.   Pump was d/c when she moved into assisted living/staff would not operate it    [place] reduced to10 units daily at last endo visit    Humalo units at breakfast/lunch, 10 units at dinner PLUS sliding scale: If BG   <60 hold  60-80 = 2 units   = 3 units  121-170 = 4 units  171-220 = 5 units  221-270 = 6 units  271-320 = 10 units  321 + = 15 units  500+ = 20 units  Snacks: if large snack give 2 units before eating    Diabetes self-management practices: Obtained from last endo note 3 wks ago  Eating pattern  -breakfast: Eats breakfast from 8 30-9 a.m., oatmeal, eggs, sandwich wraps, sausage, turkey  - lunch: Eats lunch from 1130 to 12 PM, sandwich wraps, egg salad  - dinner: 430 to 5:30 PM, grilled hamburger, sweet potato fries, coleslaw, cream sauce  - beverages: Mainly water no soda  - snacks: Fruit, lace chips, crackers, cream cheese    Physical activity pattern  Limited    Monitoring pattern  Fasting: Ranging from 140 -230, before lunch 1 , before dinner 112-400s, bedtime 200s  The blood pressure is checked by the assisted living facility staff    Taking medications pattern  [x] Consistent administration   [x] Affordable    Social determinants of health impacting diabetes self-management practices   Concerned that you need to know more about how to stay healthy with diabetes     Overall evaluation:    [x] Achieving individualized A1c target with drug therapy & self-care practices    Subjective   Patient laying in bed. Will open eyes to voice, and talk but tired    Information gathered through discussion with brother at bedside and chart review. Downs syndrome  Lives at assisted living with mother  Did work full time, retired 3 years ago  Hospitalization for DKA 22, 22  Brother voiced concerns that his sister has the judgment close to a 8year old- if another adult- staff or another resident at their home gives her food/snack/dessert- she will eat them. Big problem with stashing sweets and candy in her room and eating without insulin coverage.   Had an art event at her assisted living facility and [place] had fruit juice/lemonade/sweet tea most of the afternoon  Chart history notes hypoglycemic seizure  Endo note reports fasting hypoglycemia on elevated basal rates   Objective   Physical exam  General Overweight white female, with downs syndrome. Seen in ICUr. Tired but will briefly answer questions  Neuro  Opens eyes to voice. 1-2 word answers. Oriented  Vital Signs   Visit Vitals  BP (!) 145/70   Pulse 79   Temp 98.2 °F (36.8 °C) (Axillary)   Resp 19   Wt 47.5 kg (104 lb 11.5 oz)   SpO2 100%   Breastfeeding No   BMI 21.15 kg/m²     Skin  Warm and dry. Acanthosis noted along neckline. No lipohypertrophy or lipoatrophy noted at injection sites  Heart   Regular rate and rhythm. No murmurs, rubs or gallops  Lungs  Clear to auscultation without rales or rhonchi  Extremities No foot wounds       Laboratory  Recent Labs     05/18/22  0510 05/17/22  2303 05/17/22  1836 05/17/22  1311 05/17/22  0612   * 143* 166*   < > 982*  967*   AGAP 11 7 9   < > 28*  24*   WBC 8.5  --   --   --  10.2   CREA 4.88* 4.51* 4.49*   < > 3.88*  3.82*   GFRNA 9* 10* 10*   < > 12*  12*   AST  --   --   --   --  41*   ALT  --   --   --   --  28    < > = values in this interval not displayed. Factors impacting BG management  Factor Dose Comments   Nutrition:  Standard meals     NPO    Other:   Kidney function GFR 10 on HD      Blood glucose pattern      Significant diabetes-related events over the past 24-72 hours  BG now 522  Last set of labs: , K 5, , AG 11, GFR 9, lac 0.8.   TSH 41.3  UA with 1000+ glucosuria, moderate ketones, neg leuk esterase  A1C 4/9/22: 7.6%  Blood cx/urine cx NGTD   Fasting   8 units glargine     Assessment and Plan   Nursing Diagnosis Risk for unstable blood glucose pattern   Nursing Intervention Domain 8843 Decision-making Support   Nursing Interventions Examined current inpatient diabetes/blood glucose control   Explored factors facilitating and impeding inpatient management  Explored corrective strategies with patient and responsible inpatient provider   Informed patient of rational for insulin strategy while hospitalized     Evaluation   Sharmin Colindres is a 45year old female with down's syndrome and Type 1 Diabetes with hypoglycemia unawareness and ESRD on HD, who presented in moderate DKA s/t insulin deficiency. A1C this past spring was 7.6% which is likely at goal for her. Family reports that despite routine BG monitoring and insulin being given at meals by facility staff, Aida Chambers struggles with unintentional carbohydrate intake in the afternoons/evenings without adequate insulin coverage. She doesn't have the mental capability to determine if a food item that is given to her by other residents will impact her BG. This weekend, family reports that Aida Chambers participated in an art event where there was ample fruit juice and lemonade provided, for which Aida Chambers drank throughout the day without insulin coverage. Initial labs in the ED were WBC 10, K 5.7 glucose 967 AG 24 BUN/Cr 35/3. 82. ABG 7.01/33/49. UA with glucosuria and moderate ketones. Negative leuk esterase. Blood and urine cx NGTD. She was fluid resuscitated and started on an insulin gtt per DKA protocol. DKA resolved overnight and she was given low dose basal- 8 units and fasting BG this morning is 142. Please add bolus and correctional insulin today. Individualized BG goal closer to 180mg/dl given mental handicap and renal insufficiency. Recommendations   1. POC glucose [facility]    2. Consistent carbohydrate diet (60 grams CHO/meal)    3. Continue 8 units glargine daily. Increase by 2 units if fasting BG over 180mg/dl    4. Start nutritional insulin: 1 unit humalog for every 10 grams CHO at meals    5.  Correctional insulin at normal sensitivity [facility]  Billing Code(s)   [x] 95329    Before making these care recommendations, I personally reviewed the hospitalization record, including notes, laboratory & diagnostic data and current medications, and examined the patient at the bedside (circumstances permitting) before making care recommendations. More than fifty (50) percent of the time was spent in patient counseling and/or care coordination.   Total minutes: 25    JAILYN Pelayo  Diabetes Clinical Nurse Specialist  [facility]  Access via [employer]

## 2022-05-18 NOTE — PROGRESS NOTES
Bedside shift change report given to Cathleen Lo RN (oncoming nurse) by Opal Chapman RN (offgoing nurse). Report included the following information SBAR, Kardex, ED Summary, Procedure Summary, Intake/Output, MAR, Recent Results, Cardiac Rhythm NSR, Alarm Parameters , Quality Measures and Dual Neuro Assessment. Bedside shift change report given to Melina Voss RN (oncoming nurse) by Cathleen Lo RN (offgoing nurse). Report included the following information SBAR, Kardex, ED Summary, Procedure Summary, Intake/Output, MAR, Recent Results, Cardiac Rhythm NSR, Alarm Parameters , Quality Measures and Dual Neuro Assessment.

## 2022-05-18 NOTE — PROGRESS NOTES
1930: Bedside shift change report given to Linus treviño  (oncoming nurse) by Steve Heck (offgoing nurse). Report included the following information SBAR, Kardex, Intake/Output and MAR.     0730: Bedside shift change report given to Alex Pulido  (oncoming nurse) by Linus treviño  (offgoing nurse). Report included the following information SBAR and Kardex.

## 2022-05-18 NOTE — PROGRESS NOTES
[facility] Adult  Hospitalist Group     ICU Transfer/Accept Summary     [place] [address] [place]  DATE OF TRANSFER: 5/18/2022       PATIENT ID: Zully Gr  MRN: [id number]   YOB: 1970    PRIMARY CARE PROVIDER: Lana Mar MD   DATE OF ADMISSION: 5/17/2022  5:59 AM    ATTENDING PHYSICIAN: Jade Topete MD  CONSULTATIONS:   IP CONSULT TO NEPHROLOGY    PROCEDURES/SURGERIES:   * No surgery found *    REASON FOR ADMISSION: <principal problem not specified>     HOSPITAL PROBLEM LIST:  Patient Active Problem List   Diagnosis Code    Down syndrome Q90.9    Acquired hypothyroidism E03.9    Anemia, unspecified D64.9    DM (diabetes mellitus), type 1, uncontrolled [id number]    Asthma J45.909    HTN (hypertension) I10    Acne L70.9    Advance directive on file Z78.9    ROBBY (acute kidney injury) ([address] C83.6    Metabolic acidosis V58.8    Asthma exacerbation J45. 0    ESRD (end stage renal disease) ([address] N18.6    CKD (chronic kidney disease), stage V ([facility]) N18.5    Fluid overload E87.70    Severe anemia D64.9    Acute respiratory failure with hypoxia ([facility]) J96.01    AMS (altered mental status) R41.82    Hyperglycemia due to type 1 diabetes mellitus ([address] E10.65    After-cataract with vision obscured H26.499    DKA (diabetic ketoacidosis) ([facility]) E11.10         Brief HPI and Hospital Course:        45 y. o. female who has a PMH of DM1 c/b prior DKA, Down syndrome, ESRD on HD (since October), GERD and hypothyroidism.  She last underwent HD the day prior to admission.  She presented to the ED via EMS with vomiting and hyperglycemia with readings of \"high\". She lives at a SNF with her mother. Serum glucose on arrival 967.  In the ED, she was accompanied by her mother and brother.  Pertinent labs include WBC 10, K 5.7 glucose 967 AG 24 BUN/Cr 35/3. 82. ABG 7.01/33/49. The patient received 1L IVF and was started on DKA protocol insulin drip.  Patient's DKA resolved with insulin gtt, hyperkalemia treated with HD and now stable for transfer out of ICU 5/18    Patient seen and examined in ICU. Patient sitting up in chair. Feeling much better,. No abdominal pain , nausea or vomiting. DKA in Type 1 DM   - resolved with Insulin gtt  - Now on lantus 8U, 5u with meals and sliding scale   - at home [place] 10U daily with Humalog 8U Breakfast/lunch 10U Dinner plus sliding scale     Hyperkalemia  Severe Metabolic Acidosis  - resolved with HD    ESRD on HD  - c/w HD MWF  - renal following     Hypothyroidism  - c/w synthroid    GERD  - c/w PPI    Down Syndrome  - stable    DNR  VTE prophylaxis: Heparin sq  D/w Patient   Dispo: SNF in [place] if stable          PHYSICAL EXAMINATION:  Visit Vitals  /65   Pulse 78   Temp 98.6 °F (37 °C)   Resp 20   Wt 47.5 kg (104 lb 11.5 oz)   LMP 03/27/1986   SpO2 98%   Breastfeeding No   BMI 21.15 kg/m²       General:          Alert, cooperative, no distress  HEENT:           Atraumatic, MMM            Neck:               Supple, symmetrical,   Lungs:             Clear to auscultation bilaterally. Heart:              Regular  rhythm,  No  murmur   No edema  Abdomen:       Soft, non-tender. Not distended. Bowel sounds normal  Extremities:     No cyanosis. No clubbing,  +2 distal pulses  Skin:                Not pale. Not Jaundiced  No rashes   Psych:             Not anxious or agitated. Neurologic:      Alert, moves all extremities, oriented X 3.      Labs:     Recent Labs     05/18/22  0510 05/17/22  0612   WBC 8.5 10.2   HGB 10.2* 10.3*   HCT 31.9* 34.4*    171     Recent Labs     05/18/22  0510 05/17/22  2303 05/17/22  1836 05/17/22  1311 05/17/22  0612   * 131* 131*   < > 125*  124*   K 5.0 4.7 3.9   < > 5.7*  5.7*   CL 96* 96* 97   < > 89*  89*   CO2 24 28 25   < > 8*  11*   BUN 43* 41* 38*   < > 35*  35*   CREA 4.88* 4.51* 4.49*   < > 3.88*  3.82*   * 143* 166*   < > 982*  967*   CA 7.7* 7.5* 7.8*   < > 8.3*  7.9*   MG 1.9  1.8 1.8 1.9   < > 2.2   PHOS  --  4.3  --   --  6.2*    < > = values in this interval not displayed. Recent Labs     05/17/22 0612   ALT 28   AP 78   TBILI 0.6   TP 6.8   ALB 2.8*   GLOB 4.0     No results for input(s): INR, PTP, APTT, INREXT in the last 72 hours. No results for input(s): FE, TIBC, PSAT, FERR in the last 72 hours. Lab Results   Component Value Date/Time    Folate 63.4 (H) 04/05/2022 12:38 AM      No results for input(s): PH, PCO2, PO2 in the last 72 hours. No results for input(s): CPK, CKNDX, TROIQ in the last 72 hours. No lab exists for component: CPKMB  Lab Results   Component Value Date/Time    Cholesterol, total 107 12/16/2018 04:08 AM    HDL Cholesterol 42 12/16/2018 04:08 AM    LDL, calculated 36.4 12/16/2018 04:08 AM    Triglyceride 143 12/16/2018 04:08 AM    CHOL/HDL Ratio 2.5 12/16/2018 04:08 AM     Lab Results   Component Value Date/Time    Glucose (POC) 165 (H) 05/18/2022 11:47 AM    Glucose (POC) 106 05/18/2022 09:25 AM    Glucose (POC) 142 (H) 05/18/2022 06:42 AM    Glucose (POC) 180 (H) 05/18/2022 02:17 AM    Glucose (POC) 135 (H) 05/18/2022 01:16 AM     Lab Results   Component Value Date/Time    Color YELLOW/STRAW 05/17/2022 01:40 PM    Appearance CLEAR 05/17/2022 01:40 PM    Specific gravity 1.024 05/17/2022 01:40 PM    Specific gravity 1.015 04/29/2013 01:50 PM    pH (UA) 6.0 05/17/2022 01:40 PM    Protein >300 (A) 05/17/2022 01:40 PM    Glucose >1,000 (A) 05/17/2022 01:40 PM    Ketone 40 (A) 05/17/2022 01:40 PM    Bilirubin Negative 05/17/2022 01:40 PM    Urobilinogen 0.2 05/17/2022 01:40 PM    Nitrites Negative 05/17/2022 01:40 PM    Leukocyte Esterase Negative 05/17/2022 01:40 PM    Glucose/Ketones  02/08/2009 07:00 AM     GLUCOSE AND KETONES SIGNIFICANTLY ELEVATED.  RESULTS PHONED TO AND READ BACK BY    Epithelial cells FEW 05/17/2022 01:40 PM    Bacteria Negative 05/17/2022 01:40 PM    WBC 0-4 05/17/2022 01:40 PM    RBC 0-5 05/17/2022 01:40 PM         CODE STATUS:   Full Code   X DNR    Partial    Comfort Care       Signed:   Delia Cardona MD  Date of Service:  5/18/2022  2:49 PM

## 2022-05-18 NOTE — PROGRESS NOTES
Problem: Mobility Impaired (Adult and Pediatric)  Goal: *Acute Goals and Plan of Care (Insert Text)  Description: FUNCTIONAL STATUS PRIOR TO ADMISSION: Patient was independent with intermittent use of a rollator walker. HOME SUPPORT PRIOR TO ADMISSION: The patient lived with her elderly mother at an [facility] ([facility] Physical Therapy Goals  Initiated 5/17/2022  1. Patient will move from supine to sit and sit to supine  in bed with modified independence within 7 days. 2.  Patient will transfer from bed to chair and chair to bed with modified independence using the least restrictive device within 7 day(s). 2.  Patient will perform sit to/from stand with modified independence within 7 days. 3.  Patient will ambulate 150 feet with least restrictive assistive device and modified independence within 7 days. Outcome: Progressing Towards Goal     PHYSICAL THERAPY TREATMENT  Patient: Bon Gr (88 y.o. female)  Date: 5/18/2022  Diagnosis: DKA (diabetic ketoacidosis) ([facility] [E11.10] <principal problem not specified>      Precautions: Fall  Chart, physical therapy assessment, plan of care and goals were reviewed. ASSESSMENT  Patient continues with skilled PT services and is progressing towards goals. Patient received in supine agreeable to treatment. Patient requiring min A for management of LE in bed to get EOB. Patient required min A for sit to stand due to bed height and patient's height. Once up, static balance good. Patient agreeable to ambulate in room distances up to 45ft and maintain static standing balance at window for prolonged time with and without support. Patient use of rollator at baseline and demonstrates some difficulty managing the RW. No fatigue reported. Patient returned to recliner chair. Vitals remained stable. Will continue to follow in acute setting. Recommend HH at discharge to [facility] pending progress.      Current Level of Function Impacting Discharge (mobility/balance): min A supine to sit, min A transfers, CGA for ambulation with RW up to 45ft. Other factors to consider for discharge: strong support from family         PLAN :  Patient continues to benefit from skilled intervention to address the above impairments. Continue treatment per established plan of care. to address goals. Recommendation for discharge: (in order for the patient to meet his/her long term goals)  Physical therapy at least 2 days/week in the home AND ensure assist and/or supervision for safety with mobility    This discharge recommendation:  Has been made in collaboration with the attending provider and/or case management    IF patient discharges home will need the following DME: patient owns DME required for discharge       SUBJECTIVE:   Patient stated Markus Marrero had dialysis this morning.     OBJECTIVE DATA SUMMARY:   Critical Behavior:  Neurologic State: Alert,Eyes open spontaneously  Orientation Level: Oriented X4  Cognition: Follows commands     Functional Mobility Training:  Bed Mobility:     Supine to Sit: Minimum assistance;Assist x1     Scooting: Stand-by assistance   Transfers:  Sit to Stand: Minimum assistance;Assist x1  Stand to Sit: Minimum assistance;Assist x1       Balance:  Sitting: Intact; Without support  Standing: Intact; With support  Standing - Static: Good  Standing - Dynamic : Fair  Ambulation/Gait Training:  Distance (ft): 45 Feet (ft)  Assistive Device: Walker, rolling  Ambulation - Level of Assistance: Contact guard assistance;Assist x1  Base of Support: Widened     Speed/Delmis: Slow;Pace decreased (<100 feet/min)  Step Length: Right shortened;Left shortened        Pain Rating:  No pain reported     Activity Tolerance:   Fair and SpO2 stable on RA    After treatment patient left in no apparent distress:   Sitting in chair, Call bell within reach and Bed / chair alarm activated    COMMUNICATION/COLLABORATION:   The patients plan of care was discussed with: Occupational therapist and Registered nurse.      Katelynn Arguelles, PT   Time Calculation: 30 mins

## 2022-05-18 NOTE — PROGRESS NOTES
Problem: Pressure Injury - Risk of  Goal: *Prevention of pressure injury  Description: Document Raul Scale and appropriate interventions in the flowsheet. Outcome: Progressing Towards Goal  Note: Pressure Injury Interventions:  Sensory Interventions: Assess changes in LOC    Moisture Interventions: Absorbent underpads    Activity Interventions: Assess need for specialty bed    Mobility Interventions: Assess need for specialty bed    Nutrition Interventions: Document food/fluid/supplement intake    Friction and Shear Interventions: Apply protective barrier, creams and emollients                Problem: Patient Education: Go to Patient Education Activity  Goal: Patient/Family Education  Outcome: Progressing Towards Goal     Problem: Patient Education: Go to Patient Education Activity  Goal: Patient/Family Education  Outcome: Progressing Towards Goal     Problem: Falls - Risk of  Goal: *Absence of Falls  Description: [facility] and appropriate interventions in the flowsheet.   Outcome: Progressing Towards Goal  Note: Fall Risk Interventions:  Mobility Interventions: Communicate number of staff needed for ambulation/transfer         Medication Interventions: Evaluate medications/consider consulting pharmacy    Elimination Interventions: Elevated toilet seat,Call light in reach,Patient to call for help with toileting needs              Problem: Patient Education: Go to Patient Education Activity  Goal: Patient/Family Education  Outcome: Progressing Towards Goal

## 2022-05-18 NOTE — PROGRESS NOTES
Admission Medication Reconciliation:    Information obtained from:  [facility]:  NO    Comments/Recommendations: Updated PTA meds/reviewed patient's allergies. 1)  The patient's PTA medication list was reviewed and compared to the list provided by her nursing facility, [facility]. Majority of the medications were the same since the last review, however there were significant changes to her insulin regimen. 2)  Medication changes (since last review): Added  - None    Adjusted  - Alleen Fern was changed from 12 to 10 units SQ at bedtime  - Humalog SSI was changed to [facility]  - Humalog mealtime insulin was changed to 8 units before breakfast, 8 units before lunch and 10 units before dinner    Removed  - None     ¹RxQuery pharmacy benefit data reflects medications filled and processed through the patient's insurance, however   this data does NOT capture whether the medication was picked up or is currently being taken by the patient. Allergies:  Codeine, Lettuce, Nitrofurantoin, Pcn [penicillins], and Tomato    Significant PMH/Disease States:   Past Medical History:   Diagnosis Date    Allergic rhinitis, cause unspecified     Asthma     use inhalers    Diabetes ([address]     Diabetic macular edema ([address] 2/4/2016    DKA (diabetic ketoacidosis) ([address] 12/4/2021    Down's syndrome     GERD (gastroesophageal reflux disease)     H/O impacted kenn     Dr. Tiarra Cuellar    Headache     Hypoglycemia 6/27/2012    Hypothyroid     Mononucleosis     Pneumonia     Seizures ([facility]     as a result of low blood glucose readings    Thyroid disease      Chief Complaint for this Admission:    Chief Complaint   Patient presents with    High Blood Sugar     Prior to Admission Medications:   Prior to Admission Medications   Prescriptions Last Dose Informant Taking? HumaLOG KwikPen Insulin 100 unit/mL kwikpen   Yes   Sig: by SubCUTAneous route Before breakfast, lunch, and dinner.  Inject as per sliding scale:  If BG 60-80 = 2 units   = 3 units  121-170 = 4 units  171-220 = 5 units  221-270 = 6 units  271-320 = 10 units  321 + = 15 units  500+ = 20 units   L.acid,para-B. bifidum-S.therm (RISAQUAD) 8 billion cell cap cap   Yes   Sig: Take 1 Capsule by mouth daily. albuterol (PROVENTIL HFA, VENTOLIN HFA, PROAIR HFA) 90 mcg/actuation inhaler   Yes   Sig: Take 1 Puff by inhalation every four (4) hours as needed for Wheezing. atorvastatin (LIPITOR) 20 mg tablet   Yes   Sig: Take 20 mg by mouth nightly. b complex-vitamin c-folic acid (NEPHROCAPS) 1 mg capsule   Yes   Sig: Take 1 Capsule by mouth daily. biotin 5,000 mcg TbDi   Yes   Sig: Take 5,000 mcg by mouth daily. cholecalciferol, vitamin D3, (VITAMIN D3) 2,000 unit tab   Yes   Sig: Take 4,000 Units by mouth daily. cranberry 500 mg capsule   Yes   Sig: Take 500 mg by mouth daily. dextromethorphan (Delsym) 30 mg/5 mL liquid   Yes   Sig: Take 30 mg by mouth every twelve (12) hours as needed for Cough. diphenhydrAMINE (BenadryL) 25 mg capsule   Yes   Sig: Take 25 mg by mouth daily as needed for Allergies. ergocalciferol (Vitamin D2) 1,250 mcg (50,000 unit) capsule   Yes   Sig: Take 50,000 Units by mouth every Wednesday. esomeprazole (NexIUM) 40 mg capsule   Yes   Sig: Take 40 mg by mouth daily. fluticasone propionate (Flonase Allergy Relief) 50 mcg/actuation nasal spray   Yes   Si Sprays by Both Nostrils route every twelve (12) hours as needed for Rhinitis or Allergies. glucagon (GLUCAGEN) 1 mg injection   Yes   Si mg by IntraMUSCular route once as needed (hypoglycemia if patient is unresponsive). glucose 4 gram chewable tablet   Yes   Sig: Take 12 g by mouth as needed (BS < 60). insulin degludec Leigh Galla FlexTouch U-100) 100 unit/mL (3 mL) inpn   Yes   Sig: 10 Units by SubCUTAneous route nightly. insulin lispro (HumaLOG KwikPen Insulin) 100 unit/mL kwikpen   Yes   Sig: 10 Units by SubCUTAneous route Daily (before dinner).  8 units before breakfast, 8 units before lunch and 10 units before dinner   insulin lispro (HumaLOG KwikPen Insulin) 100 unit/mL kwikpen   Yes   Si Units by SubCUTAneous route Daily (before breakfast). 8 units before breakfast, 8 units before lunch and 10 units before dinner   insulin lispro (HumaLOG KwikPen Insulin) 100 unit/mL kwikpen   Yes   Si Units by SubCUTAneous route Daily (before lunch). 8 units before breakfast, 8 units before lunch and 10 units before dinner   levothyroxine (SYNTHROID) 100 mcg tablet   Yes   Sig: Take 1 Tablet by mouth Daily (before breakfast). lidocaine-prilocaine (EMLA) topical cream   Yes   Sig: Apply  to affected area DIALYSIS MON, WED & FRI. Apply to left arm access site topically in the morning every // prior to dialysis   metoprolol tartrate (LOPRESSOR) 25 mg tablet   Yes   Sig: Take 1 Tablet by mouth every twelve (12) hours. montelukast (Singulair) 10 mg tablet   Yes   Sig: Take 10 mg by mouth nightly. ondansetron hcl (ZOFRAN) 4 mg tablet   Yes   Sig: Take 4 mg by mouth every four (4) hours as needed for Nausea or Vomiting. traZODone (DESYREL) 50 mg tablet   Yes   Sig: Take 50 mg by mouth nightly. Facility-Administered Medications: None     Please contact the main inpatient pharmacy with any questions or concerns at [phone] and we will direct you to the clinical pharmacist covering this patient's care while in-house.    Vita Fonseca, PHARMD

## 2022-05-18 NOTE — PROGRESS NOTES
Name: Marni Mortimer   MRN: [id number]  : 1970        Assessment:  ESRD ([facility] [address]; MWF)  Hyperkalemia- likely due to severe hyperglycemia/acidosis  DM-1  DKA  Severe metabolic acidosis- due to ESRD + DKA  Anemia of ESRD  Down syndrome     High k resolved. DKA corrected. Had HD in AM. Got 1 Kg UF     Plan/Recommendations:  Off nsulin drip  On lantus and humalog  Next HD on Friday if pt still here    Subjective:  oob in chair. Feels good.  Hoping to go home soon    ROS:   No nausea, no vomiting  No chest pain, no shortness of breath    Exam:  Visit Vitals  BP (!) 125/43 (BP 1 Location: Right upper arm, BP Patient Position: Sitting)   Pulse 77   Temp 98.6 °F (37 °C)   Resp 18   Wt 47.5 kg (104 lb 11.5 oz)   LMP 1986   SpO2 100%   Breastfeeding No   BMI 21.15 kg/m²     NAD  Clear  RRR  No edema  L AVG patent    Current Facility-Administered Medications   Medication Dose Route Frequency Last Admin    insulin glargine (LANTUS) injection 8 Units  8 Units SubCUTAneous QHS 8 Units at 22 0115    insulin lispro (HUMALOG) injection   SubCUTAneous AC&HS 2 Units at 22 1155    dextrose 10 % infusion 0-250 mL  0-250 mL IntraVENous PRN      pantoprazole (PROTONIX) tablet 40 mg  40 mg Oral ACB 40 mg at 22 1154    heparin (porcine) injection 5,000 Units  5,000 Units SubCUTAneous Q12H 5,000 Units at 22 1154    insulin lispro (HUMALOG) injection 5 Units  5 Units SubCUTAneous TIDAC      miconazole (MICONTIN) 2 % ointment   Topical BID      levothyroxine (SYNTHROID) tablet 100 mcg  100 mcg Oral 6am 100 mcg at 22 0920    dextrose 10 % infusion 125-250 mL  125-250 mL IntraVENous PRN Stopped at 22       Labs/Data:    Lab Results   Component Value Date/Time    WBC 8.5 2022 05:10 AM    Hemoglobin (POC) 12.2 2012 03:10 PM HGB 10.2 (L) 05/18/2022 05:10 AM    Hematocrit (POC) 36 11/05/2012 03:10 PM    HCT 31.9 (L) 05/18/2022 05:10 AM    PLATELET 438 98/47/1004 05:10 AM    .1 (H) 05/18/2022 05:10 AM       Lab Results   Component Value Date/Time    Sodium 131 (L) 05/18/2022 05:10 AM    Potassium 5.0 05/18/2022 05:10 AM    Chloride 96 (L) 05/18/2022 05:10 AM    CO2 24 05/18/2022 05:10 AM    Anion gap 11 05/18/2022 05:10 AM    Glucose 221 (H) 05/18/2022 05:10 AM    BUN 43 (H) 05/18/2022 05:10 AM    Creatinine 4.88 (H) 05/18/2022 05:10 AM    BUN/Creatinine ratio 9 (L) 05/18/2022 05:10 AM    GFR est AA 11 (L) 05/18/2022 05:10 AM    GFR est non-AA 9 (L) 05/18/2022 05:10 AM    Calcium 7.7 (L) 05/18/2022 05:10 AM       Wt Readings from Last 3 Encounters:   05/17/22 47.5 kg (104 lb 11.5 oz)   04/19/22 58.2 kg (128 lb 6.4 oz)   04/09/22 61.3 kg (135 lb 2.3 oz)         Intake/Output Summary (Last 24 hours) at 5/18/2022 1307  Last data filed at 5/18/2022 1008  Gross per 24 hour   Intake 1319.15 ml   Output 1000 ml   Net 319.15 ml       Patient seen and examined. Chart reviewed. Labs, data and other pertinent notes reviewed in last 24 hrs.     PMH/SH/FH reviewed and unchanged compared to H&P    Discussed with pt/RN      Stalin Romero MD

## 2022-05-18 NOTE — PROGRESS NOTES
Occupational therapy  05/18/22     OT eval order received and acknowledged. OT eval attempted at 8:52 AM however patient is currently on HD at bedside at this time. Will continue to follow patient and attempt OT eval at a later time.      Thank you,  Apple Zayas, OTR/L

## 2022-05-18 NOTE — WOUND CARE
WOCN Note:     New consult placed for assessment of groin and perineal rash. Chart reviewed. Assessed in room 7109. Admitted DX:  DKA (diabetic ketoacidosis)    Assessment:   Patient is alert, communicative and sitting in recliner. Bed: [facility]  Patient reports no pain. 1. POA Groin and perineal red moist rash consistent with Candidiasis. Wound, Pressure Prevention & Skin Care Recommendations:    1. Minimize layers of linen/pads under patient to optimize support surface. 2.  Turn/reposition approximately every 2 hours and offload heels. 3.  Manage moisture/ Keep skin folds clean and dry/minimize brief usage. 4.  Groin, perineal rash:  Miconazole ointment BID     Discussed above plan with patient and Kristen Meier.     Transition of Care:   Plan to follow as needed while admitted to hospital.    LUANNE FrazierN RN [facility]  Available on [employer]  Office [phone]

## 2022-05-18 NOTE — ADVANCED PRACTICE NURSE
Hemodialysis / [phone]    Vitals Pre Post Assessment Pre Post   BP BP: 133/70 (05/18/22 0645) 146/63 LOC A&O x 2 to self and place A&O x 2 to self and place   HR Pulse (Heart Rate): 84 (05/18/22 0645) 80 Lungs congestion clear   Resp Resp Rate: 16 (05/18/22 0645) 20 Cardiac regular regular   Temp Temp: 98.5 °F (36.9 °C) (05/18/22 0525) 98.2 Skin warm warm   Weight Pre-Dialysis Weight: 54.4 kg (119 lb 14.9 oz) (05/18/22 0525) 53.1 kg Edema generalized No edema   Tele status bedside bedside Pain Pain Intensity 1: 0 (05/18/22 0400) No pain     Orders   Duration: Start: 0530 End: 0900 Total: 3.5 hr   Dialyzer: Dialyzer/Set Up Inspection: Cleophus Lips ([id number]) (05/18/22 0525)   K Bath: Dialysate K (mEq/L): 2 (05/18/22 0525)   Ca Bath: Dialysate CA (mEq/L): 2.5 (05/18/22 0525)   Na: Dialysate NA (mEq/L): 138 (05/18/22 0525)   Bicarb: Dialysate HCO3 (mEq/L): 40 (05/18/22 0525)   Target Fluid Removal: Goal/Amount of Fluid to Remove (mL): 1000 mL (05/18/22 0525)     Access   Type & Location: YENIFER AVG   Comments:                  Patent, B&T positive, cannulated with 15 g needles x 2                      Labs   HBsAg (Antigen) / date:           04/05/22 Negative                                    HBsAb (Antibody) / date: 10/08/21 [facility].    Source: [facility]   Obtained/Reviewed  Critical Results Called HGB   Date Value Ref Range Status   05/18/2022 10.2 (L) 11.5 - 16.0 g/dL Final     Potassium   Date Value Ref Range Status   05/17/2022 4.7 3.5 - 5.1 mmol/L Final     Comment:     SPECIMEN HEMOLYZED, RESULTS MAY BE AFFECTED     Calcium   Date Value Ref Range Status   05/17/2022 7.5 (L) 8.5 - 10.1 MG/DL Final     BUN   Date Value Ref Range Status   05/17/2022 41 (H) 6 - 20 MG/DL Final     Creatinine   Date Value Ref Range Status   05/17/2022 4.51 (H) 0.55 - 1.02 MG/DL Final        Meds Given   Name Dose Route                    Adequacy / Fluid    Total Liters Process: 85 L   Net Fluid Removed: 1000 ml Comments   Time Out Done:   (Time) Yes, 0525   Admitting Diagnosis: Renal failure   Consent obtained/signed:  yes, 05/18/22   Machine / RO # Machine Number: [id number] (05/18/22 0160)   Primary Nurse Rpt Pre: Sagrario Olivares RN   Primary Nurse Rpt Post: Antoni Ware RN   Pt Education: Yes, r/t dialysis process   Care Plan: Continue HD as ordered   Pts outpatient clinic:      Tx Summary   Comments:               Tolerated tx well, at end, all blood in circuit returned with 300 ml NS, YENIFER AVG patent, B&T positive, bleeding stopped at both sites, pressure dressing in place

## 2022-05-18 NOTE — PROGRESS NOTES
Reviewed chart for transitions of care, and discussed in rounds. CM met with patient, MARQUITA Gr and Cedarelizabeth Gr  at bedside to explain role and offer support. Patient is alert and oriented x4, and confirmed demographics. Baseline:   ADLs/IDALS:Independent  Previous Home Health:None  Previous SNF/IPR:None  ER Contact: Mother Quyen Barrios H: [phone], Brother Candy Gr [phone], [facility] [phone]    Patient lives at the [facility]. Patient is independent with ADLs/IDALs   Patient uses a walker occasionally to ambulate. Transportation : Family    Reason for Admission:   DKA                 RUR Score:  23%         PCP: First and Last name:  Sury Neff MD     Name of Practice:   Are you a current patient: Yes/No:Yes   Approximate date of last visit:    Can you do a virtual visit with your PCP: No             Resources/supports as identified by patient/family:   Lives in a 2 bedroom apatment at the [facility] with her mother                Top Challenges facing patient (as identified by patient/family and CM): Finances/Medication cost?    None voiced                Transportation? Family or [facility]               Support system or lack thereof? Mother and 3 siblings                     Living arrangements? [facility]             Self-care/ADLs/Cognition?   A&O          Current Advanced Directive/Advance Care Plan:  DNR      Healthcare Decision Maker:   Click here to complete HealthCare Decision Makers including selection of the Healthcare Decision Maker Relationship (ie \"Primary\")      Primary Decision MakerRose Gena - Mother - [phone]    Secondary Decision Maker: Yvette Maharaj Sister - [phone]    Secondary Decision Maker: Timoteo Gr - Brother - [phone]    Payor Source Payor: [address] [place] [address] / Plan: [address] / Product Type: Managed Care Medicare / Plan for utilizing home health:   TBD                 Transition of Care Plan:      Patient has a history of Down syndrome. CM met with patient and her brother  Milton Gross and sister Jasmyne First to introduce self and explain role. Per brother they prefer patient to go back to her apartment with her mother at discharge, family will transport. Patient is ESRD and receives dialysis at [facility] on a M-W-F schedule and either family the facility or a transportation service takes patient. Care management will follow for transitions of care. Kymberly Prescott RN,Care Management   Care Management Interventions  PCP Verified by CM: Yes (Dr Ubaldo Reza)  [address]. Danny Nolasco: Yes  Discharge Durable Medical Equipment: No  Physical Therapy Consult: Yes  Occupational Therapy Consult: Yes  Speech Therapy Consult: No  Support Systems: Parent(s) (Mother Sarah Baton: [phone], H: [phone], )  Confirm Follow Up Transport: Family  Discharge Location  Patient Expects to be Discharged to[de-identified] Assisted Living     Medicare pt has received, reviewed, and signed 1 st  IM letter informing them of their right to appeal the discharge. Signed copy has been placed on pt bedside chart.

## 2022-05-18 NOTE — PROGRESS NOTES
Problem: Self Care Deficits Care Plan (Adult)  Goal: *Acute Goals and Plan of Care (Insert Text)  Description: FUNCTIONAL STATUS PRIOR TO ADMISSION: Patient was modified independent using a rollator for functional mobility. Patient was modified independent for basic and instrumental ADLs. HOME SUPPORT: The patient lived with her mother at an custodial but did not require assist.    Occupational Therapy Goals  Initiated 5/18/2022  1. Patient will perform grooming at sink with modified independence within 7 day(s). 2.  Patient will perform lower body dressing with modified independence within 7 day(s). 3.  Patient will perform bathing with modified independence within 7 day(s). 4.  Patient will perform toilet transfers with modified independence within 7 day(s). 5.  Patient will perform all aspects of toileting with modified independence within 7 day(s). 6.  Patient will participate in upper extremity therapeutic exercise/activities with independence for 10 minutes within 7 day(s). Outcome: Not Met   OCCUPATIONAL THERAPY EVALUATION  Patient: Judi Gr (47 y.o. female)  Date: 5/18/2022  Primary Diagnosis: DKA (diabetic ketoacidosis) ([facility] [address] [E11.10]        Precautions: Fall    ASSESSMENT  Based on the objective data described below, the patient presents with impaired balance using RW for mobility, decreased generalized strength, impaired activity tolerance, increased time for communication, follows commands and fair safety awareness, and requiring increased assist for self care and functional mobility/transfers. Patient received semi supine in bed upon OT/PT arrival and agreeable to working with therapy. Sister present briefly to drop off coloring book for patient during session. Patient transferred to edge of bed and stood with RW, patient reports using rollator at baseline and reports more difficulty managing RW compared to rollator.  Patient ambulated to sink and brushed teeth standing at sink for several minutes. Patient then ambulating in room, see PT note for gait details, stood at window for several minutes and reported difficulty with far vision at baseline. Patient returned to recliner in room and left sitting in chair with chair alarm activated, nursing notified. Overall patient did fair with session but remains below functional baseline of modified independence. Patient would benefit from skilled OT services during admission to improve independence with self care and functional mobility/transfers. [facility] with HHOT as available at this time. Current Level of Function Impacting Discharge (ADLs/self-care): SBA to min A for mobility/transfers, independent to min A for self care tasks    Functional Outcome Measure: The patient scored Total: 55/100 on the Barthel Index outcome measure which is indicative of being partial dependence in basic self-care. Other factors to consider for discharge: [facility], good family support, uses a rollator at baseline     Patient will benefit from skilled therapy intervention to address the above noted impairments. PLAN :  Recommendations and Planned Interventions: self care training, functional mobility training, therapeutic exercise, balance training, therapeutic activities, endurance activities, patient education, home safety training and family training/education    Frequency/Duration: Patient will be followed by occupational therapy 3 times a week to address goals. Recommendation for discharge: (in order for the patient to meet his/her long term goals)  Occupational therapy at least 2 days/week in the home     This discharge recommendation:  Has not yet been discussed the attending provider and/or case management    IF patient discharges home will need the following DME: patient owns DME required for discharge       SUBJECTIVE:   Patient stated Niko Chock is it?  When discussing helipad present out patient's window. OBJECTIVE DATA SUMMARY:   HISTORY:   Past Medical History:   Diagnosis Date    Allergic rhinitis, cause unspecified     Asthma     use inhalers    Diabetes ([facility] [address]     Diabetic macular edema ([address] 2/4/2016    DKA (diabetic ketoacidosis) ([address] 12/4/2021    Down's syndrome     GERD (gastroesophageal reflux disease)     H/O impacted cerumen     Dr. Balbina Umanzor Headache     Hypoglycemia 6/27/2012    Hypothyroid     Mononucleosis     Pneumonia     Seizures ([address]     as a result of low blood glucose readings    Thyroid disease      Past Surgical History:   Procedure Laterality Date    HX CATARACT REMOVAL Bilateral 3/5/15    cataract left and right    HX CYST REMOVAL      right shoulder    HX HEENT Bilateral 2017    prior in one eye   [address] [facility] Wily Whitman       Expanded or extensive additional review of patient history:     Home Situation  Home Environment: [address] [place] [address] Road Name: [address]  # Steps to Enter: 0  One/Two Story Residence: One story  Living Alone: No  Support Systems: Parent(s),Other Family Member(s),Assisted Living (lives with mom)  Patient Expects to be Discharged to[de-identified] Assisted Living  Current DME Used/Available at Home: [address], rollator    Hand dominance: Right    EXAMINATION OF PERFORMANCE DEFICITS:  Cognitive/Behavioral Status:  Neurologic State: Alert  Orientation Level: Oriented X4  Cognition: Follows commands        Safety/Judgement: Fall prevention;Home safety    Skin: intact where visible    Edema: general swelling, no pitting noted    Hearing:   Auditory  Auditory Impairment: None    Vision/Perceptual:    Tracking: Able to track stimulus in all quadrants w/o difficulty                      Acuity: Impaired far vision         Range of Motion:                            Strength:  Strength: Generally decreased, functional                Coordination:  Coordination: Within functional limits            Tone & Sensation:  Tone: Normal Balance:  Sitting: Intact; Without support  Standing: Intact; With support  Standing - Static: Good  Standing - Dynamic : Fair    Functional Mobility and Transfers for ADLs:  Bed Mobility:  Supine to Sit: Minimum assistance;Assist x1  Scooting: Stand-by assistance    Transfers:  Sit to Stand: Minimum assistance;Assist x1  Stand to Sit: Minimum assistance;Assist x1  Bed to Chair: Minimum assistance  Toilet Transfer : Minimum assistance (infer to commode)    ADL Assessment:  Feeding: Independent (infer from functional reach and coordination)    Oral Facial Hygiene/Grooming: Contact guard assistance (standing at sink)    Bathing: Minimum assistance (infer from functional reach and LE access)    Upper Body Dressing: Setup (infer from functional reach)    Lower Body Dressing: Contact guard assistance (infer from functional reach and standing balance)    Toileting: Minimum assistance (infer from functional reach and balance)                ADL Intervention and task modifications:       Grooming  Position Performed: Standing  Brushing Teeth: Contact guard assistance                   Lower Body Dressing Assistance  Socks: Set-up  Leg Crossed Method Used: Yes  Position Performed: Long sitting on bed         Cognitive Retraining  Safety/Judgement: Fall prevention;Home safety     Functional Measure:    Barthel Index:  Bathin  Bladder: 10  Bowels: 10  Groomin  Dressin  Feeding: 10  Mobility: 0  Stairs: 0  Toilet Use: 5  Transfer (Bed to Chair and Back): 10  Total: 55/100      The Barthel ADL Index: Guidelines  1. The index should be used as a record of what a patient does, not as a record of what a patient could do. 2. The main aim is to establish degree of independence from any help, physical or verbal, however minor and for whatever reason. 3. The need for supervision renders the patient not independent. 4. A patient's performance should be established using the best available evidence.  Asking the patient, friends/relatives and nurses are the usual sources, but direct observation and common sense are also important. However direct testing is not needed. 5. Usually the patient's performance over the preceding 24-48 hours is important, but occasionally longer periods will be relevant. 6. Middle categories imply that the patient supplies over 50 per cent of the effort. 7. Use of aids to be independent is allowed. Score Interpretation (from [facility])    Independent   60-79 Minimally independent   40-59 Partially dependent   20-39 Very dependent   <20 Totally dependent     -Nohelia Diamond, Barthel, D.W. (1965). Functional evaluation: the Barthel Index. [address] ([address] (1997). The Barthel activities of daily living index: self-reporting versus actual performance in the old (> or = 75 years). Journal of [address] [place] [address] 45(7), [facility], JMELISA, Stefan Lucas, Jhonathan Rodriguez. (1999). Measuring the change in disability after inpatient rehabilitation; comparison of the responsiveness of the Barthel Index and Functional [place] Measure. Journal of Neurology, Neurosurgery, and Psychiatry, 66(4), 548-046. Tim Murcia, N.J.A, DEBORA Roberts, & Candi Raza, MMagnoliaA. (2004) Assessment of post-stroke quality of life in cost-effectiveness studies: The usefulness of the Barthel Index and the EuroQoL-5D.  Quality of Life Research, 15, 927-92     Occupational Therapy Evaluation Charge Determination   History Examination Decision-Making   LOW Complexity : Brief history review  LOW Complexity : 1-3 performance deficits relating to physical, cognitive , or psychosocial skils that result in activity limitations and / or participation restrictions  LOW Complexity : No comorbidities that affect functional and no verbal or physical assistance needed to complete eval tasks       Based on the above components, the patient evaluation is determined to be of the following complexity level: LOW   Pain Rating:  None reported    Activity Tolerance:   Fair and SpO2 stable on RA    After treatment patient left in no apparent distress:    Sitting in chair, Call bell within reach and Bed / chair alarm activated    COMMUNICATION/EDUCATION:   The patients plan of care was discussed with: Physical therapist and Registered nurse. Home safety education was provided and the patient/caregiver indicated understanding., Patient/family have participated as able in goal setting and plan of care. and Patient/family agree to work toward stated goals and plan of care. This patients plan of care is appropriate for delegation to [facility].     Thank you for this referral.  Leroy Hanks, OTR/L  Time Calculation: 30 mins

## 2022-05-18 NOTE — PROGRESS NOTES
Physical Therapy 5/18/22    Chart reviewed, currently receiving HD at bedside. Will follow up later as able.     Thank you for your consideration,    Sammy Vidal, PT, DPT

## 2022-05-18 NOTE — PROGRESS NOTES
CM received call from ICU/RN, plan is for patient to be discharged tomorrow if stable. Note CM/ICU met with patient, MARQUITA Askew and Kermit Calles at bedside today. Family wants her to return to her familiar surroundings, apartment at the [facility] with her mother.       Paulino Garcia RN, BSN, [facility]  ED Care Management  [phone]

## 2022-05-18 NOTE — PROGRESS NOTES
[facility]    ICU TEAM Progress Note    Name: Shiloh Gr   : 1970   MRN: [id number]   Date: 2022      I  Subjective:   Progress Note: 2022      Reason for ICU Admission: DKA, hyperkalemia, and stage renal disease    Interval history: From critical care HPI  45 y.o. female who has a PMH of DM1 c/b prior DKA, Down syndrome, ESRD on HD (since October), GERD and hypothyroidism. She last underwent HD the day prior to admission. She presented to the ED via EMS with vomiting and hyperglycemia with readings of \"high\". She lives at a SNF with her mother. Serum glucose on arrival 967. In the ED, she was accompanied by her mother and brother. Pertinent labs include WBC 10, K 5.7 glucose 967 AG 24 BUN/Cr 35/3. 82. ABG 7.01//49. The patient received 1L IVF and was started on DKA protocol insulin drip. Overnight Events:   : Improving as expected. Insulin drip is off. Active Problem List:     Problem List  Date Reviewed: 2022          Codes Class    DKA (diabetic ketoacidosis) ([facility] ICD-10-CM: E11.10  ICD-9-CM: 250.12         Hyperglycemia due to type 1 diabetes mellitus ([facility] ICD-10-CM: E10.65  ICD-9-CM: 250.01         AMS (altered mental status) ICD-10-CM: R41.82  ICD-9-CM: 780.97         Acute respiratory failure with hypoxia (HCC) ICD-10-CM: J96.01  ICD-9-CM: 518.81         CKD (chronic kidney disease), stage V ([facility] ICD-10-CM: N18.5  ICD-9-CM: 787. 5         Fluid overload ICD-10-CM: E87.70  ICD-9-CM: 276.69         Severe anemia ICD-10-CM: D64.9  ICD-9-CM: 285.9         ESRD (end stage renal disease) ([facility] ICD-10-CM: N18.6  ICD-9-CM: 585. 6         Metabolic acidosis CHD-14-DB: E87.2  ICD-9-CM: 276.2         Asthma exacerbation ICD-10-CM: J45.901  ICD-9-CM: 493.92         ROBBY (acute kidney injury) ([facility] [address] ICD-10-CM: N17.9  ICD-9-CM: 584.9         After-cataract with vision obscured ICD-10-CM: H26.499  ICD-9-CM: 366.53         Advance directive on file ICD-10-CM: Z78.9  ICD-9-CM: V49.89         Acne ICD-10-CM: L70.9  ICD-9-CM: 706.1         Asthma (Chronic) ICD-10-CM: J45.909  ICD-9-CM: 493.90         HTN (hypertension) (Chronic) ICD-10-CM: I10  ICD-9-CM: 401.9         DM (diabetes mellitus), type 1, uncontrolled ICD-10-CM: [id number]  ICD-9-CM: 250.03         Down syndrome ICD-10-CM: Q90.9  ICD-9-CM: 758.0         Acquired hypothyroidism ICD-10-CM: E03.9  ICD-9-CM: 244.9         Anemia, unspecified ICD-10-CM: D64.9  ICD-9-CM: 285.9               Past Medical History:      has a past medical history of Allergic rhinitis, cause unspecified, Asthma, Diabetes ([facility] [address] [facility] (2/4/2016), DKA (diabetic ketoacidosis) ([facility] [address] (12/4/2021), Down's syndrome, GERD (gastroesophageal reflux disease), H/O impacted cerumen, Headache, Hypoglycemia (6/27/2012), Hypothyroid, Mononucleosis, Pneumonia, Seizures ([facility] [address] and Thyroid disease. Past Surgical History:      has a past surgical history that includes hx cyst removal; hx hysterectomy (1987); hx heent (Bilateral, 2017); and hx cataract removal (Bilateral, 3/5/15). Home Medications:     Prior to Admission medications    Medication Sig Start Date End Date Taking? Authorizing Provider   HumaLOG KwikPen Insulin 100 unit/mL kwikpen nject as per sliding scale: If BG 60-80 = 2 units   = 3 units  121-170 = 4 units  171-220 = 5 units  221-270 = 6 units  271-320 = 10 units  321 + = 15 units  500+ = 20 units 4/15/22   Provider, Historical   insulin lispro (HumaLOG U-100 Insulin) 100 unit/mL injection 8 Units by SubCUTAneous route Before breakfast, lunch, and dinner. Hold insulin if BS <80. Hold if patient does not eat her meal. If BS >150 give with SS. Patient not taking: Reported on 4/19/2022 4/10/22   Anoop Ownes MD   insulin lispro (HumaLOG U-100 Insulin) 100 unit/mL injection Inject as per sliding scale:   If BG 60-80 = 2 units   = 3 units  121-170 = 4 units  171-220 = 5 units  221-270 = 6 units  271-320 = 10 units  321 + = 15 units  500+ = 20 units 4/10/22   Kenneth Collins MD   dextromethorphan (Delsym) 30 mg/5 mL liquid Take 30 mg by mouth every twelve (12) hours as needed for Cough. Provider, Historical   lidocaine-prilocaine (EMLA) topical cream Apply  to affected area DIALYSIS MON, WED & FRI. Apply to left arm access site topically in the morning every M/W/F prior to dialysis    Provider, Historical   traZODone (DESYREL) 50 mg tablet Take 50 mg by mouth nightly. Provider, Historical   metoprolol tartrate (LOPRESSOR) 25 mg tablet Take 1 Tablet by mouth every twelve (12) hours. 10/11/21   Vivek Hill MD   b complex-vitamin c-folic acid (NEPHROCAPS) 1 mg capsule Take 1 Capsule by mouth daily. 10/12/21   Vivek Hill MD   diphenhydrAMINE (BenadryL) 25 mg capsule Take 25 mg by mouth nightly as needed for Allergies. Provider, Historical   cranberry 500 mg capsule Take 500 mg by mouth daily. Provider, Historical   fluticasone propionate (Flonase Allergy Relief) 50 mcg/actuation nasal spray 2 Sprays by Both Nostrils route every twelve (12) hours as needed for Rhinitis or Allergies. Provider, Historical   esomeprazole (NexIUM) 40 mg capsule Take 40 mg by mouth daily. Provider, Historical   L.acid,para-B. bifidum-S.therm (RISAQUAD) 8 billion cell cap cap Take 1 Capsule by mouth daily. Provider, Historical   insulin degludec Sim Stapler FlexTouch U-100) 100 unit/mL (3 mL) inpn 12 Units by SubCUTAneous route nightly. Provider, Historical   ergocalciferol (Vitamin D2) 1,250 mcg (50,000 unit) capsule Take 50,000 Units by mouth every Wednesday. Provider, Historical   montelukast (Singulair) 10 mg tablet Take 10 mg by mouth nightly. Provider, Historical   levothyroxine (SYNTHROID) 100 mcg tablet Take 1 Tablet by mouth Daily (before breakfast). 9/8/21   Viktoria Chaves MD   biotin 5,000 mcg TbDi Take 5,000 mcg by mouth daily.     Provider, Historical   cholecalciferol, vitamin D3, (VITAMIN D3) 2,000 unit tab Take 4,000 Units by mouth daily. Provider, Historical   atorvastatin (LIPITOR) 20 mg tablet Take 20 mg by mouth nightly. Provider, Historical   albuterol (PROVENTIL HFA, VENTOLIN HFA, PROAIR HFA) 90 mcg/actuation inhaler Take 1 Puff by inhalation every four (4) hours as needed for Wheezing. 18   Bharat Alvarado MD   glucose 4 gram chewable tablet Take 12 g by mouth as needed (BS < 60). Provider, Historical   glucagon (GLUCAGEN) 1 mg injection 1 mg by IntraMUSCular route once as needed (hypoglycemia if patient is unresponsive). Provider, Historical       Allergies/Social/Family History:      Allergies   Allergen Reactions    Codeine Nausea and Vomiting    Lettuce Other (comments)    Nitrofurantoin Rash    Pcn [Penicillins] Hives and Rash    Tomato Other (comments)      Social History     Tobacco Use    Smoking status: Never Smoker    Smokeless tobacco: Never Used   Substance Use Topics    Alcohol use: No      Family History   Problem Relation Age of Onset    Thyroid Disease Mother         hypo    Hypertension Father     Abdominal aortic aneurysm Father     Neuropathy Father     No Known Problems Sister     Heart Disease Brother     Heart Attack Brother     Heart Surgery Brother     No Known Problems Sister     No Known Problems Brother     Anesth Problems Neg Hx        Review of Systems:     10 system reviewed and negative but as in interval history    Objective:   Vital Signs:  Visit Vitals  /60   Pulse 84   Temp 98.5 °F (36.9 °C) (Oral)   Resp 12   Wt 47.5 kg (104 lb 11.5 oz)   LMP 1986   SpO2 96%   Breastfeeding No   BMI 21.15 kg/m²    O2 Flow Rate (L/min): 3 l/min O2 Device: Nasal cannula Temp (24hrs), Av.1 °F (36.7 °C), Min:97.7 °F (36.5 °C), Max:98.5 °F (36.9 °C)           Intake/Output:     Intake/Output Summary (Last 24 hours) at 2022 0728  Last data filed at 2022 0400  Gross per 24 hour   Intake 1109.15 ml   Output --   Net 1109.15 ml       Physical Exam:    GENERAL: Alert, awake, participates in exam  EYE: PERRLA  THROAT & NECK: Supple, excess soft tissue noted around neck  LUNG: CTAB  HEART:RRR, 2+ pulses, no edema  ABDOMEN: Soft, nondistended, +epigastric abdominal pain  SKIN: c/d/i  NEUROLOGIC: Alert, participates in exam, answers questions appropriately  PSYCHIATRIC:  Anxious    LABS AND  DATA: Personally reviewed  Recent Labs     05/18/22  0510 05/17/22  0612   WBC 8.5 10.2   HGB 10.2* 10.3*   HCT 31.9* 34.4*    171     Recent Labs     05/18/22  0510 05/17/22  2303 05/17/22  1311 05/17/22  0612   * 131*   < > 125*  124*   K 5.0 4.7   < > 5.7*  5.7*   CL 96* 96*   < > 89*  89*   CO2 24 28   < > 8*  11*   BUN 43* 41*   < > 35*  35*   CREA 4.88* 4.51*   < > 3.88*  3.82*   * 143*   < > 982*  967*   CA 7.7* 7.5*   < > 8.3*  7.9*   MG 1.8 1.8   < > 2.2   PHOS  --  4.3  --  6.2*    < > = values in this interval not displayed. Recent Labs     05/17/22 0612   AP 78   TP 6.8   ALB 2.8*   GLOB 4.0     No results for input(s): INR, PTP, APTT, INREXT in the last 72 hours. No results for input(s): PHI, PCO2I, PO2I, FIO2I in the last 72 hours. No results for input(s): CPK, CKMB, TROIQ, BNPP in the last 72 hours. Hemodynamics:   PAP:   CO:     Wedge:   CI:     CVP:    SVR:       PVR:       Ventilator Settings:  Mode Rate Tidal Volume Pressure FiO2 PEEP                    Peak airway pressure:      Minute ventilation:          MEDS: Reviewed    Chest X-Ray:  CXR Results  (Last 48 hours)               05/17/22 0927  XR CHEST PORT Final result    Impression:  Stable chronic bibasilar lung markings. No acute finding. Narrative:  EXAM: Portable CXR. 0925 hours. COMPARISON: 4/10/2022 and prior. INDICATION: SOB       FINDINGS:   Stable chronic bibasilar lung markings favor scarring or atelectasis. There is   no new airspace disease. There is no pneumothorax.  Heart size is normal. There What Type Of Note Output Would You Prefer (Optional)?: Standard Output What Is The Reason For Today's Visit?: Full Body Skin Examination is no pulmonary edema or apparent pleural effusion. Assessment and Plan:   - DKA:  - End-stage renal disease: On hemodialysis  - Diabetes mellitus: Previous history of DKA is  - Hypothyroidism:  - Down syndrome    - DKA resolved, now on Lantus and sliding scale. Consult for diabetes education placed. - Appreciate nephrology, tolerating IHD  - On home dose of levothyroxine    At this time no immediate need for ICU level of care. Will contact hospitalist to transfer further service    DISPOSITION  Transfer to non-ICU bed    CRITICAL CARE CONSULTANT NOTE  I had a face to face encounter with the patient, reviewed and interpreted patient data including clinical events, labs, images, vital signs, I/O's, and examined patient. I have discussed the case and the plan and management of the patient's care with the consulting services, the bedside nurses and the respiratory therapist.      NOTE OF PERSONAL INVOLVEMENT IN CARE   This patient has a high probability of imminent, clinically significant deterioration, which requires the highest level of preparedness to intervene urgently. I participated in the decision-making and personally managed or directed the management of the following life and organ supporting interventions that required my frequent assessment to treat or prevent imminent deterioration. I personally spent 40 minutes of critical care time. This is time spent at this critically ill patient's bedside actively involved in patient care as well as the coordination of care and discussions with the patient's family. This does not include any procedural time which has been billed separately. Tom Red M.D.   Staff Intensivist/Pulmonologist  [facility]  5/18/2022 What Is The Reason For Today's Visit? (Being Monitored For X): concerning skin lesions on an annual basis

## 2022-05-19 NOTE — DISCHARGE SUMMARY
Discharge Summary       PATIENT ID: Jose Wren  MRN: [id number]   YOB: 1970    DATE OF ADMISSION: 5/17/2022  5:59 AM    DATE OF DISCHARGE: 05/19/22   PRIMARY CARE PROVIDER: Cayden Turner MD     ATTENDING PHYSICIAN: Geri Haley MD  DISCHARGING PROVIDER: Geri Haley MD    To contact this individual call [phone] and ask the  to page. If unavailable ask to be transferred the Adult Hospitalist Department. CONSULTATIONS: IP CONSULT TO NEPHROLOGY    PROCEDURES/SURGERIES: * No surgery found *    ADMITTING DIAGNOSES & HOSPITAL COURSE:   HPI:\"51 y. o. female who has a PMH of DM1 c/b prior DKA, Down syndrome, ESRD on HD (since October), GERD and hypothyroidism.  She last underwent HD the day prior to admission.  She presented to the ED via EMS with vomiting and hyperglycemia with readings of \"high\". She lives at a SNF with her mother. Serum glucose on arrival 967.  In the ED, she was accompanied by her mother and brother.  Pertinent labs include WBC 10, K 5.7 glucose 967 AG 24 BUN/Cr 35/3. 82. ABG 7.01/33/49. The patient received 1L IVF and was started on DKA protocol insulin drip. \"    He was managed for DKA initially on insulin 0 transition successfully to subcu anion gap closed tolerating diet. Was in ICU and downgraded to floors. Home insulin regimen. Instructed follow-up with PCP, endocrinologist outpatient.   DC home  To [facility] today        DISCHARGE DIAGNOSES / PLAN:      DKA resolved   Setting of type 1 diabetes  - Seems dietary nature she seems to ingest inappropriate foods at facility despite being on proper insulin regimen.   -Initiate diabetic management team recs, counseled extensively on proper food choices  -Off insulin drip back on subcu when tolerating diet  - Outpatient follow-up with endocrinology         FOLLOW UP APPOINTMENTS:    Follow-up Information     Follow up With Specialties Details Why Contact Info    Cayden Turner MD Internal Medicine Physician In 3 days  [address] [facility]  [address]      Mariza Landers MD Nephrology In 1 week  [address] [facility] [address]  [address] [place] [address]             ADDITIONAL CARE RECOMMENDATIONS: rpt cbc and bmp 3-5 days     DIET: Diabetic Diet    ACTIVITY: Activity as tolerated      DISCHARGE MEDICATIONS:  Discharge Medication List as of 5/19/2022  9:23 AM      START taking these medications    Details   pantoprazole (PROTONIX) 40 mg tablet Take 1 Tablet by mouth Daily (before breakfast) for 30 days. , Normal, Disp-30 Tablet, R-0         CONTINUE these medications which have NOT CHANGED    Details   !! insulin lispro (HumaLOG KwikPen Insulin) 100 unit/mL kwikpen 10 Units by SubCUTAneous route Daily (before dinner). 8 units before breakfast, 8 units before lunch and 10 units before dinner, Historical Med      !! insulin lispro (HumaLOG KwikPen Insulin) 100 unit/mL kwikpen 8 Units by SubCUTAneous route Daily (before breakfast). 8 units before breakfast, 8 units before lunch and 10 units before dinner, Historical Med      ondansetron hcl (ZOFRAN) 4 mg tablet Take 4 mg by mouth every four (4) hours as needed for Nausea or Vomiting., Historical Med      !! insulin lispro (HumaLOG KwikPen Insulin) 100 unit/mL kwikpen 8 Units by SubCUTAneous route Daily (before lunch). 8 units before breakfast, 8 units before lunch and 10 units before dinner, Historical Med      !! HumaLOG KwikPen Insulin 100 unit/mL kwikpen by SubCUTAneous route Before breakfast, lunch, and dinner. Inject as per sliding scale:   If BG 60-80 = 2 units   = 3 units  121-170 = 4 units  171-220 = 5 units  221-270 = 6 units  271-320 = 10 units  321 + = 15 units  500+ = 20 units, Historical M ed, PRASANTH      dextromethorphan (Delsym) 30 mg/5 mL liquid Take 30 mg by mouth every twelve (12) hours as needed for Cough., Historical Med      lidocaine-prilocaine (EMLA) topical cream Apply  to affected area DIALYSIS MON, WED & FRI. Apply to left arm access site topically in the morning every M/W/F prior to dialysis, Historical Med      traZODone (DESYREL) 50 mg tablet Take 50 mg by mouth nightly., Historical Med      metoprolol tartrate (LOPRESSOR) 25 mg tablet Take 1 Tablet by mouth every twelve (12) hours. , Normal, Disp-60 Tablet, R-0      b complex-vitamin c-folic acid (NEPHROCAPS) 1 mg capsule Take 1 Capsule by mouth daily. , Normal, Disp-30 Capsule, R-0      diphenhydrAMINE (BenadryL) 25 mg capsule Take 25 mg by mouth daily as needed for Allergies. , Historical Med      cranberry 500 mg capsule Take 500 mg by mouth daily. , Historical Med      fluticasone propionate (Flonase Allergy Relief) 50 mcg/actuation nasal spray 2 Sprays by Both Nostrils route every twelve (12) hours as needed for Rhinitis or Allergies. , Historical Med      esomeprazole (NexIUM) 40 mg capsule Take 40 mg by mouth daily. , Historical Med      L.acid,para-B. bifidum-S.therm (RISAQUAD) 8 billion cell cap cap Take 1 Capsule by mouth daily. , Historical Med      insulin degludec Solomon Gash FlexTouch U-100) 100 unit/mL (3 mL) inpn 10 Units by SubCUTAneous route nightly., Historical Med      ergocalciferol (Vitamin D2) 1,250 mcg (50,000 unit) capsule Take 50,000 Units by mouth every Wednesday., Historical Med      montelukast (Singulair) 10 mg tablet Take 10 mg by mouth nightly., Historical Med      levothyroxine (SYNTHROID) 100 mcg tablet Take 1 Tablet by mouth Daily (before breakfast). , Normal, Disp-90 Tablet, R-3      biotin 5,000 mcg TbDi Take 5,000 mcg by mouth daily. , Historical Med      cholecalciferol, vitamin D3, (VITAMIN D3) 2,000 unit tab Take 4,000 Units by mouth daily. , Historical Med      atorvastatin (LIPITOR) 20 mg tablet Take 20 mg by mouth nightly., Historical Med      albuterol (PROVENTIL HFA, VENTOLIN HFA, PROAIR HFA) 90 mcg/actuation inhaler Take 1 Puff by inhalation every four (4) hours as needed for Wheezing., Print, Disp-1 Inhaler, R-0 glucose 4 gram chewable tablet Take 12 g by mouth as needed (BS < 60). , Historical Med      glucagon (GLUCAGEN) 1 mg injection 1 mg by IntraMUSCular route once as needed (hypoglycemia if patient is unresponsive). , Historical Med       !! - Potential duplicate medications found. Please discuss with provider. NOTIFY YOUR PHYSICIAN FOR ANY OF THE FOLLOWING:   Fever over 101 degrees for 24 hours. Chest pain, shortness of breath, fever, chills, nausea, vomiting, diarrhea, change in mentation, falling, weakness, bleeding. Severe pain or pain not relieved by medications. Or, any other signs or symptoms that you may have questions about. DISPOSITION:    Home With:   OT  PT  HH  RN       Long term SNF/Inpatient Rehab   x Independent/assisted living    Hospice    Other:       PATIENT CONDITION AT DISCHARGE:     Functional status    Poor    x Deconditioned     Independent      Cognition     Lucid    x Forgetful     Dementia        Code status    x Full code     DNR      PHYSICAL EXAMINATION AT DISCHARGE:   GENERAL: Alert, awake, participates in exam  EYE: PERRLA  HEENT: Supple, excess soft tissue noted around neck  LUNG: CTAB  HEART:RRR, 2+ pulses, no edema  ABDOMEN: Soft, nondistended, +epigastric abdominal pain  SKIN: c/d/i  NEUROLOGIC: Alert, participates in exam, answers questions appropriately.   CN II through XII grossly intact      CHRONIC MEDICAL DIAGNOSES:  Problem List as of 5/19/2022 Date Reviewed: 4/19/2022          Codes Class Noted - Resolved    DKA (diabetic ketoacidosis) ([facility] ICD-10-CM: E11.10  ICD-9-CM: 250.12  5/17/2022 - Present        Hyperglycemia due to type 1 diabetes mellitus ([facility] ICD-10-CM: E10.65  ICD-9-CM: 250.01  4/19/2022 - Present        AMS (altered mental status) ICD-10-CM: R41.82  ICD-9-CM: 780.97  4/4/2022 - Present        Acute respiratory failure with hypoxia ([facility] ICD-10-CM: J96.01  ICD-9-CM: 518.81  10/25/2021 - Present        CKD (chronic kidney disease), stage V ([facility] ICD-10-CM: N18.5  ICD-9-CM: 585.5  10/8/2021 - Present        Fluid overload ICD-10-CM: E87.70  ICD-9-CM: 276.69  10/8/2021 - Present        Severe anemia ICD-10-CM: D64.9  ICD-9-CM: 285.9  10/8/2021 - Present        ESRD (end stage renal disease) ([facility] ICD-10-CM: N18.6  ICD-9-CM: 585.6  10/1/2021 - Present        Metabolic acidosis OYP-10-MM: E87.2  ICD-9-CM: 276.2  1/19/2020 - Present        Asthma exacerbation ICD-10-CM: J45.901  ICD-9-CM: 493.92  1/19/2020 - Present        ROBBY (acute kidney injury) ([facility] ICD-10-CM: N17.9  ICD-9-CM: 584.9  1/15/2020 - Present        After-cataract with vision obscured ICD-10-CM: H26.499  ICD-9-CM: 366.53  2/4/2016 - Present        Advance directive on file ICD-10-CM: Z78.9  ICD-9-CM: V49.89  12/28/2015 - Present        Acne ICD-10-CM: L70.9  ICD-9-CM: 706.1  8/20/2014 - Present        Asthma (Chronic) ICD-10-CM: J45.909  ICD-9-CM: 493.90  4/29/2013 - Present        HTN (hypertension) (Chronic) ICD-10-CM: I10  ICD-9-CM: 401.9  4/29/2013 - Present        DM (diabetes mellitus), type 1, uncontrolled ICD-10-CM: PST4209  ICD-9-CM: 250.03  11/14/2012 - Present        Down syndrome ICD-10-CM: Q90.9  ICD-9-CM: 758.0  7/23/2010 - Present        Acquired hypothyroidism ICD-10-CM: E03.9  ICD-9-CM: 244.9  7/23/2010 - Present        Anemia, unspecified ICD-10-CM: D64.9  ICD-9-CM: 285.9  7/23/2010 - Present        RESOLVED: DKA (diabetic ketoacidosis) ([facility] ICD-10-CM: E11.10  ICD-9-CM: 250.12  12/4/2021 - 4/19/2022        RESOLVED: Hyponatremia ICD-10-CM: E87.1  ICD-9-CM: 276.1  2/28/2018 - 1/1/2019        RESOLVED: DKA (diabetic ketoacidoses) ICD-10-CM: E11.10  ICD-9-CM: 250.12  9/14/2017 - 1/1/2019        RESOLVED: Diabetic macular edema ([facility] ICD-10-CM: E11.311  ICD-9-CM: 250.50, 362.01, 362.07  2/4/2016 - 4/19/2022        RESOLVED: Hyperglycemia ICD-10-CM: R73.9  ICD-9-CM: 790.29  7/28/2014 - 7/29/2014        RESOLVED: Syncope and collapse ICD-10-CM: R55  ICD-9-CM: 780.2  4/29/2013 - 1/1/2019        RESOLVED: Hypoglycemia ICD-10-CM: E16.2  ICD-9-CM: 251.2  6/27/2012 - 4/19/2022        RESOLVED: Type II or unspecified type diabetes mellitus without mention of complication, uncontrolled ICD-10-CM: [id number]  ICD-9-CM: 250.02  7/23/2010 - 11/14/2012              Greater than 30 minutes were spent with the patient on counseling and coordination of care    Signed:   Monet Irby MD  5/19/2022  2:40 PM

## 2022-05-19 NOTE — DIABETES MGMT
[address] [place] [address]    CLINICAL NURSE SPECIALIST CONSULT     Initial Presentation   Concepcion Gr is a 45 y.o. female who presented to the ED 5/17/22 via EMS from her assisted living facility with a c/o HIGH sugar on her home glucometer. She gave 20 units lispro and follow-up glucose remained HIGH with 1 episode of vomiting. Pertinent labs in the ED included WBC 10, K 5.7 glucose 967 AG 24 BUN/Cr 35/3. 82. ABG 7.01/33/49. She was fluid resuscitated and started on an insulin gtt per DKA protocol. HX:   Past Medical History:   Diagnosis Date    Allergic rhinitis, cause unspecified     Asthma     use inhalers    Diabetes ([address]     Diabetic macular edema ([address] 2/4/2016    DKA (diabetic ketoacidosis) ([address] 12/4/2021    Down's syndrome     GERD (gastroesophageal reflux disease)     H/O impacted cerumen     Dr. Mata Lav Headache     Hypoglycemia 6/27/2012    Hypothyroid     Mononucleosis     Pneumonia     Seizures ([address]     as a result of low blood glucose readings    Thyroid disease     CKD on HD    INITIAL DX:   DKA (diabetic ketoacidosis) ([address] [E11.10]     Current Treatment     TX: IV Fluids, Insulin gtt    Consulted by William Josue MD for advanced diabetes nursing assessment and care for:   [x] Inpatient management strategy    Hospital Course   Clinical progress has been uncomplicated. 5/17: Admission   5/18: DKA resolved. Transferred out of ICU  Diabetes History   Type 1 Diabetes- Diagnosed when she was 2years old  Ambulatory BG management provided by: Sandra Zazueta MD with [facility]- first visit was 4/19/22. Diabetes-related Medical History  Acute complications  DKA  Neurological complications  Peripheral neuropathy  Microvascular disease  Nephropathy  Other associated conditions     Hypothyroidism     Diabetes Medication History  Was previously on an insulin pump.   Pump was d/c when she moved into assisted living/staff would not operate it    [place] reduced to10 units daily at last endo visit    Humalo units at breakfast/lunch, 10 units at dinner PLUS sliding scale: If BG   <60 hold  60-80 = 2 units   = 3 units  121-170 = 4 units  171-220 = 5 units  221-270 = 6 units  271-320 = 10 units  321 + = 15 units  500+ = 20 units  Snacks: if large snack give 2 units before eating    Diabetes self-management practices: Obtained from last endo note 3 wks ago  Eating pattern  -breakfast: Eats breakfast from 8 30-9 a.m., oatmeal, eggs, sandwich wraps, sausage, turkey  - lunch: Eats lunch from 1130 to 12 PM, sandwich wraps, egg salad  - dinner: 430 to 5:30 PM, grilled hamburger, sweet potato fries, coleslaw, cream sauce  - beverages: Mainly water no soda  - snacks: Fruit, lace chips, crackers, cream cheese    Physical activity pattern  Limited    Monitoring pattern  Fasting: Ranging from 140 -230, before lunch 1 , before dinner 112-400s, bedtime 200s  The blood pressure is checked by the assisted living facility staff    Taking medications pattern  [x] Consistent administration   [x] Affordable    Social determinants of health impacting diabetes self-management practices   Concerned that you need to know more about how to stay healthy with diabetes     Overall evaluation:    [x] Achieving individualized A1c target with drug therapy & self-care practices    Subjective   Patient dressed in street clothes, sitting in bedside chair. Downs syndrome  Lives at assisted living with mother  Did work full time, retired 3 years ago  Hospitalization for DKA 22, 22  Brother voiced concerns that his sister has the judgment close to a 8year old- if another adult- staff or another resident at their home gives her food/snack/dessert- she will eat them. Big problem with stashing sweets and candy in her room and eating without insulin coverage.   Had an art event at her assisted living facility and [place] had fruit juice/lemonade/sweet tea most of the afternoon  Chart history notes hypoglycemic seizure  Endo note reports fasting hypoglycemia on elevated basal rates   Objective   Physical exam  General Overweight white female, with downs syndrome. Awake, conversant, cooperative  Neuro  Awake, alert, oriented   Vital Signs   Visit Vitals  BP (!) 179/68 (BP 1 Location: Right upper arm, BP Patient Position: Sitting)   Pulse 78   Temp 98.1 °F (36.7 °C)   Resp 18   Wt 59.9 kg (132 lb 0.9 oz)   SpO2 97%   Breastfeeding No   BMI 26.67 kg/m²     Skin  Warm and dry. Acanthosis noted along neckline. No lipohypertrophy or lipoatrophy noted at injection sites  Heart   Regular rate and rhythm. No murmurs, rubs or gallops  Lungs  Clear to auscultation without rales or rhonchi  Extremities No foot wounds       Laboratory  Recent Labs     22  0043 22  0510 22  2303 22  1311 22  0612   * 221* 143*   < > 982*  967*   AGAP 5 11 7   < > 28*  24*   WBC 4.9 8.5  --   --  10.2   CREA 3.28* 4.88* 4.51*   < > 3.88*  3.82*   GFRNA 15* 9* 10*   < > 12*  12*   AST  --   --   --   --  41*   ALT  --   --   --   --  28    < > = values in this interval not displayed. Factors impacting BG management  Factor Dose Comments   Nutrition:  Standard meals     NPO    Other:   Kidney function GFR 10 on HD      Blood glucose pattern      Significant diabetes-related events over the past 24-72 hours  Last set of labs: , K 5, , AG 11, GFR 9, lac 0.8.   TSH 41.3  UA with 1000+ glucosuria, moderate ketones, neg leuk esterase  A1C 22: 7.6%  Blood cx/urine cx NGTD   Fasting   Pre-prandial B-165  8 units glargine at bed  5 units humalog/meals  Correction: 2 units in the last 24h      Assessment and Plan   Nursing Diagnosis Risk for unstable blood glucose pattern   Nursing Intervention Domain 4360 Decision-making Support   Nursing Interventions Examined current inpatient diabetes/blood glucose control   Explored factors facilitating and impeding inpatient management  Explored corrective strategies with patient and responsible inpatient provider   Informed patient of rational for insulin strategy while hospitalized     Evaluation   Lauryn Black is a 45year old female with down's syndrome and Type 1 Diabetes with hypoglycemia unawareness and ESRD on HD, who presented in moderate DKA s/t insulin deficiency. A1C this past spring was 7.6% which is likely at goal for her. Family reports that despite routine BG monitoring and insulin being given at meals by facility staff, Machelle Hunter struggles with unintentional carbohydrate intake in the afternoons/evenings without adequate insulin coverage. She doesn't have the mental capability to determine if a food item that is given to her by other residents will impact her BG. This weekend, family reports that Machelle Hunter participated in an art event where there was ample fruit juice and lemonade provided, for which Machelle Hunter drank throughout the day without insulin coverage. Initial labs in the ED were WBC 10, K 5.7 glucose 967 AG 24 BUN/Cr 35/3. 82. ABG 7.01/33/49. UA with glucosuria and moderate ketones. Negative leuk esterase. Blood and urine cx NGTD. She was fluid resuscitated and started on an insulin gtt per DKA protocol. DKA resolved and she was given low dose basal- 8 units with a 1:5 carb coverage. BG in goal on current doses. Individualized BG goal closer to 180mg/dl given mental handicap and renal insufficiency. Recommendations   1. POC glucose ACHS    2. Consistent carbohydrate diet (60 grams CHO/meal)    3. Continue 8 units glargine daily. Increase by 2 units if fasting BG over 180mg/dl    4. Continue nutritional insulin: 5 units humalog/meals (1:5 carb coverage). Reduce by 1 unit if pre-meal BG sustained below 120s. 5. Correctional insulin at normal sensitivity ACHS      Please discharge home on PTA insulin doses. Eating much more at home vs here.    Billing Code(s) [x] 06846    Before making these care recommendations, I personally reviewed the hospitalization record, including notes, laboratory & diagnostic data and current medications, and examined the patient at the bedside (circumstances permitting) before making care recommendations. More than fifty (50) percent of the time was spent in patient counseling and/or care coordination.   Total minutes: 13    JAILYN Dunn  Diabetes Clinical Nurse Specialist  [facility]  Access via [employer]

## 2022-05-19 NOTE — PROGRESS NOTES
Bedside shift change report given to Shaw Crzu RN (oncoming nurse) by Ayaka Asencio RN (offgoing nurse). Report included the following information SBAR, Kardex, Intake/Output, MAR, Recent Results and Med Rec Status.

## 2022-05-19 NOTE — PROGRESS NOTES
Name: Job Law   MRN: [id number]  : 1970        Assessment:  ESRD ([facility] [address]; MWF)  Hyperkalemia- resolved  DM-1  DKA  Severe metabolic acidosis- due to ESRD + DKA  Anemia of ESRD  Down syndrome        Plan/Recommendations:  Kerline Still for d/c today  Next HD on Friday if pt still here    Subjective:  oob in chair. Resting.  \"I am going home today\"    ROS:   No nausea, no vomiting  No chest pain, no shortness of breath    Exam:  Visit Vitals  BP (!) 179/68 (BP 1 Location: Right upper arm, BP Patient Position: Sitting)   Pulse 78   Temp 98.1 °F (36.7 °C)   Resp 18   Wt 59.9 kg (132 lb 0.9 oz)   LMP 1986   SpO2 97%   Breastfeeding No   BMI 26.67 kg/m²     NAD  No edema  L AVG patent    Current Facility-Administered Medications   Medication Dose Route Frequency Last Admin    hydrALAZINE (APRESOLINE) 20 mg/mL injection 10 mg  10 mg IntraVENous Q6H PRN 10 mg at 22 0536    insulin glargine (LANTUS) injection 8 Units  8 Units SubCUTAneous QHS 8 Units at 22 2215    insulin lispro (HUMALOG) injection   SubCUTAneous AC&HS 2 Units at 22 1155    dextrose 10 % infusion 0-250 mL  0-250 mL IntraVENous PRN      pantoprazole (PROTONIX) tablet 40 mg  40 mg Oral ACB 40 mg at 22 0635    heparin (porcine) injection 5,000 Units  5,000 Units SubCUTAneous Q12H 5,000 Units at 22 0039    insulin lispro (HUMALOG) injection 5 Units  5 Units SubCUTAneous TIDAC 5 Units at 22 1415    miconazole (MICONTIN) 2 % ointment   Topical BID      montelukast (SINGULAIR) tablet 10 mg  10 mg Oral QHS 10 mg at 22 221    levothyroxine (SYNTHROID) tablet 100 mcg  100 mcg Oral 6am 100 mcg at 22 0068    dextrose 10 % infusion 125-250 mL  125-250 mL IntraVENous PRN Stopped at 22       Labs/Data:    Lab Results   Component Value Date/Time WBC 4.9 05/19/2022 12:43 AM    Hemoglobin (POC) 12.2 11/05/2012 03:10 PM    HGB 9.8 (L) 05/19/2022 12:43 AM    Hematocrit (POC) 36 11/05/2012 03:10 PM    HCT 30.5 (L) 05/19/2022 12:43 AM    PLATELET 837 (L) 16/20/4999 12:43 AM    .5 (H) 05/19/2022 12:43 AM       Lab Results   Component Value Date/Time    Sodium 131 (L) 05/19/2022 12:43 AM    Potassium 3.8 05/19/2022 12:43 AM    Chloride 94 (L) 05/19/2022 12:43 AM    CO2 32 05/19/2022 12:43 AM    Anion gap 5 05/19/2022 12:43 AM    Glucose 172 (H) 05/19/2022 12:43 AM    BUN 21 (H) 05/19/2022 12:43 AM    Creatinine 3.28 (H) 05/19/2022 12:43 AM    BUN/Creatinine ratio 6 (L) 05/19/2022 12:43 AM    GFR est AA 18 (L) 05/19/2022 12:43 AM    GFR est non-AA 15 (L) 05/19/2022 12:43 AM    Calcium 7.7 (L) 05/19/2022 12:43 AM       Wt Readings from Last 3 Encounters:   05/19/22 59.9 kg (132 lb 0.9 oz)   04/19/22 58.2 kg (128 lb 6.4 oz)   04/09/22 61.3 kg (135 lb 2.3 oz)         Intake/Output Summary (Last 24 hours) at 5/19/2022 0928  Last data filed at 5/18/2022 1008  Gross per 24 hour   Intake 210 ml   Output --   Net 210 ml       Patient seen and examined. Chart reviewed. Labs, data and other pertinent notes reviewed in last 24 hrs.     PMH/SH/FH reviewed and unchanged compared to H&P    Discussed with pt/RN      Barrera Whaley MD

## 2022-05-19 NOTE — PROGRESS NOTES
TRANSFER - OUT REPORT:    Verbal report given to Willian on Orlando Gr   being transferred to [facility] unit   Report consisted of patients Situation, Background, Assessment and   Recommendations(SBAR).

## 2022-05-19 NOTE — TELEPHONE ENCOUNTER
Spoke with patient's sister ms. Pierre Atwood on 05/17/2022 at 1:30 PM and she indicated that ms. Stas Martinez is hospitalized with elevated blood glucose in 1000. Ms. Pierre Atwood indicates that ms. Stas Martinez has been taking treats from the dining kc and from other residents and hiding it under her bed and is not having enough supervision due to their mother having dementia and not being able to supervise ms. Stas Martinez all the time. The nursing home would not manage insulin pumps or CGMs and having ms. Stas Martinez transferred to a different floor with closer supervision may not be a possibility at this time due to cost per ms. Pierre Atwood. She indicated that they will have a family meeting soon to discuss this and look for other possible solutions to restrict ms. Arzola's access to treats as this is her 5th hospitalization this year for elevated blood glucose.  We plan to see her in our clinic next week after she is discharged from the hospital.

## 2022-05-19 NOTE — PROGRESS NOTES
Physical Therapy Note    Chart reviewed. Discharge orders in to [facility] where patient resides with mother. Arrived to patient fully dressed in street clothes with brother bedside. Brother and patient report patient walked to bathroom and back today. Patient eager to get back to [facility]. Patient/caregiver at bedside decline any questions/concerns in regards to discharge back to [facility] in regards to functional mobility. Declined practicing mobility this date. Acute PT will continue to follow and progress as able if discharge delayed.     Robert Don, PT, DPT

## 2022-05-19 NOTE — PROGRESS NOTES
According to Dr. Letha Faustin [place] office, patient is no longer with this facility. Spoke to CM who will call and determine correct PCP and let us know.

## 2022-05-19 NOTE — PROGRESS NOTES
Physician Progress Note      Soledad Gaming  [facility] #:                  [id number]  :                       1970  ADMIT DATE:       2022 5:59 AM  DISCH DATE:        2022 1:27 PM  RESPONDING  PROVIDER #:        Ron Hamilton MD          QUERY TEXT:    Patient admitted with DKA , noted to have abnormal serum sodium levels . If possible, please document in progress notes and discharge summary if you are evaluating and/or treating any of the following: The medical record reflects the following:  Risk Factors: DKA  Clinical Indicators:  2022 06:12  Sodium: 124 (L)    2022 06:12  Sodium: 125 (L)    Treatment:lab monitoring, ICU monitoring, lispro sliding scale insulin      Thank you,  Kim Ralph RN, ESTER, CRCR, CCDS  Certified Clinical   [phone]  Options provided:  -- hyponatremia  -- serum sodium levels  not clinically significant  -- Other - I will add my own diagnosis  -- Disagree - Not applicable / Not valid  -- Disagree - Clinically unable to determine / Unknown  -- Refer to Clinical Documentation Reviewer    PROVIDER RESPONSE TEXT:    serum sodium levels is not clinically significant. Query created by: Valeri Mix on 2022 1:40 PM      QUERY TEXT:    Pt admitted with DKA  Pt noted to have AMS superimposed upon downs syndrome . If possible, please document in the progress notes and discharge summary if you are evaluating and / or treating any of the following: The medical record reflects the following:  Risk Factors: DKA  Clinical Indicators: The patient is currently lethargic and unable to provide any history or review of systems.      PN active problem list  AMS (altered mental status) ICD-10-CM: R41.82    Treatment: high fall risk precautions, ICU monitoring, lispro sliding scale insulin    Thank you,  Kim Ralph RN, ESTER, CRCR, CCDS  Certified Clinical Documentation Specialist  [phone]  Options provided:  -- Metabolic encephalopathy superimposed upon downs syndrome  -- Encephalopathy due to DKA superimposed upon downs syndrome  -- Other - I will add my own diagnosis  -- Disagree - Not applicable / Not valid  -- Disagree - Clinically unable to determine / Unknown  -- Refer to Clinical Documentation Reviewer    PROVIDER RESPONSE TEXT:    This patient has metabolic encephalopathy superimposed upon downs syndrome .     Query created by: Estela Rios on 5/19/2022 1:45 PM      Electronically signed by:  Juyd King MD 5/19/2022 5:30 PM

## 2022-05-20 NOTE — PROGRESS NOTES
22 at 11:13am:  Care Transitions Outreach Attempt    Call within 2 business days of discharge: Yes   Care Transitions Nurse (CTN) met briefly with patient's mother, Rajwinder Bentley, by phone for transitions of care follow-up call. Patient's mother states patient is currently at her outpatient dialysis treatment. A short time into telephonic assessment our phone call was disconnected/ended. CTN attempted to reach patient's mother again by phone; however unable to reach patient's mother. Will attempt to reach patient/patient's mother again and CTN will continue to monitor. Patient: Griselda Gr Patient : 1970 MRN: [id number]    Last Discharge [address]       Complaint Diagnosis Description Type Department Provider    22 High Blood Sugar Diabetic ketoacidosis without coma associated with type 1 diabetes mellitus ([facility] [address] . .. ED to Hosp-Admission (Discharged) (ADMIT) Erika Hudson MD; Sandra Pate. .. Was this an external facility discharge? No     Noted following upcoming appointments from discharge chart review:   [facility] follow up appointment(s):   Future Appointments   Date Time Provider Odell Shaw   2022  3:10 PM Moriah Morales MD RDE [address] BS AMB   2022 11:00 AM Reymundo Smith RD PDHRDE BS AMB     Non-BS follow up appointment(s): , ,  outpatient hemodialysis treatments at [facility] per chart. 22 at 3:09pm:  CTN attempted to reach patient/patient's mother by phone for transitions of care follow-up and completion of telephonic assessment. Unable to reach patient/patient's mother. CTN will attempt to reach patient/patient/s mother again and CTN will continue to monitor. 22 at 9:16am:  CTN attempted to reach patient/patient's mother by phone for transitions of care follow-up and completion of telephonic assessment. Unable to reach patient/patient's mother.   CTN will continue to monitor. 22 at 3:26pm:  Care Transitions Initial Call    Call within 2 business days of discharge: Yes     Patient: Marty Gr Patient : 1970 MRN: [id number]    Last Discharge [address]       Complaint Diagnosis Description Type Department Provider    22 High Blood Sugar Diabetic ketoacidosis without coma associated with type 1 diabetes mellitus ([facility] [address] . .. ED to Hosp-Admission (Discharged) (ADMIT) Sam Kessler MD; Emile Munoz. .. Was this an external facility discharge? No     Challenges to be reviewed by the provider   Additional needs identified to be addressed with provider:   yes  - Per discharge summary, patient needs CBC and BMP within 3-5 days of discharge. - Per sister, not adhering to a diabetic diet at home and home glucose readings continue to fluctuate and are high at times. Not avoiding sweets, not counting calories and/or carbohydrates. Method of communication with provider:  chart routing to PCP.      Component      Latest Ref Rng & Units 2022          12:03 PM  8:27 AM   GLUCOSE,FAST - POC      65 - 117 mg/dL 237 (H) 100     Component      Latest Ref Rng & Units 2022          12:43 AM   Glucose      65 - 100 mg/dL 172 (H)     Component      Latest Ref Rng & Units 2022           9:27 PM  4:42 PM  4:40 PM   GLUCOSE,FAST - POC      65 - 117 mg/dL 130 (H) 91 68     Component      Latest Ref Rng & Units 2022           5:10 AM   Hemoglobin A1c, (calculated)      4.0 - 5.6 % 7.5 (H)   Est. average glucose      mg/dL 169     Component      Latest Ref Rng & Units 2022          12:43 AM  5:10 AM 11:03 PM   Sodium      136 - 145 mmol/L 131 (L) 131 (L) 131 (L)     Component      Latest Ref Rng & Units 2022          12:43 AM  5:10 AM 11:03 PM   Chloride      97 - 108 mmol/L 94 (L) 96 (L) 96 (L)     Component      Latest Ref Rng & Units 5/19/2022 5/18/2022 5/17/2022          12:43 AM  5:10 AM 11:03 PM   Calcium      8.5 - 10.1 MG/DL 7.7 (L) 7.7 (L) 7.5 (L)     Component      Latest Ref Rng & Units 5/18/2022           5:10 AM   TSH      0.36 - 3.74 uIU/mL 41.30 (H)     Component      Latest Ref Rng & Units 5/19/2022 5/18/2022 5/18/2022          12:43 AM  5:10 AM  5:10 AM   Magnesium      1.6 - 2.4 mg/dL 2.5 (H) 1.9 1.8     Component      Latest Ref Rng & Units 5/19/2022 5/18/2022          12:43 AM  5:10 AM   RBC      3.80 - 5.20 M/uL 2.76 (L) 2.87 (L)   HGB      11.5 - 16.0 g/dL 9.8 (L) 10.2 (L)   HCT      35.0 - 47.0 % 30.5 (L) 31.9 (L)   MCV      80.0 - 99.0 .5 (H) 111.1 (H)   MCH      26.0 - 34.0 PG 35.5 (H) 35.5 (H)     Component      Latest Ref Rng & Units 5/19/2022 5/18/2022          12:43 AM  5:10 AM   PLATELET      083 - 888 K/uL 131 (L) 169     Discussed COVID-19 related testing which was available at this time. Test results were negative. Patient informed of results, if available? N/A, telephonic assessment was completed with patient's mother and patient's sister. Advance Care Planning:   Does patient have an Advance Directive: yes, reviewed and current per patient's mother. Inpatient Readmission Risk score: Unplanned Readmit Risk Score: 23.2 ( )    Was this a readmission? no   Patient stated reason for the admission: N/A, telephonic assessment completed with patient's mother and patient's sister. Patients top risk factors for readmission: Medical condition -  Hypertension, diabetes, recent DKA, asthma, ESRD and CKD, and pure hypercholesterolemia per chart. Interventions to address risk factors: Obtained and reviewed discharge summary and/or continuity of care documents and Education of patient/family/caregiver/guardian to support self-management-Education provided to patient's sister, Mary Jane Khanna, regarding signs/symptoms of DKA, sister verbalized an understanding.      Care Transition Nurse (CTN) contacted the patient's mother, Bernarda Perea, and patient's sister, Bobbi Dick, by telephone to perform post hospital discharge assessment. Verified name and  with mother and sister as identifiers. Provided introduction to self, and explanation of the CTN role. CTN reviewed discharge instructions, medical action plan and red flags with mother and sister who verbalized understanding. Were discharge instructions available to patient? yes. Reviewed appropriate site of care based on symptoms and resources available to patient including: PCP, Specialist and When to call 911. Mother and sister were given an opportunity to ask questions and does not have any further questions or concerns at this time. The mother and sister agree to contact the PCP office for questions related to their healthcare. Medication reconciliation was not performed with patient's sister during this phone conversation as sister is not familiar with patient's discharge medication. Patient's mother states patient's medications are administered by [facility]. Advised obtaining a 90-day supply of all daily and as-needed medications. Referral to Pharm D needed: no     Home Health/Outpatient orders at discharge: [address]: n/a  Date of initial visit: [facility] ordered at discharge: None  [address] received: n/a    Was patient discharged with a pulse oximeter? no    Discussed follow-up appointments. If no appointment was previously scheduled, appointment scheduling offered: yes. Is follow up appointment scheduled within 7 days of discharge? Yes, patient's sister states patient is seen by her PCP on , when Dr. Azra Hsieh.   Per sister, Dr. Cristela Garcia plans to see patient on 22 at [facility].   [facility] follow up appointment(s):   Future Appointments   Date Time Provider Odell Shaw 6/2/2022  3:10 PM Janae Singh MD RDE [address] BS AMB   6/7/2022 11:00 AM Reymundo Castro RD PDHRDE BS AMB     Non-[facility] follow up appointment(s): Per sister, patient sees her nephrologist, Dr. Archie Phan, when she visits [facility]. Plan for follow-up call in 7-10 days based on severity of symptoms and risk factors. Plan for next call: symptom management-Review red flags of DKA with patient/family, and follow up appointment-Evaluate if patient is attending follow-up appointments as scheduled, offer assistance with scheduling as needed. CTN provided contact information for future needs. Goals      Understands red flags post discharge. 5-23-22: Red flags of DKA reviewed with patient's sister, Lucie Simmons, and sister verbalized an understanding. Sister states patient's home glucose readings continue to fluctuate as patient does not adhere to a diabetic diet at home and does not participate in meal planning, does not avoid concentrated sweets, does not count carbohydrates or calories. Sister states she and her siblings are considering care that is \"more involved\" for future planning as patient is currently residing in [facility] and home glucose readings are not monitored frequently enough per patient's sister. Mother reports patient has not had any falls in the last 12 months, ambulates with a walker; however both mother and sister agree that patient is able to ambulate without her walker. Sister denies patient having chest pain, denies shortness of breath, denies fever/chills, and denies nausea/vomiting. Neither sister or mother have any red flags to report at this time and mother states, \"She's doing just fine. \"  Care Transitions Nurse will review red flags again on next phone conversation with patient/family.  Zeina Hurtado

## 2022-06-02 PROBLEM — Z99.2 ESRD ON DIALYSIS (HCC): Status: ACTIVE | Noted: 2021-01-01

## 2022-06-02 NOTE — LETTER
6/6/2022    Patient: Jeff Gr   YOB: 1970   Date of Visit: 6/2/2022     Fareed Archibald MD  [address] [facility] [place] [place] [address] [place] [address] In [facility] [address]    Dear Fareed Archibald MD,      Thank you for referring Ms. Carlos Gr to [facility] ENDOCRINOLOGY for evaluation. My notes for this consultation are attached. If you have questions, please do not hesitate to call me. I look forward to following your patient along with you.       Sincerely,    Marly Hughes MD

## 2022-06-02 NOTE — PATIENT INSTRUCTIONS
Plan is:    Give Tresiba 10 units every night at 9 PM    Give Humalog insulin as follows and add Sliding Scale:    -Breakfast 8 units + Sliding scale  -Lunch 6 units + Sliding scale  -Dinner 8 units + Sliding scale    Sliding scale is as follows:  Correction Scale: 1 unit for every 50 above 150    IF GLUCOSE IS:                 THEN TAKE:      0   Extra Unit  151-200   1   Extra Unit  201-250   2   Extra Units  251-300   3   Extra Units  301-350   4   Extra Units  351-400   5   Extra Units    Example: My planned insulin dose:    ____ Units    +    ____ Extra Correction Units  =  ____ total units to take together as one injection. Please WRITE DOWN all of the insulin doses given to the patient and at what time and send me a copy in 2 weeks. Please MEASURE the BLOOD SUGARS at 8 AM, 11 AM, 2 PM, 4:30 PM and 9 PM    Please provide me documentation of the Blood Sugar in 2 weeks. Fax the documents to [phone] [facility] [place] [facility] fax number). Please give the patient her levothyroxine dose at 8 AM every day. Thank you very much for your help on this, you can reach me on [phone].     Best,    Dr. Elza Barton

## 2022-06-02 NOTE — PROGRESS NOTES
CHIEF COMPLAINT: f/u evaluation for uncontrolled type 1 diabetes    HISTORY OF PRESENT ILLNESS:   Yuniel Gr is a 45 y.o. female with a PMHx as noted below who presents to the endocrinology clinic for f/u evaluation of uncontrolled type 1 diabetes. PMH: Down syndrome  DM 1, diagnosed when she was 2years old  ESRD on HD  [Monday, Wednesday, Friday] since Oct 2021  Hypothyroidism  HTN  HLD  Anemia  Asthma    Most recent a1c: 7.5% on 2022      She lives in [address] ([phone]) with her mother. Her sister Anurag Barber and brother Marina Becker and other family members are closely involved in ms. Arzola's care. Ms. Anurag Barber and Mr. Marina Becker are here today in the office. Patient has had 6 hospitalizations this year for Diabetes related blood exursions (mainly hyperglycemia (admitted last 2 weeks ago) but she also had hypoglycemia last Tuesday requiring EMS to be called). Her Current insulin regimen given at [facility] is as follows:  Tresiba 10 units at bedtime  Humalog 8 units before Breakfast and Lunch and 10 units before dinner  And Sliding Scale as follows: If BG   <60 hold  60-80 = 2 units   = 3 units  121-170 = 4 units  171-220 = 5 units  221-270 = 6 units  271-320 = 10 units  321 + = 15 units  500+ = 20 units       She is not receiving any insulin for snacks. She does not receive lunch time insulin on Hemodialysis days and eats a small snack after the HD session comprising     Blood glucose logs sent from the [facility] assisted living for past 2 weeks:  Fastin, 72, 89, 146, 200, 469, 375, 490, 169, 74, 545  Before lunch: 309, 218, 136, 140, 490, 81, 147, 242, 480, 88  Before dinner: 67, 112, 180, 152, 79, 114, 190, 144, 208, 186, 64, 291, 117, 387, 145, 261  No bedtime readings    MS. Aura Pardo when she develops hyoglycemia would repeat the same phrase over and over again and may lie down prone and cry. Her family are with her on the phone on a regular basis.      Diet: Recommendations were given by nutritionist at [facility] but patient does not have a separate carb restricted diet compared to the other residents. The blood pressure is checked regularly at [facility]. My discussion note with ms. Beatriz Marie after ms. Arzola's admission on 05/17/2022:  \"Spoke with patient's sister ms. Beatriz Marie on 05/17/2022 at 1:30 PM and she indicated that ms. Summer Sosa is hospitalized with elevated blood glucose in 1000. Ms. Betariz Marie indicates that ms. Summer Sosa has been taking treats from the dining kc and from other residents and hiding it under her bed and is not having enough supervision due to their mother having a history of dementia and not being able to supervise ms. Summer Sosa all the time. The nursing home would not manage insulin pumps or CGMs and having ms. Summer Sosa transferred to a different floor with closer supervision may not be a possibility at this time due to cost per ms. Beatriz Marie. She indicated that they will have a family meeting soon to discuss this and look for other possible solutions to restrict ms. Traylors access to treats as this is her 5th hospitalization this year for elevated blood glucose. We plan to see her in our clinic next week after she is discharged from the hospital.\"    Today, ms. Beatriz Marie and mr. Miladis Parra indicate that they do \"a sweep\" of the room every 2-3 days to look for treats and that has minimized her blood sugar excursions, but ms. Summer Sosa will not be moving to a floor with closer supervision as also her presence with her mother is very important. The store that ms. Summer Sosa had gotten orange juice from regularly in the past has stopped giving her the juice, however restricting other residents from bringing treats has been difficult. Last eye exam was 2 years ago, missed last appointment due to Jose Luis. At cataract removal, had laser eye surgery. Ms. Summer Sosa denies complaints today and is feeling at her baseline. She is on levothyroxine 100 mcg daily, but may not be given at regular time every day (eg.  If patient sleeps in it may be given with breakfast, also weekend schedule is not always given at the same time).      Her lab results were not routed to me before her hospital admission    Review of most recent diabetes-related labs:  Lab Results   Component Value Date    HBA1C 7.5 (H) 05/18/2022    HBA1C 7.6 (H) 04/09/2022    HBA1C 8.6 (H) 12/04/2021    RHZ8CBTL 6.7 08/26/2021    FPI6PEXR 7.5 02/22/2017    GIB6ZJAL 7.7 08/17/2016    CHOL 107 12/16/2018    LDLC 36.4 12/16/2018    LDLCEXT 110 10/29/2015    GFRAA 18 (L) 05/19/2022    GFRNA 15 (L) 05/19/2022    CREATEXT 0.83 02/22/2017    MALBEXT 498.5 05/04/2016    TSH 41.30 (H) 05/18/2022    VITD3 87.5 10/10/2021    B12LT 952 04/05/2022     Lab Key:  [id number] = IA-2 pancreatic islet cell autoantibody  CPEPL = C-peptide level  :EXT = External Lab  GADLT = ROSA-65 autoantibody   INSUL = Insulin level  MCACR (or MALBEXT) = Urine Microalbumin (or External UM)  B12LT = B12 level    Review of most recent thyroid function:  Lab Results   Component Value Date    TSH 41.30 (H) 05/18/2022    TSH 19.90 (H) 04/05/2022    TSH 0.13 (L) 12/15/2018    FT4 1.1 07/23/2010    FT4 1.1 02/10/2009      Thyroid Lab Key:  TSILT = Thyroid stimulating antibodies  TRALT = TSH Receptor Antibodies  TMCLT = TPO antibodies  T3LT = Total T3 levels  [id number] = Direct FT4  [id number] = Free T3    PAST MEDICAL/SURGICAL HISTORY:   Past Medical History:   Diagnosis Date    Allergic rhinitis, cause unspecified     Asthma     use inhalers    [facility]     Diabetic macular edema ([facility] [address] 2/4/2016    DKA (diabetic ketoacidosis) ([facility] [address] 12/4/2021    Down's syndrome     GERD (gastroesophageal reflux disease)     H/O impacted cerumen     Dr. Katja Sigala    Headache     Hypoglycemia 6/27/2012    Hypothyroid     Mononucleosis     Pneumonia     Seizures ([address]     as a result of low blood glucose readings    Thyroid disease      Past Surgical History:   Procedure Laterality Date    HX CATARACT REMOVAL Bilateral 3/5/15 cataract left and right    HX CYST REMOVAL      right shoulder    HX HEENT Bilateral 2017    prior in one eye    HX HYSTERECTOMY  1987       ALLERGIES:   Allergies   Allergen Reactions    Codeine Nausea and Vomiting    Lettuce Other (comments)    Nitrofurantoin Rash    Pcn [Penicillins] Hives and Rash    Tomato Other (comments)       MEDICATIONS ON ADMISSION:     Current Outpatient Medications:     pantoprazole (PROTONIX) 40 mg tablet, Take 1 Tablet by mouth Daily (before breakfast) for 30 days. , Disp: 30 Tablet, Rfl: 0    insulin lispro (HumaLOG KwikPen Insulin) 100 unit/mL kwikpen, 10 Units by SubCUTAneous route Daily (before dinner). 8 units before breakfast, 8 units before lunch and 10 units before dinner, Disp: , Rfl:     insulin lispro (HumaLOG KwikPen Insulin) 100 unit/mL kwikpen, 8 Units by SubCUTAneous route Daily (before breakfast). 8 units before breakfast, 8 units before lunch and 10 units before dinner, Disp: , Rfl:     ondansetron hcl (ZOFRAN) 4 mg tablet, Take 4 mg by mouth every four (4) hours as needed for Nausea or Vomiting., Disp: , Rfl:     insulin lispro (HumaLOG KwikPen Insulin) 100 unit/mL kwikpen, 8 Units by SubCUTAneous route Daily (before lunch). 8 units before breakfast, 8 units before lunch and 10 units before dinner, Disp: , Rfl:     HumaLOG KwikPen Insulin 100 unit/mL kwikpen, by SubCUTAneous route Before breakfast, lunch, and dinner. Inject as per sliding scale: If BG 60-80 = 2 units  = 3 units 121-170 = 4 units 171-220 = 5 units 221-270 = 6 units 271-320 = 10 units 321 + = 15 units 500+ = 20 units, Disp: , Rfl:     dextromethorphan (Delsym) 30 mg/5 mL liquid, Take 30 mg by mouth every twelve (12) hours as needed for Cough. , Disp: , Rfl:     lidocaine-prilocaine (EMLA) topical cream, Apply  to affected area DIALYSIS MON, WED & FRI.  Apply to left arm access site topically in the morning every M/W/F prior to dialysis, Disp: , Rfl:     traZODone (DESYREL) 50 mg tablet, Take 50 mg by mouth nightly., Disp: , Rfl:     metoprolol tartrate (LOPRESSOR) 25 mg tablet, Take 1 Tablet by mouth every twelve (12) hours. , Disp: 60 Tablet, Rfl: 0    b complex-vitamin c-folic acid (NEPHROCAPS) 1 mg capsule, Take 1 Capsule by mouth daily. , Disp: 30 Capsule, Rfl: 0    diphenhydrAMINE (BenadryL) 25 mg capsule, Take 25 mg by mouth daily as needed for Allergies. , Disp: , Rfl:     cranberry 500 mg capsule, Take 500 mg by mouth daily. , Disp: , Rfl:     fluticasone propionate (Flonase Allergy Relief) 50 mcg/actuation nasal spray, 2 Sprays by Both Nostrils route every twelve (12) hours as needed for Rhinitis or Allergies. , Disp: , Rfl:     esomeprazole (NexIUM) 40 mg capsule, Take 40 mg by mouth daily. , Disp: , Rfl:     L.acid,para-B. bifidum-S.therm (RISAQUAD) 8 billion cell cap cap, Take 1 Capsule by mouth daily. , Disp: , Rfl:     insulin degludec Bridger Mcarthur FlexTouch U-100) 100 unit/mL (3 mL) inpn, 10 Units by SubCUTAneous route nightly., Disp: , Rfl:     ergocalciferol (Vitamin D2) 1,250 mcg (50,000 unit) capsule, Take 50,000 Units by mouth every Wednesday. , Disp: , Rfl:     montelukast (Singulair) 10 mg tablet, Take 10 mg by mouth nightly., Disp: , Rfl:     levothyroxine (SYNTHROID) 100 mcg tablet, Take 1 Tablet by mouth Daily (before breakfast). , Disp: 90 Tablet, Rfl: 3    biotin 5,000 mcg TbDi, Take 5,000 mcg by mouth daily. , Disp: , Rfl:     cholecalciferol, vitamin D3, (VITAMIN D3) 2,000 unit tab, Take 4,000 Units by mouth daily. , Disp: , Rfl:     atorvastatin (LIPITOR) 20 mg tablet, Take 20 mg by mouth nightly., Disp: , Rfl:     albuterol (PROVENTIL HFA, VENTOLIN HFA, PROAIR HFA) 90 mcg/actuation inhaler, Take 1 Puff by inhalation every four (4) hours as needed for Wheezing., Disp: 1 Inhaler, Rfl: 0    glucose 4 gram chewable tablet, Take 12 g by mouth as needed (BS < 60). , Disp: , Rfl:     glucagon (GLUCAGEN) 1 mg injection, 1 mg by IntraMUSCular route once as needed (hypoglycemia if patient is unresponsive). , Disp: , Rfl:     SOCIAL HISTORY:   Social History     Socioeconomic History    Marital status: SINGLE     Spouse name: Not on file    Number of children: Not on file    Years of education: Not on file    Highest education level: Not on file   Occupational History    Not on file   Tobacco Use    Smoking status: Never Smoker    Smokeless tobacco: Never Used   Vaping Use    Vaping Use: Never used   Substance and Sexual Activity    Alcohol use: No    Drug use: No    Sexual activity: Not Currently   Other Topics Concern    Not on file   Social History Narrative    Not on file     Social Determinants of Health     Financial Resource Strain:     Difficulty of Paying Living Expenses: Not on file   Food Insecurity:     Worried About Running Out of Food in the Last Year: Not on file    Cornelius of Food in the Last Year: Not on file   Transportation Needs:     Lack of Transportation (Medical): Not on file    Lack of Transportation (Non-Medical):  Not on file   Physical Activity:     Days of Exercise per Week: Not on file    Minutes of Exercise per Session: Not on file   Stress:     Feeling of Stress : Not on file   Social Connections:     Frequency of Communication with Friends and Family: Not on file    Frequency of Social Gatherings with Friends and Family: Not on file    Attends [employer] Services: Not on file    Active Member of [address] [place] [address] [employer] or Organizations: Not on file    Attends Club or Organization Meetings: Not on file    Marital Status: Not on file   Intimate Partner Violence:     Fear of Current or Ex-Partner: Not on file    Emotionally Abused: Not on file    Physically Abused: Not on file    Sexually Abused: Not on file   Housing Stability:     Unable to Pay for Housing in the Last Year: Not on file    Number of [place] in the Last Year: Not on file    Unstable Housing in the Last Year: Not on file       FAMILY HISTORY:  Family History Problem Relation Age of Onset    Thyroid Disease Mother         hypo    Hypertension Father     Abdominal aortic aneurysm Father     Neuropathy Father     No Known Problems Sister     Heart Disease Brother     Heart Attack Brother     Heart Surgery Brother     No Known Problems Sister     No Known Problems Brother     Anesth Problems Neg Hx        REVIEW OF SYSTEMS: Complete ROS assessed and noted for that which is described above, all else are negative. Eyes: normal  ENT: normal  CVS: normal  Resp: normal  GI: normal  : normal  GYN: normal  Endocrine: normal  Integument: normal  Musculoskeletal: normal  Neuro: normal  Psych: normal      PHYSICAL EXAMINATION:    VITAL SIGNS:  Visit Vitals  BP (!) 130/56   Pulse 60   Ht 4' 11\" (1.499 m)   Wt 129 lb (58.5 kg)   LMP 03/27/1986   BMI 26.05 kg/m²     Last 3 Recorded Weights in this Encounter    06/02/22 1509   Weight: 129 lb (58.5 kg)        GENERAL: NCAT, Sitting comfortably, NAD  EYES: EOMI, non-icteric, no proptosis  Ear/Nose/Throat: NCAT, no inflammation, no masses  LYMPH NODES: No LAD  CARDIOVASCULAR: S1 S2, RRR, No murmur, 2+ radial pulses  RESPIRATORY: CTA b/l, no wheeze/rales  GASTROINTESTINAL: ND  MUSCULOSKELETAL: Normal ROM, no atrophy  SKIN: warm, no edema/rash/ or other skin changes  NEUROLOGIC: 5/5 power all extremities, no tremors, AAOx3  PSYCHIATRIC: Normal affect, Normal insight and judgement    REVIEW OF LABORATORY AND RADIOLOGY DATA:   Labs and documentation have been reviewed as described above. ASSESSMENT AND PLAN:   Narda Rivera is a 45 y.o. female with a PMHx as noted above who presents to the endocrinology clinic for evaluation of uncontrolled type 1 diabetes.      DM1 uncontrolled  Hypothyroidism  HTN  HLD    Dm1: uncontrolled  Goal a1c: 8%    -Tresiba 10 units every night at 9 PM  - Humalog insulin:    - Breakfast 8 units + Sliding scale  - Lunch 6 units + Sliding scale  - Dinner 8 units + Sliding scale    Sliding scale: Correction Scale: 1 unit for every 50 above 150    Check glucose 5x/day  [place] to document the insulin given and blood sugars and send report in 2 weeks      HTN: BP stable continue current meds, better controlled  HLD:  continue atorvastatin 20mg daily    Labs: We discussed the expected course, resolution and complications of the diagnosis(es) in detail. Medication risks, benefits, costs, interactions, and alternatives were discussed as indicated. I advised Campbell Gr to contact the office if her condition worsens, changes or fails to improve as anticipated. Patient expressed understanding with the diagnosis(es) and plan. MEDICAL DECISION MAKING OR COUNSELING:  This is a visit of 40minutes and 22minutes of the time was spent counseling the patient and/or coordinating care. I have met with the patient and participated in the MDM for their plan of care. MD [facility]    Please see patient instructions.

## 2022-06-06 PROBLEM — E87.70 FLUID OVERLOAD: Status: RESOLVED | Noted: 2021-01-01 | Resolved: 2022-01-01

## 2022-06-06 PROBLEM — N18.5 CKD (CHRONIC KIDNEY DISEASE), STAGE V (HCC): Status: RESOLVED | Noted: 2021-01-01 | Resolved: 2022-01-01

## 2022-06-06 PROBLEM — R41.82 AMS (ALTERED MENTAL STATUS): Status: RESOLVED | Noted: 2022-01-01 | Resolved: 2022-01-01

## 2022-06-06 PROBLEM — E11.10 DKA (DIABETIC KETOACIDOSIS) (HCC): Status: RESOLVED | Noted: 2022-01-01 | Resolved: 2022-01-01

## 2022-06-06 PROBLEM — J96.01 ACUTE RESPIRATORY FAILURE WITH HYPOXIA (HCC): Status: RESOLVED | Noted: 2021-01-01 | Resolved: 2022-01-01

## 2022-06-07 NOTE — PROGRESS NOTES
[address] [place] [address] Diabetes Health  Diabetes Self-Management Education & Support Program  Pre-program Assessment    Reason for Referral: Type 1 DM with chronic kidney disease: ESRD on HD (M,W,F)  Referral Source: Hansa Wade MD  Services requested: DSMES    ASSESSMENT    From my perspective, the participant would benefit from [facility] SYSTEM specifically related to Healthy eating, Monitoring, Physical activity, Taking medications and Problem solving. During the program, we will focus on providing DSMES that specifically addresses participant's interest in Healthy eating, as shown by their reported readiness to change. Iveth Bess, her mother Adrienne Villatoro and her sister, Cari Pisano \"Arabella\" Fabricio Rodas attended today's appointment via virtual platform. Iveth Bess has hx of Down's Syndrome and ESRD on HD (M,W,F). She lives at the [facility] and has medications/meals provided by the facility staff. She has the support of her mother and siblings(4) for her meal planning and has limited resources for meal preparation within their apartment. Herrera Garcia shared that repetition and simple concrete directions help Iveth Bess to implement self care tasks. Recent hospital admission related to hypoglycemia but was having very large extremes in her BG values 400-500 mg/dl to as low as 20-40 mg/dl. Herrera Garcia shared that she is noticed improvement in her BG management since visit with  and suspects Iveth Bess is getting her evening Tresiba dose. Herrera Garcia shared that Iveth Bess was managed on an insulin pump successfully in the past but with her mother's dementia and limitations within the facility this is no longer an option for medication delivery and disease management. Herrera Garcia shared that the focus of education is to keep Iveth Bess as healthy and to build skills for when Iveth Bess may be alone with her daily living.  To help for our next visit, Herrera Garcia is going to help with\"measuring Ness's hand\" so we can use this as a tool to estimate food portions when in the dining room and to also help her estimate \"how much\" when she is by herself with her food choices. While I will suggest food choices that will positively impact her renal and dm management, I will focus education for JORY and Mom framed within healthy weight food choices. Suggestions were provided to Go Parra and Mom for snack items for evening. Shared with Kylee Arizmendi that as long as they stay within her fluid guidelines, JORY can have zero gingerale and we will try to find something she can have in small portions for sweets if needed going forward. The participant would be best served by attending weekly individual sessions. JORY will receive assessment and education as we move forward - she also has access to medical nutrition therapy if needed with her ESRD. We will not focus on providing formal DSMES education program due to Ness's unique learning needs, support network and living conditions. Diabetes Self-Management Education Follow-up Visit: 22       Clinical Presentation  Lisa Gr is a 45 y.o. White female referred for diabetes self-management education. Participant has Type 1 DM for >50 years. Family history positive for diabetes. Patient reports receiving DSMES services in the past. Most recent A1c value:   Lab Results   Component Value Date/Time    Hemoglobin A1c 7.5 (H) 2022 05:10 AM    Hemoglobin A1c, External 6.7 2021 12:00 AM       Diabetes-related medical history:  Acute complications  DKA and Recurrent hypoglycemia  Microvascular disease  Retinopathy and Nephropathy      Diabetes-related medications:  Current dosing:     Per (22) and family, pt's current medication regimen is as follows:  Tresiba 10 units daily 9 pm.  Humalo units with breakfast and dinner, 6 units with lunch and correctional scale of 1 uni:50 mg/dl >150 mg/dl. Blood Pressure Management  Key ACE/ARB Medications     Patient is on no ACE or ARB meds.           Lipid Management  Key Antihyperlipidemia Meds             atorvastatin (LIPITOR) 20 mg tablet Take 20 mg by mouth nightly. Clot Prevention  Key Anti-Platelet Anticoagulant Meds     The patient is on no antiplatelet meds or anticoagulants. Characteristics to Learning   Barriers to Learning   [] Cognitive loss  [] Mental retardation   [x] Intellectual delay/cognitive impairment  [] Psychiatric disorder  [] Visually impaired  [] Hearing loss                 [] Low literacy (difficulty with written text)  [] Low numeracy (difficulty with mathematical information  [] Low health literacy (difficulty with understanding health information & services  [] Language  [] Functional limitation  [] Pain   [] Financial  [] Transportation  [] None   Favorite Ways to Learn   [] Lecture  [] Slides  [] Reading [x] Video-Internet  [] Cassettes/CDs/MP3's  [] Interactive Small Groups [x] Other- hands on and simple written instructions. Behavioral Assessment (did not complete being active, problem solving and reducing risks assessment today due to time.)    Current self-care practices  Educator assessment (6/7/2022)   Healthy Eating  Current practices  24-hour Dietary Recall:  Breakfast: cereal and milk on the weekend. Lunch: 1/2 sandwich; eaten in dining room  Dinner: eaten in dining room  Snacks: crackers/fruit with string cheese/cream cheese or pimento cheese and celery  Beverages: water, whole milk once daily and almond milk. Alcohol: none       Would benefit from [facility] related to Healthy Eating: Yes      Eats a carbohydrate controlled diet: No      Stage of change: Action     Shaw Cruz and her mother eat lunch and dinner in dining room. The have a microwave, small fridge for simple snack/meal prep as needed. Shaw Cruz has some meal planning barriers related to old diet information/education guidelines that were practiced in the past but are not current.  She also has some renal guidelines for sodium and fluid that are new but as Matti Cartwright shared. Aliya Mansfield reports that her mother and Garima Molina have been restricting/avoiding carb (bread,rice, potato) for weight loss. Aliya Mansfield shared that Garima Molina has very poor tolerance for diet and sugar free items. Garima Molina shared her food preference with me today. Aliya Mansfield shared that staff at dialysis are concerned about Ness's protein intakes. Suggested small portion of cheese daily, lean meats as offered by dining facility, boiled eggs as desired. Encouraged using Glucerna Hunger Smart as diabetes and renal nutrition supplement - 1 daily. Aliya Mansfield shared that Garima Molina is often on her own from about 7:30 pm until she goes to sleep - notes that there are times when she notices foods that have been consumed or is in the apartment and not sure how much or how it is there (cookies, sweets, chocolates). Shared suspects this was contributing to hyperglycemia events in the past. Requests assistance with providing healthy suggestions that are \"weight friendly\" and within her diet guidelines. We will include food choices for Garima Molina and will use picture based education materials and simple instructions for her meal planning - will focus education for weight management (as this is a concern for Garima Molina and Mom) while addressing renal and diabetes guidelines Alberto Gong and her sibling's concerns). Monitoring  Current practices  Do you monitor your blood sugar? Yes    How often do you monitor? 5 times - 9 am, 11 am, 2 pm, 4:30 pm and 9pm.    What are the range of readings? We did not review today. Do you know your last A1c measurement? did not assess    Do you know the meaning of the A1c? Yes     Would benefit from [facility] related to Monitoring: Yes, will review with family and Garima Molina      Uses BG readings to establish trends and understand BG patterns: Yes, has correctional scale for staff to help with insulin dosing. Updated scale from 6/2/22 visit from William Bazzi, Endocrinology.       Stage of change: Action     Aliya Mansfield reports improvement in [facility] and staff are monitoring at least 5 times daily per 's orders. Considering using CGM as backup plan for Ness's family support and using \"sharing/follow\" and advised Lennie Byrd that with dialysis this can be used more for \"do we need to get help\" versus treatment decisions for medications. Taking Medication  Current practices  Do you understand what your diabetes medications do? Yes    How often do you miss doses of your diabetes medications? Kim Ferguson and Lennie Byrd shared that they question if her Alleen Sluder insulin was being dosed consistently each day. Noticing that fasting BG are improving. Can you afford your diabetes medications? Yes   Would benefit from [facility] related to Taking Medication: Yes, will teach and reinforce as needed only. Takes medications consistently to receive full benefit: Yes, pt medications are dosed by her living facility. Stage of change: Action    Kim Ferguson is provided and administered her medications by the [facility] staff daily. They have a questions about her Levothyroxine timing as it is very close to their breakfast dining time and Kim Ferguson has a hard time with waiting to eat in the morning for the 30 minutes. We can ask if this medication could be moved to a time when Kim Ferguson may not have food on her stomach for >4 hours to ensure she is getting the maximum benefits. Healthy Coping   Current state  Diabetes Skills, Confidence and Preparedness Index: not applicable for this participant.      Would benefit from [facility] related to Healthy Coping: will review with family and Kim Ferguson as needed      Identifies specific people, organizations,etc, that actively support their diabetes self-care efforts: Yes      Stage of change: Action     Note: Content derived from the American Association of Diabetes Educators' Diabetes Education Curriculum: A Guide to Successful Self-Management (3rd edition)      Oralia Hobbs RD, [facility] on 6/7/2022 at 3:48 PM    I have personally reviewed the health record, including provider notes, laboratory data and current medications before making these care and education recommendations. Total minutes: 79    [id number]-[id number] [facility] Emergency Adaptations for Telehealth:  Zully Machadoedisonfede, was evaluated through a synchronous (real-time) audio-video encounter. The patient (or guardian if applicable) is aware that this is a billable service, which includes applicable co-pays. This Virtual Visit was conducted with patient's (and/or legal guardian's) consent. The visit was conducted pursuant to the emergency declaration under the [address] [place] [address], [address] [place] [address] authority and the [employer] and [place] [employer] Act. Patient identification was verified, and a caregiver was present when appropriate. The patient was located in a state where the provider was licensed to provide care.

## 2022-06-07 NOTE — TELEPHONE ENCOUNTER
Spoke today with ms. Jada Wilkins regarding remote CGM monitoring and I asked her to go on [email or web address] to see if their devices are compatible for having remote CGM. She will check and let us know today.

## 2022-06-09 NOTE — PROGRESS NOTES
Care Transitions Follow Up Call    Challenges to be reviewed by the provider   Additional needs identified to be addressed with provider:  no  None, patient's sister has no red flags to report at this time. Method of communication with provider:  none, sister has no red flags to report. Care Transition Nurse (CTN) contacted the patient's sister, India Brand (on [address] dated 22), by telephone to follow up after admission on 2022. Verified name and  with patient's sister as identifiers. Addressed changes since last contact: None reported by sister. Follow up appointment completed? Yes, patient saw Dr. Tyson Carey and endocrinology on 22 (within 14 days of discharge). Was follow up appointment scheduled within 7 days of discharge? no.     Advance Care Planning:   Does patient have an Advance Directive:  yes; reviewed and current per sister. CTN reviewed discharge instructions, medical action plan and red flags with patient's sister and discussed any barriers to care and/or understanding of plan of care after discharge. Discussed appropriate site of care based on symptoms and resources available to patient including: PCP, Specialist, Urgent Care Clinics, When to call 911 and outpatient hemodialysis treatments at [facility]. The sister agrees to contact the PCP office for questions related to patient's healthcare. Patients top risk factors for readmission: Medical condition -  Hypertension, diabetes, recent DKA, asthma, ESRD and CKD, and pure hypercholesterolemia per chart. Interventions to address risk factors: Obtained and reviewed discharge summary and/or continuity of care documents and Education of patient/family/caregiver/guardian to support self-management-Education provided regarding signs/symptoms of DKA, sister verbalized an understanding.      [facility] follow up appointment(s):   Future Appointments   Date Time Provider Odell Shaw 6/30/2022  3:15 PM Reymundo Barakat, RD PDHRDE BS AMB   7/5/2022  3:30 PM Yamini Almeida MD RDE [address] BS AMB   7/7/2022  3:30 PM Reymundo Steinbird, RD PDHRDE BS AMB   7/14/2022  3:30 PM Reymundo Howard Mejiad, RD PDHRDE BS AMB     Non-[facility] follow up appointment(s): None noted at this time. CTN provided contact information for future needs. Plan for follow-up call in 10-14 days based on severity of symptoms and risk factors. Plan for next call: self management-Review red flags of DKA, and follow up appointment-Evaluate if patient continues to attend appointments as scheduled, offer assistance with scheduling as needed. Goals Addressed                 This Visit's Progress     Understands red flags post discharge. 5-23-22: Red flags of DKA reviewed with patient's sister, Isabel Kendall, and sister verbalized an understanding. Sister states patient's home glucose readings continue to fluctuate as patient does not adhere to a diabetic diet at home and does not participate in meal planning, does not avoid concentrated sweets, does not count carbohydrates or calories. Sister states she and her siblings are considering care that is \"more involved\" for future planning as patient is currently residing in [facility] and home glucose readings are not monitored frequently enough per patient's sister. Mother reports patient has not had any falls in the last 12 months, ambulates with a walker; however both mother and sister agree that patient is able to ambulate without her walker. Sister denies patient having chest pain, denies shortness of breath, denies fever/chills, and denies nausea/vomiting. Neither sister or mother have any red flags to report at this time and mother states, \"She's doing just fine. \"  Care Transitions Nurse will review red flags again on next phone conversation with patient/family.  Luis Perez     6-9-22: Red flags of DKA reviewed with patient's sister, Genevieve Robins (on [address] dated 4-9-22), and sister verbalized an understanding. Sister states patient's home glucose readings continue to fluctuate as patient is struggling to adhere to a diabetic diet at [facility]. Per sister, patient has had \"a few very high readings\" and continues to eat foods that will increase her glucose level. Sister states she will continue to work with patient in regards to dietary choices and education. Sister has no red flags to report at this time and states, \"She's doing pretty well. \"  Care Transitions Nurse will review red flags again on next phone conversation with patient's sister.   Darin Fortune

## 2022-06-14 NOTE — TELEPHONE ENCOUNTER
6/14/2022  10:39 AM      Shelia stewart [employer] at [address] called on behalf of pt. Dene Schaumann stated the pt blood sugars have been running high its not even reading numbers just saying high since 7:30 am this morning. Dene Schaumann said they have checked it times 3. Dene Schaumann stated they gave the pt her scheduled dosage of insulin and it is still reading off the chart. Martin#[phone]      Thanks,  Aleksander Martinez

## 2022-06-14 NOTE — TELEPHONE ENCOUNTER
Spoke with Cleo Chung from the [facility]. He said that her glucometer said \"HIGH\" this am and would not read the number. Her mother does not know what she ate (mom has sl dementia, per Mr. Clementine bYarra). Yesterday blood sugars:   was given 8 units Humalog  = 6 units Humalog  = 3 units + 8 units=11 units  HS    260   This am at 7:30 am. Glucometer said \"HIGH\". Her normal regimen:  Humalog 8 units in Am        6 units at lunch       8 units at dinner  Tresiba 10 units at bedtime.

## 2022-06-15 NOTE — TELEPHONE ENCOUNTER
Spoke with ms. Holley Chacon on 06/13/2022 about the remote CGM and she opted for Dexcom and the request for that has been placed today.

## 2022-06-15 NOTE — PROGRESS NOTES
Dexcom form faxed to [facility] ( Attn: Franthomas Raudel, [phone]), awaiting further instructions.

## 2022-06-16 NOTE — TELEPHONE ENCOUNTER
Spoke with ms. Jori Whitney ms CORLEY is doing better, she had high sugars on Tuesday mainly relating to food given to her by her mother (cranberry juice). It is unclear what her sugar was the night before. She was able to go to HD yesterday and her BS after dialysis was 140. I advised to continue close monitoring and plan is to get remote CGM to help with the measurements as per ms. Pierre Atwood the assisted living facility is short-staffed. For the levothyroxine dose I advised to give dose early in the morning instead of late at night and to obtain TFT levels (she was not able to get them at the HD center, ms. Pierre Atwood will push to have it done at the [facility]). We will update ms. Pierre Atwood on the progress of the prior auth for Dexcom.

## 2022-06-22 NOTE — TELEPHONE ENCOUNTER
6/22/2022    Maria Elena Gr called and left a vm at 8:51 am stating the pt was sent to [facility] yesterday and they know nothing of it and would like to know if Dr. Malika Gaxiola knew anything. They can be reached at [phone].     Thanks,   Jose David Rodriguez

## 2022-06-22 NOTE — TELEPHONE ENCOUNTER
Ms. Medley First called today as last night ms. CORLEY was taken to [facility] [place] [address] and when she was talking with her mother (ms. Gomez) she said that dr Irvin Cao said to bring them to the hospital but she was not sure so she called. I explained to ms. Medley First that I never received a call or any contact about ms. CORLEY going to the hospital yesterday and that our last contact was regarding the Dexcom CGM last week. I also explained I would notify her if any recommendations from myself as she is the primary care giver. She indicates understanding and says it may be her mom ms. Gomez as she has hx of dementia and may have been confused about who or which doctor recommended ms. CORLEY be taken to the hospital. Ms. CORLEY is also scheduled to receive the Dexcom this week at the [facility].

## 2022-07-06 PROBLEM — I61.9 ICH (INTRACEREBRAL HEMORRHAGE) (HCC): Status: ACTIVE | Noted: 2022-01-01

## 2022-07-06 NOTE — PROGRESS NOTES
7/6/2022; 17:45 -   CM notes Hospice Consult and discussed patient with ED Attending. Hospice is anticipated to be GIP. CM sent referral to [facility] via CC. BS Nurse Liaison to review patient's case for GIP appropriateness.     CRM: Alyson Mcintyre, MPH, Mak [address].: [phone] or [phone]

## 2022-07-06 NOTE — CONSULTS
44yo with Downs Syndrome and ESRD on dialysis. Yesterday family noted she was complaining of headache and was agitated. This morning in dialysis they thought she was having an allergic reaction and stopped dialysis and brought her to ED. In ED, family noted AMS, usually she is conversant and directable. Her brother notes that the patient has IDDM and has been in DKA in the past which presents with confusion and agitation. CT shows previously seen left parietal cyst with new convexity heterogenous hemorrhage consistent with hemorrhagic conversion of infarct. Agree with cardene gtt for BP management and Nephrology consult regarding dialysis. Repeat CT in 6 -12 hours. MRI with anesthesia when able. Will follow with you.

## 2022-07-06 NOTE — ED NOTES
Xavi Guzmán to bedside to straight catheterize patient. Urine sample obtained. Patient placed in clean brief and repositioned in bed.

## 2022-07-06 NOTE — PROGRESS NOTES
Notified by Neurosurgery that patient is going to hospice, admitted order and additional orders to ICU cancelled  Lisa Ace NP

## 2022-07-06 NOTE — HOSPICE
[facility] Help to Those in Need  [phone]     Patient Name: Edger Ormond Noftsinger  YOB: 1970  Age: 45 y.o. [facility] RN Note:  Hospice consult received, reviewing chart. Will follow up with Unit Nurse and Care Manager to discuss plan of care, patient status and discharge disposition within the hour. Thank you for the opportunity to be of service to this patient. 18:00: Bedside patient. Patient's sister, Lisa Skinner" Thersa Spar" is bedside. Patient is minimally responsive, wearing non-re breather face mask. Noted purple fingers. Per sister, patient was sent to ER from dialysis. Patient lives with her mother in assisted living facility and is unaware of patient being in the ER. Pt's brother in route to speak with patient mother. Pt has ACP documentation listing her mother as primary MPOA. Secondary MPOA is brother \"Bill\" and sister Aldair Wong. Per bedside discussions, family have plans to inform patient's mother and most likely she will want to visit patient in the hospital. Family requesting patient be moved to a \"room\" with anticipation patient's mother will visit. Family are not ready to elect hospice services until Mother has been able to be involved and visit with patient. 18:45: Met with patient's brother, \"Bill\" who joined patient and his sister Aldair Wong bedside. Family are making plans to involve patient's mother prior to making decisions about hospice services. Family in agreement plan is to discontinue dialysis. Last dialysis was Monday. PLAN: Hospice team will meet with patient and family in the afternoon of 7/7/2022. Family plan to bring patient's mother into the hospital to see patient in the am, and request hospice team meet with them at that time or shortly after. Family leaning towards electing hospice services.  Family are not interested to move patient to the [facility], as this would be difficult for patients mother who has been patient's room-mate for many years. Pt mother is MPOA. Mother is MPOA.  Second MPOA is brother Prince borrego  [phone] and sister Perri Mesa [phone]      Rajinder Holt RN, [place] [address] Nurse Liaison  [phone] [place]  [phone] Office  Available on [employer]

## 2022-07-06 NOTE — ED NOTES
Unable to start cardene drip due to pt agitation; taking off bp cuff and oxygen. MD aware, fentanyl ordered.

## 2022-07-06 NOTE — CONSULTS
Neurocritical Care Code Stroke Documentation      Symptoms:  Altered mental status, change in behavior, aphagia    Baseline mRS:   4   Last Known Well: ~1130   Medical hx: Past Medical History:   Diagnosis Date    Acute respiratory failure with hypoxia ([address] 10/25/2021    Allergic rhinitis, cause unspecified     AMS (altered mental status) 2022    Asthma     use inhalers    CKD (chronic kidney disease), stage V ([address] 10/8/2021    Diabetes [facility]     Diabetic macular edema ([address] 2016    DKA (diabetic ketoacidosis) ([address] 2021    DKA (diabetic ketoacidosis) ([facility] 2022    Down's syndrome     Fluid overload 10/8/2021    GERD (gastroesophageal reflux disease)     H/O impacted cerumen     Dr. Rosa Maria Bueno Headache     Hypoglycemia 2012    Hypothyroid     Mononucleosis     Pneumonia     Seizures ([facility]     as a result of low blood glucose readings    Thyroid disease       Anticoagulation:  None   VAN:   Negative   NIHSS:   1a-LOC: 2    1b-Month/Age: 2    1c-Open/Close Hand: 2    2-Best Gaze: 0    3-Visual Fields: 0    4-Facial Palsy:0    5a-Left Arm: 2    5b-Right Arm: 2    6a-Left Le    6b-Right Le    7-Limb Ataxia:0    8-Sensory:2    9-Best Language:3    10-Dysarthria:2    11-Extinction/Inattention:0  TOTAL SCORE: 21    ICH Score: 2   Imaging:   CTH showing left parietoccipital intracerebral hemorrhage with no evidence of IVH    Plan:   TPA Candidate: NO    Mechanical thrombectomy Candidate: NO    Recommendations:  - SBP goal less than 140, Cardene/Labetalol PRN  - Keppra 250mg Daily-renally dosed for seizure ppx  - No anticoagulation/APT  - Consult NSGY      *Perform dysphagia screening prior to any PO intake*    Discussed with: ER Provider, Primary RN, Family    Arrival time: 1515  Time spent: 30 minutes.      Kisha Nicole NP  Neurocritical Care Nurse Practitioner  [phone]

## 2022-07-06 NOTE — ED TRIAGE NOTES
TRIAGE: Pt arrives via EMS [place] [facility] after patient was noted to have an increasingly red and swollen face. EMS reports rash to center of patients chest and red hands. Pt is allergic to penicillin and tomato and it unknown if patient had breakfast.     VS upon arrival. , 80 HR, 210/120, 93% on RA. Pt given 50mg of benadryl IM and 10 of dex IM in route.

## 2022-07-06 NOTE — CONSULTS
NEUROLOGY   INPATIENT EVALUATION/CONSULTATION       PATIENT NAME: Nory Perdomo    MRN: [id number]    REASON FOR CONSULTATION: Left parietal ICH    07/06/22      HISTORY OF PRESENT ILLNESS:  Lafonda Phalen Noftsinger is a 45 y.o. right-hand-dominant female history of ESRD on HD as well as Down syndrome who presented to [facility] after she is found to be noninteractive/noncommunicative today. She notably hypertensive at presentation with noncontrasted CT demonstrating a left parietal hemorrhage. Pressure is reportedly reasonably well controlled, patient is on dialysis last had a session on Friday. No trauma is reported. As of his walk in the room patient is notably combative/scared received Ativan and thus not able to communicate any further.  No prior history of ICH    PAST MEDICAL HISTORY:  Past Medical History:   Diagnosis Date    Acute respiratory failure with hypoxia ([address] 10/25/2021    Allergic rhinitis, cause unspecified     AMS (altered mental status) 4/4/2022    Asthma     use inhalers    CKD (chronic kidney disease), stage V ([address] 10/8/2021    Diabetes ([address]     Diabetic macular edema ([address] 2/4/2016    DKA (diabetic ketoacidosis) ([address] 12/4/2021    DKA (diabetic ketoacidosis) ([address] 5/17/2022    Down's syndrome     Fluid overload 10/8/2021    GERD (gastroesophageal reflux disease)     H/O impacted cerumen     Dr. Emilia Hearn    Headache     Hypoglycemia 6/27/2012    Hypothyroid     Mononucleosis     Pneumonia     Seizures ([address]     as a result of low blood glucose readings    Thyroid disease        PAST SURGICAL HISTORY:  Past Surgical History:   Procedure Laterality Date    HX CATARACT REMOVAL Bilateral 3/5/15    cataract left and right    HX CYST REMOVAL      right shoulder    HX HEENT Bilateral 2017    prior in one eye    HX HYSTERECTOMY  1987       FAMILY HISTORY:   Family History   Problem Relation Age of Onset    Thyroid Disease Mother         hypo    Hypertension Father     Abdominal aortic aneurysm Father     Neuropathy Father     No Known Problems Sister     Heart Disease Brother     Heart Attack Brother     Heart Surgery Brother     No Known Problems Sister     No Known Problems Brother     Anesth Problems Neg Hx          SOCIAL HISTORY:  Social History     Socioeconomic History    Marital status: SINGLE   Tobacco Use    Smoking status: Never Smoker    Smokeless tobacco: Never Used   Vaping Use    Vaping Use: Never used   Substance and Sexual Activity    Alcohol use: No    Drug use: No    Sexual activity: Not Currently         MEDICATIONS:   Current Facility-Administered Medications   Medication Dose Route Frequency Provider Last Rate Last Admin    niCARdipine (CARDENE) 25 mg in 0.9% sodium chloride 250 mL ([id number])  0-15 mg/hr IntraVENous TITRATE Memo Duncan MD 85 mL/hr at 07/06/22 1628 8.5 mg/hr at 07/06/22 1628    levETIRAcetam (KEPPRA) injection 250 mg  250 mg IntraVENous DAILY Flakito Maynard NP   250 mg at 07/06/22 1630     Current Outpatient Medications   Medication Sig Dispense Refill    insulin lispro (HumaLOG KwikPen Insulin) 100 unit/mL kwikpen 10 Units by SubCUTAneous route Daily (before dinner). 8 units before breakfast, 8 units before lunch and 10 units before dinner      insulin lispro (HumaLOG KwikPen Insulin) 100 unit/mL kwikpen 8 Units by SubCUTAneous route Daily (before breakfast). 8 units before breakfast, 8 units before lunch and 10 units before dinner      ondansetron hcl (ZOFRAN) 4 mg tablet Take 4 mg by mouth every four (4) hours as needed for Nausea or Vomiting.  insulin lispro (HumaLOG KwikPen Insulin) 100 unit/mL kwikpen 8 Units by SubCUTAneous route Daily (before lunch). 8 units before breakfast, 8 units before lunch and 10 units before dinner      HumaLOG KwikPen Insulin 100 unit/mL kwikpen by SubCUTAneous route Before breakfast, lunch, and dinner. Inject as per sliding scale:   If BG 60-80 = 2 units   = 3 units  121-170 = 4 units  171-220 = 5 units  221-270 = 6 units  271-320 = 10 units  321 + = 15 units  500+ = 20 units      dextromethorphan (Delsym) 30 mg/5 mL liquid Take 30 mg by mouth every twelve (12) hours as needed for Cough.  lidocaine-prilocaine (EMLA) topical cream Apply  to affected area DIALYSIS MON, WED & FRI. Apply to left arm access site topically in the morning every M/W/F prior to dialysis      traZODone (DESYREL) 50 mg tablet Take 50 mg by mouth nightly.  metoprolol tartrate (LOPRESSOR) 25 mg tablet Take 1 Tablet by mouth every twelve (12) hours. 60 Tablet 0    b complex-vitamin c-folic acid (NEPHROCAPS) 1 mg capsule Take 1 Capsule by mouth daily. 30 Capsule 0    diphenhydrAMINE (BenadryL) 25 mg capsule Take 25 mg by mouth daily as needed for Allergies.  cranberry 500 mg capsule Take 500 mg by mouth daily.  fluticasone propionate (Flonase Allergy Relief) 50 mcg/actuation nasal spray 2 Sprays by Both Nostrils route every twelve (12) hours as needed for Rhinitis or Allergies.  esomeprazole (NexIUM) 40 mg capsule Take 40 mg by mouth daily.  L.acid,para-B. bifidum-S.therm (RISAQUAD) 8 billion cell cap cap Take 1 Capsule by mouth daily.  insulin degludec Debbie Au FlexTouch U-100) 100 unit/mL (3 mL) inpn 10 Units by SubCUTAneous route nightly.  ergocalciferol (Vitamin D2) 1,250 mcg (50,000 unit) capsule Take 50,000 Units by mouth every Wednesday.  montelukast (Singulair) 10 mg tablet Take 10 mg by mouth nightly.  levothyroxine (SYNTHROID) 100 mcg tablet Take 1 Tablet by mouth Daily (before breakfast). 90 Tablet 3    biotin 5,000 mcg TbDi Take 5,000 mcg by mouth daily.  cholecalciferol, vitamin D3, (VITAMIN D3) 2,000 unit tab Take 4,000 Units by mouth daily.  atorvastatin (LIPITOR) 20 mg tablet Take 20 mg by mouth nightly.       albuterol (PROVENTIL HFA, VENTOLIN HFA, PROAIR HFA) 90 mcg/actuation inhaler Take 1 Puff by inhalation every four (4) hours as needed for Wheezing. 1 Inhaler 0    glucose 4 gram chewable tablet Take 12 g by mouth as needed (BS < 60).  glucagon (GLUCAGEN) 1 mg injection 1 mg by IntraMUSCular route once as needed (hypoglycemia if patient is unresponsive). ALLERGIES:  Allergies   Allergen Reactions    Codeine Nausea and Vomiting    Lettuce Other (comments)    Nitrofurantoin Rash    Pcn [Penicillins] Hives and Rash    Tomato Other (comments)         REVIEW OF SYSTEMS:  10 point ROS reviewed with patient and negative except for those listed above. PHYSICAL EXAM:  Vital Signs:   Visit Vitals  BP (!) 123/56   Pulse 90   Temp (!) 101 °F (38.3 °C)   Resp 24   LMP 03/27/1986   SpO2 99%     Sedated female resting in bed being assisted by nurses. HEENT is typical for Down syndrome. Neck appears supple. Cardiopulmonary exams are grossly unrevealing. Abdomen is nondistended. Extremities are warm/dry left hand is somewhat mottled in appearance. Neurologically patient just received Ativan from our evaluation and is deeply asleep. Cranial nerves II through XII appear grossly unrevealing though limited in scope. Motorically tone is diminished throughout. No response to mild noxious stimuli. Remainder of examination is deferred. PERTINENT DATA:    CT Results (maximum last 3): Results from Hospital Encounter encounter on 07/06/22    CT CHEST ABD PELV W CONT    Narrative  EXAM: CT CHEST ABD PELV W CONT    INDICATION: infection    COMPARISON: Chest radiograph 7/6/2022, CT abdomen pelvis 12/4/2021, CTA chest  10/25/2021    IV CONTRAST: 100 mL of Isovue-370. ORAL CONTRAST: None    TECHNIQUE:  Following the uneventful intravenous administration of contrast, thin axial  images were obtained through the chest, abdomen and pelvis. Coronal and sagittal  reformats were generated.  CT dose reduction was achieved through use of a  standardized protocol tailored for this examination and automatic exposure  control for dose modulation. FINDINGS:  Study limited by motion. CHEST WALL: No mass or axillary lymphadenopathy. Partially visualized left arm  vascular stent. THYROID: No nodule. MEDIASTINUM: No mass or lymphadenopathy. JAN: No mass or lymphadenopathy. THORACIC AORTA: No dissection or aneurysm. MAIN PULMONARY ARTERY: Normal in caliber. TRACHEA/BRONCHI: Patent. ESOPHAGUS: No wall thickening or dilatation. HEART: Cardiomegaly  PLEURA: Small-moderate right and small left pleural effusions. No pneumothorax. LUNGS: Bandlike consolidation in the right lower lobe may reflect pneumonia or  atelectasis. Diffuse septal thickening and patchy faint groundglass, most  compatible with edema    LIVER: No mass. BILIARY TREE: Gallbladder is within normal limits. CBD is not dilated. SPLEEN: within normal limits. PANCREAS: No mass or ductal dilatation. ADRENALS: Unremarkable. KIDNEYS: No mass, calculus, or hydronephrosis. STOMACH: Unremarkable. SMALL BOWEL: No dilatation or wall thickening. COLON: No dilatation or wall thickening. APPENDIX: Not discretely visualized  PERITONEUM: No ascites or pneumoperitoneum. RETROPERITONEUM: No lymphadenopathy or aortic aneurysm. REPRODUCTIVE ORGANS: Hysterectomy  URINARY BLADDER: Circumferentially thick-walled  BONES: No destructive bone lesion. Degenerative changes. Several chronic left  rib deformities. ABDOMINAL WALL: No mass or hernia. ADDITIONAL COMMENTS: N/A    Impression  1. Study limited by motion. 2.  Pulmonary edema pattern with bilateral pleural effusions. Right lower lobe  bandlike consolidation may reflect pneumonia or atelectasis. 3.  Circumferential bladder wall thickening, correlate for cystitis. CT HEAD WO CONT    Narrative  EXAM: CT HEAD WO CONT    INDICATION: htn    COMPARISON: CT head 4/4/2022. CONTRAST: None. TECHNIQUE: Unenhanced CT of the head was performed using 5 mm images. Brain and  bone windows were generated. Coronal and sagittal reformats. CT dose reduction  was achieved through use of a standardized protocol tailored for this  examination and automatic exposure control for dose modulation. FINDINGS:  Study significantly limited by motion. A 3.5 cm x 3.0 cm x 2.4 cm intraparenchymal hemorrhage with surrounding  vasogenic edema in the left parietal lobe. Stable 4.3 cm x 3.9 cm x 3.4 cm  lobulated cystic lesion in the parafalcine left frontoparietal junction. No  significant mass effect, midline shift, nor evidence of herniation    The bone windows demonstrate no abnormalities. The visualized portions of the  paranasal sinuses and mastoid air cells are clear. Bilateral lens replacements. Impression  1. Study significantly limited by motion. 2.  Left parietal intraparenchymal hemorrhage. No evidence of herniation. 3.  Stable left parafalcine cystic lesion. Results from Hospital Encounter encounter on 04/04/22    CT HEAD WO CONT    Narrative  EXAM: CT HEAD WO CONT    INDICATION: Confusion, hyperglycemia, end-stage renal disease on dialysis, Down  syndrome. COMPARISON: CT head on 4/29/2013. TECHNIQUE: Noncontrast head CT. Coronal and sagittal reformats. CT dose  reduction was achieved through the use of a standardized protocol tailored for  this examination and automatic exposure control for dose modulation. FINDINGS: The ventricles and sulci are age-appropriate without hydrocephalus. Lobulated dural based lesion along the left falx cerebri has increased in size  and measures 4.4 x 2.6 x 4.6 cm. Increased mass effect on the surrounding brain  and body of the left lateral ventricle. The finding is isointense to CSF. There  is no midline shift. There is no intracranial hemorrhage or extra-axial fluid  collection. Hypoattenuation in the left occipital lobe is new but may be  chronic. The calvarium is intact. The visualized paranasal sinuses and mastoid air cells  are clear.     Impression  No acute intracranial abnormality on this noncontrast head CT. Increased 4.4 cm left falx cerebri arachnoid cyst. Increased mass effect without  transtentorial herniation is of uncertain clinical significance. Left occipital small infarct appears nonacute, new since 2013.     ASSESSMENT:      Kemal Ashraf is a 13-year-old right-hand-dominant female history of ESRD on dialysis as well as Down syndrome who presented with acute onset of aphasia found to have a left parietal hemorrhage in the setting of hypertension    RECOMMENDATIONS:  Left parietal hemorrhage:  Somewhat lobulated and unusual location for hypertensive bleed, would recommend MRI, MRA head/neck (maybe difficult to achieve) if unable to do so would at least obtain CTA of head neck  Differential regarding etiology includes atypical hypertensive hemorrhage, cerebral amyloid angiopathy given history of Down syndrome, underlying vessel abnormality versus mass lesion  Ischemic stroke with conversion is also possible  Coags, platelets are in range, patient is not on any medications which would potentiate bleed  Maintain blood pressure less than 140/90  SCDs for DVT prophylaxis    Fever:  Would recommend evaluation from an infectious standpoint    Neurology will reevaluate in the morning, please call with questions, concerns         Amparo Weiner MD

## 2022-07-06 NOTE — ED NOTES
Patient noted to have axillary temp of 100.4F. Violeta Forbes MD notified. RN to obtain rectal temp and administer rectal suppository.

## 2022-07-06 NOTE — ED PROVIDER NOTES
43-year-old female with history of Down syndrome, IDDM with previous admissions for DKA, ESRD on HD (MWF, last on Monday), asthma, GERD who was brought to the ED by EMS from her dialysis center for suspected allergic reaction. Per EMS, patient had a red/swollen face and a rash to her chest and hands. EMS provided Benadryl 50 mg IM and Decadron 10 mg IM. History is unable to be obtained from the patient at this time. Per brother who is at bedside, he reports that he does not see any rash or swelling. He states that she was complaining of a headache last night and took Tylenol for it. This morning, patient was agitated and did not want to go to dialysis. Dialysis was not initiated due to patient's condition. He states that she is normally able to communicate and carry on a conversation. He states that she has been in DKA numerous times and does become confused.              Past Medical History:   Diagnosis Date    Acute respiratory failure with hypoxia ([address] 10/25/2021    Allergic rhinitis, cause unspecified     AMS (altered mental status) 4/4/2022    Asthma     use inhalers    CKD (chronic kidney disease), stage V ([address] 10/8/2021    Diabetes ([address]     Diabetic macular edema ([address] 2/4/2016    DKA (diabetic ketoacidosis) ([address] 12/4/2021    DKA (diabetic ketoacidosis) ([address] 5/17/2022    Down's syndrome     Fluid overload 10/8/2021    GERD (gastroesophageal reflux disease)     H/O impacted cerumen     Dr. Franklyn Jimenez    Headache     Hypoglycemia 6/27/2012    Hypothyroid     Mononucleosis     Pneumonia     Seizures ([address]     as a result of low blood glucose readings    Thyroid disease        Past Surgical History:   Procedure Laterality Date    HX CATARACT REMOVAL Bilateral 3/5/15    cataract left and right    HX CYST REMOVAL      right shoulder    HX HEENT Bilateral 2017    prior in one eye    HX HYSTERECTOMY  1987         Family History:   Problem Relation Age of Onset    Thyroid Disease Mother         hypo    Hypertension Father     Abdominal aortic aneurysm Father     Neuropathy Father     No Known Problems Sister     Heart Disease Brother     Heart Attack Brother     Heart Surgery Brother     No Known Problems Sister     No Known Problems Brother     Anesth Problems Neg Hx        Social History     Socioeconomic History    Marital status: SINGLE     Spouse name: Not on file    Number of children: Not on file    Years of education: Not on file    Highest education level: Not on file   Occupational History    Not on file   Tobacco Use    Smoking status: Never Smoker    Smokeless tobacco: Never Used   Vaping Use    Vaping Use: Never used   Substance and Sexual Activity    Alcohol use: No    Drug use: No    Sexual activity: Not Currently   Other Topics Concern    Not on file   Social History Narrative    Not on file     Social Determinants of Health     Financial Resource Strain:     Difficulty of Paying Living Expenses: Not on file   Food Insecurity:     Worried About Running Out of Food in the Last Year: Not on file    Cornelius of Food in the Last Year: Not on file   Transportation Needs:     Lack of Transportation (Medical): Not on file    Lack of Transportation (Non-Medical):  Not on file   Physical Activity:     Days of Exercise per Week: Not on file    Minutes of Exercise per Session: Not on file   Stress:     Feeling of Stress : Not on file   Social Connections:     Frequency of Communication with Friends and Family: Not on file    Frequency of Social Gatherings with Friends and Family: Not on file    Attends [employer] Services: Not on file    Active Member of Clubs or Organizations: Not on file    Attends Club or Organization Meetings: Not on file    Marital Status: Not on file   Intimate Partner Violence:     Fear of Current or Ex-Partner: Not on file    Emotionally Abused: Not on file    Physically Abused: Not on file    Sexually Abused: Not on file Housing Stability:     Unable to Pay for Housing in the Last Year: Not on file    Number of Places Lived in the Last Year: Not on file    Unstable Housing in the Last Year: Not on file         ALLERGIES: Codeine, Lettuce, Nitrofurantoin, Pcn [penicillins], and Tomato    Review of Systems   Unable to perform ROS: Mental status change       Vitals:    07/06/22 1154   BP: (!) 200/77   Pulse: 90   Resp: 24   SpO2: 97%            Physical Exam  Vitals and nursing note reviewed. Constitutional:       Comments: Pt not answering questions or following commands. Pt moving around on stretcher and occasionally moans. HENT:      Head: Atraumatic. Mouth/Throat:      Mouth: Mucous membranes are moist.   Eyes:      Conjunctiva/sclera: Conjunctivae normal.      Pupils: Pupils are equal, round, and reactive to light. Cardiovascular:      Rate and Rhythm: Normal rate and regular rhythm. Pulses: Normal pulses. Heart sounds: Normal heart sounds. Pulmonary:      Effort: Pulmonary effort is normal.      Breath sounds: Normal breath sounds. Abdominal:      General: Bowel sounds are normal.      Palpations: Abdomen is soft. Tenderness: There is no abdominal tenderness. There is no guarding or rebound. Musculoskeletal:         General: No tenderness. Cervical back: Neck supple. Right lower leg: No edema. Left lower leg: No edema. Comments: +LUE graft   Skin:     General: Skin is warm and dry. Neurological:      Comments: Moving all extremities   Psychiatric:      Comments: Per brother, pt not behaving at her baseline. She appears agitated and not communicating.            MDM  Number of Diagnoses or Management Options  Down syndrome  ESRD (end stage renal disease) on dialysis [facility])  Hypertensive emergency  Intraparenchymal hematoma of brain, left, without loss of consciousness, initial encounter [facility])  Diagnosis management comments: DDx:  Headache, AMS, ICH, metabolic encephalopathy, DKA, missed dialysis    CT HEAD WO CONT    Result Date: 7/6/2022  1. Study significantly limited by motion. 2.  Left parietal intraparenchymal hemorrhage. No evidence of herniation. 3.  Stable left parafalcine cystic lesion. CT CHEST ABD PELV W CONT    Result Date: 7/6/2022  1. Study limited by motion. 2.  Pulmonary edema pattern with bilateral pleural effusions. Right lower lobe bandlike consolidation may reflect pneumonia or atelectasis. 3.  Circumferential bladder wall thickening, correlate for cystitis. XR CHEST PORT    Result Date: 7/6/2022  Increased interstitial opacities bilaterally likely reflecting pulmonary edema. Recent Results (from the past 12 hour(s))  -GLUCOSE, POC:   Collection Time: 07/06/22 11:55 AM       Result                      Value             Ref Range           Glucose (POC)               194 (H)           65 - 117 mg/*       Performed by                                                  Marvel Peralta  -SAMPLES BEING HELD:   Collection Time: 07/06/22 11:57 AM       Result                      Value             Ref Range           SAMPLES BEING HELD                                            1blue 1lav 1red 1pst 1bc(silver)       COMMENT                                                       Add-on orders for these samples will be processed based on acceptable specimen integrity and analyte stability, which may vary by analyte.   -METABOLIC PANEL, COMPREHENSIVE:   Collection Time: 07/06/22 11:57 AM       Result                      Value             Ref Range           Sodium                      125 (L)           136 - 145 mm*       Potassium                                     3.5 - 5.1 mm*   HEMOLYZED,RECOLLECT REQUESTED       Chloride                    92 (L)            97 - 108 mmo*       CO2                         25                21 - 32 mmol*       Anion gap                   8                 5 - 15 mmol/L       Glucose                     201 (H) 65 - 100 mg/*       BUN                         46 (H)            6 - 20 MG/DL        Creatinine                  5.93 (H)          0.55 - 1.02 *       BUN/Creatinine ratio        8 (L)             12 - 20             GFR est AA                  9 (L)             >60 ml/min/1*       GFR est non-AA              7 (L)             >60 ml/min/1*       Calcium                     10.0              8.5 - 10.1 M*       Bilirubin, total            0.5               0.2 - 1.0 MG*       ALT (SGPT)                  31                12 - 78 U/L         AST (SGOT)                                    15 - 37 U/L     HEMOLYZED,RECOLLECT REQUESTED       Alk.  phosphatase            77                45 - 117 U/L        Protein, total              7.5               6.4 - 8.2 g/*       Albumin                     3.0 (L)           3.5 - 5.0 g/*       Globulin                    4.5 (H)           2.0 - 4.0 g/*       A-G Ratio                   0.7 (L)           1.1 - 2.2      -CBC WITH AUTOMATED DIFF:   Collection Time: 07/06/22 11:57 AM       Result                      Value             Ref Range           WBC                         10.3              3.6 - 11.0 K*       RBC                         3.68 (L)          3.80 - 5.20 *       HGB                         13.0              11.5 - 16.0 *       HCT                         38.0              35.0 - 47.0 %       MCV                         103.3 (H)         80.0 - 99.0 *       MCH                         35.3 (H)          26.0 - 34.0 *       MCHC                        34.2              30.0 - 36.5 *       RDW                         13.6              11.5 - 14.5 %       PLATELET                    166               150 - 400 K/*       MPV                         10.9              8.9 - 12.9 FL       NRBC                        0.0               0  WBC       ABSOLUTE NRBC               0.00              0.00 - 0.01 *       NEUTROPHILS                 85 (H) 32 - 75 %           LYMPHOCYTES                 7 (L)             12 - 49 %           MONOCYTES                   7                 5 - 13 %            EOSINOPHILS                 0                 0 - 7 %             BASOPHILS                   0                 0 - 1 %             IMMATURE GRANULOCYTES       1 (H)             0.0 - 0.5 %         ABS. NEUTROPHILS            8.8 (H)           1.8 - 8.0 K/*       ABS. LYMPHOCYTES            0.7 (L)           0.8 - 3.5 K/*       ABS. MONOCYTES              0.7               0.0 - 1.0 K/*       ABS. EOSINOPHILS            0.0               0.0 - 0.4 K/*       ABS. BASOPHILS              0.0               0.0 - 0.1 K/*       ABS. IMM. GRANS.            0.1 (H)           0.00 - 0.04 *       DF                          SMEAR SCANNED                         RBC COMMENTS                                                  MACROCYTOSIS 1+        RBC COMMENTS                                                  ANISOCYTOSIS 1+     1500 - family at bedside. Notified of parenchymal hemorrhage. Confirmed that patient is DNR/DNI. Would not want any surgical intervention. Would be open to hospice/palliative discussion. Will start BP control (nicardipine drip) and d/w neurology and neurosurgery re: recommendations and admission. 1547 - D/W Dr. Christin Downing (neurosurgery) - admit to ICU. Repeat head ct in 6-12 hours. D/W Neurology: keppra 500mg IV, nicardipine drip. D/W ICU - will accept. 1657 -Per discussion with family (brother at bedside who indicates he is POA), they would like the patient to be in hospice. They would not like any invasive procedures. Per neurosurgery and ICU, can admit to hospitalist service. Family would like to change to comfort care. No dialysis. Family agreeable to BP control, benzos and fentanyl for pain/sedation. Aware that this may cause respiratory depression and emphasize that they want pt comfortable.   Do not want the pt in ICU    7260 - I have called hospice to evaluate pt for inpatient hospice. 1850 - I have discussed with hospice. Will evaluate pt in AM.  Will admit to hospitalist service on medical floor given that pt is comfort care.              Amount and/or Complexity of Data Reviewed  Clinical lab tests: reviewed  Tests in the radiology section of CPT®: reviewed  Tests in the medicine section of CPT®: reviewed    Risk of Complications, Morbidity, and/or Mortality  Presenting problems: high  Diagnostic procedures: high  Management options: high    Patient Progress  Patient progress: stable         Critical Care  Performed by: Johnnie Alexandra MD  Authorized by: Johnnie Alexandra MD     Critical care provider statement:     Critical care time (minutes):  60    Critical care time was exclusive of:  Separately billable procedures and treating other patients    Critical care was necessary to treat or prevent imminent or life-threatening deterioration of the following conditions:  CNS failure or compromise    Critical care was time spent personally by me on the following activities:  Development of treatment plan with patient or surrogate, discussions with consultants, evaluation of patient's response to treatment, examination of patient, obtaining history from patient or surrogate, ordering and performing treatments and interventions, ordering and review of laboratory studies, ordering and review of radiographic studies, pulse oximetry and re-evaluation of patient's condition    I assumed direction of critical care for this patient from another provider in my specialty: no

## 2022-07-06 NOTE — CONSULTS
Assessment:  ESRD: MWF [facility]    Left parietal intraparenchymal hemorrhage    HTN emergency: on Cardene drip    DM2    Down Syndrome    Hyponatremia    Plan/Recommendations:  Discussed prognosis with family. They would like to hold HD and discuss hospice measures  Respect family's wishes-> seems most appropriate  [facility] team notified    Discussed with patient's family      Initial Consult note         Patient name: Ryan Agudelo  MR no: [id number]  Date of admission: 7/6/2022  Date of consultation: 7/6/2022  Requested by: Dr. Carmine Marx  Reason for consult: ESRD    Patient seen and examined. History obtained from patient and chart review. Relevant labs, data and notes reviewed. HPI: Ryan Agudelo is a 45 y.o. female with PMH significant for ESRD on chronic HD MWF at [facility], DM2 well known to our service sent in from our HD unit after refusing to undergo treatment. +Significant agitation which is very unusual for patient. SBP in the ER >200. +Febrile. CT head c/w left parietal intraparenchymal hemorrhage. Currently sedated s/p versed/fentanyl. On Cardene gtt.      PMH:  Past Medical History:   Diagnosis Date    Acute respiratory failure with hypoxia ([address] 10/25/2021    Allergic rhinitis, cause unspecified     AMS (altered mental status) 4/4/2022    Asthma     use inhalers    CKD (chronic kidney disease), stage V ([address] 10/8/2021    Diabetes ([address]     Diabetic macular edema ([address] 2/4/2016    DKA (diabetic ketoacidosis) ([address] 12/4/2021    DKA (diabetic ketoacidosis) ([address] 5/17/2022    Down's syndrome     Fluid overload 10/8/2021    GERD (gastroesophageal reflux disease)     H/O impacted cerumen     Dr. Chung Shook    Headache     Hypoglycemia 6/27/2012    Hypothyroid     Mononucleosis     Pneumonia     Seizures ([address]     as a result of low blood glucose readings    Thyroid disease      PSH:  Past Surgical History:   Procedure Laterality Date    HX CATARACT REMOVAL Bilateral 3/5/15    cataract left and right    HX CYST REMOVAL      right shoulder    HX HEENT Bilateral 2017    prior in one eye    HX HYSTERECTOMY  1987       Social history:   Social History     Tobacco Use    Smoking status: Never Smoker    Smokeless tobacco: Never Used   Vaping Use    Vaping Use: Never used   Substance Use Topics    Alcohol use: No    Drug use: No       Family history:  No history of CKD or ESRD in the family. Allergies   Allergen Reactions    Codeine Nausea and Vomiting    Lettuce Other (comments)    Nitrofurantoin Rash    Pcn [Penicillins] Hives and Rash    Tomato Other (comments)       Current Facility-Administered Medications   Medication Dose Route Frequency Last Admin    niCARdipine (CARDENE) 25 mg in 0.9% sodium chloride 250 mL ([id number])  0-15 mg/hr IntraVENous TITRATE 8.5 mg/hr at 07/06/22 1628    levETIRAcetam (KEPPRA) injection 250 mg  250 mg IntraVENous DAILY 250 mg at 07/06/22 1630     Current Outpatient Medications   Medication Sig Dispense    insulin lispro (HumaLOG KwikPen Insulin) 100 unit/mL kwikpen 10 Units by SubCUTAneous route Daily (before dinner). 8 units before breakfast, 8 units before lunch and 10 units before dinner     insulin lispro (HumaLOG KwikPen Insulin) 100 unit/mL kwikpen 8 Units by SubCUTAneous route Daily (before breakfast). 8 units before breakfast, 8 units before lunch and 10 units before dinner     ondansetron hcl (ZOFRAN) 4 mg tablet Take 4 mg by mouth every four (4) hours as needed for Nausea or Vomiting.  insulin lispro (HumaLOG KwikPen Insulin) 100 unit/mL kwikpen 8 Units by SubCUTAneous route Daily (before lunch). 8 units before breakfast, 8 units before lunch and 10 units before dinner     HumaLOG KwikPen Insulin 100 unit/mL kwikpen by SubCUTAneous route Before breakfast, lunch, and dinner. Inject as per sliding scale:   If BG 60-80 = 2 units   = 3 units  121-170 = 4 units  171-220 = 5 units  221-270 = 6 units  271-320 = 10 units  321 + = 15 units  500+ = 20 units     dextromethorphan (Delsym) 30 mg/5 mL liquid Take 30 mg by mouth every twelve (12) hours as needed for Cough.  lidocaine-prilocaine (EMLA) topical cream Apply  to affected area DIALYSIS MON, WED & FRI. Apply to left arm access site topically in the morning every M/W/F prior to dialysis     traZODone (DESYREL) 50 mg tablet Take 50 mg by mouth nightly.  metoprolol tartrate (LOPRESSOR) 25 mg tablet Take 1 Tablet by mouth every twelve (12) hours. 60 Tablet    b complex-vitamin c-folic acid (NEPHROCAPS) 1 mg capsule Take 1 Capsule by mouth daily. 30 Capsule    diphenhydrAMINE (BenadryL) 25 mg capsule Take 25 mg by mouth daily as needed for Allergies.  cranberry 500 mg capsule Take 500 mg by mouth daily.  fluticasone propionate (Flonase Allergy Relief) 50 mcg/actuation nasal spray 2 Sprays by Both Nostrils route every twelve (12) hours as needed for Rhinitis or Allergies.  esomeprazole (NexIUM) 40 mg capsule Take 40 mg by mouth daily.  L.acid,para-B. bifidum-S.therm (RISAQUAD) 8 billion cell cap cap Take 1 Capsule by mouth daily.  insulin degludec Roberta Parada FlexTouch U-100) 100 unit/mL (3 mL) inpn 10 Units by SubCUTAneous route nightly.  ergocalciferol (Vitamin D2) 1,250 mcg (50,000 unit) capsule Take 50,000 Units by mouth every Wednesday.  montelukast (Singulair) 10 mg tablet Take 10 mg by mouth nightly.  levothyroxine (SYNTHROID) 100 mcg tablet Take 1 Tablet by mouth Daily (before breakfast). 90 Tablet    biotin 5,000 mcg TbDi Take 5,000 mcg by mouth daily.  cholecalciferol, vitamin D3, (VITAMIN D3) 2,000 unit tab Take 4,000 Units by mouth daily.  atorvastatin (LIPITOR) 20 mg tablet Take 20 mg by mouth nightly.  albuterol (PROVENTIL HFA, VENTOLIN HFA, PROAIR HFA) 90 mcg/actuation inhaler Take 1 Puff by inhalation every four (4) hours as needed for Wheezing.  1 Inhaler    glucose 4 gram chewable tablet Take 12 g by mouth as needed (BS < 60).  glucagon (GLUCAGEN) 1 mg injection 1 mg by IntraMUSCular route once as needed (hypoglycemia if patient is unresponsive). ROS (besides HPI):    UTO    Objective   Visit Vitals  /60   Pulse 90   Temp (!) 102.1 °F (38.9 °C)   Resp 24   LMP 03/27/1986   SpO2 98%       Physical Exam:    Gen: NAD/Sedated    HEENT: Downs facies    Lungs/Chest wall: Breath sounds coarse    Cardiovascular: Normal S1/S2, normal rate, regular rhythm.      Abdomen: soft, NT, ND, BS+, no HSM    Ext:  No edema    Skin: + rashes    : no CVA tenderness    CNS: sedated    Labs/Data:    Lab Results   Component Value Date/Time    Sodium 125 (L) 07/06/2022 11:57 AM    Potassium HEMOLYZED,RECOLLECT REQUESTED 07/06/2022 11:57 AM    Chloride 92 (L) 07/06/2022 11:57 AM    CO2 25 07/06/2022 11:57 AM    Anion gap 8 07/06/2022 11:57 AM    Glucose 201 (H) 07/06/2022 11:57 AM    BUN 46 (H) 07/06/2022 11:57 AM    Creatinine 5.93 (H) 07/06/2022 11:57 AM    BUN/Creatinine ratio 8 (L) 07/06/2022 11:57 AM    GFR est AA 9 (L) 07/06/2022 11:57 AM    GFR est non-AA 7 (L) 07/06/2022 11:57 AM    Calcium 10.0 07/06/2022 11:57 AM       Lab Results   Component Value Date/Time    WBC 10.3 07/06/2022 11:57 AM    Hemoglobin (POC) 12.2 11/05/2012 03:10 PM    HGB 13.0 07/06/2022 11:57 AM    Hematocrit (POC) 36 11/05/2012 03:10 PM    HCT 38.0 07/06/2022 11:57 AM    PLATELET 251 43/09/3855 11:57 AM    .3 (H) 07/06/2022 11:57 AM       Urine analysis:   Results for orders placed or performed in visit on 01/26/15   AMB POC URINALYSIS DIP STICK AUTO W/O MICRO     Status: Abnormal   Result Value Ref Range Status    Color (UA POC) Yellow  Final    Clarity (UA POC) Clear  Final    Glucose (UA POC) Negative Negative Final    Bilirubin (UA POC) Negative Negative Final    Ketones (UA POC) Negative Negative Final    Specific gravity (UA POC) 1.015 1.001 - 1.035 Final    Blood (UA POC) Negative Negative Final    pH (UA POC) 6.0 4.6 - 8.0 Final    Protein (UA POC) 2+ Negative mg/dL Final    Urobilinogen (UA POC) 0.2 mg/dL 0.2 - 1 Final    Nitrites (UA POC) Negative Negative Final    Leukocyte esterase (UA POC) Negative Negative Final           No components found for: SPEP, UPEP  No results found for: PUQ, PROTU2, PROTU1, BJP1, CPE1, IMEL1, MET2  No results found for: MCACR, MCA1, MCA2, MCA3, MCAU, MCAU2, MCALPOCT    No intake or output data in the 24 hours ending 07/06/22 1747    Wt Readings from Last 3 Encounters:   06/02/22 58.5 kg (129 lb)   05/19/22 59.9 kg (132 lb 0.9 oz)   04/19/22 58.2 kg (128 lb 6.4 oz)       Signed by:  Iraida Martinez MD  Nephrology and Hypertension  [facility]

## 2022-07-06 NOTE — PROGRESS NOTES
Family has requested hospice. We will sign off. Please reconsult if we can be of further assistance.

## 2022-07-07 PROBLEM — I62.9 INTRACRANIAL HEMORRHAGE (HCC): Status: ACTIVE | Noted: 2022-01-01

## 2022-07-07 NOTE — CONSULTS
[address]    Name:  Bill Nettles  MR#:  [id number]  :  1970  ACCOUNT #:  [de-identified]  DATE OF SERVICE:  2022      NEUROSURGERY CONSULTATION    REASON FOR CONSULTATION:  Intracranial hemorrhage. HISTORY OF PRESENT ILLNESS:  This is a 58-year-old woman who has Down's syndrome. She developed some stage III renal disease and has been on dialysis for the past 6 months. She also has diabetes and has had multiple episodes of DKA over the past 6 months. Last night, she complained of headache and became agitated. She did take Tylenol for the headache. It was unusual for her to complain of headache or to become agitated. This morning, she again was agitated and was refusing to go to her dialysis. When she went to dialysis, she was noted to have a rash. The emergency medical services were called. She had Benadryl and Decadron and transferred to [facility].  She had been agitated and noncommunicative since arrival.  Workup included a head CT that revealed a previously known cyst as well as a left parietal hemorrhage. I was asked to see her in consultation. PAST MEDICAL HISTORY:  Includes Down's syndrome, diabetes, diabetic ketoacidosis, end-stage renal disease, and hypothyroidism. PAST SURGICAL HISTORY:  Bilateral cataract removal, cyst removed from right shoulder, hysterectomy, and dialysis graft placement. FAMILY HISTORY:  Positive for thyroid disease, hypertension, and coronary artery disease. SOCIAL HISTORY:  No history of tobacco or alcohol use. MEDICATIONS PRIOR TO ADMISSION:  1. Albuterol inhaler. 2.  Lipitor 20 mg daily. 3.  Nephro capsule once daily. 4.  Biotin 500 mg q. daily. 5.  Vitamin D3 4000 units daily. 6.  Cranberry 500 mg daily. 7.  Delsym as needed for cough. 8.  Sliding scale insulin. 9.  Insulin 10 units at bedtime, 10 units before dinner, 8 units before breakfast, 8 units before lunch.   10.  RisaQuad one capsule daily. 11.  Synthroid 100 mcg daily. 12.  Lopressor 25 mg q.12 h.  13.  Singulair 10 mg daily. 14.  Trazodone 50 mg at bedtime. ALLERGIES:  CODEINE, LETTUCE, NITROFURANTOIN, PENICILLIN, TOMATO. REVIEW OF SYSTEMS:  Unobtainable from the patient. PHYSICAL EXAMINATION:  VITAL SIGNS:  Height 5 feet 11 inches. No recent weight. Last BMI 26. Blood pressure on admission to ED was 200/77, increased to 227/73, now on Cardene drip 144/57; oxygen saturation 97%. GENERAL:  This is a well-nourished woman. She does have Down's syndrome with shortened limbs and a Down's facies. She is unresponsive to questioning. She is moaning in the bed. HEENT:  Her eyes are closed. She is moving all extremities symmetrically. LUNGS:  She has had normal respirations. No abdominal distention. SKIN:  I did not see any rash upon my examination. LABORATORY STUDIES:  White count 10.3, hematocrit 38, and platelet count 66. Chemistry has sodium of 125, BUN of 46, and creatinine of 5.93. Lactic acid is 4.5. Procalcitonin is 0.62. IMAGING:  CT scan shows a stable 4.3 x 2.9 x 2.4 cm left parafalcine frontoparietal cyst without any significant mass effect. This was seen on previous imaging from 04/04/2022. There is a new 3.5 x 3.0 x 2.4 heterogeneous hemorrhage adjacent to the cyst in the left parietal lobe. No significant mass effect or midline shift. ASSESSMENT AND PLAN:  This is a 50-year-old woman with Down's syndrome who has had a decline in the last 6 months of her overall health. She has had multiple admissions for diabetic ketoacidosis. She has been on dialysis and according to her family members does not like dialysis. I discussed with them workup to include blood pressure control, frequent neurologic checks, intensive care unit admission, MRI to determine underlying lesion, potential surgery if underlying lesion were found, potential surgery if increase in hemorrhage.   They said that they would not like to pursue any aggressive treatment and that she has been unhappy with her overall quality of life at this point, and they would rather consider hospice care. I think that is appropriate. We will sign off. We appreciate consult if we can be of further assistance.       Laurie Farris MD      [id number]  D:  07/06/2022 17:24  T:  07/06/2022 20:04  JOB #:  [id number]

## 2022-07-07 NOTE — PROGRESS NOTES
Bedside shift change report given to Pippa Salcedo RN (oncoming nurse) by Tanvi Davis RN (offgoing nurse). Report included the following information SBAR, ED Summary, Intake/Output, MAR, Med Rec Status and Cardiac Rhythm NSR TO SINUS TACH.

## 2022-07-07 NOTE — HOSPICE
[address] [facility] Help to Those in Need  [phone]     Patient Name: Jena Gr  YOB: 1970  Age: 45 y.o. [facility] RN Note:  Hospice consult received, reviewing chart. Will follow up with Unit Nurse and Care Manager to discuss plan of care, patient status and discharge disposition within the hour. Call to Pippa Dumas, sister who wishes to meet at 10:00am for hospice information session. She prefers to be main contact as pt's mother lives in [place].    46: Met with family at bedside. Pt having dyspnea on NRB mask. Dr. Billie Stallworth contacted for comfort med. Family given brief info session and agree to inpatient hospice. Dr. Mindy Fuentes gave CTI, Attending contacted for discharge order. Consents signed by ravi JUÁREZ. Medication orders received. Thank you for the opportunity to be of service to this patient.     Nely Kennedy RN [facility]  [phone]  Or Jennie

## 2022-07-07 NOTE — PROGRESS NOTES
Speaking with brother Sailaja Contreras. He states he does not want the patient to come to ICU and have aggressive measures at this time. He did not understand that she would not have her blood pressure checked and would get no treatment, he is simply asking that she receive some blood pressure medication to, \"try to keep her going\" so that the patients mother can see her in the morning before transitioning to hospice tomorrow. He does not want to pursue aggressive measures, but wants to try to get the patient through the night. He understands that the patient may still not make it through the night, he was just hopeful that their mother could see the patient one last time. Sailaja Contreras is adamant he does not want his sister in the ICU and would only like for BP meds to be added until the morning. If this does not keep her alive until the morning he is understanding that this is a very real possibility.         Omer DENT-BC     Critical Care Medicine  [employer]

## 2022-07-07 NOTE — PROGRESS NOTES
TRANSFER - IN REPORT:    Verbal report received from destinee RN(name) on Ness Gr  being received from [address] [place] [address](unit) for change in patient condition(pt is on hospice)      Report consisted of patients Situation, Background, Assessment and   Recommendations(SBAR). Information from the following report(s) SBAR, MAR, Recent Results and Med Rec Status was reviewed with the receiving nurse. Opportunity for questions and clarification was provided. Assessment completed upon patients arrival to unit and care assumed.

## 2022-07-07 NOTE — ED NOTES
Has a final transfer review been performed? Yes    Reason for Admission: intraparenchymal hematoma of brain, left, without loss of consciousness, ESRD, Hypertensive Emergency, Down Syndrome  Patient comes from: [place]  Mental Status: Other medicated with fentanyl due to agitation, patient resting quietly now  ADL:total assistance  Ambulation:patient medicated, not currently ambulating. Pertinent Info/Safety Concerns: patient placed on comfort care by family, patient cardene drip stopped in ED, patient medicated about 1615 for agitation, has been resting quietly since, patient on non-rebreather. COVID Status: Not Tested or Not Indicated  MEWS Score: 1    Vitals:    07/06/22 1955 07/06/22 1959 07/06/22 2001 07/06/22 2005   BP: (!) 136/56 (!) 136/56 (!) 129/59 (!) 133/57   Pulse: 94   94   Resp:    16   Temp:    99.3 °F (37.4 °C)   SpO2:  100% 99% 100%   LMP: 03/27/1986     Transport mode:ED stretcher    TRANSFER - OUT REPORT:    Dean GARCIA Maile  being transferred to 6E(unit) for routine progression of care     \"Notification of etransfer note given to (Name) Ole Whelan (Title)     Report consisted of patient's Situation, Background, Assessment and   Recommendations(SBAR). Lines:   Peripheral IV 07/06/22 Right Wrist (Active)   Site Assessment Clean, dry, & intact 07/06/22 1158   Phlebitis Assessment 0 07/06/22 1158   Infiltration Assessment 0 07/06/22 1158   Dressing Status Clean, dry, & intact 07/06/22 1158   Dressing Type Transparent 07/06/22 1158   Hub Color/Line Status Pink;Flushed;Patent 07/06/22 1158   Action Taken Blood drawn 07/06/22 1158       Peripheral IV 07/06/22 Right Hand (Active)        Opportunity for questions and clarification was provided.

## 2022-07-07 NOTE — HOSPICE
[facility]  [phone]    Discharge/Death Nursing Note   Patient Name: Rubie Aase Noftsinger  YOB: 1970  Age: 45 y.o. Date of Death: 22  Admitted Date: 2022  Time of Death: 17:50    Facility of Care: [facility]  Level of Care: [facility]  Patient Room: [facility]     Hospice Attending: Charlie Overton MD  Hospice Diagnosis: Intracranial hemorrhage [facility]) [I62.9]    Death Pronouncement   Pronouncement of death completed by: Devang Apple RN and Jenn Alanis RN   under direction of Dr. Marshal Nix staff was not present at the time of death    At the time of death the patient was documented as: not breathing, no apical pulse, unresponsive to stimuli, pupils fixed and nonreactive.       The pt  within [facility]    The following were notified of the patient's death: MD, , nursing supvr, [facility]    Medications were disposed of per facility protocol     Discharge Summary   Discharge Reason: Death    Summary of Care Provided:    [x] Post mortem care provided by nursing staff  [x] Notification of  home by nursing supvr  [x] Referrals/Community resources provided: by   [x] Goals completed  [] Durable Medical Equipment vendor notified     Disciplines involved: [x] RN [x] SW [x]  [] C[employer] [] Vol [] PT [] OT [] ST [] BC    [x] IDT communication/notification    Attending Physician, Dr. Kaylie Lockwood, notified of death    Bereaved   Verify bereaved identified with name, address, telephone number and risk level      Advance Care Planning 2022   Patient's Healthcare Decision Maker is: -   Primary Decision Maker Name -   Primary Decision Maker Phone Number -   Primary Decision Maker Relationship to Patient -   Secondary Decision [address] Name -   Secondary Decision Maker Phone Number -   Confirm Advance Directive None   Patient Would Like to Complete Advance Directive No   Does the patient have other document types Do Not Resuscitate     Vignesh Walsh Noedisoninger, brother/POA wishes to use Nataly MARIE, [place]uy[place].  Bereavement low

## 2022-07-07 NOTE — PROGRESS NOTES
Responded to page for this pt's death in [facility] 623. Provided pastoral support for pt's family. Provided support as family shared memories of pt. Contact spiritual care services for any spiritual or emotional support needs. Jann Monk MDiv.  Staff   Request  Support/Spiritual Care Services on [phone])

## 2022-07-07 NOTE — PROGRESS NOTES
Spoke with Dr. Ayesha Tellez and explained my conversation with the family to him. The family is okay with trying to manage the BP on the medical unit, and were explicit that the patient should not be brought to ICU. I would recommend IV hydralazine or labetolol every 4 hours to maintain systolic blood pressures in the 140-160 range. The goal is not to have strict control over pressure, but to have moderation to keep the BP from spiking suddenly which is the families concern. They understand even with these measures in place the patient may still pass away.  They have a meeting with hospice in the morning, and want the patients mother to have the chance to say goodbye      -Recommendations per family request:  IV Hydalazine or labetolol 10-20 mg every 4 hours for SBP > 160  Monitor BP every 4 hours and treat if SBP is greater than 160 mmHg  Ultimate goal remains palliation and not treatment      Joseph Coronel AGACNP-BC     [address] [facility]

## 2022-07-07 NOTE — H&P
[address] [place] [address] [facility]  [phone]    Patient Name: Jada Gr  YOB: 1970    Date of Provider Hospice Visit: 07/07/22    Level of Care:   [x] General Inpatient (GIP)    [] Routine   [] Respite    Current Location of Care:  [x] [facility] [] [facility] [] [facility] [] [facility] [] [facility], patient referred from:  [] [facility] [] [facility] [] [facility] [] [facility] [] Home [] Other:     Date of Original Hospice Admission: 7/7/2022  Hospice Medical Director at time of admission: Dr. Sushma Coyne Diagnosis: Intracerebral hemorrhage  Diagnoses RELATED to the terminal prognosis: Diabetes mellitus type 2 with hyperglycemia, altered mental status  Other Diagnoses: ESRD on hemodialysis, HTN, Down Syndrome     HOSPICE SUMMARY   Jada Gr is a 45y.o. year old female who was admitted to [facility]. PMH significant for asthma, ESRD on hemodialysis, type 2 diabetes mellitus, h/o  DKA, Down's syndrome, GERD, hypothyroidism, pneumonia, and seizures presented with initial reports of facial swelling secondary to suspected allergic reaction.  Patient reportedly was transported via EMS from hemodialysis center for the same, given Benadryl IV.  Discussion with brother also indicates that the evening prior she reported significant headache and seemed \"not herself\". On arrival in the ED, initial recorded vital signs were T 100.4 F, /77, HR 90, RR 24, O2sat 97% on 3 liters O2 nasal cannula .  Patient notedly then placed on 100% non re-breather face mask.  VBG showed pH 7.24/ pCO2 49.8/ pO2 38, HCO3 21.1, base deficit - 6.6, and Svo2 38%.  Tmax increased to 102.1 F.  Abnormal labs included Na 125, , BUN 46, creatinine 5.93, GFR 7, and lactic acid 4.5.   Chest xray portable showed increased interstitial bilateral opacities likely pulmonary edema, per radiologist report. Ammi Ax 19 test was negative (not detected).   Blood cultures were collected.  Patient was seen in consultation by nephrologist who per note, discussed prognosis with family and they would like to hold HD and discuss hospice measures.  CT head without IV contrast showed a 3.5 cm x 3.0 cm x 2.4 cm intraparenchymal hemorrhage with surrounding vasogenic edema in the left parietal lobe; stable 4.3 cm x 3.9 cm x 3.4 cm lobulated cystic lesion in the parafalcine left frontoparietal junction.  Initial plan was for Nicardipine IV drip for HTN emergency, Keppra 250 mg IV daily and ICU admission (as ED discussed with intensivist).   Patient was seen in consultation by neurosurgeon who per consultant note, discussed with (patient's family) initial workup to include blood pressure control, frequent neurologic checks, intensive care unit admission, MRI to determine underlying lesion, potential surgery if underlying lesion were found, potential surgery if increase in hemorrhage but they (patient's family) said that they would not like to pursue any aggressive treatment and elected hospice for comfort care. The patient's principle diagnosis has resulted in decreased level of consciousness, non-verbal signs of pain, restlessness, and increased work of breathing. Functionally, the patient's Karnofsky and/or Palliative Performance Scale has declined over a period of days and is estimated at 10. The patient is dependent on the following ADLs: all    Objective information that support this patients limited prognosis includes: POC glucose >600; serum glucose 915  Study Result    Narrative & Impression   EXAM: CT HEAD WO CONT     INDICATION: htn     COMPARISON: CT head 4/4/2022.     CONTRAST: None.     TECHNIQUE: Unenhanced CT of the head was performed using 5 mm images. Brain and  bone windows were generated. Coronal and sagittal reformats.  CT dose reduction  was achieved through use of a standardized protocol tailored for this  examination and automatic exposure control for dose modulation.       FINDINGS:  Study significantly limited by motion.     A 3.5 cm x 3.0 cm x 2.4 cm intraparenchymal hemorrhage with surrounding  vasogenic edema in the left parietal lobe. Stable 4.3 cm x 3.9 cm x 3.4 cm  lobulated cystic lesion in the parafalcine left frontoparietal junction. No  significant mass effect, midline shift, nor evidence of herniation      The bone windows demonstrate no abnormalities. The visualized portions of the  paranasal sinuses and mastoid air cells are clear. Bilateral lens replacements.     IMPRESSION  1. Study significantly limited by motion. 2.  Left parietal intraparenchymal hemorrhage. No evidence of herniation. 3.  Stable left parafalcine cystic lesion. Study Result    Narrative & Impression   EXAM: CT CHEST ABD PELV W CONT     INDICATION: infection      COMPARISON: Chest radiograph 7/6/2022, CT abdomen pelvis 12/4/2021, CTA chest  10/25/2021     IV CONTRAST: 100 mL of Isovue-370.     ORAL CONTRAST: None     TECHNIQUE:   Following the uneventful intravenous administration of contrast, thin axial  images were obtained through the chest, abdomen and pelvis. Coronal and sagittal  reformats were generated. CT dose reduction was achieved through use of a  standardized protocol tailored for this examination and automatic exposure  control for dose modulation.     FINDINGS:   Study limited by motion.     CHEST WALL: No mass or axillary lymphadenopathy. Partially visualized left arm  vascular stent. THYROID: No nodule. MEDIASTINUM: No mass or lymphadenopathy. JAN: No mass or lymphadenopathy. THORACIC AORTA: No dissection or aneurysm. MAIN PULMONARY ARTERY: Normal in caliber. TRACHEA/BRONCHI: Patent. ESOPHAGUS: No wall thickening or dilatation. HEART: Cardiomegaly  PLEURA: Small-moderate right and small left pleural effusions. No pneumothorax. LUNGS: Bandlike consolidation in the right lower lobe may reflect pneumonia or  atelectasis.  Diffuse septal thickening and patchy faint groundglass, most  compatible with edema     LIVER: No mass. BILIARY TREE: Gallbladder is within normal limits. CBD is not dilated. SPLEEN: within normal limits. PANCREAS: No mass or ductal dilatation. ADRENALS: Unremarkable. KIDNEYS: No mass, calculus, or hydronephrosis. STOMACH: Unremarkable. SMALL BOWEL: No dilatation or wall thickening. COLON: No dilatation or wall thickening. APPENDIX: Not discretely visualized  PERITONEUM: No ascites or pneumoperitoneum. RETROPERITONEUM: No lymphadenopathy or aortic aneurysm. REPRODUCTIVE ORGANS: Hysterectomy  URINARY BLADDER: Circumferentially thick-walled  BONES: No destructive bone lesion. Degenerative changes. Several chronic left  rib deformities. ABDOMINAL WALL: No mass or hernia. ADDITIONAL COMMENTS: N/A     IMPRESSION  1. Study limited by motion. 2.  Pulmonary edema pattern with bilateral pleural effusions. Right lower lobe  bandlike consolidation may reflect pneumonia or atelectasis. 3.  Circumferential bladder wall thickening, correlate for cystitis. The patient/family chose comfort measures with the support of Hospice. HOSPICE DIAGNOSES   Active Symptoms:  1. Increased work of breathing  2. Non-verbal signs of pain  3. Restlessness/anxiety  4. Terminal fever  5. Hypotension  6. Hospice care     PLAN   1. Admit to [facility] level of care as pt is unable to tolerate PO/SL medications and requires frequent nursing assessment, and administration and titration of parenteral medications to manage symptom burden; and is unsafe to transport  2. Valium 5 mg IV every 6 hours for anxiety/seizure prevention  3. Lorazepam 1 mg IV every hour as needed for anxiety/sedation  4. Hydromorphone 0.5 mg IV every 3 hours with prn dosing available every 15 minutes for pain/air hunger  5. Robinul 0.2 mg IV every 4 hours as needed for excessive secretions  6. Toradol 15 mg IV every 8 hours as needed for pain/fever  7. All other symptom management medications as needed  8.  Oxygen via NRB for comfort; may wean as tolerated  9. Large family presence at bedside; reviewed hospice philosophy with emphasis on comfort and safety and discussed end of life care; all questions answered  10.  and SW to support family needs  6. Disposition: anticipate death in hospital  12. Hospice Plan of care was reviewed in detail and agree with current plan of care    Prognosis estimated based on 07/07/22 clinical assessment is:   [x] Hours to Days    [] Days to Weeks    [] Other:    Communicated plan of care with: Hospice Case Manager; Hospice IDT; Care Team     GOALS OF CARE     Patient/Medical POA stated Goal of Care: hospice for comfort care and symptom management    [x] I have reviewed and/or updated ACP information in the Advance Care Planning Navigator. This information is available in the [address] link in the patient's chart header. Primary Decision [facility] Agent):   Primary Decision Maker: Kathryn Kothari - Mother - [phone]    Secondary Decision Maker: Parish Kirby - Sister - [phone]    Secondary Decision Maker: Timoteo Gr - Brother - [phone]    Resuscitation Status: DNR  If DNR is there a Durable DNR on file? : [x] Yes [] No (If no, complete Durable DNR)    HISTORY     History obtained from: review of medical record, discussion with interdisciplinary team, and discussion with family    CHIEF COMPLAINT: headache, altered mental status  The patient is:   [] Verbal  [] Nonverbal  [x] Unresponsive    HPI/SUBJECTIVE:  Large family presence at bedside. Pt is unresponsive. Remains on oxygen 6L via NRB. Family reports respiratory effort less labored as she was recently medicated with scheduled dilaudid. Received PRN dose of Toradol at 1200 for elevated temp.         REVIEW OF SYSTEMS     The following systems were: [] reviewed  [x] unable to be reviewed    Adult Non-Verbal Pain Assessment Score: 2    Face  [] 0   No particular expression or smile  [x] 1   Occasional grimace, tearing, frowning, wrinkled forehead  [] 2   Frequent grimace, tearing, frowning, wrinkled forehead    Activity (movement)  [] 0   Lying quietly, normal position  [x] 1   Seeking attention through movement or slow, cautious movement  [] 2   Restless, excessive activity and/or withdrawal reflexes    Guarding  [x] 0   Lying quietly, no positioning of hands over areas of body  [] 1   Splinting areas of the body, tense  [] 2   Rigid, stiff    Physiology (vital signs)  [x] 0   Stable vital signs  [] 1   Change in any of the following: SBP > 20mm Hg; HR > 20/minute  [x] 2   Change in any of the following: SBP > 30mm Hg; HR > 25/minute    Respiratory  [x] 0   Baseline RR/SpO2, compliant with ventilator  [] 1   RR > 10 above baseline, or 5% drop SpO2, mild asynchrony with ventilator  [] 2   RR > 20 above baseline, or 10% drop SpO2, asynchrony with ventilator     FUNCTIONAL ASSESSMENT     Palliative Performance Scale (PPS): 10       PSYCHOSOCIAL/SPIRITUAL ASSESSMENT     Active Problems:    Intracranial hemorrhage ([address] (7/7/2022)      Past Medical History:   Diagnosis Date    Acute respiratory failure with hypoxia ([address] 10/25/2021    Allergic rhinitis, cause unspecified     AMS (altered mental status) 4/4/2022    Asthma     use inhalers    CKD (chronic kidney disease), stage V ([address] 10/8/2021    Diabetes ([address]     Diabetic macular edema ([address] 2/4/2016    DKA (diabetic ketoacidosis) ([address] 12/4/2021    DKA (diabetic ketoacidosis) ([address] 5/17/2022    Down's syndrome     Fluid overload 10/8/2021    GERD (gastroesophageal reflux disease)     H/O impacted cerumen     Dr. David Cruz    Headache     Hypoglycemia 6/27/2012    Hypothyroid     Mononucleosis     Pneumonia     Seizures ([address]     as a result of low blood glucose readings    Thyroid disease       Past Surgical History:   Procedure Laterality Date    HX CATARACT REMOVAL Bilateral 3/5/15    cataract left and right    HX CYST REMOVAL      right shoulder    HX HEENT Bilateral 2017    prior in one eye    HX HYSTERECTOMY  1987      Social History     Tobacco Use    Smoking status: Never Smoker    Smokeless tobacco: Never Used   Substance Use Topics    Alcohol use: No     Family History   Problem Relation Age of Onset    Thyroid Disease Mother         hypo    Hypertension Father     Abdominal aortic aneurysm Father     Neuropathy Father     No Known Problems Sister     Heart Disease Brother     Heart Attack Brother     Heart Surgery Brother     No Known Problems Sister     No Known Problems Brother     Anesth Problems Neg Hx       Allergies   Allergen Reactions    Codeine Nausea and Vomiting    Lettuce Other (comments)    Nitrofurantoin Rash    Pcn [Penicillins] Hives and Rash    Tomato Other (comments)      Current Facility-Administered Medications   Medication Dose Route Frequency    LORazepam (INTENSOL) 2 mg/mL oral concentrate 1 mg  1 mg Oral Q1H PRN    acetaminophen (TYLENOL) suppository 650 mg  650 mg Rectal Q4H PRN    ketorolac (TORADOL) injection 15 mg  15 mg IntraVENous Q8H PRN    glycopyrrolate (ROBINUL) injection 0.2 mg  0.2 mg IntraVENous Q4H PRN    bisacodyL (DULCOLAX) suppository 10 mg  10 mg Rectal DAILY PRN    HYDROmorphone (DILAUDID) injection 0.5 mg  0.5 mg IntraVENous Q15MIN PRN    sodium chloride (NS) flush 5 mL  5 mL IntraVENous PRN    HYDROmorphone (DILAUDID) injection 0.5 mg  0.5 mg IntraVENous Q3H    diazePAM (VALIUM) injection 5 mg  5 mg IntraVENous Q6H        PHYSICAL EXAM     Wt Readings from Last 3 Encounters:   06/02/22 58.5 kg (129 lb)   05/19/22 59.9 kg (132 lb 0.9 oz)   04/19/22 58.2 kg (128 lb 6.4 oz)       Visit Vitals  BP (!) 59/24 (BP 1 Location: Left arm, BP Patient Position: At rest)   Pulse 88   Temp (!) 100.6 °F (38.1 °C)   Resp 20   SpO2 96%       Supplemental O2  [x] Yes  [] NO  Last bowel movement: not known    Currently this patient has:  [x] Peripheral IV [] PICC  [] PORT [] ICD    [] Kaiser Catheter [] NG Tube   [] PEG Tube    [] Rectal Tube [] Drain  [] Other:     Constitutional: unresponsive; ill-appearing  Eyes: closed  ENMT:  Slightly dry oral mucosa  Cardiovascular: regular; murmur; distal pulses weak  Respiratory: course breath sounds  Gastrointestinal: soft; BSx4   Musculoskeletal: no contractures or gross deformities  Skin: warm to touch  Neurologic: unresponsive  Psychiatric:  Appears calm  Other: +LUE AV fistula      Pertinent Lab and or Imaging Tests:  Lab Results   Component Value Date/Time    Sodium 120 (L) 07/07/2022 06:04 AM    Potassium 6.3 (H) 07/07/2022 06:04 AM    Chloride 85 (L) 07/07/2022 06:04 AM    CO2 11 (LL) 07/07/2022 06:04 AM    Anion gap 24 (H) 07/07/2022 06:04 AM    Glucose 915 (HH) 07/07/2022 06:04 AM    BUN 71 (H) 07/07/2022 06:04 AM    Creatinine 7.63 (H) 07/07/2022 06:04 AM    BUN/Creatinine ratio 9 (L) 07/07/2022 06:04 AM    GFR est AA 7 (L) 07/07/2022 06:04 AM    GFR est non-AA 6 (L) 07/07/2022 06:04 AM    Calcium 8.8 07/07/2022 06:04 AM     Lab Results   Component Value Date/Time    Protein, total 6.9 07/07/2022 06:04 AM    Albumin 2.9 (L) 07/07/2022 06:04 AM           Jonathan Ser FNP-C

## 2022-07-07 NOTE — DIABETES MGMT
Diabetes health consulted for IP diabetes management. Patient is being discharged to hospice. We will cancel this consult.   If anything changes and our service is needed, please [employer] our team.

## 2022-07-07 NOTE — PROGRESS NOTES
Problem: Falls - Risk of  Goal: *Absence of Falls  Description: Document Sampson Fall Risk and appropriate interventions in the flowsheet.   Outcome: Progressing Towards Goal  Note: Fall Risk Interventions:       Mentation Interventions: Bed/chair exit alarm,Evaluate medications/consider consulting pharmacy,Door open when patient unattended,Family/sitter at [employer] frequent rounding,Increase mobility    Medication Interventions: Evaluate medications/consider consulting pharmacy    Elimination Interventions: Call light in reach,Bed/chair exit alarm              Problem: Patient Education: Go to Patient Education Activity  Goal: Patient/Family Education  Outcome: Progressing Towards Goal     Problem: Patient Education: Go to Patient Education Activity  Goal: Patient/Family Education  Outcome: Progressing Towards Goal     Problem: Hemorrhagic Stroke:  3-24 hours  Goal: Off Pathway (Use only if patient is Off Pathway)  Outcome: Progressing Towards Goal  Goal: Activity/Safety  Outcome: Progressing Towards Goal  Goal: Consults, if ordered  Outcome: Progressing Towards Goal  Goal: Diagnostic Test/Procedures  Outcome: Progressing Towards Goal  Goal: Nutrition/Diet  Outcome: Progressing Towards Goal  Goal: Discharge Planning  Outcome: Progressing Towards Goal  Goal: Medications  Outcome: Progressing Towards Goal  Goal: Respiratory  Outcome: Progressing Towards Goal  Goal: Treatments/Interventions/Procedures  Outcome: Progressing Towards Goal  Goal: Psychosocial  Outcome: Progressing Towards Goal  Goal: *Hemodynamically stable  Outcome: Progressing Towards Goal  Goal: *Verbalizes anxiety and depression are reduced or absent  Outcome: Progressing Towards Goal  Goal: *Absence of aspiration  Outcome: Progressing Towards Goal  Goal: *Absence of signs and symptoms of DVT  Outcome: Progressing Towards Goal  Goal: *Optimal pain control at patient's stated goal  Outcome: Progressing Towards Goal  Goal: *Tolerating diet  Outcome: Progressing Towards Goal  Goal: *Progressive mobility and function (eg: ADL's)  Outcome: Progressing Towards Goal  Goal: *Rehabilitation readiness  Outcome: Progressing Towards Goal     Problem: Hemorrhagic Stroke: Discharge Outcomes  Goal: *Verbalizes anxiety and depression are reduced or absent  Outcome: Progressing Towards Goal  Goal: *Verbalize understanding of risk factor modification(Stroke Metric)  Outcome: Progressing Towards Goal  Goal: *Optimal pain control at patient's stated goal  Outcome: Progressing Towards Goal  Goal: *Hemodynamically stable  Outcome: Progressing Towards Goal  Goal: *Absence of aspiration pneumonia  Outcome: Progressing Towards Goal  Goal: *Aware of needed dietary changes  Outcome: Progressing Towards Goal  Goal: *Verbalizes understanding and describes medication purposes and frequencies  Outcome: Progressing Towards Goal  Goal: *Tolerating diet  Outcome: Progressing Towards Goal  Goal: *Absence of signs and symptoms of DVT  Outcome: Progressing Towards Goal  Goal: *Absence of aspiration  Outcome: Progressing Towards Goal  Goal: *Progressive mobility and function  Outcome: Progressing Towards Goal  Goal: *Home safety concerns addressed  Outcome: Progressing Towards Goal     Problem: Non-Violent Restraints  Goal: Removal from restraints as soon as assessed to be safe  Outcome: Progressing Towards Goal  Goal: No harm/injury to patient while restraints in use  Outcome: Progressing Towards Goal  Goal: Patient's dignity will be maintained  Outcome: Progressing Towards Goal  Goal: Patient Interventions  Outcome: Progressing Towards Goal     Problem: Diabetes Self-Management  Goal: *Disease process and treatment process  Description: Define diabetes and identify own type of diabetes; list 3 options for treating diabetes.   Outcome: Progressing Towards Goal  Goal: *Incorporating nutritional management into lifestyle  Description: Describe effect of type, amount and timing of food on blood glucose; list 3 methods for planning meals. Outcome: Progressing Towards Goal  Goal: *Incorporating physical activity into lifestyle  Description: State effect of exercise on blood glucose levels. Outcome: Progressing Towards Goal  Goal: *Developing strategies to promote health/change behavior  Description: Define the ABC's of diabetes; identify appropriate screenings, schedule and personal plan for screenings. Outcome: Progressing Towards Goal  Goal: *Using medications safely  Description: State effect of diabetes medications on diabetes; name diabetes medication taking, action and side effects. Outcome: Progressing Towards Goal  Goal: *Monitoring blood glucose, interpreting and using results  Description: Identify recommended blood glucose targets  and personal targets. Outcome: Progressing Towards Goal  Goal: *Prevention, detection, treatment of acute complications  Description: List symptoms of hyper- and hypoglycemia; describe how to treat low blood sugar and actions for lowering  high blood glucose level. Outcome: Progressing Towards Goal  Goal: *Prevention, detection and treatment of chronic complications  Description: Define the natural course of diabetes and describe the relationship of blood glucose levels to long term complications of diabetes.   Outcome: Progressing Towards Goal  Goal: *Developing strategies to address psychosocial issues  Description: Describe feelings about living with diabetes; identify support needed and support network  Outcome: Progressing Towards Goal  Goal: *Insulin pump training  Outcome: Progressing Towards Goal  Goal: *Sick day guidelines  Outcome: Progressing Towards Goal  Goal: *Patient Specific Goal (EDIT GOAL, INSERT TEXT)  Outcome: Progressing Towards Goal     Problem: Patient Education: Go to Patient Education Activity  Goal: Patient/Family Education  Outcome: Progressing Towards Goal

## 2022-07-07 NOTE — HOSPICE
[facility]  [phone]    Inpatient Nursing Admission   Patient Name: Wood Gr  YOB: 1970  Age: 45 y.o. Date of Hospice Admission: 7/7/2022  Hospice Attending Elected by Patient: Jermaine Godinez MD  Primary Care Physician: Guillermo Del Cid MD  Admitting RN: Joana Springer RN  : Dougie Powers     Level of Care (GIP/Routine/Respite): [facility]  Facility of Care: [facility]  Patient Room: [facility]/     HOSPICE SUMMARY   ER Visits/ Hospitalizations in past year: 4  Hospice Diagnosis: Intracranial hemorrhage ([facility] [I62.9]  Onset Date of Hospice Diagnosis: 7/5/22  Summary of Disease Progression Leading to Hospice Diagnosis:   65-year-old female with history of Down syndrome, IDDM with previous admissions for DKA, ESRD on HD (MWF, last on Monday), asthma, GERD who was brought to the ED by EMS from her dialysis center for suspected allergic reaction, altered mental status. Per brother she was complaining of a headache last night and took Tylenol. This morning, patient was agitated, refusing to go to dialysis. Dialysis was not initiated due to patient's condition. He states that she is normally able to communicate. CT Head revealed intraparenchymal hemorrhage left parietal lobe. Pt never regained consciousness and family asked for comfort care under hospice. Co-Morbidities:   Patient Active Problem List   Diagnosis Code    Down syndrome Q90.9    Acquired hypothyroidism E03.9    Anemia, unspecified D64.9    DM (diabetes mellitus), type 1, uncontrolled [id number]    Asthma J45.909    HTN (hypertension) I10    Acne L70.9    Advance directive on file Z78.9    ROBBY (acute kidney injury) ([facility] [facility] M75.7    Metabolic acidosis O06.1    Asthma exacerbation J45. 0    ESRD on dialysis ([facility] N18.6, Z99.2    Severe anemia D64.9    Hyperglycemia due to type 1 diabetes mellitus (HCC) E10.65    After-cataract with vision obscured H26.499    ICH (intracerebral hemorrhage) (HCC) I61.9    Intracranial hemorrhage (HCC) I62.9     Diagnoses RELATED to the terminal prognosis: ESRD, DKA  Other Diagnoses: Downs Syndrome, Anemia    Rationale for a prognosis of life expectancy of 6 months or less if the disease follows its normal course (Disease Specific History):     Aakash Gr is a 45 y.o. who was admitted to [facility]. The patient's principle diagnosis of intracranial hemorrhage has resulted in unresponsiveness, dyspnea, pain. Functionally, the patient's Palliative Performance Scale has declined over a period of 3 days and is estimated at 10%. Objective information that support this patients limited prognosis includes:     CT Head 7/6/22  IMPRESSION  1. Study significantly limited by motion. 2.  Left parietal intraparenchymal hemorrhage. No evidence of herniation. 3.  Stable left parafalcine cystic lesion. The patient/family chose comfort measures with the support of Hospice. Patient meets for GIP LOC as evidenced by: Dyspnea, nonverbal pain, unresponsiveness    Prognosis estimated based on 07/07/22 clinical assessment is:   [x] Few to Many Hours  [] Hours to Days   [] Few to Many Days   [] Days to Weeks   [] Few to Many Weeks   [] Weeks to Months   [] Few to Many Months    ASSESSMENT    Patient self-reports:  []  Yes    [x] No    SYMPTOMS: Dyspnea, nonverbal pain, unresponsiveness    SIGNS/PHYSICAL FINDINGS: Pt is unresponsive to physical stimulation, breathing labored using accessory muscles, requiring NRB mask 12L/min. Heart rate tachycardic, hypotensive. Skin pale, trace edema, extremities cool, cyanotic. Abdomen distended. AV Fistula left upper arm. Anuric. Pulses weak. KARNOFSKY: 10%    FAST for all dementia:      Learning Assessment:  Patient  Is patient willing/able to learn? no  What is the highest level of education completed? Learning preference (written material, demonstration, visual)?   Learning barriers (ESOL, [address], poor vision)? Caregiver  Is caregiver willing to learn care for patient? yes  What is the highest level of education completed? Learning preference (written material, demonstration, visual)? Learning barriers (ESOL, [place], poor vision)? CLINICAL INFORMATION     Wt Readings from Last 3 Encounters:   06/02/22 58.5 kg (129 lb)   05/19/22 59.9 kg (132 lb 0.9 oz)   04/19/22 58.2 kg (128 lb 6.4 oz)     Ht Readings from Last 3 Encounters:   06/02/22 4' 11\" (1.499 m)   04/19/22 4' 11\" (1.499 m)   04/09/22 4' 11\" (1.499 m)     There is no height or weight on file to calculate BMI. There were no vitals taken for this visit. LAB VALUES  No results found for this visit on 07/07/22 (from the past 12 hour(s)). No results found for this visit on 07/07/22 (from the past 6 hour(s)). Lab Results   Component Value Date/Time    Protein, total 6.9 07/07/2022 06:04 AM    Albumin 2.9 (L) 07/07/2022 06:04 AM       Currently this patient has:  [x] Supplemental O2 [x] Peripheral IV  [] PICC    [] PORT   [] Kaiser Catheter [] NG Tube   [] PEG Tube [] Ostomy    [] AICD: Has ICD been deactivated? [] Yes [] No:______    PLAN     1. Admit to GIP for dyspnea, nonverbal pain. 2. Start Dilaudid 0.5mg IV every 3 hrs and PRN for dyspnea, pain  3. Start Valium 5mg IV every 6 hrs for seizure prophylaxis, Ativan 1mg sl q15 min PRN  4. Comfort order set as needed. 5. NRB 12L continuous and wean to face tent for comfort. 6. Turn and reposition q4 hours, frequent oral care. 7. Support and educate family at bedside. 8. MSW and Yajaira Lopezid visits for emotional suppport. Hospice Team Frequency Orders:  Skilled Nurse - Daily x 7 days with 5 PRN visits for symptom control. MSW - 1 visit for initial assessment/evaluation for family support and need for volunteer services. Kenneth Haven - 1 visit for initial assessment/evaluation for spiritual support.      ADVANCE CARE PLANNING (Complete in ACP Flow Sheet)   Code Status: DNR  Durable DNR: [x]  Yes []  No  Code Status Discussed/Confirmed: yes  Preference for Other Life Sustaining Treatment Discussed/Confirmed: yes  Hospitalization Preference:  (ex. Patient would like to receive end of life care in the hospital, in a facility, at home etc.)  Advance Care Planning 2022   Patient's [address] is: -   Primary Decision Maker Name -   Primary Decision Maker Phone Number -   Primary Decision Maker Relationship to Patient -   Secondary Decision [facility] Name -   Secondary Decision [facility] Phone Number -   Confirm Advance Directive None   Patient Would Like to Complete Advance Directive No   Does the patient have other document types Do Not Resuscitate        Service: [] Yes  [x]  No      [] Unknown  Appropriate for Pinning Ceremony:  [] Yes     [] No  Sabianism: NO Jain   Home: Richar MARIE, [place]    DISCHARGE PLANNING     1. Discharge Plan: Remain inpatient as not stable for transfer to [facility]  2. Patient/Family teaching: signs and symptoms of end of life, medications to manage  3. Response to patient/family teaching: Accepting and agree to plan of care. SOCIAL/EMOTIONAL/SPIRITUAL NEEDS     Spiritual Issues Identified: None.  support for family appreciated    Psych/ Social/ Emotional Issues Identified: Pt lived with mother at [facility]. She has 5 siblings and neices and nephews and cherished by all. Loved music, dogs, making people laugh. Caregiver Support:  [x] Provided information on End of Life Care   [x] Material Provided: Fareed Olmedo From My Sight or [facility]     CARE COORDINATION   Mary Crawford DNP contacted, discharge to hospice order received  Dr. Jorden Spatz contacted, agrees to serve as attending provider for hospice and provided verbal certification of terminal illness with life expectancy of 6 months or less. Orders for hospice admission, medications and plan of treatment received. Medication reconciliation completed.   MEDS: See medication list below  DME: Per hospital  Supplies: Per hospital  IDT communication to include MD, SN, SW, CH and support team    ALLERGIES AND MEDICATIONS     Allergies:    Allergies   Allergen Reactions    Codeine Nausea and Vomiting    Lettuce Other (comments)    Nitrofurantoin Rash    Pcn [Penicillins] Hives and Rash    Tomato Other (comments)     Current Facility-Administered Medications   Medication Dose Route Frequency    LORazepam (INTENSOL) 2 mg/mL oral concentrate 1 mg  1 mg Oral Q1H PRN    acetaminophen (TYLENOL) suppository 650 mg  650 mg Rectal Q4H PRN    ketorolac (TORADOL) injection 15 mg  15 mg IntraVENous Q8H PRN    glycopyrrolate (ROBINUL) injection 0.2 mg  0.2 mg IntraVENous Q4H PRN    bisacodyL (DULCOLAX) suppository 10 mg  10 mg Rectal DAILY PRN    HYDROmorphone (DILAUDID) injection 0.5 mg  0.5 mg IntraVENous Q15MIN PRN    sodium chloride (NS) flush 5 mL  5 mL IntraVENous PRN    HYDROmorphone (DILAUDID) injection 0.5 mg  0.5 mg IntraVENous Q3H    diazePAM (VALIUM) injection 5 mg  5 mg IntraVENous Q6H

## 2022-07-07 NOTE — CONSULTS
Palliative medicine~    Discussed pt care w/ Margarito Bryant RN w/ hospice and she is about to have family meeting- they are requesting comfort measures. They want to keep pt alive until mother can come see her, but also want to ensure that she is peaceful when mother comes. Right now Margarito Bryant reports some labored breathing (on NRB)- adding Dilaudid 0.5mg IV every 15 min prn pain or SOB. Anticipate IP hospice admission, however if not Margarito Bryant will contact me and I will see patient.

## 2022-07-07 NOTE — PROGRESS NOTES
Problem: Falls - Risk of  Goal: *Absence of Falls  Description: Document Danne Lobe Fall Risk and appropriate interventions in the flowsheet. Outcome: Progressing Towards Goal  Note: Fall Risk Interventions:       Mentation Interventions: Adequate sleep, hydration, pain control,Bed/chair exit alarm    Medication Interventions: Bed/chair exit alarm    Elimination Interventions: Bed/chair exit alarm       Problem: Pressure Injury - Risk of  Goal: *Prevention of pressure injury  Description: Document Raul Scale and appropriate interventions in the flowsheet. Outcome: Progressing Towards Goal  Note: Pressure Injury Interventions:  Sensory Interventions: Check visual cues for pain    Moisture Interventions: Check for incontinence Q2 hours and as needed,Apply protective barrier, creams and emollients    Activity Interventions: Assess need for specialty bed    Mobility Interventions: Assess need for specialty bed    Nutrition Interventions: Document food/fluid/supplement intake    Friction and Shear Interventions: HOB 30 degrees or less       Problem: Hospice Orientation  Goal: Demonstrate understanding of hospice philosophy, plan of care, and home hospice program  Description: The patient/family/caregiver will demonstrate understanding of hospice philosophy, plan of care and the home hospice program as evidenced by participation in meeting the patient's psychosocial, spiritual, medical, and physical needs inclusive of medical supplies/equipment focusing on symptoms. Outcome: Progressing Towards Goal     Problem: Anticipatory Grief  Goal: Explore reactions to and verbalize acceptance of impending loss  Description: Patient/family/caregiver will explore reactions to and verbalize acceptance of impending loss.   Outcome: Progressing Towards Goal     Problem: Breathing Pattern - Ineffective  Goal: *Use of effective breathing techniques  Outcome: Progressing Towards Goal     Problem: Seizures  Goal: *STG: Remains free of seizure activity  Outcome: Progressing Towards Goal  Goal: *STG: Remains free of injury during seizure activity  Outcome: Progressing Towards Goal  Goal: Interventions  Outcome: Progressing Towards Goal     Problem: Dyspnea Due to End of Life  Goal: Demonstrate understanding of and ability to manage respiratory symptoms at end of life  Outcome: Progressing Towards Goal     Problem: Imminent Death  Goal: Collaborate with patient/family/caregiver/interdisciplinary team to minimize and manage end of life symptoms  Outcome: Progressing Towards Goal

## 2022-07-07 NOTE — PROGRESS NOTES
TRANSFER - OUT REPORT:    Verbal report given to Felizardo Essex RN (name) on Rekha Gr  being transferred to [address] [place] [address].  Report consisted of patients Situation, Background, Assessment and   Recommendations(SBAR). Information from the following report(s) SBAR, Kardex, Intake/Output, MAR, Recent Results and Med Rec Status was reviewed with the receiving nurse. Lines:   Peripheral IV 07/06/22 Right Wrist (Active)   Site Assessment Clean, dry, & intact 07/07/22 0800   Phlebitis Assessment 0 07/07/22 0800   Infiltration Assessment 0 07/07/22 0800   Dressing Status Clean, dry, & intact 07/07/22 0800   Dressing Type Transparent 07/07/22 0800   Hub Color/Line Status Pink 07/07/22 0800   Action Taken Open ports on tubing capped 07/07/22 0800   Alcohol Cap Used Yes 07/07/22 0800       Peripheral IV 07/06/22 Right Hand (Active)   Site Assessment Clean, dry, & intact 07/07/22 0800   Phlebitis Assessment 0 07/07/22 0800   Infiltration Assessment 0 07/07/22 0800   Dressing Status Clean, dry, & intact 07/07/22 0800   Dressing Type Transparent 07/07/22 0800   Hub Color/Line Status Blue 07/07/22 0800   Action Taken Open ports on tubing capped 07/07/22 0800   Alcohol Cap Used Yes 07/07/22 0800     Opportunity for questions and clarification was provided.

## 2022-07-07 NOTE — PROGRESS NOTES
Received call from Dr. Fern Fenton that family now wishes to stop comfort measures and resume all aggressive measures and is requesting ICU consult. I will call family now and see where they are in their decision making process, and if family wishes, we will transfer to ICU.        Gene HENRY-BC     Critical Care Medicine  [employer]

## 2022-07-07 NOTE — PROGRESS NOTES
[address] [facility] Help to Those in Need  [phone]    Social Work Admission Note  Patient Name: Rubie Aase Noftsinger  YOB: 1970  Age: 45 y.o. Date of Visit: 22  Facility of Care: [facility]  Patient Room: [facility]/     Hospice Attending: Charlie Overton MD  Hospice Diagnosis: Intracranial hemorrhage ([address] [I62.9]    Level of Care:    [x]  GIP    []  Respite   []  Routine    Consents/NCD Documentation:     Consents Reviewed:   [x]  Yes  []  No, completed by other hospice staff member. Person Reviewed/Signed with:  []  Patient   [x]  Pts NOK/MPOA  Name: Vignesh Gr, brother/MPOA    Right to NCD Reviewed:   [x]  Yes  []  No, completed by other hospice staff member. NCD Requested:   []  Yes  [x]  No    Admission Nurse/Intake Notified NCD was requested:  []  Yes  []  No  [x]  Not requested    Planned Start of Care Date: 2022    Hospice Witness Representative: Enma Bañuelosmaude   Pt is a 69-year-old female admitted to inpatient hospice on 2022 with a hospice diagnosis of Intracranial Hemorrhage. Pt was admitted to hospital on 2022 for suspected allergic reaction. Prior to hospitalization, pt lived at the [facility] with her mother Kirk Gold. Mother Kirk Gold is pt's primary MPOA, however family reports she has dementia and difficulty seeing and hearing. Secondary MPOAs include brother Vignesh Walsh and sister Vinod Garcia. LMSW met with pt's siblings Feng Pack, and Deandra Haq to discuss inpatient hospice. Family is familiar, as their father  under inpatient hospice services at [facility] in 2017. Family in agreement to transition to hospice as they want pt to be comfortable. Family reflected that pt was an amazing  of character and very intelligent. Family reported mother Kirk Gold already thinks pt is dead and is unlikely to visit. LMSW provided emotional support and supportive counseling in processing pt's prognosis. Pt is DNR code status with DDNR on chart.   FH is TBD, family is reviewing cremation options. LMSW will continue to provide support.     ADVANCE CARE PLANNING    Advance Directive:  []  Yes  []  No   []  Would like to complete  Primary MPOA: Sole Bolivar, mother (has dementia and unable to sign)  Secondary MPOA: Timoteo Gr and Jj Beards, siblings  LNOK:   Code Status: DNR  Durable DNR: X_ Yes  _ No  Advance Care Planning 2022   Patient's Healthcare Decision Maker is: -   Primary Decision Maker Name -   Primary Decision Maker Phone Number -   Primary Decision Maker Relationship to Patient -   Secondary Decision Maker Name -   Secondary Decision Maker Phone Number -   Confirm Advance Directive None   Patient Would Like to Complete Advance Directive No   Does the patient have other document types Do Not Resuscitate       Relationship Status:  [x]  Single     []        []      []  Domestic Partner     []  /  []  Common Law  []    []  Unknown    If in a relationship, name of partner/spouse:  Duration of relationship:    Samaritan/Spirituality: NO Mormonism  []  Active In Samaritan/Spirituality   []  Not Active   [x]  N/A  Notes:     Home: TBD, family looking into cremation options  Resources Provided:  []  [facility]  []  Information on applying for disability  []  FMLA Paperwork  []  Letters Requested by [facility]   []  Christian Werner  []  Final Arrangements Resources   []  Outside counseling services (individual or group therapy)  []  Grief resources   []  Juan [facility] Duran  []  [facility]   []  [facility]   [x]  [facility] [employer]   []  Referral Sent to [email or web address]   []  Referral Sent to Music Therapy   []  Referral Sent to Pet Therapy  []  Other  Social Work Initial Assessment     Sex:  female    Pronoun Preference:   []  He/Him/His   [x]  She/Her/Hers   []  They/Them/Theirs   []   Patient Name   []   Decline to Answer  []   Unknown  []   Other   Notes:     Race/Ethnicity: (amelia all that apply)  []  American [place] ([facility]) or [place] Native  []    []  Black or [place] American  []   or   []   or [place]  [x]  White  []  Unknown  []  Other     Inpatient Financial Agreement Completed:   []  Yes  [x]  No    Insurance:   [x]  Medicare   []  Medicaid     []  [employer]    []  Self-Pay/Uninsured   Notes:      Has pt applied for FAP? []  Needs to Apply  []  Application Completed and Submitted   []  Approved/ Expiration Date:   [x]  N/A  Notes:     Has pt applied for Medicaid? []  Needs to Apply  []  Application Completed and Submitted/Application Number:   []  N/A  Notes:     Has a long term care screening ([facility]) been requested? []  Requested   []  Not Requested, Needs follow up  []  Completed  [x]  N/A  Notes:     Does the pt have a long term care insurance policy? []  Yes  [x]  No  []  Yes, Needing assistance with paperwork  Notes:      Service:    []  Yes   [x]  No       []  Unknown    Appropriate for Pinning Ceremony:   []  Yes      [x]  No    Is patient using VA benefits? []  Yes      [x]  No  []  Needs assistance with accessing benefits  Notes:       Work History:   []  Full-Time/Part-Time  []  Retired   [x]  Other  Notes:     Primary Language: English  []   Needed  []   utilized during visit  []   Waived/ form completed    Ability to express thoughts/needs/feelings  []  Expressed thoughts/feelings/needs without difficulty  []  Requires extra time and cuing  []  Speech limited single words  []  Uses only gestures (eye, blinking eye or head movement/pointing)  []  Unable to express thoughts/feelings/needs (speech unintelligible or inappropriate)  [x]  Unresponsive  Notes:      Mental Status:  []  Alert-oriented to:     []  Person     []  Place     []  Time  []  Comatose-responds to:    []   Verbal stimuli    []  Tactile stimuli    []  Painful stimuli  []  Forgetful  []  Disoriented/Confused  []  Lethargic  [] Agitated  [x]  Unresponsive  []  Other (specify):    Notes:      Patients description of Illness/Current Health Status:    [x]  Patient unable to discuss  []  Patient unwilling to discuss  []  (Specify)        Knowledge/Understanding of Disease Process  Patient:    []  Demonstrates knowledge/understanding of disease process   []  Demonstrates knowledge/understanding of treatment plan   []  Demonstrates knowledge/understanding of prognosis   []  Demonstrates acceptance of prognosis   []  Demonstrates knowledge/understanding of resuscitation status   [x]  Other (specify) unresponsive   Caregiver:   [x]  Demonstrates knowledge/understanding of disease process   [x]  Demonstrates knowledge/understanding of treatment plan   [x]  Demonstrates knowledge/understanding of prognosis   [x]  Demonstrates acceptance of prognosis   [x]  Demonstrates knowledge/understanding of resuscitation status   []  Other (specify)  Notes:      Patients living arrangement/care setting:  Use the PRIOR COLUMN when the PATIENTS current health status necessitated a change in his/her primary residence.     Prior Current Response              []             []    Patients own home/residence              []             []    Home of family member/friend              []             []    Boarding home/Group Home              []             []    Assisted living facility/[facility] center              []             []    Hospital/Acute care facility              []             []    Skilled nursing facility              [x]             []    Long term care facility/Nursing home              []             [x]    Hospice in Patient      Primary Caregiver:  []  No Primary Caregiver  Name of Primary Caregiver: Timoteo Gr  Primary Caregiver Phone Number: [phone]  Relationship or Primary Caregiver:    []  Spouse/Significant other       []  Child        []  Step child       [x]  Sibling   []  Parent   []  Friend/Neighbor   []  Community/Taoism Volunteer   []  Paid help   []  Other (specify):___________  Notes:       Family members/Significant others:  Name:  Relationship:  Phone Number:  Actively involved in care? []  Yes  []  No    Name:  Relationship:  Phone Number:  Actively involved in care?   []  Yes  []  No    Social support systems: (select ONE best description)  [x]  Excellent social support system which includes three or more family members or friends  []  Good social support system which includes two or less members or friends  []  [address] support which includes one family member or friend  []  Poor social support; no family members or friends; basically ALONE  Notes:      Emotional Status: (amelia all that apply)    Patient Caregiver Response                 []                [x]    Mood/Affect stable and appropriate                   []                []    Angry                 []                []    Anxious                 []                []    Apprehensive                 []                []    Avoidant                 []                []    Clinging                 []                []    Depressed                 []                []    Distraught                 []                []    Fearful                 []                []    Flat Affect                 []                []    Helpless                 []                []    Hostile                 []                []    Impulsive                 []                []    Irritable                 []                []    Labile                 []                []    Manic                 []                []    Restlessness                 []                [x]    Sad                 []                []    Strain/Stress                 []                []    Suspicious                 []                [x]     Tearful                 []                 []     Withdrawn          Notes:     Coping Skills (strengths/weakness):    Patient: Coping Skills (strength/weakness): Good familial support   Family/caregiver (strength/weakness): good familial support, sudden decline     Salome of care upon discharge (amelia all that apply):     []  No burden evident   []  Family must administer medications   []  Illness causing financial strain   [x]  Family/Support feels overwhelmed   []  Family/Support sleep disturbed with patients care   []  Patients care causes extra physical stress  of death  []  Illness causes changes in family lifestyle  []  Illness impacting family/support employment  []  Family experiencing increased time demands  []  Patients behavior endangers family  []  Denial of patients illness  []  Concern over outcome of illness/fear  []  Patients behavior embarrassing to family   Notes:      Risk Factors: (amelia all that apply):    [x]  No burden evident   []  Alcohol abuse  []  Financial resources inadequate to meet basic needs (food/house/etc)  []  Financial resources inadequate to meet health care needs (supplies/equipment/medications)  []  Food/nutrition resources inadequate  []  Home environment unsafe/inadequate for home care  []  Homicidal risk  []  Lives alone or without concerned relatives  []  Multiple medications/complex schedule  []  Physical limitations increase likelihood of falls  []  Plan of care/treatments complicated  []  Substance use/abuse  []  Suicidal risk  []  Visual impairment threatens safety/ability to perform self-care  []  Other (specify):     Abuse/Neglect (actual/potential risks):  [x]  No signs of abuse/neglect  []  History of abuse/neglect                 []  Physical          []  Sexual  []  History of domestic violence  []  Lacks adequate physical care  []  Lacks emotional nurturing/support  []  Lacks appropriate stimulation/cognitive experiences  []  Left alone inappropriately  []  Lacks necessary supervision  []  Inadequate or delayed medical care  []  Unsafe environment (i.e guns/drug use/history of violence in the home/etc.)  []  Bruising or other physical signs of injury present  []  Refer to child/adult protective services  []  Other (specify):   Notes:      Current Sources of Stress (in Addition to Current Illness):   [x]  None reported  []  Bills/Debt    []  Career/Job change    []   (short term)    []   (long term)    []  Death of a child (recent)    []  Death of a parent (recent)   []  Death of a spouse (recent)   []  Employment status changed   []  Family discord    []  Financial loss/Inadequate inther (specify):come  []  Job loss  []  Legal issues unresolved  []  Lifestyle change  []  Marital discord  []  Marriage within the last year  []  Paperwork (insurance/legal/etc) overwhelming  []  Separation/Divorce  []  Other (specify):  Notes:       Interventions/Plan of Care   [x]  MSW will assess social and emotional factors related to coping with end of life issues  []  MSW will provide community resource planning/referral   []  MSW to assist with relocation to different care setting if/when symptoms stabilize  [x]  Pt/Pts family will receive emotional support. []  Pt/Pts family will share the details surrounding the pts disease progression  []  Pt/Pts Family will show expression of grief by participating in life review. []  Pt/Pts Family will be educated and able to verbalize understanding of mental, emotional, and physical symptoms of grief. []  Pt/Pts Family will be educated and able to identify skills and social support to help cope with grief. []  Pts family will receive support for grief with emphasis on developmentally appropriate language.   [] Other:   Notes:       Discharge Planning   []  Home with family member    [x]  Return back to nursing home/facility  []  Needs assistance with placement/paid caregivers  []  Short Stay under routine level of care at [facility]   []  Other  Notes:     MSW Assessment Completed by: Yasemin Carreno LMSW  07/07/22    Time In: 10:45 am       Time Out: 11:30 am

## 2022-07-07 NOTE — PROGRESS NOTES
Problem: Falls - Risk of  Goal: *Absence of Falls  Description: Document Alcantara Reason Fall Risk and appropriate interventions in the flowsheet.   Outcome: Progressing Towards Goal  Note: Fall Risk Interventions:       Mentation Interventions: Bed/chair exit alarm    Medication Interventions: Bed/chair exit alarm    Elimination Interventions: Bed/chair exit alarm              Problem: Patient Education: Go to Patient Education Activity  Goal: Patient/Family Education  Outcome: Progressing Towards Goal     Problem: Patient Education: Go to Patient Education Activity  Goal: Patient/Family Education  Outcome: Progressing Towards Goal     Problem: Hemorrhagic Stroke:  3-24 hours  Goal: Off Pathway (Use only if patient is Off Pathway)  Outcome: Progressing Towards Goal  Goal: Activity/Safety  Outcome: Progressing Towards Goal  Goal: Consults, if ordered  Outcome: Progressing Towards Goal  Goal: Diagnostic Test/Procedures  Outcome: Progressing Towards Goal  Goal: Nutrition/Diet  Outcome: Progressing Towards Goal  Goal: Discharge Planning  Outcome: Progressing Towards Goal  Goal: Medications  Outcome: Progressing Towards Goal  Goal: Respiratory  Outcome: Progressing Towards Goal  Goal: Treatments/Interventions/Procedures  Outcome: Progressing Towards Goal  Goal: Psychosocial  Outcome: Progressing Towards Goal  Goal: *Hemodynamically stable  Outcome: Progressing Towards Goal  Goal: *Verbalizes anxiety and depression are reduced or absent  Outcome: Progressing Towards Goal  Goal: *Absence of aspiration  Outcome: Progressing Towards Goal  Goal: *Absence of signs and symptoms of DVT  Outcome: Progressing Towards Goal  Goal: *Optimal pain control at patient's stated goal  Outcome: Progressing Towards Goal  Goal: *Tolerating diet  Outcome: Progressing Towards Goal  Goal: *Progressive mobility and function (eg: ADL's)  Outcome: Progressing Towards Goal  Goal: *Rehabilitation readiness  Outcome: Progressing Towards Goal

## 2022-07-07 NOTE — HOSPICE
Called to room by family member. Pt found not breathing, no apical pulse, unresponsive to stimuli, pupils fixed and nonreactive. Pronounced  at 17:50 by Wesley Canela, RN and Bonnie Chen, TOOTIE under direction of Dr. Dulce Ma. Family at bedside,  called for visit. MD, nursing barrett and Katharine contacted. Donation to [employer] pending family approval.  Cintia Schultz [address] [place] [address] to serve. Bereavement low.

## 2022-07-07 NOTE — H&P
[facility]    History & Physical      Date of admission: 7/6/2022    Patient name: Tania Everett  MRN: [id number]  YOB: 1970  Age: 45 y.o. Primary care provider:  Chavez Carranza MD     Source of Information: patient, ED and electronic medical records                              Chief complaint:  Patient cannot provide secondary to AMS. History of present illness  Tania Everett is a 45 y.o. female with asthma, ESRD on hemodialysis, type 2 diabetes mellitus, h/o  DKA, Down's syndrome, GERD, hypothyroidism, pneumonia, seizures presented with initial reports of facial swelling with suspected allergic reaction. Patient reportedly was transported via EMS from hemodialysis center for the same, given Benadryl IV. Per additional reports, patient's brother noted that patient had recent headache as main complaint. On arrival in the ED, initial recorded vital signs were T 100.4 F, /77, HR 90, RR 24, O2sat 97% on 3 liters O2 nasal cannula . Patient notedly then placed on 100% non re-breather face mask. VBG showed pH 7.24/ pCO2 49.8/ pO2 38, HCO3 21.1, base deficit - 6.6, and Svo2 38%. Tmax increased to 102.1 F. Abnormal labs included Na 125, , BUN 46, creatinine 5.93, GFR 7, and lactic acid 4.5. Chest xray portable showed increased interstitial bilateral opacities likely pulmonary edema, per radiologist report. COVID 19 test was negative (not detected). Blood cultures were collected. Patient was seen in consultation by nephrologist who per note, discussed prognosis with family and they would like to hold HD and discuss hospice measures. CT head without IV contrast showed a 3.5 cm x 3.0 cm x 2.4 cm intraparenchymal hemorrhage with surrounding vasogenic edema in the left parietal lobe; stable 4.3 cm x 3.9 cm x 3.4 cm lobulated cystic lesion in the parafalcine left frontoparietal junction.   Initial plan was for Nicardipine IV drip for HTN emergency, Keppra 250 mg IV daily and ICU admission (as ED discussed with intensivist). Patient was seen in consultation by neurosurgeon who per consultant note, discussed with (patient's family) initial workup to include blood pressure control, frequent neurologic checks, intensive care unit admission, MRI to determine underlying lesion, potential surgery if underlying lesion were found, potential surgery if increase in hemorrhage but they (patient's family) said that they would not like to pursue any aggressive treatment and they would rather consider hospice care. Per ED MD note, per his discussion with family,\" they would like the patient to be in hospice; would not like any invasive procedures; per neurosurgery and ICU, can admit to hospitalist service and  family would like to change to comfort care, no dialysis. Family agreeable to benzos and fentanyl for pain/sedation but aware that this may cause respiratory depression and emphasize that they want patient comfortable. \".  ED requested admission to hospitalist service and per discussion with ED MD, plan is for comfort care measures. Patient is now seen for admission. Family were not present in the room at the time of examination to obtain additional history.            Past Medical History:   Diagnosis Date    Acute respiratory failure with hypoxia ([address] 10/25/2021    Allergic rhinitis, cause unspecified     AMS (altered mental status) 4/4/2022    Asthma     use inhalers    CKD (chronic kidney disease), stage V ([address] 10/8/2021    Diabetes ([address]     Diabetic macular edema ([address] 2/4/2016    DKA (diabetic ketoacidosis) ([address] 12/4/2021    DKA (diabetic ketoacidosis) ([address] 5/17/2022    Down's syndrome     Fluid overload 10/8/2021    GERD (gastroesophageal reflux disease)     H/O impacted cerumen     Dr. Colton Goldmann    Headache     Hypoglycemia 6/27/2012    Hypothyroid     Mononucleosis     Pneumonia     Seizures ([facility]     as a result of low blood glucose readings    Thyroid disease       Past Surgical History:   Procedure Laterality Date    HX CATARACT REMOVAL Bilateral 3/5/15    cataract left and right    HX CYST REMOVAL      right shoulder    HX HEENT Bilateral 2017    prior in one eye    HX HYSTERECTOMY  1987     Prior to Admission medications    Medication Sig Start Date End Date Taking? Authorizing Provider   insulin lispro (HumaLOG KwikPen Insulin) 100 unit/mL kwikpen 10 Units by SubCUTAneous route Daily (before dinner). 8 units before breakfast, 8 units before lunch and 10 units before dinner    Provider, Historical   insulin lispro (HumaLOG KwikPen Insulin) 100 unit/mL kwikpen 8 Units by SubCUTAneous route Daily (before breakfast). 8 units before breakfast, 8 units before lunch and 10 units before dinner    Provider, Historical   ondansetron hcl (ZOFRAN) 4 mg tablet Take 4 mg by mouth every four (4) hours as needed for Nausea or Vomiting. Provider, Historical   insulin lispro (HumaLOG KwikPen Insulin) 100 unit/mL kwikpen 8 Units by SubCUTAneous route Daily (before lunch). 8 units before breakfast, 8 units before lunch and 10 units before dinner    Provider, Historical   HumaLOG KwikPen Insulin 100 unit/mL kwikpen by SubCUTAneous route Before breakfast, lunch, and dinner. Inject as per sliding scale: If BG 60-80 = 2 units   = 3 units  121-170 = 4 units  171-220 = 5 units  221-270 = 6 units  271-320 = 10 units  321 + = 15 units  500+ = 20 units 4/15/22   Provider, Historical   dextromethorphan (Delsym) 30 mg/5 mL liquid Take 30 mg by mouth every twelve (12) hours as needed for Cough. Provider, Historical   lidocaine-prilocaine (EMLA) topical cream Apply  to affected area DIALYSIS MON, WED & FRI. Apply to left arm access site topically in the morning every M/W/F prior to dialysis    Provider, Historical   traZODone (DESYREL) 50 mg tablet Take 50 mg by mouth nightly.     Provider, Historical metoprolol tartrate (LOPRESSOR) 25 mg tablet Take 1 Tablet by mouth every twelve (12) hours. 10/11/21   Clarice Wen MD   b complex-vitamin c-folic acid (NEPHROCAPS) 1 mg capsule Take 1 Capsule by mouth daily. 10/12/21   Clarice Wen MD   diphenhydrAMINE (BenadryL) 25 mg capsule Take 25 mg by mouth daily as needed for Allergies. Provider, Historical   cranberry 500 mg capsule Take 500 mg by mouth daily. Provider, Historical   fluticasone propionate (Flonase Allergy Relief) 50 mcg/actuation nasal spray 2 Sprays by Both Nostrils route every twelve (12) hours as needed for Rhinitis or Allergies. Provider, Historical   esomeprazole (NexIUM) 40 mg capsule Take 40 mg by mouth daily. Provider, Historical   L.acid,para-B. bifidum-S.therm (RISAQUAD) 8 billion cell cap cap Take 1 Capsule by mouth daily. Provider, Historical   insulin degludec Michelle Pencil FlexTouch U-100) 100 unit/mL (3 mL) inpn 10 Units by SubCUTAneous route nightly. Provider, Historical   ergocalciferol (Vitamin D2) 1,250 mcg (50,000 unit) capsule Take 50,000 Units by mouth every Wednesday. Provider, Historical   montelukast (Singulair) 10 mg tablet Take 10 mg by mouth nightly. Provider, Historical   levothyroxine (SYNTHROID) 100 mcg tablet Take 1 Tablet by mouth Daily (before breakfast). 9/8/21   Jalen Rivas MD   biotin 5,000 mcg TbDi Take 5,000 mcg by mouth daily. Provider, Historical   cholecalciferol, vitamin D3, (VITAMIN D3) 2,000 unit tab Take 4,000 Units by mouth daily. Provider, Historical   atorvastatin (LIPITOR) 20 mg tablet Take 20 mg by mouth nightly. Provider, Historical   albuterol (PROVENTIL HFA, VENTOLIN HFA, PROAIR HFA) 90 mcg/actuation inhaler Take 1 Puff by inhalation every four (4) hours as needed for Wheezing. 12/16/18   Martinez Jensen MD   glucose 4 gram chewable tablet Take 12 g by mouth as needed (BS < 60).     Provider, Historical   glucagon (GLUCAGEN) 1 mg injection 1 mg by IntraMUSCular route once as needed (hypoglycemia if patient is unresponsive). Provider, Historical     Allergies   Allergen Reactions    Codeine Nausea and Vomiting    Lettuce Other (comments)    Nitrofurantoin Rash    Pcn [Penicillins] Hives and Rash    Tomato Other (comments)         Social history      Alcohol history   x  unknown and unable to obtain history from patient due to AMS           Smoking history  x  unknown             Family History   Problem Relation Age of Onset    Thyroid Disease Mother         hypo    Hypertension Father     Abdominal aortic aneurysm Father     Neuropathy Father     No Known Problems Sister     Heart Disease Brother     Heart Attack Brother     Heart Surgery Brother     No Known Problems Sister     No Known Problems Brother     Anesth Problems Neg Hx           Review of systems  Unable to obtain history from patient due to AMS. Physical Examination   Visit Vitals  BP (!) 163/72 (BP 1 Location: Left upper arm, BP Patient Position: At rest)   Pulse 98   Temp (!) 100.6 °F (38.1 °C)   Resp 16   LMP 03/27/1986   SpO2 96%      O2 Flow Rate (L/min): 15 l/min   O2 Device: None    General:  Patient is in acute respiratory distress. Head:  Normocephalic, without obvious abnormality, atraumatic   Eyes:  Conjunctivae/corneas clear. Pupils 2 mm reactive bilateral.   E/N/M/T: Nares normal. Septum midline.  No nasal drainage or sinus tenderness  Tongue midline/ non-edematous  Clear oropharynx   Neck: Normal appearance and movements, symmetrical, trachea midline  No palpable adenopathy  No thyroid enlargement, tenderness or nodules  No carotid bruit   No JVD  Trachea midline   Lungs:   Symmetrical chest expansion and respiratory effort  Rhonchi to auscultation bilaterally   Chest wall:  No tenderness or deformity   Heart:  Regular rate and rhythm   Normal S1 and S2; no murmur, click, rub or gallop   Abdomen:   Soft, no tenderness  No rebound, guarding, or rigidity  Non-distended   Bowel sounds normal  No masses or hepatosplenomegaly  No hernias present   Back: No costovertebral angle tenderness  No step-off deformity   Extremities: Extremities normal, atraumatic  No cyanosis   Trace BLE non-pitting edema     Vascular/  Pulses: 2+ radial/ DP bilateral pulses   Integument/  Skin: Erythema bilateral lower extremities  Warm and dry   Musculo-      skeletal: Gait not tested  No calf tenderness   Neuro: GCS 6 (E1 V1 M4). Minimally withdraws BUE/BLE in response to tactile stimuli. Currently aphasic. No facial droop. Sensation grossly intact. No pronator drift. Psych: Alert, oriented x 3           I reviewed the following data:    24 Hour Results:  Recent Results (from the past 24 hour(s))   GLUCOSE, POC    Collection Time: 07/06/22 11:55 AM   Result Value Ref Range    Glucose (POC) 194 (H) 65 - 117 mg/dL    Performed by Jay Piedra    SAMPLES BEING HELD    Collection Time: 07/06/22 11:57 AM   Result Value Ref Range    SAMPLES BEING HELD 1blue 1lav 1red 1pst 1bc(silver)     COMMENT        Add-on orders for these samples will be processed based on acceptable specimen integrity and analyte stability, which may vary by analyte. METABOLIC PANEL, COMPREHENSIVE    Collection Time: 07/06/22 11:57 AM   Result Value Ref Range    Sodium 125 (L) 136 - 145 mmol/L    Potassium HEMOLYZED,RECOLLECT REQUESTED 3.5 - 5.1 mmol/L    Chloride 92 (L) 97 - 108 mmol/L    CO2 25 21 - 32 mmol/L    Anion gap 8 5 - 15 mmol/L    Glucose 201 (H) 65 - 100 mg/dL    BUN 46 (H) 6 - 20 MG/DL    Creatinine 5.93 (H) 0.55 - 1.02 MG/DL    BUN/Creatinine ratio 8 (L) 12 - 20      GFR est AA 9 (L) >60 ml/min/1.73m2    GFR est non-AA 7 (L) >60 ml/min/1.73m2    Calcium 10.0 8.5 - 10.1 MG/DL    Bilirubin, total 0.5 0.2 - 1.0 MG/DL    ALT (SGPT) 31 12 - 78 U/L    AST (SGOT) HEMOLYZED,RECOLLECT REQUESTED 15 - 37 U/L    Alk.  phosphatase 77 45 - 117 U/L    Protein, total 7.5 6.4 - 8.2 g/dL    Albumin 3.0 (L) 3.5 - 5.0 g/dL    Globulin 4.5 (H) 2.0 - 4.0 g/dL    A-G Ratio 0.7 (L) 1.1 - 2.2     CBC WITH AUTOMATED DIFF    Collection Time: 07/06/22 11:57 AM   Result Value Ref Range    WBC 10.3 3.6 - 11.0 K/uL    RBC 3.68 (L) 3.80 - 5.20 M/uL    HGB 13.0 11.5 - 16.0 g/dL    HCT 38.0 35.0 - 47.0 %    .3 (H) 80.0 - 99.0 FL    MCH 35.3 (H) 26.0 - 34.0 PG    MCHC 34.2 30.0 - 36.5 g/dL    RDW 13.6 11.5 - 14.5 %    PLATELET 967 288 - 828 K/uL    MPV 10.9 8.9 - 12.9 FL    NRBC 0.0 0  WBC    ABSOLUTE NRBC 0.00 0.00 - 0.01 K/uL    NEUTROPHILS 85 (H) 32 - 75 %    LYMPHOCYTES 7 (L) 12 - 49 %    MONOCYTES 7 5 - 13 %    EOSINOPHILS 0 0 - 7 %    BASOPHILS 0 0 - 1 %    IMMATURE GRANULOCYTES 1 (H) 0.0 - 0.5 %    ABS. NEUTROPHILS 8.8 (H) 1.8 - 8.0 K/UL    ABS. LYMPHOCYTES 0.7 (L) 0.8 - 3.5 K/UL    ABS. MONOCYTES 0.7 0.0 - 1.0 K/UL    ABS. EOSINOPHILS 0.0 0.0 - 0.4 K/UL    ABS. BASOPHILS 0.0 0.0 - 0.1 K/UL    ABS. IMM.  GRANS. 0.1 (H) 0.00 - 0.04 K/UL    DF SMEAR SCANNED      RBC COMMENTS MACROCYTOSIS  1+        RBC COMMENTS ANISOCYTOSIS  1+       LACTIC ACID    Collection Time: 07/06/22  3:29 PM   Result Value Ref Range    Lactic acid 4.5 (HH) 0.4 - 2.0 MMOL/L   COVID-19 RAPID TEST    Collection Time: 07/06/22  3:29 PM   Result Value Ref Range    Specimen source Nasopharyngeal      COVID-19 rapid test Not detected NOTD     URINALYSIS W/MICROSCOPIC    Collection Time: 07/06/22  3:29 PM   Result Value Ref Range    Color YELLOW/STRAW      Appearance CLEAR CLEAR      Specific gravity 1.017 1.003 - 1.030      pH (UA) 7.5 5.0 - 8.0      Protein 300 (A) NEG mg/dL    Glucose 250 (A) NEG mg/dL    Ketone Negative NEG mg/dL    Bilirubin Negative NEG      Blood TRACE (A) NEG      Urobilinogen 0.2 0.2 - 1.0 EU/dL    Nitrites Negative NEG      Leukocyte Esterase Negative NEG      WBC 0-4 0 - 4 /hpf    RBC 0-5 0 - 5 /hpf    Epithelial cells FEW FEW /lpf    Bacteria Negative NEG /hpf    Hyaline cast 0-2 0 - 5 /lpf   PTT    Collection Time: 07/06/22  3:29 PM   Result Value Ref Range    aPTT 23.0 22.1 - 31.0 sec    aPTT, therapeutic range     58.0 - 77.0 SECS   PROTHROMBIN TIME + INR    Collection Time: 07/06/22  3:29 PM   Result Value Ref Range    INR 1.1 0.9 - 1.1      Prothrombin time 11.4 (H) 9.0 - 11.1 sec   PROCALCITONIN    Collection Time: 07/06/22  3:29 PM   Result Value Ref Range    Procalcitonin 0.62 ng/mL   URINE CULTURE HOLD SAMPLE    Collection Time: 07/06/22  3:29 PM    Specimen: Serum   Result Value Ref Range    Urine culture hold        Urine on hold in Microbiology dept for 2 days. If unpreserved urine is submitted, it cannot be used for addtional testing after 24 hours, recollection will be required. POC VENOUS BLOOD GAS    Collection Time: 07/06/22  4:04 PM   Result Value Ref Range    pH, venous (POC) 7.24 (L) 7.32 - 7.42      pCO2, venous (POC) 49.8 41 - 51 MMHG    pO2, venous (POC) 38 25 - 40 mmHg    HCO3, venous (POC) 21.1 (L) 23.0 - 28.0 MMOL/L    sO2, venous (POC) 61.5 (L) 65 - 88 %    Base deficit, venous (POC) 6.6 mmol/L    Specimen type (POC) VENOUS BLOOD      Performed by Akiko Lopez      Recent Labs     07/06/22  1157   WBC 10.3   HGB 13.0   HCT 38.0        Recent Labs     07/06/22  1529 07/06/22  1157   NA  --  125*   K  --  HEMOLYZED,RECOLLECT REQUESTED   CL  --  92*   CO2  --  25   GLU  --  201*   BUN  --  46*   CREA  --  5.93*   CA  --  10.0   ALB  --  3.0*   TBILI  --  0.5   ALT  --  31   INR 1.1  --        Imaging  EXAM: CT HEAD WO CONT     INDICATION: htn     COMPARISON: CT head 4/4/2022.     CONTRAST: None.     TECHNIQUE: Unenhanced CT of the head was performed using 5 mm images. Brain and  bone windows were generated. Coronal and sagittal reformats.  CT dose reduction  was achieved through use of a standardized protocol tailored for this  examination and automatic exposure control for dose modulation.       FINDINGS:  Study significantly limited by motion.     A 3.5 cm x 3.0 cm x 2.4 cm intraparenchymal hemorrhage with surrounding  vasogenic edema in the left parietal lobe. Stable 4.3 cm x 3.9 cm x 3.4 cm  lobulated cystic lesion in the parafalcine left frontoparietal junction. No  significant mass effect, midline shift, nor evidence of herniation      The bone windows demonstrate no abnormalities. The visualized portions of the  paranasal sinuses and mastoid air cells are clear. Bilateral lens replacements.     IMPRESSION  1. Study significantly limited by motion. 2.  Left parietal intraparenchymal hemorrhage. No evidence of herniation. 3.  Stable left parafalcine cystic lesion.           EXAM: CT CHEST ABD PELV W CONT     INDICATION: infection      COMPARISON: Chest radiograph 7/6/2022, CT abdomen pelvis 12/4/2021, CTA chest  10/25/2021     IV CONTRAST: 100 mL of Isovue-370.     ORAL CONTRAST: None     TECHNIQUE:   Following the uneventful intravenous administration of contrast, thin axial  images were obtained through the chest, abdomen and pelvis. Coronal and sagittal  reformats were generated. CT dose reduction was achieved through use of a  standardized protocol tailored for this examination and automatic exposure  control for dose modulation.     FINDINGS:   Study limited by motion.     CHEST WALL: No mass or axillary lymphadenopathy. Partially visualized left arm  vascular stent. THYROID: No nodule. MEDIASTINUM: No mass or lymphadenopathy. JAN: No mass or lymphadenopathy. THORACIC AORTA: No dissection or aneurysm. MAIN PULMONARY ARTERY: Normal in caliber. TRACHEA/BRONCHI: Patent. ESOPHAGUS: No wall thickening or dilatation. HEART: Cardiomegaly  PLEURA: Small-moderate right and small left pleural effusions. No pneumothorax. LUNGS: Bandlike consolidation in the right lower lobe may reflect pneumonia or  atelectasis. Diffuse septal thickening and patchy faint groundglass, most  compatible with edema     LIVER: No mass. BILIARY TREE: Gallbladder is within normal limits.  CBD is not dilated. SPLEEN: within normal limits. PANCREAS: No mass or ductal dilatation. ADRENALS: Unremarkable. KIDNEYS: No mass, calculus, or hydronephrosis. STOMACH: Unremarkable. SMALL BOWEL: No dilatation or wall thickening. COLON: No dilatation or wall thickening. APPENDIX: Not discretely visualized  PERITONEUM: No ascites or pneumoperitoneum. RETROPERITONEUM: No lymphadenopathy or aortic aneurysm. REPRODUCTIVE ORGANS: Hysterectomy  URINARY BLADDER: Circumferentially thick-walled  BONES: No destructive bone lesion. Degenerative changes. Several chronic left  rib deformities. ABDOMINAL WALL: No mass or hernia. ADDITIONAL COMMENTS: N/A     IMPRESSION  1. Study limited by motion. 2.  Pulmonary edema pattern with bilateral pleural effusions. Right lower lobe  bandlike consolidation may reflect pneumonia or atelectasis. 3.  Circumferential bladder wall thickening, correlate for cystitis. Assessment and Plan     1. ICH (intracerebral hemorrhage) ([facility] [address] (7/6/2022)  2. Hypertensive emergency  3. Altered mental status (AMS)  4. Fever unspecified  5. Sepsis, unspecified organism  6. Acute respiratory failure with hypoxia  7. Pulmonary edema  8. Bilateral pleural effusions  9. Pulmonary consolidation, possible pneumonia or atelectasis  10. Possible cystitis  11. ESRD on hemodialysis  12. Lactic acidosis  13. Uncontrolled type 2 diabetes mellitus with hyperglycemia  14. Down's syndrome    - I called and spoke with patient's next of kin- his sister Cruz Ferguson at phone # [phone]. I confirmed that she is patient's medical decision maker. I asked her regarding her/ family's wishes for patient's advanced directive / code status which she confirmed was DNR (DO NOT RESUSCITATE).   As initial ED report was that patient's family requested comfort care, I spoke with patient's sister - Cruz Ferguson and informed her of the meaning of comfort care measures, which would include no additional diagnostic testing, procedures, telemetry / vital sign monitoring, IV medications, except as pertains to comfort care measure medications to provide symptom relief. Per discussion with patient's sister, she verbalized understanding of the same, confirmed DNR/ comfort care measures, requested expediting admission/ transport from ER to medical (not ICU) bed, and requested Fentanyl for pain. Orders then were placed for admission to medical bed and comfort care measures orders. ADDENDUM:  In the interim since last entry, patient was transported to medical bed with comfort care measures orders. I later received request from nurse that patient's sister- Humberto Kumar wanted to talk with me. When I called her, she notes that she was on speaker phone with her daughter and her son-in-law Dr. Rommel Rodríguez. She now notes that she has talked with her family and she now does not want patient to be on comfort care measures. She and her family note that their is a misunderstanding (despite my extensive aforementioned discussion with patient's sister- Humberot Kumar), and they now want to escalate patient's care to include but not limited to BP management. Per discussion with Dr. Rommel Rodríguez, the patient's family do not want patient to be placed on Nicardipine IV drip, do not want ICU admission, but they do approve intermittent BP medication. She notes that she will talk with hospice nurse tomorrow. As per the family's wishes, I have now discontinued comfort care measures, informed nursing staff, consultants (including on call neurosurgeon and nephrologist) on the same. As the patient has [address] with need to obtain blood pressure control, I had consulted with ICU intensivist for recommendations and possible even admission if no telemetry bed is available. Per discussion with ICU intensivist, he has spoken with patient's family.   Nursing supervisor notes that there is a bed on neuro/telemetry floor and patient will be transferred once unit is ready. Given the change in patient's directive, the following management is as follows    1. ICH (intracerebral hemorrhage) ([facility] [address] (7/6/2022)  -no plan for surgical intervention per neurosurgeon note  -continue Keppra 250 mg IV daily  2. Hypertensive emergency  -order Labetalol prn. No Hydralazine due to tachycardia. 3.  Altered mental status (AMS) with behavioral disturbance  -bouts of agitation  -neuro checks/ fall precautions  4. Fever unspecified  -Acetaminophen prn  -check blood cultures  -considered for IV antibiotic coverage with prior Tmax 102.1 F, possible pneumonia and questionable cystitis on CT report. 5.  Sepsis, unspecified organism  -plan as above  6. Acute respiratory failure with hypoxia  -was on 4 liters O2; pulse oximetry monitoring and titrate O2 to keep O2sats > 94%  7. Pulmonary edema  -place on strict Is and Os / daily weights to monitor fluid status closely  8. Bilateral pleural effusions  -plan as above  9. Pulmonary consolidation, possible pneumonia or atelectasis  -have limited antibiotic options due to patient's drug allergies. Will order Meropenem which patient has had in past without reported allergic reaction  10. Bladder wall thickening, with possible cystitis  11. ESRD on hemodialysis  -per nephrologist note, no plan for HD today; repeat renal panel in a.m.  12. Lactic acidosis  -no aggressive IV fluid resuscitation ordered due to pulmonary edema/ ESRD  13. Uncontrolled type 2 diabetes mellitus with hyperglycemia  -order Humalog insulin sliding scale coverage, scheduled BG checks  14. Down's syndrome  -supportive care    CODE STATUS:  DNR/DNR.              Signed by: Ashlyn Mitchell MD    July 6, 2022 at 9:51 PM

## 2022-07-07 NOTE — PROGRESS NOTES
Patient name: Ryan Agudelo  MRN: [id number]    Nephrology Progress note:    Assessment:  ESRD: MWF [facility]     Left parietal intraparenchymal hemorrhage     HTN emergency: on Cardene drip     DM2     Down Syndrome     Hyponatremia    Down Syndrome    Plan/Recommendations:  Hospice meeting this morning  Family requesting her mother be able to see patient today  Ct efforts to keep patient comfortable  No HD. No more labs please      Subjective:  Family at bedside. Patient in restraints. Sedated/Less agitated currently    ROS:   UTO    Exam:  Visit Vitals  BP (!) 125/40 (BP 1 Location: Left leg, BP Patient Position: At rest)   Pulse 98   Temp 99.5 °F (37.5 °C)   Resp 26   LMP 03/27/1986   SpO2 94%     Wt Readings from Last 3 Encounters:   06/02/22 58.5 kg (129 lb)   05/19/22 59.9 kg (132 lb 0.9 oz)   04/19/22 58.2 kg (128 lb 6.4 oz)     No intake or output data in the 24 hours ending 07/07/22 0919    Gen: NAD  HEENT:Down facies  Cardiovascular: Regular rate, normal rhythm. Ext: No clubbing, No cyanosis.  No peripheral edema        Current Facility-Administered Medications   Medication Dose Route Frequency Last Admin    [START ON 7/8/2022] meropenem (MERREM) 500 mg in 0.9% sodium chloride (MBP/ADV) 50 mL MBP  0.5 g IntraVENous Q24H      insulin lispro (HUMALOG) injection   SubCUTAneous Q6H      insulin lispro (HUMALOG) injection 14 Units  14 Units SubCUTAneous ONCE      glucose chewable tablet 16 g  4 Tablet Oral PRN      dextrose 10 % infusion 0-250 mL  0-250 mL IntraVENous PRN      glucagon (GLUCAGEN) injection 1 mg  1 mg IntraMUSCular PRN      insulin glargine (LANTUS) injection 8 Units  8 Units SubCUTAneous DAILY      [START ON 7/14/2022] levothyroxine (SYNTHROID) injection 75 mcg  75 mcg IntraVENous Q24H      insulin regular (NOVOLIN R, HUMULIN R) 100 Units in 0.9% sodium chloride 100 mL infusion  0-50 Units/hr IntraVENous TITRATE      insulin lispro (HUMALOG) injection   SubCUTAneous TIDAC      glucose chewable tablet 16 g  4 Tablet Oral PRN      dextrose 10 % infusion 0-250 mL  0-250 mL IntraVENous PRN      glucagon (GLUCAGEN) injection 1 mg  1 mg IntraMUSCular PRN      niCARdipine (CARDENE) 25 mg in 0.9% sodium chloride 250 mL ([id number])  0-15 mg/hr IntraVENous TITRATE Stopped at 07/06/22 1955    levETIRAcetam (KEPPRA) injection 250 mg  250 mg IntraVENous DAILY 250 mg at 07/06/22 1630    sodium chloride (NS) flush 5-40 mL  5-40 mL IntraVENous Q8H 10 mL at 07/07/22 0629    sodium chloride (NS) flush 5-40 mL  5-40 mL IntraVENous PRN      acetaminophen (TYLENOL) tablet 650 mg  650 mg Oral Q6H PRN      Or    acetaminophen (TYLENOL) suppository 650 mg  650 mg Rectal Q6H  mg at 07/07/22 0641    polyethylene glycol (MIRALAX) packet 17 g  17 g Oral DAILY PRN      ondansetron (ZOFRAN ODT) tablet 4 mg  4 mg Oral Q8H PRN      Or    ondansetron (ZOFRAN) injection 4 mg  4 mg IntraVENous Q6H PRN      bisacodyL (DULCOLAX) suppository 10 mg  10 mg Rectal DAILY PRN      labetaloL (NORMODYNE;TRANDATE) injection 10 mg  10 mg IntraVENous Q6H PRN 10 mg at 07/06/22 2329    sodium chloride (NS) flush 5-40 mL  5-40 mL IntraVENous Q8H 10 mL at 07/07/22 0629    sodium chloride (NS) flush 5-40 mL  5-40 mL IntraVENous PRN      polyethylene glycol (MIRALAX) packet 17 g  17 g Oral DAILY PRN      fentaNYL citrate (PF) injection 12.5 mcg  12.5 mcg IntraVENous Q4H PRN 12.5 mcg at 07/07/22 6853       Labs/Data:    Lab Results   Component Value Date/Time    WBC 14.1 (H) 07/07/2022 06:04 AM    Hemoglobin (POC) 12.2 11/05/2012 03:10 PM    HGB 10.1 (L) 07/07/2022 06:04 AM    Hematocrit (POC) 36 11/05/2012 03:10 PM    HCT 32.0 (L) 07/07/2022 06:04 AM    PLATELET 116 93/69/7958 06:04 AM    .7 (H) 07/07/2022 06:04 AM       Lab Results   Component Value Date/Time    Sodium 120 (L) 07/07/2022 06:04 AM    Potassium 6.3 (H) 07/07/2022 06:04 AM    Chloride 85 (L) 07/07/2022 06:04 AM    CO2 11 (LL) 07/07/2022 06:04 AM    Anion gap 24 (H) 07/07/2022 06:04 AM    Glucose 915 (HH) 07/07/2022 06:04 AM    BUN 71 (H) 07/07/2022 06:04 AM    Creatinine 7.63 (H) 07/07/2022 06:04 AM    BUN/Creatinine ratio 9 (L) 07/07/2022 06:04 AM    GFR est AA 7 (L) 07/07/2022 06:04 AM    GFR est non-AA 6 (L) 07/07/2022 06:04 AM    Calcium 8.8 07/07/2022 06:04 AM       Patient seen and examined. Chart reviewed. Labs, data and other pertinent notes reviewed in last 24 hrs.     Discussed with patient's family and RN    Signed by:  Shiela Garcia MD  [address] [employer]

## 2022-07-07 NOTE — DISCHARGE SUMMARY
Discharge Summary       PATIENT ID: René Mosqueda  MRN: [id number]   YOB: 1970    DATE OF ADMISSION: 7/7/2022 10:58 AM    DATE OF DISCHARGE: 7/7/2022   PRIMARY CARE PROVIDER: Yvette Mar MD     ATTENDING PHYSICIAN: Sebastian Cantrell MD  DISCHARGING PROVIDER: Diamond Tan NP    To contact this individual call [phone] and ask the  to page. If unavailable ask to be transferred the Adult Hospitalist Department. CONSULTATIONS: None    PROCEDURES/SURGERIES: * No surgery found *    [address] COURSE:   Per H&P, Lenny Gr is a 45 y.o. female with asthma, ESRD on hemodialysis, type 2 diabetes mellitus, h/o  DKA, Down's syndrome, GERD, hypothyroidism, pneumonia, seizures presented with initial reports of facial swelling with suspected allergic reaction. Patient reportedly was transported via EMS from hemodialysis center for the same, given Benadryl IV. Per additional reports, patient's brother noted that patient had recent headache as main complaint. On arrival in the ED, initial recorded vital signs were T 100.4 F, /77, HR 90, RR 24, O2sat 97% on 3 liters O2 nasal cannula . Patient notedly then placed on 100% non re-breather face mask. VBG showed pH 7.24/ pCO2 49.8/ pO2 38, HCO3 21.1, base deficit - 6.6, and Svo2 38%. Tmax increased to 102.1 F. Abnormal labs included Na 125, , BUN 46, creatinine 5.93, GFR 7, and lactic acid 4.5. Chest xray portable showed increased interstitial bilateral opacities likely pulmonary edema, per radiologist report. COVID 19 test was negative (not detected). Blood cultures were collected. Patient was seen in consultation by nephrologist who per note, discussed prognosis with family and they would like to hold HD and discuss hospice measures.   CT head without IV contrast showed a 3.5 cm x 3.0 cm x 2.4 cm intraparenchymal hemorrhage with surrounding vasogenic edema in the left parietal lobe; stable 4.3 cm x 3.9 cm x 3.4 cm lobulated cystic lesion in the parafalcine left frontoparietal junction. Initial plan was for Nicardipine IV drip for HTN emergency, Keppra 250 mg IV daily and ICU admission (as ED discussed with intensivist). Patient was seen in consultation by neurosurgeon who per consultant note, discussed with (patient's family) initial workup to include blood pressure control, frequent neurologic checks, intensive care unit admission, MRI to determine underlying lesion, potential surgery if underlying lesion were found, potential surgery if increase in hemorrhage but they (patient's family) said that they would not like to pursue any aggressive treatment and they would rather consider hospice care. Per ED MD note, per his discussion with family,\" they would like the patient to be in hospice; would not like any invasive procedures; per neurosurgery and ICU, can admit to hospitalist service and  family would like to change to comfort care, no dialysis.  Family agreeable to benzos and fentanyl for pain/sedation but aware that this may cause respiratory depression and emphasize that they want patient comfortable. \".  ED requested admission to hospitalist service and per discussion with ED MD, plan is for comfort care measures. Patient is now seen for admission. Family were not present in the room at the time of examination to obtain additional history        DISCHARGE DIAGNOSES / PLAN:      In the course of her time at our facility patient why this initially for comfort measures. After further discussion with the overnight hospitalist with family members, there was concern for continuing treatment. Patient was treated with antibiotics in the form of meropenem. She was noted to have hyperglycemia, insulin was started and as patient was noted to be in DKA, insulin infusion was ordered. Otherwise she blood pressure remained stable.   Patient was in the morning evaluated by hospice, after discussion with family members, POA's, patient was excepted to inpatient hospice. Patient discharged from hospitalist service       PENDING TEST RESULTS:   At the time of discharge the following test results are still pending: na    FOLLOW UP APPOINTMENTS:    Follow-up Information    None          ADDITIONAL CARE RECOMMENDATIONS: Per hospice    DIET: Per hospice services    ACTIVITY: To be determined    WOUND CARE: N/A    EQUIPMENT needed: To be determined      DISCHARGE MEDICATIONS:  Current Discharge Medication List            NOTIFY YOUR PHYSICIAN FOR ANY OF THE FOLLOWING:   Fever over 101 degrees for 24 hours. Chest pain, shortness of breath, fever, chills, nausea, vomiting, diarrhea, change in mentation, falling, weakness, bleeding. Severe pain or pain not relieved by medications. Or, any other signs or symptoms that you may have questions about.     DISPOSITION:    Home With:   OT  PT  HH  RN       Long term SNF/Inpatient Rehab    Independent/assisted living   x Hospice    Other:       PATIENT CONDITION AT DISCHARGE:     Functional status   x Poor     Deconditioned     Independent      Cognition     Lucid     Forgetful     Dementia      Catheters/lines (plus indication)    [facility]     PICC     PEG     None      Code status     Full code     DNR      PHYSICAL EXAMINATION AT DISCHARGE:   Refer to Progress Note      CHRONIC MEDICAL DIAGNOSES:  Problem List as of 7/7/2022 Date Reviewed: 6/6/2022          Codes Class Noted - Resolved    ICH (intracerebral hemorrhage) ([facility] ICD-10-CM: I61.9  ICD-9-CM: 767  7/6/2022 - Present        Hyperglycemia due to type 1 diabetes mellitus ([facility] ICD-10-CM: E10.65  ICD-9-CM: 250.01  4/19/2022 - Present        Severe anemia ICD-10-CM: D64.9  ICD-9-CM: 285.9  10/8/2021 - Present        ESRD on dialysis [facility]) ICD-10-CM: N18.6, Z99.2  ICD-9-CM: 585.6, V45.11  10/1/2021 - Present        Metabolic acidosis LUM-18-[place]: E87.2  ICD-9-CM: 276.2  1/19/2020 - Present        Asthma exacerbation ICD-10-CM: J45.901  ICD-9-CM: 493.92  1/19/2020 - Present        ROBBY (acute kidney injury) [facility]) ICD-10-CM: N17.9  ICD-9-CM: 584.9  1/15/2020 - Present        After-cataract with vision obscured ICD-10-CM: H26.499  ICD-9-CM: 366.53  2/4/2016 - Present        Advance directive on file ICD-10-CM: Z78.9  ICD-9-CM: V49.89  12/28/2015 - Present        Acne ICD-10-CM: L70.9  ICD-9-CM: 706.1  8/20/2014 - Present        Asthma (Chronic) ICD-10-CM: J45.909  ICD-9-CM: 493.90  4/29/2013 - Present        HTN (hypertension) (Chronic) ICD-10-CM: I10  ICD-9-CM: 401.9  4/29/2013 - Present        DM (diabetes mellitus), type 1, uncontrolled ICD-10-CM: Kyra Tillman  ICD-9-CM: 250.03  11/14/2012 - Present        Down syndrome ICD-10-CM: Q90.9  ICD-9-CM: 758.0  7/23/2010 - Present        Acquired hypothyroidism ICD-10-CM: E03.9  ICD-9-CM: 244.9  7/23/2010 - Present        Anemia, unspecified ICD-10-CM: D64.9  ICD-9-CM: 285.9  7/23/2010 - Present        RESOLVED: DKA (diabetic ketoacidosis) ([facility] ICD-10-CM: E11.10  ICD-9-CM: 250.12  5/17/2022 - 6/6/2022        RESOLVED: AMS (altered mental status) ICD-10-CM: R41.82  ICD-9-CM: 780.97  4/4/2022 - 6/6/2022        RESOLVED: DKA (diabetic ketoacidosis) ([facility] ICD-10-CM: E11.10  ICD-9-CM: 250.12  12/4/2021 - 4/19/2022        RESOLVED: Acute respiratory failure with hypoxia ([facility] ICD-10-CM: J96.01  ICD-9-CM: 518.81  10/25/2021 - 6/6/2022        RESOLVED: CKD (chronic kidney disease), stage V ([facility] ICD-10-CM: N18.5  ICD-9-CM: 585.5  10/8/2021 - 6/6/2022        RESOLVED: Fluid overload ICD-10-CM: E87.70  ICD-9-CM: 276.69  10/8/2021 - 6/6/2022        RESOLVED: Hyponatremia ICD-10-CM: E87.1  ICD-9-CM: 276.1  2/28/2018 - 1/1/2019        RESOLVED: DKA (diabetic ketoacidoses) ICD-10-CM: E11.10  ICD-9-CM: 250.12  9/14/2017 - 1/1/2019        RESOLVED: Diabetic macular edema ([facility] ICD-10-CM: E11.311  ICD-9-CM: 250.50, 362.01, 362.07  2/4/2016 - 4/19/2022        RESOLVED: Hyperglycemia ICD-10-CM: R73.9  ICD-9-CM: 790.29 7/28/2014 - 7/29/2014        RESOLVED: Syncope and collapse ICD-10-CM: R55  ICD-9-CM: 780.2  4/29/2013 - 1/1/2019        RESOLVED: Hypoglycemia ICD-10-CM: E16.2  ICD-9-CM: 251.2  6/27/2012 - 4/19/2022        RESOLVED: Type II or unspecified type diabetes mellitus without mention of complication, uncontrolled ICD-10-CM: [id number]  ICD-9-CM: 250.02  7/23/2010 - 11/14/2012              Greater than 30 minutes were spent with the patient on counseling and coordination of care    Signed:   Debora Walker NP  7/7/2022  11:05 AM

## 2022-07-08 ENCOUNTER — HOME CARE VISIT (OUTPATIENT)
Dept: HOSPICE | Facility: HOSPICE | Age: 52
End: 2022-07-08
Payer: MEDICARE

## 2022-07-08 NOTE — PROGRESS NOTES
Physician Progress Note      Arpan Hood  CSN #:                  [id number]  :                       1970  ADMIT DATE:       2022 11:49 AM  DISCH DATE:        2022 10:57 AM  RESPONDING  PROVIDER #:        Willa Andrade NP          QUERY TEXT:    Pt admitted with intracerebral hemorrhage and noted to have an unspecified fever at admission. The History and Physical documented sepsis and possible pneumonia, neither of which appeared in subsequent rounding notes or the D/C summary. Pt had temp of 102.1, lactic of 4.5, procal of 0.62 and HR of 90 at the time of admission. The pt received IV Abx prior to being admitted to Hospice. If possible, please document if you were evaluating and /or treating any of the following: The medical record reflects the following:    Risk Factors: Fever, possible pneumonia    Clinical Indicators:    -- At the time of admission: temp = 100.4, 102.1; lactic = 4.5; procal = 0.62; WBC = 10.3; HR = 90; RR = 24    -- H&P noted:  Fever unspecified  -considered for IV antibiotic coverage with prior Tmax 102.1 F, possible pneumonia and questionable cystitis on CT report. Sepsis, unspecified organism    -- CT chest/abd/pelv  =  Findings:  LUNGS: Bandlike consolidation in the right lower lobe may reflect pneumonia or atelectasis. Diffuse septal thickening and patchy faint groundglass, most compatible with edema  URINARY BLADDER: Circumferentially thick-walled  Impresson:  1. Study limited by motion. 2.  Pulmonary edema pattern with bilateral pleural effusions. Right lower lobe bandlike consolidation may reflect pneumonia or atelectasis. 3.  Circumferential bladder wall thickening, correlate for cystitis    Treatment: IV Abx (meropenem), IV acyclovir, imaging studies, serial lab monitoring, vitals monitoring    Thank-you,  Eunice Carlson RN, [facility]  Clinical   [email or web address]  [phone]  You can also contact me via [address] [place] [address]. Options provided:  -- Sepsis due to pneumonia, present on admission  -- Sepsis present on admission due to other, please specify, Please specify underlying infectious process. -- Sepsis was ruled out  -- Other - I will add my own diagnosis  -- Disagree - Not applicable / Not valid  -- Disagree - Clinically unable to determine / Unknown  -- Refer to Clinical Documentation Reviewer    PROVIDER RESPONSE TEXT:    This patient had sepsis due to pneumonia, which was present on admission.     Query created by: Farheen Youngblood on 7/8/2022 3:26 PM      Electronically signed by:  Halina Pierce NP 7/8/2022 6:18 PM

## 2022-07-12 LAB
BACTERIA SPEC CULT: NORMAL
SERVICE CMNT-IMP: NORMAL

## 2023-08-07 NOTE — PROGRESS NOTES
----- Message from April Nagle sent at 8/7/2023  1:30 PM EDT -----  Subject: Medication Problem    Medication: levothyroxine (SYNTHROID) 75 MCG tablet  Dosage:  Take 1 tablet by mouth Daily - Oral  Ordering Provider: Alvaro Dumont    Question/Problem: Patient was given a paper script for this refill on 8/4.   she would like this medication sent to [facility] for a 90 day supply (the   paper script only have her a 30 day supply.) please call patient back to   advise, thank you       Pharmacy: [address]   [phone] Delfino Cummings [phone]    ---------------------------------------------------------------------------  --------------  Marcela Millbury Rajendra  [phone]; OK to leave message on voicemail  ---------------------------------------------------------------------------  --------------    SCRIPT ANSWERS  Relationship to Patient: Self Bedside and Verbal shift change report given to Amarilys Valentin (oncoming nurse) by Chantelle Martin (offgoing nurse). Report included the following information SBAR, Kardex, ED Summary, Intake/Output, MAR, Recent Results and Cardiac Rhythm NSR.

## 2024-07-09 NOTE — ED NOTES
Bariatric Surgery Consultation    Subjective:     The patient is a 41 y.o. obese  female with a Body mass index is 64.48 kg/m²..  The patient has been at her heaviest weight for the past year;  she has been overweight since childhood;  she has been considering surgery since 2023; she desires surgery at this time because medical efforts at weight loss been unsuccessful.  Tequila Talley has tried multiple diets in her lifetime most recently tried unsupervised diets; she is also on Ozempic (diabetes management).    Bariatric comorbidities present are   Patient Active Problem List   Diagnosis    Gastroesophageal reflux disease without esophagitis    Morbid obesity (HCC)    Hypercholesteremia    Type 2 diabetes mellitus without complication, without long-term current use of insulin (HCC)    Snores     The patient desires laparoscopic gastric bypass surgery for surgical weight loss.  The patients goal weight is 230 lbs.  The highest acceptable weight is 280 lbs. These goals are consistent with expected outcomes of their desired operation.  her Medical goals are diabetes resolution; her qualty of life goals are to improve mobility, decrease fatigue.    Patient Active Problem List    Diagnosis Date Noted    Gastroesophageal reflux disease without esophagitis 07/09/2024    Morbid obesity (HCC) 07/09/2024    Hypercholesteremia 07/09/2024    Type 2 diabetes mellitus without complication, without long-term current use of insulin (HCC) 07/09/2024    Snores 07/09/2024      Past Surgical History:   Procedure Laterality Date    CHOLECYSTECTOMY, LAPAROSCOPIC  2003      Social History     Tobacco Use    Smoking status: Never    Smokeless tobacco: Never   Substance Use Topics    Alcohol use: Not on file      No family history on file.   Prior to Admission medications    Medication Sig Start Date End Date Taking? Authorizing Provider   atorvastatin (LIPITOR) 40 MG tablet Take 1 tablet by mouth daily   Yes Provider,  Per MD- hold off on insulin infusion, accuchecks q1hr, and BMP four hours from last check.

## (undated) DEVICE — SUTURE MCRYL SZ 4-0 L27IN ABSRB UD L19MM PS-2 1/2 CIR PRIM Y426H

## (undated) DEVICE — SOL INJ SOD CL 0.9% 500ML BG --

## (undated) DEVICE — ROCKER SWITCH PENCIL BLADE ELECTRODE, HOLSTER: Brand: EDGE

## (undated) DEVICE — SOLUTION IRRIG 1000ML 0.9% SOD CHL USP POUR PLAS BTL

## (undated) DEVICE — Device

## (undated) DEVICE — SUTURE VCRL SZ 3-0 L27IN ABSRB UD L26MM SH 1/2 CIR J416H

## (undated) DEVICE — PART NUMBER 108260, VTI 8 MHZ DISPOSABLE DOPPLER PROBE, STRAIGHT, BOX: Brand: VTI 8 MHZ DISPOSABLE DOPPLER PROBE, STRAIGHT, BOX

## (undated) DEVICE — SUTURE GOR TX SZ 5-0 L30IN NONABSORBABLE L9MM TTC-9 3/8 CIR 6M02B

## (undated) DEVICE — SPONGE GZ W4XL4IN COT 12 PLY TYP VII WVN C FLD DSGN

## (undated) DEVICE — 4F 40CM OVER-THE-WIRE EMBOLECTOMY CATHETER, EIFU: Brand: LEMAITRE EMBOLECTOMY CATHETER

## (undated) DEVICE — GARMENT,MEDLINE,DVT,INT,CALF,MED, GEN2: Brand: MEDLINE

## (undated) DEVICE — SOLUTION IRRIG 1000ML STRL H2O USP PLAS POUR BTL

## (undated) DEVICE — SUT PROL 6-0 18IN BV1 DA BLU --

## (undated) DEVICE — SUTURE PERMAHAND SZ 3-0 L30IN NONABSORBABLE BLK SILK BRAID A304H

## (undated) DEVICE — HANDLE LT SNAP ON ULT DURABLE LENS FOR TRUMPF ALC DISPOSABLE

## (undated) DEVICE — SUTURE NONABSORBABLE MONOFILAMENT 5-0 CV-6 TTC-9 24 IN GORTX 6K02A